# Patient Record
Sex: FEMALE | Race: WHITE | NOT HISPANIC OR LATINO | Employment: OTHER | URBAN - METROPOLITAN AREA
[De-identification: names, ages, dates, MRNs, and addresses within clinical notes are randomized per-mention and may not be internally consistent; named-entity substitution may affect disease eponyms.]

---

## 2017-02-10 ENCOUNTER — OFFICE VISIT (OUTPATIENT)
Dept: ENDOCRINOLOGY | Age: 72
End: 2017-02-10

## 2017-02-10 ENCOUNTER — TELEPHONE (OUTPATIENT)
Dept: SURGERY | Facility: CLINIC | Age: 72
End: 2017-02-10

## 2017-02-10 VITALS
WEIGHT: 221 LBS | HEART RATE: 72 BPM | DIASTOLIC BLOOD PRESSURE: 68 MMHG | BODY MASS INDEX: 39.16 KG/M2 | OXYGEN SATURATION: 98 % | SYSTOLIC BLOOD PRESSURE: 130 MMHG | HEIGHT: 63 IN

## 2017-02-10 DIAGNOSIS — E05.90 HYPERTHYROIDISM: Primary | ICD-10-CM

## 2017-02-10 DIAGNOSIS — R63.5 ABNORMAL WEIGHT GAIN: ICD-10-CM

## 2017-02-10 DIAGNOSIS — E05.00 GRAVES DISEASE: ICD-10-CM

## 2017-02-10 LAB
ALBUMIN SERPL-MCNC: 4.4 G/DL (ref 3.5–5.2)
ALBUMIN/GLOB SERPL: 1.5 G/DL
ALP SERPL-CCNC: 166 U/L (ref 39–117)
ALT SERPL-CCNC: 16 U/L (ref 1–33)
AST SERPL-CCNC: 30 U/L (ref 1–32)
BASOPHILS # BLD AUTO: 0.01 10*3/MM3 (ref 0–0.2)
BASOPHILS NFR BLD AUTO: 0.2 % (ref 0–1.5)
BILIRUB SERPL-MCNC: 0.4 MG/DL (ref 0.1–1.2)
BUN SERPL-MCNC: 14 MG/DL (ref 8–23)
BUN/CREAT SERPL: 11.1 (ref 7–25)
CALCIUM SERPL-MCNC: 9.5 MG/DL (ref 8.6–10.5)
CHLORIDE SERPL-SCNC: 104 MMOL/L (ref 98–107)
CO2 SERPL-SCNC: 19.9 MMOL/L (ref 22–29)
CREAT SERPL-MCNC: 1.26 MG/DL (ref 0.57–1)
EOSINOPHIL # BLD AUTO: 0.19 10*3/MM3 (ref 0–0.7)
EOSINOPHIL NFR BLD AUTO: 3.6 % (ref 0.3–6.2)
ERYTHROCYTE [DISTWIDTH] IN BLOOD BY AUTOMATED COUNT: 16 % (ref 11.7–13)
GLOBULIN SER CALC-MCNC: 3 GM/DL
GLUCOSE SERPL-MCNC: 104 MG/DL (ref 65–99)
HCT VFR BLD AUTO: 42.4 % (ref 35.6–45.5)
HGB BLD-MCNC: 13.9 G/DL (ref 11.9–15.5)
IMM GRANULOCYTES # BLD: 0 10*3/MM3 (ref 0–0.03)
IMM GRANULOCYTES NFR BLD: 0 % (ref 0–0.5)
LYMPHOCYTES # BLD AUTO: 1.49 10*3/MM3 (ref 0.9–4.8)
LYMPHOCYTES NFR BLD AUTO: 28.4 % (ref 19.6–45.3)
MCH RBC QN AUTO: 29.1 PG (ref 26.9–32)
MCHC RBC AUTO-ENTMCNC: 32.8 G/DL (ref 32.4–36.3)
MCV RBC AUTO: 88.9 FL (ref 80.5–98.2)
MONOCYTES # BLD AUTO: 0.25 10*3/MM3 (ref 0.2–1.2)
MONOCYTES NFR BLD AUTO: 4.8 % (ref 5–12)
NEUTROPHILS # BLD AUTO: 3.3 10*3/MM3 (ref 1.9–8.1)
NEUTROPHILS NFR BLD AUTO: 63 % (ref 42.7–76)
PLATELET # BLD AUTO: 133 10*3/MM3 (ref 140–500)
POTASSIUM SERPL-SCNC: 5.1 MMOL/L (ref 3.5–5.2)
PROT SERPL-MCNC: 7.4 G/DL (ref 6–8.5)
RBC # BLD AUTO: 4.77 10*6/MM3 (ref 3.9–5.2)
SODIUM SERPL-SCNC: 139 MMOL/L (ref 136–145)
T4 FREE SERPL-MCNC: 0.8 NG/DL (ref 0.93–1.7)
TSH SERPL DL<=0.005 MIU/L-ACNC: 0.09 MIU/ML (ref 0.27–4.2)
WBC # BLD AUTO: 5.24 10*3/MM3 (ref 4.5–10.7)

## 2017-02-10 PROCEDURE — 99214 OFFICE O/P EST MOD 30 MIN: CPT | Performed by: INTERNAL MEDICINE

## 2017-02-10 RX ORDER — ALLOPURINOL 300 MG/1
300 TABLET ORAL AS NEEDED
COMMUNITY
Start: 2017-01-20 | End: 2017-07-07

## 2017-02-10 NOTE — TELEPHONE ENCOUNTER
Patient called today with c/o golf-ball sized bulge at umb hernia repair site that has appeared over night. The bulge is hard. Has no redness, pain, drainage or change in bowel habits. I have scheduled the patient with Dr. Moore on 3/8/17 for a possible incisional hernia.     I will send a message to Dr. Moore to see if she would like to move up the patient or order imaging prior to her appointment.  Patient was also instructed to call our office if her bowel habits change or she the bulge starts to become painful leading up to her appointment on 3/8/17.       natanael    Move her up to next Monday, and no xrays    MD natanael Caicedo    That's fine    Patt Moore MD

## 2017-02-10 NOTE — PROGRESS NOTES
71 y.o.    Patient Care Team:  Moe Matute MD as PCP - General  Moe Matute MD as PCP - Family Medicine    Chief Complaint:    F/U HYPERTHYROID GRAVE'S DISEASE  Subjective     HPI   Hyperthyroidism, Graves' disease    Patient is a 71-year-old female with a history of large goiter and Graves' disease and hyperthyroidism came for followup.  Patient reported that she has been compliant with methimazole 10 mg twice daily. She denies any side effects. She is tolerating the medication well.  She denied any recent dysphagia other than occasional problem. She denies any change in her voice.  She reports that she does have shortness of breath on exertion.  She also reported that she started feeling much better after starting medication methimazole.  Abnormal weight gain.  Patient gained nearly 14 pounds in the past 2 months.  She denies any cold intolerance. No constipation       Interval History:     71-year-old female no previous history of pulmonary issues in the past admitted for the evaluation of left-sided abdominal pain. She's noted to have severe diverticular disease with abscess and presently ongoing evaluation. She gets short of breath with exertion. She's had workup with a CT chest results of which revealed a large thyroid mass causing tracheal deviation. Currently she denies any problems swallowing. She denies any cough but has some wheezing with exertion. No aspiration has been reported.  Patient's thyroid function was analyzed and TSH was suppressed with elevated. T4. With this picture of hyperthyroidism was concentrated for further managing patient's thyroid condition.      Patient reports very occasional dysphagia. She denies any change in voice. She does report to shortness of breath and is not clear as to the source of shortness of breath.  Patient denied any symptoms of palpitations or sweating episodes for tremors or diarrhea. She has had diverticulitis which has complicated the GI  symptoms.  She has lost weight but also reported that her appetite has been very low which could explain the weight loss      Patient frequently refers to Dr. Camarillo doing a surgery on the right side for thyroid mass in the past. Currently has a CT scan reveals a large thyroid mass on the left side pushing the trachea to the right and causing some tracheal compression.  Patient is currently being evaluated by general surgery, pulmonary, infection disease  Patient and family denied any family history of thyroid cancer. She's not known to have any thyroid dysfunction in the past other than the fact that she had a goiter on the right side of the chest which was removed several years agoher       The following portions of the patient's history were reviewed and updated as appropriate: allergies, current medications, past family history, past medical history, past social history, past surgical history and problem list.    Past Medical History   Diagnosis Date   • Diverticular disease    • Kidney calculi    • PONV (postoperative nausea and vomiting)    • Renal disorder      Family History   Problem Relation Age of Onset   • Heart disease Father      Social History     Social History   • Marital status:      Spouse name: N/A   • Number of children: N/A   • Years of education: N/A     Occupational History   • Not on file.     Social History Main Topics   • Smoking status: Never Smoker   • Smokeless tobacco: Not on file   • Alcohol use No   • Drug use: No   • Sexual activity: Defer     Other Topics Concern   • Not on file     Social History Narrative     Allergies   Allergen Reactions   • Penicillins Hives       Current Outpatient Prescriptions:   •  HYDROcodone-acetaminophen (NORCO) 7.5-325 MG per tablet, Take 1-2 tablets by mouth Every 8 (Eight) Hours As Needed for moderate pain (4-6) for up to 20 doses., Disp: 20 tablet, Rfl: 0  •  methIMAzole (TAPAZOLE) 10 MG tablet, Take 1 tablet by mouth 2 (Two) Times a Day  "With Meals., Disp: 60 tablet, Rfl: 5        Review of Systems   Constitutional: Negative for chills, fatigue and fever.   Eyes: Negative for visual disturbance.   Respiratory: Positive for shortness of breath.    Cardiovascular: Negative for chest pain and palpitations.   Gastrointestinal: Positive for diarrhea. Negative for constipation, nausea and vomiting.   Endocrine: Negative for polydipsia.   Genitourinary: Negative for frequency.   Neurological: Negative for numbness.   All other systems reviewed and are negative.      Objective       Vitals:    02/10/17 0941   BP: 130/68   Pulse: 72   SpO2: 98%   Weight: 221 lb (100 kg)   Height: 63\" (160 cm)     Body mass index is 39.15 kg/(m^2).      Physical Exam   Constitutional: She is oriented to person, place, and time. She appears well-developed and well-nourished.   Obese   HENT:   Head: Normocephalic and atraumatic.   Eyes: EOM are normal. Pupils are equal, round, and reactive to light.   Neck: Normal range of motion. Neck supple. No tracheal deviation present. No thyromegaly present.   Cardiovascular: Normal rate, regular rhythm, normal heart sounds and intact distal pulses.    Tachycardia   Pulmonary/Chest: Effort normal and breath sounds normal.   Abdominal: Soft. Bowel sounds are normal. She exhibits distension. There is no tenderness.   Musculoskeletal: Normal range of motion. She exhibits no edema.   Neurological: She is alert and oriented to person, place, and time. She has normal reflexes.   Skin: Skin is warm and dry. No erythema.   Psychiatric: She has a normal mood and affect. Her behavior is normal.   Nursing note and vitals reviewed.    Results Review:     I reviewed the patient's new clinical results.    Medical records reviewed  Summary:      Admission on 12/06/2016, Discharged on 12/06/2016   Component Date Value Ref Range Status   • Glucose 12/06/2016 119* 65 - 99 mg/dL Final   • BUN 12/06/2016 18  8 - 23 mg/dL Final   • Creatinine 12/06/2016 0.91  " 0.57 - 1.00 mg/dL Final   • Sodium 12/06/2016 138  136 - 145 mmol/L Final   • Potassium 12/06/2016 4.3  3.5 - 5.2 mmol/L Final   • Chloride 12/06/2016 103  98 - 107 mmol/L Final   • CO2 12/06/2016 23.6  22.0 - 29.0 mmol/L Final   • Calcium 12/06/2016 9.3  8.6 - 10.5 mg/dL Final   • Total Protein 12/06/2016 7.3  6.0 - 8.5 g/dL Final   • Albumin 12/06/2016 3.90  3.50 - 5.20 g/dL Final   • ALT (SGPT) 12/06/2016 15  1 - 33 U/L Final   • AST (SGOT) 12/06/2016 17  1 - 32 U/L Final   • Alkaline Phosphatase 12/06/2016 158* 39 - 117 U/L Final   • Total Bilirubin 12/06/2016 0.4  0.1 - 1.2 mg/dL Final   • eGFR Non African Amer 12/06/2016 61  >60 mL/min/1.73 Final   • Globulin 12/06/2016 3.4  gm/dL Final   • A/G Ratio 12/06/2016 1.1  g/dL Final   • BUN/Creatinine Ratio 12/06/2016 19.8  7.0 - 25.0 Final   • Anion Gap 12/06/2016 11.4  mmol/L Final   • Lipase 12/06/2016 36  13 - 60 U/L Final   • Color, UA 12/06/2016 Yellow  Yellow, Straw Final   • Appearance, UA 12/06/2016 Clear  Clear Final   • pH, UA 12/06/2016 5.5  5.0 - 8.0 Final   • Specific Gravity, UA 12/06/2016 1.014  1.005 - 1.030 Final   • Glucose, UA 12/06/2016 Negative  Negative Final   • Ketones, UA 12/06/2016 Negative  Negative Final   • Bilirubin, UA 12/06/2016 Negative  Negative Final   • Blood, UA 12/06/2016 Large (3+)* Negative Final   • Protein, UA 12/06/2016 Negative  Negative Final   • Leuk Esterase, UA 12/06/2016 Negative  Negative Final   • Nitrite, UA 12/06/2016 Negative  Negative Final   • Urobilinogen, UA 12/06/2016 0.2 E.U./dL  0.2 - 1.0 E.U./dL Final   • Extra Tube 12/06/2016 hold for add-on   Final    Auto resulted   • Extra Tube 12/06/2016 hold for add-on   Final    Auto resulted   • Extra Tube 12/06/2016 Hold for add-ons.   Final    Auto resulted.   • WBC 12/06/2016 5.71  4.50 - 10.70 10*3/mm3 Final   • RBC 12/06/2016 4.47  3.90 - 5.20 10*6/mm3 Final   • Hemoglobin 12/06/2016 12.4  11.9 - 15.5 g/dL Final   • Hematocrit 12/06/2016 38.4  35.6 - 45.5 %  Final   • MCV 12/06/2016 85.9  80.5 - 98.2 fL Final   • MCH 12/06/2016 27.7  26.9 - 32.0 pg Final   • MCHC 12/06/2016 32.3* 32.4 - 36.3 g/dL Final   • RDW 12/06/2016 14.9* 11.7 - 13.0 % Final   • RDW-SD 12/06/2016 45.9  37.0 - 54.0 fl Final   • MPV 12/06/2016 9.3  6.0 - 12.0 fL Final   • Platelets 12/06/2016 143  140 - 500 10*3/mm3 Final   • Neutrophil % 12/06/2016 69.6  42.7 - 76.0 % Final   • Lymphocyte % 12/06/2016 20.1  19.6 - 45.3 % Final   • Monocyte % 12/06/2016 6.0  5.0 - 12.0 % Final   • Eosinophil % 12/06/2016 3.5  0.3 - 6.2 % Final   • Basophil % 12/06/2016 0.4  0.0 - 1.5 % Final   • Immature Grans % 12/06/2016 0.4  0.0 - 0.5 % Final   • Neutrophils, Absolute 12/06/2016 3.98  1.90 - 8.10 10*3/mm3 Final   • Lymphocytes, Absolute 12/06/2016 1.15  0.90 - 4.80 10*3/mm3 Final   • Monocytes, Absolute 12/06/2016 0.34  0.20 - 1.20 10*3/mm3 Final   • Eosinophils, Absolute 12/06/2016 0.20  0.00 - 0.70 10*3/mm3 Final   • Basophils, Absolute 12/06/2016 0.02  0.00 - 0.20 10*3/mm3 Final   • Immature Grans, Absolute 12/06/2016 0.02  0.00 - 0.03 10*3/mm3 Final   • RBC, UA 12/06/2016 Too Numerous to Count* None Seen /HPF Final   • WBC, UA 12/06/2016 0-2* None Seen /HPF Final   • Bacteria, UA 12/06/2016 None Seen  None Seen /HPF Final   • Squamous Epithelial Cells, UA 12/06/2016 3-6* None Seen, 0-2 /HPF Final   • Hyaline Casts, UA 12/06/2016 0-2  None Seen /LPF Final   • Methodology 12/06/2016 Automated Microscopy   Final     Lab Results   Component Value Date    HGBA1C 4.70 (L) 09/10/2016     Lab Results   Component Value Date    LDLCALC 24 09/10/2016    CREATININE 0.91 12/06/2016     Imaging Results (most recent)     None                Assessment and Plan:    Diagnoses and all orders for this visit:    Hyperthyroidism  -     CBC & Differential  -     Comprehensive Metabolic Panel  -     T4, Free  -     TSH  -     US Thyroid; Future    Graves disease  -     CBC & Differential  -     Comprehensive Metabolic Panel  -      T4, Free  -     TSH  -     US Thyroid; Future    Abnormal weight gain  -     CBC & Differential  -     Comprehensive Metabolic Panel  -     T4, Free  -     TSH  -     US Thyroid; Future        Very large left thyroid lobe nodule or mass extending into the mediastinum and having mass effect on the trachea measuring 8.4 cm-needs a biopsy to determine if neoplastic  Hyperthyroidism  Abnormal weight loss  Acute diverticulitis  History of previous thyroid surgery again in the mediastinal region  Pancytopenia      Patient reports significant improvement in her symptoms since starting Tapazole 10 mg twice daily  The only problem she has now is increased appetite  She gained 14 pounds since last visit she denies any cold intolerance and heat intolerance    Patient will get lab testing done today  Further adjustments to medication will be made after the results have been reviewed      Patient will return to follow-up in 3 months.  The total time spent for old record and lab review and face- to- face was more than 25 min of which greater than 50% of time was spent on counseling the patient on recommended evaluation and treatment options, instructions for management/treatment and /or follow up  and importance of compliance with chosen management or treatment options    Garrison Alcaraz MD. FACE    02/10/17      EMR Dragon / transcription disclaimer:    Much of this encounter note is an electronic transcription/ translation of spoken language to printed text.  Electronic translation of spoken language may permit erroneous, or at times, nonsensical words or phrases to be inadvertently transcribed; although I have reviewed the note for such errors, some may still exist.

## 2017-02-13 ENCOUNTER — OFFICE VISIT (OUTPATIENT)
Dept: SURGERY | Facility: CLINIC | Age: 72
End: 2017-02-13

## 2017-02-13 VITALS — HEART RATE: 73 BPM | HEIGHT: 63 IN | WEIGHT: 221.2 LBS | OXYGEN SATURATION: 98 % | BODY MASS INDEX: 39.19 KG/M2

## 2017-02-13 DIAGNOSIS — R63.5 WEIGHT GAIN: ICD-10-CM

## 2017-02-13 DIAGNOSIS — Z86.010 HISTORY OF COLON POLYPS: ICD-10-CM

## 2017-02-13 DIAGNOSIS — K80.20 GALLSTONES: ICD-10-CM

## 2017-02-13 DIAGNOSIS — Z88.9 MULTIPLE DRUG ALLERGIES: ICD-10-CM

## 2017-02-13 DIAGNOSIS — E05.90 HYPERTHYROIDISM: ICD-10-CM

## 2017-02-13 DIAGNOSIS — K43.2 INCISIONAL HERNIA, WITHOUT OBSTRUCTION OR GANGRENE: Primary | ICD-10-CM

## 2017-02-13 DIAGNOSIS — Z87.898 HISTORY OF ABDOMINAL ABSCESS: ICD-10-CM

## 2017-02-13 PROCEDURE — 99214 OFFICE O/P EST MOD 30 MIN: CPT | Performed by: SURGERY

## 2017-02-13 RX ORDER — SULFAMETHOXAZOLE AND TRIMETHOPRIM 800; 160 MG/1; MG/1
TABLET ORAL
COMMUNITY
Start: 2017-02-02 | End: 2017-02-13

## 2017-02-13 RX ORDER — IBUPROFEN 200 MG
200 TABLET ORAL EVERY 6 HOURS PRN
Status: ON HOLD | COMMUNITY
End: 2017-09-27

## 2017-02-13 NOTE — PROGRESS NOTES
CC:   Chief Complaint   Patient presents with   • Hernia     Possible incisional hernia, no pain or bowel habit changes      Subjective:   Patt Bond is a 71 y.o. female who is seen today for an incisional hernia.     Her surgical history is significant for undergoing a laparoscopic low anterior resection with splenic flexure mobilization, drainage of pelvic abscess, left salpingoopherectomy, laparoscopic rectopexy and umbilical hernia repair with Dr. Moore on 9/14/2016 for pelvic abscess with suspected diverticulitis, and rectal prolapse. The pelvic abscess grew out 3+ E. Coli on culture. She was also noted to have gallstones prior to surgery, but a lap cosme was decided against due to the length of the case and the patient's vague symptoms.     At today's office visit, 2/13/2017, she denies any bowel symptoms with her incisional hernia or wound infection at the site of her visible incisional hernia at the umbilicus. She reports that she will eat a full meal, then feeling hungry 1/2 hour later. She reports an unexpected weight gain of 10 pounds in the last 2 months to 221 pounds. She denies any postprandial bloating.  She has noticed a mass at the site of her prior surgery and a bulge, some discomfort but very mild.    Due to her history of abscess of 3+ E. Coli, as well as her multiple antibiotic allergies, she would need Ertapenem perioperatively.     She has a history of hyperthyroidism, for which she takes methimazole, and is followed by Dr. Gomez. She reports that her next follow up with him is scheduled for 3 months' time.     Past Medical History   Diagnosis Date   • Colon polyps 12/10/2004     Proximal ascending polyps: tubulovillous adenoma, only mild dysplasia seen; distal ascending polyps: tubulovillous adenoma, only mild dysplasia seen   • Colon polyps 04/16/2001     Cecum biopsy: fragments of tubular adenoma, rectum biopsy: fragments of hyperplastic polyp   • Diverticulitis    •  Diverticulosis    • Endometrial polyp 2011, 2002 2011 Path findings: fragments of endometrial polyp with cystic hyperplasia (and no atypia) / 2002 Path findings:    • Hyperthyroidism      followed by Dr. Blackmon   • Kidney stones 2015     with cystoscopy by Dr. Miguel Monte done on 10/7/15, 9/9/15, 5/8/15, 9/21/07   • Obstructive pyelonephritis 09/28/2015     left obstructing pyelonephritis    • PONV (postoperative nausea and vomiting)    • Renal disorder      Past Surgical History   Procedure Laterality Date   • Sigmoidoscopy N/A 9/13/2016     Procedure: SIGMOIDOSCOPY FLEXIBLE TO 25 CM;  Surgeon: Patt Moore MD;  Location: [facility] ENDOSCOPY;  Service:    • Colon resection Left 9/14/2016     Laparoscopic Low Anterior Resection with splenic flexure mobilization, drainage of Pelvic Abscess (cultured 3+ E. Coli), Left salpingo-ophorectomy, laparoscopic rectopexy, and umbilical hernia repair, Dr. Patt Moore   • Umbilical hernia repair N/A 9/14/2016     Procedure: UMBILICAL HERNIA REPAIR ;  Surgeon: Patt Moore MD;  Location: [facility] OR;  Service:    • Cystoscopy w/ ureteral stent removal Left 10/07/2015     Cystoscopy with stent extraction, left ureteral occulusion balloon placement, percutanous nephrostolithotomy with stone volume less than 2.5 cm involving the left lower pole and the left proximal ureter, balloon tract dilation for establishment of nephrostomy tract, antegrade nephrostomy tube placement-Dr. Miguel Monte   • Cystoscopy, retrograde pyelogram and stent insertion Left 09/28/2015     Left cystoscopy with left retrograde pyelogram, left double-J stent placement-Dr. Rivera [facility]   • Cystoscopy, retrograde pyelogram and stent insertion Left 09/09/2015     Cystoscopy with bilateral retrograde pyelogram, left double-J stent placement, right ureteral pyeloscopy with laser lithotripsy of the ureteropelvic junction calculus, right double-J stent placement-Dr. Miguel Monte   •  Extracorporeal shockwave lithotripsy (eswl), stent insertion/removal Left 05/08/2015     Left extracoporeal shockwave lithotripsy, cystoscopy with stent placement-Dr. Miguel Monte   • D&c hysteroscopy N/A 05/18/2011     Procedure done due to thickened endomentrium and an endometrial polyp-Dr. Quita Cheatham   • Colonoscopy N/A 05/15/2008     sigmoid diverticulosis with blunting of the haustral folds and angulation consistent with previous diverticulitis, no polyps, suggestion of rectal prolapse-Dr. Patt Moore   • Cystoscopy, retrograde pyelogram and stent insertion Right 09/21/2007     Cystoscopy with right retrograde pyelogram, right biliary stent placement-Dr. Mgiuel Monte   • Cystoscopy, ureteroscopy, retrograde pyelogram, stent insertion Right 09/07/2007     Cystoscopy with right retrograde pyelogram, rigth ureteroscopy with extraction of distal mass, right double J stent placement-Dr. Miguel Monte   • Cystoscopy, retrograde pyelogram and stent insertion Right 09/07/2007     Cystoscopy with right retrograde pyelogram, right ureteral peyloscopy with extraction distal ureteral mass, right double J stent placement-Dr. Miguel Monte   • Colonoscopy w/ polypectomy N/A 12/10/2004     Diverticulosis with sigmoid perideverticulitis with erythema and patchiness around some of the diverticula, proximal ascedning colon polyp-approx 5 mm-removed via snare cauter, two distal ascending colon polyps-7 mm and 3 mm-removed via snare cautery polypectomy, hemorrhoids-Dr. Patt Moore   • Dilatation and curettage N/A 03/22/2002     D&C, polyp removal-Dr. Quita Cheatham   • Colonoscopy w/ biopsies and polypectomy N/A 04/16/2001     Possible mild gastritis w/ some appearance of formations that look like petechiae in the antrum, no ulcerations, no erosions; cecal polyp approx 4mm sessile; probable transverse colon polyp, although we could not visualize this completely upon pulling out; sigmoid diverticula; multiple  "rectal polyps, mostly w/ appearance of hyperplasia, largest being 5 to 6mm: removed via snare-Dr. Patt Moore   • Thoracotomy  1996     Thoracotomy for ectopic thyroid, Dr. Camarillo       Current Outpatient Prescriptions:   •  allopurinol (ZYLOPRIM) 300 MG tablet, Take 300 mg by mouth Daily., Disp: , Rfl:   •  ibuprofen (ADVIL,MOTRIN) 200 MG tablet, Take 200 mg by mouth Every 6 (Six) Hours As Needed for mild pain (1-3)., Disp: , Rfl:   •  methIMAzole (TAPAZOLE) 10 MG tablet, Take 1 tablet by mouth 2 (Two) Times a Day With Meals., Disp: 60 tablet, Rfl: 5    Allergies   Allergen Reactions   • Cephalexin Hives     Pt states she possibly is not allergic to Cephalexin, took and did not have problems   • Cephalosporins Hives   • Penicillins Hives     Family History   Problem Relation Age of Onset   • Heart disease Father      Social History   Substance Use Topics   • Smoking status: Never Smoker   • Smokeless tobacco: None   • Alcohol use No     Occupation/Social: She is retired from working for [employer]. She is , and would be cared for by her  if she needed surgery. She is under a moderate amount of stress and states that the most strenuous activity that she performs is ride a bike.     Preventative Medicine  Colonoscopy: 5/2008, she endorses a history of polyps; records reviewed, history of 4 TVA in 2004, normal colonoscopy in 2008  Mammogram: 12/28/2016    Review of Systems   Constitutional: Positive for appetite change and unexpected weight change (weight gain of 10 pounds in 2 months).   Respiratory: Positive for shortness of breath (sometimes shortness of breath).    Musculoskeletal: Positive for arthralgias (due to arthritis).   Hematological: Bruises/bleeds easily (easy bruising).   All other systems reviewed and are negative.    Objective:   Vitals:    02/13/17 1413   Pulse: 73   SpO2: 98%   Weight: 221 lb 3.2 oz (100 kg)   Height: 63\" (160 cm)     Body mass index is 39.18 " kg/(m^2).    Physical Exam   Constitutional: She is oriented to person, place, and time. She appears well-developed and well-nourished.   HENT:   Head: Normocephalic and atraumatic.   Right lip downward curve, chronic, not known to be associated with TIA   Eyes: Conjunctivae are normal. No scleral icterus.   Cardiovascular: Normal rate and regular rhythm.    No murmur heard.  Pulses:       Radial pulses are 2+ on the right side, and 2+ on the left side.        Posterior tibial pulses are 2+ on the right side, and 2+ on the left side.   No carotid bruits auscultated   Pulmonary/Chest: Effort normal and breath sounds normal. She has no wheezes.   Abdominal: Soft. Bowel sounds are normal. She exhibits no distension. There is no tenderness. Hernia confirmed negative in the right inguinal area and confirmed negative in the left inguinal area.   Laparoscopic incision scars and colon resection scar, and infrapannicular hyperpigmentation; a defect at the upper part of the umbilical incision with protrusion of approximately 3 cm, width of at least 4 cm   Musculoskeletal: She exhibits no deformity.   Gait with a limp   Lymphadenopathy:     She has no cervical adenopathy.     She has no axillary adenopathy.   Neurological: She is alert and oriented to person, place, and time.   Skin: Skin is warm and dry.   Psychiatric: She has a normal mood and affect. Her behavior is normal.      Diagnosis Plan   1. Incisional hernia, without obstruction or gangrene  - Discussed at today's office visit was the possibility of an incisional hernia repair. This would likely necessitate an overnight stay. Weight loss was recommended prior to surgery. We will plan to see her back in 6 weeks for a follow up office visit and to discuss weight loss.    2. Cholecystitis  - will discuss gallbladder surgery at follow up office visit, then decide laparoscopic vs open repair for incisional hernia   3. History of colon polyps - 4 Tubulovillous adenomas in  2004, normal colonoscopy in 2008     4. Hyperthyroidism - on methimazole  - Patient to discuss with Dr. Gomez if she is ok for surgery from the standpoint of her thyroid; to discuss at next office visit   5. History of abdominal abscess - 3+ E. coli at time of surgery 9/2016  - Would need to give Ertapenem due to abscess and multiple drug allergies   6. Multiple drug allergies to Cephalexin, Cephalosporins, Penicillins     7. Weight gain - 10 pounds in 2 months, unintentional  - Encouraged weight loss   8. Obesity (BMI 30-39.9)       Domi Mcbride PA-C, acting as a scribe for Dr. Patt Moore addendum    Ms. Bond comes in with an incisional hernia, likely in part due to her pelvic abscess with the Escherichia coli at the time of her surgery, abdominal distention associated with her surgery, her weight with a BMI of 39 now.  This is fairly sizable and will certainly require mesh and will almost certainly need to be a bridging mesh, unless we are willing to do a very extensive surgery with bilateral musculofascial advancement flaps.    With the placement of that mesh, any further entry into the abdominal cavity for attention to her gallstones we very difficult.  In the context of her vague symptoms, and the need for mesh, I would suggest that we take care of her gallbladder at the same time.    We discussed the fact that her weight will make her have greater risk for wound infection and seroma, and she quickly says she would like to lose weight prior to an attempted surgery.  We will thus see her back in 2 months to reassess, but at that time we'll likely schedule a laparoscopic cholecystectomy with an open incisional hernia repair with mesh.  Perioperative ertapenem    I personally performed the history physical exam medical decision making in this case.    Patt Moore MD

## 2017-02-14 PROBLEM — Z87.898 HISTORY OF ABDOMINAL ABSCESS: Status: ACTIVE | Noted: 2017-02-14

## 2017-02-14 PROBLEM — Z86.010 HISTORY OF COLON POLYPS: Status: ACTIVE | Noted: 2017-02-14

## 2017-02-14 PROBLEM — K43.2 INCISIONAL HERNIA, WITHOUT OBSTRUCTION OR GANGRENE: Status: ACTIVE | Noted: 2017-02-14

## 2017-02-14 PROBLEM — Z86.0100 HISTORY OF COLON POLYPS: Status: ACTIVE | Noted: 2017-02-14

## 2017-02-14 PROBLEM — Z88.9 MULTIPLE DRUG ALLERGIES: Status: ACTIVE | Noted: 2017-02-14

## 2017-02-14 PROBLEM — K81.9 CHOLECYSTITIS: Status: ACTIVE | Noted: 2017-02-14

## 2017-02-14 PROBLEM — R63.5 WEIGHT GAIN: Status: ACTIVE | Noted: 2017-02-14

## 2017-02-15 RX ORDER — METHIMAZOLE 10 MG/1
5 TABLET ORAL DAILY
Qty: 30 TABLET | Refills: 3 | Status: SHIPPED | OUTPATIENT
Start: 2017-02-15 | End: 2017-07-08 | Stop reason: SDUPTHER

## 2017-02-16 DIAGNOSIS — Z86.010 HISTORY OF COLON POLYPS: Primary | ICD-10-CM

## 2017-02-16 DIAGNOSIS — Z12.11 SCREEN FOR COLON CANCER: ICD-10-CM

## 2017-03-27 ENCOUNTER — OFFICE VISIT (OUTPATIENT)
Dept: SURGERY | Facility: CLINIC | Age: 72
End: 2017-03-27

## 2017-03-27 VITALS — BODY MASS INDEX: 40.36 KG/M2 | WEIGHT: 227.8 LBS | OXYGEN SATURATION: 96 % | HEIGHT: 63 IN | HEART RATE: 69 BPM

## 2017-03-27 DIAGNOSIS — E05.90 HYPERTHYROIDISM: ICD-10-CM

## 2017-03-27 DIAGNOSIS — K57.20 DIVERTICULITIS OF LARGE INTESTINE WITH ABSCESS WITHOUT BLEEDING: ICD-10-CM

## 2017-03-27 DIAGNOSIS — K43.2 INCISIONAL HERNIA, WITHOUT OBSTRUCTION OR GANGRENE: Primary | ICD-10-CM

## 2017-03-27 DIAGNOSIS — R74.8 ELEVATED ALKALINE PHOSPHATASE LEVEL: ICD-10-CM

## 2017-03-27 DIAGNOSIS — Z86.010 HISTORY OF COLON POLYPS: ICD-10-CM

## 2017-03-27 DIAGNOSIS — K81.9 CHOLECYSTITIS: ICD-10-CM

## 2017-03-27 DIAGNOSIS — Z87.898 HISTORY OF ABDOMINAL ABSCESS: ICD-10-CM

## 2017-03-27 DIAGNOSIS — Z88.9 MULTIPLE DRUG ALLERGIES: ICD-10-CM

## 2017-03-27 DIAGNOSIS — E05.00 GRAVES DISEASE: ICD-10-CM

## 2017-03-27 DIAGNOSIS — N20.0 NEPHROLITHIASIS: ICD-10-CM

## 2017-03-27 PROCEDURE — 99214 OFFICE O/P EST MOD 30 MIN: CPT | Performed by: SURGERY

## 2017-03-27 RX ORDER — SODIUM CHLORIDE 0.9 % (FLUSH) 0.9 %
1-10 SYRINGE (ML) INJECTION AS NEEDED
Status: CANCELLED | OUTPATIENT
Start: 2017-03-27

## 2017-03-28 NOTE — PROGRESS NOTES
CC:   Chief Complaint   Patient presents with   • Follow-up     F/up weight loss, discuss lap cholecystectomy and open incisional hernia repair       Subjective:   Patt Bond is a 71 y.o. female who is seen today for an incisional hernia.  Unfortunately, she is here in follow-up after her last visit we encouraged weight loss, siting the increased risk of the surgery associated with her BMI, she is actually gaining weight.  She states that she has been exercising, doing well on the bike, walking extensively, and cutting back on eating without success.    Her surgical history is significant for undergoing a laparoscopic low anterior resection with splenic flexure mobilization, drainage of pelvic abscess, left salpingoopherectomy, laparoscopic rectopexy and umbilical hernia repair with Dr. Moore on 9/14/2016 for pelvic abscess with suspected diverticulitis, and rectal prolapse. The pelvic abscess grew out 3+ E. Coli on culture, treated with invanz despite her allergies, based on her cultures.   She was also noted to have gallstones prior to surgery, but a lap cosme was decided against due to the length of the case and the patient's vague symptoms.  Those vague symptoms persist with discomfort in the right upper quadrant.    At today's office visit, she continues to note pain at the site of her hernia, mild to moderate, but with visible increase in size.  She has also developed a pulling sensation to the right side of the hernia.    She has a history of hyperthyroidism, and has seen Dr Gomez,who has reduced her dose of methimazole.      Past Medical History:   Diagnosis Date   • Colon polyps 12/10/2004    Proximal ascending polyps: tubulovillous adenoma, only mild dysplasia seen; distal ascending polyps: tubulovillous adenoma, only mild dysplasia seen   • Colon polyps 04/16/2001    Cecum biopsy: fragments of tubular adenoma, rectum biopsy: fragments of hyperplastic polyp   • Diverticulitis    •  Diverticulosis    • Endometrial polyp 2011, 2002 2011 Path findings: fragments of endometrial polyp with cystic hyperplasia (and no atypia) / 2002 Path findings:    • Hyperthyroidism     followed by Dr. Blackmon   • Kidney stones 2015    with cystoscopy by Dr. Miguel Monte done on 10/7/15, 9/9/15, 5/8/15, 9/21/07   • Obstructive pyelonephritis 09/28/2015    left obstructing pyelonephritis    • PONV (postoperative nausea and vomiting)    • Renal disorder      Past Surgical History:   Procedure Laterality Date   • COLON RESECTION Left 9/14/2016    Laparoscopic Low Anterior Resection with splenic flexure mobilization, drainage of Pelvic Abscess (cultured 3+ E. Coli), Left salpingo-ophorectomy, laparoscopic rectopexy, and umbilical hernia repair, Dr. Patt Moore   • COLONOSCOPY N/A 05/15/2008    sigmoid diverticulosis with blunting of the haustral folds and angulation consistent with previous diverticulitis, no polyps, suggestion of rectal prolapse-Dr. Patt Moore   • COLONOSCOPY W/ BIOPSIES AND POLYPECTOMY N/A 04/16/2001    Possible mild gastritis w/ some appearance of formations that look like petechiae in the antrum, no ulcerations, no erosions; cecal polyp approx 4mm sessile; probable transverse colon polyp, although we could not visualize this completely upon pulling out; sigmoid diverticula; multiple rectal polyps, mostly w/ appearance of hyperplasia, largest being 5 to 6mm: removed via snare-Dr. Patt Moore   • COLONOSCOPY W/ POLYPECTOMY N/A 12/10/2004    Diverticulosis with sigmoid perideverticulitis with erythema and patchiness around some of the diverticula, proximal ascedning colon polyp-approx 5 mm-removed via snare cauter, two distal ascending colon polyps-7 mm and 3 mm-removed via snare cautery polypectomy, hemorrhoids-Dr. Patt Moore   • CYSTOSCOPY W/ URETERAL STENT REMOVAL Left 10/07/2015    Cystoscopy with stent extraction, left ureteral occulusion balloon placement, percutanous  nephrostolithotomy with stone volume less than 2.5 cm involving the left lower pole and the left proximal ureter, balloon tract dilation for establishment of nephrostomy tract, antegrade nephrostomy tube placement-Dr. Miguel Monte   • CYSTOSCOPY, RETROGRADE PYELOGRAM AND STENT INSERTION Left 09/28/2015    Left cystoscopy with left retrograde pyelogram, left double-J stent placement-Dr. Rivera [facility]   • CYSTOSCOPY, RETROGRADE PYELOGRAM AND STENT INSERTION Left 09/09/2015    Cystoscopy with bilateral retrograde pyelogram, left double-J stent placement, right ureteral pyeloscopy with laser lithotripsy of the ureteropelvic junction calculus, right double-J stent placement-Dr. Miguel Monte   • CYSTOSCOPY, RETROGRADE PYELOGRAM AND STENT INSERTION Right 09/21/2007    Cystoscopy with right retrograde pyelogram, right biliary stent placement-Dr. Miguel Monte   • CYSTOSCOPY, RETROGRADE PYELOGRAM AND STENT INSERTION Right 09/07/2007    Cystoscopy with right retrograde pyelogram, right ureteral peyloscopy with extraction distal ureteral mass, right double J stent placement-Dr. Miguel Monte   • CYSTOSCOPY, URETEROSCOPY, RETROGRADE PYELOGRAM, STENT INSERTION Right 09/07/2007    Cystoscopy with right retrograde pyelogram, rigth ureteroscopy with extraction of distal mass, right double J stent placement-Dr. Miguel Monte   • D&C HYSTEROSCOPY N/A 05/18/2011    Procedure done due to thickened endomentrium and an endometrial polyp-Dr. Quita Cheatham   • DILATATION AND CURETTAGE N/A 03/22/2002    D&C, polyp removal-Dr. Quita Cheatham   • EXTRACORPOREAL SHOCKWAVE LITHOTRIPSY (ESWL), STENT INSERTION/REMOVAL Left 05/08/2015    Left extracoporeal shockwave lithotripsy, cystoscopy with stent placement-Dr. Miguel Monte   • SIGMOIDOSCOPY N/A 9/13/2016    Procedure: SIGMOIDOSCOPY FLEXIBLE TO 25 CM;  Surgeon: Patt Moore MD;  Location: [facility] ENDOSCOPY;  Service:    • THORACOTOMY  1996    Thoracotomy for ectopic  "thyroid, Dr. Camarillo   • UMBILICAL HERNIA REPAIR N/A 9/14/2016    Procedure: UMBILICAL HERNIA REPAIR ;  Surgeon: Patt Moore MD;  Location: [facility];  Service:        Current Outpatient Prescriptions:   •  allopurinol (ZYLOPRIM) 300 MG tablet, Take 300 mg by mouth Daily., Disp: , Rfl:   •  ibuprofen (ADVIL,MOTRIN) 200 MG tablet, Take 200 mg by mouth Every 6 (Six) Hours As Needed for mild pain (1-3)., Disp: , Rfl:   •  methIMAzole (TAPAZOLE) 10 MG tablet, Take 0.5 tablets by mouth Daily., Disp: 30 tablet, Rfl: 3    Allergies   Allergen Reactions   • Cephalexin Hives     Pt states she possibly is not allergic to Cephalexin, took and did not have problems   • Cephalosporins Hives   • Penicillins Hives     Family History   Problem Relation Age of Onset   • Heart disease Father      Social History   Substance Use Topics   • Smoking status: Never Smoker   • Smokeless tobacco: None   • Alcohol use No     Occupation/Social: She is retired from working for [employer]. She is , and would be cared for by her  if she needed surgery. She is under a moderate amount of stress and states that the most strenuous activity that she performs is ride a bike.     Preventative Medicine  Colonoscopy: 5/2008, she endorses a history of polyps; records reviewed, history of 4 TVA in 2004, normal colonoscopy in 2008  Mammogram: 12/28/2016    Review of Systems   Constitutional: Positive for appetite change and unexpected weight change (weight gain of 10 pounds in 2 months).   Respiratory: Positive for shortness of breath (sometimes shortness of breath).    Musculoskeletal: Positive for arthralgias (due to arthritis).   Hematological: Bruises/bleeds easily (easy bruising).   All other systems reviewed and are negative.    Objective:   Vitals:    03/27/17 1428   Pulse: 69   SpO2: 96%   Weight: 227 lb 12.8 oz (103 kg)   Height: 63\" (160 cm)     Body mass index is 40.35 kg/(m^2).    Physical Exam   Constitutional: She " is oriented to person, place, and time. She appears well-developed and well-nourished.   HENT:   Head: Normocephalic and atraumatic.   Right lip downward curve, chronic, not known to be associated with TIA   Eyes: Conjunctivae are normal. No scleral icterus.   Cardiovascular: Normal rate and regular rhythm.    No murmur heard.  Pulses:       Radial pulses are 2+ on the right side, and 2+ on the left side.        Posterior tibial pulses are 2+ on the right side, and 2+ on the left side.   No carotid bruits auscultated   Pulmonary/Chest: Effort normal and breath sounds normal. She has no wheezes.   Abdominal: Soft. Bowel sounds are normal. She exhibits no distension. There is no tenderness. Hernia confirmed negative in the right inguinal area and confirmed negative in the left inguinal area.   Laparoscopic incision scars and colon resection scar, and infrapannicular hyperpigmentation; a defect at the upper part of the umbilical incision with protrusion of approximately 3 cm elevated from the skin, width of at least 4 cm, external size 9 cm, increased notably   Musculoskeletal: She exhibits no deformity.   Gait with a limp   Lymphadenopathy:     She has no cervical adenopathy.     She has no axillary adenopathy.   Neurological: She is alert and oriented to person, place, and time.   Skin: Skin is warm and dry.   Psychiatric: She has a normal mood and affect. Her behavior is normal.      Diagnosis Plan   1. Incisional hernia, without obstruction or gangrene, increasing in size - incisional hernia repair.  We will need to use a bridging mesh or do bilateral musculofascial advancement flaps, which I think is probably going to be the better option in her, she being very concerned about the potential for mesh complications, and not wanting her bowel to be exposed mesh    2. Cholecystitis, with gallstones, and elevated alkaline phosphatase  - laparoscopic cholecystectomy with x-ray to begin the case through the defect.  I     3. History of colon polyps - 4 Tubulovillous adenomas in 2004, normal colonoscopy in 2008  -colonoscopy after recuperated from surgery    4. Hyperthyroidism - on methimazole  - continue medication as recommended by Dr. Hardin    5. History of abdominal abscess - 3+ E. coli at time of surgery 9/2016  - Ertapenem due to abscess at incision site, and multiple drug allergies   6. Multiple drug allergies to Cephalexin, Cephalosporins, Penicillins     7. Weight gain - 10 pounds in 2 months, unintentional  - seems that encouragement to lose weight is not going to be effective and thus we will need to go ahead and proceed with surgery, and now encumbering her ability and activity    8. Obesity (BMI 30-39.9)     9  kidney stone, with increasing creatinine, now up to 1.26.  I'm concerned about proceeding with our surgery until her kidney function is cleared up  - will discuss with Dr Rl Monte.  Have instructed her to proceed with scheduling once she is cleared from the kidney perspective.           [facility] addendum    Ms. Bond comes in with an incisional hernia, likely in part due to her pelvic abscess with the Escherichia coli at the time of her surgery, abdominal distention associated with her surgery, her weight with a BMI of 39 now.  This is fairly sizable and will certainly require mesh and will almost certainly need to be a bridging mesh, unless we are willing to do a very extensive surgery with bilateral musculofascial advancement flaps.    With the placement of that mesh, any further entry into the abdominal cavity for attention to her gallstones we very difficult.  In the context of her vague symptoms, and the need for mesh, I would suggest that we take care of her gallbladder at the same time.    We discussed the fact that her weight will make her have greater risk for wound infection and seroma, and she has been unable to lose that weight despite attempts to do so.  We will proceed with surgery.    Patt  MD Oscar    Addendum 04/26/17    Patient was seen by Dr Rl Monte and then referred to Dr Matute due to concerns that her creatinine was elevated from something other than kidney stone.  Dr Matute's labs did not reflect DM and her TSH is now normal at 0.49.      Will plan for PRP for hernia flaps and liver biopsy with elevated alkphos and obesity.    Patt Moore MD

## 2017-04-13 ENCOUNTER — DOCUMENTATION (OUTPATIENT)
Dept: SURGERY | Facility: CLINIC | Age: 72
End: 2017-04-13

## 2017-04-14 NOTE — PROGRESS NOTES
Discussed with Dr Rl Monte. She does not require urologic intervention prior to my surgery, but he has recommended that she follow-up with her PCP about her glucose levels with concern about increase in creatinine not being attributable to her kidney stones which are nonobstructing

## 2017-04-21 ENCOUNTER — TELEPHONE (OUTPATIENT)
Dept: SURGERY | Facility: CLINIC | Age: 72
End: 2017-04-21

## 2017-04-21 ENCOUNTER — DOCUMENTATION (OUTPATIENT)
Dept: SURGERY | Facility: CLINIC | Age: 72
End: 2017-04-21

## 2017-04-21 NOTE — PROGRESS NOTES
natanael    I discussed with patient scheduling her surgery which could be quite extensive and would entail a cholecystectomy and large hernia repair with mesh, with advancement flaps.    She has been cleared by Dr Rl Monte, after seeing Dr Bangura as well.  However, i would like to make sure that she is OK from a thyroid standpoint.      I've written this note to forward to Dr Quigley so that he can comment if further adjustment of her medications is advisable prior to surgery.    Patt Moore MD

## 2017-04-24 ENCOUNTER — TELEPHONE (OUTPATIENT)
Dept: SURGERY | Facility: CLINIC | Age: 72
End: 2017-04-24

## 2017-04-26 NOTE — TELEPHONE ENCOUNTER
Kym,    I put in a case request for her for c scope and also surgery.  Left message for her to call our schedulers.    Patt Moore MD

## 2017-05-12 ENCOUNTER — PREP FOR SURGERY (OUTPATIENT)
Dept: SURGERY | Facility: CLINIC | Age: 72
End: 2017-05-12

## 2017-05-12 ENCOUNTER — APPOINTMENT (OUTPATIENT)
Dept: PREADMISSION TESTING | Facility: HOSPITAL | Age: 72
End: 2017-05-12

## 2017-05-12 VITALS
HEART RATE: 80 BPM | HEIGHT: 62 IN | TEMPERATURE: 98.4 F | DIASTOLIC BLOOD PRESSURE: 76 MMHG | RESPIRATION RATE: 20 BRPM | WEIGHT: 235 LBS | OXYGEN SATURATION: 95 % | SYSTOLIC BLOOD PRESSURE: 146 MMHG | BODY MASS INDEX: 43.24 KG/M2

## 2017-05-12 DIAGNOSIS — K43.2 INCISIONAL HERNIA, WITHOUT OBSTRUCTION OR GANGRENE: ICD-10-CM

## 2017-05-12 LAB
ABO GROUP BLD: NORMAL
ALBUMIN SERPL-MCNC: 3.9 G/DL (ref 3.5–5.2)
ALBUMIN/GLOB SERPL: 1.1 G/DL
ALP SERPL-CCNC: 113 U/L (ref 39–117)
ALT SERPL W P-5'-P-CCNC: 18 U/L (ref 1–33)
ANION GAP SERPL CALCULATED.3IONS-SCNC: 11.9 MMOL/L
AST SERPL-CCNC: 22 U/L (ref 1–32)
BASOPHILS # BLD AUTO: 0.01 10*3/MM3 (ref 0–0.2)
BASOPHILS NFR BLD AUTO: 0.2 % (ref 0–1.5)
BILIRUB SERPL-MCNC: 0.7 MG/DL (ref 0.1–1.2)
BLD GP AB SCN SERPL QL: NEGATIVE
BUN BLD-MCNC: 17 MG/DL (ref 8–23)
BUN/CREAT SERPL: 16.3 (ref 7–25)
CALCIUM SPEC-SCNC: 9.2 MG/DL (ref 8.6–10.5)
CHLORIDE SERPL-SCNC: 104 MMOL/L (ref 98–107)
CO2 SERPL-SCNC: 24.1 MMOL/L (ref 22–29)
CREAT BLD-MCNC: 1.04 MG/DL (ref 0.57–1)
DEPRECATED RDW RBC AUTO: 46.6 FL (ref 37–54)
EOSINOPHIL # BLD AUTO: 0.27 10*3/MM3 (ref 0–0.7)
EOSINOPHIL NFR BLD AUTO: 4.8 % (ref 0.3–6.2)
ERYTHROCYTE [DISTWIDTH] IN BLOOD BY AUTOMATED COUNT: 13.7 % (ref 11.7–13)
GFR SERPL CREATININE-BSD FRML MDRD: 52 ML/MIN/1.73
GLOBULIN UR ELPH-MCNC: 3.6 GM/DL
GLUCOSE BLD-MCNC: 123 MG/DL (ref 65–99)
HCT VFR BLD AUTO: 41.9 % (ref 35.6–45.5)
HGB BLD-MCNC: 13.9 G/DL (ref 11.9–15.5)
IMM GRANULOCYTES # BLD: 0 10*3/MM3 (ref 0–0.03)
IMM GRANULOCYTES NFR BLD: 0 % (ref 0–0.5)
LYMPHOCYTES # BLD AUTO: 1.42 10*3/MM3 (ref 0.9–4.8)
LYMPHOCYTES NFR BLD AUTO: 25.1 % (ref 19.6–45.3)
MCH RBC QN AUTO: 30.8 PG (ref 26.9–32)
MCHC RBC AUTO-ENTMCNC: 33.2 G/DL (ref 32.4–36.3)
MCV RBC AUTO: 92.9 FL (ref 80.5–98.2)
MONOCYTES # BLD AUTO: 0.36 10*3/MM3 (ref 0.2–1.2)
MONOCYTES NFR BLD AUTO: 6.4 % (ref 5–12)
NEUTROPHILS # BLD AUTO: 3.59 10*3/MM3 (ref 1.9–8.1)
NEUTROPHILS NFR BLD AUTO: 63.5 % (ref 42.7–76)
PLATELET # BLD AUTO: 109 10*3/MM3 (ref 140–500)
PMV BLD AUTO: 9.5 FL (ref 6–12)
POTASSIUM BLD-SCNC: 4.2 MMOL/L (ref 3.5–5.2)
PROT SERPL-MCNC: 7.5 G/DL (ref 6–8.5)
RBC # BLD AUTO: 4.51 10*6/MM3 (ref 3.9–5.2)
RH BLD: POSITIVE
SODIUM BLD-SCNC: 140 MMOL/L (ref 136–145)
WBC NRBC COR # BLD: 5.65 10*3/MM3 (ref 4.5–10.7)

## 2017-05-12 PROCEDURE — 86900 BLOOD TYPING SEROLOGIC ABO: CPT | Performed by: SURGERY

## 2017-05-12 PROCEDURE — 86850 RBC ANTIBODY SCREEN: CPT | Performed by: SURGERY

## 2017-05-12 PROCEDURE — 93010 ELECTROCARDIOGRAM REPORT: CPT | Performed by: INTERNAL MEDICINE

## 2017-05-12 PROCEDURE — 85025 COMPLETE CBC W/AUTO DIFF WBC: CPT | Performed by: SURGERY

## 2017-05-12 PROCEDURE — 36415 COLL VENOUS BLD VENIPUNCTURE: CPT

## 2017-05-12 PROCEDURE — 93005 ELECTROCARDIOGRAM TRACING: CPT

## 2017-05-12 PROCEDURE — 80053 COMPREHEN METABOLIC PANEL: CPT | Performed by: SURGERY

## 2017-05-12 PROCEDURE — 86901 BLOOD TYPING SEROLOGIC RH(D): CPT | Performed by: SURGERY

## 2017-05-15 ENCOUNTER — TELEPHONE (OUTPATIENT)
Dept: SURGERY | Facility: CLINIC | Age: 72
End: 2017-05-15

## 2017-05-17 ENCOUNTER — ANESTHESIA EVENT (OUTPATIENT)
Dept: PERIOP | Facility: HOSPITAL | Age: 72
End: 2017-05-17

## 2017-05-17 ENCOUNTER — APPOINTMENT (OUTPATIENT)
Dept: GENERAL RADIOLOGY | Facility: HOSPITAL | Age: 72
End: 2017-05-17

## 2017-05-17 ENCOUNTER — ANESTHESIA (OUTPATIENT)
Dept: PERIOP | Facility: HOSPITAL | Age: 72
End: 2017-05-17

## 2017-05-17 ENCOUNTER — HOSPITAL ENCOUNTER (INPATIENT)
Facility: HOSPITAL | Age: 72
LOS: 3 days | Discharge: HOME | End: 2017-05-20
Attending: SURGERY | Admitting: SURGERY

## 2017-05-17 DIAGNOSIS — K43.2 INCISIONAL HERNIA, WITHOUT OBSTRUCTION OR GANGRENE: ICD-10-CM

## 2017-05-17 PROCEDURE — 25010000002 ONDANSETRON PER 1 MG: Performed by: NURSE ANESTHETIST, CERTIFIED REGISTERED

## 2017-05-17 PROCEDURE — 25010000002 HYDROMORPHONE PER 4 MG: Performed by: SURGERY

## 2017-05-17 PROCEDURE — C1781 MESH (IMPLANTABLE): HCPCS | Performed by: SURGERY

## 2017-05-17 PROCEDURE — 25010000002 ONDANSETRON PER 1 MG: Performed by: SURGERY

## 2017-05-17 PROCEDURE — 88304 TISSUE EXAM BY PATHOLOGIST: CPT | Performed by: SURGERY

## 2017-05-17 PROCEDURE — 88302 TISSUE EXAM BY PATHOLOGIST: CPT | Performed by: SURGERY

## 2017-05-17 PROCEDURE — 49568 PR IMPLANT MESH HERNIA REPAIR/DEBRIDEMENT CLOSURE: CPT | Performed by: SURGERY

## 2017-05-17 PROCEDURE — 25010000002 VANCOMYCIN PER 500 MG

## 2017-05-17 PROCEDURE — 25010000002 MIDAZOLAM PER 1 MG: Performed by: ANESTHESIOLOGY

## 2017-05-17 PROCEDURE — 15734 MUSCLE-SKIN GRAFT TRUNK: CPT | Performed by: SURGERY

## 2017-05-17 PROCEDURE — 49560 PR REPAIR INCISIONAL HERNIA,REDUCIBLE: CPT | Performed by: PHYSICIAN ASSISTANT

## 2017-05-17 PROCEDURE — 25010000002 PROPOFOL 10 MG/ML EMULSION: Performed by: ANESTHESIOLOGY

## 2017-05-17 PROCEDURE — 25010000002 PROMETHAZINE PER 50 MG: Performed by: SURGERY

## 2017-05-17 PROCEDURE — 74300 X-RAY BILE DUCTS/PANCREAS: CPT

## 2017-05-17 PROCEDURE — 49568 PR IMPLANT MESH HERNIA REPAIR/DEBRIDEMENT CLOSURE: CPT | Performed by: PHYSICIAN ASSISTANT

## 2017-05-17 PROCEDURE — BF131ZZ FLUOROSCOPY OF GALLBLADDER AND BILE DUCTS USING LOW OSMOLAR CONTRAST: ICD-10-PCS | Performed by: SURGERY

## 2017-05-17 PROCEDURE — 25010000002 HYDROMORPHONE PER 4 MG: Performed by: NURSE ANESTHETIST, CERTIFIED REGISTERED

## 2017-05-17 PROCEDURE — 0WUF0JZ SUPPLEMENT ABDOMINAL WALL WITH SYNTHETIC SUBSTITUTE, OPEN APPROACH: ICD-10-PCS | Performed by: SURGERY

## 2017-05-17 PROCEDURE — 25010000002 KETOROLAC TROMETHAMINE PER 15 MG: Performed by: SURGERY

## 2017-05-17 PROCEDURE — 94799 UNLISTED PULMONARY SVC/PX: CPT

## 2017-05-17 PROCEDURE — 25010000002 FENTANYL CITRATE (PF) 100 MCG/2ML SOLUTION: Performed by: ANESTHESIOLOGY

## 2017-05-17 PROCEDURE — 49560 PR REPAIR INCISIONAL HERNIA,REDUCIBLE: CPT | Performed by: SURGERY

## 2017-05-17 PROCEDURE — 0FT44ZZ RESECTION OF GALLBLADDER, PERCUTANEOUS ENDOSCOPIC APPROACH: ICD-10-PCS | Performed by: SURGERY

## 2017-05-17 PROCEDURE — 15734 MUSCLE-SKIN GRAFT TRUNK: CPT | Performed by: PHYSICIAN ASSISTANT

## 2017-05-17 PROCEDURE — 0 IOPAMIDOL PER 1 ML: Performed by: SURGERY

## 2017-05-17 PROCEDURE — 25010000002 HYDRALAZINE PER 20 MG: Performed by: NURSE ANESTHETIST, CERTIFIED REGISTERED

## 2017-05-17 PROCEDURE — 47563 LAPARO CHOLECYSTECTOMY/GRAPH: CPT | Performed by: PHYSICIAN ASSISTANT

## 2017-05-17 PROCEDURE — 25010000002 SUCCINYLCHOLINE PER 20 MG: Performed by: ANESTHESIOLOGY

## 2017-05-17 PROCEDURE — 47563 LAPARO CHOLECYSTECTOMY/GRAPH: CPT | Performed by: SURGERY

## 2017-05-17 DEVICE — MESH TISS REINFORCEMENT BIO/A 9X15CM: Type: IMPLANTABLE DEVICE | Site: ABDOMEN | Status: FUNCTIONAL

## 2017-05-17 RX ORDER — ALBUTEROL SULFATE 2.5 MG/3ML
2.5 SOLUTION RESPIRATORY (INHALATION) ONCE AS NEEDED
Status: DISCONTINUED | OUTPATIENT
Start: 2017-05-17 | End: 2017-05-17 | Stop reason: HOSPADM

## 2017-05-17 RX ORDER — LABETALOL HYDROCHLORIDE 5 MG/ML
5 INJECTION, SOLUTION INTRAVENOUS
Status: DISCONTINUED | OUTPATIENT
Start: 2017-05-17 | End: 2017-05-17 | Stop reason: HOSPADM

## 2017-05-17 RX ORDER — OXYCODONE AND ACETAMINOPHEN 10; 325 MG/1; MG/1
1 TABLET ORAL EVERY 4 HOURS PRN
Status: DISCONTINUED | OUTPATIENT
Start: 2017-05-17 | End: 2017-05-20 | Stop reason: HOSPADM

## 2017-05-17 RX ORDER — FENTANYL CITRATE 50 UG/ML
50 INJECTION, SOLUTION INTRAMUSCULAR; INTRAVENOUS
Status: DISCONTINUED | OUTPATIENT
Start: 2017-05-17 | End: 2017-05-17 | Stop reason: HOSPADM

## 2017-05-17 RX ORDER — SODIUM CHLORIDE, SODIUM LACTATE, POTASSIUM CHLORIDE, CALCIUM CHLORIDE 600; 310; 30; 20 MG/100ML; MG/100ML; MG/100ML; MG/100ML
100 INJECTION, SOLUTION INTRAVENOUS CONTINUOUS
Status: DISCONTINUED | OUTPATIENT
Start: 2017-05-17 | End: 2017-05-18

## 2017-05-17 RX ORDER — OXYCODONE AND ACETAMINOPHEN 7.5; 325 MG/1; MG/1
1 TABLET ORAL ONCE AS NEEDED
Status: DISCONTINUED | OUTPATIENT
Start: 2017-05-17 | End: 2017-05-20 | Stop reason: HOSPADM

## 2017-05-17 RX ORDER — METHIMAZOLE 5 MG/1
5 TABLET ORAL NIGHTLY
Status: DISCONTINUED | OUTPATIENT
Start: 2017-05-17 | End: 2017-05-20 | Stop reason: HOSPADM

## 2017-05-17 RX ORDER — PROMETHAZINE HYDROCHLORIDE 25 MG/ML
12.5 INJECTION, SOLUTION INTRAMUSCULAR; INTRAVENOUS EVERY 6 HOURS PRN
Status: DISCONTINUED | OUTPATIENT
Start: 2017-05-17 | End: 2017-05-20 | Stop reason: HOSPADM

## 2017-05-17 RX ORDER — PROPOFOL 10 MG/ML
VIAL (ML) INTRAVENOUS AS NEEDED
Status: DISCONTINUED | OUTPATIENT
Start: 2017-05-17 | End: 2017-05-17 | Stop reason: SURG

## 2017-05-17 RX ORDER — DEXTROSE, SODIUM CHLORIDE, AND POTASSIUM CHLORIDE 5; .45; .15 G/100ML; G/100ML; G/100ML
30 INJECTION INTRAVENOUS CONTINUOUS
Status: DISCONTINUED | OUTPATIENT
Start: 2017-05-17 | End: 2017-05-20 | Stop reason: HOSPADM

## 2017-05-17 RX ORDER — PROMETHAZINE HYDROCHLORIDE 25 MG/ML
5 INJECTION, SOLUTION INTRAMUSCULAR; INTRAVENOUS
Status: DISCONTINUED | OUTPATIENT
Start: 2017-05-17 | End: 2017-05-17 | Stop reason: HOSPADM

## 2017-05-17 RX ORDER — PROMETHAZINE HYDROCHLORIDE 25 MG/1
25 TABLET ORAL ONCE AS NEEDED
Status: DISCONTINUED | OUTPATIENT
Start: 2017-05-17 | End: 2017-05-17 | Stop reason: HOSPADM

## 2017-05-17 RX ORDER — LIDOCAINE HYDROCHLORIDE 20 MG/ML
INJECTION, SOLUTION INFILTRATION; PERINEURAL AS NEEDED
Status: DISCONTINUED | OUTPATIENT
Start: 2017-05-17 | End: 2017-05-17 | Stop reason: SURG

## 2017-05-17 RX ORDER — HYDROMORPHONE HYDROCHLORIDE 1 MG/ML
0.5 INJECTION, SOLUTION INTRAMUSCULAR; INTRAVENOUS; SUBCUTANEOUS
Status: DISCONTINUED | OUTPATIENT
Start: 2017-05-17 | End: 2017-05-20 | Stop reason: HOSPADM

## 2017-05-17 RX ORDER — CETIRIZINE HYDROCHLORIDE 10 MG/1
10 TABLET ORAL DAILY
Status: DISCONTINUED | OUTPATIENT
Start: 2017-05-17 | End: 2017-05-20 | Stop reason: HOSPADM

## 2017-05-17 RX ORDER — SCOLOPAMINE TRANSDERMAL SYSTEM 1 MG/1
1 PATCH, EXTENDED RELEASE TRANSDERMAL ONCE
Status: COMPLETED | OUTPATIENT
Start: 2017-05-17 | End: 2017-05-18

## 2017-05-17 RX ORDER — METOCLOPRAMIDE HYDROCHLORIDE 5 MG/ML
10 INJECTION INTRAMUSCULAR; INTRAVENOUS EVERY 6 HOURS PRN
Status: DISCONTINUED | OUTPATIENT
Start: 2017-05-17 | End: 2017-05-20 | Stop reason: HOSPADM

## 2017-05-17 RX ORDER — ROCURONIUM BROMIDE 10 MG/ML
INJECTION, SOLUTION INTRAVENOUS AS NEEDED
Status: DISCONTINUED | OUTPATIENT
Start: 2017-05-17 | End: 2017-05-17 | Stop reason: SURG

## 2017-05-17 RX ORDER — NALOXONE HCL 0.4 MG/ML
0.2 VIAL (ML) INJECTION AS NEEDED
Status: DISCONTINUED | OUTPATIENT
Start: 2017-05-17 | End: 2017-05-17 | Stop reason: HOSPADM

## 2017-05-17 RX ORDER — MIDAZOLAM HYDROCHLORIDE 1 MG/ML
1 INJECTION INTRAMUSCULAR; INTRAVENOUS
Status: DISCONTINUED | OUTPATIENT
Start: 2017-05-17 | End: 2017-05-17 | Stop reason: HOSPADM

## 2017-05-17 RX ORDER — BENZONATATE 100 MG/1
100 CAPSULE ORAL
COMMUNITY
Start: 2017-05-14 | End: 2017-07-07

## 2017-05-17 RX ORDER — CETIRIZINE HYDROCHLORIDE 10 MG/1
10 TABLET ORAL
COMMUNITY
Start: 2017-05-14 | End: 2017-07-07

## 2017-05-17 RX ORDER — HYDROMORPHONE HYDROCHLORIDE 1 MG/ML
0.5 INJECTION, SOLUTION INTRAMUSCULAR; INTRAVENOUS; SUBCUTANEOUS
Status: DISCONTINUED | OUTPATIENT
Start: 2017-05-17 | End: 2017-05-17 | Stop reason: HOSPADM

## 2017-05-17 RX ORDER — HYDRALAZINE HYDROCHLORIDE 20 MG/ML
5 INJECTION INTRAMUSCULAR; INTRAVENOUS
Status: DISCONTINUED | OUTPATIENT
Start: 2017-05-17 | End: 2017-05-17 | Stop reason: HOSPADM

## 2017-05-17 RX ORDER — KETOROLAC TROMETHAMINE 30 MG/ML
15 INJECTION, SOLUTION INTRAMUSCULAR; INTRAVENOUS EVERY 6 HOURS PRN
Status: DISCONTINUED | OUTPATIENT
Start: 2017-05-17 | End: 2017-05-20 | Stop reason: HOSPADM

## 2017-05-17 RX ORDER — PROMETHAZINE HYDROCHLORIDE 25 MG/ML
12.5 INJECTION, SOLUTION INTRAMUSCULAR; INTRAVENOUS EVERY 4 HOURS PRN
Status: DISCONTINUED | OUTPATIENT
Start: 2017-05-17 | End: 2017-05-20 | Stop reason: HOSPADM

## 2017-05-17 RX ORDER — BENZONATATE 100 MG/1
100 CAPSULE ORAL 3 TIMES DAILY PRN
Status: DISCONTINUED | OUTPATIENT
Start: 2017-05-17 | End: 2017-05-20 | Stop reason: HOSPADM

## 2017-05-17 RX ORDER — ONDANSETRON 2 MG/ML
4 INJECTION INTRAMUSCULAR; INTRAVENOUS ONCE AS NEEDED
Status: COMPLETED | OUTPATIENT
Start: 2017-05-17 | End: 2017-05-17

## 2017-05-17 RX ORDER — NALOXONE HCL 0.4 MG/ML
0.1 VIAL (ML) INJECTION
Status: DISCONTINUED | OUTPATIENT
Start: 2017-05-17 | End: 2017-05-20 | Stop reason: HOSPADM

## 2017-05-17 RX ORDER — ONDANSETRON 2 MG/ML
INJECTION INTRAMUSCULAR; INTRAVENOUS AS NEEDED
Status: DISCONTINUED | OUTPATIENT
Start: 2017-05-17 | End: 2017-05-17 | Stop reason: SURG

## 2017-05-17 RX ORDER — PROMETHAZINE HYDROCHLORIDE 25 MG/1
25 SUPPOSITORY RECTAL ONCE AS NEEDED
Status: DISCONTINUED | OUTPATIENT
Start: 2017-05-17 | End: 2017-05-17 | Stop reason: HOSPADM

## 2017-05-17 RX ORDER — DOXYCYCLINE HYCLATE 100 MG/1
100 CAPSULE ORAL EVERY 12 HOURS SCHEDULED
Status: DISCONTINUED | OUTPATIENT
Start: 2017-05-17 | End: 2017-05-20 | Stop reason: HOSPADM

## 2017-05-17 RX ORDER — DIPHENHYDRAMINE HYDROCHLORIDE 50 MG/ML
12.5 INJECTION INTRAMUSCULAR; INTRAVENOUS
Status: DISCONTINUED | OUTPATIENT
Start: 2017-05-17 | End: 2017-05-17 | Stop reason: HOSPADM

## 2017-05-17 RX ORDER — FENTANYL CITRATE 50 UG/ML
INJECTION, SOLUTION INTRAMUSCULAR; INTRAVENOUS AS NEEDED
Status: DISCONTINUED | OUTPATIENT
Start: 2017-05-17 | End: 2017-05-17 | Stop reason: SURG

## 2017-05-17 RX ORDER — ACETAMINOPHEN 325 MG/1
650 TABLET ORAL EVERY 4 HOURS PRN
Status: DISCONTINUED | OUTPATIENT
Start: 2017-05-17 | End: 2017-05-20 | Stop reason: HOSPADM

## 2017-05-17 RX ORDER — PROMETHAZINE HYDROCHLORIDE 25 MG/ML
12.5 INJECTION, SOLUTION INTRAMUSCULAR; INTRAVENOUS ONCE AS NEEDED
Status: DISCONTINUED | OUTPATIENT
Start: 2017-05-17 | End: 2017-05-17 | Stop reason: HOSPADM

## 2017-05-17 RX ORDER — DOXYCYCLINE HYCLATE 100 MG/1
100 CAPSULE ORAL
COMMUNITY
Start: 2017-05-14 | End: 2017-05-24

## 2017-05-17 RX ORDER — ONDANSETRON 4 MG/1
4 TABLET, FILM COATED ORAL EVERY 6 HOURS PRN
Status: DISCONTINUED | OUTPATIENT
Start: 2017-05-17 | End: 2017-05-20 | Stop reason: HOSPADM

## 2017-05-17 RX ORDER — BUPIVACAINE HYDROCHLORIDE AND EPINEPHRINE 5; 5 MG/ML; UG/ML
INJECTION, SOLUTION PERINEURAL AS NEEDED
Status: DISCONTINUED | OUTPATIENT
Start: 2017-05-17 | End: 2017-05-17 | Stop reason: HOSPADM

## 2017-05-17 RX ORDER — SODIUM CHLORIDE, SODIUM LACTATE, POTASSIUM CHLORIDE, CALCIUM CHLORIDE 600; 310; 30; 20 MG/100ML; MG/100ML; MG/100ML; MG/100ML
9 INJECTION, SOLUTION INTRAVENOUS CONTINUOUS
Status: DISCONTINUED | OUTPATIENT
Start: 2017-05-17 | End: 2017-05-17

## 2017-05-17 RX ORDER — SODIUM CHLORIDE 0.9 % (FLUSH) 0.9 %
1-10 SYRINGE (ML) INJECTION AS NEEDED
Status: DISCONTINUED | OUTPATIENT
Start: 2017-05-17 | End: 2017-05-17 | Stop reason: HOSPADM

## 2017-05-17 RX ORDER — MIDAZOLAM HYDROCHLORIDE 1 MG/ML
INJECTION INTRAMUSCULAR; INTRAVENOUS AS NEEDED
Status: DISCONTINUED | OUTPATIENT
Start: 2017-05-17 | End: 2017-05-17 | Stop reason: SURG

## 2017-05-17 RX ORDER — SUCCINYLCHOLINE CHLORIDE 20 MG/ML
INJECTION INTRAMUSCULAR; INTRAVENOUS AS NEEDED
Status: DISCONTINUED | OUTPATIENT
Start: 2017-05-17 | End: 2017-05-17 | Stop reason: SURG

## 2017-05-17 RX ORDER — ONDANSETRON 2 MG/ML
4 INJECTION INTRAMUSCULAR; INTRAVENOUS EVERY 6 HOURS PRN
Status: DISCONTINUED | OUTPATIENT
Start: 2017-05-17 | End: 2017-05-20 | Stop reason: HOSPADM

## 2017-05-17 RX ORDER — FLUMAZENIL 0.1 MG/ML
0.2 INJECTION INTRAVENOUS AS NEEDED
Status: DISCONTINUED | OUTPATIENT
Start: 2017-05-17 | End: 2017-05-17 | Stop reason: HOSPADM

## 2017-05-17 RX ORDER — ONDANSETRON 4 MG/1
4 TABLET, ORALLY DISINTEGRATING ORAL EVERY 6 HOURS PRN
Status: DISCONTINUED | OUTPATIENT
Start: 2017-05-17 | End: 2017-05-20 | Stop reason: HOSPADM

## 2017-05-17 RX ORDER — MIDAZOLAM HYDROCHLORIDE 1 MG/ML
2 INJECTION INTRAMUSCULAR; INTRAVENOUS
Status: DISCONTINUED | OUTPATIENT
Start: 2017-05-17 | End: 2017-05-17 | Stop reason: HOSPADM

## 2017-05-17 RX ORDER — SODIUM CHLORIDE 9 MG/ML
INJECTION, SOLUTION INTRAVENOUS AS NEEDED
Status: DISCONTINUED | OUTPATIENT
Start: 2017-05-17 | End: 2017-05-17 | Stop reason: HOSPADM

## 2017-05-17 RX ORDER — FAMOTIDINE 10 MG/ML
20 INJECTION, SOLUTION INTRAVENOUS ONCE
Status: COMPLETED | OUTPATIENT
Start: 2017-05-17 | End: 2017-05-17

## 2017-05-17 RX ADMIN — ROCURONIUM BROMIDE 45 MG: 10 INJECTION INTRAVENOUS at 11:10

## 2017-05-17 RX ADMIN — FENTANYL CITRATE 100 MCG: 50 INJECTION INTRAMUSCULAR; INTRAVENOUS at 11:55

## 2017-05-17 RX ADMIN — SODIUM CHLORIDE, POTASSIUM CHLORIDE, SODIUM LACTATE AND CALCIUM CHLORIDE 9 ML/HR: 600; 310; 30; 20 INJECTION, SOLUTION INTRAVENOUS at 09:18

## 2017-05-17 RX ADMIN — HYDROMORPHONE HYDROCHLORIDE 0.5 MG: 2 INJECTION INTRAMUSCULAR; INTRAVENOUS; SUBCUTANEOUS at 15:18

## 2017-05-17 RX ADMIN — LABETALOL HYDROCHLORIDE 5 MG: 5 INJECTION, SOLUTION INTRAVENOUS at 14:31

## 2017-05-17 RX ADMIN — SUGAMMADEX 214 MG: 100 INJECTION, SOLUTION INTRAVENOUS at 13:14

## 2017-05-17 RX ADMIN — HYDROMORPHONE HYDROCHLORIDE 0.5 MG: 1 INJECTION, SOLUTION INTRAMUSCULAR; INTRAVENOUS; SUBCUTANEOUS at 21:52

## 2017-05-17 RX ADMIN — HYDROMORPHONE HYDROCHLORIDE 0.5 MG: 1 INJECTION, SOLUTION INTRAMUSCULAR; INTRAVENOUS; SUBCUTANEOUS at 17:55

## 2017-05-17 RX ADMIN — ONDANSETRON 4 MG: 2 INJECTION INTRAMUSCULAR; INTRAVENOUS at 16:32

## 2017-05-17 RX ADMIN — ROCURONIUM BROMIDE 5 MG: 10 INJECTION INTRAVENOUS at 10:58

## 2017-05-17 RX ADMIN — POTASSIUM CHLORIDE, DEXTROSE MONOHYDRATE AND SODIUM CHLORIDE 100 ML/HR: 150; 5; 450 INJECTION, SOLUTION INTRAVENOUS at 16:33

## 2017-05-17 RX ADMIN — FENTANYL CITRATE 50 MCG: 50 INJECTION INTRAMUSCULAR; INTRAVENOUS at 12:10

## 2017-05-17 RX ADMIN — SODIUM CHLORIDE, POTASSIUM CHLORIDE, SODIUM LACTATE AND CALCIUM CHLORIDE: 600; 310; 30; 20 INJECTION, SOLUTION INTRAVENOUS at 10:58

## 2017-05-17 RX ADMIN — FAMOTIDINE 20 MG: 10 INJECTION, SOLUTION INTRAVENOUS at 09:18

## 2017-05-17 RX ADMIN — HYDROMORPHONE HYDROCHLORIDE 0.5 MG: 2 INJECTION INTRAMUSCULAR; INTRAVENOUS; SUBCUTANEOUS at 14:36

## 2017-05-17 RX ADMIN — FENTANYL CITRATE 100 MCG: 50 INJECTION INTRAMUSCULAR; INTRAVENOUS at 10:59

## 2017-05-17 RX ADMIN — PROPOFOL 200 MG: 10 INJECTION, EMULSION INTRAVENOUS at 11:00

## 2017-05-17 RX ADMIN — DOXYCYCLINE HYCLATE 100 MG: 100 CAPSULE, GELATIN COATED ORAL at 21:52

## 2017-05-17 RX ADMIN — LIDOCAINE HYDROCHLORIDE 100 MG: 20 INJECTION, SOLUTION INFILTRATION; PERINEURAL at 11:00

## 2017-05-17 RX ADMIN — LABETALOL HYDROCHLORIDE 5 MG: 5 INJECTION, SOLUTION INTRAVENOUS at 14:13

## 2017-05-17 RX ADMIN — MIDAZOLAM 2 MG: 1 INJECTION INTRAMUSCULAR; INTRAVENOUS at 09:18

## 2017-05-17 RX ADMIN — SUCCINYLCHOLINE CHLORIDE 140 MG: 20 INJECTION, SOLUTION INTRAMUSCULAR; INTRAVENOUS; PARENTERAL at 11:00

## 2017-05-17 RX ADMIN — MIDAZOLAM HYDROCHLORIDE 2 MG: 1 INJECTION, SOLUTION INTRAMUSCULAR; INTRAVENOUS at 10:58

## 2017-05-17 RX ADMIN — SCOPALAMINE 1 PATCH: 1 PATCH, EXTENDED RELEASE TRANSDERMAL at 09:17

## 2017-05-17 RX ADMIN — MIDAZOLAM 2 MG: 1 INJECTION INTRAMUSCULAR; INTRAVENOUS at 10:40

## 2017-05-17 RX ADMIN — ONDANSETRON 4 MG: 2 INJECTION INTRAMUSCULAR; INTRAVENOUS at 13:14

## 2017-05-17 RX ADMIN — METHIMAZOLE 5 MG: 5 TABLET ORAL at 21:52

## 2017-05-17 RX ADMIN — FENTANYL CITRATE 100 MCG: 50 INJECTION INTRAMUSCULAR; INTRAVENOUS at 11:45

## 2017-05-17 RX ADMIN — SODIUM CHLORIDE, POTASSIUM CHLORIDE, SODIUM LACTATE AND CALCIUM CHLORIDE: 600; 310; 30; 20 INJECTION, SOLUTION INTRAVENOUS at 13:10

## 2017-05-17 RX ADMIN — HYDRALAZINE HYDROCHLORIDE 5 MG: 20 INJECTION INTRAMUSCULAR; INTRAVENOUS at 15:24

## 2017-05-17 RX ADMIN — PROPOFOL 200 MG: 10 INJECTION, EMULSION INTRAVENOUS at 12:39

## 2017-05-17 RX ADMIN — ERTAPENEM SODIUM 1 G: 1 INJECTION, POWDER, LYOPHILIZED, FOR SOLUTION INTRAMUSCULAR; INTRAVENOUS at 11:10

## 2017-05-17 RX ADMIN — KETOROLAC TROMETHAMINE 15 MG: 30 INJECTION, SOLUTION INTRAMUSCULAR at 16:32

## 2017-05-17 RX ADMIN — ONDANSETRON 4 MG: 2 INJECTION INTRAMUSCULAR; INTRAVENOUS at 14:13

## 2017-05-17 RX ADMIN — PROMETHAZINE HYDROCHLORIDE 12.5 MG: 25 INJECTION INTRAMUSCULAR; INTRAVENOUS at 18:01

## 2017-05-18 LAB
ANION GAP SERPL CALCULATED.3IONS-SCNC: 12.4 MMOL/L
BASOPHILS # BLD AUTO: 0.01 10*3/MM3 (ref 0–0.2)
BASOPHILS NFR BLD AUTO: 0.1 % (ref 0–1.5)
BUN BLD-MCNC: 15 MG/DL (ref 8–23)
BUN/CREAT SERPL: 13 (ref 7–25)
CALCIUM SPEC-SCNC: 8.2 MG/DL (ref 8.6–10.5)
CHLORIDE SERPL-SCNC: 101 MMOL/L (ref 98–107)
CO2 SERPL-SCNC: 20.6 MMOL/L (ref 22–29)
CREAT BLD-MCNC: 1.15 MG/DL (ref 0.57–1)
CYTO UR: NORMAL
DEPRECATED RDW RBC AUTO: 49.2 FL (ref 37–54)
EOSINOPHIL # BLD AUTO: 0.16 10*3/MM3 (ref 0–0.7)
EOSINOPHIL NFR BLD AUTO: 2.2 % (ref 0.3–6.2)
ERYTHROCYTE [DISTWIDTH] IN BLOOD BY AUTOMATED COUNT: 14.5 % (ref 11.7–13)
GFR SERPL CREATININE-BSD FRML MDRD: 47 ML/MIN/1.73
GLUCOSE BLD-MCNC: 117 MG/DL (ref 65–99)
HCT VFR BLD AUTO: 36.3 % (ref 35.6–45.5)
HGB BLD-MCNC: 11.8 G/DL (ref 11.9–15.5)
IMM GRANULOCYTES # BLD: 0.02 10*3/MM3 (ref 0–0.03)
IMM GRANULOCYTES NFR BLD: 0.3 % (ref 0–0.5)
LAB AP CASE REPORT: NORMAL
LYMPHOCYTES # BLD AUTO: 1.4 10*3/MM3 (ref 0.9–4.8)
LYMPHOCYTES NFR BLD AUTO: 19.6 % (ref 19.6–45.3)
Lab: NORMAL
MCH RBC QN AUTO: 30.2 PG (ref 26.9–32)
MCHC RBC AUTO-ENTMCNC: 32.5 G/DL (ref 32.4–36.3)
MCV RBC AUTO: 92.8 FL (ref 80.5–98.2)
MONOCYTES # BLD AUTO: 0.56 10*3/MM3 (ref 0.2–1.2)
MONOCYTES NFR BLD AUTO: 7.9 % (ref 5–12)
NEUTROPHILS # BLD AUTO: 4.98 10*3/MM3 (ref 1.9–8.1)
NEUTROPHILS NFR BLD AUTO: 69.9 % (ref 42.7–76)
NRBC BLD MANUAL-RTO: 0 /100 WBC (ref 0–0)
PATH REPORT.FINAL DX SPEC: NORMAL
PATH REPORT.GROSS SPEC: NORMAL
PLAT MORPH BLD: NORMAL
PLATELET # BLD AUTO: 137 10*3/MM3 (ref 140–500)
PMV BLD AUTO: 9.7 FL (ref 6–12)
POTASSIUM BLD-SCNC: 4.6 MMOL/L (ref 3.5–5.2)
RBC # BLD AUTO: 3.91 10*6/MM3 (ref 3.9–5.2)
RBC MORPH BLD: NORMAL
SODIUM BLD-SCNC: 134 MMOL/L (ref 136–145)
WBC MORPH BLD: NORMAL
WBC NRBC COR # BLD: 7.13 10*3/MM3 (ref 4.5–10.7)

## 2017-05-18 PROCEDURE — 99024 POSTOP FOLLOW-UP VISIT: CPT | Performed by: SURGERY

## 2017-05-18 PROCEDURE — 85007 BL SMEAR W/DIFF WBC COUNT: CPT | Performed by: SURGERY

## 2017-05-18 PROCEDURE — 25010000002 KETOROLAC TROMETHAMINE PER 15 MG: Performed by: SURGERY

## 2017-05-18 PROCEDURE — 25010000002 HYDROMORPHONE PER 4 MG: Performed by: SURGERY

## 2017-05-18 PROCEDURE — 85025 COMPLETE CBC W/AUTO DIFF WBC: CPT | Performed by: SURGERY

## 2017-05-18 PROCEDURE — 80048 BASIC METABOLIC PNL TOTAL CA: CPT | Performed by: SURGERY

## 2017-05-18 RX ADMIN — DOXYCYCLINE HYCLATE 100 MG: 100 CAPSULE, GELATIN COATED ORAL at 09:12

## 2017-05-18 RX ADMIN — BENZONATATE 100 MG: 100 CAPSULE ORAL at 21:07

## 2017-05-18 RX ADMIN — DOXYCYCLINE HYCLATE 100 MG: 100 CAPSULE, GELATIN COATED ORAL at 21:07

## 2017-05-18 RX ADMIN — HYDROMORPHONE HYDROCHLORIDE 0.5 MG: 1 INJECTION, SOLUTION INTRAMUSCULAR; INTRAVENOUS; SUBCUTANEOUS at 09:01

## 2017-05-18 RX ADMIN — DAKIN'S SOLUTION 0.125% (QUARTER STRENGTH) 946 ML: 0.12 SOLUTION at 09:01

## 2017-05-18 RX ADMIN — HYDROMORPHONE HYDROCHLORIDE 0.5 MG: 1 INJECTION, SOLUTION INTRAMUSCULAR; INTRAVENOUS; SUBCUTANEOUS at 03:41

## 2017-05-18 RX ADMIN — OXYCODONE HYDROCHLORIDE AND ACETAMINOPHEN 1 TABLET: 10; 325 TABLET ORAL at 19:13

## 2017-05-18 RX ADMIN — POTASSIUM CHLORIDE, DEXTROSE MONOHYDRATE AND SODIUM CHLORIDE 100 ML/HR: 150; 5; 450 INJECTION, SOLUTION INTRAVENOUS at 11:25

## 2017-05-18 RX ADMIN — KETOROLAC TROMETHAMINE 15 MG: 30 INJECTION, SOLUTION INTRAMUSCULAR at 11:24

## 2017-05-18 RX ADMIN — OXYCODONE HYDROCHLORIDE AND ACETAMINOPHEN 1 TABLET: 10; 325 TABLET ORAL at 22:51

## 2017-05-18 RX ADMIN — POTASSIUM CHLORIDE, DEXTROSE MONOHYDRATE AND SODIUM CHLORIDE 100 ML/HR: 150; 5; 450 INJECTION, SOLUTION INTRAVENOUS at 02:30

## 2017-05-18 RX ADMIN — HYDROMORPHONE HYDROCHLORIDE 0.5 MG: 1 INJECTION, SOLUTION INTRAMUSCULAR; INTRAVENOUS; SUBCUTANEOUS at 15:42

## 2017-05-18 RX ADMIN — POTASSIUM CHLORIDE, DEXTROSE MONOHYDRATE AND SODIUM CHLORIDE 100 ML/HR: 150; 5; 450 INJECTION, SOLUTION INTRAVENOUS at 21:27

## 2017-05-18 RX ADMIN — METHIMAZOLE 5 MG: 5 TABLET ORAL at 21:07

## 2017-05-18 RX ADMIN — BENZONATATE 100 MG: 100 CAPSULE ORAL at 11:29

## 2017-05-18 RX ADMIN — KETOROLAC TROMETHAMINE 15 MG: 30 INJECTION, SOLUTION INTRAMUSCULAR at 06:57

## 2017-05-19 PROCEDURE — 99024 POSTOP FOLLOW-UP VISIT: CPT | Performed by: SURGERY

## 2017-05-19 RX ORDER — OXYCODONE HYDROCHLORIDE AND ACETAMINOPHEN 5; 325 MG/1; MG/1
TABLET ORAL
Qty: 30 TABLET | Refills: 0 | Status: SHIPPED | OUTPATIENT
Start: 2017-05-19 | End: 2017-05-24

## 2017-05-19 RX ADMIN — OXYCODONE HYDROCHLORIDE AND ACETAMINOPHEN 1 TABLET: 10; 325 TABLET ORAL at 04:30

## 2017-05-19 RX ADMIN — CETIRIZINE HYDROCHLORIDE 10 MG: 10 TABLET, FILM COATED ORAL at 08:00

## 2017-05-19 RX ADMIN — POTASSIUM CHLORIDE, DEXTROSE MONOHYDRATE AND SODIUM CHLORIDE 100 ML/HR: 150; 5; 450 INJECTION, SOLUTION INTRAVENOUS at 07:36

## 2017-05-19 RX ADMIN — DAKIN'S SOLUTION 0.125% (QUARTER STRENGTH) 946 ML: 0.12 SOLUTION at 08:00

## 2017-05-19 RX ADMIN — DOXYCYCLINE HYCLATE 100 MG: 100 CAPSULE, GELATIN COATED ORAL at 08:00

## 2017-05-19 RX ADMIN — OXYCODONE HYDROCHLORIDE AND ACETAMINOPHEN 1 TABLET: 10; 325 TABLET ORAL at 13:59

## 2017-05-19 RX ADMIN — OXYCODONE HYDROCHLORIDE AND ACETAMINOPHEN 1 TABLET: 10; 325 TABLET ORAL at 09:09

## 2017-05-19 RX ADMIN — OXYCODONE HYDROCHLORIDE AND ACETAMINOPHEN 1 TABLET: 10; 325 TABLET ORAL at 21:17

## 2017-05-19 RX ADMIN — DOXYCYCLINE HYCLATE 100 MG: 100 CAPSULE, GELATIN COATED ORAL at 21:17

## 2017-05-19 RX ADMIN — ONDANSETRON 4 MG: 4 TABLET, FILM COATED ORAL at 13:59

## 2017-05-19 RX ADMIN — METHIMAZOLE 5 MG: 5 TABLET ORAL at 21:17

## 2017-05-20 VITALS
HEIGHT: 62 IN | HEART RATE: 69 BPM | TEMPERATURE: 97.8 F | OXYGEN SATURATION: 94 % | DIASTOLIC BLOOD PRESSURE: 74 MMHG | SYSTOLIC BLOOD PRESSURE: 126 MMHG | WEIGHT: 236.4 LBS | RESPIRATION RATE: 18 BRPM | BODY MASS INDEX: 43.5 KG/M2

## 2017-05-20 PROBLEM — K43.2 INCISIONAL HERNIA, WITHOUT OBSTRUCTION OR GANGRENE: Status: RESOLVED | Noted: 2017-02-14 | Resolved: 2017-05-20

## 2017-05-20 LAB
ANION GAP SERPL CALCULATED.3IONS-SCNC: 8.7 MMOL/L
BASOPHILS # BLD AUTO: 0.01 10*3/MM3 (ref 0–0.2)
BASOPHILS NFR BLD AUTO: 0.1 % (ref 0–1.5)
BUN BLD-MCNC: 10 MG/DL (ref 8–23)
BUN/CREAT SERPL: 10.1 (ref 7–25)
CALCIUM SPEC-SCNC: 8.9 MG/DL (ref 8.6–10.5)
CHLORIDE SERPL-SCNC: 102 MMOL/L (ref 98–107)
CO2 SERPL-SCNC: 25.3 MMOL/L (ref 22–29)
CREAT BLD-MCNC: 0.99 MG/DL (ref 0.57–1)
DEPRECATED RDW RBC AUTO: 47.3 FL (ref 37–54)
EOSINOPHIL # BLD AUTO: 0.27 10*3/MM3 (ref 0–0.7)
EOSINOPHIL NFR BLD AUTO: 4 % (ref 0.3–6.2)
ERYTHROCYTE [DISTWIDTH] IN BLOOD BY AUTOMATED COUNT: 14 % (ref 11.7–13)
GFR SERPL CREATININE-BSD FRML MDRD: 55 ML/MIN/1.73
GLUCOSE BLD-MCNC: 104 MG/DL (ref 65–99)
HCT VFR BLD AUTO: 36.7 % (ref 35.6–45.5)
HGB BLD-MCNC: 12.3 G/DL (ref 11.9–15.5)
IMM GRANULOCYTES # BLD: 0 10*3/MM3 (ref 0–0.03)
IMM GRANULOCYTES NFR BLD: 0 % (ref 0–0.5)
LYMPHOCYTES # BLD AUTO: 1.27 10*3/MM3 (ref 0.9–4.8)
LYMPHOCYTES NFR BLD AUTO: 18.8 % (ref 19.6–45.3)
MCH RBC QN AUTO: 31.1 PG (ref 26.9–32)
MCHC RBC AUTO-ENTMCNC: 33.5 G/DL (ref 32.4–36.3)
MCV RBC AUTO: 92.9 FL (ref 80.5–98.2)
MONOCYTES # BLD AUTO: 0.36 10*3/MM3 (ref 0.2–1.2)
MONOCYTES NFR BLD AUTO: 5.3 % (ref 5–12)
NEUTROPHILS # BLD AUTO: 4.85 10*3/MM3 (ref 1.9–8.1)
NEUTROPHILS NFR BLD AUTO: 71.8 % (ref 42.7–76)
PLATELET # BLD AUTO: 133 10*3/MM3 (ref 140–500)
PMV BLD AUTO: 9.5 FL (ref 6–12)
POTASSIUM BLD-SCNC: 4.4 MMOL/L (ref 3.5–5.2)
RBC # BLD AUTO: 3.95 10*6/MM3 (ref 3.9–5.2)
SODIUM BLD-SCNC: 136 MMOL/L (ref 136–145)
WBC NRBC COR # BLD: 6.76 10*3/MM3 (ref 4.5–10.7)

## 2017-05-20 PROCEDURE — 80048 BASIC METABOLIC PNL TOTAL CA: CPT | Performed by: SURGERY

## 2017-05-20 PROCEDURE — 85025 COMPLETE CBC W/AUTO DIFF WBC: CPT | Performed by: SURGERY

## 2017-05-20 PROCEDURE — 99024 POSTOP FOLLOW-UP VISIT: CPT | Performed by: SURGERY

## 2017-05-20 RX ADMIN — DAKIN'S SOLUTION 0.125% (QUARTER STRENGTH) 946 ML: 0.12 SOLUTION at 10:02

## 2017-05-20 RX ADMIN — CETIRIZINE HYDROCHLORIDE 10 MG: 10 TABLET, FILM COATED ORAL at 10:02

## 2017-05-20 RX ADMIN — OXYCODONE HYDROCHLORIDE AND ACETAMINOPHEN 1 TABLET: 10; 325 TABLET ORAL at 06:15

## 2017-05-20 RX ADMIN — DOXYCYCLINE HYCLATE 100 MG: 100 CAPSULE, GELATIN COATED ORAL at 10:02

## 2017-05-24 ENCOUNTER — OFFICE VISIT (OUTPATIENT)
Dept: SURGERY | Facility: CLINIC | Age: 72
End: 2017-05-24

## 2017-05-24 DIAGNOSIS — Z87.898 HISTORY OF ABDOMINAL ABSCESS: Primary | ICD-10-CM

## 2017-05-24 DIAGNOSIS — K43.2 INCISIONAL HERNIA, WITHOUT OBSTRUCTION OR GANGRENE: ICD-10-CM

## 2017-05-24 DIAGNOSIS — K81.9 CHOLECYSTITIS: ICD-10-CM

## 2017-05-24 PROCEDURE — 99024 POSTOP FOLLOW-UP VISIT: CPT | Performed by: SURGERY

## 2017-05-26 ENCOUNTER — DOCUMENTATION (OUTPATIENT)
Dept: SURGERY | Facility: CLINIC | Age: 72
End: 2017-05-26

## 2017-05-30 ENCOUNTER — HOSPITAL ENCOUNTER (EMERGENCY)
Facility: HOSPITAL | Age: 72
Discharge: HOME | End: 2017-05-30
Attending: EMERGENCY MEDICINE | Admitting: EMERGENCY MEDICINE

## 2017-05-30 ENCOUNTER — APPOINTMENT (OUTPATIENT)
Dept: CT IMAGING | Facility: HOSPITAL | Age: 72
End: 2017-05-30

## 2017-05-30 VITALS
OXYGEN SATURATION: 96 % | TEMPERATURE: 97.8 F | RESPIRATION RATE: 16 BRPM | HEIGHT: 62 IN | WEIGHT: 235 LBS | SYSTOLIC BLOOD PRESSURE: 127 MMHG | DIASTOLIC BLOOD PRESSURE: 71 MMHG | HEART RATE: 63 BPM | BODY MASS INDEX: 43.24 KG/M2

## 2017-05-30 DIAGNOSIS — N20.0 KIDNEY STONE ON LEFT SIDE: Primary | ICD-10-CM

## 2017-05-30 LAB
ALBUMIN SERPL-MCNC: 3.8 G/DL (ref 3.5–5.2)
ALBUMIN/GLOB SERPL: 1.1 G/DL
ALP SERPL-CCNC: 95 U/L (ref 39–117)
ALT SERPL W P-5'-P-CCNC: 12 U/L (ref 1–33)
ANION GAP SERPL CALCULATED.3IONS-SCNC: 12.8 MMOL/L
AST SERPL-CCNC: 16 U/L (ref 1–32)
BACTERIA UR QL AUTO: ABNORMAL /HPF
BASOPHILS # BLD AUTO: 0.02 10*3/MM3 (ref 0–0.2)
BASOPHILS NFR BLD AUTO: 0.3 % (ref 0–1.5)
BILIRUB SERPL-MCNC: 0.4 MG/DL (ref 0.1–1.2)
BILIRUB UR QL STRIP: NEGATIVE
BUN BLD-MCNC: 17 MG/DL (ref 8–23)
BUN/CREAT SERPL: 14.5 (ref 7–25)
CALCIUM SPEC-SCNC: 9.3 MG/DL (ref 8.6–10.5)
CHLORIDE SERPL-SCNC: 104 MMOL/L (ref 98–107)
CLARITY UR: ABNORMAL
CO2 SERPL-SCNC: 24.2 MMOL/L (ref 22–29)
COLOR UR: YELLOW
CREAT BLD-MCNC: 1.17 MG/DL (ref 0.57–1)
DEPRECATED RDW RBC AUTO: 44.1 FL (ref 37–54)
EOSINOPHIL # BLD AUTO: 0.15 10*3/MM3 (ref 0–0.7)
EOSINOPHIL NFR BLD AUTO: 2.3 % (ref 0.3–6.2)
ERYTHROCYTE [DISTWIDTH] IN BLOOD BY AUTOMATED COUNT: 13.6 % (ref 11.7–13)
GFR SERPL CREATININE-BSD FRML MDRD: 46 ML/MIN/1.73
GLOBULIN UR ELPH-MCNC: 3.4 GM/DL
GLUCOSE BLD-MCNC: 100 MG/DL (ref 65–99)
GLUCOSE UR STRIP-MCNC: NEGATIVE MG/DL
HCT VFR BLD AUTO: 36.8 % (ref 35.6–45.5)
HGB BLD-MCNC: 12.7 G/DL (ref 11.9–15.5)
HGB UR QL STRIP.AUTO: ABNORMAL
HOLD SPECIMEN: NORMAL
HOLD SPECIMEN: NORMAL
HYALINE CASTS UR QL AUTO: ABNORMAL /LPF
IMM GRANULOCYTES # BLD: 0 10*3/MM3 (ref 0–0.03)
IMM GRANULOCYTES NFR BLD: 0 % (ref 0–0.5)
KETONES UR QL STRIP: NEGATIVE
LEUKOCYTE ESTERASE UR QL STRIP.AUTO: ABNORMAL
LYMPHOCYTES # BLD AUTO: 1.17 10*3/MM3 (ref 0.9–4.8)
LYMPHOCYTES NFR BLD AUTO: 17.9 % (ref 19.6–45.3)
MCH RBC QN AUTO: 30.7 PG (ref 26.9–32)
MCHC RBC AUTO-ENTMCNC: 34.5 G/DL (ref 32.4–36.3)
MCV RBC AUTO: 88.9 FL (ref 80.5–98.2)
MONOCYTES # BLD AUTO: 0.3 10*3/MM3 (ref 0.2–1.2)
MONOCYTES NFR BLD AUTO: 4.6 % (ref 5–12)
NEUTROPHILS # BLD AUTO: 4.88 10*3/MM3 (ref 1.9–8.1)
NEUTROPHILS NFR BLD AUTO: 74.9 % (ref 42.7–76)
NITRITE UR QL STRIP: NEGATIVE
PH UR STRIP.AUTO: <=5 [PH] (ref 5–8)
PLATELET # BLD AUTO: 165 10*3/MM3 (ref 140–500)
PMV BLD AUTO: 9.3 FL (ref 6–12)
POTASSIUM BLD-SCNC: 4.1 MMOL/L (ref 3.5–5.2)
PROT SERPL-MCNC: 7.2 G/DL (ref 6–8.5)
PROT UR QL STRIP: NEGATIVE
RBC # BLD AUTO: 4.14 10*6/MM3 (ref 3.9–5.2)
RBC # UR: ABNORMAL /HPF
REF LAB TEST METHOD: ABNORMAL
SODIUM BLD-SCNC: 141 MMOL/L (ref 136–145)
SP GR UR STRIP: 1.02 (ref 1–1.03)
SQUAMOUS #/AREA URNS HPF: ABNORMAL /HPF
UROBILINOGEN UR QL STRIP: ABNORMAL
WBC NRBC COR # BLD: 6.52 10*3/MM3 (ref 4.5–10.7)
WBC UR QL AUTO: ABNORMAL /HPF
WHOLE BLOOD HOLD SPECIMEN: NORMAL
WHOLE BLOOD HOLD SPECIMEN: NORMAL

## 2017-05-30 PROCEDURE — 25010000002 ONDANSETRON PER 1 MG: Performed by: PHYSICIAN ASSISTANT

## 2017-05-30 PROCEDURE — 99283 EMERGENCY DEPT VISIT LOW MDM: CPT

## 2017-05-30 PROCEDURE — 81001 URINALYSIS AUTO W/SCOPE: CPT | Performed by: EMERGENCY MEDICINE

## 2017-05-30 PROCEDURE — 96361 HYDRATE IV INFUSION ADD-ON: CPT

## 2017-05-30 PROCEDURE — 85025 COMPLETE CBC W/AUTO DIFF WBC: CPT | Performed by: EMERGENCY MEDICINE

## 2017-05-30 PROCEDURE — 96375 TX/PRO/DX INJ NEW DRUG ADDON: CPT

## 2017-05-30 PROCEDURE — 80053 COMPREHEN METABOLIC PANEL: CPT | Performed by: EMERGENCY MEDICINE

## 2017-05-30 PROCEDURE — 74176 CT ABD & PELVIS W/O CONTRAST: CPT

## 2017-05-30 PROCEDURE — 25010000002 MORPHINE PER 10 MG: Performed by: EMERGENCY MEDICINE

## 2017-05-30 PROCEDURE — 87077 CULTURE AEROBIC IDENTIFY: CPT | Performed by: EMERGENCY MEDICINE

## 2017-05-30 PROCEDURE — 96374 THER/PROPH/DIAG INJ IV PUSH: CPT

## 2017-05-30 PROCEDURE — 36415 COLL VENOUS BLD VENIPUNCTURE: CPT

## 2017-05-30 PROCEDURE — 87086 URINE CULTURE/COLONY COUNT: CPT | Performed by: EMERGENCY MEDICINE

## 2017-05-30 RX ORDER — OXYCODONE HYDROCHLORIDE AND ACETAMINOPHEN 5; 325 MG/1; MG/1
1 TABLET ORAL EVERY 6 HOURS PRN
COMMUNITY
End: 2017-07-07

## 2017-05-30 RX ORDER — ONDANSETRON 2 MG/ML
4 INJECTION INTRAMUSCULAR; INTRAVENOUS ONCE
Status: COMPLETED | OUTPATIENT
Start: 2017-05-30 | End: 2017-05-30

## 2017-05-30 RX ORDER — SODIUM CHLORIDE 0.9 % (FLUSH) 0.9 %
10 SYRINGE (ML) INJECTION AS NEEDED
Status: DISCONTINUED | OUTPATIENT
Start: 2017-05-30 | End: 2017-05-30 | Stop reason: HOSPADM

## 2017-05-30 RX ADMIN — MORPHINE SULFATE 4 MG: 4 INJECTION, SOLUTION INTRAMUSCULAR; INTRAVENOUS at 12:08

## 2017-05-30 RX ADMIN — SODIUM CHLORIDE 1000 ML: 9 INJECTION, SOLUTION INTRAVENOUS at 12:07

## 2017-05-30 RX ADMIN — ONDANSETRON 4 MG: 2 INJECTION INTRAMUSCULAR; INTRAVENOUS at 12:08

## 2017-06-01 LAB — BACTERIA SPEC AEROBE CULT: NORMAL

## 2017-07-07 ENCOUNTER — OFFICE VISIT (OUTPATIENT)
Dept: ENDOCRINOLOGY | Age: 72
End: 2017-07-07

## 2017-07-07 VITALS
SYSTOLIC BLOOD PRESSURE: 152 MMHG | WEIGHT: 245.6 LBS | OXYGEN SATURATION: 96 % | DIASTOLIC BLOOD PRESSURE: 82 MMHG | HEART RATE: 84 BPM | HEIGHT: 63 IN | BODY MASS INDEX: 43.52 KG/M2

## 2017-07-07 DIAGNOSIS — E05.90 HYPERTHYROIDISM: Primary | ICD-10-CM

## 2017-07-07 DIAGNOSIS — E05.00 GRAVES DISEASE: ICD-10-CM

## 2017-07-07 DIAGNOSIS — R63.5 ABNORMAL WEIGHT GAIN: ICD-10-CM

## 2017-07-07 DIAGNOSIS — E04.2 MULTINODULAR GOITER: ICD-10-CM

## 2017-07-07 PROCEDURE — 99214 OFFICE O/P EST MOD 30 MIN: CPT | Performed by: INTERNAL MEDICINE

## 2017-07-07 NOTE — PROGRESS NOTES
72 y.o.    Patient Care Team:  Moe Matute MD as PCP - General  Moe Matute MD as PCP - Family Medicine    Chief Complaint:        F/U HYPERTHYROID.  GRAVE'S DISEASE.  NO RECENT LABS.  Subjective     HPI patient is a 71-year-old white female with a past history of large goiter, Graves' disease, hyperthyroidism came for follow-up  Patient is currently taking methimazole 5 mg once daily.  She denies any side effects  She is tolerating the medication and is compliant  Abnormal weight gain  Patient gained nearly 10 pounds since last visit 3 months ago  Patient currently denies any symptoms suggestive of hyper-or hypothyroidism       Interval History:      71-year-old female no previous history of pulmonary issues in the past admitted for the evaluation of left-sided abdominal pain. She's noted to have severe diverticular disease with abscess and presently ongoing evaluation. She gets short of breath with exertion. She's had workup with a CT chest results of which revealed a large thyroid mass causing tracheal deviation. Currently she denies any problems swallowing. She denies any cough but has some wheezing with exertion. No aspiration has been reported.  Patient's thyroid function was analyzed and TSH was suppressed with elevated. T4. With this picture of hyperthyroidism was concentrated for further managing patient's thyroid condition.      Patient reports very occasional dysphagia. She denies any change in voice. She does report to shortness of breath and is not clear as to the source of shortness of breath.  Patient denied any symptoms of palpitations or sweating episodes for tremors or diarrhea. She has had diverticulitis which has complicated the GI symptoms.  She has lost weight but also reported that her appetite has been very low which could explain the weight loss      Patient frequently refers to Dr. Camarillo doing a surgery on the right side for thyroid mass in the past. Currently has a  CT scan reveals a large thyroid mass on the left side pushing the trachea to the right and causing some tracheal compression.  Patient is currently being evaluated by general surgery, pulmonary, infection disease  Patient and family denied any family history of thyroid cancer. She's not known to have any thyroid dysfunction in the past other than the fact that she had a goiter on the right side of the chest which was removed several years agoher     The following portions of the patient's history were reviewed and updated as appropriate: allergies, current medications, past family history, past medical history, past social history, past surgical history and problem list.    Past Medical History:   Diagnosis Date   • Back pain    • Colon polyps 12/10/2004    Proximal ascending polyps: tubulovillous adenoma, only mild dysplasia seen; distal ascending polyps: tubulovillous adenoma, only mild dysplasia seen   • Colon polyps 04/16/2001    Cecum biopsy: fragments of tubular adenoma, rectum biopsy: fragments of hyperplastic polyp   • Diverticulitis    • Diverticulosis    • Endometrial polyp 2011, 2002 2011 Path findings: fragments of endometrial polyp with cystic hyperplasia (and no atypia) / 2002 Path findings:    • Gallstones    • Hyperthyroidism     followed by Dr. Blackmon   • Joint pain, knee    • Kidney stones 2015    with cystoscopy by Dr. Miguel Monte done on 10/7/15, 9/9/15, 5/8/15, 9/21/07   • Obstructive pyelonephritis 09/28/2015    left obstructing pyelonephritis    • PONV (postoperative nausea and vomiting)    • Rash     RT LOWER LEG AND FOOT   • Renal disorder    • SOB (shortness of breath) on exertion      Family History   Problem Relation Age of Onset   • Heart disease Father      Social History     Social History   • Marital status:      Spouse name: BERENICE COFFEY    • Number of children: 2   • Years of education: HIGH SCHOOL      Occupational History   •  Retired     Social History Main Topics   •  "Smoking status: Never Smoker   • Smokeless tobacco: Never Used   • Alcohol use No   • Drug use: No   • Sexual activity: Defer     Other Topics Concern   • Not on file     Social History Narrative     Allergies   Allergen Reactions   • Cephalexin Hives     Pt states she possibly is not allergic to Cephalexin, took and did not have problems   • Cephalosporins Hives   • Penicillins Hives       Current Outpatient Prescriptions:   •  Acetaminophen (TYLENOL 8 HOUR PO), Take 2 tablets by mouth As Needed (PAIN)., Disp: , Rfl:   •  allopurinol (ZYLOPRIM) 300 MG tablet, Take 300 mg by mouth As Needed. STOPPED 4/8/17, Disp: , Rfl:   •  benzonatate (TESSALON) 100 MG capsule, Take 100 mg by mouth., Disp: , Rfl:   •  cetirizine (zyrTEC) 10 MG tablet, Take 10 mg by mouth., Disp: , Rfl:   •  ibuprofen (ADVIL,MOTRIN) 200 MG tablet, Take 200 mg by mouth Every 6 (Six) Hours As Needed for Mild Pain (1-3). STOPPED 5/9/17, Disp: , Rfl:   •  methIMAzole (TAPAZOLE) 10 MG tablet, Take 0.5 tablets by mouth Daily. (Patient taking differently: Take 5 mg by mouth Every Evening.), Disp: 30 tablet, Rfl: 3  •  oxyCODONE-acetaminophen (PERCOCET) 5-325 MG per tablet, Take 1 tablet by mouth Every 6 (Six) Hours As Needed. Took 2 this am, Disp: , Rfl:   •  sodium hypochlorite in sterile water irrigation topical solution 0.0625%, Irrigate with 946 mL as directed Daily., Disp: 1 bottle, Rfl: 0        Review of Systems   Constitutional: Negative for chills, fatigue and fever.   Cardiovascular: Negative for chest pain and palpitations.   Gastrointestinal: Negative for abdominal pain, constipation, diarrhea, nausea and vomiting.   Endocrine: Negative for cold intolerance and heat intolerance.   All other systems reviewed and are negative.      Objective       Vitals:    07/07/17 1121   BP: 152/82   Pulse: 84   SpO2: 96%   Weight: 245 lb 9.6 oz (111 kg)   Height: 63\" (160 cm)     Body mass index is 43.51 kg/(m^2).      Physical Exam   Constitutional: She is " oriented to person, place, and time. She appears well-developed and well-nourished.   Obese   HENT:   Head: Normocephalic and atraumatic.   Eyes: EOM are normal. Pupils are equal, round, and reactive to light.   Neck: Normal range of motion. Neck supple. No tracheal deviation present. No thyromegaly present.   Cardiovascular: Normal rate, regular rhythm, normal heart sounds and intact distal pulses.    Tachycardia   Pulmonary/Chest: Effort normal and breath sounds normal.   Abdominal: Soft. Bowel sounds are normal. She exhibits distension. There is no tenderness.   Musculoskeletal: Normal range of motion. She exhibits no edema.   Neurological: She is alert and oriented to person, place, and time. She has normal reflexes.   Skin: Skin is warm and dry. No erythema.   Psychiatric: She has a normal mood and affect. Her behavior is normal.   Nursing note and vitals reviewed.    Results Review:     I reviewed the patient's new clinical results.    Medical records reviewed  Summary:      Admission on 05/30/2017, Discharged on 05/30/2017   Component Date Value Ref Range Status   • Glucose 05/30/2017 100* 65 - 99 mg/dL Final   • BUN 05/30/2017 17  8 - 23 mg/dL Final   • Creatinine 05/30/2017 1.17* 0.57 - 1.00 mg/dL Final   • Sodium 05/30/2017 141  136 - 145 mmol/L Final   • Potassium 05/30/2017 4.1  3.5 - 5.2 mmol/L Final   • Chloride 05/30/2017 104  98 - 107 mmol/L Final   • CO2 05/30/2017 24.2  22.0 - 29.0 mmol/L Final   • Calcium 05/30/2017 9.3  8.6 - 10.5 mg/dL Final   • Total Protein 05/30/2017 7.2  6.0 - 8.5 g/dL Final   • Albumin 05/30/2017 3.80  3.50 - 5.20 g/dL Final   • ALT (SGPT) 05/30/2017 12  1 - 33 U/L Final   • AST (SGOT) 05/30/2017 16  1 - 32 U/L Final   • Alkaline Phosphatase 05/30/2017 95  39 - 117 U/L Final   • Total Bilirubin 05/30/2017 0.4  0.1 - 1.2 mg/dL Final   • eGFR Non African Amer 05/30/2017 46* >60 mL/min/1.73 Final   • Globulin 05/30/2017 3.4  gm/dL Final   • A/G Ratio 05/30/2017 1.1  g/dL Final    • BUN/Creatinine Ratio 05/30/2017 14.5  7.0 - 25.0 Final   • Anion Gap 05/30/2017 12.8  mmol/L Final   • Color, UA 05/30/2017 Yellow  Yellow, Straw Final   • Appearance, UA 05/30/2017 Cloudy* Clear Final   • pH, UA 05/30/2017 <=5.0  5.0 - 8.0 Final   • Specific Gravity, UA 05/30/2017 1.020  1.005 - 1.030 Final   • Glucose, UA 05/30/2017 Negative  Negative Final   • Ketones, UA 05/30/2017 Negative  Negative Final   • Bilirubin, UA 05/30/2017 Negative  Negative Final   • Blood, UA 05/30/2017 Moderate (2+)* Negative Final   • Protein, UA 05/30/2017 Negative  Negative Final   • Leuk Esterase, UA 05/30/2017 Small (1+)* Negative Final   • Nitrite, UA 05/30/2017 Negative  Negative Final   • Urobilinogen, UA 05/30/2017 0.2 E.U./dL  0.2 - 1.0 E.U./dL Final   • Extra Tube 05/30/2017 hold for add-on   Final    Auto resulted   • Extra Tube 05/30/2017 Hold for add-ons.   Final    Auto resulted.   • Extra Tube 05/30/2017 hold for add-on   Final    Auto resulted   • Extra Tube 05/30/2017 Hold for add-ons.   Final    Auto resulted.   • WBC 05/30/2017 6.52  4.50 - 10.70 10*3/mm3 Final   • RBC 05/30/2017 4.14  3.90 - 5.20 10*6/mm3 Final   • Hemoglobin 05/30/2017 12.7  11.9 - 15.5 g/dL Final   • Hematocrit 05/30/2017 36.8  35.6 - 45.5 % Final   • MCV 05/30/2017 88.9  80.5 - 98.2 fL Final   • MCH 05/30/2017 30.7  26.9 - 32.0 pg Final   • MCHC 05/30/2017 34.5  32.4 - 36.3 g/dL Final   • RDW 05/30/2017 13.6* 11.7 - 13.0 % Final   • RDW-SD 05/30/2017 44.1  37.0 - 54.0 fl Final   • MPV 05/30/2017 9.3  6.0 - 12.0 fL Final   • Platelets 05/30/2017 165  140 - 500 10*3/mm3 Final   • Neutrophil % 05/30/2017 74.9  42.7 - 76.0 % Final   • Lymphocyte % 05/30/2017 17.9* 19.6 - 45.3 % Final   • Monocyte % 05/30/2017 4.6* 5.0 - 12.0 % Final   • Eosinophil % 05/30/2017 2.3  0.3 - 6.2 % Final   • Basophil % 05/30/2017 0.3  0.0 - 1.5 % Final   • Immature Grans % 05/30/2017 0.0  0.0 - 0.5 % Final   • Neutrophils, Absolute 05/30/2017 4.88  1.90 - 8.10  10*3/mm3 Final   • Lymphocytes, Absolute 05/30/2017 1.17  0.90 - 4.80 10*3/mm3 Final   • Monocytes, Absolute 05/30/2017 0.30  0.20 - 1.20 10*3/mm3 Final   • Eosinophils, Absolute 05/30/2017 0.15  0.00 - 0.70 10*3/mm3 Final   • Basophils, Absolute 05/30/2017 0.02  0.00 - 0.20 10*3/mm3 Final   • Immature Grans, Absolute 05/30/2017 0.00  0.00 - 0.03 10*3/mm3 Final   • RBC, UA 05/30/2017 13-20* None Seen, 0-2 /HPF Final   • WBC, UA 05/30/2017 3-5* None Seen, 0-2 /HPF Final   • Bacteria, UA 05/30/2017 None Seen  None Seen /HPF Final   • Squamous Epithelial Cells, UA 05/30/2017 3-6* None Seen, 0-2 /HPF Final   • Hyaline Casts, UA 05/30/2017 3-6  None Seen /LPF Final   • Methodology 05/30/2017 Automated Microscopy   Final   • Urine Culture 05/30/2017 50,000 CFU/mL Mixed Culture   Final     Lab Results   Component Value Date    HGBA1C 4.70 (L) 09/10/2016     Lab Results   Component Value Date    LDLCALC 24 09/10/2016    CREATININE 1.17 (H) 05/30/2017     Imaging Results (most recent)     None                Assessment and Plan:    Patt was seen today for graves' disease and hyperthyroidism.    Diagnoses and all orders for this visit:    Hyperthyroidism  -     Comprehensive Metabolic Panel  -     T4, Free  -     TSH  -     CBC & Differential    Graves disease  -     Comprehensive Metabolic Panel  -     T4, Free  -     TSH  -     CBC & Differential    Abnormal weight gain    Multinodular goiter           Very large left thyroid lobe nodule or mass extending into the mediastinum and having mass effect on the trachea measuring 8.4 cm-needs a biopsy to determine if neoplastic  Hyperthyroidism  Abnormal weight loss  Acute diverticulitis  History of previous thyroid surgery again in the mediastinal region  Pancytopenia     Patient continues to gain weight and is a very big concern  She's currently taking Tapazole 5 mg once daily since February 2017  Patient gained an additional 10 pounds since May 2017    Patient is reporting  worsening fatigue and feeling cold.  No worsening of dysphagia or shortness of breath    Patient will get lab testing done today  She'll be notified of the lab results and any further changes in the medication  Patient will return to follow-up in 6 months.     The total time spent for old record and lab review and face- to- face was more than 25 min of which greater than 15 min of time was spent on counseling the patient on recommended evaluation and treatment options, instructions for management/treatment and /or follow up  and importance of compliance with chosen management or treatment options      Garrison Alcaraz MD. FACE    07/07/17

## 2017-07-08 LAB
ALBUMIN SERPL-MCNC: 4.3 G/DL (ref 3.5–5.2)
ALBUMIN/GLOB SERPL: 1.7 G/DL
ALP SERPL-CCNC: 97 U/L (ref 39–117)
ALT SERPL-CCNC: 15 U/L (ref 1–33)
AST SERPL-CCNC: 19 U/L (ref 1–32)
BASOPHILS # BLD AUTO: 0.02 10*3/MM3 (ref 0–0.2)
BASOPHILS NFR BLD AUTO: 0.3 % (ref 0–1.5)
BILIRUB SERPL-MCNC: 0.7 MG/DL (ref 0.1–1.2)
BUN SERPL-MCNC: 14 MG/DL (ref 8–23)
BUN/CREAT SERPL: 12.8 (ref 7–25)
CALCIUM SERPL-MCNC: 9.2 MG/DL (ref 8.6–10.5)
CHLORIDE SERPL-SCNC: 100 MMOL/L (ref 98–107)
CO2 SERPL-SCNC: 22.1 MMOL/L (ref 22–29)
CREAT SERPL-MCNC: 1.09 MG/DL (ref 0.57–1)
EOSINOPHIL # BLD AUTO: 0.15 10*3/MM3 (ref 0–0.7)
EOSINOPHIL NFR BLD AUTO: 2.5 % (ref 0.3–6.2)
ERYTHROCYTE [DISTWIDTH] IN BLOOD BY AUTOMATED COUNT: 14.7 % (ref 11.7–13)
GLOBULIN SER CALC-MCNC: 2.5 GM/DL
GLUCOSE SERPL-MCNC: 135 MG/DL (ref 65–99)
HCT VFR BLD AUTO: 41.8 % (ref 35.6–45.5)
HGB BLD-MCNC: 13.6 G/DL (ref 11.9–15.5)
IMM GRANULOCYTES # BLD: 0.02 10*3/MM3 (ref 0–0.03)
IMM GRANULOCYTES NFR BLD: 0.3 % (ref 0–0.5)
LYMPHOCYTES # BLD AUTO: 1.69 10*3/MM3 (ref 0.9–4.8)
LYMPHOCYTES NFR BLD AUTO: 27.7 % (ref 19.6–45.3)
MCH RBC QN AUTO: 30.8 PG (ref 26.9–32)
MCHC RBC AUTO-ENTMCNC: 32.5 G/DL (ref 32.4–36.3)
MCV RBC AUTO: 94.8 FL (ref 80.5–98.2)
MONOCYTES # BLD AUTO: 0.31 10*3/MM3 (ref 0.2–1.2)
MONOCYTES NFR BLD AUTO: 5.1 % (ref 5–12)
NEUTROPHILS # BLD AUTO: 3.91 10*3/MM3 (ref 1.9–8.1)
NEUTROPHILS NFR BLD AUTO: 64.1 % (ref 42.7–76)
PLATELET # BLD AUTO: 134 10*3/MM3 (ref 140–500)
POTASSIUM SERPL-SCNC: 4.4 MMOL/L (ref 3.5–5.2)
PROT SERPL-MCNC: 6.8 G/DL (ref 6–8.5)
RBC # BLD AUTO: 4.41 10*6/MM3 (ref 3.9–5.2)
SODIUM SERPL-SCNC: 139 MMOL/L (ref 136–145)
T4 FREE SERPL-MCNC: 0.83 NG/DL (ref 0.93–1.7)
TSH SERPL DL<=0.005 MIU/L-ACNC: 2.63 MIU/ML (ref 0.27–4.2)
WBC # BLD AUTO: 6.1 10*3/MM3 (ref 4.5–10.7)

## 2017-07-08 RX ORDER — METHIMAZOLE 5 MG/1
5 TABLET ORAL EVERY OTHER DAY
Qty: 15 TABLET | Refills: 3 | Status: SHIPPED | OUTPATIENT
Start: 2017-07-08 | End: 2017-09-26

## 2017-08-02 ENCOUNTER — TRANSCRIBE ORDERS (OUTPATIENT)
Dept: ADMINISTRATIVE | Facility: HOSPITAL | Age: 72
End: 2017-08-02

## 2017-08-02 DIAGNOSIS — R06.09 DYSPNEA ON EXERTION: Primary | ICD-10-CM

## 2017-08-08 ENCOUNTER — HOSPITAL ENCOUNTER (OUTPATIENT)
Dept: CT IMAGING | Facility: HOSPITAL | Age: 72
Discharge: HOME | End: 2017-08-08
Attending: INTERNAL MEDICINE | Admitting: INTERNAL MEDICINE

## 2017-08-08 DIAGNOSIS — R06.09 DYSPNEA ON EXERTION: ICD-10-CM

## 2017-08-08 PROCEDURE — 71250 CT THORAX DX C-: CPT

## 2017-08-22 ENCOUNTER — TRANSCRIBE ORDERS (OUTPATIENT)
Dept: CARDIOLOGY | Facility: CLINIC | Age: 72
End: 2017-08-22

## 2017-08-22 DIAGNOSIS — R06.00 DYSPNEA, UNSPECIFIED TYPE: Primary | ICD-10-CM

## 2017-08-29 ENCOUNTER — TRANSCRIBE ORDERS (OUTPATIENT)
Dept: ADMINISTRATIVE | Facility: HOSPITAL | Age: 72
End: 2017-08-29

## 2017-08-29 DIAGNOSIS — R91.1 LUNG NODULE: Primary | ICD-10-CM

## 2017-08-30 ENCOUNTER — HOSPITAL ENCOUNTER (OUTPATIENT)
Dept: CARDIOLOGY | Facility: HOSPITAL | Age: 72
Discharge: HOME | End: 2017-08-30
Attending: INTERNAL MEDICINE | Admitting: INTERNAL MEDICINE

## 2017-08-30 VITALS
DIASTOLIC BLOOD PRESSURE: 90 MMHG | OXYGEN SATURATION: 96 % | HEART RATE: 82 BPM | SYSTOLIC BLOOD PRESSURE: 150 MMHG | BODY MASS INDEX: 43.05 KG/M2 | HEIGHT: 63 IN | WEIGHT: 243 LBS

## 2017-08-30 DIAGNOSIS — R06.00 DYSPNEA, UNSPECIFIED TYPE: ICD-10-CM

## 2017-08-30 LAB
BH CV ECHO MEAS - ACS: 1.5 CM
BH CV ECHO MEAS - AO MAX PG (FULL): 11.3 MMHG
BH CV ECHO MEAS - AO MAX PG: 13.8 MMHG
BH CV ECHO MEAS - AO MEAN PG (FULL): 6 MMHG
BH CV ECHO MEAS - AO MEAN PG: 7 MMHG
BH CV ECHO MEAS - AO ROOT AREA (BSA CORRECTED): 1.8
BH CV ECHO MEAS - AO ROOT AREA: 11.3 CM^2
BH CV ECHO MEAS - AO ROOT DIAM: 3.8 CM
BH CV ECHO MEAS - AO V2 MAX: 186 CM/SEC
BH CV ECHO MEAS - AO V2 MEAN: 123 CM/SEC
BH CV ECHO MEAS - AO V2 VTI: 36 CM
BH CV ECHO MEAS - AVA(I,A): 1.5 CM^2
BH CV ECHO MEAS - AVA(I,D): 1.5 CM^2
BH CV ECHO MEAS - AVA(V,A): 1.3 CM^2
BH CV ECHO MEAS - AVA(V,D): 1.3 CM^2
BH CV ECHO MEAS - BSA(HAYCOCK): 2.3 M^2
BH CV ECHO MEAS - BSA: 2.1 M^2
BH CV ECHO MEAS - BZI_BMI: 43.6 KILOGRAMS/M^2
BH CV ECHO MEAS - BZI_METRIC_HEIGHT: 160 CM
BH CV ECHO MEAS - BZI_METRIC_WEIGHT: 111.6 KG
BH CV ECHO MEAS - CONTRAST EF (2CH): 60.9 ML/M^2
BH CV ECHO MEAS - CONTRAST EF 4CH: 56.3 ML/M^2
BH CV ECHO MEAS - EDV(MOD-SP2): 92 ML
BH CV ECHO MEAS - EDV(MOD-SP4): 96 ML
BH CV ECHO MEAS - EF(MOD-SP2): 60.9 %
BH CV ECHO MEAS - EF(MOD-SP4): 56.3 %
BH CV ECHO MEAS - ESV(MOD-SP2): 36 ML
BH CV ECHO MEAS - ESV(MOD-SP4): 42 ML
BH CV ECHO MEAS - LAT PEAK E' VEL: 8 CM/SEC
BH CV ECHO MEAS - LV DIASTOLIC VOL/BSA (35-75): 45.5 ML/M^2
BH CV ECHO MEAS - LV MAX PG: 2.5 MMHG
BH CV ECHO MEAS - LV MEAN PG: 1 MMHG
BH CV ECHO MEAS - LV SYSTOLIC VOL/BSA (12-30): 19.9 ML/M^2
BH CV ECHO MEAS - LV V1 MAX: 79.7 CM/SEC
BH CV ECHO MEAS - LV V1 MEAN: 57.1 CM/SEC
BH CV ECHO MEAS - LV V1 VTI: 17.4 CM
BH CV ECHO MEAS - LVLD AP2: 7.7 CM
BH CV ECHO MEAS - LVLD AP4: 7.8 CM
BH CV ECHO MEAS - LVLS AP2: 6.8 CM
BH CV ECHO MEAS - LVLS AP4: 7.2 CM
BH CV ECHO MEAS - LVOT AREA (M): 3.1 CM^2
BH CV ECHO MEAS - LVOT AREA: 3.1 CM^2
BH CV ECHO MEAS - LVOT DIAM: 2 CM
BH CV ECHO MEAS - MED PEAK E' VEL: 7 CM/SEC
BH CV ECHO MEAS - MR MAX PG: 110.7 MMHG
BH CV ECHO MEAS - MR MAX VEL: 526 CM/SEC
BH CV ECHO MEAS - MV A DUR: 0.11 SEC
BH CV ECHO MEAS - MV A MAX VEL: 106 CM/SEC
BH CV ECHO MEAS - MV DEC SLOPE: 402 CM/SEC^2
BH CV ECHO MEAS - MV DEC TIME: 0.24 SEC
BH CV ECHO MEAS - MV E MAX VEL: 93.3 CM/SEC
BH CV ECHO MEAS - MV E/A: 0.88
BH CV ECHO MEAS - MV MAX PG: 5.6 MMHG
BH CV ECHO MEAS - MV MEAN PG: 3 MMHG
BH CV ECHO MEAS - MV P1/2T MAX VEL: 95.3 CM/SEC
BH CV ECHO MEAS - MV P1/2T: 69.4 MSEC
BH CV ECHO MEAS - MV V2 MAX: 118 CM/SEC
BH CV ECHO MEAS - MV V2 MEAN: 87.6 CM/SEC
BH CV ECHO MEAS - MV V2 VTI: 41.2 CM
BH CV ECHO MEAS - MVA P1/2T LCG: 2.3 CM^2
BH CV ECHO MEAS - MVA(P1/2T): 3.2 CM^2
BH CV ECHO MEAS - MVA(VTI): 1.3 CM^2
BH CV ECHO MEAS - PA MAX PG (FULL): 1.5 MMHG
BH CV ECHO MEAS - PA MAX PG: 3.8 MMHG
BH CV ECHO MEAS - PA V2 MAX: 97.7 CM/SEC
BH CV ECHO MEAS - PULM A REVS DUR: 0.09 SEC
BH CV ECHO MEAS - PULM A REVS VEL: 22.1 CM/SEC
BH CV ECHO MEAS - PULM DIAS VEL: 34.2 CM/SEC
BH CV ECHO MEAS - PULM S/D: 1.3
BH CV ECHO MEAS - PULM SYS VEL: 45.9 CM/SEC
BH CV ECHO MEAS - PVA(V,A): 2 CM^2
BH CV ECHO MEAS - PVA(V,D): 2 CM^2
BH CV ECHO MEAS - QP/QS: 0.75
BH CV ECHO MEAS - RV MAX PG: 2.3 MMHG
BH CV ECHO MEAS - RV MEAN PG: 1 MMHG
BH CV ECHO MEAS - RV V1 MAX: 75.9 CM/SEC
BH CV ECHO MEAS - RV V1 MEAN: 51.5 CM/SEC
BH CV ECHO MEAS - RV V1 VTI: 16.1 CM
BH CV ECHO MEAS - RVOT AREA: 2.5 CM^2
BH CV ECHO MEAS - RVOT DIAM: 1.8 CM
BH CV ECHO MEAS - SI(AO): 193.3 ML/M^2
BH CV ECHO MEAS - SI(LVOT): 25.9 ML/M^2
BH CV ECHO MEAS - SI(MOD-SP2): 26.5 ML/M^2
BH CV ECHO MEAS - SI(MOD-SP4): 25.6 ML/M^2
BH CV ECHO MEAS - SV(AO): 408.3 ML
BH CV ECHO MEAS - SV(LVOT): 54.7 ML
BH CV ECHO MEAS - SV(MOD-SP2): 56 ML
BH CV ECHO MEAS - SV(MOD-SP4): 54 ML
BH CV ECHO MEAS - SV(RVOT): 41 ML
BH CV ECHO MEAS - TAPSE (>1.6): 2.1 CM2
BH CV ECHO MEAS - TR MAX VEL: 213 CM/SEC
BH CV XLRA - RV BASE: 2.6 CM
BH CV XLRA - TDI S': 14 CM/SEC
E/E' RATIO: 12
LEFT ATRIUM VOLUME INDEX: 20 ML/M2
LEFT ATRIUM VOLUME: 41 CM3
LV EF 2D ECHO EST: 56 %
SINUS: 2.4 CM
STJ: 2.6 CM

## 2017-08-30 PROCEDURE — 93306 TTE W/DOPPLER COMPLETE: CPT | Performed by: INTERNAL MEDICINE

## 2017-08-30 PROCEDURE — 25010000002 PERFLUTREN (DEFINITY) 8.476 MG IN SODIUM CHLORIDE 10 ML INJECTION: Performed by: INTERNAL MEDICINE

## 2017-08-30 PROCEDURE — C8929 TTE W OR WO FOL WCON,DOPPLER: HCPCS

## 2017-08-30 RX ADMIN — PERFLUTREN 1.5 ML: 6.52 INJECTION, SUSPENSION INTRAVENOUS at 09:36

## 2017-09-08 ENCOUNTER — OFFICE VISIT (OUTPATIENT)
Dept: CARDIOLOGY | Facility: CLINIC | Age: 72
End: 2017-09-08

## 2017-09-08 ENCOUNTER — LAB (OUTPATIENT)
Dept: LAB | Facility: HOSPITAL | Age: 72
End: 2017-09-08
Attending: INTERNAL MEDICINE

## 2017-09-08 VITALS
DIASTOLIC BLOOD PRESSURE: 80 MMHG | BODY MASS INDEX: 45.45 KG/M2 | WEIGHT: 247 LBS | SYSTOLIC BLOOD PRESSURE: 130 MMHG | HEIGHT: 62 IN | HEART RATE: 81 BPM

## 2017-09-08 DIAGNOSIS — R00.2 PALPITATIONS: ICD-10-CM

## 2017-09-08 DIAGNOSIS — E66.01 MORBID OBESITY DUE TO EXCESS CALORIES (HCC): ICD-10-CM

## 2017-09-08 DIAGNOSIS — R06.09 DOE (DYSPNEA ON EXERTION): ICD-10-CM

## 2017-09-08 DIAGNOSIS — R07.2 PRECORDIAL PAIN: Primary | ICD-10-CM

## 2017-09-08 DIAGNOSIS — R07.2 PRECORDIAL PAIN: ICD-10-CM

## 2017-09-08 LAB
ANION GAP SERPL CALCULATED.3IONS-SCNC: 13.9 MMOL/L
BUN BLD-MCNC: 17 MG/DL (ref 8–23)
BUN/CREAT SERPL: 15.7 (ref 7–25)
CALCIUM SPEC-SCNC: 9.3 MG/DL (ref 8.6–10.5)
CHLORIDE SERPL-SCNC: 104 MMOL/L (ref 98–107)
CO2 SERPL-SCNC: 23.1 MMOL/L (ref 22–29)
CREAT BLD-MCNC: 1.08 MG/DL (ref 0.57–1)
D DIMER PPP FEU-MCNC: 0.47 MCGFEU/ML (ref 0–0.49)
GFR SERPL CREATININE-BSD FRML MDRD: 50 ML/MIN/1.73
GLUCOSE BLD-MCNC: 98 MG/DL (ref 65–99)
NT-PROBNP SERPL-MCNC: 37.8 PG/ML (ref 5–900)
POTASSIUM BLD-SCNC: 4.3 MMOL/L (ref 3.5–5.2)
SODIUM BLD-SCNC: 141 MMOL/L (ref 136–145)

## 2017-09-08 PROCEDURE — 99214 OFFICE O/P EST MOD 30 MIN: CPT | Performed by: INTERNAL MEDICINE

## 2017-09-08 PROCEDURE — 85379 FIBRIN DEGRADATION QUANT: CPT

## 2017-09-08 PROCEDURE — 80048 BASIC METABOLIC PNL TOTAL CA: CPT | Performed by: INTERNAL MEDICINE

## 2017-09-08 PROCEDURE — 36415 COLL VENOUS BLD VENIPUNCTURE: CPT | Performed by: INTERNAL MEDICINE

## 2017-09-08 PROCEDURE — 83880 ASSAY OF NATRIURETIC PEPTIDE: CPT | Performed by: INTERNAL MEDICINE

## 2017-09-08 PROCEDURE — 93000 ELECTROCARDIOGRAM COMPLETE: CPT | Performed by: INTERNAL MEDICINE

## 2017-09-08 RX ORDER — FUROSEMIDE 20 MG/1
20 TABLET ORAL DAILY
Qty: 30 TABLET | Refills: 11 | Status: SHIPPED | OUTPATIENT
Start: 2017-09-08 | End: 2017-09-16

## 2017-09-08 NOTE — PROGRESS NOTES
Patt Bond  1945  Date of Office Visit: 09/08/2017  Encounter Provider: Miguel Gautam MD  Place of Service: [facility] CARDIOLOGY      CHIEF COMPLAINT:  Dyspnea on exertion    HISTORY OF PRESENT ILLNESS:A very pleasant 72-year-old female with a medical history as documented above who presented to me secondary to bilateral lower extremity edema along with severe dyspnea on exertion. She states that her dyspnea, at least over the past 6 months, has severely increased. She is unable to walk more than 200 feet without having severe dyspnea requiring her to rest. She has no chest pain or tightness with that activity, but her dyspnea is debilitating. She has no other associated symptoms typically with that activity. She has also complained of intermittent bilateral lower extremity edema that she describes as mild in intensity. It occasionally improves with elevation, but this is no predictable. She has no erythema or pain in her legs documented. She has no orthopnea or PND. She has underwent evaluation by Dr. Nando Heller and was felt to have really no obvious pulmonary etiology for her severe dyspnea. She was subsequently referred here for evaluation.      Review of Systems   Constitution: Negative for fever, weakness and malaise/fatigue.   HENT: Negative for nosebleeds and sore throat.    Eyes: Negative for blurred vision and double vision.   Cardiovascular: Positive for chest pain, leg swelling and palpitations. Negative for claudication and syncope.   Respiratory: Positive for shortness of breath. Negative for cough and snoring.    Endocrine: Negative for cold intolerance, heat intolerance and polydipsia.   Skin: Negative for itching, poor wound healing and rash.   Musculoskeletal: Negative for joint pain, joint swelling, muscle weakness and myalgias.   Gastrointestinal: Negative for abdominal pain, melena, nausea and vomiting.   Neurological: Negative for  light-headedness, loss of balance, seizures and vertigo.   Psychiatric/Behavioral: Negative for altered mental status and depression.       Past Medical History:   Diagnosis Date   • Back pain    • Colon polyps 12/10/2004    Proximal ascending polyps: tubulovillous adenoma, only mild dysplasia seen; distal ascending polyps: tubulovillous adenoma, only mild dysplasia seen   • Colon polyps 04/16/2001    Cecum biopsy: fragments of tubular adenoma, rectum biopsy: fragments of hyperplastic polyp   • Diverticulitis    • Diverticulosis    • Endometrial polyp 2011, 2002 2011 Path findings: fragments of endometrial polyp with cystic hyperplasia (and no atypia) / 2002 Path findings:    • Gallstones    • Hyperthyroidism     followed by Dr. Blackmon   • Joint pain, knee    • Kidney stones 2015    with cystoscopy by Dr. Miguel Monte done on 10/7/15, 9/9/15, 5/8/15, 9/21/07   • Obstructive pyelonephritis 09/28/2015    left obstructing pyelonephritis    • PONV (postoperative nausea and vomiting)    • Rash     RT LOWER LEG AND FOOT   • Renal disorder    • SOB (shortness of breath) on exertion        The following portions of the patient's history were reviewed and updated as appropriate: Social history , Family history and Surgical history     Current Outpatient Prescriptions on File Prior to Visit   Medication Sig Dispense Refill   • Acetaminophen (TYLENOL 8 HOUR PO) Take 2 tablets by mouth As Needed (PAIN).     • ibuprofen (ADVIL,MOTRIN) 200 MG tablet Take 200 mg by mouth Every 6 (Six) Hours As Needed for Mild Pain (1-3). STOPPED 5/9/17     • methIMAzole (TAPAZOLE) 5 MG tablet Take 1 tablet by mouth Every Other Day. 15 tablet 3     No current facility-administered medications on file prior to visit.        Allergies   Allergen Reactions   • Cephalexin Hives     Pt states she possibly is not allergic to Cephalexin, took and did not have problems   • Cephalosporins Hives   • Penicillins Hives       Vitals:    09/08/17 1259  "09/08/17 1300   BP: 130/80 130/80   BP Location: Right arm Left arm   Pulse: 81    Weight: 247 lb (112 kg)    Height: 62\" (157.5 cm)      Physical Exam   Constitutional: She is oriented to person, place, and time. She appears well-developed and well-nourished.   Obese     HENT:   Head: Normocephalic and atraumatic.   Eyes: Conjunctivae and EOM are normal. No scleral icterus.   Neck: Normal range of motion. Neck supple. Normal carotid pulses, no hepatojugular reflux and no JVD present. Carotid bruit is not present. No tracheal deviation present. No thyromegaly present.   Cardiovascular: Normal rate and regular rhythm.  Exam reveals no gallop and no friction rub.    No murmur heard.  Pulses:       Carotid pulses are 2+ on the right side, and 2+ on the left side.       Radial pulses are 2+ on the right side, and 2+ on the left side.        Femoral pulses are 2+ on the right side, and 2+ on the left side.       Dorsalis pedis pulses are 2+ on the right side, and 2+ on the left side.        Posterior tibial pulses are 2+ on the right side, and 2+ on the left side.   Pulmonary/Chest: Breath sounds normal. No respiratory distress. She has no decreased breath sounds. She has no wheezes. She has no rhonchi. She has no rales. She exhibits no tenderness.   Abdominal: Soft. Bowel sounds are normal. She exhibits no distension. There is no tenderness. There is no rebound.   Musculoskeletal: She exhibits edema (trace bilateral LE). She exhibits no deformity.   Neurological: She is alert and oriented to person, place, and time. She has normal strength. No sensory deficit.   Skin: No rash noted. No erythema.   Psychiatric: She has a normal mood and affect. Her behavior is normal.       No components found for: CBC  No results found for: CMP  No components found for: LIPID  No results found for: BMP      ECG 12 Lead  Date/Time: 9/8/2017 1:04 PM  Performed by: NARENDRA COOPER  Authorized by: NARENDRA COOPER   Comparison: " compared with previous ECG   Rhythm: sinus rhythm  Rate: normal  Conduction: conduction normal  ST Segments: ST segments normal  T Waves: T waves normal  QRS axis: normal  Clinical impression: normal ECG               DISCUSSION/SUMMARYA very pleasant 72-year-old female with a medical history as documented above including severe dyspnea on exertion that she states has worsened over the past 6 months. She denies any chest pain or pressure with activity. She also complains of bilateral lower extremity edema. Her volume status is difficulty to ascertain secondary to her size. She has had a significant amount of weight gain over the same period of time that her dyspnea has presented and I think this may be contributing significantly. She underwent a transthoracic echocardiogram with just grade 2 diastolic dysfunction and no significant valvular heart disease or right ventricular dysfunction.    1. Dyspnea on exertion.       - In a setting of her lower extremity edema along with grade 2 diastolic dysfunction I do think a low dose of Lasix therapy           at 20 mg daily is reasonable.       - BMP, proBNP, and D-dimer have all been ordered as well.        - I will start out with a PET stress to evaluate for ischemia as the etiology or contributing factor to her dyspnea.       - If she has a positive stress test obviously she will need a left and right heart catheterization. If she has a negative stress       test and has continued symptoms even with diuretic therapy I still think we may end up going down the road of coronary         angiography and right heart catheterization. If she had a negative stress test and had improvement on the Lasix therapy        that would certainly be an ideal situation.  2. Lower extremity edema: Low dose Lasix and compression stockings have been recommended.    I will follow up her lab work and stress test and we will go from there.

## 2017-09-12 ENCOUNTER — HOSPITAL ENCOUNTER (OUTPATIENT)
Dept: CARDIOLOGY | Facility: HOSPITAL | Age: 72
Discharge: HOME | End: 2017-09-12
Attending: INTERNAL MEDICINE | Admitting: INTERNAL MEDICINE

## 2017-09-12 DIAGNOSIS — R07.2 PRECORDIAL PAIN: ICD-10-CM

## 2017-09-12 LAB
BH CV NUCLEAR PRIOR STUDY: 2
BH CV STRESS BP STAGE 1: NORMAL
BH CV STRESS COMMENTS STAGE 1: NORMAL
BH CV STRESS DOSE REGADENOSON STAGE 1: 0.4
BH CV STRESS DURATION MIN STAGE 1: 0
BH CV STRESS DURATION SEC STAGE 1: 15
BH CV STRESS HR STAGE 1: 98
BH CV STRESS PROTOCOL 1: NORMAL
BH CV STRESS RECOVERY BP: NORMAL MMHG
BH CV STRESS RECOVERY HR: 88 BPM
BH CV STRESS STAGE 1: 1
LV EF NUC BP: 67 %
MAXIMAL PREDICTED HEART RATE: 150 BPM
PERCENT MAX PREDICTED HR: 65.33 %
STRESS BASELINE BP: NORMAL MMHG
STRESS BASELINE HR: 75 BPM
STRESS PERCENT HR: 77 %
STRESS POST EXERCISE DUR SEC: 15 SEC
STRESS POST PEAK BP: NORMAL MMHG
STRESS POST PEAK HR: 98 BPM
STRESS TARGET HR: 128 BPM

## 2017-09-12 PROCEDURE — 93016 CV STRESS TEST SUPVJ ONLY: CPT | Performed by: INTERNAL MEDICINE

## 2017-09-12 PROCEDURE — 78492 MYOCRD IMG PET MLT RST&STRS: CPT | Performed by: INTERNAL MEDICINE

## 2017-09-12 PROCEDURE — 93017 CV STRESS TEST TRACING ONLY: CPT

## 2017-09-12 PROCEDURE — 0 RUBIDIUM CHLORIDE: Performed by: INTERNAL MEDICINE

## 2017-09-12 PROCEDURE — A9555 RB82 RUBIDIUM: HCPCS | Performed by: INTERNAL MEDICINE

## 2017-09-12 PROCEDURE — 93018 CV STRESS TEST I&R ONLY: CPT | Performed by: INTERNAL MEDICINE

## 2017-09-12 PROCEDURE — 25010000002 REGADENOSON 0.4 MG/5ML SOLUTION: Performed by: INTERNAL MEDICINE

## 2017-09-12 PROCEDURE — 78492 MYOCRD IMG PET MLT RST&STRS: CPT

## 2017-09-12 RX ADMIN — RUBIDIUM CHLORIDE RB-82 1 DOSE: 150 INJECTION, SOLUTION INTRAVENOUS at 11:55

## 2017-09-12 RX ADMIN — REGADENOSON 0.4 MG: 0.08 INJECTION, SOLUTION INTRAVENOUS at 11:55

## 2017-09-12 RX ADMIN — RUBIDIUM CHLORIDE RB-82 1 DOSE: 150 INJECTION, SOLUTION INTRAVENOUS at 11:45

## 2017-09-13 ENCOUNTER — OFFICE VISIT (OUTPATIENT)
Dept: OTHER | Facility: HOSPITAL | Age: 72
End: 2017-09-13
Attending: THORACIC SURGERY (CARDIOTHORACIC VASCULAR SURGERY)

## 2017-09-13 ENCOUNTER — TELEPHONE (OUTPATIENT)
Dept: CARDIOLOGY | Facility: CLINIC | Age: 72
End: 2017-09-13

## 2017-09-13 VITALS
SYSTOLIC BLOOD PRESSURE: 164 MMHG | HEIGHT: 62 IN | HEART RATE: 84 BPM | OXYGEN SATURATION: 94 % | TEMPERATURE: 97 F | BODY MASS INDEX: 45.64 KG/M2 | RESPIRATION RATE: 16 BRPM | DIASTOLIC BLOOD PRESSURE: 83 MMHG | WEIGHT: 248 LBS

## 2017-09-13 DIAGNOSIS — R06.09 DOE (DYSPNEA ON EXERTION): ICD-10-CM

## 2017-09-13 DIAGNOSIS — E04.2 MULTINODULAR GOITER: ICD-10-CM

## 2017-09-13 DIAGNOSIS — J39.8 TRACHEAL COMPRESSION: Primary | Chronic | ICD-10-CM

## 2017-09-13 DIAGNOSIS — R22.2 PLEURAL NODULE: Chronic | ICD-10-CM

## 2017-09-13 PROCEDURE — 99204 OFFICE O/P NEW MOD 45 MIN: CPT | Performed by: THORACIC SURGERY (CARDIOTHORACIC VASCULAR SURGERY)

## 2017-09-13 PROCEDURE — G0463 HOSPITAL OUTPT CLINIC VISIT: HCPCS

## 2017-09-13 NOTE — TELEPHONE ENCOUNTER
Pt called wanting to know the results of her labs from last week and her stress results from yesterday. Both are in [facility] and she can be reached at #[phone].  She also said that you said that she should wait to start the Lasix until you had a chance to look at her BMP. So she wants to know whether to start it or not.      1. Dyspnea on exertion.       - In a setting of her lower extremity edema along with grade 2 diastolic dysfunction I do think a low dose of Lasix therapy           at 20 mg daily is reasonable.       - BMP, proBNP, and D-dimer have all been ordered as well.        - I will start out with a PET stress to evaluate for ischemia as the etiology or contributing factor to her dyspnea.       - If she has a positive stress test obviously she will need a left and right heart catheterization. If she has a negative stress       test and has continued symptoms even with diuretic therapy I still think we may end up going down the road of coronary         angiography and right heart catheterization. If she had a negative stress test and had improvement on the Lasix therapy        that would certainly be an ideal situation.  2. Lower extremity edema: Low dose Lasix and compression stockings have been recommended.        Thanks,  Kristi

## 2017-09-13 NOTE — PROGRESS NOTES
Subjective   Patient ID: Patt Bond is a 72 y.o. female is being seen for consultation today at the request of Nando Heller MD    History of Present Illness  Dear Colleague,  Patt Bond was seen in the [facility] for evaluation of left paratracheal, mediastinal mass.  I have taken opportunity review her history, films and perform a focused physical examination.  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 Quintin Camarillo at [facility].  She now presents with recurrent episodes of bronchitis, shortness of breath with physical activity and a recently identified mediastinal mass along the left paratracheal and anterior mediastinal borders.  Her dyspnea is made worse with physical activity such as walking on a flat surface, an incline or climbing stairs.  This resolves with rest.  She denies any complaints of fever, chills,hemoptysis, pleuritic chest pain, night sweats, hoarseness, or unintentional weight loss. Underlying medical conditions including hyperthyroidism, nephrolithiasis and palpitations remain stable.  She has no other somatic complaints or alleviating or exacerbating factors aside from those mentioned above.    The following portions of the patient's history were reviewed and updated as appropriate: allergies, current medications, past family history, past medical history, past social history, past surgical history and problem list.  Review of Systems   Constitution: Positive for weight gain.        Feeling bad,trouble sleeping   Cardiovascular:        Chest heaviness,ankle swelling   Respiratory: Positive for shortness of breath.    Hematologic/Lymphatic: Bruises/bleeds easily.   Skin: Positive for rash.   Musculoskeletal: Positive for back pain and muscle cramps.   Genitourinary:        Kidney stones   All other systems reviewed and are  negative.    Patient Active Problem List   Diagnosis   • Left lower quadrant pain   • Ketonuria due to dehydration   • Normocytic anemia   • Pancytopenia   • Obesity (BMI 30-39.9)   • Nausea   • Sepsis   • Nephrolithiasis   • Pleural nodule   • Tracheal compression   • Hyperthyroidism   • Graves disease   • Abnormal weight gain   • Palpitations   • Cholecystitis   • History of colon polyps - 4 Tubulovillous adenomas in 2004, normal colonoscopy in 2008   • History of abdominal abscess - 3+ E. coli at time of surgery 9/2016   • Multiple drug allergies to Cephalexin, Cephalosporins, Penicillins   • Weight gain - 10 pounds in 2 months, unintentional   • Multinodular goiter   • GRAY (dyspnea on exertion)   • Morbid obesity     Past Medical History:   Diagnosis Date   • Back pain    • Colon polyps 12/10/2004    Proximal ascending polyps: tubulovillous adenoma, only mild dysplasia seen; distal ascending polyps: tubulovillous adenoma, only mild dysplasia seen   • Colon polyps 04/16/2001    Cecum biopsy: fragments of tubular adenoma, rectum biopsy: fragments of hyperplastic polyp   • Diverticulitis    • Diverticulosis    • Endometrial polyp 2011, 2002 2011 Path findings: fragments of endometrial polyp with cystic hyperplasia (and no atypia) / 2002 Path findings:    • Gallstones    • Hyperthyroidism     followed by Dr. Blackmon   • Joint pain, knee    • Kidney stones 2015    with cystoscopy by Dr. Miguel Monte done on 10/7/15, 9/9/15, 5/8/15, 9/21/07   • Mediastinal mass    • Obstructive pyelonephritis 09/28/2015    left obstructing pyelonephritis    • PONV (postoperative nausea and vomiting)    • Rash     RT LOWER LEG AND FOOT   • Renal disorder    • SOB (shortness of breath) on exertion      Past Surgical History:   Procedure Laterality Date   • CHOLECYSTECTOMY N/A 5/17/2017    Procedure: LAPAROSCOPIC CHOLECYSTECTOMY WITH IOC;  Surgeon: Patt Moore MD;  Location: [facility] OR;  Service:    • COLON RESECTION Left  9/14/2016    Laparoscopic Low Anterior Resection with splenic flexure mobilization, drainage of Pelvic Abscess (cultured 3+ E. Coli), Left salpingo-ophorectomy, laparoscopic rectopexy, and umbilical hernia repair, Dr. Patt Moore   • COLON RESECTION      VENTRAL HERNIA REPAIR   • COLONOSCOPY N/A 05/15/2008    sigmoid diverticulosis with blunting of the haustral folds and angulation consistent with previous diverticulitis, no polyps, suggestion of rectal prolapse-Dr. aPtt Moore   • COLONOSCOPY W/ BIOPSIES AND POLYPECTOMY N/A 04/16/2001    Possible mild gastritis w/ some appearance of formations that look like petechiae in the antrum, no ulcerations, no erosions; cecal polyp approx 4mm sessile; probable transverse colon polyp, although we could not visualize this completely upon pulling out; sigmoid diverticula; multiple rectal polyps, mostly w/ appearance of hyperplasia, largest being 5 to 6mm: removed via snare-Dr. Patt Moore   • COLONOSCOPY W/ POLYPECTOMY N/A 12/10/2004    Diverticulosis with sigmoid perideverticulitis with erythema and patchiness around some of the diverticula, proximal ascedning colon polyp-approx 5 mm-removed via snare cauter, two distal ascending colon polyps-7 mm and 3 mm-removed via snare cautery polypectomy, hemorrhoids-Dr. Patt Moore   • CYSTOSCOPY W/ URETERAL STENT REMOVAL Left 10/07/2015    Cystoscopy with stent extraction, left ureteral occulusion balloon placement, percutanous nephrostolithotomy with stone volume less than 2.5 cm involving the left lower pole and the left proximal ureter, balloon tract dilation for establishment of nephrostomy tract, antegrade nephrostomy tube placement-Dr. Miguel Monte   • CYSTOSCOPY, RETROGRADE PYELOGRAM AND STENT INSERTION Left 09/28/2015    Left cystoscopy with left retrograde pyelogram, left double-J stent placement-Dr. Miguel Blas   • CYSTOSCOPY, RETROGRADE PYELOGRAM AND STENT INSERTION Left 09/09/2015    Cystoscopy with  bilateral retrograde pyelogram, left double-J stent placement, right ureteral pyeloscopy with laser lithotripsy of the ureteropelvic junction calculus, right double-J stent placement-Dr. Miguel Monte   • CYSTOSCOPY, RETROGRADE PYELOGRAM AND STENT INSERTION Right 09/21/2007    Cystoscopy with right retrograde pyelogram, right biliary stent placement-Dr. Miguel Monte   • CYSTOSCOPY, RETROGRADE PYELOGRAM AND STENT INSERTION Right 09/07/2007    Cystoscopy with right retrograde pyelogram, right ureteral peyloscopy with extraction distal ureteral mass, right double J stent placement-Dr. Miguel Monte   • CYSTOSCOPY, URETEROSCOPY, RETROGRADE PYELOGRAM, STENT INSERTION Right 09/07/2007    Cystoscopy with right retrograde pyelogram, rigth ureteroscopy with extraction of distal mass, right double J stent placement-Dr. Miguel Monte   • D&C HYSTEROSCOPY N/A 05/18/2011    Procedure done due to thickened endomentrium and an endometrial polyp-Dr. Quita Cheatham   • DILATATION AND CURETTAGE N/A 03/22/2002    D&C, polyp removal-Dr. Quita Cheatham   • EXTRACORPOREAL SHOCKWAVE LITHOTRIPSY (ESWL), STENT INSERTION/REMOVAL Left 05/08/2015    Left extracoporeal shockwave lithotripsy, cystoscopy with stent placement-Dr. Miguel Monte   • SIGMOIDOSCOPY N/A 9/13/2016    Procedure: SIGMOIDOSCOPY FLEXIBLE TO 25 CM;  Surgeon: Patt Moore MD;  Location: [facility] ENDOSCOPY;  Service:    • THORACOTOMY  1996    Thoracotomy for ectopic thyroid, Dr. Camarillo   • UMBILICAL HERNIA REPAIR N/A 9/14/2016    Procedure: UMBILICAL HERNIA REPAIR ;  Surgeon: Patt Moore MD;  Location: [facility] OR;  Service:    • VENTRAL/INCISIONAL HERNIA REPAIR N/A 5/17/2017    Procedure: VENTRAL HERNIA REPAIR WITH MESH, BILATERAL MUSCULOFASCIAL RELEASE;  Surgeon: Patt Moore MD;  Location: [facility] MAIN OR;  Service:      Family History   Problem Relation Age of Onset   • Heart disease Father    • Heart attack Paternal Uncle    • Cancer Maternal  Grandmother      ovarian   • Heart attack Paternal Grandfather    • Heart attack Paternal Uncle    • Heart attack Paternal Uncle    • Heart attack Paternal Uncle    • Heart attack Paternal Uncle    • Heart attack Paternal Uncle      Social History     Social History   • Marital status:      Spouse name: BERENICE COFFEY    • Number of children: 2   • Years of education: HIGH SCHOOL      Occupational History   •  Retired     Social History Main Topics   • Smoking status: Never Smoker   • Smokeless tobacco: Never Used   • Alcohol use No      Comment: No caffeine use   • Drug use: No   • Sexual activity: Defer     Other Topics Concern   • Not on file     Social History Narrative       Current Outpatient Prescriptions:   •  Acetaminophen (TYLENOL 8 HOUR PO), Take 2 tablets by mouth As Needed (PAIN)., Disp: , Rfl:   •  furosemide (LASIX) 20 MG tablet, Take 1 tablet by mouth Daily., Disp: 30 tablet, Rfl: 11  •  ibuprofen (ADVIL,MOTRIN) 200 MG tablet, Take 200 mg by mouth Every 6 (Six) Hours As Needed for Mild Pain (1-3). STOPPED 5/9/17, Disp: , Rfl:   •  methIMAzole (TAPAZOLE) 5 MG tablet, Take 1 tablet by mouth Every Other Day., Disp: 15 tablet, Rfl: 3  No current facility-administered medications for this visit.   Allergies   Allergen Reactions   • Cephalexin Hives     Pt states she possibly is not allergic to Cephalexin, took and did not have problems   • Cephalosporins Hives   • Penicillins Hives        Objective   Vitals:    09/13/17 0922   BP: 164/83   Pulse: 84   Resp: 16   Temp: 97 °F (36.1 °C)   SpO2: 94%     Physical Exam   Constitutional: She is oriented to person, place, and time. Vital signs are normal. She appears well-developed and well-nourished.   HENT:   Head: Normocephalic and atraumatic.   Eyes: EOM are normal. Pupils are equal, round, and reactive to light. Right eye exhibits no discharge. Left eye exhibits no discharge.   Neck: Normal range of motion. Neck supple. No JVD present. No tracheal  deviation present.   Cardiovascular: Normal rate, regular rhythm, normal heart sounds and intact distal pulses.    No murmur heard.  Pulmonary/Chest: Effort normal and breath sounds normal. No accessory muscle usage. No apnea and no bradypnea. No respiratory distress. She has no wheezes. She has no rhonchi. She has no rales. She exhibits no tenderness.   Abdominal: Soft. There is no tenderness.   Musculoskeletal: Normal range of motion. She exhibits no edema or tenderness.   Lymphadenopathy:     She has no cervical adenopathy.   Neurological: She is alert and oriented to person, place, and time. She has normal strength.   Skin: Skin is warm and dry. No rash noted. No cyanosis or erythema.   Psychiatric: She has a normal mood and affect. Her behavior is normal. Thought content normal.     Independent Review of Radiographic Studies:    Appointment Information   PACS Images   Radiology Images   Study Result   CT CHEST WITHOUT CONTRAST      HISTORY: Thyroid mass and dyspnea      TECHNIQUE: Axial CT images of the chest were obtained without  administration of intravenous contrast. Coronal and sagittal reformats  were obtained.      COMPARISON: CT chest from 09/03/2016.      FINDINGS: The left lobe of the thyroid gland is enlarged and  heterogeneous with a mass arising from its inferior aspect and extending  into the mediastinum. This causes rightward deviation of the trachea as  well as the esophagus. The approximate dimensions of this mass are  approximately 6.6 cm in the craniocaudal plane and 4.1 x 4.8 cm in  transverse and AP dimension. It demonstrates heterogeneous foci of  calcification as well as low density areas. The appearance is largely  unchanged from the CT chest performed on 09/13/2016.      Calcified coronary artery disease is present. No pleural or pericardial  effusion. No pathological mediastinal adenopathy is identified. The  central airways are otherwise patent without endobronchial lesion. A 6  mm  subpleural density within the left lower lobe is without interim  change from prior imaging. An additional 3 mm noncalcified nodule seen  within the left lower lobe, image 55 is also unchanged from the prior  study.      The visualized upper abdomen is unremarkable. No pathological axillary  lymphadenopathy. Degenerative disc disease is identified within the  spine.      IMPRESSION:  1.  Large thyroid mass extending into the mediastinal with rightward  deviation of the trachea is unchanged from the CT dated 09/2016.  Followup recommended.  2.  A 6 mm subpleural density in the left lower lobe lobe unchanged  since 2011 and thus likely benign. There is an additional 3 mm  noncalcified nodule within the left lower lobe unchanged since imaging  in 09/2016 and a followup CT chest in 1 year is recommended to  reevaluate this abnormality.      This report was finalized on 8/10/2017 11:45 AM by Dr. Luma Mosqueda MD.     CT CHEST WITH IV CONTRAST      HISTORY: 71-year-old female with a pulmonary nodule. Shortness of  breath.      TECHNIQUE: 3 mm images were obtained through the chest after the  administration of IV contrast. There is no prior chest CT for  comparison. Compared with images of the lower chest on CT of the abdomen  performed yesterday.      FINDINGS: There is a 6 mm pleural-based nodule at the lateral aspect of  the left lower lobe, image 62. There is a tiny nodular focus of pleural  thickening at the left major fissure. No other nodular densities are  seen. There are dependent atelectatic changes at both lung bases and  there is mild scarring at the left lung base. There are no pleural or  pericardial effusions. There is a very large left thyroid lobe nodule  which extends well into the mediastinum resulting in mass effect on the  trachea and esophagus to the right. Measured on the sagittal  reconstruction series, the left thyroid lesion measures 8.4 x 3.3 cm.  The right thyroid lobe appears unremarkable.  There are a few shotty  mediastinal nodes, but there are no pathologically enlarged nodes.      IMPRESSION:  1. The 6 mm pleural-based left lower lobe pulmonary nodule appears  stable since prior CT of the abdomen from 11/01/2011 suggesting a benign  etiology. The tiny focus of pleural thickening along the left major  fissure is likely benign as well.  2. Very large left thyroid lobe nodule/mass extending into the  mediastinum with mass effect on the trachea and esophagus to the right,  measuring approximately 8.4 x 3.3 cm. Follow-up for this is recommended.      This report was finalized on 9/13/2016 2:33 PM by Dr. Sridevi Aguirre MD.      Assessment/Plan   Assessment:  Large left thyroid mass extending into the mediastinum with mild compression of the esophagus and trachea.  Prior history of right thoracotomy and resection of anterior mediastinal mass, substernal thyroid.  Chronic shortness of breath with physical activity.  There are no diagnoses linked to this encounter.          Obesity.  Multinodular goiter.    Plan:  The mass within the left anterior mediastinum and causing mild extrinsic compression of the trachea is likely residual thyroid tissue.  This is not amenable to biopsy through a transthoracic approach.  I recommended that she return to pulmonary service, and determine if this would be approachable by a endobronchial ultrasound or navigational bronchoscopy.  If this is not an option our next approach would be a left thoracotomy with resection or possibly a transcervical approach in conjunction with a left video-assisted thoracoscopy.  Fortunately this mass appears to have decreased in size when compared to the prior CAT scan of the September 2016.  She is only symptomatic with physical activity and I believe that this is multifactorial including a combination of increased weight, and cardiac disease.  If we're are unable to obtain tissue diagnosis through a bronchoscopy and endobronchial ultrasound or  navigational bronchoscopy we will then determine the necessity for surgical intervention.  Additionally there are 2 small pulmonary nodules in the left lower lobe that appear stable when compared to prior CAT scan imaging.  I'll keep you informed of her progress. Should you have any questions or concerns regarding your patient's care, please do not hesitate to contact me.  Sincerely, Christopher Bautista M.D.

## 2017-09-14 NOTE — PATIENT INSTRUCTIONS
Pt was seen by Dr. Bautista and referred back to Dr. Heller for an EBUS for tissue diagnosis and will follow up with Dr. Bautista. Pt given contact cards for Dr. Bautista and nurse navigator.

## 2017-09-16 ENCOUNTER — TELEPHONE (OUTPATIENT)
Dept: CARDIOLOGY | Facility: CLINIC | Age: 72
End: 2017-09-16

## 2017-09-16 DIAGNOSIS — R06.09 DOE (DYSPNEA ON EXERTION): Primary | ICD-10-CM

## 2017-09-25 ENCOUNTER — TRANSCRIBE ORDERS (OUTPATIENT)
Dept: LAB | Facility: HOSPITAL | Age: 72
End: 2017-09-25

## 2017-09-25 ENCOUNTER — LAB (OUTPATIENT)
Dept: LAB | Facility: HOSPITAL | Age: 72
End: 2017-09-25
Attending: INTERNAL MEDICINE

## 2017-09-25 DIAGNOSIS — Z01.810 PRE-OPERATIVE CARDIOVASCULAR EXAMINATION: Primary | ICD-10-CM

## 2017-09-25 DIAGNOSIS — Z13.6 SCREENING FOR ISCHEMIC HEART DISEASE: ICD-10-CM

## 2017-09-25 DIAGNOSIS — R06.89 HYPOVENTILATION, IDIOPATHIC: ICD-10-CM

## 2017-09-25 DIAGNOSIS — Z01.810 PRE-OPERATIVE CARDIOVASCULAR EXAMINATION: ICD-10-CM

## 2017-09-25 LAB
ANION GAP SERPL CALCULATED.3IONS-SCNC: 10.6 MMOL/L
BASOPHILS # BLD AUTO: 0.02 10*3/MM3 (ref 0–0.2)
BASOPHILS NFR BLD AUTO: 0.3 % (ref 0–1.5)
BUN BLD-MCNC: 14 MG/DL (ref 8–23)
BUN/CREAT SERPL: 13.3 (ref 7–25)
CALCIUM SPEC-SCNC: 9.5 MG/DL (ref 8.6–10.5)
CHLORIDE SERPL-SCNC: 106 MMOL/L (ref 98–107)
CO2 SERPL-SCNC: 24.4 MMOL/L (ref 22–29)
CREAT BLD-MCNC: 1.05 MG/DL (ref 0.57–1)
DEPRECATED RDW RBC AUTO: 46.8 FL (ref 37–54)
EOSINOPHIL # BLD AUTO: 0.28 10*3/MM3 (ref 0–0.7)
EOSINOPHIL NFR BLD AUTO: 4.7 % (ref 0.3–6.2)
ERYTHROCYTE [DISTWIDTH] IN BLOOD BY AUTOMATED COUNT: 13.9 % (ref 11.7–13)
GFR SERPL CREATININE-BSD FRML MDRD: 52 ML/MIN/1.73
GLUCOSE BLD-MCNC: 98 MG/DL (ref 65–99)
HCT VFR BLD AUTO: 40.1 % (ref 35.6–45.5)
HGB BLD-MCNC: 13.5 G/DL (ref 11.9–15.5)
IMM GRANULOCYTES # BLD: 0 10*3/MM3 (ref 0–0.03)
IMM GRANULOCYTES NFR BLD: 0 % (ref 0–0.5)
LYMPHOCYTES # BLD AUTO: 1.83 10*3/MM3 (ref 0.9–4.8)
LYMPHOCYTES NFR BLD AUTO: 30.8 % (ref 19.6–45.3)
MCH RBC QN AUTO: 31.5 PG (ref 26.9–32)
MCHC RBC AUTO-ENTMCNC: 33.7 G/DL (ref 32.4–36.3)
MCV RBC AUTO: 93.5 FL (ref 80.5–98.2)
MONOCYTES # BLD AUTO: 0.36 10*3/MM3 (ref 0.2–1.2)
MONOCYTES NFR BLD AUTO: 6.1 % (ref 5–12)
NEUTROPHILS # BLD AUTO: 3.45 10*3/MM3 (ref 1.9–8.1)
NEUTROPHILS NFR BLD AUTO: 58.1 % (ref 42.7–76)
PLATELET # BLD AUTO: 149 10*3/MM3 (ref 140–500)
PMV BLD AUTO: 9.4 FL (ref 6–12)
POTASSIUM BLD-SCNC: 4.4 MMOL/L (ref 3.5–5.2)
RBC # BLD AUTO: 4.29 10*6/MM3 (ref 3.9–5.2)
SODIUM BLD-SCNC: 141 MMOL/L (ref 136–145)
WBC NRBC COR # BLD: 5.94 10*3/MM3 (ref 4.5–10.7)

## 2017-09-25 PROCEDURE — 85025 COMPLETE CBC W/AUTO DIFF WBC: CPT

## 2017-09-25 PROCEDURE — 36415 COLL VENOUS BLD VENIPUNCTURE: CPT

## 2017-09-25 PROCEDURE — 80048 BASIC METABOLIC PNL TOTAL CA: CPT

## 2017-09-26 RX ORDER — METHIMAZOLE 5 MG/1
5 TABLET ORAL DAILY
COMMUNITY
End: 2018-01-08 | Stop reason: SDUPTHER

## 2017-09-27 ENCOUNTER — HOSPITAL ENCOUNTER (OUTPATIENT)
Facility: HOSPITAL | Age: 72
Setting detail: HOSPITAL OUTPATIENT SURGERY
Discharge: HOME | End: 2017-09-27
Attending: INTERNAL MEDICINE | Admitting: INTERNAL MEDICINE

## 2017-09-27 VITALS
RESPIRATION RATE: 18 BRPM | OXYGEN SATURATION: 94 % | BODY MASS INDEX: 45.27 KG/M2 | SYSTOLIC BLOOD PRESSURE: 133 MMHG | HEIGHT: 62 IN | HEART RATE: 66 BPM | WEIGHT: 246 LBS | DIASTOLIC BLOOD PRESSURE: 67 MMHG | TEMPERATURE: 97.9 F

## 2017-09-27 DIAGNOSIS — R06.09 DOE (DYSPNEA ON EXERTION): ICD-10-CM

## 2017-09-27 LAB
HCT VFR BLDA CALC: 36 % (ref 38–51)
HCT VFR BLDA CALC: 36 % (ref 38–51)
HCT VFR BLDA CALC: 37 % (ref 38–51)
HGB BLDA-MCNC: 12.2 G/DL (ref 12–17)
HGB BLDA-MCNC: 12.2 G/DL (ref 12–17)
HGB BLDA-MCNC: 12.6 G/DL (ref 12–17)
SAO2 % BLDA: 68 % (ref 95–98)
SAO2 % BLDA: 73 % (ref 95–98)
SAO2 % BLDA: 91 % (ref 95–98)

## 2017-09-27 PROCEDURE — 85018 HEMOGLOBIN: CPT

## 2017-09-27 PROCEDURE — 0 IOPAMIDOL PER 1 ML: Performed by: INTERNAL MEDICINE

## 2017-09-27 PROCEDURE — 25010000002 FENTANYL CITRATE (PF) 100 MCG/2ML SOLUTION: Performed by: INTERNAL MEDICINE

## 2017-09-27 PROCEDURE — 85014 HEMATOCRIT: CPT

## 2017-09-27 PROCEDURE — 25010000002 HEPARIN (PORCINE) PER 1000 UNITS: Performed by: INTERNAL MEDICINE

## 2017-09-27 PROCEDURE — C1769 GUIDE WIRE: HCPCS | Performed by: INTERNAL MEDICINE

## 2017-09-27 PROCEDURE — 93460 R&L HRT ART/VENTRICLE ANGIO: CPT | Performed by: INTERNAL MEDICINE

## 2017-09-27 PROCEDURE — 25010000002 MIDAZOLAM PER 1 MG: Performed by: INTERNAL MEDICINE

## 2017-09-27 PROCEDURE — C1894 INTRO/SHEATH, NON-LASER: HCPCS | Performed by: INTERNAL MEDICINE

## 2017-09-27 RX ORDER — FENTANYL CITRATE 50 UG/ML
INJECTION, SOLUTION INTRAMUSCULAR; INTRAVENOUS AS NEEDED
Status: DISCONTINUED | OUTPATIENT
Start: 2017-09-27 | End: 2017-09-27 | Stop reason: HOSPADM

## 2017-09-27 RX ORDER — HYDROCODONE BITARTRATE AND ACETAMINOPHEN 5; 325 MG/1; MG/1
1 TABLET ORAL EVERY 4 HOURS PRN
Status: DISCONTINUED | OUTPATIENT
Start: 2017-09-27 | End: 2017-09-27 | Stop reason: HOSPADM

## 2017-09-27 RX ORDER — SODIUM CHLORIDE 0.9 % (FLUSH) 0.9 %
1-10 SYRINGE (ML) INJECTION AS NEEDED
Status: DISCONTINUED | OUTPATIENT
Start: 2017-09-27 | End: 2017-09-27 | Stop reason: HOSPADM

## 2017-09-27 RX ORDER — NALOXONE HCL 0.4 MG/ML
0.4 VIAL (ML) INJECTION
Status: DISCONTINUED | OUTPATIENT
Start: 2017-09-27 | End: 2017-09-27 | Stop reason: HOSPADM

## 2017-09-27 RX ORDER — LIDOCAINE HYDROCHLORIDE 20 MG/ML
INJECTION, SOLUTION INFILTRATION; PERINEURAL AS NEEDED
Status: DISCONTINUED | OUTPATIENT
Start: 2017-09-27 | End: 2017-09-27 | Stop reason: HOSPADM

## 2017-09-27 RX ORDER — SODIUM CHLORIDE 9 MG/ML
100 INJECTION, SOLUTION INTRAVENOUS CONTINUOUS
Status: DISCONTINUED | OUTPATIENT
Start: 2017-09-27 | End: 2017-09-27 | Stop reason: HOSPADM

## 2017-09-27 RX ORDER — MIDAZOLAM HYDROCHLORIDE 1 MG/ML
INJECTION INTRAMUSCULAR; INTRAVENOUS AS NEEDED
Status: DISCONTINUED | OUTPATIENT
Start: 2017-09-27 | End: 2017-09-27 | Stop reason: HOSPADM

## 2017-09-27 RX ORDER — SODIUM CHLORIDE 9 MG/ML
75 INJECTION, SOLUTION INTRAVENOUS CONTINUOUS
Status: DISCONTINUED | OUTPATIENT
Start: 2017-09-27 | End: 2017-09-27 | Stop reason: HOSPADM

## 2017-09-27 RX ORDER — BUDESONIDE AND FORMOTEROL FUMARATE DIHYDRATE 160; 4.5 UG/1; UG/1
2 AEROSOL RESPIRATORY (INHALATION)
COMMUNITY
End: 2018-01-08

## 2017-09-27 RX ORDER — LIDOCAINE HYDROCHLORIDE 10 MG/ML
0.1 INJECTION, SOLUTION EPIDURAL; INFILTRATION; INTRACAUDAL; PERINEURAL ONCE AS NEEDED
Status: DISCONTINUED | OUTPATIENT
Start: 2017-09-27 | End: 2017-09-27 | Stop reason: HOSPADM

## 2017-09-27 RX ADMIN — SODIUM CHLORIDE 75 ML/HR: 9 INJECTION, SOLUTION INTRAVENOUS at 08:19

## 2017-11-03 ENCOUNTER — TELEPHONE (OUTPATIENT)
Dept: CARDIOLOGY | Facility: CLINIC | Age: 72
End: 2017-11-03

## 2017-11-03 NOTE — TELEPHONE ENCOUNTER
No.  This is of no clinical consequence for her right now.  Only therapy is exercise and weight loss. Let her know

## 2017-11-03 NOTE — TELEPHONE ENCOUNTER
Pt called. She said that she had a question about her recent testing. She said the 8/30/17 echo showed she had diastolic dysfunction, but at her heart cath she said that you told her that her heart was normal.  So her question was does she actually have diastoloic dysfunction and if so is there anything that needs to be done to treat it. She can be reached at #[phone]. Please advise.    Thanks,  Kristi

## 2017-11-06 ENCOUNTER — ANESTHESIA EVENT (OUTPATIENT)
Dept: GASTROENTEROLOGY | Facility: HOSPITAL | Age: 72
End: 2017-11-06

## 2017-11-06 ENCOUNTER — ANESTHESIA (OUTPATIENT)
Dept: GASTROENTEROLOGY | Facility: HOSPITAL | Age: 72
End: 2017-11-06

## 2017-11-06 ENCOUNTER — HOSPITAL ENCOUNTER (OUTPATIENT)
Facility: HOSPITAL | Age: 72
Setting detail: HOSPITAL OUTPATIENT SURGERY
Discharge: HOME | End: 2017-11-06
Attending: INTERNAL MEDICINE | Admitting: INTERNAL MEDICINE

## 2017-11-06 VITALS
DIASTOLIC BLOOD PRESSURE: 89 MMHG | OXYGEN SATURATION: 96 % | HEIGHT: 62 IN | BODY MASS INDEX: 46.91 KG/M2 | TEMPERATURE: 97.9 F | SYSTOLIC BLOOD PRESSURE: 152 MMHG | HEART RATE: 75 BPM | WEIGHT: 254.9 LBS | RESPIRATION RATE: 14 BRPM

## 2017-11-06 DIAGNOSIS — J98.59 MEDIASTINAL MASS: ICD-10-CM

## 2017-11-06 PROCEDURE — 25010000002 PROPOFOL 10 MG/ML EMULSION: Performed by: ANESTHESIOLOGY

## 2017-11-06 PROCEDURE — 25010000002 MIDAZOLAM PER 1 MG: Performed by: ANESTHESIOLOGY

## 2017-11-06 PROCEDURE — C1726 CATH, BAL DIL, NON-VASCULAR: HCPCS | Performed by: INTERNAL MEDICINE

## 2017-11-06 PROCEDURE — 88305 TISSUE EXAM BY PATHOLOGIST: CPT | Performed by: INTERNAL MEDICINE

## 2017-11-06 PROCEDURE — 88173 CYTOPATH EVAL FNA REPORT: CPT | Performed by: INTERNAL MEDICINE

## 2017-11-06 RX ORDER — MIDAZOLAM HYDROCHLORIDE 5 MG/ML
1 INJECTION INTRAMUSCULAR; INTRAVENOUS ONCE
Status: DISCONTINUED | OUTPATIENT
Start: 2017-11-06 | End: 2017-11-06

## 2017-11-06 RX ORDER — PROPOFOL 10 MG/ML
VIAL (ML) INTRAVENOUS AS NEEDED
Status: DISCONTINUED | OUTPATIENT
Start: 2017-11-06 | End: 2017-11-06 | Stop reason: SURG

## 2017-11-06 RX ORDER — MIDAZOLAM HYDROCHLORIDE 1 MG/ML
2 INJECTION INTRAMUSCULAR; INTRAVENOUS
Status: CANCELLED | OUTPATIENT
Start: 2017-11-06

## 2017-11-06 RX ORDER — LIDOCAINE HYDROCHLORIDE 10 MG/ML
INJECTION, SOLUTION EPIDURAL; INFILTRATION; INTRACAUDAL; PERINEURAL AS NEEDED
Status: DISCONTINUED | OUTPATIENT
Start: 2017-11-06 | End: 2017-11-06 | Stop reason: HOSPADM

## 2017-11-06 RX ORDER — SCOLOPAMINE TRANSDERMAL SYSTEM 1 MG/1
1 PATCH, EXTENDED RELEASE TRANSDERMAL ONCE
Status: DISCONTINUED | OUTPATIENT
Start: 2017-11-06 | End: 2017-11-06 | Stop reason: HOSPADM

## 2017-11-06 RX ORDER — SODIUM CHLORIDE 9 MG/ML
9 INJECTION, SOLUTION INTRAVENOUS CONTINUOUS
Status: CANCELLED | OUTPATIENT
Start: 2017-11-06

## 2017-11-06 RX ORDER — FAMOTIDINE 10 MG/ML
20 INJECTION, SOLUTION INTRAVENOUS ONCE
Status: CANCELLED | OUTPATIENT
Start: 2017-11-06 | End: 2017-11-06

## 2017-11-06 RX ORDER — SODIUM CHLORIDE 9 MG/ML
30 INJECTION, SOLUTION INTRAVENOUS CONTINUOUS
Status: DISCONTINUED | OUTPATIENT
Start: 2017-11-06 | End: 2017-11-06 | Stop reason: HOSPADM

## 2017-11-06 RX ORDER — LIDOCAINE HYDROCHLORIDE 10 MG/ML
0.5 INJECTION, SOLUTION INFILTRATION; PERINEURAL ONCE AS NEEDED
Status: DISCONTINUED | OUTPATIENT
Start: 2017-11-06 | End: 2017-11-06 | Stop reason: HOSPADM

## 2017-11-06 RX ORDER — SCOLOPAMINE TRANSDERMAL SYSTEM 1 MG/1
1 PATCH, EXTENDED RELEASE TRANSDERMAL ONCE
Status: CANCELLED | OUTPATIENT
Start: 2017-11-06 | End: 2017-11-06

## 2017-11-06 RX ORDER — SODIUM CHLORIDE 0.9 % (FLUSH) 0.9 %
3 SYRINGE (ML) INJECTION AS NEEDED
Status: DISCONTINUED | OUTPATIENT
Start: 2017-11-06 | End: 2017-11-06 | Stop reason: HOSPADM

## 2017-11-06 RX ORDER — LIDOCAINE HYDROCHLORIDE 20 MG/ML
INJECTION, SOLUTION EPIDURAL; INFILTRATION; INTRACAUDAL; PERINEURAL AS NEEDED
Status: DISCONTINUED | OUTPATIENT
Start: 2017-11-06 | End: 2017-11-06 | Stop reason: HOSPADM

## 2017-11-06 RX ORDER — MIDAZOLAM HYDROCHLORIDE 1 MG/ML
1 INJECTION INTRAMUSCULAR; INTRAVENOUS ONCE
Status: COMPLETED | OUTPATIENT
Start: 2017-11-06 | End: 2017-11-06

## 2017-11-06 RX ORDER — SODIUM CHLORIDE, SODIUM LACTATE, POTASSIUM CHLORIDE, CALCIUM CHLORIDE 600; 310; 30; 20 MG/100ML; MG/100ML; MG/100ML; MG/100ML
1000 INJECTION, SOLUTION INTRAVENOUS CONTINUOUS PRN
Status: DISCONTINUED | OUTPATIENT
Start: 2017-11-06 | End: 2017-11-06

## 2017-11-06 RX ORDER — SODIUM CHLORIDE 0.9 % (FLUSH) 0.9 %
1-10 SYRINGE (ML) INJECTION AS NEEDED
Status: CANCELLED | OUTPATIENT
Start: 2017-11-06

## 2017-11-06 RX ORDER — FAMOTIDINE 10 MG/ML
20 INJECTION, SOLUTION INTRAVENOUS ONCE
Status: COMPLETED | OUTPATIENT
Start: 2017-11-06 | End: 2017-11-06

## 2017-11-06 RX ORDER — LIDOCAINE HYDROCHLORIDE 20 MG/ML
INJECTION, SOLUTION INFILTRATION; PERINEURAL AS NEEDED
Status: DISCONTINUED | OUTPATIENT
Start: 2017-11-06 | End: 2017-11-06 | Stop reason: SURG

## 2017-11-06 RX ADMIN — SCOPALAMINE 1 PATCH: 1 PATCH, EXTENDED RELEASE TRANSDERMAL at 08:53

## 2017-11-06 RX ADMIN — PROPOFOL 200 MG: 10 INJECTION, EMULSION INTRAVENOUS at 09:13

## 2017-11-06 RX ADMIN — PROPOFOL 200 MG: 10 INJECTION, EMULSION INTRAVENOUS at 09:30

## 2017-11-06 RX ADMIN — MIDAZOLAM 1 MG: 1 INJECTION INTRAMUSCULAR; INTRAVENOUS at 08:59

## 2017-11-06 RX ADMIN — SODIUM CHLORIDE 30 ML/HR: 9 INJECTION, SOLUTION INTRAVENOUS at 08:47

## 2017-11-06 RX ADMIN — LIDOCAINE HYDROCHLORIDE 100 MG: 20 INJECTION, SOLUTION INFILTRATION; PERINEURAL at 09:13

## 2017-11-06 RX ADMIN — FAMOTIDINE 20 MG: 10 INJECTION INTRAVENOUS at 08:53

## 2017-11-06 RX ADMIN — PROPOFOL 200 MG: 10 INJECTION, EMULSION INTRAVENOUS at 09:20

## 2017-11-06 NOTE — ANESTHESIA POSTPROCEDURE EVALUATION
Patient: Patt Bond    Procedure Summary     Date Anesthesia Start Anesthesia Stop Room / Location    11/06/17 0906 [id number]  [facility] /  [facility]       Procedure Diagnosis Surgeon Provider    BRONCHOSCOPY WITH ENDOBRONCHIAL ULTRASOUND AND TRANSBRONCHIAL NEEDLE ASPIRATION (N/A Bronchus) No diagnosis on file. MD Shauna Dorado MD          Anesthesia Type: general  Last vitals  BP   157/91 (11/06/17 1005)   Temp   36.6 °C (97.9 °F) (11/06/17 1005)   Pulse   74 (11/06/17 1005)   Resp   14 (11/06/17 1005)     SpO2   97 % (11/06/17 1005)     Post Anesthesia Care and Evaluation    Patient location during evaluation: PHASE II  Patient participation: complete - patient participated  Level of consciousness: awake  Pain management: adequate  Airway patency: patent  Anesthetic complications: No anesthetic complications    Cardiovascular status: acceptable  Respiratory status: acceptable  Hydration status: acceptable

## 2017-11-06 NOTE — BRIEF OP NOTE
BRONCHOSCOPY WITH ENDOBRONCHIAL ULTRASOUND  Progress Note    Patt Bond  11/6/2017    Pre-op Diagnosis:   Mediastinal Mass       Post-Op Diagnosis Codes:   Mediastinal Mass    Procedure/CPT® Codes:      Procedure(s):  BRONCHOSCOPY WITH ENDOBRONCHIAL ULTRASOUND AND TRANSBRONCHIAL NEEDLE ASPIRATION    Surgeon(s):  Nando Heller MD    Anesthesia: Monitor Anesthesia Care    Staff:   Endo Technician: Sonia Lofton Nurse: Adilene Lozano RN  [employer]: Delfino Crenshaw    Estimated Blood Loss: none    Urine Voided: * No values recorded between 11/6/2017  9:04 AM and 11/6/2017  9:41 AM *    Specimens:                  ID Type Source Tests Collected by Time Destination   A : mediastinal mass TBNA Fine Needle Aspirate Mediastinum FINE NEEDLE ASPIRATION Nando Heller MD 11/6/2017 0925          Drains:           Findings: Significant trachea narrowing, displacement, narrowing of left lower lobe, left main stem    Complications: none      Nando Heller MD     Date: 11/6/2017  Time: 9:44 AM

## 2017-11-06 NOTE — ANESTHESIA PREPROCEDURE EVALUATION
Anesthesia Evaluation     Patient summary reviewed and Nursing notes reviewed   history of anesthetic complications: PONV  NPO Solid Status: > 8 hours  NPO Liquid Status: > 8 hours     Airway   Mallampati: III  TM distance: >3 FB  Neck ROM: limited  difficult intubation highly probable  Dental      Pulmonary - normal exam   (+) shortness of breath,     ROS comment: Abn CT scan shows thyroid mass that is compressing trachea and esophagus  Pt is able to lay flat     Cardiovascular - normal exam    ECG reviewed    (+) GRAY,       Neuro/Psych  GI/Hepatic/Renal/Endo    (+) morbid obesity,     Musculoskeletal     (+) back pain,   Abdominal   (+) obese,    Substance History      OB/GYN          Other                                      Anesthesia Plan    ASA 3     general     Anesthetic plan and risks discussed with patient.

## 2017-11-06 NOTE — ANESTHESIA PROCEDURE NOTES
Airway  Urgency: elective    Date/Time: 11/6/2017 9:14 AM  End Time:11/6/2017 9:14 AM  Airway not difficult    General Information and Staff    Patient location during procedure: OR  Anesthesiologist: SAYDA QUEEN    Indications and Patient Condition  Indications for airway management: airway protection    Preoxygenated: yes  Mask difficulty assessment: 1 - vent by mask    Final Airway Details  Final airway type: supraglottic airway      Successful airway: classic  Size 4    Number of attempts at approach: 1    Additional Comments  Inserted easily, Atraumatic  Teeth ok

## 2017-11-06 NOTE — PLAN OF CARE
Problem: Patient Care Overview (Adult)  Goal: Plan of Care Review  Outcome: Ongoing (interventions implemented as appropriate)    11/06/17 0901   Coping/Psychosocial Response Interventions   Plan Of Care Reviewed With patient   Patient Care Overview   Progress no change   Outcome Evaluation   Outcome Summary/Follow up Plan pending procedure results       Goal: Adult Individualization and Mutuality  Outcome: Ongoing (interventions implemented as appropriate)    11/06/17 0901   Individualization   Patient Specific Preferences goes by Patt   Mutuality/Individual Preferences   What Anxieties, Fears or Concerns Do You Have About Your Health or Care? IV anxiety       Goal: Discharge Needs Assessment  Outcome: Ongoing (interventions implemented as appropriate)    11/06/17 0901   Discharge Needs Assessment   Concerns To Be Addressed no discharge needs identified   Discharge Disposition home or self-care   Living Environment   Transportation Available car         Problem: Bronchoscopy (Adult)  Goal: Signs and Symptoms of Listed Potential Problems Will be Absent or Manageable (Bronchoscopy)  Outcome: Ongoing (interventions implemented as appropriate)    11/06/17 0901   Bronchoscopy   Problems Assessed (Bronchoscopy) pain;bleeding;dysrhythmia/arrhythmia;hypoxia/hypoxemia;nausea and vomiting   Problems Present (Bronchoscopy) none

## 2017-11-07 LAB
CYTO UR: NORMAL
LAB AP CASE REPORT: NORMAL
LAB AP DIAGNOSIS COMMENT: NORMAL
LAB AP NON-GYN SPECIMEN ADEQUACY: NORMAL
Lab: NORMAL
PATH REPORT.FINAL DX SPEC: NORMAL
PATH REPORT.GROSS SPEC: NORMAL

## 2017-12-04 ENCOUNTER — OFFICE VISIT (OUTPATIENT)
Dept: SURGERY | Facility: CLINIC | Age: 72
End: 2017-12-04

## 2017-12-04 VITALS
BODY MASS INDEX: 46.93 KG/M2 | DIASTOLIC BLOOD PRESSURE: 82 MMHG | HEART RATE: 85 BPM | OXYGEN SATURATION: 96 % | WEIGHT: 255 LBS | HEIGHT: 62 IN | SYSTOLIC BLOOD PRESSURE: 154 MMHG

## 2017-12-04 DIAGNOSIS — E66.9 OBESITY (BMI 30-39.9): ICD-10-CM

## 2017-12-04 DIAGNOSIS — E05.00 GRAVES DISEASE: ICD-10-CM

## 2017-12-04 DIAGNOSIS — E05.90 HYPERTHYROIDISM: ICD-10-CM

## 2017-12-04 DIAGNOSIS — E04.2 MULTINODULAR GOITER: Primary | ICD-10-CM

## 2017-12-04 PROCEDURE — 99213 OFFICE O/P EST LOW 20 MIN: CPT | Performed by: THORACIC SURGERY (CARDIOTHORACIC VASCULAR SURGERY)

## 2017-12-04 NOTE — PROGRESS NOTES
Subjective   Patient ID: Patt Bond is a 72 y.o. female is here today for follow-up.    History of Present Illness  Dear Colleague,  Patt Bond was seen in our office today for continued follow up and surveillance for large peritracheal thyroid mass.  She has recently undergone a bronchoscopy and a transbronchial needle biopsy which is consistent with thyroid tissue.  The patient continues to have shortness of breath with physical activity and takes partially 5 minutes to recover.  I explained to her that I believe her shortness of breath is multifactorial; extrinsic compression of the airway secondary to the left mediastinal mass, her body habitus and weight.  She denies any complaints of fever, chills, cough, hemoptysis, pleuritic chest pain, night sweats, hoarseness, or unintentional weight loss. Underlying medical conditions including hypertension, cardiac palpitations and joint pain remain stable.  She has no other somatic complaints or alleviating or exacerbating factors aside from those mentioned above.    The following portions of the patient's history were reviewed and updated as appropriate: allergies, current medications, past family history, past medical history, past social history, past surgical history and problem list.  Review of Systems   Constitution: Negative.   HENT: Negative.    Eyes: Negative.    Cardiovascular: Negative.    Respiratory: Positive for shortness of breath.    Endocrine: Negative.    Hematologic/Lymphatic: Negative.    Skin: Negative.    Musculoskeletal: Negative.    Gastrointestinal: Negative.    Genitourinary: Negative.    Neurological: Negative.    Psychiatric/Behavioral: Negative.      Patient Active Problem List   Diagnosis   • Left lower quadrant pain   • Ketonuria due to dehydration   • Normocytic anemia   • Pancytopenia   • Obesity (BMI 30-39.9)   • Nausea   • Sepsis   • Nephrolithiasis   • Pleural nodule   • Tracheal compression   • Hyperthyroidism   • Graves  disease   • Abnormal weight gain   • Palpitations   • Cholecystitis   • History of colon polyps - 4 Tubulovillous adenomas in 2004, normal colonoscopy in 2008   • History of abdominal abscess - 3+ E. coli at time of surgery 9/2016   • Multiple drug allergies to Cephalexin, Cephalosporins, Penicillins   • Weight gain - 10 pounds in 2 months, unintentional   • Multinodular goiter   • GRAY (dyspnea on exertion)   • Morbid obesity     Past Medical History:   Diagnosis Date   • Back pain    • Colon polyps 12/10/2004    Proximal ascending polyps: tubulovillous adenoma, only mild dysplasia seen; distal ascending polyps: tubulovillous adenoma, only mild dysplasia seen   • Colon polyps 04/16/2001    Cecum biopsy: fragments of tubular adenoma, rectum biopsy: fragments of hyperplastic polyp   • Diverticulitis    • Diverticulosis    • Endometrial polyp 2011, 2002 2011 Path findings: fragments of endometrial polyp with cystic hyperplasia (and no atypia) / 2002 Path findings:    • Gallstones    • Hyperthyroidism     followed by Dr. Blackmon   • Joint pain, knee    • Kidney stones 2015    with cystoscopy by Dr. Miguel Monte done on 10/7/15, 9/9/15, 5/8/15, 9/21/07   • Mediastinal mass    • Obstructive pyelonephritis 09/28/2015    left obstructing pyelonephritis    • PONV (postoperative nausea and vomiting)    • Rash     RT LOWER LEG AND FOOT   • Renal disorder    • SOB (shortness of breath) on exertion      Past Surgical History:   Procedure Laterality Date   • BRONCHOSCOPY N/A 11/6/2017    Procedure: BRONCHOSCOPY WITH ENDOBRONCHIAL ULTRASOUND AND TRANSBRONCHIAL NEEDLE ASPIRATION;  Surgeon: Nando Heller MD;  Location: [facility] ENDOSCOPY;  Service:    • CARDIAC CATHETERIZATION N/A 9/27/2017    Procedure: Right Heart Cath;  Surgeon: Miguel Gautam MD;  Location: [facility] CATH INVASIVE LOCATION;  Service:    • CARDIAC CATHETERIZATION N/A 9/27/2017    Procedure: Left Heart Cath;  Surgeon: Miguel Gautam MD;   Location:  [facility] CATH INVASIVE LOCATION;  Service:    • CARDIAC CATHETERIZATION N/A 9/27/2017    Procedure: Coronary angiography;  Surgeon: Miguel Gautam MD;  Location:  [facility] CATH INVASIVE LOCATION;  Service:    • CARDIAC CATHETERIZATION N/A 9/27/2017    Procedure: Left ventriculography;  Surgeon: Miguel Gautam MD;  Location:  [facility] CATH INVASIVE LOCATION;  Service:    • CHOLECYSTECTOMY N/A 5/17/2017    Procedure: LAPAROSCOPIC CHOLECYSTECTOMY WITH IOC;  Surgeon: Patt Moore MD;  Location: [facility] MAIN OR;  Service:    • COLON RESECTION Left 9/14/2016    Laparoscopic Low Anterior Resection with splenic flexure mobilization, drainage of Pelvic Abscess (cultured 3+ E. Coli), Left salpingo-ophorectomy, laparoscopic rectopexy, and umbilical hernia repair, Dr. Patt Moore   • COLON RESECTION      VENTRAL HERNIA REPAIR   • COLONOSCOPY N/A 05/15/2008    sigmoid diverticulosis with blunting of the haustral folds and angulation consistent with previous diverticulitis, no polyps, suggestion of rectal prolapse-Dr. Patt Moore   • COLONOSCOPY W/ BIOPSIES AND POLYPECTOMY N/A 04/16/2001    Possible mild gastritis w/ some appearance of formations that look like petechiae in the antrum, no ulcerations, no erosions; cecal polyp approx 4mm sessile; probable transverse colon polyp, although we could not visualize this completely upon pulling out; sigmoid diverticula; multiple rectal polyps, mostly w/ appearance of hyperplasia, largest being 5 to 6mm: removed via snare-Dr. Patt Moore   • COLONOSCOPY W/ POLYPECTOMY N/A 12/10/2004    Diverticulosis with sigmoid perideverticulitis with erythema and patchiness around some of the diverticula, proximal ascedning colon polyp-approx 5 mm-removed via snare cauter, two distal ascending colon polyps-7 mm and 3 mm-removed via snare cautery polypectomy, hemorrhoids-Dr. Patt Moore   • CYSTOSCOPY W/ URETERAL STENT REMOVAL Left 10/07/2015    Cystoscopy with stent  extraction, left ureteral occulusion balloon placement, percutanous nephrostolithotomy with stone volume less than 2.5 cm involving the left lower pole and the left proximal ureter, balloon tract dilation for establishment of nephrostomy tract, antegrade nephrostomy tube placement-Dr. Miguel Monte   • CYSTOSCOPY, RETROGRADE PYELOGRAM AND STENT INSERTION Left 09/28/2015    Left cystoscopy with left retrograde pyelogram, left double-J stent placement-Dr. Rivera [facility]   • CYSTOSCOPY, RETROGRADE PYELOGRAM AND STENT INSERTION Left 09/09/2015    Cystoscopy with bilateral retrograde pyelogram, left double-J stent placement, right ureteral pyeloscopy with laser lithotripsy of the ureteropelvic junction calculus, right double-J stent placement-Dr. Miguel Monte   • CYSTOSCOPY, RETROGRADE PYELOGRAM AND STENT INSERTION Right 09/21/2007    Cystoscopy with right retrograde pyelogram, right biliary stent placement-Dr. Miguel Monte   • CYSTOSCOPY, RETROGRADE PYELOGRAM AND STENT INSERTION Right 09/07/2007    Cystoscopy with right retrograde pyelogram, right ureteral peyloscopy with extraction distal ureteral mass, right double J stent placement-Dr. Miguel Monte   • CYSTOSCOPY, URETEROSCOPY, RETROGRADE PYELOGRAM, STENT INSERTION Right 09/07/2007    Cystoscopy with right retrograde pyelogram, rigth ureteroscopy with extraction of distal mass, right double J stent placement-Dr. Miguel Monte   • D&C HYSTEROSCOPY N/A 05/18/2011    Procedure done due to thickened endomentrium and an endometrial polyp-Dr. Quita Cheatham   • DILATATION AND CURETTAGE N/A 03/22/2002    D&C, polyp removal-Dr. Quita Cheatham   • EXTRACORPOREAL SHOCKWAVE LITHOTRIPSY (ESWL), STENT INSERTION/REMOVAL Left 05/08/2015    Left extracoporeal shockwave lithotripsy, cystoscopy with stent placement-Dr. Miguel Monte   • SIGMOIDOSCOPY N/A 9/13/2016    Procedure: SIGMOIDOSCOPY FLEXIBLE TO 25 CM;  Surgeon: Patt Moore MD;  Location: [facility]  ENDOSCOPY;  Service:    • THORACOTOMY  1996    Thoracotomy for ectopic thyroid, Dr. Camarillo   • UMBILICAL HERNIA REPAIR N/A 9/14/2016    Procedure: UMBILICAL HERNIA REPAIR ;  Surgeon: Patt Moore MD;  Location: [facility] OR;  Service:    • VENTRAL/INCISIONAL HERNIA REPAIR N/A 5/17/2017    Procedure: VENTRAL HERNIA REPAIR WITH MESH, BILATERAL MUSCULOFASCIAL RELEASE;  Surgeon: Patt Moore MD;  Location: [facility] OR;  Service:      Family History   Problem Relation Age of Onset   • Heart disease Father    • Heart attack Paternal Uncle    • Cancer Maternal Grandmother      ovarian   • Heart attack Paternal Grandfather    • Heart attack Paternal Uncle    • Heart attack Paternal Uncle    • Heart attack Paternal Uncle    • Heart attack Paternal Uncle    • Heart attack Paternal Uncle      Social History     Social History   • Marital status:      Spouse name: BERENICE COFFEY    • Number of children: 2   • Years of education: HIGH SCHOOL      Occupational History   •  Retired     Social History Main Topics   • Smoking status: Never Smoker   • Smokeless tobacco: Never Used   • Alcohol use No      Comment: No caffeine use   • Drug use: No   • Sexual activity: Defer     Other Topics Concern   • Not on file     Social History Narrative       Current Outpatient Prescriptions:   •  budesonide-formoterol (SYMBICORT) 160-4.5 MCG/ACT inhaler, Inhale 2 puffs 2 (Two) Times a Day., Disp: , Rfl:   •  methIMAzole (TAPAZOLE) 5 MG tablet, Take 5 mg by mouth Daily., Disp: , Rfl:   Allergies   Allergen Reactions   • Cephalexin Hives     Pt states she possibly is not allergic to Cephalexin, took and did not have problems   • Cephalosporins Hives   • Penicillins Hives        Objective   Vitals:    12/04/17 0851   BP: 154/82   Pulse: 85   SpO2: 96%     Physical Exam   Constitutional: She is oriented to person, place, and time. Vital signs are normal. She appears well-developed.   HENT:   Head: Normocephalic and atraumatic.   Eyes:  "EOM are normal. Pupils are equal, round, and reactive to light.   Neck: Normal range of motion. Neck supple.   Cardiovascular: Normal rate, regular rhythm, normal heart sounds and intact distal pulses.    No murmur heard.  Pulmonary/Chest: Effort normal and breath sounds normal. She has no wheezes. She has no rhonchi. She has no rales. She exhibits no tenderness.   Abdominal: Soft. There is no tenderness.   Musculoskeletal: Normal range of motion. She exhibits no edema or tenderness.   Neurological: She is alert and oriented to person, place, and time. She has normal strength.   Skin: Skin is warm and dry. No rash noted. No cyanosis or erythema.   Psychiatric: She has a normal mood and affect. Her behavior is normal.     Independent Review of Radiographic Studies:    MEDIASTINAL THYROID MASS\", FINE NEEDLE ASPIRATION:                         HISTIOCYTES, LYSED BLOOD AND RARE EPITHELIAL CELL POSSIBLY OF THYROID ORIGIN-                             NON-DIAGNOSTIC.     Reviewed with Dr. MARKUS Reed who concurs.  THM/jse   Assessment/Plan   Assessment:  Heterogeneous mass within the left chest causing rightward deviation of the trachea as well as esophagus is consistent with thyroid tissue from the biopsy obtained during her bronchoscopy.  Dyspnea with exertion.  Small left lower lobe pulmonary nodule.  Plan:  We will repeat her CT scan of the chest as well as discussed with the endocrinology service the plans for moving forward with surgical resection.  This will likely require a left thoracotomy.  I'll update the patient on the results of her CT scan of the chest and make further recommendations after an opportunity to review her films.  Should you have any questions or concerns regarding your patient's care, please do not hesitate to contact me.  Sincerely, Christopher Bautista M.D.  There are no diagnoses linked to this encounter.        "

## 2017-12-11 ENCOUNTER — HOSPITAL ENCOUNTER (OUTPATIENT)
Dept: CT IMAGING | Facility: HOSPITAL | Age: 72
Discharge: HOME | End: 2017-12-11
Attending: THORACIC SURGERY (CARDIOTHORACIC VASCULAR SURGERY) | Admitting: THORACIC SURGERY (CARDIOTHORACIC VASCULAR SURGERY)

## 2017-12-11 DIAGNOSIS — E66.9 OBESITY (BMI 30-39.9): ICD-10-CM

## 2017-12-11 DIAGNOSIS — E04.2 MULTINODULAR GOITER: ICD-10-CM

## 2017-12-11 DIAGNOSIS — E05.00 GRAVES DISEASE: ICD-10-CM

## 2017-12-11 DIAGNOSIS — E05.90 HYPERTHYROIDISM: ICD-10-CM

## 2017-12-11 PROCEDURE — 82565 ASSAY OF CREATININE: CPT

## 2017-12-11 PROCEDURE — 71260 CT THORAX DX C+: CPT

## 2017-12-11 PROCEDURE — 0 IOPAMIDOL 61 % SOLUTION: Performed by: THORACIC SURGERY (CARDIOTHORACIC VASCULAR SURGERY)

## 2017-12-11 RX ADMIN — IOPAMIDOL 75 ML: 612 INJECTION, SOLUTION INTRAVENOUS at 09:00

## 2017-12-13 LAB — CREAT BLDA-MCNC: 1 MG/DL (ref 0.6–1.3)

## 2017-12-27 DIAGNOSIS — E05.00 GRAVES DISEASE: ICD-10-CM

## 2017-12-27 DIAGNOSIS — E05.90 HYPERTHYROIDISM: Primary | ICD-10-CM

## 2017-12-29 ENCOUNTER — RESULTS ENCOUNTER (OUTPATIENT)
Dept: ENDOCRINOLOGY | Age: 72
End: 2017-12-29

## 2017-12-29 DIAGNOSIS — E05.00 GRAVES DISEASE: ICD-10-CM

## 2017-12-29 DIAGNOSIS — E05.90 HYPERTHYROIDISM: ICD-10-CM

## 2017-12-29 LAB
ALBUMIN SERPL-MCNC: 4.1 G/DL (ref 3.5–5.2)
ALBUMIN/GLOB SERPL: 1.6 G/DL
ALP SERPL-CCNC: 68 U/L (ref 39–117)
ALT SERPL-CCNC: 22 U/L (ref 1–33)
AST SERPL-CCNC: 17 U/L (ref 1–32)
BASOPHILS # BLD AUTO: 0.01 10*3/MM3 (ref 0–0.2)
BASOPHILS NFR BLD AUTO: 0.2 % (ref 0–1.5)
BILIRUB SERPL-MCNC: 0.5 MG/DL (ref 0.1–1.2)
BUN SERPL-MCNC: 13 MG/DL (ref 8–23)
BUN/CREAT SERPL: 13.5 (ref 7–25)
CALCIUM SERPL-MCNC: 8.8 MG/DL (ref 8.6–10.5)
CHLORIDE SERPL-SCNC: 106 MMOL/L (ref 98–107)
CO2 SERPL-SCNC: 25 MMOL/L (ref 22–29)
CREAT SERPL-MCNC: 0.96 MG/DL (ref 0.57–1)
EOSINOPHIL # BLD AUTO: 0.22 10*3/MM3 (ref 0–0.7)
EOSINOPHIL NFR BLD AUTO: 4.4 % (ref 0.3–6.2)
ERYTHROCYTE [DISTWIDTH] IN BLOOD BY AUTOMATED COUNT: 14.4 % (ref 11.7–13)
GLOBULIN SER CALC-MCNC: 2.6 GM/DL
GLUCOSE SERPL-MCNC: 196 MG/DL (ref 65–99)
HCT VFR BLD AUTO: 39.2 % (ref 35.6–45.5)
HGB BLD-MCNC: 12.9 G/DL (ref 11.9–15.5)
IMM GRANULOCYTES # BLD: 0 10*3/MM3 (ref 0–0.03)
IMM GRANULOCYTES NFR BLD: 0 % (ref 0–0.5)
LYMPHOCYTES # BLD AUTO: 1.25 10*3/MM3 (ref 0.9–4.8)
LYMPHOCYTES NFR BLD AUTO: 25.2 % (ref 19.6–45.3)
MCH RBC QN AUTO: 31.5 PG (ref 26.9–32)
MCHC RBC AUTO-ENTMCNC: 32.9 G/DL (ref 32.4–36.3)
MCV RBC AUTO: 95.8 FL (ref 80.5–98.2)
MONOCYTES # BLD AUTO: 0.29 10*3/MM3 (ref 0.2–1.2)
MONOCYTES NFR BLD AUTO: 5.8 % (ref 5–12)
NEUTROPHILS # BLD AUTO: 3.2 10*3/MM3 (ref 1.9–8.1)
NEUTROPHILS NFR BLD AUTO: 64.4 % (ref 42.7–76)
PLATELET # BLD AUTO: 133 10*3/MM3 (ref 140–500)
POTASSIUM SERPL-SCNC: 3.9 MMOL/L (ref 3.5–5.2)
PROT SERPL-MCNC: 6.7 G/DL (ref 6–8.5)
RBC # BLD AUTO: 4.09 10*6/MM3 (ref 3.9–5.2)
SODIUM SERPL-SCNC: 144 MMOL/L (ref 136–145)
T4 FREE SERPL-MCNC: 1.35 NG/DL (ref 0.93–1.7)
TSH SERPL DL<=0.005 MIU/L-ACNC: 0.27 MIU/ML (ref 0.27–4.2)
WBC # BLD AUTO: 4.97 10*3/MM3 (ref 4.5–10.7)

## 2018-01-08 ENCOUNTER — OFFICE VISIT (OUTPATIENT)
Dept: ENDOCRINOLOGY | Age: 73
End: 2018-01-08

## 2018-01-08 VITALS
BODY MASS INDEX: 44.86 KG/M2 | HEIGHT: 63 IN | HEART RATE: 75 BPM | WEIGHT: 253.2 LBS | SYSTOLIC BLOOD PRESSURE: 140 MMHG | DIASTOLIC BLOOD PRESSURE: 82 MMHG

## 2018-01-08 DIAGNOSIS — E05.90 HYPERTHYROIDISM: ICD-10-CM

## 2018-01-08 DIAGNOSIS — E04.2 MULTINODULAR GOITER: ICD-10-CM

## 2018-01-08 DIAGNOSIS — E05.00 GRAVES DISEASE: Primary | ICD-10-CM

## 2018-01-08 DIAGNOSIS — J39.8 TRACHEAL COMPRESSION: Chronic | ICD-10-CM

## 2018-01-08 PROCEDURE — 99214 OFFICE O/P EST MOD 30 MIN: CPT | Performed by: INTERNAL MEDICINE

## 2018-01-08 RX ORDER — METHIMAZOLE 5 MG/1
5 TABLET ORAL EVERY OTHER DAY
Qty: 15 TABLET | Refills: 5 | Status: SHIPPED | OUTPATIENT
Start: 2018-01-08 | End: 2018-04-09

## 2018-01-08 NOTE — PROGRESS NOTES
72 y.o.    Patient Care Team:  Moe Matute MD as PCP - General  Moe Matute MD as PCP - Family Medicine    Chief Complaint:      F/U HYPERTHYROID.  GRAVE'S DISEASE.  HERE TO DISCUSS AB RESULTS.  Subjective     HPI   Patient is a 72-year-old white female with a history of large goiter, Graves' disease and hyperthyroidism came for follow-up  Hypothyroidism  Patient used to be on methimazole but apparently she stopped the medication one week before the lab.  Reasons are unknown but she reports shortness of breath and difficulty swallowing and some other reasons for stopping the medication  She denied any clear side effects on the medication  She denies any palpitations or cold intolerance  Dysphagia  Patient reports occasional dysphagia but not on a consistent basis  Her problem seems to be more shortness of breath and feeling of choking in the neck  She is currently being evaluated by cardiothoracic surgery.  Abnormal weight gain  Patient currently weighs 253 pounds with a BMI of 44.9  Other than local neck symptoms she clearly denies any symptoms suggestive of hyper or hypothyroidism at this point          Interval History: Summary of hospitalization      71-year-old female no previous history of pulmonary issues in the past admitted for the evaluation of left-sided abdominal pain. She's noted to have severe diverticular disease with abscess and presently ongoing evaluation. She gets short of breath with exertion. She's had workup with a CT chest results of which revealed a large thyroid mass causing tracheal deviation. Currently she denies any problems swallowing. She denies any cough but has some wheezing with exertion. No aspiration has been reported.  Patient's thyroid function was analyzed and TSH was suppressed with elevated. T4. With this picture of hyperthyroidism was concentrated for further managing patient's thyroid condition.      Patient reports very occasional dysphagia. She denies  any change in voice. She does report to shortness of breath and is not clear as to the source of shortness of breath.  Patient denied any symptoms of palpitations or sweating episodes for tremors or diarrhea. She has had diverticulitis which has complicated the GI symptoms.  She has lost weight but also reported that her appetite has been very low which could explain the weight loss      Patient frequently refers to Dr. Camarillo doing a surgery on the right side for thyroid mass in the past. Currently has a CT scan reveals a large thyroid mass on the left side pushing the trachea to the right and causing some tracheal compression.  Patient is currently being evaluated by general surgery, pulmonary, infection disease  Patient and family denied any family history of thyroid cancer. She's not known to have any thyroid dysfunction in the past other than the fact that she had a goiter on the right side of the chest which was removed several years agoher  The following portions of the patient's history were reviewed and updated as appropriate: allergies, current medications, past family history, past medical history, past social history, past surgical history and problem list.    Past Medical History:   Diagnosis Date   • Back pain    • Colon polyps 12/10/2004    Proximal ascending polyps: tubulovillous adenoma, only mild dysplasia seen; distal ascending polyps: tubulovillous adenoma, only mild dysplasia seen   • Colon polyps 04/16/2001    Cecum biopsy: fragments of tubular adenoma, rectum biopsy: fragments of hyperplastic polyp   • Diverticulitis    • Diverticulosis    • Endometrial polyp 2011, 2002 2011 Path findings: fragments of endometrial polyp with cystic hyperplasia (and no atypia) / 2002 Path findings:    • Gallstones    • Hyperthyroidism     followed by Dr. Blackmon   • Joint pain, knee    • Kidney stones 2015    with cystoscopy by Dr. Miguel Monte done on 10/7/15, 9/9/15, 5/8/15, 9/21/07   • Mediastinal  mass    • Obstructive pyelonephritis 09/28/2015    left obstructing pyelonephritis    • PONV (postoperative nausea and vomiting)    • Rash     RT LOWER LEG AND FOOT   • Renal disorder    • SOB (shortness of breath) on exertion      Family History   Problem Relation Age of Onset   • Heart disease Father    • Heart attack Paternal Uncle    • Cancer Maternal Grandmother      ovarian   • Heart attack Paternal Grandfather    • Heart attack Paternal Uncle    • Heart attack Paternal Uncle    • Heart attack Paternal Uncle    • Heart attack Paternal Uncle    • Heart attack Paternal Uncle      Social History     Social History   • Marital status:      Spouse name: BERENICE COFFEY    • Number of children: 2   • Years of education: HIGH SCHOOL      Occupational History   •  Retired     Social History Main Topics   • Smoking status: Never Smoker   • Smokeless tobacco: Never Used   • Alcohol use No      Comment: No caffeine use   • Drug use: No   • Sexual activity: Defer     Other Topics Concern   • Not on file     Social History Narrative     Allergies   Allergen Reactions   • Cephalexin Hives     Pt states she possibly is not allergic to Cephalexin, took and did not have problems   • Cephalosporins Hives   • Penicillins Hives       Current Outpatient Prescriptions:   •  budesonide-formoterol (SYMBICORT) 160-4.5 MCG/ACT inhaler, Inhale 2 puffs 2 (Two) Times a Day., Disp: , Rfl:   •  methIMAzole (TAPAZOLE) 5 MG tablet, Take 5 mg by mouth Daily., Disp: , Rfl:         Review of Systems   Constitutional: Negative for chills, fatigue and fever.   Respiratory: Positive for shortness of breath.    Cardiovascular: Negative for chest pain and palpitations.   Gastrointestinal: Negative for abdominal pain, constipation, diarrhea, nausea and vomiting.   Endocrine: Negative for cold intolerance and heat intolerance.   All other systems reviewed and are negative.      Objective       Vitals:    01/08/18 1508   BP: 140/82   Pulse: 75  "  Weight: 115 kg (253 lb 3.2 oz)   Height: 160 cm (63\")     Body mass index is 44.85 kg/(m^2).      Physical Exam   Constitutional: She is oriented to person, place, and time. She appears well-developed and well-nourished.   Obese   HENT:   Head: Normocephalic and atraumatic.   Eyes: EOM are normal. Pupils are equal, round, and reactive to light.   Neck: Normal range of motion. Neck supple. No tracheal deviation present. No thyromegaly present.   Cardiovascular: Normal rate, regular rhythm, normal heart sounds and intact distal pulses.    Tachycardia   Pulmonary/Chest: Effort normal and breath sounds normal.   Abdominal: Soft. Bowel sounds are normal. She exhibits distension. There is no tenderness.   Musculoskeletal: Normal range of motion. She exhibits no edema.   Neurological: She is alert and oriented to person, place, and time. She has normal reflexes.   Skin: Skin is warm and dry. No erythema.   Psychiatric: She has a normal mood and affect. Her behavior is normal.   Nursing note and vitals reviewed.    Results Review:     I reviewed the patient's new clinical results.    Medical records reviewed  Summary:      Results Encounter on 12/29/2017   Component Date Value Ref Range Status   • Glucose 12/29/2017 196* 65 - 99 mg/dL Final   • BUN 12/29/2017 13  8 - 23 mg/dL Final   • Creatinine 12/29/2017 0.96  0.57 - 1.00 mg/dL Final   • eGFR Non African Am 12/29/2017 57* >60 mL/min/1.73 Final   • eGFR African Am 12/29/2017 70  >60 mL/min/1.73 Final   • BUN/Creatinine Ratio 12/29/2017 13.5  7.0 - 25.0 Final   • Sodium 12/29/2017 144  136 - 145 mmol/L Final   • Potassium 12/29/2017 3.9  3.5 - 5.2 mmol/L Final   • Chloride 12/29/2017 106  98 - 107 mmol/L Final   • Total CO2 12/29/2017 25.0  22.0 - 29.0 mmol/L Final   • Calcium 12/29/2017 8.8  8.6 - 10.5 mg/dL Final   • Total Protein 12/29/2017 6.7  6.0 - 8.5 g/dL Final   • Albumin 12/29/2017 4.10  3.50 - 5.20 g/dL Final   • Globulin 12/29/2017 2.6  gm/dL Final   • A/G " Ratio 12/29/2017 1.6  g/dL Final   • Total Bilirubin 12/29/2017 0.5  0.1 - 1.2 mg/dL Final   • Alkaline Phosphatase 12/29/2017 68  39 - 117 U/L Final   • AST (SGOT) 12/29/2017 17  1 - 32 U/L Final   • ALT (SGPT) 12/29/2017 22  1 - 33 U/L Final   • Free T4 12/29/2017 1.35  0.93 - 1.70 ng/dL Final   • TSH 12/29/2017 0.269* 0.270 - 4.200 mIU/mL Final   • WBC 12/29/2017 4.97  4.50 - 10.70 10*3/mm3 Final   • RBC 12/29/2017 4.09  3.90 - 5.20 10*6/mm3 Final   • Hemoglobin 12/29/2017 12.9  11.9 - 15.5 g/dL Final   • Hematocrit 12/29/2017 39.2  35.6 - 45.5 % Final   • MCV 12/29/2017 95.8  80.5 - 98.2 fL Final   • MCH 12/29/2017 31.5  26.9 - 32.0 pg Final   • MCHC 12/29/2017 32.9  32.4 - 36.3 g/dL Final   • RDW 12/29/2017 14.4* 11.7 - 13.0 % Final   • Platelets 12/29/2017 133* 140 - 500 10*3/mm3 Final   • Neutrophil Rel % 12/29/2017 64.4  42.7 - 76.0 % Final   • Lymphocyte Rel % 12/29/2017 25.2  19.6 - 45.3 % Final   • Monocyte Rel % 12/29/2017 5.8  5.0 - 12.0 % Final   • Eosinophil Rel % 12/29/2017 4.4  0.3 - 6.2 % Final   • Basophil Rel % 12/29/2017 0.2  0.0 - 1.5 % Final   • Neutrophils Absolute 12/29/2017 3.20  1.90 - 8.10 10*3/mm3 Final   • Lymphocytes Absolute 12/29/2017 1.25  0.90 - 4.80 10*3/mm3 Final   • Monocytes Absolute 12/29/2017 0.29  0.20 - 1.20 10*3/mm3 Final   • Eosinophils Absolute 12/29/2017 0.22  0.00 - 0.70 10*3/mm3 Final   • Basophils Absolute 12/29/2017 0.01  0.00 - 0.20 10*3/mm3 Final   • Immature Granulocyte Rel % 12/29/2017 0.0  0.0 - 0.5 % Final   • Immature Grans Absolute 12/29/2017 0.00  0.00 - 0.03 10*3/mm3 Final     Lab Results   Component Value Date    HGBA1C 4.70 (L) 09/10/2016     Lab Results   Component Value Date    LDLCALC 24 09/10/2016    CREATININE 0.96 12/29/2017     Imaging Results (most recent)     None                Assessment and Plan:    Patt was seen today for graves' disease and hyperthyroidism.    Diagnoses and all orders for this visit:    Graves  "disease    Hyperthyroidism    Multinodular goiter    Tracheal compression    Other orders  -     methIMAzole (TAPAZOLE) 5 MG tablet; Take 1 tablet by mouth Every Other Day.            Very large left thyroid lobe nodule or mass extending into the mediastinum and having mass effect on the trachea measuring 8.4 cm-needs a biopsy to determine if neoplastic  Hyperthyroidism  Abnormal weight loss  Acute diverticulitis  History of previous thyroid surgery again in the mediastinal region  Pancytopenia      Patient has seen Dr. Bautista and the surgery is being contemplated but has no definite date  Patient apparently discontinued methimazole 3 weeks ago  Her recent TSH is once again 0.1  Apparently an ENT consultation is also being proposed  I recommend that she restart Tapazole 5 mg every other day  The risk of not taking medication including atrial fibrillation and stroke explained to the patient    Radioiodine therapy has also some risks including postradiation inflammation and sudden increase in gland  Size and worsening of symptoms in my opinion surgery may be the best option while keeping her euthyroid on Tapazole  I discussed with the patient and her     Patient will return to follow-up in 3-4 months.    The total time spent for old record and lab review and face- to- face was more than 25 min of which greater than 15 min of time was spent on counseling the patient on recommended evaluation and treatment options, instructions for management/treatment and /or follow up  and importance of compliance with chosen management or treatment options        Garrison Alcaraz MD. FACE    01/08/18      EM[facility] Dragon / transcription disclaimer:     \"Dictated utilizing Dragon dictation\".         "

## 2018-02-13 ENCOUNTER — PREP FOR SURGERY (OUTPATIENT)
Dept: OTHER | Facility: HOSPITAL | Age: 73
End: 2018-02-13

## 2018-02-13 ENCOUNTER — OFFICE VISIT (OUTPATIENT)
Dept: SURGERY | Facility: CLINIC | Age: 73
End: 2018-02-13

## 2018-02-13 VITALS
WEIGHT: 257 LBS | HEIGHT: 63 IN | SYSTOLIC BLOOD PRESSURE: 162 MMHG | BODY MASS INDEX: 45.54 KG/M2 | DIASTOLIC BLOOD PRESSURE: 88 MMHG | HEART RATE: 76 BPM | OXYGEN SATURATION: 95 %

## 2018-02-13 DIAGNOSIS — E04.9 SUBSTERNAL GOITER: Primary | ICD-10-CM

## 2018-02-13 DIAGNOSIS — R79.1 ABNORMAL COAGULATION PROFILE: ICD-10-CM

## 2018-02-13 DIAGNOSIS — E66.01 MORBID OBESITY (HCC): ICD-10-CM

## 2018-02-13 PROCEDURE — 99213 OFFICE O/P EST LOW 20 MIN: CPT | Performed by: THORACIC SURGERY (CARDIOTHORACIC VASCULAR SURGERY)

## 2018-02-13 RX ORDER — CLINDAMYCIN PHOSPHATE 900 MG/50ML
900 INJECTION INTRAVENOUS ONCE
Status: CANCELLED | OUTPATIENT
Start: 2018-03-05 | End: 2018-03-05

## 2018-02-13 RX ORDER — SODIUM CHLORIDE 0.9 % (FLUSH) 0.9 %
1-10 SYRINGE (ML) INJECTION AS NEEDED
Status: CANCELLED | OUTPATIENT
Start: 2018-03-05

## 2018-02-13 NOTE — PROGRESS NOTES
Subjective   Patient ID: Patt Bond is a 72 y.o. female is here today for follow-up.    History of Present Illness  Dear Colleague,  Patt Bond was seen in our office today for further evaluation and treatment of a substernal thyroid goiter.  Mrs. Bond said it has been fully evaluated by Dr. Bautista and Dr. Nando Heller.  Dr. Bautista his left town and has asked me to take over the care Mrs. Bond.  I have reviewed her history her x-rays and have examined.    Mrs. Bond is a 72-year-old  female she is a nonsmoker.  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  She has done well since then.  For approximately 2 years she has had progressive shortness of breath.  Initially there was no wheezing.  She now wheezes with exertion.  Because of this shortness of breath she underwent an extensive cardiac evaluation which showed no cardiac issues causing her shortness of breath.  She was seen by Dr. 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  Bronchoscopy with transtracheal biopsy showed normal thyroid tissue no malignancy.  She was referred to Dr. Bautista for resection.    She is morbidly obese.  BMI is 45.5.  She has been seen by an endocrinologist who is managing thyroid medications.  She has no diabetes.  She has no hoarseness or change in her voice.  She has had no cough or hemoptysis.    The following portions of the patient's history were reviewed and updated as appropriate: allergies, current medications, past family history, past medical history, past social history, past surgical history and problem list.  Review of Systems   Constitution: Positive for weight gain.   HENT: Negative.    Eyes: Negative.    Cardiovascular: Positive for dyspnea on exertion and leg swelling.   Respiratory: Positive for shortness of breath.    Endocrine: Negative.    Hematologic/Lymphatic: Negative.     Skin: Negative.    Musculoskeletal: Negative.    Gastrointestinal: Negative.    Genitourinary: Negative.    Neurological: Negative.    Psychiatric/Behavioral: Negative.      Patient Active Problem List   Diagnosis   • Left lower quadrant pain   • Ketonuria due to dehydration   • Normocytic anemia   • Pancytopenia   • Obesity (BMI 30-39.9)   • Nausea   • Sepsis   • Nephrolithiasis   • Pleural nodule   • Tracheal compression   • Hyperthyroidism   • Graves disease   • Abnormal weight gain   • Palpitations   • Cholecystitis   • History of colon polyps - 4 Tubulovillous adenomas in 2004, normal colonoscopy in 2008   • History of abdominal abscess - 3+ E. coli at time of surgery 9/2016   • Multiple drug allergies to Cephalexin, Cephalosporins, Penicillins   • Weight gain - 10 pounds in 2 months, unintentional   • Multinodular goiter   • GRAY (dyspnea on exertion)   • Morbid obesity   • Substernal goiter     Past Medical History:   Diagnosis Date   • Back pain    • Colon polyps 12/10/2004    Proximal ascending polyps: tubulovillous adenoma, only mild dysplasia seen; distal ascending polyps: tubulovillous adenoma, only mild dysplasia seen   • Colon polyps 04/16/2001    Cecum biopsy: fragments of tubular adenoma, rectum biopsy: fragments of hyperplastic polyp   • Diverticulitis    • Diverticulosis    • Endometrial polyp 2011, 2002 2011 Path findings: fragments of endometrial polyp with cystic hyperplasia (and no atypia) / 2002 Path findings:    • Gallstones    • Hyperthyroidism     followed by Dr. Blackmon   • Joint pain, knee    • Kidney stones 2015    with cystoscopy by Dr. Miguel Monte done on 10/7/15, 9/9/15, 5/8/15, 9/21/07   • Mediastinal mass    • Obstructive pyelonephritis 09/28/2015    left obstructing pyelonephritis    • PONV (postoperative nausea and vomiting)    • Rash     RT LOWER LEG AND FOOT   • Renal disorder    • SOB (shortness of breath) on exertion      Past Surgical History:   Procedure Laterality  Date   • BRONCHOSCOPY N/A 11/6/2017    Procedure: BRONCHOSCOPY WITH ENDOBRONCHIAL ULTRASOUND AND TRANSBRONCHIAL NEEDLE ASPIRATION;  Surgeon: Nando Heller MD;  Location: [facility] ENDOSCOPY;  Service:    • CARDIAC CATHETERIZATION N/A 9/27/2017    Procedure: Right Heart Cath;  Surgeon: Miguel Gautam MD;  Location: [facility] CATH INVASIVE LOCATION;  Service:    • CARDIAC CATHETERIZATION N/A 9/27/2017    Procedure: Left Heart Cath;  Surgeon: Miguel Gautam MD;  Location: [facility] CATH INVASIVE LOCATION;  Service:    • CARDIAC CATHETERIZATION N/A 9/27/2017    Procedure: Coronary angiography;  Surgeon: Miguel Gautam MD;  Location: [facility] CATH INVASIVE LOCATION;  Service:    • CARDIAC CATHETERIZATION N/A 9/27/2017    Procedure: Left ventriculography;  Surgeon: Miguel Gautam MD;  Location: [facility] CATH INVASIVE LOCATION;  Service:    • CHOLECYSTECTOMY N/A 5/17/2017    Procedure: LAPAROSCOPIC CHOLECYSTECTOMY WITH IOC;  Surgeon: Patt Moore MD;  Location: [facility] OR;  Service:    • COLON RESECTION Left 9/14/2016    Laparoscopic Low Anterior Resection with splenic flexure mobilization, drainage of Pelvic Abscess (cultured 3+ E. Coli), Left salpingo-ophorectomy, laparoscopic rectopexy, and umbilical hernia repair, Dr. Patt Moore   • COLON RESECTION      VENTRAL HERNIA REPAIR   • COLONOSCOPY N/A 05/15/2008    sigmoid diverticulosis with blunting of the haustral folds and angulation consistent with previous diverticulitis, no polyps, suggestion of rectal prolapse-Dr. Patt Moore   • COLONOSCOPY W/ BIOPSIES AND POLYPECTOMY N/A 04/16/2001    Possible mild gastritis w/ some appearance of formations that look like petechiae in the antrum, no ulcerations, no erosions; cecal polyp approx 4mm sessile; probable transverse colon polyp, although we could not visualize this completely upon pulling out; sigmoid diverticula; multiple rectal polyps, mostly w/ appearance of hyperplasia, largest being 5  to 6mm: removed via snare-Dr. Patt Moore   • COLONOSCOPY W/ POLYPECTOMY N/A 12/10/2004    Diverticulosis with sigmoid perideverticulitis with erythema and patchiness around some of the diverticula, proximal ascedning colon polyp-approx 5 mm-removed via snare cauter, two distal ascending colon polyps-7 mm and 3 mm-removed via snare cautery polypectomy, hemorrhoids-Dr. Patt Moore   • CYSTOSCOPY W/ URETERAL STENT REMOVAL Left 10/07/2015    Cystoscopy with stent extraction, left ureteral occulusion balloon placement, percutanous nephrostolithotomy with stone volume less than 2.5 cm involving the left lower pole and the left proximal ureter, balloon tract dilation for establishment of nephrostomy tract, antegrade nephrostomy tube placement-Dr. Miguel Monte   • CYSTOSCOPY, RETROGRADE PYELOGRAM AND STENT INSERTION Left 09/28/2015    Left cystoscopy with left retrograde pyelogram, left double-J stent placement-Dr. Miguel Blas   • CYSTOSCOPY, RETROGRADE PYELOGRAM AND STENT INSERTION Left 09/09/2015    Cystoscopy with bilateral retrograde pyelogram, left double-J stent placement, right ureteral pyeloscopy with laser lithotripsy of the ureteropelvic junction calculus, right double-J stent placement-Dr. Miguel Monte   • CYSTOSCOPY, RETROGRADE PYELOGRAM AND STENT INSERTION Right 09/21/2007    Cystoscopy with right retrograde pyelogram, right biliary stent placement-Dr. Miguel Monte   • CYSTOSCOPY, RETROGRADE PYELOGRAM AND STENT INSERTION Right 09/07/2007    Cystoscopy with right retrograde pyelogram, right ureteral peyloscopy with extraction distal ureteral mass, right double J stent placement-Dr. Miguel Monte   • CYSTOSCOPY, URETEROSCOPY, RETROGRADE PYELOGRAM, STENT INSERTION Right 09/07/2007    Cystoscopy with right retrograde pyelogram, rigth ureteroscopy with extraction of distal mass, right double J stent placement-Dr. Miguel Monte   • D&C HYSTEROSCOPY N/A 05/18/2011    Procedure done  due to thickened endomentrium and an endometrial polyp-Dr. Quita Cheatham   • DILATATION AND CURETTAGE N/A 03/22/2002    D&C, polyp removal-Dr. Quita Cheatham   • EXTRACORPOREAL SHOCKWAVE LITHOTRIPSY (ESWL), STENT INSERTION/REMOVAL Left 05/08/2015    Left extracoporeal shockwave lithotripsy, cystoscopy with stent placement-Dr. Miguel Monte   • SIGMOIDOSCOPY N/A 9/13/2016    Procedure: SIGMOIDOSCOPY FLEXIBLE TO 25 CM;  Surgeon: Patt Moore MD;  Location: [facility] ENDOSCOPY;  Service:    • THORACOTOMY  1996    Thoracotomy for ectopic thyroid, Dr. Camarillo   • UMBILICAL HERNIA REPAIR N/A 9/14/2016    Procedure: UMBILICAL HERNIA REPAIR ;  Surgeon: Patt Moore MD;  Location: [facility] OR;  Service:    • VENTRAL/INCISIONAL HERNIA REPAIR N/A 5/17/2017    Procedure: VENTRAL HERNIA REPAIR WITH MESH, BILATERAL MUSCULOFASCIAL RELEASE;  Surgeon: Patt Moore MD;  Location: [facility] MAIN OR;  Service:      Family History   Problem Relation Age of Onset   • Heart disease Father    • Heart attack Paternal Uncle    • Cancer Maternal Grandmother      ovarian   • Heart attack Paternal Grandfather    • Heart attack Paternal Uncle    • Heart attack Paternal Uncle    • Heart attack Paternal Uncle    • Heart attack Paternal Uncle    • Heart attack Paternal Uncle      Social History     Social History   • Marital status:      Spouse name: BERENICE COFFEY    • Number of children: 2   • Years of education: HIGH SCHOOL      Occupational History   •  Retired     Social History Main Topics   • Smoking status: Never Smoker   • Smokeless tobacco: Never Used   • Alcohol use No      Comment: No caffeine use   • Drug use: No   • Sexual activity: Defer     Other Topics Concern   • Not on file     Social History Narrative       Current Outpatient Prescriptions:   •  methIMAzole (TAPAZOLE) 5 MG tablet, Take 1 tablet by mouth Every Other Day., Disp: 15 tablet, Rfl: 5  Allergies   Allergen Reactions   • Cephalexin Hives     Pt states she possibly  is not allergic to Cephalexin, took and did not have problems   • Cephalosporins Hives   • Penicillins Hives        Objective   Vitals:    02/13/18 1301   BP: 162/88   Pulse: 76   SpO2: 95%     Physical Exam   Constitutional: She is oriented to person, place, and time. She appears well-developed and well-nourished.   HENT:   Head: Normocephalic.   Eyes: Conjunctivae, EOM and lids are normal. Pupils are equal, round, and reactive to light.   Neck: Trachea normal and normal range of motion. Neck supple. No hepatojugular reflux and no JVD present. Carotid bruit is not present. No thyroid mass and no thyromegaly present.   No palpable masses or adenopathy.  Trachea is deviated to the right.  Neck is short and thick   Cardiovascular: Normal rate, regular rhythm, S1 normal, S2 normal, normal heart sounds and normal pulses.   No extrasystoles are present. PMI is not displaced.    Pulmonary/Chest: Effort normal and breath sounds normal.   Well-healed right thoracotomy incision.  Chest wall is stable.  No subcutaneous masses or nodules.   Abdominal: Soft. Normal appearance and bowel sounds are normal. She exhibits no mass. There is no hepatosplenomegaly. There is no tenderness. No hernia.   Musculoskeletal: Normal range of motion.   Neurological: She is alert and oriented to person, place, and time. She has normal strength and normal reflexes. No cranial nerve deficit or sensory deficit. She displays a negative Romberg sign.   Skin: Skin is warm, dry and intact.   Psychiatric: She has a normal mood and affect. Her speech is normal and behavior is normal. Judgment and thought content normal. Cognition and memory are normal.     Independent Review of Radiographic Studies:    CT of the chest with contrast performed December 11, 2017 was independently reviewed and compared to the previous x-rays. There is a large mass protruding from the inferior aspect of the left lobe of the thyroid into the superior mediastinum.  This  measures 5 x 4 x 4 cm.  The mass is unchanged from CT of August 2017. The mass deviates the trachea to the right.  There is thickening of the thyroid isthmus.  Right lobe of the thyroid is unremarkable.  There there is no mediastinal, hilar or axillary adenopathy. There is a benign 6 mm nodule in the lateral aspect of the left lower lobe that has been present since 2011. There is a 3 mm nodule superiorly within the left lower lobe that has been stable since September 2016.    Assessment/Plan       It appears that this lady has a typical substernal thyroid goiter causing deviation of the trachea to the right.  I have recommended resection prior to this mass getting any larger.  I believe that this can be resected through a neck incision with removal of the left thyroid lobe and the substernal mass.  She may need to have an upper median sternotomy to resect the substernal portion of the goiter.  I have explained all of this in great detail to the patient and her .  We discussed the procedure.  I explained the risks and benefits.  In particular we discussed how her obesity will make the procedure technically more difficult and increase her overall risk.  I have answered all of her questions to her satisfaction.  She has requested that we proceed.  Arrangements are being made to do a left thyroidectomy with substernal thyroid goiter resection at [facility] in the near future.  I will keep you informed of her progress.  Thank you for allowing me to participate in the care Mrs. Bond.    Diagnoses and all orders for this visit:    Substernal goiter  -     Case Request    Morbid obesity

## 2018-02-26 ENCOUNTER — APPOINTMENT (OUTPATIENT)
Dept: PREADMISSION TESTING | Facility: HOSPITAL | Age: 73
End: 2018-02-26

## 2018-02-26 VITALS
HEIGHT: 63 IN | HEART RATE: 81 BPM | RESPIRATION RATE: 20 BRPM | DIASTOLIC BLOOD PRESSURE: 84 MMHG | WEIGHT: 256 LBS | SYSTOLIC BLOOD PRESSURE: 128 MMHG | BODY MASS INDEX: 45.36 KG/M2 | TEMPERATURE: 97.8 F | OXYGEN SATURATION: 96 %

## 2018-02-26 DIAGNOSIS — R79.1 ABNORMAL COAGULATION PROFILE: ICD-10-CM

## 2018-02-26 DIAGNOSIS — E04.9 SUBSTERNAL GOITER: ICD-10-CM

## 2018-02-26 LAB
ABO GROUP BLD: NORMAL
ANION GAP SERPL CALCULATED.3IONS-SCNC: 12 MMOL/L
BASOPHILS # BLD AUTO: 0.01 10*3/MM3 (ref 0–0.2)
BASOPHILS NFR BLD AUTO: 0.2 % (ref 0–1.5)
BLD GP AB SCN SERPL QL: NEGATIVE
BUN BLD-MCNC: 14 MG/DL (ref 8–23)
BUN/CREAT SERPL: 14.6 (ref 7–25)
CALCIUM SPEC-SCNC: 9.3 MG/DL (ref 8.6–10.5)
CHLORIDE SERPL-SCNC: 103 MMOL/L (ref 98–107)
CO2 SERPL-SCNC: 24 MMOL/L (ref 22–29)
CREAT BLD-MCNC: 0.96 MG/DL (ref 0.57–1)
DEPRECATED RDW RBC AUTO: 47.1 FL (ref 37–54)
EOSINOPHIL # BLD AUTO: 0.24 10*3/MM3 (ref 0–0.7)
EOSINOPHIL NFR BLD AUTO: 4.2 % (ref 0.3–6.2)
ERYTHROCYTE [DISTWIDTH] IN BLOOD BY AUTOMATED COUNT: 14 % (ref 11.7–13)
GFR SERPL CREATININE-BSD FRML MDRD: 57 ML/MIN/1.73
GLUCOSE BLD-MCNC: 97 MG/DL (ref 65–99)
HCT VFR BLD AUTO: 38.7 % (ref 35.6–45.5)
HGB BLD-MCNC: 12.9 G/DL (ref 11.9–15.5)
IMM GRANULOCYTES # BLD: 0 10*3/MM3 (ref 0–0.03)
IMM GRANULOCYTES NFR BLD: 0 % (ref 0–0.5)
INR PPP: 1.07 (ref 0.9–1.1)
LYMPHOCYTES # BLD AUTO: 1.54 10*3/MM3 (ref 0.9–4.8)
LYMPHOCYTES NFR BLD AUTO: 27 % (ref 19.6–45.3)
MCH RBC QN AUTO: 30.9 PG (ref 26.9–32)
MCHC RBC AUTO-ENTMCNC: 33.3 G/DL (ref 32.4–36.3)
MCV RBC AUTO: 92.8 FL (ref 80.5–98.2)
MONOCYTES # BLD AUTO: 0.4 10*3/MM3 (ref 0.2–1.2)
MONOCYTES NFR BLD AUTO: 7 % (ref 5–12)
NEUTROPHILS # BLD AUTO: 3.52 10*3/MM3 (ref 1.9–8.1)
NEUTROPHILS NFR BLD AUTO: 61.6 % (ref 42.7–76)
PLATELET # BLD AUTO: 133 10*3/MM3 (ref 140–500)
PMV BLD AUTO: 9.3 FL (ref 6–12)
POTASSIUM BLD-SCNC: 4.1 MMOL/L (ref 3.5–5.2)
PROTHROMBIN TIME: 13.7 SECONDS (ref 11.7–14.2)
RBC # BLD AUTO: 4.17 10*6/MM3 (ref 3.9–5.2)
RH BLD: POSITIVE
SODIUM BLD-SCNC: 139 MMOL/L (ref 136–145)
WBC NRBC COR # BLD: 5.71 10*3/MM3 (ref 4.5–10.7)

## 2018-02-26 PROCEDURE — 36415 COLL VENOUS BLD VENIPUNCTURE: CPT

## 2018-02-26 PROCEDURE — 85025 COMPLETE CBC W/AUTO DIFF WBC: CPT | Performed by: THORACIC SURGERY (CARDIOTHORACIC VASCULAR SURGERY)

## 2018-02-26 PROCEDURE — 86923 COMPATIBILITY TEST ELECTRIC: CPT

## 2018-02-26 PROCEDURE — 80048 BASIC METABOLIC PNL TOTAL CA: CPT | Performed by: THORACIC SURGERY (CARDIOTHORACIC VASCULAR SURGERY)

## 2018-02-26 PROCEDURE — 85610 PROTHROMBIN TIME: CPT | Performed by: THORACIC SURGERY (CARDIOTHORACIC VASCULAR SURGERY)

## 2018-02-26 PROCEDURE — 86901 BLOOD TYPING SEROLOGIC RH(D): CPT | Performed by: THORACIC SURGERY (CARDIOTHORACIC VASCULAR SURGERY)

## 2018-02-26 PROCEDURE — 86900 BLOOD TYPING SEROLOGIC ABO: CPT | Performed by: THORACIC SURGERY (CARDIOTHORACIC VASCULAR SURGERY)

## 2018-02-26 PROCEDURE — 86850 RBC ANTIBODY SCREEN: CPT | Performed by: THORACIC SURGERY (CARDIOTHORACIC VASCULAR SURGERY)

## 2018-02-26 RX ORDER — NAPROXEN SODIUM 220 MG
220 TABLET ORAL 2 TIMES DAILY PRN
COMMUNITY
End: 2018-04-09

## 2018-02-26 RX ORDER — ASPIRIN 325 MG
325 TABLET, DELAYED RELEASE (ENTERIC COATED) ORAL EVERY 6 HOURS PRN
COMMUNITY
End: 2018-11-26

## 2018-02-26 NOTE — DISCHARGE INSTRUCTIONS
Take the following medications the morning of surgery with a small sip of water:    NONE    ARRIVE  AT 5:30  General Instructions:  • Do not eat solid food after midnight the night before surgery.  • You may drink clear liquids day of surgery but must stop at least one hour before your hospital arrival time.  • It is beneficial for you to have a clear drink that contains carbohydrates the day of surgery.  We suggest a 12 to 20 ounce bottle of Gatorade or Powerade for non-diabetic patients or a 12 to 20 ounce bottle of G2 or Powerade Zero for diabetic patients. (Pediatric patients, are not advised to drink a 12 to 20 ounce carbohydrate drink)    Clear liquids are liquids you can see through.  Nothing red in color.     Plain water                               Sports drinks  Sodas                                   Gelatin (Jell-O)  Fruit juices without pulp such as white grape juice and apple juice  Popsicles that contain no fruit or yogurt  Tea or coffee (no cream or milk added)  Gatorade / Powerade  G2 / Powerade Zero    • Infants may have breast milk up to four hours before surgery.  • Infants drinking formula may drink formula up to six hours before surgery.   • Patients who avoid smoking, chewing tobacco and alcohol for 4 weeks prior to surgery have a reduced risk of post-operative complications.  Quit smoking as many days before surgery as you can.  • Do not smoke, use chewing tobacco or drink alcohol the day of surgery.   • If applicable bring your C-PAP/ BI-PAP machine.  • Bring any papers given to you in the doctor’s office.  • Wear clean comfortable clothes and socks.  • Do not wear contact lenses or make-up.  Bring a case for your glasses.   • Bring crutches or walker if applicable.  • Remove all piercings.  Leave jewelry and any other valuables at home.  • Hair extensions with metal clips must be removed prior to surgery.  • The Pre-Admission Testing nurse will instruct you to bring medications if unable  to obtain an accurate list in Pre-Admission Testing.        If you were given a blood bank ID arm band remember to bring it with you the day of surgery.    Preventing a Surgical Site Infection:  • For 2 to 3 days before surgery, avoid shaving with a razor because the razor can irritate skin and make it easier to develop an infection.  • The night prior to surgery sleep in a clean bed with clean clothing.  Do not allow pets to sleep with you.  • Shower on the morning of surgery using a fresh bar of anti-bacterial soap (such as Dial) and clean washcloth.  Dry with a clean towel and dress in clean clothing.  • Ask your surgeon if you will be receiving antibiotics prior to surgery.  • Make sure you, your family, and all healthcare providers clean their hands with soap and water or an alcohol based hand  before caring for you or your wound.    Day of surgery:  Upon arrival, a Pre-op nurse and Anesthesiologist will review your health history, obtain vital signs, and answer questions you may have.  The only belongings needed at this time will be your home medications and if applicable your C-PAP/BI-PAP machine.  If you are staying overnight your family can leave the rest of your belongings in the car and bring them to your room later.  A Pre-op nurse will start an IV and you may receive medication in preparation for surgery, including something to help you relax.  Your family will be able to see you in the Pre-op area.  While you are in surgery your family should notify the waiting room  if they leave the waiting room area and provide a contact phone number.    Please be aware that surgery does come with discomfort.  We want to make every effort to control your discomfort so please discuss any uncontrolled symptoms with your nurse.   Your doctor will most likely have prescribed pain medications.      If you are going home after surgery you will receive individualized written care instructions before  being discharged.  A responsible adult must drive you to and from the hospital on the day of your surgery and stay with you for 24 hours.    If you are staying overnight following surgery, you will be transported to your hospital room following the recovery period.  [facility] has all private rooms.    If you have any questions please call Pre-Admission Testing at [phone].  Deductibles and co-payments are collected on the day of service. Please be prepared to pay the required co-pay, deductible or deposit on the day of service as defined by your plan.

## 2018-03-05 ENCOUNTER — ANESTHESIA (OUTPATIENT)
Dept: PERIOP | Facility: HOSPITAL | Age: 73
End: 2018-03-05

## 2018-03-05 ENCOUNTER — HOSPITAL ENCOUNTER (INPATIENT)
Facility: HOSPITAL | Age: 73
LOS: 2 days | Discharge: HOME | End: 2018-03-08
Attending: THORACIC SURGERY (CARDIOTHORACIC VASCULAR SURGERY) | Admitting: THORACIC SURGERY (CARDIOTHORACIC VASCULAR SURGERY)

## 2018-03-05 ENCOUNTER — APPOINTMENT (OUTPATIENT)
Dept: GENERAL RADIOLOGY | Facility: HOSPITAL | Age: 73
End: 2018-03-05

## 2018-03-05 ENCOUNTER — ANESTHESIA EVENT (OUTPATIENT)
Dept: PERIOP | Facility: HOSPITAL | Age: 73
End: 2018-03-05

## 2018-03-05 DIAGNOSIS — E04.9 SUBSTERNAL GOITER: ICD-10-CM

## 2018-03-05 DIAGNOSIS — R53.1 GENERALIZED WEAKNESS: Primary | ICD-10-CM

## 2018-03-05 DIAGNOSIS — L23.1 ALLERGIC CONTACT DERMATITIS DUE TO ADHESIVES: ICD-10-CM

## 2018-03-05 PROCEDURE — 94799 UNLISTED PULMONARY SVC/PX: CPT

## 2018-03-05 PROCEDURE — 60220 PARTIAL REMOVAL OF THYROID: CPT | Performed by: THORACIC SURGERY (CARDIOTHORACIC VASCULAR SURGERY)

## 2018-03-05 PROCEDURE — 25010000002 FENTANYL CITRATE (PF) 100 MCG/2ML SOLUTION: Performed by: NURSE ANESTHETIST, CERTIFIED REGISTERED

## 2018-03-05 PROCEDURE — 25010000002 PROPOFOL 10 MG/ML EMULSION: Performed by: NURSE ANESTHETIST, CERTIFIED REGISTERED

## 2018-03-05 PROCEDURE — 71045 X-RAY EXAM CHEST 1 VIEW: CPT

## 2018-03-05 PROCEDURE — 25010000002 HYDROMORPHONE PER 4 MG: Performed by: NURSE ANESTHETIST, CERTIFIED REGISTERED

## 2018-03-05 PROCEDURE — 25010000002 FENTANYL CITRATE (PF) 100 MCG/2ML SOLUTION: Performed by: ANESTHESIOLOGY

## 2018-03-05 PROCEDURE — 25010000002 PROMETHAZINE PER 50 MG: Performed by: NURSE ANESTHETIST, CERTIFIED REGISTERED

## 2018-03-05 PROCEDURE — 25010000002 ONDANSETRON PER 1 MG: Performed by: NURSE ANESTHETIST, CERTIFIED REGISTERED

## 2018-03-05 PROCEDURE — 25010000002 MIDAZOLAM PER 1 MG: Performed by: ANESTHESIOLOGY

## 2018-03-05 PROCEDURE — S0260 H&P FOR SURGERY: HCPCS | Performed by: THORACIC SURGERY (CARDIOTHORACIC VASCULAR SURGERY)

## 2018-03-05 PROCEDURE — 0GTG0ZZ RESECTION OF LEFT THYROID GLAND LOBE, OPEN APPROACH: ICD-10-PCS | Performed by: THORACIC SURGERY (CARDIOTHORACIC VASCULAR SURGERY)

## 2018-03-05 PROCEDURE — 03HY32Z INSERTION OF MONITORING DEVICE INTO UPPER ARTERY, PERCUTANEOUS APPROACH: ICD-10-PCS | Performed by: ANESTHESIOLOGY

## 2018-03-05 PROCEDURE — 88307 TISSUE EXAM BY PATHOLOGIST: CPT | Performed by: THORACIC SURGERY (CARDIOTHORACIC VASCULAR SURGERY)

## 2018-03-05 PROCEDURE — G0378 HOSPITAL OBSERVATION PER HR: HCPCS

## 2018-03-05 PROCEDURE — 25010000002 DEXAMETHASONE PER 1 MG: Performed by: NURSE ANESTHETIST, CERTIFIED REGISTERED

## 2018-03-05 PROCEDURE — 25010000002 MORPHINE SULFATE (PF) 2 MG/ML SOLUTION: Performed by: THORACIC SURGERY (CARDIOTHORACIC VASCULAR SURGERY)

## 2018-03-05 PROCEDURE — 25010000002 ONDANSETRON PER 1 MG: Performed by: THORACIC SURGERY (CARDIOTHORACIC VASCULAR SURGERY)

## 2018-03-05 RX ORDER — OXYCODONE AND ACETAMINOPHEN 7.5; 325 MG/1; MG/1
1 TABLET ORAL ONCE AS NEEDED
Status: DISCONTINUED | OUTPATIENT
Start: 2018-03-05 | End: 2018-03-05

## 2018-03-05 RX ORDER — FENTANYL CITRATE 50 UG/ML
INJECTION, SOLUTION INTRAMUSCULAR; INTRAVENOUS AS NEEDED
Status: DISCONTINUED | OUTPATIENT
Start: 2018-03-05 | End: 2018-03-05 | Stop reason: SURG

## 2018-03-05 RX ORDER — FLUMAZENIL 0.1 MG/ML
0.2 INJECTION INTRAVENOUS AS NEEDED
Status: DISCONTINUED | OUTPATIENT
Start: 2018-03-05 | End: 2018-03-05

## 2018-03-05 RX ORDER — ONDANSETRON 2 MG/ML
4 INJECTION INTRAMUSCULAR; INTRAVENOUS EVERY 6 HOURS PRN
Status: DISCONTINUED | OUTPATIENT
Start: 2018-03-05 | End: 2018-03-08 | Stop reason: HOSPADM

## 2018-03-05 RX ORDER — HYDROCODONE BITARTRATE AND ACETAMINOPHEN 7.5; 325 MG/1; MG/1
1 TABLET ORAL ONCE AS NEEDED
Status: DISCONTINUED | OUTPATIENT
Start: 2018-03-05 | End: 2018-03-05

## 2018-03-05 RX ORDER — DIPHENHYDRAMINE HYDROCHLORIDE 50 MG/ML
12.5 INJECTION INTRAMUSCULAR; INTRAVENOUS
Status: DISCONTINUED | OUTPATIENT
Start: 2018-03-05 | End: 2018-03-05

## 2018-03-05 RX ORDER — HYDROCODONE BITARTRATE AND ACETAMINOPHEN 7.5; 325 MG/1; MG/1
2 TABLET ORAL EVERY 4 HOURS PRN
Status: DISCONTINUED | OUTPATIENT
Start: 2018-03-05 | End: 2018-03-08 | Stop reason: HOSPADM

## 2018-03-05 RX ORDER — HYDROCODONE BITARTRATE AND ACETAMINOPHEN 7.5; 325 MG/1; MG/1
1 TABLET ORAL EVERY 4 HOURS PRN
Status: DISCONTINUED | OUTPATIENT
Start: 2018-03-05 | End: 2018-03-08 | Stop reason: HOSPADM

## 2018-03-05 RX ORDER — NALOXONE HCL 0.4 MG/ML
0.2 VIAL (ML) INJECTION AS NEEDED
Status: DISCONTINUED | OUTPATIENT
Start: 2018-03-05 | End: 2018-03-05

## 2018-03-05 RX ORDER — CLINDAMYCIN PHOSPHATE 900 MG/50ML
900 INJECTION INTRAVENOUS EVERY 8 HOURS
Status: COMPLETED | OUTPATIENT
Start: 2018-03-05 | End: 2018-03-06

## 2018-03-05 RX ORDER — FENTANYL CITRATE 50 UG/ML
50 INJECTION, SOLUTION INTRAMUSCULAR; INTRAVENOUS
Status: DISCONTINUED | OUTPATIENT
Start: 2018-03-05 | End: 2018-03-05 | Stop reason: HOSPADM

## 2018-03-05 RX ORDER — PROPOFOL 10 MG/ML
VIAL (ML) INTRAVENOUS AS NEEDED
Status: DISCONTINUED | OUTPATIENT
Start: 2018-03-05 | End: 2018-03-05 | Stop reason: SURG

## 2018-03-05 RX ORDER — SODIUM CHLORIDE 0.9 % (FLUSH) 0.9 %
1-10 SYRINGE (ML) INJECTION AS NEEDED
Status: DISCONTINUED | OUTPATIENT
Start: 2018-03-05 | End: 2018-03-05 | Stop reason: HOSPADM

## 2018-03-05 RX ORDER — SODIUM CHLORIDE 9 MG/ML
75 INJECTION, SOLUTION INTRAVENOUS CONTINUOUS
Status: DISCONTINUED | OUTPATIENT
Start: 2018-03-05 | End: 2018-03-06

## 2018-03-05 RX ORDER — SODIUM CHLORIDE 9 MG/ML
INJECTION, SOLUTION INTRAVENOUS CONTINUOUS PRN
Status: DISCONTINUED | OUTPATIENT
Start: 2018-03-05 | End: 2018-03-05 | Stop reason: SURG

## 2018-03-05 RX ORDER — MAGNESIUM HYDROXIDE 1200 MG/15ML
LIQUID ORAL AS NEEDED
Status: DISCONTINUED | OUTPATIENT
Start: 2018-03-05 | End: 2018-03-05 | Stop reason: HOSPADM

## 2018-03-05 RX ORDER — NITROGLYCERIN 0.4 MG/1
0.4 TABLET SUBLINGUAL
Status: DISCONTINUED | OUTPATIENT
Start: 2018-03-05 | End: 2018-03-08 | Stop reason: HOSPADM

## 2018-03-05 RX ORDER — MIDAZOLAM HYDROCHLORIDE 1 MG/ML
1 INJECTION INTRAMUSCULAR; INTRAVENOUS
Status: DISCONTINUED | OUTPATIENT
Start: 2018-03-05 | End: 2018-03-05 | Stop reason: HOSPADM

## 2018-03-05 RX ORDER — ACETAMINOPHEN 325 MG/1
650 TABLET ORAL EVERY 4 HOURS PRN
Status: DISCONTINUED | OUTPATIENT
Start: 2018-03-05 | End: 2018-03-08 | Stop reason: HOSPADM

## 2018-03-05 RX ORDER — ONDANSETRON 2 MG/ML
INJECTION INTRAMUSCULAR; INTRAVENOUS AS NEEDED
Status: DISCONTINUED | OUTPATIENT
Start: 2018-03-05 | End: 2018-03-05 | Stop reason: SURG

## 2018-03-05 RX ORDER — LABETALOL HYDROCHLORIDE 5 MG/ML
5 INJECTION, SOLUTION INTRAVENOUS
Status: DISCONTINUED | OUTPATIENT
Start: 2018-03-05 | End: 2018-03-05

## 2018-03-05 RX ORDER — BISACODYL 10 MG
10 SUPPOSITORY, RECTAL RECTAL DAILY PRN
Status: DISCONTINUED | OUTPATIENT
Start: 2018-03-05 | End: 2018-03-08 | Stop reason: HOSPADM

## 2018-03-05 RX ORDER — ONDANSETRON 2 MG/ML
4 INJECTION INTRAMUSCULAR; INTRAVENOUS ONCE AS NEEDED
Status: DISCONTINUED | OUTPATIENT
Start: 2018-03-05 | End: 2018-03-05

## 2018-03-05 RX ORDER — HYDROMORPHONE HCL 110MG/55ML
PATIENT CONTROLLED ANALGESIA SYRINGE INTRAVENOUS AS NEEDED
Status: DISCONTINUED | OUTPATIENT
Start: 2018-03-05 | End: 2018-03-05 | Stop reason: SURG

## 2018-03-05 RX ORDER — MIDAZOLAM HYDROCHLORIDE 1 MG/ML
2 INJECTION INTRAMUSCULAR; INTRAVENOUS
Status: DISCONTINUED | OUTPATIENT
Start: 2018-03-05 | End: 2018-03-05 | Stop reason: HOSPADM

## 2018-03-05 RX ORDER — HEPARIN SODIUM 5000 [USP'U]/ML
5000 INJECTION, SOLUTION INTRAVENOUS; SUBCUTANEOUS EVERY 12 HOURS SCHEDULED
Status: DISCONTINUED | OUTPATIENT
Start: 2018-03-06 | End: 2018-03-08 | Stop reason: HOSPADM

## 2018-03-05 RX ORDER — PROMETHAZINE HYDROCHLORIDE 25 MG/ML
INJECTION, SOLUTION INTRAMUSCULAR; INTRAVENOUS AS NEEDED
Status: DISCONTINUED | OUTPATIENT
Start: 2018-03-05 | End: 2018-03-05 | Stop reason: SURG

## 2018-03-05 RX ORDER — PROMETHAZINE HYDROCHLORIDE 25 MG/ML
12.5 INJECTION, SOLUTION INTRAMUSCULAR; INTRAVENOUS ONCE AS NEEDED
Status: DISCONTINUED | OUTPATIENT
Start: 2018-03-05 | End: 2018-03-05

## 2018-03-05 RX ORDER — CLINDAMYCIN PHOSPHATE 900 MG/50ML
900 INJECTION INTRAVENOUS ONCE
Status: COMPLETED | OUTPATIENT
Start: 2018-03-05 | End: 2018-03-05

## 2018-03-05 RX ORDER — FENTANYL CITRATE 50 UG/ML
50 INJECTION, SOLUTION INTRAMUSCULAR; INTRAVENOUS
Status: DISCONTINUED | OUTPATIENT
Start: 2018-03-05 | End: 2018-03-05

## 2018-03-05 RX ORDER — SODIUM CHLORIDE, SODIUM LACTATE, POTASSIUM CHLORIDE, CALCIUM CHLORIDE 600; 310; 30; 20 MG/100ML; MG/100ML; MG/100ML; MG/100ML
9 INJECTION, SOLUTION INTRAVENOUS CONTINUOUS
Status: DISCONTINUED | OUTPATIENT
Start: 2018-03-05 | End: 2018-03-06

## 2018-03-05 RX ORDER — PROMETHAZINE HYDROCHLORIDE 25 MG/1
12.5 TABLET ORAL ONCE AS NEEDED
Status: DISCONTINUED | OUTPATIENT
Start: 2018-03-05 | End: 2018-03-05

## 2018-03-05 RX ORDER — EPHEDRINE SULFATE 50 MG/ML
INJECTION, SOLUTION INTRAVENOUS AS NEEDED
Status: DISCONTINUED | OUTPATIENT
Start: 2018-03-05 | End: 2018-03-05 | Stop reason: SURG

## 2018-03-05 RX ORDER — PROMETHAZINE HYDROCHLORIDE 25 MG/1
25 TABLET ORAL ONCE AS NEEDED
Status: DISCONTINUED | OUTPATIENT
Start: 2018-03-05 | End: 2018-03-05

## 2018-03-05 RX ORDER — DOCUSATE SODIUM 100 MG/1
100 CAPSULE, LIQUID FILLED ORAL 2 TIMES DAILY
Status: DISCONTINUED | OUTPATIENT
Start: 2018-03-05 | End: 2018-03-08 | Stop reason: HOSPADM

## 2018-03-05 RX ORDER — HYDRALAZINE HYDROCHLORIDE 20 MG/ML
5 INJECTION INTRAMUSCULAR; INTRAVENOUS
Status: DISCONTINUED | OUTPATIENT
Start: 2018-03-05 | End: 2018-03-05

## 2018-03-05 RX ORDER — HYDROMORPHONE HCL 110MG/55ML
0.5 PATIENT CONTROLLED ANALGESIA SYRINGE INTRAVENOUS
Status: DISCONTINUED | OUTPATIENT
Start: 2018-03-05 | End: 2018-03-05

## 2018-03-05 RX ORDER — PROMETHAZINE HYDROCHLORIDE 25 MG/1
25 SUPPOSITORY RECTAL ONCE AS NEEDED
Status: DISCONTINUED | OUTPATIENT
Start: 2018-03-05 | End: 2018-03-05

## 2018-03-05 RX ORDER — SENNA AND DOCUSATE SODIUM 50; 8.6 MG/1; MG/1
2 TABLET, FILM COATED ORAL NIGHTLY
Status: DISCONTINUED | OUTPATIENT
Start: 2018-03-05 | End: 2018-03-08 | Stop reason: HOSPADM

## 2018-03-05 RX ORDER — SODIUM CHLORIDE 0.9 % (FLUSH) 0.9 %
1-10 SYRINGE (ML) INJECTION AS NEEDED
Status: DISCONTINUED | OUTPATIENT
Start: 2018-03-05 | End: 2018-03-08 | Stop reason: HOSPADM

## 2018-03-05 RX ORDER — EPHEDRINE SULFATE 50 MG/ML
5 INJECTION, SOLUTION INTRAVENOUS ONCE AS NEEDED
Status: DISCONTINUED | OUTPATIENT
Start: 2018-03-05 | End: 2018-03-05

## 2018-03-05 RX ORDER — DEXAMETHASONE SODIUM PHOSPHATE 10 MG/ML
INJECTION INTRAMUSCULAR; INTRAVENOUS AS NEEDED
Status: DISCONTINUED | OUTPATIENT
Start: 2018-03-05 | End: 2018-03-05 | Stop reason: SURG

## 2018-03-05 RX ORDER — ONDANSETRON 4 MG/1
4 TABLET, FILM COATED ORAL EVERY 6 HOURS PRN
Status: DISCONTINUED | OUTPATIENT
Start: 2018-03-05 | End: 2018-03-08 | Stop reason: HOSPADM

## 2018-03-05 RX ORDER — FAMOTIDINE 10 MG/ML
20 INJECTION, SOLUTION INTRAVENOUS ONCE
Status: COMPLETED | OUTPATIENT
Start: 2018-03-05 | End: 2018-03-05

## 2018-03-05 RX ORDER — LIDOCAINE HYDROCHLORIDE 20 MG/ML
INJECTION, SOLUTION INFILTRATION; PERINEURAL AS NEEDED
Status: DISCONTINUED | OUTPATIENT
Start: 2018-03-05 | End: 2018-03-05 | Stop reason: SURG

## 2018-03-05 RX ORDER — ONDANSETRON 4 MG/1
4 TABLET, ORALLY DISINTEGRATING ORAL EVERY 6 HOURS PRN
Status: DISCONTINUED | OUTPATIENT
Start: 2018-03-05 | End: 2018-03-08 | Stop reason: HOSPADM

## 2018-03-05 RX ORDER — METHIMAZOLE 10 MG/1
5 TABLET ORAL EVERY OTHER DAY
Status: DISCONTINUED | OUTPATIENT
Start: 2018-03-05 | End: 2018-03-08 | Stop reason: HOSPADM

## 2018-03-05 RX ORDER — NALOXONE HCL 0.4 MG/ML
0.4 VIAL (ML) INJECTION
Status: DISCONTINUED | OUTPATIENT
Start: 2018-03-05 | End: 2018-03-08 | Stop reason: HOSPADM

## 2018-03-05 RX ORDER — LIDOCAINE HYDROCHLORIDE 10 MG/ML
0.5 INJECTION, SOLUTION EPIDURAL; INFILTRATION; INTRACAUDAL; PERINEURAL ONCE AS NEEDED
Status: DISCONTINUED | OUTPATIENT
Start: 2018-03-05 | End: 2018-03-05 | Stop reason: HOSPADM

## 2018-03-05 RX ORDER — IPRATROPIUM BROMIDE AND ALBUTEROL SULFATE 2.5; .5 MG/3ML; MG/3ML
3 SOLUTION RESPIRATORY (INHALATION)
Status: DISPENSED | OUTPATIENT
Start: 2018-03-05 | End: 2018-03-07

## 2018-03-05 RX ORDER — ROCURONIUM BROMIDE 10 MG/ML
INJECTION, SOLUTION INTRAVENOUS AS NEEDED
Status: DISCONTINUED | OUTPATIENT
Start: 2018-03-05 | End: 2018-03-05 | Stop reason: SURG

## 2018-03-05 RX ADMIN — PROMETHAZINE HYDROCHLORIDE 5 MG: 25 INJECTION INTRAMUSCULAR; INTRAVENOUS at 10:50

## 2018-03-05 RX ADMIN — CLINDAMYCIN PHOSPHATE 900 MG: 18 INJECTION, SOLUTION INTRAMUSCULAR; INTRAVENOUS at 15:00

## 2018-03-05 RX ADMIN — FENTANYL CITRATE 50 MCG: 50 INJECTION INTRAMUSCULAR; INTRAVENOUS at 08:30

## 2018-03-05 RX ADMIN — ROCURONIUM BROMIDE 10 MG: 10 INJECTION INTRAVENOUS at 09:25

## 2018-03-05 RX ADMIN — FENTANYL CITRATE 50 MCG: 50 INJECTION, SOLUTION INTRAMUSCULAR; INTRAVENOUS at 12:12

## 2018-03-05 RX ADMIN — HYDROMORPHONE HYDROCHLORIDE 0.5 MG: 2 INJECTION INTRAMUSCULAR; INTRAVENOUS; SUBCUTANEOUS at 12:08

## 2018-03-05 RX ADMIN — ROCURONIUM BROMIDE 50 MG: 10 INJECTION INTRAVENOUS at 07:59

## 2018-03-05 RX ADMIN — HYDROMORPHONE HYDROCHLORIDE 0.5 MG: 2 INJECTION INTRAMUSCULAR; INTRAVENOUS; SUBCUTANEOUS at 10:30

## 2018-03-05 RX ADMIN — CLINDAMYCIN PHOSPHATE 900 MG: 18 INJECTION, SOLUTION INTRAVENOUS at 08:05

## 2018-03-05 RX ADMIN — MIDAZOLAM 2 MG: 1 INJECTION INTRAMUSCULAR; INTRAVENOUS at 07:26

## 2018-03-05 RX ADMIN — HYDROCODONE BITARTRATE AND ACETAMINOPHEN 1 TABLET: 7.5; 325 TABLET ORAL at 23:02

## 2018-03-05 RX ADMIN — SODIUM CHLORIDE 75 ML/HR: 9 INJECTION, SOLUTION INTRAVENOUS at 14:13

## 2018-03-05 RX ADMIN — ONDANSETRON 4 MG: 2 INJECTION INTRAMUSCULAR; INTRAVENOUS at 10:29

## 2018-03-05 RX ADMIN — DEXAMETHASONE SODIUM PHOSPHATE 6 MG: 10 INJECTION INTRAMUSCULAR; INTRAVENOUS at 08:10

## 2018-03-05 RX ADMIN — LIDOCAINE HYDROCHLORIDE 60 MG: 20 INJECTION, SOLUTION INFILTRATION; PERINEURAL at 07:59

## 2018-03-05 RX ADMIN — FAMOTIDINE 20 MG: 10 INJECTION, SOLUTION INTRAVENOUS at 06:19

## 2018-03-05 RX ADMIN — FENTANYL CITRATE 50 MCG: 50 INJECTION INTRAMUSCULAR; INTRAVENOUS at 07:59

## 2018-03-05 RX ADMIN — PROPOFOL 50 MG: 10 INJECTION, EMULSION INTRAVENOUS at 08:30

## 2018-03-05 RX ADMIN — HYDROMORPHONE HYDROCHLORIDE 0.5 MG: 2 INJECTION INTRAMUSCULAR; INTRAVENOUS; SUBCUTANEOUS at 09:45

## 2018-03-05 RX ADMIN — METHIMAZOLE 5 MG: 10 TABLET ORAL at 16:22

## 2018-03-05 RX ADMIN — CLINDAMYCIN PHOSPHATE 900 MG: 18 INJECTION, SOLUTION INTRAMUSCULAR; INTRAVENOUS at 20:29

## 2018-03-05 RX ADMIN — ONDANSETRON 4 MG: 2 INJECTION INTRAMUSCULAR; INTRAVENOUS at 19:55

## 2018-03-05 RX ADMIN — MORPHINE SULFATE 4 MG: 2 INJECTION, SOLUTION INTRAMUSCULAR; INTRAVENOUS at 13:53

## 2018-03-05 RX ADMIN — SODIUM CHLORIDE, POTASSIUM CHLORIDE, SODIUM LACTATE AND CALCIUM CHLORIDE 9 ML/HR: 600; 310; 30; 20 INJECTION, SOLUTION INTRAVENOUS at 06:20

## 2018-03-05 RX ADMIN — SUGAMMADEX 300 MG: 100 INJECTION, SOLUTION INTRAVENOUS at 10:29

## 2018-03-05 RX ADMIN — FENTANYL CITRATE 50 MCG: 50 INJECTION INTRAMUSCULAR; INTRAVENOUS at 06:51

## 2018-03-05 RX ADMIN — MORPHINE SULFATE 4 MG: 2 INJECTION, SOLUTION INTRAMUSCULAR; INTRAVENOUS at 20:29

## 2018-03-05 RX ADMIN — SODIUM CHLORIDE: 9 INJECTION, SOLUTION INTRAVENOUS at 07:00

## 2018-03-05 RX ADMIN — HYDROCODONE BITARTRATE AND ACETAMINOPHEN 2 TABLET: 7.5; 325 TABLET ORAL at 15:04

## 2018-03-05 RX ADMIN — PROPOFOL 150 MG: 10 INJECTION, EMULSION INTRAVENOUS at 07:59

## 2018-03-05 RX ADMIN — FENTANYL CITRATE 50 MCG: 50 INJECTION INTRAMUSCULAR; INTRAVENOUS at 06:20

## 2018-03-05 RX ADMIN — MIDAZOLAM 2 MG: 1 INJECTION INTRAMUSCULAR; INTRAVENOUS at 06:20

## 2018-03-05 RX ADMIN — EPHEDRINE SULFATE 10 MG: 50 INJECTION INTRAMUSCULAR; INTRAVENOUS; SUBCUTANEOUS at 09:25

## 2018-03-05 NOTE — PLAN OF CARE
Problem: Patient Care Overview (Adult)  Goal: Plan of Care Review  Outcome: Ongoing (interventions implemented as appropriate)   03/05/18 4744   Coping/Psychosocial Response Interventions   Plan Of Care Reviewed With patient;family;daughter   Patient Care Overview   Progress progress toward functional goals as expected   Outcome Evaluation   Outcome Summary/Follow up Plan Pt admitted to 5E postop LE thyriodectomy, VSS, pt compaining of pain, manage with PRN medications, pt up to chair with dinner, pt tolerating clear liquid diet with no complaints of nausea, will continue to monitor

## 2018-03-05 NOTE — ANESTHESIA PREPROCEDURE EVALUATION
Anesthesia Evaluation     Patient summary reviewed and Nursing notes reviewed   history of anesthetic complications: PONV  NPO Solid Status: > 8 hours  NPO Liquid Status: > 2 hours           Airway   Mallampati: II  TM distance: >3 FB  Neck ROM: full  Dental - normal exam     Pulmonary - normal exam   (+) asthma, shortness of breath,   Cardiovascular - normal exam    ECG reviewed    (+) GRAY,       Neuro/Psych- negative ROS  GI/Hepatic/Renal/Endo    (+) morbid obesity,  hyperthyroidism    Musculoskeletal     (+) back pain,   Abdominal    Substance History - negative use     OB/GYN          Other - negative ROS                       Anesthesia Plan    ASA 4     general     Anesthetic plan and risks discussed with patient.

## 2018-03-05 NOTE — ANESTHESIA PROCEDURE NOTES
Arterial Line    Patient location during procedure: holding area   Line placed for hemodynamic monitoring.  Performed By   Anesthesiologist: GENEVIEVE TYLER  Preanesthetic Checklist  Completed: patient identified, site marked, surgical consent, pre-op evaluation, timeout performed, IV checked, risks and benefits discussed and monitors and equipment checked  Arterial Line Prep   Sterile Tech: gloves and sterile barriers  Prep: ChloraPrep  Patient monitoring: blood pressure monitoring, continuous pulse oximetry and EKG  Arterial Line Procedure   Laterality:left  Location:  radial artery  Catheter size: 20 G   Guidance: ultrasound guided  PROCEDURE NOTE/ULTRASOUND INTERPRETATION.  Using ultrasound guidance the potential vascular sites for insertion of the catheter were visualized to determine the patency of the vessel to be used for vascular access.  After selecting the appropriate site for insertion, the needle was visualized under ultrasound being inserted into the radial artery, followed by ultrasound confirmation of wire and catheter placement. There were no abnormalities seen on ultrasound; an image was taken; and the patient tolerated the procedure with no complications.   Number of attempts: 1  Successful placement: yes          Post Assessment   Dressing Type: occlusive dressing applied, secured with tape and wrist guard applied.   Complications no  Circ/Move/Sens Assessment: unchanged.   Patient Tolerance: patient tolerated the procedure well with no apparent complications

## 2018-03-05 NOTE — OP NOTE
MEDIASTINOTOMY  Progress Note    Patt Bond  3/5/2018    Pre-op Diagnosis:   Substernal goiter [E04.9]       Post-Op Diagnosis Codes:     * Substernal goiter [E04.9]    Procedure/CPT® Codes:      Procedure(s):  left thyroidectomy, resection of substernal thyroid goiter with cervical approach    Surgeon(s):  Quintin Mares III, MD    Anesthesia: General    Staff:   Cell Saver : Leo Rivas  Circulator: Sandhya Arias RN; Rimma Hickman RN  Scrub Person: Kenia Hernandez  Assistant: GUMARO Contreras    Estimated Blood Loss: 250 mL    Urine Voided: 250 mL    Specimens:                  ID Type Source Tests Collected by Time Destination   A : left thyroid with goiter Tissue Thyroid TISSUE PATHOLOGY EXAM Quintin Mares III, MD 3/5/2018 0903          Drains:   Drain/Device Site 03/05/18 1021 Left neck collapsible closed device 1 (Active)       Drain/Device Site 03/05/18 1025 Right neck collapsible closed device 2 (Active)       Urethral Catheter 03/05/18 0805 100% silicone 16 10 10 (Active)           Findings: Left lobe of the thyroid appeared relatively normal.  Large goiter extending from the distal end of the left lobe down into the mediastinum.  This caused a significant deviation of the trachea to the right.    Complications: none    Summary of procedure: Mrs. Bond was brought to the operating room and placed on the operating table in the supine position.  Following the induction of adequate general endotracheal anesthesia, a large roll was placed behind the shoulders to extend the neck.  Neck, chest, and upper abdomen were all prepped and draped in usual sterile manner.  An incision was made 2 fingerbreadths above the sternal notch in the skin creases and extended from right sternocleidomastoid muscle to left sternocleidomastoid muscle.  Incision was carried down to the skin subcutaneous tissues and through the platysma muscle.  Skin flaps were raised superiorly and inferiorly.  Strap  muscles were exposed.  Strap muscles were split longitudinally and retracted laterally exposing the thyroid cartilage and the upper thyroid.  The trachea was deviated significantly to the right of midline.  The left lobe of the thyroid appeared relatively normal.  There was a large goiter extending off the inferior pole of the left thyroid down into the mediastinum.  Dissection was carried out around the superior aspect of the thyroid. The superior thyroid vessels were isolated and ligated with 3-0 silk and divided near the thyroid.  Dissection was carried out along the anterior aspect of the trachea.  The isthmus of the thyroid was divided with the endovascular stapler.  Blunt and sharp dissection was carried out around the goiter extending down into the mediastinum.  The goiter was delivered up out of the mediastinum and into the neck.  Attachments to the thyroid inferiorly were isolated near the gland and ligated with 3-0 silk and divided.  Careful dissection was carried out along the lateral aspect of the gland, pushing soft tissues away and dividing the vessels close to the gland.  Eventually left lobe of the thyroid and the goiter was completely freed.  It was sent to pathology.  The entire mediastinum was irrigated with antibiotic solution.  The entire mediastinum was sprayed with Brett and platelet rich plasma.  A large Artie drain was placed down into the mediastinum and brought out through separate stab incision and secured to the chest wall with a suture 0 silk.  The strap muscles were reapproximated with interrupted 2-0 Vicryl.  The entire area was irrigated.  The entire area was sprayed with platelet rich plasma.  A second Artie drain was placed below the platysma over the strap muscles and brought out through separate stab incision and secured to the chest wall with a suture 0 silk.  The platysma muscle was reapproximated with interrupted 2-0 Vicryl.  Subcutaneous tissues were closed with running 3-0  Vicryl.  Skin was closed with a running subcuticular 4-0 Vicryl.  A dry sterile dressing was applied.  The patient was awakened and extubated in the operating room and transported to the recovery room in satisfactory condition having tolerated the procedure well.  Sponge instrument and needle counts were correct times 2 at the end of the procedure.      .        Dictated utilizing Dragon dictation    Quintin Mares III, MD     Date: 3/5/2018  Time: 10:53 AM

## 2018-03-05 NOTE — ANESTHESIA PROCEDURE NOTES
Airway  Urgency: elective    Date/Time: 3/5/2018 8:00 AM  Airway not difficult    General Information and Staff    Patient location during procedure: OR  Anesthesiologist: GENEVIEVE TYLER  CRNA: ANGELA GA    Indications and Patient Condition  Indications for airway management: airway protection    Preoxygenated: yes  MILS maintained throughout  Mask difficulty assessment: 2 - vent by mask + OA or adjuvant +/- NMBA    Final Airway Details  Final airway type: endotracheal airway      Successful airway: ETT  Cuffed: yes   Successful intubation technique: direct laryngoscopy  Endotracheal tube insertion site: oral  Blade: Nehemias  Blade size: #3  ETT size: 7.0 mm  Cormack-Lehane Classification: grade IIa - partial view of glottis  Placement verified by: chest auscultation and capnometry   Measured from: teeth  ETT to teeth (cm): 22  Number of attempts at approach: 1

## 2018-03-05 NOTE — H&P
Progress Notes  Encounter Date: 2/13/2018  Quintin Mares III, MD   Thoracic Surgery   Expand All Collapse All    []Hide copied text  []Bulmaro for attribution information     Subjective      Patient ID: Patt Bond is a 72 y.o. female is here today for follow-up.     History of Present Illness  Dear Colleague,  Patt Bond was seen in our office today for further evaluation and treatment of a substernal thyroid goiter.  Mrs. Bond said it has been fully evaluated by Dr. Bautista and Dr. Nando Heller.  Dr. Bautista his left town and has asked me to take over the care Mrs. Bond.  I have reviewed her history her x-rays and have examined.     Mrs. Bond is a 72-year-old  female she is a nonsmoker.  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  She has done well since then.  For approximately 2 years she has had progressive shortness of breath.  Initially there was no wheezing.  She now wheezes with exertion.  Because of this shortness of breath she underwent an extensive cardiac evaluation which showed no cardiac issues causing her shortness of breath.  She was seen by Dr. 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  Bronchoscopy with transtracheal biopsy showed normal thyroid tissue no malignancy.  She was referred to Dr. Bautista for resection.     She is morbidly obese.  BMI is 45.5.  She has been seen by an endocrinologist who is managing thyroid medications.  She has no diabetes.  She has no hoarseness or change in her voice.  She has had no cough or hemoptysis.     The following portions of the patient's history were reviewed and updated as appropriate: allergies, current medications, past family history, past medical history, past social history, past surgical history and problem list.  Review of Systems   Constitution: Positive for weight gain.   HENT: Negative.    Eyes:  Negative.    Cardiovascular: Positive for dyspnea on exertion and leg swelling.   Respiratory: Positive for shortness of breath.    Endocrine: Negative.    Hematologic/Lymphatic: Negative.    Skin: Negative.    Musculoskeletal: Negative.    Gastrointestinal: Negative.    Genitourinary: Negative.    Neurological: Negative.    Psychiatric/Behavioral: Negative.           Patient Active Problem List   Diagnosis   • Left lower quadrant pain   • Ketonuria due to dehydration   • Normocytic anemia   • Pancytopenia   • Obesity (BMI 30-39.9)   • Nausea   • Sepsis   • Nephrolithiasis   • Pleural nodule   • Tracheal compression   • Hyperthyroidism   • Graves disease   • Abnormal weight gain   • Palpitations   • Cholecystitis   • History of colon polyps - 4 Tubulovillous adenomas in 2004, normal colonoscopy in 2008   • History of abdominal abscess - 3+ E. coli at time of surgery 9/2016   • Multiple drug allergies to Cephalexin, Cephalosporins, Penicillins   • Weight gain - 10 pounds in 2 months, unintentional   • Multinodular goiter   • GRAY (dyspnea on exertion)   • Morbid obesity   • Substernal goiter       Medical History         Past Medical History:   Diagnosis Date   • Back pain     • Colon polyps 12/10/2004     Proximal ascending polyps: tubulovillous adenoma, only mild dysplasia seen; distal ascending polyps: tubulovillous adenoma, only mild dysplasia seen   • Colon polyps 04/16/2001     Cecum biopsy: fragments of tubular adenoma, rectum biopsy: fragments of hyperplastic polyp   • Diverticulitis     • Diverticulosis     • Endometrial polyp 2011, 2002 2011 Path findings: fragments of endometrial polyp with cystic hyperplasia (and no atypia) / 2002 Path findings:    • Gallstones     • Hyperthyroidism       followed by Dr. Blackmon   • Joint pain, knee     • Kidney stones 2015     with cystoscopy by Dr. Miguel Monte done on 10/7/15, 9/9/15, 5/8/15, 9/21/07   • Mediastinal mass     • Obstructive pyelonephritis 09/28/2015      left obstructing pyelonephritis    • PONV (postoperative nausea and vomiting)     • Rash       RT LOWER LEG AND FOOT   • Renal disorder     • SOB (shortness of breath) on exertion            Surgical History          Past Surgical History:   Procedure Laterality Date   • BRONCHOSCOPY N/A 11/6/2017     Procedure: BRONCHOSCOPY WITH ENDOBRONCHIAL ULTRASOUND AND TRANSBRONCHIAL NEEDLE ASPIRATION;  Surgeon: Nando Heller MD;  Location: [facility] ENDOSCOPY;  Service:    • CARDIAC CATHETERIZATION N/A 9/27/2017     Procedure: Right Heart Cath;  Surgeon: Miguel Gautam MD;  Location: [facility] CATH INVASIVE LOCATION;  Service:    • CARDIAC CATHETERIZATION N/A 9/27/2017     Procedure: Left Heart Cath;  Surgeon: Miguel Gautam MD;  Location: [facility] CATH INVASIVE LOCATION;  Service:    • CARDIAC CATHETERIZATION N/A 9/27/2017     Procedure: Coronary angiography;  Surgeon: Miguel Gautam MD;  Location: [facility] CATH INVASIVE LOCATION;  Service:    • CARDIAC CATHETERIZATION N/A 9/27/2017     Procedure: Left ventriculography;  Surgeon: Miguel Gautam MD;  Location: [facility] CATH INVASIVE LOCATION;  Service:    • CHOLECYSTECTOMY N/A 5/17/2017     Procedure: LAPAROSCOPIC CHOLECYSTECTOMY WITH IOC;  Surgeon: Patt Moore MD;  Location: [facility] MAIN OR;  Service:    • COLON RESECTION Left 9/14/2016     Laparoscopic Low Anterior Resection with splenic flexure mobilization, drainage of Pelvic Abscess (cultured 3+ E. Coli), Left salpingo-ophorectomy, laparoscopic rectopexy, and umbilical hernia repair, Dr. Patt Moore   • COLON RESECTION         VENTRAL HERNIA REPAIR   • COLONOSCOPY N/A 05/15/2008     sigmoid diverticulosis with blunting of the haustral folds and angulation consistent with previous diverticulitis, no polyps, suggestion of rectal prolapse-Dr. Patt Moore   • COLONOSCOPY W/ BIOPSIES AND POLYPECTOMY N/A 04/16/2001     Possible mild gastritis w/ some appearance of formations that look like  petechiae in the antrum, no ulcerations, no erosions; cecal polyp approx 4mm sessile; probable transverse colon polyp, although we could not visualize this completely upon pulling out; sigmoid diverticula; multiple rectal polyps, mostly w/ appearance of hyperplasia, largest being 5 to 6mm: removed via snare-Dr. Patt Moore   • COLONOSCOPY W/ POLYPECTOMY N/A 12/10/2004     Diverticulosis with sigmoid perideverticulitis with erythema and patchiness around some of the diverticula, proximal ascedning colon polyp-approx 5 mm-removed via snare cauter, two distal ascending colon polyps-7 mm and 3 mm-removed via snare cautery polypectomy, hemorrhoids-Dr. Patt Moore   • CYSTOSCOPY W/ URETERAL STENT REMOVAL Left 10/07/2015     Cystoscopy with stent extraction, left ureteral occulusion balloon placement, percutanous nephrostolithotomy with stone volume less than 2.5 cm involving the left lower pole and the left proximal ureter, balloon tract dilation for establishment of nephrostomy tract, antegrade nephrostomy tube placement-Dr. Miguel Monte   • CYSTOSCOPY, RETROGRADE PYELOGRAM AND STENT INSERTION Left 09/28/2015     Left cystoscopy with left retrograde pyelogram, left double-J stent placement-Dr. Rivera [facility]   • CYSTOSCOPY, RETROGRADE PYELOGRAM AND STENT INSERTION Left 09/09/2015     Cystoscopy with bilateral retrograde pyelogram, left double-J stent placement, right ureteral pyeloscopy with laser lithotripsy of the ureteropelvic junction calculus, right double-J stent placement-Dr. Miguel Monte   • CYSTOSCOPY, RETROGRADE PYELOGRAM AND STENT INSERTION Right 09/21/2007     Cystoscopy with right retrograde pyelogram, right biliary stent placement-Dr. Miguel Monte   • CYSTOSCOPY, RETROGRADE PYELOGRAM AND STENT INSERTION Right 09/07/2007     Cystoscopy with right retrograde pyelogram, right ureteral peyloscopy with extraction distal ureteral mass, right double J stent placement-Dr. Miguel Monte   •  CYSTOSCOPY, URETEROSCOPY, RETROGRADE PYELOGRAM, STENT INSERTION Right 09/07/2007     Cystoscopy with right retrograde pyelogram, rigth ureteroscopy with extraction of distal mass, right double J stent placement-Dr. Miguel Monte   • D&C HYSTEROSCOPY N/A 05/18/2011     Procedure done due to thickened endomentrium and an endometrial polyp-Dr. Quita Cheatham   • DILATATION AND CURETTAGE N/A 03/22/2002     D&C, polyp removal-Dr. Quita Cheatham   • EXTRACORPOREAL SHOCKWAVE LITHOTRIPSY (ESWL), STENT INSERTION/REMOVAL Left 05/08/2015     Left extracoporeal shockwave lithotripsy, cystoscopy with stent placement-Dr. Miguel Monte   • SIGMOIDOSCOPY N/A 9/13/2016     Procedure: SIGMOIDOSCOPY FLEXIBLE TO 25 CM;  Surgeon: Patt Moore MD;  Location: [facility] ENDOSCOPY;  Service:    • THORACOTOMY   1996     Thoracotomy for ectopic thyroid, Dr. Camarillo   • UMBILICAL HERNIA REPAIR N/A 9/14/2016     Procedure: UMBILICAL HERNIA REPAIR ;  Surgeon: Patt Moore MD;  Location: [facility] OR;  Service:    • VENTRAL/INCISIONAL HERNIA REPAIR N/A 5/17/2017     Procedure: VENTRAL HERNIA REPAIR WITH MESH, BILATERAL MUSCULOFASCIAL RELEASE;  Surgeon: Patt Moore MD;  Location: [facility] MAIN OR;  Service:                 Family History   Problem Relation Age of Onset   • Heart disease Father     • Heart attack Paternal Uncle     • Cancer Maternal Grandmother         ovarian   • Heart attack Paternal Grandfather     • Heart attack Paternal Uncle     • Heart attack Paternal Uncle     • Heart attack Paternal Uncle     • Heart attack Paternal Uncle     • Heart attack Paternal Uncle         Social History    Social History            Social History   • Marital status:        Spouse name: BERENICE COFFEY    • Number of children: 2   • Years of education: HIGH SCHOOL            Occupational History   •   Retired             Social History Main Topics   • Smoking status: Never Smoker   • Smokeless tobacco: Never Used   • Alcohol use No          Comment: No caffeine use   • Drug use: No   • Sexual activity: Defer           Other Topics Concern   • Not on file      Social History Narrative            Current Outpatient Prescriptions:   •  methIMAzole (TAPAZOLE) 5 MG tablet, Take 1 tablet by mouth Every Other Day., Disp: 15 tablet, Rfl: 5  Allergies   Allergen Reactions   • Cephalexin Hives       Pt states she possibly is not allergic to Cephalexin, took and did not have problems   • Cephalosporins Hives   • Penicillins Hives            Objective           Vitals:     02/13/18 1301   BP: 162/88   Pulse: 76   SpO2: 95%      Physical Exam   Constitutional: She is oriented to person, place, and time. She appears well-developed and well-nourished.   HENT:   Head: Normocephalic.   Eyes: Conjunctivae, EOM and lids are normal. Pupils are equal, round, and reactive to light.   Neck: Trachea normal and normal range of motion. Neck supple. No hepatojugular reflux and no JVD present. Carotid bruit is not present. No thyroid mass and no thyromegaly present.   No palpable masses or adenopathy.  Trachea is deviated to the right.  Neck is short and thick   Cardiovascular: Normal rate, regular rhythm, S1 normal, S2 normal, normal heart sounds and normal pulses.   No extrasystoles are present. PMI is not displaced.    Pulmonary/Chest: Effort normal and breath sounds normal.   Well-healed right thoracotomy incision.  Chest wall is stable.  No subcutaneous masses or nodules.   Abdominal: Soft. Normal appearance and bowel sounds are normal. She exhibits no mass. There is no hepatosplenomegaly. There is no tenderness. No hernia.   Musculoskeletal: Normal range of motion.   Neurological: She is alert and oriented to person, place, and time. She has normal strength and normal reflexes. No cranial nerve deficit or sensory deficit. She displays a negative Romberg sign.   Skin: Skin is warm, dry and intact.   Psychiatric: She has a normal mood and affect. Her speech is normal and  behavior is normal. Judgment and thought content normal. Cognition and memory are normal.      Independent Review of Radiographic Studies:    CT of the chest with contrast performed December 11, 2017 was independently reviewed and compared to the previous x-rays. There is a large mass protruding from the inferior aspect of the left lobe of the thyroid into the superior mediastinum.  This measures 5 x 4 x 4 cm.  The mass is unchanged from CT of August 2017. The mass deviates the trachea to the right.  There is thickening of the thyroid isthmus.  Right lobe of the thyroid is unremarkable.  There there is no mediastinal, hilar or axillary adenopathy. There is a benign 6 mm nodule in the lateral aspect of the left lower lobe that has been present since 2011. There is a 3 mm nodule superiorly within the left lower lobe that has been stable since September 2016.        Assessment/Plan             It appears that this lady has a typical substernal thyroid goiter causing deviation of the trachea to the right.  I have recommended resection prior to this mass getting any larger.  I believe that this can be resected through a neck incision with removal of the left thyroid lobe and the substernal mass.  She may need to have an upper median sternotomy to resect the substernal portion of the goiter.  I have explained all of this in great detail to the patient and her .  We discussed the procedure.  I explained the risks and benefits.  In particular we discussed how her obesity will make the procedure technically more difficult and increase her overall risk.  I have answered all of her questions to her satisfaction.  She has requested that we proceed.  Arrangements are being made to do a left thyroidectomy with substernal thyroid goiter resection at [facility] in the near future.  I will keep you informed of her progress.  Thank you for allowing me to participate in the care Mrs. Bond.     Diagnoses and all  orders for this visit:     Substernal goiter  -     Case Request     Morbid obesity                          Office Visit on 2/13/2018              Revision History              Detailed Report           Update: March 5, 2018    I have seen and examined the patient.  There have been no changes since initial evaluation.  Patient has a very large substernal thyroid goiter deviating her trachea.  We plan a thyroidectomy with removal of substernal thyroid goiter through a cervical approach.  I've explained the procedure as well as the risks and benefits.  The patient understands that we may have to do sternotomy to complete the operation.  I've also discussed that procedure as well as its risks and benefits.  I have answered all of her questions.  She has requested that we proceed.

## 2018-03-05 NOTE — ANESTHESIA POSTPROCEDURE EVALUATION
Patient: Patt ANDRADE Graves    Procedure Summary     Date Anesthesia Start Anesthesia Stop Room / Location    03/05/18 0752 [id number]  [facility] / [facility] MA[facility]       Procedure Diagnosis Surgeon Provider    left thyroidectomy, resection of substernal thyroid goiter cervical approach (Left Chest) Substernal goiter  (Substernal goiter [E04.9]) MD Jackson Ha III, MD          Anesthesia Type: general  Last vitals  BP   158/95 (03/05/18 1300)   Temp   36.4 °C (97.5 °F) (03/05/18 1300)   Pulse   98 (03/05/18 1300)   Resp   18 (03/05/18 1300)     SpO2   93 % (03/05/18 1300)     Post Anesthesia Care and Evaluation    Patient location during evaluation: PACU  Patient participation: complete - patient participated  Level of consciousness: awake and alert  Pain management: adequate  Airway patency: patent  Anesthetic complications: No anesthetic complications  PONV Status: none  Cardiovascular status: acceptable  Respiratory status: acceptable  Hydration status: acceptable

## 2018-03-06 ENCOUNTER — APPOINTMENT (OUTPATIENT)
Dept: GENERAL RADIOLOGY | Facility: HOSPITAL | Age: 73
End: 2018-03-06

## 2018-03-06 LAB
ANION GAP SERPL CALCULATED.3IONS-SCNC: 10.3 MMOL/L
BUN BLD-MCNC: 14 MG/DL (ref 8–23)
BUN/CREAT SERPL: 13.9 (ref 7–25)
CALCIUM SPEC-SCNC: 8 MG/DL (ref 8.6–10.5)
CALCIUM SPEC-SCNC: 8.2 MG/DL (ref 8.6–10.5)
CHLORIDE SERPL-SCNC: 106 MMOL/L (ref 98–107)
CO2 SERPL-SCNC: 22.7 MMOL/L (ref 22–29)
CREAT BLD-MCNC: 1.01 MG/DL (ref 0.57–1)
CYTO UR: NORMAL
DEPRECATED RDW RBC AUTO: 47.8 FL (ref 37–54)
ERYTHROCYTE [DISTWIDTH] IN BLOOD BY AUTOMATED COUNT: 14 % (ref 11.7–13)
GFR SERPL CREATININE-BSD FRML MDRD: 54 ML/MIN/1.73
GLUCOSE BLD-MCNC: 131 MG/DL (ref 65–99)
HCT VFR BLD AUTO: 32.8 % (ref 35.6–45.5)
HGB BLD-MCNC: 10.8 G/DL (ref 11.9–15.5)
LAB AP CASE REPORT: NORMAL
Lab: NORMAL
MCH RBC QN AUTO: 31 PG (ref 26.9–32)
MCHC RBC AUTO-ENTMCNC: 32.9 G/DL (ref 32.4–36.3)
MCV RBC AUTO: 94.3 FL (ref 80.5–98.2)
PATH REPORT.FINAL DX SPEC: NORMAL
PATH REPORT.GROSS SPEC: NORMAL
PLATELET # BLD AUTO: 128 10*3/MM3 (ref 140–500)
PMV BLD AUTO: 9.5 FL (ref 6–12)
POTASSIUM BLD-SCNC: 4.6 MMOL/L (ref 3.5–5.2)
RBC # BLD AUTO: 3.48 10*6/MM3 (ref 3.9–5.2)
SODIUM BLD-SCNC: 139 MMOL/L (ref 136–145)
WBC NRBC COR # BLD: 9.33 10*3/MM3 (ref 4.5–10.7)

## 2018-03-06 PROCEDURE — 97110 THERAPEUTIC EXERCISES: CPT

## 2018-03-06 PROCEDURE — 85027 COMPLETE CBC AUTOMATED: CPT | Performed by: THORACIC SURGERY (CARDIOTHORACIC VASCULAR SURGERY)

## 2018-03-06 PROCEDURE — 94799 UNLISTED PULMONARY SVC/PX: CPT

## 2018-03-06 PROCEDURE — 82310 ASSAY OF CALCIUM: CPT | Performed by: NURSE PRACTITIONER

## 2018-03-06 PROCEDURE — 97162 PT EVAL MOD COMPLEX 30 MIN: CPT

## 2018-03-06 PROCEDURE — 71045 X-RAY EXAM CHEST 1 VIEW: CPT

## 2018-03-06 PROCEDURE — 25010000002 ONDANSETRON PER 1 MG: Performed by: THORACIC SURGERY (CARDIOTHORACIC VASCULAR SURGERY)

## 2018-03-06 PROCEDURE — 25010000002 HEPARIN (PORCINE) PER 1000 UNITS: Performed by: THORACIC SURGERY (CARDIOTHORACIC VASCULAR SURGERY)

## 2018-03-06 PROCEDURE — 80048 BASIC METABOLIC PNL TOTAL CA: CPT | Performed by: THORACIC SURGERY (CARDIOTHORACIC VASCULAR SURGERY)

## 2018-03-06 PROCEDURE — 99024 POSTOP FOLLOW-UP VISIT: CPT | Performed by: NURSE PRACTITIONER

## 2018-03-06 RX ADMIN — IPRATROPIUM BROMIDE AND ALBUTEROL SULFATE 3 ML: .5; 3 SOLUTION RESPIRATORY (INHALATION) at 22:02

## 2018-03-06 RX ADMIN — IPRATROPIUM BROMIDE AND ALBUTEROL SULFATE 3 ML: .5; 3 SOLUTION RESPIRATORY (INHALATION) at 17:00

## 2018-03-06 RX ADMIN — DOCUSATE SODIUM 100 MG: 100 CAPSULE, LIQUID FILLED ORAL at 08:45

## 2018-03-06 RX ADMIN — ONDANSETRON 4 MG: 2 INJECTION INTRAMUSCULAR; INTRAVENOUS at 05:00

## 2018-03-06 RX ADMIN — HEPARIN SODIUM 5000 UNITS: 5000 INJECTION, SOLUTION INTRAVENOUS; SUBCUTANEOUS at 21:55

## 2018-03-06 RX ADMIN — CLINDAMYCIN PHOSPHATE 900 MG: 18 INJECTION, SOLUTION INTRAMUSCULAR; INTRAVENOUS at 14:53

## 2018-03-06 RX ADMIN — IPRATROPIUM BROMIDE AND ALBUTEROL SULFATE 3 ML: .5; 3 SOLUTION RESPIRATORY (INHALATION) at 10:36

## 2018-03-06 RX ADMIN — CLINDAMYCIN PHOSPHATE 900 MG: 18 INJECTION, SOLUTION INTRAMUSCULAR; INTRAVENOUS at 05:01

## 2018-03-06 RX ADMIN — HYDROCODONE BITARTRATE AND ACETAMINOPHEN 1 TABLET: 7.5; 325 TABLET ORAL at 04:59

## 2018-03-06 RX ADMIN — HEPARIN SODIUM 5000 UNITS: 5000 INJECTION, SOLUTION INTRAVENOUS; SUBCUTANEOUS at 08:46

## 2018-03-06 RX ADMIN — HYDROCODONE BITARTRATE AND ACETAMINOPHEN 2 TABLET: 7.5; 325 TABLET ORAL at 13:16

## 2018-03-06 RX ADMIN — HYDROCODONE BITARTRATE AND ACETAMINOPHEN 1 TABLET: 7.5; 325 TABLET ORAL at 18:52

## 2018-03-06 RX ADMIN — HYDROCODONE BITARTRATE AND ACETAMINOPHEN 2 TABLET: 7.5; 325 TABLET ORAL at 08:45

## 2018-03-06 NOTE — PROGRESS NOTES
"    Chief Complaint: Postoperative management  S/P: Left thyroidectomy, resection of substernal thyroid goiter with cervical approach  POD # 1    Subjective:  Symptoms:  Stable.  (Patient doing well this morning.  Currently rates her pain at a 4 on a 0-to-10 scale.  Had problems with nausea last night, which was relieved with Zofran.  No problems urinating since Waddell catheter was removed.).    Diet:  Adequate intake.    Activity level: Impaired due to weakness.    Pain:  She complains of pain that is mild.  Pain is well controlled and requiring pain medication.        Vital Signs:  Temp:  [97.5 °F (36.4 °C)-98.8 °F (37.1 °C)] 97.5 °F (36.4 °C)  Heart Rate:  [71-98] 76  Resp:  [16-20] 16  BP: (104-167)/() 137/79  Arterial Line BP: (124-160)/(57-72) 128/57    Intake & Output (last day)       03/05 0701 - 03/06 0700 03/06 0701 - 03/07 0700    P.O. 540 210    I.V. (mL/kg) 2975 (25.6)     IV Piggyback 50     Total Intake(mL/kg) 3565 (30.7) 210 (1.8)    Urine (mL/kg/hr) 950 (0.3)     Other 120 (0) 12.5 (0)    Blood 250 (0.1)     Total Output 1320 12.5    Net +2245 +197.5                Objective:  General Appearance:  In no acute distress (Sitting up in chair).    Vital signs: (most recent): Blood pressure 137/79, pulse 76, temperature 97.5 °F (36.4 °C), temperature source Oral, resp. rate 16, height 160 cm (62.99\"), weight 116 kg (256 lb), SpO2 94 %.  Vital signs are normal.  No fever.    Output: Producing urine.    Lungs:  Normal respiratory rate and normal effort.    Heart: Normal rate.  Regular rhythm.    Extremities: There is no dependent edema.    Skin:  (Cervical incision site is well approximated without redness or drainage.  Bilateral VIDAL drains intact with serosanguineous drainage noted.)            VIDAL drains:  24 Hour Total: 60/60    Results Review:     I reviewed the patient's new clinical results.  I reviewed the patient's new imaging results and agree with the interpretation.    Imaging Results (last " 24 hours)     Procedure Component Value Units Date/Time    XR Chest 1 View [[id number]] Collected:  03/06/18 0756     Updated:  03/06/18 1212    Narrative:       HISTORY: 72-year-old female 1 day postop left thyroidectomy with  resection of substernal thyroid goiter with cervical approach.     PORTABLE AP SEMIERECT CHEST DATED 03/06/2018 AT 0549 HOURS.     FINDINGS: When compared to the most recent available prior chest  radiograph dated 03/05/2018 at 1149 hours, the drainage catheters at the  lower neck and apparently extending into the substernal area remain.  Monitoring lead wires are present. No large pneumothorax is seen. I  cannot confirm or exclude a 2-3 mm superolateral left pleural  separation. Some modest hazy to patchy opacity compatible with  atelectasis or infiltrate is again demonstrated in the lower lung zones  despite slight improvement in expansion of the lungs. The costophrenic  angles are sharp. The cardiac silhouette remains borderline enlarged  caliber, accompanied by aortic uncoiling. No acute change in bony thorax  is seen. Oxygen cannula tubing is present about the neck and left chest.     CONCLUSION: Slightly better aeration of the lungs but with some  persistent patchy bibasilar opacity along with persistent borderline to  mild cardiomegaly and aortic uncoiling. Questionable trace left apical  to superolateral pneumothorax. This could also represent subpleural  dissection of traces of upper mediastinal air. No large pneumothorax is  seen. Surgical drains, upper mediastinal hemostatic clips, oxygen  cannula tubing, and monitoring lead wires remain.     This report was finalized on 3/6/2018 12:09 PM by Dr. New Green MD.             Lab Results:     Lab Results (last 24 hours)     Procedure Component Value Units Date/Time    CBC (No Diff) [[id number]]  (Abnormal) Collected:  03/06/18 0324    Specimen:  Blood Updated:  03/06/18 0349     WBC 9.33 10*3/mm3      RBC 3.48 (L) 10*6/mm3       Hemoglobin 10.8 (L) g/dL      Hematocrit 32.8 (L) %      MCV 94.3 fL      MCH 31.0 pg      MCHC 32.9 g/dL      RDW 14.0 (H) %      RDW-SD 47.8 fl      MPV 9.5 fL      Platelets 128 (L) 10*3/mm3     Basic Metabolic Panel [[id number]]  (Abnormal) Collected:  03/06/18 0324    Specimen:  Blood Updated:  03/06/18 0410     Glucose 131 (H) mg/dL      BUN 14 mg/dL      Creatinine 1.01 (H) mg/dL      Sodium 139 mmol/L      Potassium 4.6 mmol/L      Chloride 106 mmol/L      CO2 22.7 mmol/L      Calcium 8.0 (L) mg/dL      eGFR Non African Amer 54 (L) mL/min/1.73      BUN/Creatinine Ratio 13.9     Anion Gap 10.3 mmol/L     Narrative:       GFR Normal >60  Chronic Kidney Disease <60  Kidney Failure <15           Assessment/Plan     Active Problems:    Substernal goiter       Assessment:    Condition: In stable condition.  Improving.   (Stable post-op course.  HD stable. ).     Plan:   Encourage ambulation.  Start/continue incentive spirometry.  X-rays as ordered.  (Chest x-ray shows questionable mediastinal air present.  We will continue to monitor with serial chest x-rays.  No large pneumothorax noted.  VIDAL drain which greater than 30 ML's for the past 24 hours.  Will leave drains in place for today.  Continue current treatment plan.  Pulmonary hygiene, incentive spirometer, increase activity, and pain management.  Postop a.m. labs reviewed and remain stable.  We will check a calcium level for today to assure no problems with parathyroid.  DC IV fluids.  ).       Tati Rees, APRN  [facility]  03/06/18  12:34 PM

## 2018-03-06 NOTE — PLAN OF CARE
Problem: Patient Care Overview (Adult)  Goal: Plan of Care Review   03/06/18 1654   Coping/Psychosocial Response Interventions   Plan Of Care Reviewed With patient   Outcome Evaluation   Outcome Summary/Follow up Plan Pt presents s/p thyroidectomy w/resection of goiter. Upon exam, pt demonstrates post op pain, weakness, and decreased endurance limiting mobility. Pt was independent w/o AD prior to admission, currently requiring assist of one and use of walker for mobility. Pt fatigued after ambulating 75'; may benefit from continued PT to address impairments and improved strength and endurance. May also benefit from  PT for continued strengthening at NM.        Problem: Inpatient Physical Therapy  Goal: Bed Mobility Goal LTG- PT   03/06/18 1654   Bed Mobility PT LTG   Bed Mobility PT LTG, Date Established 03/06/18   Bed Mobility PT LTG, Time to Achieve 1 wk   Bed Mobility PT LTG, Activity Type all bed mobility   Bed Mobility PT LTG, Dickinson Level supervision required     Goal: Transfer Training Goal 1 LTG- PT   03/06/18 1654   Transfer Training PT LTG   Transfer Training PT LTG, Date Established 03/06/18   Transfer Training PT LTG, Time to Achieve 1 wk   Transfer Training PT LTG, Activity Type all transfers   Transfer Training PT LTG, Dickinson Level supervision required     Goal: Gait Training Goal LTG- PT   03/06/18 1654   Gait Training PT LTG   Gait Training Goal PT LTG, Date Established 03/06/18   Gait Training Goal PT LTG, Time to Achieve 1 wk   Gait Training Goal PT LTG, Dickinson Level supervision required   Gait Training Goal PT LTG, Distance to Achieve 150

## 2018-03-06 NOTE — PLAN OF CARE
Problem: Patient Care Overview (Adult)  Goal: Plan of Care Review  Outcome: Ongoing (interventions implemented as appropriate)   03/06/18 0419   Coping/Psychosocial Response Interventions   Plan Of Care Reviewed With patient   Patient Care Overview   Progress progress toward functional goals as expected   Outcome Evaluation   Outcome Summary/Follow up Plan VSS. Pt's pain was treated twice on night shift. Pt a-line and olson removed as per orders. Will continue to monitor.       Problem: Perioperative Period (Adult)  Intervention: Monitor/Manage Postoperative Bleeding   03/06/18 0419   Safety Interventions   Bleeding Management affected area elevated;dressing monitored         Problem: Pain, Acute (Adult)  Goal: Acceptable Pain Control/Comfort Level  Outcome: Ongoing (interventions implemented as appropriate)   03/06/18 0419   Pain, Acute (Adult)   Acceptable Pain Control/Comfort Level making progress toward outcome      03/06/18 0419   Pain, Acute (Adult)   Acceptable Pain Control/Comfort Level making progress toward outcome

## 2018-03-06 NOTE — THERAPY EVALUATION
Acute Care - Physical Therapy Initial Evaluation  [facility]     Patient Name: Patt Bond  : 1945  MRN: [id number]  Today's Date: 3/6/2018   Onset of Illness/Injury or Date of Surgery Date: 18     Referring Physician: Vishnu      Admit Date: 3/5/2018     Visit Dx:    ICD-10-CM ICD-9-CM   1. Generalized weakness R53.1 780.79   2. Substernal goiter E04.9 240.9     Patient Active Problem List   Diagnosis   • Left lower quadrant pain   • Ketonuria due to dehydration   • Normocytic anemia   • Pancytopenia   • Obesity (BMI 30-39.9)   • Nausea   • Sepsis   • Nephrolithiasis   • Pleural nodule   • Tracheal compression   • Hyperthyroidism   • Graves disease   • Abnormal weight gain   • Palpitations   • Cholecystitis   • History of colon polyps - 4 Tubulovillous adenomas in , normal colonoscopy in    • History of abdominal abscess - 3+ E. coli at time of surgery 2016   • Multiple drug allergies to Cephalexin, Cephalosporins, Penicillins   • Weight gain - 10 pounds in 2 months, unintentional   • Multinodular goiter   • GRAY (dyspnea on exertion)   • Morbid obesity   • Substernal goiter     Past Medical History:   Diagnosis Date   • Asthma    • Back pain    • Diverticulitis    • Diverticulosis    • Goiter    • Hyperthyroidism     followed by Dr. Blackmon   • Joint pain, knee    • Kidney stones     with cystoscopy by Dr. Miguel Monte done on 10/7/15, 9/9/15, 5/8/15, 07   • Mediastinal mass    • Obstructive pyelonephritis 2015    left obstructing pyelonephritis    • PONV (postoperative nausea and vomiting)    • Renal disorder    • SOB (shortness of breath) on exertion      Past Surgical History:   Procedure Laterality Date   • BRONCHOSCOPY N/A 2017    Procedure: BRONCHOSCOPY WITH ENDOBRONCHIAL ULTRASOUND AND TRANSBRONCHIAL NEEDLE ASPIRATION;  Surgeon: Nando Heller MD;  Location: [facility] ENDOSCOPY;  Service:    • CARDIAC CATHETERIZATION N/A 2017    Procedure: Right  Heart Cath;  Surgeon: Miguel Gautam MD;  [facility];  Service:    • CARDIAC CATHETERIZATION N/A 9/27/2017    Procedure: Left Heart Cath;  Surgeon: Miguel Gautam MD;  Location:  [facility] CATH INVASIVE LOCATION;  Service:    • CARDIAC CATHETERIZATION N/A 9/27/2017    Procedure: Coronary angiography;  Surgeon: Miguel Gautam MD;  Location:  [facility] CATH INVASIVE LOCATION;  Service:    • CARDIAC CATHETERIZATION N/A 9/27/2017    Procedure: Left ventriculography;  Surgeon: Miguel Gautam MD;  Location:  [facility] CATH INVASIVE LOCATION;  Service:    • CHOLECYSTECTOMY N/A 5/17/2017    Procedure: LAPAROSCOPIC CHOLECYSTECTOMY WITH IOC;  Surgeon: Patt Moore MD;  Location: [facility] OR;  Service:    • COLON RESECTION Left 9/14/2016    Laparoscopic Low Anterior Resection with splenic flexure mobilization, drainage of Pelvic Abscess (cultured 3+ E. Coli), Left salpingo-ophorectomy, laparoscopic rectopexy, and umbilical hernia repair, Dr. Patt Moore   • COLON RESECTION      VENTRAL HERNIA REPAIR   • COLONOSCOPY N/A 05/15/2008    sigmoid diverticulosis with blunting of the haustral folds and angulation consistent with previous diverticulitis, no polyps, suggestion of rectal prolapse-Dr. Patt Moore   • COLONOSCOPY W/ BIOPSIES AND POLYPECTOMY N/A 04/16/2001    Possible mild gastritis w/ some appearance of formations that look like petechiae in the antrum, no ulcerations, no erosions; cecal polyp approx 4mm sessile; probable transverse colon polyp, although we could not visualize this completely upon pulling out; sigmoid diverticula; multiple rectal polyps, mostly w/ appearance of hyperplasia, largest being 5 to 6mm: removed via snare-Dr. Patt Moore   • COLONOSCOPY W/ POLYPECTOMY N/A 12/10/2004    Diverticulosis with sigmoid perideverticulitis with erythema and patchiness around some of the diverticula, proximal ascedning colon polyp-approx 5 mm-removed via snare  cauter, two distal ascending colon polyps-7 mm and 3 mm-removed via snare cautery polypectomy, hemorrhoids-Dr. Patt Moore   • CYSTOSCOPY W/ URETERAL STENT REMOVAL Left 10/07/2015    Cystoscopy with stent extraction, left ureteral occulusion balloon placement, percutanous nephrostolithotomy with stone volume less than 2.5 cm involving the left lower pole and the left proximal ureter, balloon tract dilation for establishment of nephrostomy tract, antegrade nephrostomy tube placement-Dr. Miguel Monte   • CYSTOSCOPY, RETROGRADE PYELOGRAM AND STENT INSERTION Left 09/28/2015    Left cystoscopy with left retrograde pyelogram, left double-J stent placement-Dr. Miguel Fernandes   • CYSTOSCOPY, RETROGRADE PYELOGRAM AND STENT INSERTION Left 09/09/2015    Cystoscopy with bilateral retrograde pyelogram, left double-J stent placement, right ureteral pyeloscopy with laser lithotripsy of the ureteropelvic junction calculus, right double-J stent placement-Dr. Miguel Monte   • CYSTOSCOPY, RETROGRADE PYELOGRAM AND STENT INSERTION Right 09/21/2007    Cystoscopy with right retrograde pyelogram, right biliary stent placement-Dr. Miguel Monte   • CYSTOSCOPY, RETROGRADE PYELOGRAM AND STENT INSERTION Right 09/07/2007    Cystoscopy with right retrograde pyelogram, right ureteral peyloscopy with extraction distal ureteral mass, right double J stent placement-Dr. Miguel Monte   • CYSTOSCOPY, URETEROSCOPY, RETROGRADE PYELOGRAM, STENT INSERTION Right 09/07/2007    Cystoscopy with right retrograde pyelogram, rigth ureteroscopy with extraction of distal mass, right double J stent placement-Dr. Miguel Monte   • D&C HYSTEROSCOPY N/A 05/18/2011    Procedure done due to thickened endomentrium and an endometrial polyp-Dr. Quita Cheatham   • DILATATION AND CURETTAGE N/A 03/22/2002    D&C, polyp removal-Dr. Quita Cheatham   • EXTRACORPOREAL SHOCKWAVE LITHOTRIPSY (ESWL), STENT INSERTION/REMOVAL Left 05/08/2015    Left extracoporeal  shockwave lithotripsy, cystoscopy with stent placement-Dr. Miguel Monte   • MEDIASTINOTOMY Left 3/5/2018    Procedure: left thyroidectomy, resection of substernal thyroid goiter cervical approach;  Surgeon: Quintin Mares III, MD;  Location: [facility] MAIN OR;  Service:    • SIGMOIDOSCOPY N/A 9/13/2016    Procedure: SIGMOIDOSCOPY FLEXIBLE TO 25 CM;  Surgeon: Patt Moore MD;  Location: [facility] ENDOSCOPY;  Service:    • THORACOTOMY  1996    Thoracotomy for ectopic thyroid, Dr. Camarillo   • UMBILICAL HERNIA REPAIR N/A 9/14/2016    Procedure: UMBILICAL HERNIA REPAIR ;  Surgeon: Patt Moore MD;  Location: [facility] MAIN OR;  Service:    • VENTRAL/INCISIONAL HERNIA REPAIR N/A 5/17/2017    Procedure: VENTRAL HERNIA REPAIR WITH MESH, BILATERAL MUSCULOFASCIAL RELEASE;  Surgeon: Patt Moore MD;  Location: [facility] MAIN OR;  Service:           PT ASSESSMENT (last 72 hours)      PT Evaluation       03/06/18 1620 03/05/18 0601    Rehab Evaluation    Document Type evaluation  -EE     Subjective Information agree to therapy;complains of;weakness;fatigue;pain  -EE     Patient Effort, Rehab Treatment good  -EE     Symptoms Noted During/After Treatment fatigue  -EE     General Information    Onset of Illness/Injury or Date of Surgery Date 03/05/18  -EE     Referring Physician Linker  -EE     General Observations Pt supine in bed in no acute distress. 2 VIDAL drains from mediastinal incisions.  -EE     Pertinent History Of Current Problem s/p thyroidectomy and resection of substernal goiter  -EE     Precautions/Limitations fall precautions;oxygen therapy device and L/min   2 L O2  -EE     Prior Level of Function independent:;all household mobility;community mobility  -EE     Equipment Currently Used at Home none  -EE none  -SP    Plans/Goals Discussed With patient;agreed upon  -EE     Barriers to Rehab none identified  -EE     Living Environment    Lives With  spouse  -SP    Living Arrangements  house  -SP    Home Accessibility   stairs within home  -SP    Stair Railings at Home  inside, present at both sides  -SP    Type of Financial/Environmental Concern  none  -SP    Transportation Available  car;family or friend will provide  -SP    Clinical Impression    Patient/Family Goals Statement Go home  -EE     Criteria for Skilled Therapeutic Interventions Met yes;treatment indicated  -EE     Pathology/Pathophysiology Noted (Describe Specifically for Each System) musculoskeletal  -EE     Impairments Found (describe specific impairments) aerobic capacity/endurance;gait, locomotion, and balance  -EE     Rehab Potential good, to achieve stated therapy goals  -EE     Vital Signs    Pre SpO2 (%) 94  -EE     O2 Delivery Pre Treatment supplemental O2  -EE     O2 Delivery Intra Treatment supplemental O2  -EE     Post SpO2 (%) 92  -EE     O2 Delivery Post Treatment supplemental O2  -EE     Pre Patient Position Supine  -EE     Intra Patient Position Standing  -EE     Post Patient Position Supine  -EE     Pain Assessment    Pain Assessment 0-10  -EE     Pain Score 4  -EE     Pain Type Surgical pain  -EE     Pain Location Chest   incision  -EE     Pain Intervention(s) Repositioned;Rest  -EE     Response to Interventions tolerated  -EE     Cognitive Assessment/Intervention    Current Cognitive/Communication Assessment functional  -EE     Orientation Status oriented x 4  -EE     Follows Commands/Answers Questions 100% of the time  -EE     Personal Safety WNL/WFL  -EE     Personal Safety Interventions fall prevention program maintained;gait belt;muscle strengthening facilitated;nonskid shoes/slippers when out of bed;supervised activity  -EE     ROM (Range of Motion)    General ROM no range of motion deficits identified  -EE     General ROM Detail B LEs grossly WFL   -EE     MMT (Manual Muscle Testing)    General MMT Assessment lower extremity strength deficits identified  -EE     General MMT Assessment Detail generalized weakness observed w/functional mobility   -EE     Bed Mobility, Assessment/Treatment    Bed Mobility, Assistive Device bed rails;head of bed elevated  -EE     Bed Mob, Supine to Sit, [place] supervision required  -EE     Bed Mob, Sit to Supine, [place] minimum assist (75% patient effort)  -EE     Bed Mobility, Impairments strength decreased  -EE     Transfer Assessment/Treatment    Transfers, Sit-Stand [place] contact guard assist;minimum assist (75% patient effort);hand held assist  -EE     Transfers, Stand-Sit [place] contact guard assist  -EE     Transfer, Impairments strength decreased;impaired balance  -EE     Transfer, Comment slightly unsteady; HHA required for balance  -EE     Gait Assessment/Treatment    Gait, [place] Level contact guard assist;verbal cues required  -EE     Gait, Assistive Device rolling walker  -EE     Gait, Distance (Feet) 75  -EE     Gait, Gait Deviations forward flexed posture;lalitha decreased;step length decreased;stride length decreased  -EE     Gait, Safety Issues step length decreased;supplemental O2  -EE     Gait, Impairments strength decreased;impaired balance  -EE     Gait, Comment Verbal cues for upright posture. Initially attempted ambulation w/o AD; pt unsteady, reaching for furniture and requiring HHA. Improved balance w/use of walker.  -EE     Motor Skills/Interventions    Additional Documentation Balance Skills Training (Group)  -EE     Balance Skills Training    Sitting-Level of Assistance Distant supervision  -EE     Sitting-Balance Support Feet supported  -EE     Standing-Level of Assistance Contact guard  -EE     Static Standing Balance Support assistive device  -EE     Gait Balance-Level of Assistance Contact guard  -EE     Gait Balance Support assistive device  -EE     Therapy Exercises    Bilateral Lower Extremities AROM:;10 reps;ankle pumps/circles;LAQ  -EE     Positioning and Restraints    Pre-Treatment Position in bed  -EE     Post Treatment Position bed  -EE     In Bed  fowlers;call light within reach;encouraged to call for assist;with family/caregiver;RUE elevated;LUE elevated;[place] [facility]  -       User Key  (r) = Recorded By, (t) = Taken By, (c) = Cosigned By    Initials Name Provider Type    SP Cinthya Love, RN Registered Nurse     Heaven Lees, PT Physical Therapist          Physical Therapy Education     Title: PT OT SLP Therapies (Done)     Topic: Physical Therapy (Done)     Point: Mobility training (Done)    Learning Progress Summary    Learner Readiness Method Response Comment Documented by Status   Patient Acceptance E VU,NR  EE 03/06/18 1653 Done               Point: Home exercise program (Done)    Learning Progress Summary    Learner Readiness Method Response Comment Documented by Status   Patient Acceptance E VU,NR  EE 03/06/18 1653 Done               Point: Body mechanics (Done)    Learning Progress Summary    Learner Readiness Method Response Comment Documented by Status   Patient Acceptance E VU,NR  EE 03/06/18 1653 Done                      User Key     Initials Effective Dates Name Provider Type Discipline     12/01/15 -  Heaven Lees PT Physical Therapist PT                PT Recommendation and Plan  Anticipated Equipment Needs At Discharge: front wheeled walker (may benefit from wx; pt unsure if she wants AD for home)  Anticipated Discharge Disposition: home with assist, home with home health (may benefit from  PT for endurance training)  Planned Therapy Interventions: balance training, bed mobility training, gait training, home exercise program, patient/family education, strengthening, transfer training  PT Frequency: daily  Plan of Care Review  Plan Of Care Reviewed With: patient  Outcome Summary/Follow up Plan: Pt presents s/p thyroidectomy w/resection of goiter. Upon exam, pt demonstrates post op pain, weakness, and decreased endurance limiting mobility. Pt was independent w/o AD prior to admission, currently requiring assist of one and use of walker  for mobility. Pt fatigued after ambulating 75'; may benefit from continued PT to address impairments and improved strength and endurance. May also benefit from  PT for continued strengthening at SD.           IP PT Goals       03/06/18 1654          Bed Mobility PT LTG    Bed Mobility PT LTG, Date Established 03/06/18  -EE      Bed Mobility PT LTG, Time to Achieve 1 wk  -EE      Bed Mobility PT LTG, Activity Type all bed mobility  -EE      Bed Mobility PT LTG, [place] Level supervision required  -EE      Transfer Training PT LTG    Transfer Training PT LTG, Date Established 03/06/18  -EE      Transfer Training PT LTG, Time to Achieve 1 wk  -EE      Transfer Training PT LTG, Activity Type all transfers  -EE      Transfer Training PT LTG, [place] Level supervision required  -EE      Transfer Training PT LTG, Assist Device --  -EE      Gait Training PT LTG    Gait Training Goal PT LTG, Date Established 03/06/18  -EE      Gait Training Goal PT LTG, Time to Achieve 1 wk  -EE      Gait Training Goal PT LTG, [place] Level supervision required  -EE      Gait Training Goal PT LTG, Distance to Achieve 150  -EE        User Key  (r) = Recorded By, (t) = Taken By, (c) = Cosigned By    Initials Name Provider Type    EE Heaven Lees, PT Physical Therapist                Outcome Measures       03/06/18 1600          How much help from another person do you currently need...    Turning from your back to your side while in flat bed without using bedrails? 3  -EE      Moving from lying on back to sitting on the side of a flat bed without bedrails? 3  -EE      Moving to and from a bed to a chair (including a wheelchair)? 3  -EE      Standing up from a chair using your arms (e.g., wheelchair, bedside chair)? 3  -EE      Climbing 3-5 steps with a railing? 3  -EE      To walk in hospital room? 3  -EE      AM-PAC 6 Clicks Score 18  -EE      Functional Assessment    Outcome Measure Options AM-PAC 6 Clicks Basic Mobility (PT)   -EE        User Key  (r) = Recorded By, (t) = Taken By, (c) = Cosigned By    Initials Name Provider Type    EE Heaven Lees PT Physical Therapist           Time Calculation:         PT Charges       03/06/18 1658          Time Calculation    Start Time 1620  -EE      Stop Time 1640  -EE      Time Calculation (min) 20 min  -EE      PT Received On 03/06/18  -EE      PT - Next Appointment 03/07/18  -EE      PT Goal Re-Cert Due Date 03/13/18  -EE        User Key  (r) = Recorded By, (t) = Taken By, (c) = Cosigned By    Initials Name Provider Type    EE Heaven Lees PT Physical Therapist          Therapy Charges for Today     Code Description Service Date Service Provider Modifiers Qty    46833435404 HC PT EVAL MOD COMPLEXITY 2 3/6/2018 Heaven Lees, PT GP 1    23633330601 HC PT THER PROC EA 15 MIN 3/6/2018 Heaven Lees PT GP 1    88061344912 HC PT THER SUPP EA 15 MIN 3/6/2018 Heaven Lees, PT GP 1          PT G-Codes  Outcome Measure Options: AM-PAC 6 Clicks Basic Mobility (PT)      Heaven Lees PT  3/6/2018

## 2018-03-07 ENCOUNTER — APPOINTMENT (OUTPATIENT)
Dept: GENERAL RADIOLOGY | Facility: HOSPITAL | Age: 73
End: 2018-03-07

## 2018-03-07 LAB — CALCIUM SPEC-SCNC: 8 MG/DL (ref 8.6–10.5)

## 2018-03-07 PROCEDURE — 94799 UNLISTED PULMONARY SVC/PX: CPT

## 2018-03-07 PROCEDURE — 82310 ASSAY OF CALCIUM: CPT | Performed by: NURSE PRACTITIONER

## 2018-03-07 PROCEDURE — 94640 AIRWAY INHALATION TREATMENT: CPT

## 2018-03-07 PROCEDURE — 71045 X-RAY EXAM CHEST 1 VIEW: CPT

## 2018-03-07 PROCEDURE — 99024 POSTOP FOLLOW-UP VISIT: CPT | Performed by: NURSE PRACTITIONER

## 2018-03-07 PROCEDURE — 25010000002 HEPARIN (PORCINE) PER 1000 UNITS: Performed by: THORACIC SURGERY (CARDIOTHORACIC VASCULAR SURGERY)

## 2018-03-07 PROCEDURE — 97110 THERAPEUTIC EXERCISES: CPT | Performed by: PHYSICAL THERAPIST

## 2018-03-07 RX ORDER — TRIAMCINOLONE ACETONIDE 1 MG/G
CREAM TOPICAL EVERY 12 HOURS SCHEDULED
Status: DISCONTINUED | OUTPATIENT
Start: 2018-03-07 | End: 2018-03-08 | Stop reason: HOSPADM

## 2018-03-07 RX ADMIN — TRIAMCINOLONE ACETONIDE: 1 CREAM TOPICAL at 12:55

## 2018-03-07 RX ADMIN — HYDROCODONE BITARTRATE AND ACETAMINOPHEN 1 TABLET: 7.5; 325 TABLET ORAL at 06:38

## 2018-03-07 RX ADMIN — HYDROCODONE BITARTRATE AND ACETAMINOPHEN 1 TABLET: 7.5; 325 TABLET ORAL at 13:02

## 2018-03-07 RX ADMIN — IPRATROPIUM BROMIDE AND ALBUTEROL SULFATE 3 ML: .5; 3 SOLUTION RESPIRATORY (INHALATION) at 07:51

## 2018-03-07 RX ADMIN — DOCUSATE SODIUM 100 MG: 100 CAPSULE, LIQUID FILLED ORAL at 08:13

## 2018-03-07 RX ADMIN — POLYETHYLENE GLYCOL 3350 17 G: 17 POWDER, FOR SOLUTION ORAL at 08:13

## 2018-03-07 RX ADMIN — HEPARIN SODIUM 5000 UNITS: 5000 INJECTION, SOLUTION INTRAVENOUS; SUBCUTANEOUS at 08:13

## 2018-03-07 RX ADMIN — METHIMAZOLE 5 MG: 10 TABLET ORAL at 08:13

## 2018-03-07 RX ADMIN — HEPARIN SODIUM 5000 UNITS: 5000 INJECTION, SOLUTION INTRAVENOUS; SUBCUTANEOUS at 21:11

## 2018-03-07 RX ADMIN — TRIAMCINOLONE ACETONIDE: 1 CREAM TOPICAL at 21:14

## 2018-03-07 NOTE — PROGRESS NOTES
"    Chief Complaint: Postoperative management  S/P: Left thyroidectomy, resection of substernal thyroid goiter with cervical approach  POD # 2    Subjective:  Symptoms:  Stable.  (Patient's condition continues to improve.  Currently her pain is a 2 on a 0-to-10 scale.  She has no shortness breath.  She is ambulating the díaz without any shortness of breath.  Her main concerns is rash from the electrodes with complaints of burning and itching.).        Vital Signs:  Temp:  [97.5 °F (36.4 °C)-98.7 °F (37.1 °C)] 98.7 °F (37.1 °C)  Heart Rate:  [71-76] 72  Resp:  [16-20] 18  BP: (127-137)/(55-79) 127/55    Intake & Output (last day)       03/06 0701 - 03/07 0700 03/07 0701 - 03/08 0700    P.O. 1020     I.V. (mL/kg)      IV Piggyback      Total Intake(mL/kg) 1020 (8.8)     Urine (mL/kg/hr) 1200 (0.4)     Other 59.5 (0)     Blood      Total Output 1259.5      Net -239.5            Unmeasured Urine Occurrence 1 x           Objective:  General Appearance:  Comfortable (Sitting up in chair).    Vital signs: (most recent): Blood pressure 125/80, pulse 72, temperature 97.4 °F (36.3 °C), temperature source Oral, resp. rate 18, height 160 cm (62.99\"), weight 116 kg (256 lb), SpO2 96 %.  Vital signs are normal.  No fever.    Lungs:  Normal respiratory rate and normal effort.  There are decreased breath sounds.    Heart: Normal rate.  Regular rhythm.  No murmur.   Abdomen: Abdomen is soft and non-distended.    Extremities: Normal range of motion.  There is no dependent edema.    Neurological: Patient is alert and oriented to person, place and time.    Skin:  Warm and dry.  (Cervical incision is well approximated.  VIDAL drains intact bilaterally.  Multiple circular raised red rash noted from electrodes to left chest and back. )          VIDAL drain:  24 Hour Total: 22/37    Results Review:     I reviewed the patient's new clinical results.  I reviewed the patient's new imaging results and agree with the interpretation.    Imaging " Results (last 24 hours)     Procedure Component Value Units Date/Time    XR Chest 1 View [[id number]] Collected:  03/06/18 0756     Updated:  03/06/18 1212    Narrative:       HISTORY: 72-year-old female 1 day postop left thyroidectomy with  resection of substernal thyroid goiter with cervical approach.     PORTABLE AP SEMIERECT CHEST DATED 03/06/2018 AT 0549 HOURS.     FINDINGS: When compared to the most recent available prior chest  radiograph dated 03/05/2018 at 1149 hours, the drainage catheters at the  lower neck and apparently extending into the substernal area remain.  Monitoring lead wires are present. No large pneumothorax is seen. I  cannot confirm or exclude a 2-3 mm superolateral left pleural  separation. Some modest hazy to patchy opacity compatible with  atelectasis or infiltrate is again demonstrated in the lower lung zones  despite slight improvement in expansion of the lungs. The costophrenic  angles are sharp. The cardiac silhouette remains borderline enlarged  caliber, accompanied by aortic uncoiling. No acute change in bony thorax  is seen. Oxygen cannula tubing is present about the neck and left chest.     CONCLUSION: Slightly better aeration of the lungs but with some  persistent patchy bibasilar opacity along with persistent borderline to  mild cardiomegaly and aortic uncoiling. Questionable trace left apical  to superolateral pneumothorax. This could also represent subpleural  dissection of traces of upper mediastinal air. No large pneumothorax is  seen. Surgical drains, upper mediastinal hemostatic clips, oxygen  cannula tubing, and monitoring lead wires remain.     This report was finalized on 3/6/2018 12:09 PM by Dr. New Green MD.       XR Chest 1 View [[id number]] Collected:  03/07/18 0742     Updated:  03/07/18 0742    Narrative:       CLINICAL HISTORY: 72-year-old female 2 days postop left thyroidectomy  and resection of substernal thyroid goiter with cervical approach.     PORTABLE  AP ERECT CHEST 03/07/2018 AT 0613 HOURS     FINDINGS: When compared to the exam dated 03/06/2018 at 0549 hours, the  previously suspected tiny trace of pneumothorax at the superolateral  left chest is absent or diminished. No definite pneumothorax is seen on  the current exam. Some modest hazy to patchy atelectasis or infiltrate  in the basilar right and left chest remains. The cardiac silhouette  remains borderline enlarged caliber, accompanied by aortic uncoiling.  Surgical hemostatic clips at the upper mediastinal distribution are  again noted. The surgical drains at the upper mediastinum and lower neck  remain. Oxygen cannula tubing is present about the neck, and monitoring  lead wires are present.     CONCLUSION: No definite pneumothorax is seen. Modest patchy bibasilar  opacity remains with slightly increased lung volumes and persistent  borderline cardiomegaly.             Lab Results:     Lab Results (last 24 hours)     Procedure Component Value Units Date/Time    Calcium [[id number]]  (Abnormal) Collected:  03/06/18 1236    Specimen:  Blood Updated:  03/06/18 1335     Calcium 8.2 (L) mg/dL     Tissue Pathology Exam [[id number]] Collected:  03/05/18 0903    Specimen:  Tissue from Thyroid Updated:  03/06/18 1405     Case Report --     Surgical Pathology Report                         Case: [id number]                                  Authorizing Provider:  Quintin Mares III, MD   Collected:           03/05/2018 09:03 AM          Ordering Location:     [facility]  Received:            03/05/2018 11:17 AM                                 MAIN OR                                                                      Pathologist:           Miguel Alvarez MD                                                                           Specimen:    Thyroid, left thyroid with goiter                                             "               Final Diagnosis --     LEFT THYROID WITH GOITER:        MULTINODULAR ADENOMATOUS GOITER WITH DEGENERATIVE CHANGES INCLUDING EXTENSIVE               CALCIFICATION.          CMK/th  IHC/TDJ    CPT CODES:  1. 07914        Gross Description --     Received in formalin labeled \"left thyroid with goiter\" is an 11.5 x 5.7 x 3.2 cm, 88 gram pink tan to maroon multinodular portion of thyroid.  The margins are inked black and the specimen is serially sectioned revealing multiple tan to hemorrhagic soft to cystic nodules.  The largest nodule measures 3.7 x 3.5 x 2.5 cm.  A few of these nodules are partially encapsulated with a white fibrous capsule up to 0.1 cm.  There are also diffuse areas of calcification noted.  Representative sections are submitted as follows:  1A - normal appearing thyroid   1B-1F - entire margin of the largest lesion  1G-1M - representative sections of the smaller lesion    CC/USO/CMK/romulo        Microscopic Description --     Performed, incorporated in diagnosis.          Embedded Images --    Calcium [[id number]]  (Abnormal) Collected:  03/07/18 0536    Specimen:  Blood Updated:  03/07/18 0633     Calcium 8.0 (L) mg/dL            Assessment/Plan     Active Problems:    Substernal goiter       Assessment:    Condition: In stable condition.  Improving.   (Stable post-op course.  HD stable. ).     Plan:   Encourage ambulation.  Start/continue incentive spirometry.  X-rays as ordered.  (VIADL drainage decreased, less than 30 mL from the left VIDAL drain will remove left side today.  Hopefully remove the right-sided VIDAL drain tomorrow.  Triamcinolone cream due to rash from electrodes.  Continue current treatment plan.  Pulmonary hygiene, incentive spirometer, increase activity, and pain management.  ).       Tati Rees, APRN  [facility]  03/07/18  8:23 AM          "

## 2018-03-07 NOTE — PLAN OF CARE
Problem: Patient Care Overview (Adult)  Goal: Plan of Care Review  Outcome: Ongoing (interventions implemented as appropriate)   03/07/18 1443   Coping/Psychosocial Response Interventions   Plan Of Care Reviewed With patient   Patient Care Overview   Progress improving   Outcome Evaluation   Outcome Summary/Follow up Plan Pt demonstrates improved activity tolerance and strength as pt needed less assistance with gait and transfers and was able to ambulate 150 ft compared to 75 ft yesterday. Pt with slight shortness of breath after ambulation on room air but kept O2 sats above 90. Pt would benefit from therapy to improve strength, mobility, balance, and activity tolerance.

## 2018-03-07 NOTE — THERAPY TREATMENT NOTE
Acute Care - Physical Therapy Treatment Note  [facility]     Patient Name: Patt Bond  : 1945  MRN: [id number]  Today's Date: 3/7/2018  Onset of Illness/Injury or Date of Surgery Date: 18     Referring Physician: Vishnu    Admit Date: 3/5/2018    Visit Dx:    ICD-10-CM ICD-9-CM   1. Generalized weakness R53.1 780.79   2. Substernal goiter E04.9 240.9   3. Allergic contact dermatitis due to adhesives L23.1 692.4     Patient Active Problem List   Diagnosis   • Left lower quadrant pain   • Ketonuria due to dehydration   • Normocytic anemia   • Pancytopenia   • Obesity (BMI 30-39.9)   • Nausea   • Sepsis   • Nephrolithiasis   • Pleural nodule   • Tracheal compression   • Hyperthyroidism   • Graves disease   • Abnormal weight gain   • Palpitations   • Cholecystitis   • History of colon polyps - 4 Tubulovillous adenomas in , normal colonoscopy in    • History of abdominal abscess - 3+ E. coli at time of surgery 2016   • Multiple drug allergies to Cephalexin, Cephalosporins, Penicillins   • Weight gain - 10 pounds in 2 months, unintentional   • Multinodular goiter   • GRAY (dyspnea on exertion)   • Morbid obesity   • Substernal goiter               Adult Rehabilitation Note       18 1428          Rehab Assessment/Intervention    Discipline (P)  physical therapist;other (see comments)   Student  -PV      Document Type (P)  therapy note (daily note)  -PV      Subjective Information (P)  agree to therapy  -PV      Patient Effort, Rehab Treatment (P)  good  -PV      Symptoms Noted During/After Treatment (P)  shortness of breath  -PV      Precautions/Limitations (P)  fall precautions  -PV      Recorded by [PV] Jin Petit, PT Student      Vital Signs    Post SpO2 (%) (P)  91  -PV      O2 Delivery Post Treatment (P)  room air  -PV      Recorded by [PV] Jin Petit, PT Student      Pain Assessment    Pain Assessment (P)  0-10  -PV      Pain Score (P)  0  -PV      Recorded by [PV] Jin Petit, PT  Student      Cognitive Assessment/Intervention    Current Cognitive/Communication Assessment (P)  functional  -PV      Orientation Status (P)  oriented x 4  -PV      Follows Commands/Answers Questions (P)  100% of the time  -PV      Personal Safety (P)  WNL/WFL  -PV      Personal Safety Interventions (P)  fall prevention program maintained;nonskid shoes/slippers when out of bed  -PV      Recorded by [PV] Jin Petit PT Student      Bed Mobility, Assessment/Treatment    Bed Mob, Supine to Sit, [place] (P)  not tested  -PV      Bed Mob, Sit to Supine, [place] (P)  not tested  -PV      Bed Mobility, Comment (P)  pt in chair  -PV      Recorded by [PV] Jin Petit PT Student      Transfer Assessment/Treatment    Transfers, Sit-Stand [place] (P)  stand by assist  -PV      Transfers, Stand-Sit [place] (P)  stand by assist  -PV      Recorded by [PV] Jin Petit PT Student      Gait Assessment/Treatment    Gait, [place] Level (P)  contact guard assist  -PV      Gait, Distance (Feet) (P)  150  -PV      Gait, Gait Deviations (P)  antalgic;lalitha decreased;stride length decreased  -PV      Gait, Comment (P)  pt with unsteadiness and stiff LEs that improved with distance  -PV      Recorded by [PV] Jin Petit PT Student      Therapy Exercises    Bilateral Lower Extremities (P)  AROM:;10 reps;LAQ;ankle pumps/circles  -PV      Recorded by [PV] Jin Petit PT Student      Positioning and Restraints    Pre-Treatment Position (P)  sitting in chair/recliner  -PV      Post Treatment Position (P)  chair  -PV      In Chair (P)  sitting;call light within reach;with family/caregiver;encouraged to call for assist  -PV      Recorded by [PV] Jin Petit PT Student        User Key  (r) = Recorded By, (t) = Taken By, (c) = Cosigned By    Initials Name Effective Dates    PV Jin Petit PT Student 03/07/18 -                 IP PT Goals       03/06/18 1654          Bed Mobility PT LTG    Bed Mobility PT LTG, Date  Established 03/06/18  -EE      Bed Mobility PT LTG, Time to Achieve 1 wk  -EE      Bed Mobility PT LTG, Activity Type all bed mobility  -EE      Bed Mobility PT LTG, [place] Level supervision required  -EE      Transfer Training PT LTG    Transfer Training PT LTG, Date Established 03/06/18  -EE      Transfer Training PT LTG, Time to Achieve 1 wk  -EE      Transfer Training PT LTG, Activity Type all transfers  -EE      Transfer Training PT LTG, [place] supervision required  -EE      Transfer Training PT LTG, Assist Device --  -EE      Gait Training PT LTG    Gait Training Goal PT LTG, Date Established 03/06/18  -EE      Gait Training Goal PT LTG, Time to Achieve 1 wk  -EE      Gait Training Goal PT LTG, [place] supervision required  -EE      Gait Training Goal PT LTG, Distance to Achieve 150  -EE        User Key  (r) = Recorded By, (t) = Taken By, (c) = Cosigned By    Initials Name Provider Type    EE Heaven Lees PT Physical Therapist          Physical Therapy Education     Title: PT OT SLP Therapies (Done)     Topic: Physical Therapy (Done)     Point: Mobility training (Done)    Learning Progress Summary    Learner Readiness Method Response Comment Documented by Status   Patient Acceptance E,RJ GONZALES,VU  PV 03/07/18 1441 Done    Acceptance E VU,NR  EE 03/06/18 1653 Done               Point: Home exercise program (Done)    Learning Progress Summary    Learner Readiness Method Response Comment Documented by Status   Patient Acceptance E,TBRJ,VU  PV 03/07/18 1441 Done    Acceptance E VU,NR  EE 03/06/18 1653 Done               Point: Body mechanics (Done)    Learning Progress Summary    Learner Readiness Method Response Comment Documented by Status   Patient Acceptance ETBRJ,VU  PV 03/07/18 1441 Done    Acceptance E VU,NR  EE 03/06/18 1653 Done                      User Key     Initials Effective Dates Name Provider Type Discipline    EE 12/01/15 -  Heaven Lees PT Physical Therapist PT     PV 03/07/18 -  Jin Petit, PT Student PT Student PT                    PT Recommendation and Plan  Anticipated Equipment Needs At Discharge: front wheeled walker (may benefit from wx; pt unsure if she wants AD for home)  Anticipated Discharge Disposition: home with assist, home with home health (may benefit from HH PT for endurance training)  Planned Therapy Interventions: balance training, bed mobility training, gait training, home exercise program, patient/family education, strengthening, transfer training  PT Frequency: daily  Plan of Care Review  Plan Of Care Reviewed With: (P) patient  Progress: (P) improving  Outcome Summary/Follow up Plan: (P) Pt demonstrates improved activity tolerance and strength as pt need less assistance with gait and transfers and was able to ambulate 150 ft compared to 75 ft yesterday. Pt with slight shortness of breath after ambulation on room air but kept O2 sats above 90. Pt would benefit from therapy to improve strength, mobility, balance, and activity tolerance.           Outcome Measures       03/07/18 1400 03/06/18 1600       How much help from another person do you currently need...    Turning from your back to your side while in flat bed without using bedrails? (P)  3  -PV 3  -EE     Moving from lying on back to sitting on the side of a flat bed without bedrails? (P)  3  -PV 3  -EE     Moving to and from a bed to a chair (including a wheelchair)? (P)  4  -PV 3  -EE     Standing up from a chair using your arms (e.g., wheelchair, bedside chair)? (P)  4  -PV 3  -EE     Climbing 3-5 steps with a railing? (P)  3  -PV 3  -EE     To walk in hospital room? (P)  3  -PV 3  -EE     AM-PAC 6 Clicks Score (P)  20  -PV 18  -EE     Functional Assessment    Outcome Measure Options (P)  AM-PAC 6 Clicks Basic Mobility (PT)  -PV AM-PAC 6 Clicks Basic Mobility (PT)  -EE       User Key  (r) = Recorded By, (t) = Taken By, (c) = Cosigned By    Initials Name Provider Type    EE Heaven Lees, PT  Physical Therapist    PV Jin Petit, PT Student PT Student           Time Calculation:         PT Charges       03/07/18 1452          Time Calculation    Start Time (P)  1418  -PV      Stop Time (P)  1428  -PV      Time Calculation (min) (P)  10 min  -PV      PT Received On (P)  03/07/18  -PV      PT - Next Appointment (P)  03/08/18  -PV        User Key  (r) = Recorded By, (t) = Taken By, (c) = Cosigned By    Initials Name Provider Type    PV Jin Petit, PT Student PT Student          Therapy Charges for Today     Code Description Service Date Service Provider Modifiers Qty    07716231366 HC PT THER PROC EA 15 MIN 3/7/2018 Jin Petit, PT Student GP 1          PT G-Codes  Outcome Measure Options: (P) AM-PAC 6 Clicks Basic Mobility (PT)    Jin Petit PT Student  3/7/2018

## 2018-03-07 NOTE — PROGRESS NOTES
Discharge Planning Assessment  [facility]     Patient Name: Patt Bond  MRN: [id number]  Today's Date: 3/7/2018    Admit Date: 3/5/2018          Discharge Needs Assessment       03/07/18 1108    Living Environment    Lives With spouse    Living Arrangements house    Transportation Available car;family or friend will provide    Discharge Needs Assessment    Concerns To Be Addressed no discharge needs identified    Equipment Currently Used at Home none            Discharge Plan       03/07/18 1109    Case Management/Social Work Plan    Plan Home    Patient/Family In Agreement With Plan yes    Additional Comments Met with pt and spouse at bedside.  Introduced self, explained CCP role, facesheet verified.  Pt states she lives in house with .  Denies use of any DME.  Denies history of HH or SNF in past.  Discussed possible need for walker and/or HH, pt declines.   Plans home.  SINCERE John RN        Discharge Placement     No information found                Demographic Summary       03/07/18 1107    Referral Information    Admission Type inpatient    Arrived From home or self-care    Referral Source admission list    Reason For Consult discharge planning    Contact Information    Permission Granted to Share Information With family/designee   Ellis Bond (spouse) [phone]    Primary Care Physician Information    Name Moe Matute            Functional Status       03/07/18 1108    Functional Status Current    Ambulation 1-->assistive equipment    Transferring 1-->assistive equipment    Toileting 1-->assistive equipment    Bathing 1-->assistive equipment    Dressing 1-->assistive equipment    Eating 0-->independent    Communication 0-->understands/communicates without difficulty    Functional Status Prior    Ambulation 0-->independent    Transferring 0-->independent    Toileting 0-->independent    Bathing 0-->independent    Dressing 0-->independent    Eating 0-->independent    Communication  0-->understands/communicates without difficulty    Cognitive/Perceptual/Developmental    Current Mental Status/Cognitive Functioning no deficits noted            Psychosocial     None            Abuse/Neglect     None            Legal     None            Substance Abuse     None            Patient Forms     None          Sangeeta John

## 2018-03-07 NOTE — PLAN OF CARE
Problem: Patient Care Overview (Adult)  Goal: Plan of Care Review  Outcome: Ongoing (interventions implemented as appropriate)   03/07/18 0412   Coping/Psychosocial Response Interventions   Plan Of Care Reviewed With patient   Patient Care Overview   Progress progress toward functional goals as expected   Outcome Evaluation   Outcome Summary/Follow up Plan vss. VIDAL drains continue. Output from drains is minimal at best. Pt's pain is well controlled .        Problem: Pain, Acute (Adult)  Goal: Acceptable Pain Control/Comfort Level  Outcome: Ongoing (interventions implemented as appropriate)   03/07/18 0412   Pain, Acute (Adult)   Acceptable Pain Control/Comfort Level making progress toward outcome

## 2018-03-07 NOTE — PLAN OF CARE
Problem: Patient Care Overview (Adult)  Goal: Plan of Care Review  Outcome: Ongoing (interventions implemented as appropriate)    VSS, on 2L oxygen via NC. Pain under control on oral narcotics. Patient up in chair most of the day. Ambulates to bathroom. Left VIDAL drain taken out by APRN. Right VIDAL drain in place in neck, serosaguinous drianage. Will start weaning oxygen, as tolerated. Neck incision C/D/I, MYRIAM, skin adhesive.     03/07/18 1355   Coping/Psychosocial Response Interventions   Plan Of Care Reviewed With patient;spouse   Patient Care Overview   Progress progress toward functional goals as expected       Problem: Pain, Acute (Adult)  Goal: Identify Related Risk Factors and Signs and Symptoms  Outcome: Outcome(s) achieved Date Met: 03/07/18 03/07/18 1355   Pain, Acute   Related Risk Factors (Acute Pain) surgery;disease process   Signs and Symptoms (Acute Pain) verbalization of pain descriptors     Goal: Acceptable Pain Control/Comfort Level  Outcome: Ongoing (interventions implemented as appropriate)   03/07/18 1355   Pain, Acute (Adult)   Acceptable Pain Control/Comfort Level making progress toward outcome       Problem: Skin Integrity Impairment, Risk/Actual (Adult)  Goal: Identify Related Risk Factors and Signs and Symptoms  Outcome: Outcome(s) achieved Date Met: 03/07/18 03/07/18 1355   Skin Integrity Impairment, Risk/Actual   Skin Integrity Impairment, Risk/Actual: Related Risk Factors age extremes     Goal: Skin Integrity/Wound Healing  Outcome: Ongoing (interventions implemented as appropriate)   03/07/18 1355   Skin Integrity Impairment, Risk/Actual (Adult)   Skin Integrity/Wound Healing making progress toward outcome

## 2018-03-07 NOTE — PLAN OF CARE
Problem: Patient Care Overview (Adult)  Goal: Plan of Care Review  Outcome: Ongoing (interventions implemented as appropriate)   03/06/18 1800   Coping/Psychosocial Response Interventions   Plan Of Care Reviewed With patient   Patient Care Overview   Progress progress toward functional goals is gradual   Outcome Evaluation   Outcome Summary/Follow up Plan VSS.pain controlled with PRN pain medication, VIDAL continues to have small amount of drainage. drsg dry and intact. No S/S of distress or discomfort. will continue to monitor      Goal: Adult Individualization and Mutuality  Outcome: Ongoing (interventions implemented as appropriate)      Problem: Perioperative Period (Adult)  Goal: Signs and Symptoms of Listed Potential Problems Will be Absent or Manageable (Perioperative Period)  Outcome: Ongoing (interventions implemented as appropriate)      Problem: Pain, Acute (Adult)  Goal: Identify Related Risk Factors and Signs and Symptoms  Outcome: Ongoing (interventions implemented as appropriate)    Goal: Acceptable Pain Control/Comfort Level  Outcome: Ongoing (interventions implemented as appropriate)

## 2018-03-08 VITALS
OXYGEN SATURATION: 95 % | RESPIRATION RATE: 16 BRPM | SYSTOLIC BLOOD PRESSURE: 139 MMHG | HEIGHT: 63 IN | DIASTOLIC BLOOD PRESSURE: 72 MMHG | TEMPERATURE: 98.7 F | HEART RATE: 72 BPM | WEIGHT: 256 LBS | BODY MASS INDEX: 45.36 KG/M2

## 2018-03-08 LAB
CA-I BLD-MCNC: 4.9 MG/DL (ref 4.6–5.4)
CA-I SERPL ISE-MCNC: 1.23 MMOL/L (ref 1.15–1.35)

## 2018-03-08 PROCEDURE — 82330 ASSAY OF CALCIUM: CPT | Performed by: NURSE PRACTITIONER

## 2018-03-08 PROCEDURE — 25010000002 HEPARIN (PORCINE) PER 1000 UNITS: Performed by: THORACIC SURGERY (CARDIOTHORACIC VASCULAR SURGERY)

## 2018-03-08 PROCEDURE — 94799 UNLISTED PULMONARY SVC/PX: CPT

## 2018-03-08 RX ORDER — HYDROCODONE BITARTRATE AND ACETAMINOPHEN 7.5; 325 MG/1; MG/1
1 TABLET ORAL EVERY 6 HOURS PRN
Qty: 30 TABLET | Refills: 0 | Status: SHIPPED | OUTPATIENT
Start: 2018-03-08 | End: 2018-03-15

## 2018-03-08 RX ORDER — TRIAMCINOLONE ACETONIDE 1 MG/G
CREAM TOPICAL EVERY 12 HOURS SCHEDULED
Qty: 45 G | Refills: 0 | Status: SHIPPED | OUTPATIENT
Start: 2018-03-08 | End: 2018-04-09

## 2018-03-08 RX ADMIN — HEPARIN SODIUM 5000 UNITS: 5000 INJECTION, SOLUTION INTRAVENOUS; SUBCUTANEOUS at 09:42

## 2018-03-08 NOTE — PLAN OF CARE
Problem: Patient Care Overview (Adult)  Goal: Plan of Care Review  Outcome: Ongoing (interventions implemented as appropriate)      Problem: Pain, Acute (Adult)  Goal: Acceptable Pain Control/Comfort Level  Outcome: Ongoing (interventions implemented as appropriate)   03/08/18 0449   Pain, Acute (Adult)   Acceptable Pain Control/Comfort Level making progress toward outcome       Problem: Skin Integrity Impairment, Risk/Actual (Adult)  Goal: Skin Integrity/Wound Healing  Outcome: Ongoing (interventions implemented as appropriate)   03/08/18 0449   Skin Integrity Impairment, Risk/Actual (Adult)   Skin Integrity/Wound Healing making progress toward outcome

## 2018-03-08 NOTE — PLAN OF CARE
Problem: Patient Care Overview (Adult)  Goal: Plan of Care Review  Outcome: Ongoing (interventions implemented as appropriate)    VSS, up in chair this am and pain under control. Patient ready for discharge home per VERO Hawley with right VIDAL drain. Patient's spouse to empty the VIDAL drain and take care of it. Patient to follow up with APRN on Monday with the VIDAL drain. Rx written scripts handed to patient. D/Kolby IVs. Discharge instructions provided.      03/08/18 1103   Coping/Psychosocial Response Interventions   Plan Of Care Reviewed With patient   Patient Care Overview   Progress progress toward functional goals as expected       Problem: Pain, Acute (Adult)  Goal: Acceptable Pain Control/Comfort Level  Outcome: Ongoing (interventions implemented as appropriate)   03/08/18 1103   Pain, Acute (Adult)   Acceptable Pain Control/Comfort Level making progress toward outcome       Problem: Skin Integrity Impairment, Risk/Actual (Adult)  Goal: Skin Integrity/Wound Healing  Outcome: Ongoing (interventions implemented as appropriate)   03/08/18 1103   Skin Integrity Impairment, Risk/Actual (Adult)   Skin Integrity/Wound Healing making progress toward outcome

## 2018-03-08 NOTE — DISCHARGE SUMMARY
Patient Care Team:  Moe Matute MD as PCP - General  Miguel Gautam MD as Consulting Physician (Cardiology)    Date of Admission: 3/5/2018   Date of Discharge:  3/8/2018    Discharge Diagnosis: Multinodular adenomatous goiter    Presenting Problem  Substernal goiter [E04.9]    History of Present Illness  Patt Bond is a 72 y.o. 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  She has done well since then.  For approximately 2 years she has had progressive shortness of breath.  Initially there was no wheezing.  She now wheezes with exertion.  Because of this shortness of breath she underwent an extensive cardiac evaluation which showed no cardiac issues causing her shortness of breath.  She was seen by Dr. 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  Bronchoscopy with transtracheal biopsy showed normal thyroid tissue no malignancy.  She was referred to our office for resection.    Hospital Course  Patt Bond underwent surgery on date of admission which was uneventful.  She is admitted 5 E. for close observation, pain management and management of drains.  She had a stable postoperative course.  Postoperative day 1, her pain is controlled.  No worsening shortness breath.  Drainage from bilateral Artie drains was moderate with 60 cc from each drain.  Continue to leave in place at this time.  Her x-ray was stable.  Postop a.m. labs reviewed and remain stable.  Her cervical neck incision is well approximated without redness or drainage.  Postoperative day 2, her drainage from her VIDAL drains had decreased.  Removed the left drain at this time.  Her voice was normal.  She was swallowing without difficulty.  Today, on postoperative day 3, she is doing well.  She has no problems with bowel or bladder.  She is ambulating without difficulty.  She has no  "worsening shortness of breath.  VIDAL drainage 38 mL for 24 hours.  We will leave drain in place at this time.  Ionized calcium level was checked and is normal.  Patient is hemodynamically stable and ready for discharge home today with drain in place.  Her  is knowledgeable on the care of drains due to patient's previous surgery.  Instructed to empty drain at least once a day and record output.  Patient will return to see me in the office on Monday for drain removal.  In the meantime, instructed to contact us sooner for any problems or concerns.       Procedures Performed  Procedure(s):  left thyroidectomy, resection of substernal thyroid goiter cervical approach       Consults:   Consults     No orders found from 2/4/2018 to 3/6/2018.          Pertinent Test Results:     Imaging Results (last 24 hours)     ** No results found for the last 24 hours. **          Lab Results (last 24 hours)     Procedure Component Value Units Date/Time    Calcium, Ionized [[id number]]  (Normal) Collected:  03/08/18 0829    Specimen:  Blood Updated:  03/08/18 0922     Ionized Calcium 1.23 mmol/L      Ionized Calcium 4.9 mg/dL         Tissue Pathology Exam   Order: [id number]   Status:  Final result   Visible to patient:  No (Not Released) Dx:  Substernal goiter      3d ago     Final Diagnosis   LEFT THYROID WITH GOITER:        MULTINODULAR ADENOMATOUS GOITER WITH DEGENERATIVE CHANGES INCLUDING EXTENSIVE               CALCIFICATION.      CMK/th  IHC/TDJ     CPT CODES:  1. 27938    Electronically signed by Miguel Alvarez MD on 3/6/2018 at 1405   Gross Description See result below   Received in formalin labeled \"left thyroid with goiter\" is an 11.5 x 5.7 x 3.2 cm, 88 gram pink tan to maroon multinodular portion of thyroid.  The margins are inked black and the specimen is serially sectioned revealing multiple tan to hemorrhagic soft to cystic nodules.  The largest nodule measures 3.7 x 3.5 x 2.5 cm.  A few of these nodules are " partially encapsulated with a white fibrous capsule up to 0.1 cm.  There are also diffuse areas of calcification noted.  Representative sections are submitted as follows:  1A - normal appearing thyroid   1B-1F - entire margin of the largest lesion  1G-1M - representative sections of the smaller lesion     CC/USO/CMK/jse    Microscopic Description See result below   Performed, incorporated in diagnosis.          Resulting Agency [facility]      Specimen Collected: 03/05/18  9:03 AM Last Resulted: 03/06/18  2:05 PM                   Condition on Discharge:  Stable    Vital Signs  Temp:  [97.6 °F (36.4 °C)-98.7 °F (37.1 °C)] 98.7 °F (37.1 °C)  Heart Rate:  [69-88] 72  Resp:  [16-18] 16  BP: (122-142)/(63-72) 139/72    Physical Exam:  Objective:  General Appearance:  Comfortable (Sitting up in chair).    Lungs:  Normal respiratory rate and normal effort.  There are decreased breath sounds.    Heart: Normal rate.  Regular rhythm.  No murmur.   Abdomen: Abdomen is soft and non-distended.    Extremities: Normal range of motion.  There is no dependent edema.    Neurological: Patient is alert and oriented to person, place and time.    Skin:  Warm and dry.  (Cervical incision is well approximated.  VIDAL drain intact right neck.  Multiple circular raised red rash noted from electrodes to left chest and back. )    Discharge Disposition  Home today    Discharge Medications   RondaPatt LUPE   Home Medication Instructions KATHE:[id number]    Printed on:03/08/18 1007   Medication Information                      aspirin  MG tablet  Take 325 mg by mouth Every 6 (Six) Hours As Needed. PT HOLDING FOR SURGERY             HYDROcodone-acetaminophen (NORCO) 7.5-325 MG per tablet  Take 1 tablet by mouth Every 6 (Six) Hours As Needed for Moderate Pain  for up to 7 days.             methIMAzole (TAPAZOLE) 5 MG tablet  Take 1 tablet by mouth Every Other Day.             naproxen sodium (ALEVE) 220 MG tablet  Take 220 mg by mouth 2 (Two)  Times a Day As Needed. PT HOLDING FOR SURGERY             triamcinolone (KENALOG) 0.1 % cream  Apply  topically Every 12 (Twelve) Hours.                 Discharge Instructions:  · No heavy lifting, pushing, pulling greater than 10 pounds.  · No driving up until 2 weeks after surgery and no longer taking narcotics.  · Resume home diet as tolerated.  · Continue incentive spirometer at least 4 times per day.  · VIDAL drain care as instructed.      Follow-up Appointments  Future Appointments  Date Time Provider Department Center   3/12/2018 12:45 PM VERO Rodriguez TS [facility] None   4/2/2018 9:30 AM [facility] None   4/9/2018 11:00 AM MD ZACK Gautam END [facility] None   8/14/2018 9:30 AM JAREN CT 2  JAREN CT [facility]           For any questions regarding patient's stay, please refer to patient's chart.    VERO Pelayo  [facility]  03/08/18  10:05 AM

## 2018-03-12 ENCOUNTER — OFFICE VISIT (OUTPATIENT)
Dept: SURGERY | Facility: CLINIC | Age: 73
End: 2018-03-12

## 2018-03-12 VITALS
HEIGHT: 63 IN | WEIGHT: 252 LBS | HEART RATE: 73 BPM | OXYGEN SATURATION: 96 % | SYSTOLIC BLOOD PRESSURE: 150 MMHG | DIASTOLIC BLOOD PRESSURE: 80 MMHG | BODY MASS INDEX: 44.65 KG/M2

## 2018-03-12 DIAGNOSIS — E04.2 MULTINODULAR GOITER: Primary | ICD-10-CM

## 2018-03-12 DIAGNOSIS — Z09 SURGERY FOLLOW-UP EXAMINATION: ICD-10-CM

## 2018-03-12 PROCEDURE — 99024 POSTOP FOLLOW-UP VISIT: CPT | Performed by: NURSE PRACTITIONER

## 2018-03-12 NOTE — PROGRESS NOTES
Case Management Discharge Note    Final Note: Pt discharged home, no known needs.     Destination     No service coordination in this encounter.      Durable Medical Equipment     No service coordination in this encounter.      Dialysis/Infusion     No service coordination in this encounter.      Home Medical Care     No service coordination in this encounter.      Social Care     No service coordination in this encounter.        Other: Other (private auto)    Final Discharge Disposition Code: 01 - home or self-care

## 2018-03-12 NOTE — PROGRESS NOTES
Patient ID: Patt Bond is a 72 y.o. female is here today for follow-up.    Subjective     Chief Complaint   Patient presents with   • Follow-up     drain removal       History of Present Illness    Patt Bond presents to the office today for postoperative follow-up concerning prior history of left thyroidectomy with resection of substernal thyroid goiter by cervical approach.  She presents to the office for VIDAL drain removal.  The drainage since discharge has been minimal with the most output of 10 ml.  Since she was discharged, she has been doing well.  Her pain is controlled and she has no shortness of breath.  Feels like she is breathing easier since discharge.  She denies any complaints of fever, chills, cough, hemoptysis, pleuritic chest pain, shortness of air, dyspnea with exertion, night sweats, hoarseness, or unintentional weight loss.      Patient Active Problem List   Diagnosis   • Left lower quadrant pain   • Ketonuria due to dehydration   • Normocytic anemia   • Pancytopenia   • Obesity (BMI 30-39.9)   • Nausea   • Sepsis   • Nephrolithiasis   • Pleural nodule   • Tracheal compression   • Hyperthyroidism   • Graves disease   • Abnormal weight gain   • Palpitations   • Cholecystitis   • History of colon polyps - 4 Tubulovillous adenomas in 2004, normal colonoscopy in 2008   • History of abdominal abscess - 3+ E. coli at time of surgery 9/2016   • Multiple drug allergies to Cephalexin, Cephalosporins, Penicillins   • Weight gain - 10 pounds in 2 months, unintentional   • Multinodular goiter   • GRAY (dyspnea on exertion)   • Morbid obesity   • Substernal goiter     Past Medical History:   Diagnosis Date   • Asthma    • Back pain    • Diverticulitis    • Diverticulosis    • Goiter    • Hyperthyroidism     followed by Dr. Blackmon   • Joint pain, knee    • Kidney stones 2015    with cystoscopy by Dr. Miguel Monte done on 10/7/15, 9/9/15, 5/8/15, 9/21/07   • Mediastinal mass    • Obstructive  pyelonephritis 09/28/2015    left obstructing pyelonephritis    • PONV (postoperative nausea and vomiting)    • Renal disorder    • SOB (shortness of breath) on exertion      Past Surgical History:   Procedure Laterality Date   • BRONCHOSCOPY N/A 11/6/2017    Procedure: BRONCHOSCOPY WITH ENDOBRONCHIAL ULTRASOUND AND TRANSBRONCHIAL NEEDLE ASPIRATION;  Surgeon: Nando Heller MD;  Location: [facility] ENDOSCOPY;  Service:    • CARDIAC CATHETERIZATION N/A 9/27/2017    Procedure: Right Heart Cath;  Surgeon: Miguel Guatam MD;  Location: [facility] CATH INVASIVE LOCATION;  Service:    • CARDIAC CATHETERIZATION N/A 9/27/2017    Procedure: Left Heart Cath;  Surgeon: Miguel Gautam MD;  Location: [facility] CATH INVASIVE LOCATION;  Service:    • CARDIAC CATHETERIZATION N/A 9/27/2017    Procedure: Coronary angiography;  Surgeon: Miguel Gautam MD;  Location: [facility] CATH INVASIVE LOCATION;  Service:    • CARDIAC CATHETERIZATION N/A 9/27/2017    Procedure: Left ventriculography;  Surgeon: Miguel Gautam MD;  Location: [facility] CATH INVASIVE LOCATION;  Service:    • CHOLECYSTECTOMY N/A 5/17/2017    Procedure: LAPAROSCOPIC CHOLECYSTECTOMY WITH IOC;  Surgeon: Patt Moore MD;  Location: [facility] MAIN OR;  Service:    • COLON RESECTION Left 9/14/2016    Laparoscopic Low Anterior Resection with splenic flexure mobilization, drainage of Pelvic Abscess (cultured 3+ E. Coli), Left salpingo-ophorectomy, laparoscopic rectopexy, and umbilical hernia repair, Dr. Patt Moore   • COLON RESECTION      VENTRAL HERNIA REPAIR   • COLONOSCOPY N/A 05/15/2008    sigmoid diverticulosis with blunting of the haustral folds and angulation consistent with previous diverticulitis, no polyps, suggestion of rectal prolapse-Dr. Patt Moore   • COLONOSCOPY W/ BIOPSIES AND POLYPECTOMY N/A 04/16/2001    Possible mild gastritis w/ some appearance of formations that look like petechiae in the antrum, no ulcerations, no erosions; cecal  polyp approx 4mm sessile; probable transverse colon polyp, although we could not visualize this completely upon pulling out; sigmoid diverticula; multiple rectal polyps, mostly w/ appearance of hyperplasia, largest being 5 to 6mm: removed via snare-Dr. Patt Moore   • COLONOSCOPY W/ POLYPECTOMY N/A 12/10/2004    Diverticulosis with sigmoid perideverticulitis with erythema and patchiness around some of the diverticula, proximal ascedning colon polyp-approx 5 mm-removed via snare cauter, two distal ascending colon polyps-7 mm and 3 mm-removed via snare cautery polypectomy, hemorrhoids-Dr. Patt Moore   • CYSTOSCOPY W/ URETERAL STENT REMOVAL Left 10/07/2015    Cystoscopy with stent extraction, left ureteral occulusion balloon placement, percutanous nephrostolithotomy with stone volume less than 2.5 cm involving the left lower pole and the left proximal ureter, balloon tract dilation for establishment of nephrostomy tract, antegrade nephrostomy tube placement-Dr. Miguel Monte   • CYSTOSCOPY, RETROGRADE PYELOGRAM AND STENT INSERTION Left 09/28/2015    Left cystoscopy with left retrograde pyelogram, left double-J stent placement-Dr. Rivera [facility]   • CYSTOSCOPY, RETROGRADE PYELOGRAM AND STENT INSERTION Left 09/09/2015    Cystoscopy with bilateral retrograde pyelogram, left double-J stent placement, right ureteral pyeloscopy with laser lithotripsy of the ureteropelvic junction calculus, right double-J stent placement-Dr. Miguel Monte   • CYSTOSCOPY, RETROGRADE PYELOGRAM AND STENT INSERTION Right 09/21/2007    Cystoscopy with right retrograde pyelogram, right biliary stent placement-Dr. Miguel Monte   • CYSTOSCOPY, RETROGRADE PYELOGRAM AND STENT INSERTION Right 09/07/2007    Cystoscopy with right retrograde pyelogram, right ureteral peyloscopy with extraction distal ureteral mass, right double J stent placement-Dr. Miguel Monte   • CYSTOSCOPY, URETEROSCOPY, RETROGRADE PYELOGRAM, STENT INSERTION  Right 09/07/2007    Cystoscopy with right retrograde pyelogram, rigth ureteroscopy with extraction of distal mass, right double J stent placement-Dr. Miguel Monte   • D&C HYSTEROSCOPY N/A 05/18/2011    Procedure done due to thickened endomentrium and an endometrial polyp-Dr. Quita Cheatham   • DILATATION AND CURETTAGE N/A 03/22/2002    D&C, polyp removal-Dr. Quita Cheatham   • EXTRACORPOREAL SHOCKWAVE LITHOTRIPSY (ESWL), STENT INSERTION/REMOVAL Left 05/08/2015    Left extracoporeal shockwave lithotripsy, cystoscopy with stent placement-Dr. Miguel Monte   • MEDIASTINOTOMY Left 3/5/2018    Procedure: left thyroidectomy, resection of substernal thyroid goiter cervical approach;  Surgeon: Quintin Mares III, MD;  Location: [facility] MAIN OR;  Service:    • SIGMOIDOSCOPY N/A 9/13/2016    Procedure: SIGMOIDOSCOPY FLEXIBLE TO 25 CM;  Surgeon: Patt Moore MD;  Location: [facility] ENDOSCOPY;  Service:    • THORACOTOMY  1996    Thoracotomy for ectopic thyroid, Dr. Camarillo   • UMBILICAL HERNIA REPAIR N/A 9/14/2016    Procedure: UMBILICAL HERNIA REPAIR ;  Surgeon: Patt Moore MD;  Location: [facility] MAIN OR;  Service:    • VENTRAL/INCISIONAL HERNIA REPAIR N/A 5/17/2017    Procedure: VENTRAL HERNIA REPAIR WITH MESH, BILATERAL MUSCULOFASCIAL RELEASE;  Surgeon: Patt Moore MD;  Location: [facility] MAIN OR;  Service:      Family History   Problem Relation Age of Onset   • Heart disease Father    • Heart attack Paternal Uncle    • Cancer Maternal Grandmother      ovarian   • Heart attack Paternal Grandfather    • Heart attack Paternal Uncle    • Heart attack Paternal Uncle    • Heart attack Paternal Uncle    • Heart attack Paternal Uncle    • Heart attack Paternal Uncle    • Malig Hyperthermia Neg Hx      Social History     Social History   • Marital status:      Spouse name: BERENICE COFFEY    • Number of children: 2   • Years of education: HIGH SCHOOL      Occupational History   •  Retired     Social History Main Topics   •  Smoking status: Never Smoker   • Smokeless tobacco: Never Used   • Alcohol use No   • Drug use: No   • Sexual activity: Defer     Other Topics Concern   • Not on file     Social History Narrative   • No narrative on file       Current Outpatient Prescriptions:   •  aspirin  MG tablet, Take 325 mg by mouth Every 6 (Six) Hours As Needed. PT HOLDING FOR SURGERY, Disp: , Rfl:   •  HYDROcodone-acetaminophen (NORCO) 7.5-325 MG per tablet, Take 1 tablet by mouth Every 6 (Six) Hours As Needed for Moderate Pain  for up to 7 days., Disp: 30 tablet, Rfl: 0  •  methIMAzole (TAPAZOLE) 5 MG tablet, Take 1 tablet by mouth Every Other Day., Disp: 15 tablet, Rfl: 5  •  naproxen sodium (ALEVE) 220 MG tablet, Take 220 mg by mouth 2 (Two) Times a Day As Needed. PT HOLDING FOR SURGERY, Disp: , Rfl:   •  triamcinolone (KENALOG) 0.1 % cream, Apply  topically Every 12 (Twelve) Hours., Disp: 45 g, Rfl: 0  Allergies   Allergen Reactions   • Cephalexin Hives     Pt states she possibly is not allergic to Cephalexin, took and did not have problems   • Cephalosporins Hives   • Penicillins Hives         Review of Systems   Constitution: Negative.   HENT: Negative.    Eyes: Negative.    Cardiovascular: Negative.    Respiratory: Negative.    Endocrine: Negative.    Hematologic/Lymphatic: Negative.    Skin: Negative.    Musculoskeletal: Negative.    Gastrointestinal: Negative.    Genitourinary: Negative.    Neurological: Negative.    Psychiatric/Behavioral: Negative.        Vitals:    03/12/18 1239   BP: 150/80   Pulse: 73   SpO2: 96%       Objective     Physical Exam   Constitutional: She appears well-developed.   HENT:   Head: Normocephalic and atraumatic.   Neck:   Cervical neck incision remains well approximated without redness, drainage, or swelling.     Cardiovascular: Normal rate and regular rhythm.    Pulmonary/Chest: Effort normal and breath sounds normal.   Skin: Rash noted. Rash is urticarial (from electrodes is healing well with  decreased redness and itching.).   Site of left VIDAL neck drain is healed without redness or drainage.  Right VIDAL drain intact.       Psychiatric: She has a normal mood and affect. Her behavior is normal.         Assessment/Plan   Diagnoses and all orders for this visit:    Multinodular goiter  -     XR Chest 2 View; Future    Surgery follow-up examination  -     XR Chest 2 View; Future        SUMMARY  Patt Bond has been doing well since she was last seen.  She denies any new complaints or concerns.    Her VIDAL drain out put has been minimal since discharge.  Drain removed in office today without any problems.  She is to follow-up in approximately 2 weeks with repeat CXR.  In the meantime, instructed to contact us sooner for any problems or concerns.       Tati Rees, APRN  03/12/18  1:11 PM

## 2018-03-13 LAB
ABO + RH BLD: NORMAL
ABO + RH BLD: NORMAL
BH BB BLOOD EXPIRATION DATE: NORMAL
BH BB BLOOD EXPIRATION DATE: NORMAL
BH BB BLOOD TYPE BARCODE: 6200
BH BB BLOOD TYPE BARCODE: 6200
BH BB DISPENSE STATUS: NORMAL
BH BB DISPENSE STATUS: NORMAL
BH BB PRODUCT CODE: NORMAL
BH BB PRODUCT CODE: NORMAL
BH BB UNIT NUMBER: NORMAL
BH BB UNIT NUMBER: NORMAL
UNIT  ABO: NORMAL
UNIT  ABO: NORMAL
UNIT  RH: NORMAL
UNIT  RH: NORMAL

## 2018-03-30 DIAGNOSIS — E05.00 GRAVES DISEASE: Primary | ICD-10-CM

## 2018-04-02 ENCOUNTER — HOSPITAL ENCOUNTER (OUTPATIENT)
Dept: GENERAL RADIOLOGY | Facility: HOSPITAL | Age: 73
Discharge: HOME | End: 2018-04-02
Admitting: NURSE PRACTITIONER

## 2018-04-02 ENCOUNTER — RESULTS ENCOUNTER (OUTPATIENT)
Dept: ENDOCRINOLOGY | Age: 73
End: 2018-04-02

## 2018-04-02 ENCOUNTER — OFFICE VISIT (OUTPATIENT)
Dept: SURGERY | Facility: CLINIC | Age: 73
End: 2018-04-02

## 2018-04-02 VITALS
SYSTOLIC BLOOD PRESSURE: 137 MMHG | DIASTOLIC BLOOD PRESSURE: 80 MMHG | BODY MASS INDEX: 44.65 KG/M2 | HEART RATE: 70 BPM | WEIGHT: 252 LBS | HEIGHT: 63 IN | OXYGEN SATURATION: 95 %

## 2018-04-02 DIAGNOSIS — E04.2 MULTINODULAR GOITER: ICD-10-CM

## 2018-04-02 DIAGNOSIS — Z09 SURGERY FOLLOW-UP EXAMINATION: ICD-10-CM

## 2018-04-02 DIAGNOSIS — E05.00 GRAVES DISEASE: ICD-10-CM

## 2018-04-02 DIAGNOSIS — E04.9 SUBSTERNAL GOITER: Primary | ICD-10-CM

## 2018-04-02 DIAGNOSIS — Z09 FOLLOW-UP EXAMINATION FOLLOWING SURGERY: ICD-10-CM

## 2018-04-02 LAB
ALBUMIN SERPL-MCNC: 4.2 G/DL (ref 3.5–5.2)
ALBUMIN/GLOB SERPL: 1.6 G/DL
ALP SERPL-CCNC: 68 U/L (ref 39–117)
ALT SERPL-CCNC: 20 U/L (ref 1–33)
AST SERPL-CCNC: 23 U/L (ref 1–32)
BASOPHILS # BLD AUTO: 0.01 10*3/MM3 (ref 0–0.2)
BASOPHILS NFR BLD AUTO: 0.2 % (ref 0–1.5)
BILIRUB SERPL-MCNC: 0.6 MG/DL (ref 0.1–1.2)
BUN SERPL-MCNC: 14 MG/DL (ref 8–23)
BUN/CREAT SERPL: 14.1 (ref 7–25)
CALCIUM SERPL-MCNC: 10.1 MG/DL (ref 8.6–10.5)
CHLORIDE SERPL-SCNC: 99 MMOL/L (ref 98–107)
CO2 SERPL-SCNC: 26.3 MMOL/L (ref 22–29)
CREAT SERPL-MCNC: 0.99 MG/DL (ref 0.57–1)
EOSINOPHIL # BLD AUTO: 0.45 10*3/MM3 (ref 0–0.7)
EOSINOPHIL NFR BLD AUTO: 8.4 % (ref 0.3–6.2)
ERYTHROCYTE [DISTWIDTH] IN BLOOD BY AUTOMATED COUNT: 14.8 % (ref 11.7–13)
GFR SERPLBLD CREATININE-BSD FMLA CKD-EPI: 55 ML/MIN/1.73
GFR SERPLBLD CREATININE-BSD FMLA CKD-EPI: 67 ML/MIN/1.73
GLOBULIN SER CALC-MCNC: 2.6 GM/DL
GLUCOSE SERPL-MCNC: 95 MG/DL (ref 65–99)
HCT VFR BLD AUTO: 40.1 % (ref 35.6–45.5)
HGB BLD-MCNC: 13.1 G/DL (ref 11.9–15.5)
IMM GRANULOCYTES # BLD: 0.02 10*3/MM3 (ref 0–0.03)
IMM GRANULOCYTES NFR BLD: 0.4 % (ref 0–0.5)
LYMPHOCYTES # BLD AUTO: 1.51 10*3/MM3 (ref 0.9–4.8)
LYMPHOCYTES NFR BLD AUTO: 28.3 % (ref 19.6–45.3)
MCH RBC QN AUTO: 31.3 PG (ref 26.9–32)
MCHC RBC AUTO-ENTMCNC: 32.7 G/DL (ref 32.4–36.3)
MCV RBC AUTO: 95.7 FL (ref 80.5–98.2)
MONOCYTES # BLD AUTO: 0.32 10*3/MM3 (ref 0.2–1.2)
MONOCYTES NFR BLD AUTO: 6 % (ref 5–12)
NEUTROPHILS # BLD AUTO: 3.03 10*3/MM3 (ref 1.9–8.1)
NEUTROPHILS NFR BLD AUTO: 56.7 % (ref 42.7–76)
PLATELET # BLD AUTO: 136 10*3/MM3 (ref 140–500)
POTASSIUM SERPL-SCNC: 4.7 MMOL/L (ref 3.5–5.2)
PROT SERPL-MCNC: 6.8 G/DL (ref 6–8.5)
RBC # BLD AUTO: 4.19 10*6/MM3 (ref 3.9–5.2)
SODIUM SERPL-SCNC: 138 MMOL/L (ref 136–145)
T4 FREE SERPL-MCNC: 0.83 NG/DL (ref 0.93–1.7)
WBC # BLD AUTO: 5.34 10*3/MM3 (ref 4.5–10.7)

## 2018-04-02 PROCEDURE — 71046 X-RAY EXAM CHEST 2 VIEWS: CPT

## 2018-04-02 PROCEDURE — 99024 POSTOP FOLLOW-UP VISIT: CPT | Performed by: THORACIC SURGERY (CARDIOTHORACIC VASCULAR SURGERY)

## 2018-04-02 NOTE — PROGRESS NOTES
Subjective   Patient ID: Patt Bond is a 72 y.o. female is here today for follow-up.    History of Present Illness  Dear Colleagues,   Patt Bond is here today for their first postoperative visit following resection of a substernal thyroid goiter through a cervical approach performed March 5, 2018.  Postoperative course was uneventful.  Patient was discharged home with 1 drain in place.  This was removed approximately 1 week after discharge.  She has been doing quite well.  She reports marked improvement in her shortness of breath.  She has no wheezing.  There is no dysphasia.  She has no pain.  She is resuming her normal activities.    The following portions of the patient's history were reviewed and updated as appropriate: allergies, current medications, past family history, past medical history, past social history, past surgical history and problem list.  Review of Systems   Constitution: Negative.   HENT: Negative.    Eyes: Negative.    Cardiovascular: Negative.    Respiratory: Negative.    Endocrine: Negative.    Hematologic/Lymphatic: Negative.    Skin: Negative.    Musculoskeletal: Negative.    Gastrointestinal: Negative.    Genitourinary: Negative.    Neurological: Negative.    Psychiatric/Behavioral: Negative.         Objective   Physical Exam   Constitutional: She is oriented to person, place, and time. She appears well-developed and well-nourished.   HENT:   Head: Normocephalic.   Eyes: Conjunctivae, EOM and lids are normal. Pupils are equal, round, and reactive to light.   Neck: Trachea normal and normal range of motion. Neck supple. No hepatojugular reflux and no JVD present. Carotid bruit is not present. No thyroid mass and no thyromegaly present.   No stridor heard over the trachea.  Neck incision is clean and dry and well healed.  Patient was instructed in wound care.  There are no masses and no adenopathy   Cardiovascular: Normal rate, regular rhythm, S1 normal, S2 normal, normal heart  sounds and normal pulses.   No extrasystoles are present. PMI is not displaced.    Pulmonary/Chest: Effort normal and breath sounds normal.   Abdominal: Soft. Normal appearance and bowel sounds are normal. She exhibits no mass. There is no hepatosplenomegaly. There is no tenderness. No hernia.   Musculoskeletal: Normal range of motion.   Neurological: She is alert and oriented to person, place, and time. She has normal strength and normal reflexes. No cranial nerve deficit or sensory deficit. She displays a negative Romberg sign.   Skin: Skin is warm, dry and intact.   Psychiatric: She has a normal mood and affect. Her speech is normal and behavior is normal. Judgment and thought content normal. Cognition and memory are normal.       Assessment/Plan   Independent Review of Radiographic Studies:    Chest x-ray performed today was independently reviewed.  Trachea is almost completely back to the midline.  No obvious masses.  Both lungs fully expanded.  No pneumothorax.  No pleural effusion.  No infiltrates.    Assessment: Satisfactory postoperative course and recovery from resection of substernal thyroid goiter    Plan: Patient will follow-up with her endocrinologist.  She will return here on an as-needed basis only.  She can resume all normal activities without restrictions.  Thank you for allowing me to participate in the care of Mrs. Bond    Diagnoses and all orders for this visit:    Substernal goiter    Follow-up examination following surgery

## 2018-04-09 ENCOUNTER — OFFICE VISIT (OUTPATIENT)
Dept: ENDOCRINOLOGY | Age: 73
End: 2018-04-09

## 2018-04-09 VITALS
DIASTOLIC BLOOD PRESSURE: 88 MMHG | BODY MASS INDEX: 45.18 KG/M2 | SYSTOLIC BLOOD PRESSURE: 168 MMHG | WEIGHT: 255 LBS | HEART RATE: 85 BPM | HEIGHT: 63 IN

## 2018-04-09 DIAGNOSIS — E05.00 GRAVES DISEASE: Primary | ICD-10-CM

## 2018-04-09 DIAGNOSIS — R63.5 ABNORMAL WEIGHT GAIN: ICD-10-CM

## 2018-04-09 DIAGNOSIS — E05.90 HYPERTHYROIDISM: ICD-10-CM

## 2018-04-09 LAB
Lab: NORMAL
Lab: NORMAL
TSH SERPL DL<=0.005 MIU/L-ACNC: 3.2 MIU/ML (ref 0.27–4.2)
WRITTEN AUTHORIZATION: NORMAL

## 2018-04-09 PROCEDURE — 99214 OFFICE O/P EST MOD 30 MIN: CPT | Performed by: INTERNAL MEDICINE

## 2018-04-09 NOTE — PROGRESS NOTES
72 y.o.    Patient Care Team:  Moe Matute MD as PCP - General  Miguel Gautam MD as Consulting Physician (Cardiology)    Chief Complaint:      F/U HYPERTHYROIDISM.  HERE TO DISCUSS LAB RESULTS.  Subjective     Hyperthyroidism   Pertinent negatives include no abdominal pain, chest pain, chills, fatigue, fever or vomiting.   Patient is a 72-year-old white female with a history of large goiter and left thyroid mass, Graves' disease and hyperthyroidism came for follow-up  Hyperthyroidism  Patient is currently taking methimazole 5 mg every other day  She recently had surgery approximately one month ago which was left thyroidectomy and it was partly substernal the surgery was done by cardiothoracic surgery Dr. Mares on 3/5/18.  Since then patient was advised to take methimazole 5 mg every other day  Patient denies any side effects from medication  Dysphagia  Mostly resolved since surgery  Abnormal weight gain  Patient gained only 3 pounds since surgery  Patient denies any symptoms suggestive of hyperthyroidism at this point        Interval History: Summary of hospitalization      71-year-old female no previous history of pulmonary issues in the past admitted for the evaluation of left-sided abdominal pain. She's noted to have severe diverticular disease with abscess and presently ongoing evaluation. She gets short of breath with exertion. She's had workup with a CT chest results of which revealed a large thyroid mass causing tracheal deviation. Currently she denies any problems swallowing. She denies any cough but has some wheezing with exertion. No aspiration has been reported.  Patient's thyroid function was analyzed and TSH was suppressed with elevated. T4. With this picture of hyperthyroidism was concentrated for further managing patient's thyroid condition.      Patient reports very occasional dysphagia. She denies any change in voice. She does report to shortness of breath and is not clear as to  the source of shortness of breath.  Patient denied any symptoms of palpitations or sweating episodes for tremors or diarrhea. She has had diverticulitis which has complicated the GI symptoms.  She has lost weight but also reported that her appetite has been very low which could explain the weight loss      Patient frequently refers to Dr. Camarillo doing a surgery on the right side for thyroid mass in the past. Currently has a CT scan reveals a large thyroid mass on the left side pushing the trachea to the right and causing some tracheal compression.  Patient is currently being evaluated by general surgery, pulmonary, infection disease  Patient and family denied any family history of thyroid cancer. She's not known to have any thyroid dysfunction in the past other than the fact that she had a goiter on the right side of the chest which was removed several years agoher  The following portions of the patient's history were reviewed and updated as appropriate: allergies, current medications, past family history, past medical history, past social history, past surgical history and problem list.    Past Medical History:   Diagnosis Date   • Asthma    • Back pain    • Diverticulitis    • Diverticulosis    • Goiter    • Hyperthyroidism     followed by Dr. Blackmon   • Joint pain, knee    • Kidney stones 2015    with cystoscopy by Dr. Miguel Monte done on 10/7/15, 9/9/15, 5/8/15, 9/21/07   • Mediastinal mass    • Obstructive pyelonephritis 09/28/2015    left obstructing pyelonephritis    • PONV (postoperative nausea and vomiting)    • Renal disorder    • SOB (shortness of breath) on exertion      Family History   Problem Relation Age of Onset   • Heart disease Father    • Heart attack Paternal Uncle    • Cancer Maternal Grandmother      ovarian   • Heart attack Paternal Grandfather    • Heart attack Paternal Uncle    • Heart attack Paternal Uncle    • Heart attack Paternal Uncle    • Heart attack Paternal Uncle    • Heart  "attack Paternal Uncle    • Malig Hyperthermia Neg Hx      Social History     Social History   • Marital status:      Spouse name: BERENICE COFFEY    • Number of children: 2   • Years of education: HIGH SCHOOL      Occupational History   •  Retired     Social History Main Topics   • Smoking status: Never Smoker   • Smokeless tobacco: Never Used   • Alcohol use No   • Drug use: No   • Sexual activity: Defer     Other Topics Concern   • Not on file     Social History Narrative   • No narrative on file     Allergies   Allergen Reactions   • Cephalexin Hives     Pt states she possibly is not allergic to Cephalexin, took and did not have problems   • Cephalosporins Hives   • Penicillins Hives       Current Outpatient Prescriptions:   •  aspirin  MG tablet, Take 325 mg by mouth Every 6 (Six) Hours As Needed. PT HOLDING FOR SURGERY, Disp: , Rfl:   •  methIMAzole (TAPAZOLE) 5 MG tablet, Take 1 tablet by mouth Every Other Day., Disp: 15 tablet, Rfl: 5  •  naproxen sodium (ALEVE) 220 MG tablet, Take 220 mg by mouth 2 (Two) Times a Day As Needed. PT HOLDING FOR SURGERY, Disp: , Rfl:   •  triamcinolone (KENALOG) 0.1 % cream, Apply  topically Every 12 (Twelve) Hours., Disp: 45 g, Rfl: 0        Review of Systems   Constitutional: Negative for chills, fatigue and fever.   Cardiovascular: Negative for chest pain and palpitations.   Gastrointestinal: Negative for abdominal pain, constipation, diarrhea and vomiting.   Endocrine: Negative for cold intolerance.   All other systems reviewed and are negative.      Objective       Vitals:    04/09/18 1111   BP: 168/88   Pulse: 85   Weight: 116 kg (255 lb)   Height: 160 cm (63\")     Body mass index is 45.17 kg/m².      Physical Exam   Constitutional: She is oriented to person, place, and time. She appears well-developed and well-nourished.   Obese   HENT:   Head: Normocephalic and atraumatic.   Eyes: EOM are normal. Pupils are equal, round, and reactive to light.   Neck: Normal " range of motion. Neck supple. No tracheal deviation present. No thyromegaly present.   Cardiovascular: Normal rate, regular rhythm, normal heart sounds and intact distal pulses.    Tachycardia   Pulmonary/Chest: Effort normal and breath sounds normal.   Abdominal: Soft. Bowel sounds are normal. She exhibits distension. There is no tenderness.   Musculoskeletal: Normal range of motion. She exhibits no edema.   Neurological: She is alert and oriented to person, place, and time. She has normal reflexes.   Skin: Skin is warm and dry. No erythema.   Psychiatric: She has a normal mood and affect. Her behavior is normal.   Nursing note and vitals reviewed.    Results Review:     I reviewed the patient's new clinical results.    Medical records reviewed  Summary:      Results Encounter on 04/02/2018   Component Date Value Ref Range Status   • Glucose 04/02/2018 95  65 - 99 mg/dL Final   • BUN 04/02/2018 14  8 - 23 mg/dL Final   • Creatinine 04/02/2018 0.99  0.57 - 1.00 mg/dL Final   • eGFR Non African Am 04/02/2018 55* >60 mL/min/1.73 Final   • eGFR African Am 04/02/2018 67  >60 mL/min/1.73 Final   • BUN/Creatinine Ratio 04/02/2018 14.1  7.0 - 25.0 Final   • Sodium 04/02/2018 138  136 - 145 mmol/L Final   • Potassium 04/02/2018 4.7  3.5 - 5.2 mmol/L Final   • Chloride 04/02/2018 99  98 - 107 mmol/L Final   • Total CO2 04/02/2018 26.3  22.0 - 29.0 mmol/L Final   • Calcium 04/02/2018 10.1  8.6 - 10.5 mg/dL Final   • Total Protein 04/02/2018 6.8  6.0 - 8.5 g/dL Final   • Albumin 04/02/2018 4.20  3.50 - 5.20 g/dL Final   • Globulin 04/02/2018 2.6  gm/dL Final   • A/G Ratio 04/02/2018 1.6  g/dL Final   • Total Bilirubin 04/02/2018 0.6  0.1 - 1.2 mg/dL Final   • Alkaline Phosphatase 04/02/2018 68  39 - 117 U/L Final   • AST (SGOT) 04/02/2018 23  1 - 32 U/L Final   • ALT (SGPT) 04/02/2018 20  1 - 33 U/L Final   • Free T4 04/02/2018 0.83* 0.93 - 1.70 ng/dL Final   • WBC 04/02/2018 5.34  4.50 - 10.70 10*3/mm3 Final   • RBC  04/02/2018 4.19  3.90 - 5.20 10*6/mm3 Final   • Hemoglobin 04/02/2018 13.1  11.9 - 15.5 g/dL Final   • Hematocrit 04/02/2018 40.1  35.6 - 45.5 % Final   • MCV 04/02/2018 95.7  80.5 - 98.2 fL Final   • MCH 04/02/2018 31.3  26.9 - 32.0 pg Final   • MCHC 04/02/2018 32.7  32.4 - 36.3 g/dL Final   • RDW 04/02/2018 14.8* 11.7 - 13.0 % Final   • Platelets 04/02/2018 136* 140 - 500 10*3/mm3 Final   • Neutrophil Rel % 04/02/2018 56.7  42.7 - 76.0 % Final   • Lymphocyte Rel % 04/02/2018 28.3  19.6 - 45.3 % Final   • Monocyte Rel % 04/02/2018 6.0  5.0 - 12.0 % Final   • Eosinophil Rel % 04/02/2018 8.4* 0.3 - 6.2 % Final   • Basophil Rel % 04/02/2018 0.2  0.0 - 1.5 % Final   • Neutrophils Absolute 04/02/2018 3.03  1.90 - 8.10 10*3/mm3 Final   • Lymphocytes Absolute 04/02/2018 1.51  0.90 - 4.80 10*3/mm3 Final   • Monocytes Absolute 04/02/2018 0.32  0.20 - 1.20 10*3/mm3 Final   • Eosinophils Absolute 04/02/2018 0.45  0.00 - 0.70 10*3/mm3 Final   • Basophils Absolute 04/02/2018 0.01  0.00 - 0.20 10*3/mm3 Final   • Immature Granulocyte Rel % 04/02/2018 0.4  0.0 - 0.5 % Final   • Immature Grans Absolute 04/02/2018 0.02  0.00 - 0.03 10*3/mm3 Final     Lab Results   Component Value Date    HGBA1C 4.70 (L) 09/10/2016     Lab Results   Component Value Date    CREATININE 0.99 04/02/2018     Imaging Results (most recent)     None            Assessment and Plan:    Patt was seen today for hyperthyroidism.    Diagnoses and all orders for this visit:    Graves disease  -     TSH; Future  -     T4, Free; Future    Hyperthyroidism  -     TSH; Future  -     T4, Free; Future    Abnormal weight gain      Patient is status post left thyroidectomy which was partly substernal on March 5, 2018  The surgery was done by Dr. Mares  Patient has been taking methimazole 5 mg every other day  She denies any symptoms suggestive of hyper or hypothyroidism  She denies any palpitations  Patient gained about 3 pounds in the past few months     Patient's free  "T4 0.83  Patient's recent TSH was 3.5  Her methimazole dose was adjusted to 5 mg every other day    Patient denies any side effects from the medication  I advised her to continue the current dose  Advised the patient to get lab testing done in the next 6 weeks as well as 12 weeks   After the review of the lab results patient will be notified of any further changes in medication  Patient will return to follow-up in 3-4 months.    The total time spent for old record and lab review and face- to- face was more than 25 min of which greater than 15 min of time ( greater than 50% of the total time )  was spent face to face with the patient counseling and coordination of care on recommended evaluation and treatment options, instructions for management/treatment and /or follow up  and importance of compliance with chosen management or treatment options    Garrison Alcaraz MD. FACE    04/09/18      EMR Dragon / transcription disclaimer:     \"Dictated utilizing Dragon dictation\".         "

## 2018-04-21 ENCOUNTER — APPOINTMENT (OUTPATIENT)
Dept: GENERAL RADIOLOGY | Facility: HOSPITAL | Age: 73
End: 2018-04-21

## 2018-04-21 ENCOUNTER — HOSPITAL ENCOUNTER (OUTPATIENT)
Facility: HOSPITAL | Age: 73
Discharge: HOME | End: 2018-04-22
Attending: EMERGENCY MEDICINE | Admitting: HOSPITALIST

## 2018-04-21 ENCOUNTER — ANESTHESIA (OUTPATIENT)
Dept: PERIOP | Facility: HOSPITAL | Age: 73
End: 2018-04-21

## 2018-04-21 ENCOUNTER — ANESTHESIA EVENT (OUTPATIENT)
Dept: PERIOP | Facility: HOSPITAL | Age: 73
End: 2018-04-21

## 2018-04-21 ENCOUNTER — APPOINTMENT (OUTPATIENT)
Dept: CT IMAGING | Facility: HOSPITAL | Age: 73
End: 2018-04-21

## 2018-04-21 DIAGNOSIS — N20.1 LEFT URETERAL STONE: Primary | ICD-10-CM

## 2018-04-21 DIAGNOSIS — Q62.11 HYDRONEPHROSIS WITH URETEROPELVIC JUNCTION (UPJ) OBSTRUCTION: ICD-10-CM

## 2018-04-21 PROBLEM — R11.2 NAUSEA AND VOMITING: Status: ACTIVE | Noted: 2018-04-21

## 2018-04-21 PROBLEM — E66.01 MORBID OBESITY WITH BMI OF 40.0-44.9, ADULT: Status: ACTIVE | Noted: 2018-04-21

## 2018-04-21 LAB
ALBUMIN SERPL-MCNC: 3.9 G/DL (ref 3.5–5.2)
ALBUMIN/GLOB SERPL: 1.2 G/DL
ALP SERPL-CCNC: 68 U/L (ref 39–117)
ALT SERPL W P-5'-P-CCNC: 25 U/L (ref 1–33)
ANION GAP SERPL CALCULATED.3IONS-SCNC: 13.1 MMOL/L
AST SERPL-CCNC: 25 U/L (ref 1–32)
BACTERIA UR QL AUTO: ABNORMAL /HPF
BASOPHILS # BLD AUTO: 0.01 10*3/MM3 (ref 0–0.2)
BASOPHILS NFR BLD AUTO: 0.2 % (ref 0–1.5)
BILIRUB SERPL-MCNC: 0.5 MG/DL (ref 0.1–1.2)
BILIRUB UR QL STRIP: NEGATIVE
BUN BLD-MCNC: 19 MG/DL (ref 8–23)
BUN/CREAT SERPL: 16.5 (ref 7–25)
CALCIUM SPEC-SCNC: 9 MG/DL (ref 8.6–10.5)
CHLORIDE SERPL-SCNC: 105 MMOL/L (ref 98–107)
CLARITY UR: CLEAR
CO2 SERPL-SCNC: 22.9 MMOL/L (ref 22–29)
COLOR UR: YELLOW
CREAT BLD-MCNC: 1.15 MG/DL (ref 0.57–1)
DEPRECATED RDW RBC AUTO: 48.7 FL (ref 37–54)
EOSINOPHIL # BLD AUTO: 0.22 10*3/MM3 (ref 0–0.7)
EOSINOPHIL NFR BLD AUTO: 3.5 % (ref 0.3–6.2)
ERYTHROCYTE [DISTWIDTH] IN BLOOD BY AUTOMATED COUNT: 14.2 % (ref 11.7–13)
GFR SERPL CREATININE-BSD FRML MDRD: 47 ML/MIN/1.73
GLOBULIN UR ELPH-MCNC: 3.2 GM/DL
GLUCOSE BLD-MCNC: 107 MG/DL (ref 65–99)
GLUCOSE UR STRIP-MCNC: NEGATIVE MG/DL
HCT VFR BLD AUTO: 42.1 % (ref 35.6–45.5)
HGB BLD-MCNC: 13.8 G/DL (ref 11.9–15.5)
HGB UR QL STRIP.AUTO: ABNORMAL
HYALINE CASTS UR QL AUTO: ABNORMAL /LPF
IMM GRANULOCYTES # BLD: 0 10*3/MM3 (ref 0–0.03)
IMM GRANULOCYTES NFR BLD: 0 % (ref 0–0.5)
KETONES UR QL STRIP: NEGATIVE
LEUKOCYTE ESTERASE UR QL STRIP.AUTO: NEGATIVE
LIPASE SERPL-CCNC: 26 U/L (ref 13–60)
LYMPHOCYTES # BLD AUTO: 1.21 10*3/MM3 (ref 0.9–4.8)
LYMPHOCYTES NFR BLD AUTO: 19.1 % (ref 19.6–45.3)
MCH RBC QN AUTO: 31.2 PG (ref 26.9–32)
MCHC RBC AUTO-ENTMCNC: 32.8 G/DL (ref 32.4–36.3)
MCV RBC AUTO: 95 FL (ref 80.5–98.2)
MONOCYTES # BLD AUTO: 0.34 10*3/MM3 (ref 0.2–1.2)
MONOCYTES NFR BLD AUTO: 5.4 % (ref 5–12)
NEUTROPHILS # BLD AUTO: 4.55 10*3/MM3 (ref 1.9–8.1)
NEUTROPHILS NFR BLD AUTO: 71.8 % (ref 42.7–76)
NITRITE UR QL STRIP: NEGATIVE
PH UR STRIP.AUTO: <=5 [PH] (ref 5–8)
PLATELET # BLD AUTO: 128 10*3/MM3 (ref 140–500)
PMV BLD AUTO: 9.8 FL (ref 6–12)
POTASSIUM BLD-SCNC: 4.4 MMOL/L (ref 3.5–5.2)
PROT SERPL-MCNC: 7.1 G/DL (ref 6–8.5)
PROT UR QL STRIP: NEGATIVE
RBC # BLD AUTO: 4.43 10*6/MM3 (ref 3.9–5.2)
RBC # UR: ABNORMAL /HPF
REF LAB TEST METHOD: ABNORMAL
SODIUM BLD-SCNC: 141 MMOL/L (ref 136–145)
SP GR UR STRIP: 1.02 (ref 1–1.03)
SQUAMOUS #/AREA URNS HPF: ABNORMAL /HPF
UROBILINOGEN UR QL STRIP: ABNORMAL
WBC NRBC COR # BLD: 6.33 10*3/MM3 (ref 4.5–10.7)
WBC UR QL AUTO: ABNORMAL /HPF

## 2018-04-21 PROCEDURE — 74176 CT ABD & PELVIS W/O CONTRAST: CPT

## 2018-04-21 PROCEDURE — A9270 NON-COVERED ITEM OR SERVICE: HCPCS | Performed by: HOSPITALIST

## 2018-04-21 PROCEDURE — 25010000002 ONDANSETRON PER 1 MG: Performed by: NURSE ANESTHETIST, CERTIFIED REGISTERED

## 2018-04-21 PROCEDURE — 96375 TX/PRO/DX INJ NEW DRUG ADDON: CPT

## 2018-04-21 PROCEDURE — 25010000002 FENTANYL CITRATE (PF) 100 MCG/2ML SOLUTION: Performed by: NURSE ANESTHETIST, CERTIFIED REGISTERED

## 2018-04-21 PROCEDURE — 25010000002 HYDROMORPHONE PER 4 MG: Performed by: PHYSICIAN ASSISTANT

## 2018-04-21 PROCEDURE — 63710000001 OPIUM-BELLADONNA 16.2-60 MG SUPPOSITORY: Performed by: UROLOGY

## 2018-04-21 PROCEDURE — 25010000002 FENTANYL CITRATE (PF) 100 MCG/2ML SOLUTION: Performed by: ANESTHESIOLOGY

## 2018-04-21 PROCEDURE — 25010000002 SUCCINYLCHOLINE PER 20 MG: Performed by: NURSE ANESTHETIST, CERTIFIED REGISTERED

## 2018-04-21 PROCEDURE — 25010000002 ONDANSETRON PER 1 MG: Performed by: PHYSICIAN ASSISTANT

## 2018-04-21 PROCEDURE — 99284 EMERGENCY DEPT VISIT MOD MDM: CPT

## 2018-04-21 PROCEDURE — 25010000002 HYDROMORPHONE PER 4 MG: Performed by: EMERGENCY MEDICINE

## 2018-04-21 PROCEDURE — C2617 STENT, NON-COR, TEM W/O DEL: HCPCS | Performed by: UROLOGY

## 2018-04-21 PROCEDURE — 25010000002 LEVOFLOXACIN PER 250 MG: Performed by: UROLOGY

## 2018-04-21 PROCEDURE — 25010000002 MIDAZOLAM PER 1 MG: Performed by: ANESTHESIOLOGY

## 2018-04-21 PROCEDURE — 96374 THER/PROPH/DIAG INJ IV PUSH: CPT

## 2018-04-21 PROCEDURE — 80053 COMPREHEN METABOLIC PANEL: CPT | Performed by: PHYSICIAN ASSISTANT

## 2018-04-21 PROCEDURE — C1758 CATHETER, URETERAL: HCPCS | Performed by: UROLOGY

## 2018-04-21 PROCEDURE — 63710000001 HYDROCODONE-ACETAMINOPHEN 5-325 MG TABLET: Performed by: HOSPITALIST

## 2018-04-21 PROCEDURE — 25010000002 PROPOFOL 10 MG/ML EMULSION: Performed by: NURSE ANESTHETIST, CERTIFIED REGISTERED

## 2018-04-21 PROCEDURE — A9270 NON-COVERED ITEM OR SERVICE: HCPCS | Performed by: UROLOGY

## 2018-04-21 PROCEDURE — C1769 GUIDE WIRE: HCPCS | Performed by: UROLOGY

## 2018-04-21 PROCEDURE — 74420 UROGRAPHY RTRGR +-KUB: CPT

## 2018-04-21 PROCEDURE — 81001 URINALYSIS AUTO W/SCOPE: CPT | Performed by: PHYSICIAN ASSISTANT

## 2018-04-21 PROCEDURE — 25010000002 DEXAMETHASONE PER 1 MG: Performed by: NURSE ANESTHETIST, CERTIFIED REGISTERED

## 2018-04-21 PROCEDURE — 63710000001 PROMETHAZINE PER 12.5 MG: Performed by: HOSPITALIST

## 2018-04-21 PROCEDURE — 25010000002 MORPHINE SULFATE (PF) 2 MG/ML SOLUTION: Performed by: HOSPITALIST

## 2018-04-21 PROCEDURE — 0 IOTHALAMATE 60 % SOLUTION: Performed by: UROLOGY

## 2018-04-21 PROCEDURE — 25010000002 ONDANSETRON PER 1 MG: Performed by: HOSPITALIST

## 2018-04-21 PROCEDURE — 83690 ASSAY OF LIPASE: CPT | Performed by: PHYSICIAN ASSISTANT

## 2018-04-21 PROCEDURE — 85025 COMPLETE CBC W/AUTO DIFF WBC: CPT | Performed by: PHYSICIAN ASSISTANT

## 2018-04-21 DEVICE — URETERAL STENT
Type: IMPLANTABLE DEVICE | Site: URETER | Status: FUNCTIONAL
Brand: CONTOUR™

## 2018-04-21 RX ORDER — ONDANSETRON 2 MG/ML
4 INJECTION INTRAMUSCULAR; INTRAVENOUS ONCE AS NEEDED
Status: DISCONTINUED | OUTPATIENT
Start: 2018-04-21 | End: 2018-04-21 | Stop reason: HOSPADM

## 2018-04-21 RX ORDER — POLYETHYLENE GLYCOL 3350 17 G/17G
17 POWDER, FOR SOLUTION ORAL DAILY
Status: CANCELLED | OUTPATIENT
Start: 2018-04-21

## 2018-04-21 RX ORDER — HYDROMORPHONE HYDROCHLORIDE 1 MG/ML
0.5 INJECTION, SOLUTION INTRAMUSCULAR; INTRAVENOUS; SUBCUTANEOUS ONCE
Status: DISCONTINUED | OUTPATIENT
Start: 2018-04-21 | End: 2018-04-21

## 2018-04-21 RX ORDER — SODIUM CHLORIDE 0.9 % (FLUSH) 0.9 %
10 SYRINGE (ML) INJECTION AS NEEDED
Status: DISCONTINUED | OUTPATIENT
Start: 2018-04-21 | End: 2018-04-21

## 2018-04-21 RX ORDER — SODIUM CHLORIDE 0.9 % (FLUSH) 0.9 %
1-10 SYRINGE (ML) INJECTION AS NEEDED
Status: DISCONTINUED | OUTPATIENT
Start: 2018-04-21 | End: 2018-04-21 | Stop reason: HOSPADM

## 2018-04-21 RX ORDER — HYDROMORPHONE HYDROCHLORIDE 1 MG/ML
0.5 INJECTION, SOLUTION INTRAMUSCULAR; INTRAVENOUS; SUBCUTANEOUS ONCE
Status: COMPLETED | OUTPATIENT
Start: 2018-04-21 | End: 2018-04-21

## 2018-04-21 RX ORDER — HYDROMORPHONE HCL 110MG/55ML
0.5 PATIENT CONTROLLED ANALGESIA SYRINGE INTRAVENOUS
Status: DISCONTINUED | OUTPATIENT
Start: 2018-04-21 | End: 2018-04-21 | Stop reason: HOSPADM

## 2018-04-21 RX ORDER — ACETAMINOPHEN 325 MG/1
650 TABLET ORAL EVERY 4 HOURS PRN
Status: DISCONTINUED | OUTPATIENT
Start: 2018-04-21 | End: 2018-04-22 | Stop reason: HOSPADM

## 2018-04-21 RX ORDER — DIPHENHYDRAMINE HYDROCHLORIDE 50 MG/ML
12.5 INJECTION INTRAMUSCULAR; INTRAVENOUS
Status: DISCONTINUED | OUTPATIENT
Start: 2018-04-21 | End: 2018-04-21 | Stop reason: HOSPADM

## 2018-04-21 RX ORDER — MORPHINE SULFATE 2 MG/ML
2 INJECTION, SOLUTION INTRAMUSCULAR; INTRAVENOUS EVERY 4 HOURS PRN
Status: DISCONTINUED | OUTPATIENT
Start: 2018-04-21 | End: 2018-04-22 | Stop reason: HOSPADM

## 2018-04-21 RX ORDER — MAGNESIUM HYDROXIDE 1200 MG/15ML
LIQUID ORAL AS NEEDED
Status: DISCONTINUED | OUTPATIENT
Start: 2018-04-21 | End: 2018-04-21 | Stop reason: HOSPADM

## 2018-04-21 RX ORDER — SODIUM CHLORIDE, SODIUM LACTATE, POTASSIUM CHLORIDE, CALCIUM CHLORIDE 600; 310; 30; 20 MG/100ML; MG/100ML; MG/100ML; MG/100ML
9 INJECTION, SOLUTION INTRAVENOUS CONTINUOUS
Status: DISCONTINUED | OUTPATIENT
Start: 2018-04-21 | End: 2018-04-21

## 2018-04-21 RX ORDER — FENTANYL CITRATE 50 UG/ML
INJECTION, SOLUTION INTRAMUSCULAR; INTRAVENOUS
Status: COMPLETED
Start: 2018-04-21 | End: 2018-04-21

## 2018-04-21 RX ORDER — PROMETHAZINE HYDROCHLORIDE 12.5 MG/1
12.5 TABLET ORAL EVERY 6 HOURS PRN
Status: DISCONTINUED | OUTPATIENT
Start: 2018-04-21 | End: 2018-04-22 | Stop reason: HOSPADM

## 2018-04-21 RX ORDER — LEVOFLOXACIN 5 MG/ML
750 INJECTION, SOLUTION INTRAVENOUS EVERY 24 HOURS
Status: DISCONTINUED | OUTPATIENT
Start: 2018-04-22 | End: 2018-04-22 | Stop reason: HOSPADM

## 2018-04-21 RX ORDER — DOCUSATE SODIUM 100 MG/1
100 CAPSULE, LIQUID FILLED ORAL 2 TIMES DAILY
Status: CANCELLED | OUTPATIENT
Start: 2018-04-21

## 2018-04-21 RX ORDER — HYDRALAZINE HYDROCHLORIDE 20 MG/ML
5 INJECTION INTRAMUSCULAR; INTRAVENOUS
Status: DISCONTINUED | OUTPATIENT
Start: 2018-04-21 | End: 2018-04-21 | Stop reason: HOSPADM

## 2018-04-21 RX ORDER — LIDOCAINE HYDROCHLORIDE 20 MG/ML
INJECTION, SOLUTION INFILTRATION; PERINEURAL AS NEEDED
Status: DISCONTINUED | OUTPATIENT
Start: 2018-04-21 | End: 2018-04-21 | Stop reason: SURG

## 2018-04-21 RX ORDER — LEVOFLOXACIN 5 MG/ML
500 INJECTION, SOLUTION INTRAVENOUS EVERY 24 HOURS
Status: CANCELLED | OUTPATIENT
Start: 2018-04-21 | End: 2018-04-28

## 2018-04-21 RX ORDER — PROPOFOL 10 MG/ML
VIAL (ML) INTRAVENOUS AS NEEDED
Status: DISCONTINUED | OUTPATIENT
Start: 2018-04-21 | End: 2018-04-21 | Stop reason: SURG

## 2018-04-21 RX ORDER — HYDROCODONE BITARTRATE AND ACETAMINOPHEN 7.5; 325 MG/1; MG/1
1 TABLET ORAL ONCE AS NEEDED
Status: DISCONTINUED | OUTPATIENT
Start: 2018-04-21 | End: 2018-04-21 | Stop reason: HOSPADM

## 2018-04-21 RX ORDER — PROMETHAZINE HYDROCHLORIDE 25 MG/1
25 TABLET ORAL ONCE AS NEEDED
Status: DISCONTINUED | OUTPATIENT
Start: 2018-04-21 | End: 2018-04-21 | Stop reason: HOSPADM

## 2018-04-21 RX ORDER — HYDROMORPHONE HCL 110MG/55ML
0.5 PATIENT CONTROLLED ANALGESIA SYRINGE INTRAVENOUS
Status: CANCELLED | OUTPATIENT
Start: 2018-04-21 | End: 2018-05-01

## 2018-04-21 RX ORDER — FENTANYL CITRATE 50 UG/ML
50 INJECTION, SOLUTION INTRAMUSCULAR; INTRAVENOUS
Status: DISCONTINUED | OUTPATIENT
Start: 2018-04-21 | End: 2018-04-21 | Stop reason: HOSPADM

## 2018-04-21 RX ORDER — ASPIRIN 325 MG
325 TABLET, DELAYED RELEASE (ENTERIC COATED) ORAL EVERY 6 HOURS PRN
Status: CANCELLED | OUTPATIENT
Start: 2018-04-21

## 2018-04-21 RX ORDER — SODIUM CHLORIDE 9 MG/ML
100 INJECTION, SOLUTION INTRAVENOUS CONTINUOUS
Status: DISCONTINUED | OUTPATIENT
Start: 2018-04-21 | End: 2018-04-22

## 2018-04-21 RX ORDER — PROMETHAZINE HYDROCHLORIDE 25 MG/1
12.5 TABLET ORAL ONCE AS NEEDED
Status: DISCONTINUED | OUTPATIENT
Start: 2018-04-21 | End: 2018-04-21 | Stop reason: HOSPADM

## 2018-04-21 RX ORDER — MIDAZOLAM HYDROCHLORIDE 1 MG/ML
2 INJECTION INTRAMUSCULAR; INTRAVENOUS
Status: DISCONTINUED | OUTPATIENT
Start: 2018-04-21 | End: 2018-04-21 | Stop reason: HOSPADM

## 2018-04-21 RX ORDER — FENTANYL CITRATE 50 UG/ML
INJECTION, SOLUTION INTRAMUSCULAR; INTRAVENOUS AS NEEDED
Status: DISCONTINUED | OUTPATIENT
Start: 2018-04-21 | End: 2018-04-21 | Stop reason: SURG

## 2018-04-21 RX ORDER — NALOXONE HCL 0.4 MG/ML
0.2 VIAL (ML) INJECTION AS NEEDED
Status: DISCONTINUED | OUTPATIENT
Start: 2018-04-21 | End: 2018-04-21 | Stop reason: HOSPADM

## 2018-04-21 RX ORDER — ONDANSETRON 2 MG/ML
4 INJECTION INTRAMUSCULAR; INTRAVENOUS ONCE
Status: COMPLETED | OUTPATIENT
Start: 2018-04-21 | End: 2018-04-21

## 2018-04-21 RX ORDER — NALOXONE HCL 0.4 MG/ML
0.4 VIAL (ML) INJECTION
Status: DISCONTINUED | OUTPATIENT
Start: 2018-04-21 | End: 2018-04-22 | Stop reason: HOSPADM

## 2018-04-21 RX ORDER — MIDAZOLAM HYDROCHLORIDE 1 MG/ML
1 INJECTION INTRAMUSCULAR; INTRAVENOUS
Status: DISCONTINUED | OUTPATIENT
Start: 2018-04-21 | End: 2018-04-21 | Stop reason: HOSPADM

## 2018-04-21 RX ORDER — ATROPA BELLADONNA AND OPIUM 16.2; 6 MG/1; MG/1
SUPPOSITORY RECTAL AS NEEDED
Status: DISCONTINUED | OUTPATIENT
Start: 2018-04-21 | End: 2018-04-21 | Stop reason: HOSPADM

## 2018-04-21 RX ORDER — ONDANSETRON 2 MG/ML
4 INJECTION INTRAMUSCULAR; INTRAVENOUS EVERY 4 HOURS PRN
Status: DISCONTINUED | OUTPATIENT
Start: 2018-04-21 | End: 2018-04-22 | Stop reason: HOSPADM

## 2018-04-21 RX ORDER — ONDANSETRON 2 MG/ML
INJECTION INTRAMUSCULAR; INTRAVENOUS AS NEEDED
Status: DISCONTINUED | OUTPATIENT
Start: 2018-04-21 | End: 2018-04-21 | Stop reason: SURG

## 2018-04-21 RX ORDER — PROMETHAZINE HYDROCHLORIDE 25 MG/1
25 SUPPOSITORY RECTAL ONCE AS NEEDED
Status: DISCONTINUED | OUTPATIENT
Start: 2018-04-21 | End: 2018-04-21 | Stop reason: HOSPADM

## 2018-04-21 RX ORDER — DEXAMETHASONE SODIUM PHOSPHATE 10 MG/ML
INJECTION INTRAMUSCULAR; INTRAVENOUS AS NEEDED
Status: DISCONTINUED | OUTPATIENT
Start: 2018-04-21 | End: 2018-04-21 | Stop reason: SURG

## 2018-04-21 RX ORDER — HYDROCODONE BITARTRATE AND ACETAMINOPHEN 7.5; 325 MG/1; MG/1
1 TABLET ORAL EVERY 6 HOURS PRN
Status: CANCELLED | OUTPATIENT
Start: 2018-04-21 | End: 2018-05-01

## 2018-04-21 RX ORDER — LABETALOL HYDROCHLORIDE 5 MG/ML
5 INJECTION, SOLUTION INTRAVENOUS
Status: DISCONTINUED | OUTPATIENT
Start: 2018-04-21 | End: 2018-04-21 | Stop reason: HOSPADM

## 2018-04-21 RX ORDER — PROMETHAZINE HYDROCHLORIDE 25 MG/ML
12.5 INJECTION, SOLUTION INTRAMUSCULAR; INTRAVENOUS EVERY 6 HOURS PRN
Status: CANCELLED | OUTPATIENT
Start: 2018-04-21

## 2018-04-21 RX ORDER — HYDROCODONE BITARTRATE AND ACETAMINOPHEN 5; 325 MG/1; MG/1
1 TABLET ORAL EVERY 4 HOURS PRN
Status: DISCONTINUED | OUTPATIENT
Start: 2018-04-21 | End: 2018-04-22 | Stop reason: HOSPADM

## 2018-04-21 RX ORDER — LIDOCAINE HYDROCHLORIDE 10 MG/ML
0.5 INJECTION, SOLUTION EPIDURAL; INFILTRATION; INTRACAUDAL; PERINEURAL ONCE AS NEEDED
Status: DISCONTINUED | OUTPATIENT
Start: 2018-04-21 | End: 2018-04-21 | Stop reason: HOSPADM

## 2018-04-21 RX ORDER — FLUMAZENIL 0.1 MG/ML
0.2 INJECTION INTRAVENOUS AS NEEDED
Status: DISCONTINUED | OUTPATIENT
Start: 2018-04-21 | End: 2018-04-21 | Stop reason: HOSPADM

## 2018-04-21 RX ORDER — PROMETHAZINE HYDROCHLORIDE 25 MG/ML
12.5 INJECTION, SOLUTION INTRAMUSCULAR; INTRAVENOUS ONCE AS NEEDED
Status: DISCONTINUED | OUTPATIENT
Start: 2018-04-21 | End: 2018-04-21 | Stop reason: HOSPADM

## 2018-04-21 RX ORDER — FAMOTIDINE 10 MG/ML
20 INJECTION, SOLUTION INTRAVENOUS ONCE
Status: COMPLETED | OUTPATIENT
Start: 2018-04-21 | End: 2018-04-21

## 2018-04-21 RX ORDER — SCOLOPAMINE TRANSDERMAL SYSTEM 1 MG/1
1 PATCH, EXTENDED RELEASE TRANSDERMAL ONCE
Status: COMPLETED | OUTPATIENT
Start: 2018-04-21 | End: 2018-04-22

## 2018-04-21 RX ORDER — ONDANSETRON 2 MG/ML
4 INJECTION INTRAMUSCULAR; INTRAVENOUS EVERY 4 HOURS PRN
Status: CANCELLED | OUTPATIENT
Start: 2018-04-21

## 2018-04-21 RX ORDER — LEVOFLOXACIN 5 MG/ML
500 INJECTION, SOLUTION INTRAVENOUS
Status: COMPLETED | OUTPATIENT
Start: 2018-04-21 | End: 2018-04-22

## 2018-04-21 RX ORDER — PHENAZOPYRIDINE HYDROCHLORIDE 200 MG/1
200 TABLET, FILM COATED ORAL
Status: CANCELLED | OUTPATIENT
Start: 2018-04-21

## 2018-04-21 RX ORDER — OXYCODONE AND ACETAMINOPHEN 7.5; 325 MG/1; MG/1
1 TABLET ORAL ONCE AS NEEDED
Status: DISCONTINUED | OUTPATIENT
Start: 2018-04-21 | End: 2018-04-21 | Stop reason: HOSPADM

## 2018-04-21 RX ORDER — EPHEDRINE SULFATE 50 MG/ML
5 INJECTION, SOLUTION INTRAVENOUS ONCE AS NEEDED
Status: DISCONTINUED | OUTPATIENT
Start: 2018-04-21 | End: 2018-04-21 | Stop reason: HOSPADM

## 2018-04-21 RX ORDER — SUCCINYLCHOLINE CHLORIDE 20 MG/ML
INJECTION INTRAMUSCULAR; INTRAVENOUS AS NEEDED
Status: DISCONTINUED | OUTPATIENT
Start: 2018-04-21 | End: 2018-04-21 | Stop reason: SURG

## 2018-04-21 RX ORDER — ATROPA BELLADONNA AND OPIUM 16.2; 6 MG/1; MG/1
SUPPOSITORY RECTAL
Status: DISPENSED
Start: 2018-04-21 | End: 2018-04-22

## 2018-04-21 RX ORDER — GLYCOPYRROLATE 0.2 MG/ML
INJECTION INTRAMUSCULAR; INTRAVENOUS AS NEEDED
Status: DISCONTINUED | OUTPATIENT
Start: 2018-04-21 | End: 2018-04-21 | Stop reason: SURG

## 2018-04-21 RX ADMIN — SODIUM CHLORIDE, POTASSIUM CHLORIDE, SODIUM LACTATE AND CALCIUM CHLORIDE: 600; 310; 30; 20 INJECTION, SOLUTION INTRAVENOUS at 13:45

## 2018-04-21 RX ADMIN — MIDAZOLAM HYDROCHLORIDE 2 MG: 2 INJECTION, SOLUTION INTRAMUSCULAR; INTRAVENOUS at 13:32

## 2018-04-21 RX ADMIN — HYDROMORPHONE HYDROCHLORIDE 0.5 MG: 1 INJECTION, SOLUTION INTRAMUSCULAR; INTRAVENOUS; SUBCUTANEOUS at 11:47

## 2018-04-21 RX ADMIN — LIDOCAINE HYDROCHLORIDE 100 MG: 20 INJECTION, SOLUTION INFILTRATION; PERINEURAL at 13:50

## 2018-04-21 RX ADMIN — SCOPALAMINE 1 PATCH: 1 PATCH, EXTENDED RELEASE TRANSDERMAL at 13:20

## 2018-04-21 RX ADMIN — SODIUM CHLORIDE 1000 ML: 9 INJECTION, SOLUTION INTRAVENOUS at 10:44

## 2018-04-21 RX ADMIN — GLYCOPYRROLATE 0.1 MG: 0.2 INJECTION INTRAMUSCULAR; INTRAVENOUS at 13:57

## 2018-04-21 RX ADMIN — DEXAMETHASONE SODIUM PHOSPHATE 8 MG: 10 INJECTION INTRAMUSCULAR; INTRAVENOUS at 13:47

## 2018-04-21 RX ADMIN — HYDROCODONE BITARTRATE AND ACETAMINOPHEN 1 TABLET: 5; 325 TABLET ORAL at 21:37

## 2018-04-21 RX ADMIN — ONDANSETRON 4 MG: 2 INJECTION INTRAMUSCULAR; INTRAVENOUS at 13:59

## 2018-04-21 RX ADMIN — FENTANYL CITRATE 50 MCG: 50 INJECTION, SOLUTION INTRAMUSCULAR; INTRAVENOUS at 13:21

## 2018-04-21 RX ADMIN — LIDOCAINE HYDROCHLORIDE 150 MG: 10 INJECTION, SOLUTION INFILTRATION; PERINEURAL at 11:01

## 2018-04-21 RX ADMIN — PROPOFOL 150 MG: 10 INJECTION, EMULSION INTRAVENOUS at 13:50

## 2018-04-21 RX ADMIN — FAMOTIDINE 20 MG: 10 INJECTION INTRAVENOUS at 13:19

## 2018-04-21 RX ADMIN — FENTANYL CITRATE 50 MCG: 50 INJECTION INTRAMUSCULAR; INTRAVENOUS at 13:47

## 2018-04-21 RX ADMIN — ONDANSETRON 4 MG: 2 INJECTION INTRAMUSCULAR; INTRAVENOUS at 10:44

## 2018-04-21 RX ADMIN — LEVOFLOXACIN 500 MG: 5 INJECTION, SOLUTION INTRAVENOUS at 13:34

## 2018-04-21 RX ADMIN — SUCCINYLCHOLINE CHLORIDE 160 MG: 20 INJECTION, SOLUTION INTRAMUSCULAR; INTRAVENOUS; PARENTERAL at 13:50

## 2018-04-21 RX ADMIN — MORPHINE SULFATE 2 MG: 2 INJECTION, SOLUTION INTRAMUSCULAR; INTRAVENOUS at 16:25

## 2018-04-21 RX ADMIN — PROMETHAZINE HYDROCHLORIDE 12.5 MG: 12.5 TABLET ORAL at 18:21

## 2018-04-21 RX ADMIN — SODIUM CHLORIDE 100 ML/HR: 9 INJECTION, SOLUTION INTRAVENOUS at 18:21

## 2018-04-21 RX ADMIN — SODIUM CHLORIDE, POTASSIUM CHLORIDE, SODIUM LACTATE AND CALCIUM CHLORIDE 9 ML/HR: 600; 310; 30; 20 INJECTION, SOLUTION INTRAVENOUS at 13:19

## 2018-04-21 RX ADMIN — ONDANSETRON 4 MG: 2 INJECTION INTRAMUSCULAR; INTRAVENOUS at 16:25

## 2018-04-21 NOTE — ANESTHESIA POSTPROCEDURE EVALUATION
"Patient: Patt Bond    Procedure Summary     Date:  04/21/18 Room / Location:  [facility] OR 01 / [facility] MAIN OR    Anesthesia Start:  1345 Anesthesia Stop:  1421    Procedure:  CYSTOSCOPY, LEFT RETROGRADE WITH LEFT URETERAL STENT INSERTION (Left ) Diagnosis:      Surgeon:  Stanley Osuna MD Provider:  Jalen Noguera MD    Anesthesia Type:  general ASA Status:  3          Anesthesia Type: general  Last vitals  BP   142/69 (04/21/18 1435)   Temp   36.4 °C (97.6 °F) (04/21/18 1418)   Pulse   79 (04/21/18 1435)   Resp   16 (04/21/18 1435)     SpO2   97 % (04/21/18 1435)     Post Anesthesia Care and Evaluation    Patient location during evaluation: bedside  Patient participation: complete - patient participated  Level of consciousness: awake and alert  Pain management: adequate  Airway patency: patent  Anesthetic complications: No anesthetic complications    Cardiovascular status: acceptable  Respiratory status: acceptable  Hydration status: acceptable    Comments: /69   Pulse 79   Temp 36.4 °C (97.6 °F) (Oral)   Resp 16   Ht 160 cm (63\")   Wt 113 kg (250 lb)   SpO2 97%   BMI 44.29 kg/m²       "

## 2018-04-21 NOTE — OP NOTE
Operative Note      Patt Bond  72 y.o.  1945  female  [id number]      4/21/2018  Surgeon(s) and Role:     * Stanley Osuna MD - Primary   Pre-operative Diagnosis: left upj stone  Post-operative Diagnosis: Same  Complications:None      Description of procedure:  Brief history: This is a 72-year-old female patient of my partner Dr. Miguel Monte with a long history of stones.  She presented to the emergency room with left-sided flank pain, nausea, low-grade fever, and a 7 mm obstructing the left UPJ stone.  After the risks benefits alternatives were all explained to her: Which included were not limited to damage to the surrounding structures, damage to the urethra the bladder the ureter the kidney infection bleeding and anesthetic complications, need for secondary procedures.  The patient accepted all the risks benefits alternatives.    After consent was obtained, the patient was taken the operating room, she was placed supine on the operating room table, after general anesthesia was administered, she is placed in the dorsal lithotomy position, and prepped and draped in the standard fashion.  A cystoscope was placed in her urethra, she had significant pelvic organ prolapse, the left ureteral orifice was cannulated with a pollack catheter and a wire, there was return of purulent urine just past the UPJ stone, retrograde was performed, a [id number] double-[id number] stent was positioned with good coil in the renal pelvis and the bladder, she was extubated in the operating room and taken to recovery in stable.    Procedure(s) and Anesthesia Type:     * CYSTOSCOPY, LEFT RETROGRADE WITH LEFT URETERAL STENT INSERTION - General    Specimens:none      Estimated Blood Loss: minimal  Stanley Osuna MD  4/21/2018

## 2018-04-21 NOTE — ANESTHESIA PROCEDURE NOTES
Airway  Urgency: elective    Airway not difficult    General Information and Staff    Patient location during procedure: OR  Anesthesiologist: DENIS ANGEL  CRNA: HENRI PAULINO    Indications and Patient Condition  Indications for airway management: airway protection    Preoxygenated: yes  MILS maintained throughout  Mask difficulty assessment: 2 - vent by mask + OA or adjuvant +/- NMBA    Final Airway Details  Final airway type: endotracheal airway      Successful airway: ETT    Successful intubation technique: direct laryngoscopy  Facilitating devices/methods: Bougie  Endotracheal tube insertion site: oral  Blade: Nehemias  Blade size: #3  Cormack-Lehane Classification: grade IIb - view of arytenoids or posterior of glottis only  Placement verified by: chest auscultation and capnometry   Measured from: teeth  ETT to teeth (cm): 20  Number of attempts at approach: 1    Additional Comments  Atraumatic. Dentition as preop.

## 2018-04-21 NOTE — H&P
Name: Patt Bond ADMIT: 2018   : 1945  PCP: Moe Matute MD    MRN: [id number] LOS: 0 days   AGE/SEX: 72 y.o. female  ROOM: [facility]/     Chief Complaint   Patient presents with   • Flank Pain     L flank pain x 4 hrs       Subjective   Ms. Bond is a 72 y.o. female with a history of recurrent kidney stones that presents to [facility] complaining of left flank pain.    Flank Pain   This is a new problem. The current episode started today. The problem occurs constantly. The problem is unchanged. The quality of the pain is described as shooting and stabbing. Radiates to: Left groin. The pain is at a severity of 10/10. The pain is severe. Associated symptoms include a fever. Pertinent negatives include no dysuria or leg pain. (Nausea) Risk factors: kidney stones.     Seen postoperatively after ureteral stent placement per Dr. Osuna. Still having severe left flank pain and nausea. Now having multiple bouts of nonbloody emesis. No abdominal pain. No chest pain or shortness of breath.    Past Medical History:   Diagnosis Date   • Asthma    • Back pain    • Diverticulitis    • Diverticulosis    • Goiter    • Hyperthyroidism     followed by Dr. Blackmon   • Joint pain, knee    • Kidney stones     with cystoscopy by Dr. Miguel Monte done on 10/7/15, 9/9/15, 5/8/15, 07   • Mediastinal mass    • Obstructive pyelonephritis 2015    left obstructing pyelonephritis    • PONV (postoperative nausea and vomiting)    • Renal disorder    • SOB (shortness of breath) on exertion      Past Surgical History:   Procedure Laterality Date   • BRONCHOSCOPY N/A 2017    Procedure: BRONCHOSCOPY WITH ENDOBRONCHIAL ULTRASOUND AND TRANSBRONCHIAL NEEDLE ASPIRATION;  Surgeon: Nando Heller MD;  Location: [facility] ENDOSCOPY;  Service:    • CARDIAC CATHETERIZATION N/A 2017    Procedure: Right Heart Cath;  Surgeon: Miguel Gautam MD;  Location: [facility] CATH INVASIVE LOCATION;   Service:    • CARDIAC CATHETERIZATION N/A 9/27/2017    Procedure: Left Heart Cath;  Surgeon: Miguel Gautam MD;  Location: [facility] CATH INVASIVE LOCATION;  Service:    • CARDIAC CATHETERIZATION N/A 9/27/2017    Procedure: Coronary angiography;  Surgeon: Miguel Gautam MD;  Location:  [facility] CATH INVASIVE LOCATION;  Service:    • CARDIAC CATHETERIZATION N/A 9/27/2017    Procedure: Left ventriculography;  Surgeon: Miguel Gautam MD;  Location: [facility] CATH INVASIVE LOCATION;  Service:    • CHOLECYSTECTOMY N/A 5/17/2017    Procedure: LAPAROSCOPIC CHOLECYSTECTOMY WITH IOC;  Surgeon: Patt Moore MD;  Location: [facility] OR;  Service:    • COLON RESECTION Left 9/14/2016    Laparoscopic Low Anterior Resection with splenic flexure mobilization, drainage of Pelvic Abscess (cultured 3+ E. Coli), Left salpingo-ophorectomy, laparoscopic rectopexy, and umbilical hernia repair, Dr. Patt Moore   • COLON RESECTION      VENTRAL HERNIA REPAIR   • COLONOSCOPY N/A 05/15/2008    sigmoid diverticulosis with blunting of the haustral folds and angulation consistent with previous diverticulitis, no polyps, suggestion of rectal prolapse-Dr. Patt Moore   • COLONOSCOPY W/ BIOPSIES AND POLYPECTOMY N/A 04/16/2001    Possible mild gastritis w/ some appearance of formations that look like petechiae in the antrum, no ulcerations, no erosions; cecal polyp approx 4mm sessile; probable transverse colon polyp, although we could not visualize this completely upon pulling out; sigmoid diverticula; multiple rectal polyps, mostly w/ appearance of hyperplasia, largest being 5 to 6mm: removed via snare-Dr. Patt Moore   • COLONOSCOPY W/ POLYPECTOMY N/A 12/10/2004    Diverticulosis with sigmoid perideverticulitis with erythema and patchiness around some of the diverticula, proximal ascedning colon polyp-approx 5 mm-removed via snare cauter, two distal ascending colon polyps-7 mm and 3 mm-removed via snare cautery polypectomy,  hemorrhoids-Dr. Patt Moore   • CYSTOSCOPY W/ URETERAL STENT REMOVAL Left 10/07/2015    Cystoscopy with stent extraction, left ureteral occulusion balloon placement, percutanous nephrostolithotomy with stone volume less than 2.5 cm involving the left lower pole and the left proximal ureter, balloon tract dilation for establishment of nephrostomy tract, antegrade nephrostomy tube placement-Dr. Miguel Monte   • CYSTOSCOPY, RETROGRADE PYELOGRAM AND STENT INSERTION Left 09/28/2015    Left cystoscopy with left retrograde pyelogram, left double-J stent placement-Dr. Miguel Blas   • CYSTOSCOPY, RETROGRADE PYELOGRAM AND STENT INSERTION Left 09/09/2015    Cystoscopy with bilateral retrograde pyelogram, left double-J stent placement, right ureteral pyeloscopy with laser lithotripsy of the ureteropelvic junction calculus, right double-J stent placement-Dr. Miguel Monte   • CYSTOSCOPY, RETROGRADE PYELOGRAM AND STENT INSERTION Right 09/21/2007    Cystoscopy with right retrograde pyelogram, right biliary stent placement-Dr. Miguel Monte   • CYSTOSCOPY, RETROGRADE PYELOGRAM AND STENT INSERTION Right 09/07/2007    Cystoscopy with right retrograde pyelogram, right ureteral peyloscopy with extraction distal ureteral mass, right double J stent placement-Dr. Migule Monte   • CYSTOSCOPY, URETEROSCOPY, RETROGRADE PYELOGRAM, STENT INSERTION Right 09/07/2007    Cystoscopy with right retrograde pyelogram, rigth ureteroscopy with extraction of distal mass, right double J stent placement-Dr. Miguel Monte   • D&C HYSTEROSCOPY N/A 05/18/2011    Procedure done due to thickened endomentrium and an endometrial polyp-Dr. Quita Cheatham   • DILATATION AND CURETTAGE N/A 03/22/2002    D&C, polyp removal-Dr. Quita Cheatham   • EXTRACORPOREAL SHOCKWAVE LITHOTRIPSY (ESWL), STENT INSERTION/REMOVAL Left 05/08/2015    Left extracoporeal shockwave lithotripsy, cystoscopy with stent placement-Dr. Miguel Monte   • MEDIASTINOTOMY  Left 3/5/2018    Procedure: left thyroidectomy, resection of substernal thyroid goiter cervical approach;  Surgeon: Quintin Mares III, MD;  Location: [facility] MAIN OR;  Service:    • SIGMOIDOSCOPY N/A 9/13/2016    Procedure: SIGMOIDOSCOPY FLEXIBLE TO 25 CM;  Surgeon: Patt Moore MD;  Location: [facility] ENDOSCOPY;  Service:    • THORACOTOMY  1996    Thoracotomy for ectopic thyroid, Dr. Camarillo   • THYROIDECTOMY, PARTIAL      left side 3/05/18   • UMBILICAL HERNIA REPAIR N/A 9/14/2016    Procedure: UMBILICAL HERNIA REPAIR ;  Surgeon: Patt Moore MD;  Location: [facility] MAIN OR;  Service:    • VENTRAL/INCISIONAL HERNIA REPAIR N/A 5/17/2017    Procedure: VENTRAL HERNIA REPAIR WITH MESH, BILATERAL MUSCULOFASCIAL RELEASE;  Surgeon: Patt Moore MD;  Location: [facility] MAIN OR;  Service:      Family History   Problem Relation Age of Onset   • Heart disease Father    • Heart attack Paternal Uncle    • Cancer Maternal Grandmother      ovarian   • Heart attack Paternal Grandfather    • Heart attack Paternal Uncle    • Heart attack Paternal Uncle    • Heart attack Paternal Uncle    • Heart attack Paternal Uncle    • Heart attack Paternal Uncle    • Malig Hyperthermia Neg Hx      Social History   Substance Use Topics   • Smoking status: Never Smoker   • Smokeless tobacco: Never Used   • Alcohol use No     Prescriptions Prior to Admission   Medication Sig Dispense Refill Last Dose   • aspirin  MG tablet Take 325 mg by mouth Every 6 (Six) Hours As Needed.   Taking     Allergies:    Allergies   Allergen Reactions   • Cephalexin Hives   • Cephalosporins Hives   • Penicillins Hives       Review of Systems   Constitutional: Positive for fever.   HENT: Negative.    Eyes: Negative.    Respiratory: Negative.    Cardiovascular: Negative.    Gastrointestinal: Negative.    Endocrine: Negative.    Genitourinary: Positive for flank pain. Negative for dysuria.   Skin: Negative.    Neurological: Negative.    Hematological: Negative.     Psychiatric/Behavioral: Negative.         Objective    Vital Signs  Temp:  [97.6 °F (36.4 °C)-98.8 °F (37.1 °C)] 97.8 °F (36.6 °C)  Heart Rate:  [68-88] 70  Resp:  [12-24] 16  BP: (113-168)/(60-99) 113/61  SpO2:  [94 %-98 %] 98 %  on  Flow (L/min):  [2-4] 2;   Device (Oxygen Therapy): nasal cannula  Body mass index is 44.29 kg/m².    Physical Exam   Constitutional: She is oriented to person, place, and time. She appears well-developed and well-nourished.  Non-toxic appearance. She appears distressed (In obvious discomfort).   HENT:   Head: Normocephalic and atraumatic.   Eyes: Conjunctivae and EOM are normal. No scleral icterus.   Neck: Normal range of motion. Neck supple. No tracheal deviation present.   Thyroid surgery scar   Cardiovascular: Normal rate and regular rhythm.    No murmur heard.  Pulmonary/Chest: Effort normal and breath sounds normal. No respiratory distress. She has no wheezes. She has no rales.   Abdominal: Soft. Bowel sounds are normal. She exhibits no distension and no mass. There is no tenderness.   Obese    Musculoskeletal: Normal range of motion. She exhibits no edema or deformity.   Neurological: She is alert and oriented to person, place, and time. No cranial nerve deficit.   Skin: Skin is warm and dry. No rash noted. No erythema.   Psychiatric: She has a normal mood and affect. Her behavior is normal. Judgment and thought content normal.   Nursing note and vitals reviewed.      Results Review:   I reviewed the patient's new clinical results including all labs and xray's.    Lab Results (last 24 hours)     Procedure Component Value Units Date/Time    CBC & Differential [[id number]] Collected:  04/21/18 1046    Specimen:  Blood Updated:  04/21/18 1106    Narrative:       The following orders were created for panel order CBC & Differential.  Procedure                               Abnormality         Status                     ---------                               -----------          ------                     CBC Auto Differential[[id number]]        Abnormal            Final result                 Please view results for these tests on the individual orders.    Comprehensive Metabolic Panel [[id number]]  (Abnormal) Collected:  04/21/18 1046    Specimen:  Blood Updated:  04/21/18 1135     Glucose 107 (H) mg/dL      BUN 19 mg/dL      Creatinine 1.15 (H) mg/dL      Sodium 141 mmol/L      Potassium 4.4 mmol/L      Chloride 105 mmol/L      CO2 22.9 mmol/L      Calcium 9.0 mg/dL      Total Protein 7.1 g/dL      Albumin 3.90 g/dL      ALT (SGPT) 25 U/L      AST (SGOT) 25 U/L      Alkaline Phosphatase 68 U/L      Total Bilirubin 0.5 mg/dL      eGFR Non African Amer 47 (L) mL/min/1.73      Globulin 3.2 gm/dL      A/G Ratio 1.2 g/dL      BUN/Creatinine Ratio 16.5     Anion Gap 13.1 mmol/L     Urinalysis With / Microscopic If Indicated - Urine, Clean Catch [[id number]]  (Abnormal) Collected:  04/21/18 1046    Specimen:  Urine from Urine, Clean Catch Updated:  04/21/18 1121     Color, UA Yellow     Appearance, UA Clear     pH, UA <=5.0     Specific Gravity, UA 1.022     Glucose, UA Negative     Ketones, UA Negative     Bilirubin, UA Negative     Blood, UA Small (1+) (A)     Protein, UA Negative     Leuk Esterase, UA Negative     Nitrite, UA Negative     Urobilinogen, UA 0.2 E.U./dL    Lipase [[id number]]  (Normal) Collected:  04/21/18 1046    Specimen:  Blood Updated:  04/21/18 1135     Lipase 26 U/L     CBC Auto Differential [[id number]]  (Abnormal) Collected:  04/21/18 1046    Specimen:  Blood Updated:  04/21/18 1106     WBC 6.33 10*3/mm3      RBC 4.43 10*6/mm3      Hemoglobin 13.8 g/dL      Hematocrit 42.1 %      MCV 95.0 fL      MCH 31.2 pg      MCHC 32.8 g/dL      RDW 14.2 (H) %      RDW-SD 48.7 fl      MPV 9.8 fL      Platelets 128 (L) 10*3/mm3      Neutrophil % 71.8 %      Lymphocyte % 19.1 (L) %      Monocyte % 5.4 %      Eosinophil % 3.5 %      Basophil % 0.2 %      Immature Grans % 0.0 %       Neutrophils, Absolute 4.55 10*3/mm3      Lymphocytes, Absolute 1.21 10*3/mm3      Monocytes, Absolute 0.34 10*3/mm3      Eosinophils, Absolute 0.22 10*3/mm3      Basophils, Absolute 0.01 10*3/mm3      Immature Grans, Absolute 0.00 10*3/mm3     Urinalysis, Microscopic Only - Urine, Clean Catch [[id number]]  (Abnormal) Collected:  04/21/18 1046    Specimen:  Urine from Urine, Clean Catch Updated:  04/21/18 1122     RBC, UA 0-2 /HPF      WBC, UA 0-2 /HPF      Bacteria, UA None Seen /HPF      Squamous Epithelial Cells, UA 7-12 (A) /HPF      Hyaline Casts, UA 0-2 /LPF      Methodology Automated Microscopy          Imaging Results (last 24 hours)     Procedure Component Value Units Date/Time    FL Retrograde Pyelogram In OR [[id number]] Collected:  04/21/18 1428     Updated:  04/21/18 1436    Narrative:       RETROGRADE PYELOGRAM     HISTORY: Left ureteral stone and obstruction.     FINDINGS: Imaging in the operating room shows instrumentation of the  left ureter with injection of a small amount of contrast showing mild  dilatation of the intrarenal collecting system and outlining stones  clustered in a lower pole calyx, as well as in the left renal pelvis.  The final image shows a double-J ureteral stent in place.     Three images were obtained and the fluoroscopy time measures 4 seconds.     This report was finalized on 4/21/2018 2:33 PM by Dr. Evaristo Lema MD.       CT Abdomen Pelvis Without Contrast [[id number]] Collected:  04/21/18 1232     Updated:  04/21/18 1315    Narrative:       CT SCANS OF THE ABDOMEN AND PELVIS WITHOUT CONTRAST     HISTORY: Left flank pain. Previous kidney stones.     The CT scan was performed as an emergency procedure through the abdomen  and pelvis without contrast and compared to the previous CT scans of the  abdomen and pelvis dated 05/30/2017. The following findings are present:  1. There is prominent left renal obstruction secondary to a 10 mm stone  at the left ureteropelvic junction as  seen on image 82. There are  multiple stones and stone fragments clustered in the central portion of  the left kidney and likely representing fragmentation related to  previous lithotripsy when compared to the earlier exam. There is  considerable perinephric edematous change and stranding consistent with  the obstruction. The right kidney is unremarkable.  2. There is some minimal linear fibrotic scarring at the lung bases that  is unchanged. The liver, spleen, pancreas, and both adrenal glands are  unremarkable without intravenous contrast. The gallbladder has been  removed.  3. There is no aortic aneurysm or retroperitoneal lymphadenopathy. The  large and small bowel loops are normal in caliber. No abnormality is  seen in the pelvis.                 Radiation dose reduction techniques were utilized, including automated  exposure control and exposure modulation based on body size.     This report was finalized on 4/21/2018 1:12 PM by Dr. Evaristo Lema MD.             Assessment/Plan      Active Hospital Problems (** Indicates Principal Problem)    Diagnosis Date Noted   • **Obstruction of left ureteropelvic junction (UPJ) due to stone [N20.1] 04/21/2018   • Morbid obesity with BMI of 40.0-44.9, adult [E66.01, Z68.41] 04/21/2018   • Nausea and vomiting [R11.2] 04/21/2018      Resolved Hospital Problems    Diagnosis Date Noted Date Resolved   No resolved problems to display.       Ms. Bond is a 72 y.o. female who is admitted with UPJ obstruction due to kidney stone.     · Discussed with Dr. Osuna. Will continue IV antibiotics as he felt there was infection present during surgery. Due to her multiple allergies will continue Levaquin.  · We will continue IV fluids, analgesics and antibiotics.  · Recheck lab work in the morning.      I discussed the patients findings and my recommendations with patient, family, nursing staff and consulting provider.      Christopher Shore MD  [facility]  04/21/18  5:52 PM

## 2018-04-21 NOTE — ANESTHESIA PREPROCEDURE EVALUATION
Anesthesia Evaluation     Patient summary reviewed   history of anesthetic complications: PONV  NPO Solid Status: > 8 hours  NPO Liquid Status: > 6 hours           Airway   Mallampati: II  TM distance: >3 FB  Dental      Pulmonary    (+) asthma,   Cardiovascular     Rhythm: regular  Rate: normal        Neuro/Psych  GI/Hepatic/Renal/Endo    (+) morbid obesity,  hyperthyroidism (Recent partial thyroidectomy.)    Musculoskeletal     Abdominal    Substance History      OB/GYN          Other                        Anesthesia Plan    ASA 3     general     Anesthetic plan and risks discussed with patient.

## 2018-04-21 NOTE — ED PROVIDER NOTES
The patient presents complaining of left flank pain that began about 5 hours ago with symptoms worsening in severity. The pt describes the pain as a knife in her side. Pt reports nausea. Pt denies vomiting, hematuria, and dark urine. The pt has a hx of kidney stones and states her pain today feels similar to her previous episodes with kidney stones.    Physical Exam:  Patient is nontoxic appearing and in mild distress. The pt is alert and oriented X 3.  Lungs/cardiovascular: The pt's heart is RRR without murmur. The pt's lungs are clear to auscultation.  Abdomen: The pt's abd is soft, nondistended, and nontender with normal active bowel sounds.    Discussed the plan to order a CT for further evaluation. Discussed the plan to treat the pt with Lidocaine to improve her pain. The pt understands and agrees with the plan.    MD ATTESTATION NOTE    The DEVIKA and I have discussed this patient's history, physical exam, and treatment plan.  I have reviewed the documentation and personally had a face to face interaction with the patient. I affirm the documentation and agree with the treatment and plan.  The attached note describes my personal findings.    Documentation assistance provided by oscar King for Dr. Sanchez. Information recorded by the scribe was done at my direction and has been verified and validated by me.                 Romario King  04/21/18 4119       John Sanchez MD  04/21/18 0062

## 2018-04-21 NOTE — CONSULTS
H&P Note  Patient Identification:  Name: Patt Bond  Age: 72 y.o.  Sex: female  : 1945  MRN: [id number]   Chief Complaint: flank pain  History of Present Illness:     Left 7mm upj stone and left renal stones     Pain 10/10 no relief with pain meds  No fever  No sob  No cp          [facility]:  [facility]  Past Medical History:  Past Medical History:   Diagnosis Date   • Asthma    • Back pain    • Diverticulitis    • Diverticulosis    • Goiter    • Hyperthyroidism     followed by Dr. Blackmon   • Joint pain, knee    • Kidney stones     with cystoscopy by Dr. Miguel Monte done on 10/7/15, 9/9/15, 5/8/15, 07   • Mediastinal mass    • Obstructive pyelonephritis 2015    left obstructing pyelonephritis    • PONV (postoperative nausea and vomiting)    • Renal disorder    • SOB (shortness of breath) on exertion      Past Surgical History:  Past Surgical History:   Procedure Laterality Date   • BRONCHOSCOPY N/A 2017    Procedure: BRONCHOSCOPY WITH ENDOBRONCHIAL ULTRASOUND AND TRANSBRONCHIAL NEEDLE ASPIRATION;  Surgeon: Nando Heller MD;  Location: [facility] ENDOSCOPY;  Service:    • CARDIAC CATHETERIZATION N/A 2017    Procedure: Right Heart Cath;  Surgeon: Miguel Gautam MD;  Location: [facility] CATH INVASIVE LOCATION;  Service:    • CARDIAC CATHETERIZATION N/A 2017    Procedure: Left Heart Cath;  Surgeon: Miguel Gautam MD;  Location: [facility] CATH INVASIVE LOCATION;  Service:    • CARDIAC CATHETERIZATION N/A 2017    Procedure: Coronary angiography;  Surgeon: Miguel Gautam MD;  Location: [facility] CATH INVASIVE LOCATION;  Service:    • CARDIAC CATHETERIZATION N/A 2017    Procedure: Left ventriculography;  Surgeon: Miguel Gautam MD;  Location: [facility] CATH INVASIVE LOCATION;  Service:    • CHOLECYSTECTOMY N/A 2017    Procedure: LAPAROSCOPIC CHOLECYSTECTOMY WITH IOC;  Surgeon: Patt Moore MD;  Location: [facility] MAIN OR;  Service:    • COLON  RESECTION Left 9/14/2016    Laparoscopic Low Anterior Resection with splenic flexure mobilization, drainage of Pelvic Abscess (cultured 3+ E. Coli), Left salpingo-ophorectomy, laparoscopic rectopexy, and umbilical hernia repair, Dr. Patt Moore   • COLON RESECTION      VENTRAL HERNIA REPAIR   • COLONOSCOPY N/A 05/15/2008    sigmoid diverticulosis with blunting of the haustral folds and angulation consistent with previous diverticulitis, no polyps, suggestion of rectal prolapse-Dr. Patt Moore   • COLONOSCOPY W/ BIOPSIES AND POLYPECTOMY N/A 04/16/2001    Possible mild gastritis w/ some appearance of formations that look like petechiae in the antrum, no ulcerations, no erosions; cecal polyp approx 4mm sessile; probable transverse colon polyp, although we could not visualize this completely upon pulling out; sigmoid diverticula; multiple rectal polyps, mostly w/ appearance of hyperplasia, largest being 5 to 6mm: removed via snare-Dr. Patt Moore   • COLONOSCOPY W/ POLYPECTOMY N/A 12/10/2004    Diverticulosis with sigmoid perideverticulitis with erythema and patchiness around some of the diverticula, proximal ascedning colon polyp-approx 5 mm-removed via snare cauter, two distal ascending colon polyps-7 mm and 3 mm-removed via snare cautery polypectomy, hemorrhoids-Dr. Patt Moore   • CYSTOSCOPY W/ URETERAL STENT REMOVAL Left 10/07/2015    Cystoscopy with stent extraction, left ureteral occulusion balloon placement, percutanous nephrostolithotomy with stone volume less than 2.5 cm involving the left lower pole and the left proximal ureter, balloon tract dilation for establishment of nephrostomy tract, antegrade nephrostomy tube placement-Dr. Miguel Monte   • CYSTOSCOPY, RETROGRADE PYELOGRAM AND STENT INSERTION Left 09/28/2015    Left cystoscopy with left retrograde pyelogram, left double-J stent placement-Dr. Miguel Blas   • CYSTOSCOPY, RETROGRADE PYELOGRAM AND STENT INSERTION Left 09/09/2015     Cystoscopy with bilateral retrograde pyelogram, left double-J stent placement, right ureteral pyeloscopy with laser lithotripsy of the ureteropelvic junction calculus, right double-J stent placement-Dr. Miguel Monte   • CYSTOSCOPY, RETROGRADE PYELOGRAM AND STENT INSERTION Right 09/21/2007    Cystoscopy with right retrograde pyelogram, right biliary stent placement-Dr. Miguel Monte   • CYSTOSCOPY, RETROGRADE PYELOGRAM AND STENT INSERTION Right 09/07/2007    Cystoscopy with right retrograde pyelogram, right ureteral peyloscopy with extraction distal ureteral mass, right double J stent placement-Dr. Miguel Monte   • CYSTOSCOPY, URETEROSCOPY, RETROGRADE PYELOGRAM, STENT INSERTION Right 09/07/2007    Cystoscopy with right retrograde pyelogram, rigth ureteroscopy with extraction of distal mass, right double J stent placement-Dr. Migeul Monte   • D&C HYSTEROSCOPY N/A 05/18/2011    Procedure done due to thickened endomentrium and an endometrial polyp-Dr. Quita Cheatham   • DILATATION AND CURETTAGE N/A 03/22/2002    D&C, polyp removal-Dr. Quita Cheatham   • EXTRACORPOREAL SHOCKWAVE LITHOTRIPSY (ESWL), STENT INSERTION/REMOVAL Left 05/08/2015    Left extracoporeal shockwave lithotripsy, cystoscopy with stent placement-Dr. Miguel Monte   • MEDIASTINOTOMY Left 3/5/2018    Procedure: left thyroidectomy, resection of substernal thyroid goiter cervical approach;  Surgeon: Quintin Mares III, MD;  Location: [facility] OR;  Service:    • SIGMOIDOSCOPY N/A 9/13/2016    Procedure: SIGMOIDOSCOPY FLEXIBLE TO 25 CM;  Surgeon: Patt Moore MD;  Location: [facility] ENDOSCOPY;  Service:    • THORACOTOMY  1996    Thoracotomy for ectopic thyroid, Dr. Camarillo   • THYROIDECTOMY, PARTIAL      left side 3/05/18   • UMBILICAL HERNIA REPAIR N/A 9/14/2016    Procedure: UMBILICAL HERNIA REPAIR ;  Surgeon: Patt Moore MD;  Location: [facility] OR;  Service:    • VENTRAL/INCISIONAL HERNIA REPAIR N/A 5/17/2017    Procedure:  "VENTRAL HERNIA REPAIR WITH MESH, BILATERAL MUSCULOFASCIAL RELEASE;  Surgeon: Patt Moore MD;  Location: [facility] OR;  Service:       Home Meds:    (Not in a hospital admission)  Current Meds:   [unfilled]  Allergies:  Allergies   Allergen Reactions   • Cephalexin Hives     Pt states she possibly is not allergic to Cephalexin, took and did not have problems   • Cephalosporins Hives   • Penicillins Hives     Immunizations:    There is no immunization history on file for this patient.  Social History:   Social History   Substance Use Topics   • Smoking status: Never Smoker   • Smokeless tobacco: Never Used   • Alcohol use No      Family History:  Family History   Problem Relation Age of Onset   • Heart disease Father    • Heart attack Paternal Uncle    • Cancer Maternal Grandmother      ovarian   • Heart attack Paternal Grandfather    • Heart attack Paternal Uncle    • Heart attack Paternal Uncle    • Heart attack Paternal Uncle    • Heart attack Paternal Uncle    • Heart attack Paternal Uncle    • Malig Hyperthermia Neg Hx       Review of Systems  negative 12 point system review except:  Objective:  tMax 24 hrs: Temp (24hrs), Av.8 °F (37.1 °C), Min:98.8 °F (37.1 °C), Max:98.8 °F (37.1 °C)    Vitals Ranges:   Temp:  [98.8 °F (37.1 °C)] 98.8 °F (37.1 °C)  Heart Rate:  [68-88] 68  Resp:  [16] 16  BP: (132-168)/(61-99) 138/73  Intake and Output Last 3 Shifts:   No intake/output data recorded.  Exam:  /73   Pulse 68   Temp 98.8 °F (37.1 °C)   Resp 16   Ht 160 cm (63\")   Wt 113 kg (250 lb)   SpO2 95%   BMI 44.29 kg/m²   General Appearance:  Alert, cooperative, no distress, appears stated age   Head:  Normocephalic, without obvious abnormality, atraumatic   ENT:  Normal hearing, external inspection, ears and nose   Eyes Normal pupils/iris, external inspection, conjunctiva, eye lids   Back: No CVA tenderness   Respiratory: Normal effort, palpation, auscultation   CV: Normal auscultation, carotid pulses, no " edema   Abdomen: No hernia, soft, non tender, no masses   :    Musculoskelteal: Normal extremities, nails, digitis   Skin: Normal skin color, texture, no rashes or lesion   Neurologic/psych: Normal orientation, mood, affect       Data Review:  CBC:   Results from last 7 days  Lab Units 04/21/18  1046   WBC 10*3/mm3 6.33   RBC 10*6/mm3 4.43      Assessment:  Active Problems:    * No active hospital problems. *  left upj stone   Left renal stones        Plan:  Cystoscopy with left stent placement possible stone removal      Stanley Osuna MD  4/21/2018

## 2018-04-21 NOTE — ED PROVIDER NOTES
EMERGENCY DEPARTMENT ENCOUNTER    CHIEF COMPLAINT  Chief Complaint: Left flank pain   History given by: Pt/spouse   History limited by: Nothing  Room Number: P398/1  PMD: Moe Matute MD      HPI:  Pt is a 72 y.o. female who presents complaining of 5 hour history of left flank pain that awoke pt this morning. She has had similar episodes in the past that were dx'd as kidney stones. She sees Dr Rl Monte in urology.  She has nausea.  Denies fever, vomiting, or dysuria.      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 coli at time of surgery 9/2016 02/14/2017   • Multiple drug allergies to Cephalexin, Cephalosporins, Penicillins 02/14/2017   • Weight gain - 10 pounds in 2 months, unintentional 02/14/2017   • Multinodular goiter 07/07/2017   • GRAY (dyspnea on exertion) 09/08/2017   • Morbid obesity 09/08/2017   • Substernal goiter 02/13/2018     Resolved Ambulatory Problems     Diagnosis Date Noted   • Acute diverticulitis of sigmoid colon 07/03/2016   • Acute diverticulitis 09/09/2016   • Incisional hernia, without obstruction or gangrene 02/14/2017     Past Medical History:   Diagnosis Date   • Asthma    • Back pain    • Diverticulitis    •  Diverticulosis    • Goiter    • Hyperthyroidism    • Joint pain, knee    • Kidney stones 2015   • Mediastinal mass    • Obstructive pyelonephritis 09/28/2015   • PONV (postoperative nausea and vomiting)    • Renal disorder    • SOB (shortness of breath) on exertion        PAST SURGICAL HISTORY  Past Surgical History:   Procedure Laterality Date   • BRONCHOSCOPY N/A 11/6/2017    Procedure: BRONCHOSCOPY WITH ENDOBRONCHIAL ULTRASOUND AND TRANSBRONCHIAL NEEDLE ASPIRATION;  Surgeon: Nando Heller MD;  Location: [facility] ENDOSCOPY;  Service:    • CARDIAC CATHETERIZATION N/A 9/27/2017    Procedure: Right Heart Cath;  Surgeon: Miguel Gautam MD;  [facility];  Service:    • CARDIAC CATHETERIZATION N/A 9/27/2017    Procedure: Left Heart Cath;  Surgeon: Miguel Gautam MD;  [facility];  Service:    • CARDIAC CATHETERIZATION N/A 9/27/2017    Procedure: Coronary angiography;  Surgeon: Miguel Gautam MD;  Location: [facility] CATH INVASIVE LOCATION;  Service:    • CARDIAC CATHETERIZATION N/A 9/27/2017    Procedure: Left ventriculography;  Surgeon: Miguel Gautam MD;  Location: [facility] CATH INVASIVE LOCATION;  Service:    • CHOLECYSTECTOMY N/A 5/17/2017    Procedure: LAPAROSCOPIC CHOLECYSTECTOMY WITH IOC;  Surgeon: Patt Moore MD;  Location: [facility] MAIN OR;  Service:    • COLON RESECTION Left 9/14/2016    Laparoscopic Low Anterior Resection with splenic flexure mobilization, drainage of Pelvic Abscess (cultured 3+ E. Coli), Left salpingo-ophorectomy, laparoscopic rectopexy, and umbilical hernia repair, Dr. Patt Moore   • COLON RESECTION      VENTRAL HERNIA REPAIR   • COLONOSCOPY N/A 05/15/2008    sigmoid diverticulosis with blunting of the haustral folds and angulation consistent with previous diverticulitis, no polyps, suggestion of rectal prolapse-Dr. Patt Moore   • COLONOSCOPY W/ BIOPSIES AND POLYPECTOMY N/A 04/16/2001    Possible mild  gastritis w/ some appearance of formations that look like petechiae in the antrum, no ulcerations, no erosions; cecal polyp approx 4mm sessile; probable transverse colon polyp, although we could not visualize this completely upon pulling out; sigmoid diverticula; multiple rectal polyps, mostly w/ appearance of hyperplasia, largest being 5 to 6mm: removed via snare-Dr. Patt Moore   • COLONOSCOPY W/ POLYPECTOMY N/A 12/10/2004    Diverticulosis with sigmoid perideverticulitis with erythema and patchiness around some of the diverticula, proximal ascedning colon polyp-approx 5 mm-removed via snare cauter, two distal ascending colon polyps-7 mm and 3 mm-removed via snare cautery polypectomy, hemorrhoids-Dr. Patt Moore   • CYSTOSCOPY W/ URETERAL STENT REMOVAL Left 10/07/2015    Cystoscopy with stent extraction, left ureteral occulusion balloon placement, percutanous nephrostolithotomy with stone volume less than 2.5 cm involving the left lower pole and the left proximal ureter, balloon tract dilation for establishment of nephrostomy tract, antegrade nephrostomy tube placement-Dr. Miguel Monte   • CYSTOSCOPY, RETROGRADE PYELOGRAM AND STENT INSERTION Left 09/28/2015    Left cystoscopy with left retrograde pyelogram, left double-J stent placement-Dr. Miguel Blas   • CYSTOSCOPY, RETROGRADE PYELOGRAM AND STENT INSERTION Left 09/09/2015    Cystoscopy with bilateral retrograde pyelogram, left double-J stent placement, right ureteral pyeloscopy with laser lithotripsy of the ureteropelvic junction calculus, right double-J stent placement-Dr. Miguel Monte   • CYSTOSCOPY, RETROGRADE PYELOGRAM AND STENT INSERTION Right 09/21/2007    Cystoscopy with right retrograde pyelogram, right biliary stent placement-Dr. Miguel Monte   • CYSTOSCOPY, RETROGRADE PYELOGRAM AND STENT INSERTION Right 09/07/2007    Cystoscopy with right retrograde pyelogram, right ureteral peyloscopy with extraction distal ureteral mass,  right double J stent placement-Dr. Miguel Monte   • CYSTOSCOPY, URETEROSCOPY, RETROGRADE PYELOGRAM, STENT INSERTION Right 09/07/2007    Cystoscopy with right retrograde pyelogram, rigth ureteroscopy with extraction of distal mass, right double J stent placement-Dr. Miguel Monte   • D&C HYSTEROSCOPY N/A 05/18/2011    Procedure done due to thickened endomentrium and an endometrial polyp-Dr. Quita Cheatham   • DILATATION AND CURETTAGE N/A 03/22/2002    D&C, polyp removal-Dr. Quita Cheatham   • EXTRACORPOREAL SHOCKWAVE LITHOTRIPSY (ESWL), STENT INSERTION/REMOVAL Left 05/08/2015    Left extracoporeal shockwave lithotripsy, cystoscopy with stent placement-Dr. Miguel Monte   • MEDIASTINOTOMY Left 3/5/2018    Procedure: left thyroidectomy, resection of substernal thyroid goiter cervical approach;  Surgeon: Quintin Mares III, MD;  Location: [facility] OR;  Service:    • SIGMOIDOSCOPY N/A 9/13/2016    Procedure: SIGMOIDOSCOPY FLEXIBLE TO 25 CM;  Surgeon: Patt Moore MD;  Location: [facility] ENDOSCOPY;  Service:    • THORACOTOMY  1996    Thoracotomy for ectopic thyroid, Dr. Camarillo   • THYROIDECTOMY, PARTIAL      left side 3/05/18   • UMBILICAL HERNIA REPAIR N/A 9/14/2016    Procedure: UMBILICAL HERNIA REPAIR ;  Surgeon: Patt Moore MD;  Location: [facility] OR;  Service:    • VENTRAL/INCISIONAL HERNIA REPAIR N/A 5/17/2017    Procedure: VENTRAL HERNIA REPAIR WITH MESH, BILATERAL MUSCULOFASCIAL RELEASE;  Surgeon: Patt Moroe MD;  Location: [facility] OR;  Service:        FAMILY HISTORY  Family History   Problem Relation Age of Onset   • Heart disease Father    • Heart attack Paternal Uncle    • Cancer Maternal Grandmother      ovarian   • Heart attack Paternal Grandfather    • Heart attack Paternal Uncle    • Heart attack Paternal Uncle    • Heart attack Paternal Uncle    • Heart attack Paternal Uncle    • Heart attack Paternal Uncle    • Malig Hyperthermia Neg Hx        SOCIAL HISTORY  Social History      Social History   • Marital status:      Spouse name: BERENICE COFFEY    • Number of children: 2   • Years of education: HIGH SCHOOL      Occupational History   •  Retired     Social History Main Topics   • Smoking status: Never Smoker   • Smokeless tobacco: Never Used   • Alcohol use No   • Drug use: No   • Sexual activity: Defer     Other Topics Concern   • Not on file     Social History Narrative   • No narrative on file       ALLERGIES  Cephalexin; Cephalosporins; and Penicillins    REVIEW OF SYSTEMS  Review of Systems   Constitutional: Positive for diaphoresis. Negative for fever.   Respiratory: Negative for chest tightness and shortness of breath.    Cardiovascular: Negative for chest pain.   Gastrointestinal: Positive for abdominal pain (severe left flank), nausea and vomiting.   Genitourinary: Negative for difficulty urinating and hematuria.   Musculoskeletal: Negative.  Negative for myalgias.   Skin: Negative.    Neurological: Negative.    All other systems reviewed and are negative.      PHYSICAL EXAM  ED Triage Vitals   Temp Heart Rate Resp BP SpO2   04/21/18 0956 04/21/18 0956 04/21/18 0956 04/21/18 1000 04/21/18 0956   98.8 °F (37.1 °C) 88 16 168/99 97 %      Temp src Heart Rate Source Patient Position BP Location FiO2 (%)   -- -- 04/21/18 1000 04/21/18 1000 --     Sitting Right arm        Physical Exam   Constitutional: She is oriented to person, place, and time. She appears distressed (mild).   HENT:   Head: Normocephalic.   Eyes: EOM are normal.   Neck: Normal range of motion. Neck supple.   Cardiovascular: Normal rate, regular rhythm and normal heart sounds.    Pulmonary/Chest: Effort normal and breath sounds normal.   Abdominal: Soft. There is no tenderness. There is no rebound.   Musculoskeletal: Normal range of motion.   Left CVA tenderness.    Neurological: She is alert and oriented to person, place, and time.   Skin: Skin is warm. She is diaphoretic.   Psychiatric: Affect and judgment  normal.   Nursing note and vitals reviewed.      LAB RESULTS  Lab Results (last 24 hours)     Procedure Component Value Units Date/Time    CBC & Differential [[id number]] Collected:  04/21/18 1046    Specimen:  Blood Updated:  04/21/18 1106    Narrative:       The following orders were created for panel order CBC & Differential.  Procedure                               Abnormality         Status                     ---------                               -----------         ------                     CBC Auto Differential[[id number]]        Abnormal            Final result                 Please view results for these tests on the individual orders.    Comprehensive Metabolic Panel [[id number]]  (Abnormal) Collected:  04/21/18 1046    Specimen:  Blood Updated:  04/21/18 1135     Glucose 107 (H) mg/dL      BUN 19 mg/dL      Creatinine 1.15 (H) mg/dL      Sodium 141 mmol/L      Potassium 4.4 mmol/L      Chloride 105 mmol/L      CO2 22.9 mmol/L      Calcium 9.0 mg/dL      Total Protein 7.1 g/dL      Albumin 3.90 g/dL      ALT (SGPT) 25 U/L      AST (SGOT) 25 U/L      Alkaline Phosphatase 68 U/L      Total Bilirubin 0.5 mg/dL      eGFR Non African Amer 47 (L) mL/min/1.73      Globulin 3.2 gm/dL      A/G Ratio 1.2 g/dL      BUN/Creatinine Ratio 16.5     Anion Gap 13.1 mmol/L     Urinalysis With / Microscopic If Indicated - Urine, Clean Catch [[id number]]  (Abnormal) Collected:  04/21/18 1046    Specimen:  Urine from Urine, Clean Catch Updated:  04/21/18 1121     Color, UA Yellow     Appearance, UA Clear     pH, UA <=5.0     Specific Gravity, UA 1.022     Glucose, UA Negative     Ketones, UA Negative     Bilirubin, UA Negative     Blood, UA Small (1+) (A)     Protein, UA Negative     Leuk Esterase, UA Negative     Nitrite, UA Negative     Urobilinogen, UA 0.2 E.U./dL    Lipase [[id number]]  (Normal) Collected:  04/21/18 1046    Specimen:  Blood Updated:  04/21/18 1135     Lipase 26 U/L     CBC Auto Differential [[id number]]   (Abnormal) Collected:  04/21/18 1046    Specimen:  Blood Updated:  04/21/18 1106     WBC 6.33 10*3/mm3      RBC 4.43 10*6/mm3      Hemoglobin 13.8 g/dL      Hematocrit 42.1 %      MCV 95.0 fL      MCH 31.2 pg      MCHC 32.8 g/dL      RDW 14.2 (H) %      RDW-SD 48.7 fl      MPV 9.8 fL      Platelets 128 (L) 10*3/mm3      Neutrophil % 71.8 %      Lymphocyte % 19.1 (L) %      Monocyte % 5.4 %      Eosinophil % 3.5 %      Basophil % 0.2 %      Immature Grans % 0.0 %      Neutrophils, Absolute 4.55 10*3/mm3      Lymphocytes, Absolute 1.21 10*3/mm3      Monocytes, Absolute 0.34 10*3/mm3      Eosinophils, Absolute 0.22 10*3/mm3      Basophils, Absolute 0.01 10*3/mm3      Immature Grans, Absolute 0.00 10*3/mm3     Urinalysis, Microscopic Only - Urine, Clean Catch [[id number]]  (Abnormal) Collected:  04/21/18 1046    Specimen:  Urine from Urine, Clean Catch Updated:  04/21/18 1122     RBC, UA 0-2 /HPF      WBC, UA 0-2 /HPF      Bacteria, UA None Seen /HPF      Squamous Epithelial Cells, UA 7-12 (A) /HPF      Hyaline Casts, UA 0-2 /LPF      Methodology Automated Microscopy          I ordered the above labs and reviewed the results    RADIOLOGY  FL Retrograde Pyelogram In OR   Final Result      CT Abdomen Pelvis Without Contrast   Final Result       There is prominent left renal obstruction secondary to a 10 mm stone  at the left ureteropelvic junction as seen on image 82. There are  multiple stones and stone fragments clustered in the central portion of  the left kidney and likely representing fragmentation related to  previous lithotripsy when compared to the earlier exam. There is  considerable perinephric edematous change and stranding consistent with  the obstruction. The right kidney is unremarkable.    I ordered the above noted radiological studies. Interpreted by radiologist. Discussed with radiologist (Pita). Reviewed by me in PACS.       PROCEDURES  Procedures      PROGRESS AND CONSULTS  ED Course       1200  Discussed with Dr Osuna, will take to ER, asls that we admit to medicine.   1230 Discussed with Dr Shore, will admit.      MEDICAL DECISION MAKING  Results were reviewed/discussed with the patient and they were also made aware of online access. Pt also made aware that some labs, such as cultures, will not be resulted during ER visit and follow up with PMD is necessary.     MDM       DIAGNOSIS  Final diagnoses:   Left ureteral stone   Hydronephrosis with ureteropelvic junction (UPJ) obstruction       DISPOSITION  Admitted    Latest Documented Vital Signs:  As of 5:39 PM  BP- 113/61 HR- 70 Temp- 97.8 °F (36.6 °C) (Oral) O2 sat- 98%         RICHELLE House  04/21/18 0109

## 2018-04-21 NOTE — ED NOTES
"Pt reports left flank pain for several days. Hx of kidney stones. Pt states \"This feels just like my previous stones.\"     Nando Mcrae RN  04/21/18 1002    "

## 2018-04-22 VITALS
DIASTOLIC BLOOD PRESSURE: 72 MMHG | SYSTOLIC BLOOD PRESSURE: 111 MMHG | OXYGEN SATURATION: 94 % | TEMPERATURE: 97.4 F | HEART RATE: 66 BPM | HEIGHT: 63 IN | WEIGHT: 250 LBS | RESPIRATION RATE: 16 BRPM | BODY MASS INDEX: 44.3 KG/M2

## 2018-04-22 PROBLEM — R11.2 NAUSEA AND VOMITING: Status: RESOLVED | Noted: 2018-04-21 | Resolved: 2018-04-22

## 2018-04-22 LAB
ANION GAP SERPL CALCULATED.3IONS-SCNC: 13.1 MMOL/L
BUN BLD-MCNC: 20 MG/DL (ref 8–23)
BUN/CREAT SERPL: 16.3 (ref 7–25)
CALCIUM SPEC-SCNC: 8.5 MG/DL (ref 8.6–10.5)
CHLORIDE SERPL-SCNC: 108 MMOL/L (ref 98–107)
CO2 SERPL-SCNC: 19.9 MMOL/L (ref 22–29)
CREAT BLD-MCNC: 1.23 MG/DL (ref 0.57–1)
DEPRECATED RDW RBC AUTO: 50.2 FL (ref 37–54)
ERYTHROCYTE [DISTWIDTH] IN BLOOD BY AUTOMATED COUNT: 14.2 % (ref 11.7–13)
GFR SERPL CREATININE-BSD FRML MDRD: 43 ML/MIN/1.73
GLUCOSE BLD-MCNC: 130 MG/DL (ref 65–99)
HCT VFR BLD AUTO: 39.2 % (ref 35.6–45.5)
HGB BLD-MCNC: 12.3 G/DL (ref 11.9–15.5)
MCH RBC QN AUTO: 30.4 PG (ref 26.9–32)
MCHC RBC AUTO-ENTMCNC: 31.4 G/DL (ref 32.4–36.3)
MCV RBC AUTO: 96.8 FL (ref 80.5–98.2)
PLATELET # BLD AUTO: 132 10*3/MM3 (ref 140–500)
PMV BLD AUTO: 10.1 FL (ref 6–12)
POTASSIUM BLD-SCNC: 4.6 MMOL/L (ref 3.5–5.2)
RBC # BLD AUTO: 4.05 10*6/MM3 (ref 3.9–5.2)
SODIUM BLD-SCNC: 141 MMOL/L (ref 136–145)
WBC NRBC COR # BLD: 9.44 10*3/MM3 (ref 4.5–10.7)

## 2018-04-22 PROCEDURE — 85027 COMPLETE CBC AUTOMATED: CPT | Performed by: HOSPITALIST

## 2018-04-22 PROCEDURE — G0378 HOSPITAL OBSERVATION PER HR: HCPCS

## 2018-04-22 PROCEDURE — 25010000002 LEVOFLOXACIN PER 250 MG: Performed by: HOSPITALIST

## 2018-04-22 PROCEDURE — 80048 BASIC METABOLIC PNL TOTAL CA: CPT | Performed by: HOSPITALIST

## 2018-04-22 RX ORDER — HYDROCODONE BITARTRATE AND ACETAMINOPHEN 5; 325 MG/1; MG/1
1 TABLET ORAL EVERY 4 HOURS PRN
Qty: 20 TABLET | Refills: 0 | Status: SHIPPED | OUTPATIENT
Start: 2018-04-22 | End: 2018-05-01

## 2018-04-22 RX ORDER — LEVOFLOXACIN 750 MG/1
750 TABLET ORAL DAILY
Qty: 5 TABLET | Refills: 0 | Status: SHIPPED | OUTPATIENT
Start: 2018-04-23 | End: 2018-04-28

## 2018-04-22 RX ADMIN — LEVOFLOXACIN 750 MG: 5 INJECTION, SOLUTION INTRAVENOUS at 12:52

## 2018-04-22 NOTE — NURSING NOTE
"Falls Prevention Teach-Back Education     Patt Bond is a 72 y.o. female admitted to [facility] on 4/21/2018  9:58 AM. The primary encounter diagnosis was Left ureteral stone. A diagnosis of Hydronephrosis with ureteropelvic junction (UPJ) obstruction was also pertinent to this visit.    Fall Risk Assessment  [facility] High Risk Falls Assessment (If Fall score is >/=13, add the Fall Risk CPG to the care plan)   Fallen in past 6 months: 0--> No  Mental Status: 0--> no mental status change  Elimination: 0--> No elimination issues  Mobility: 0--> No mobility issues  Medications: 1--> Narcotics  Nurses' Clinical Judgement: 2  Total Fall Risk Score: 5  Total Fall Risk Score: 5    The patient viewed the informational video on strategies to prevent falling while hospitalized entitled \"Patient Safety: Protecting Yourself in the Hospital (Part 3)\".  The patient was able to teach back at least three things that can be done to lessen the risk for falling while in the hospital.  Additionally, the patient was able to verbalize what they need to do before getting out of bed in an effort to prevent a fall.    Nurse: Zandra Mota RN  "

## 2018-04-22 NOTE — PLAN OF CARE
Problem: Patient Care Overview  Goal: Plan of Care Review  Outcome: Ongoing (interventions implemented as appropriate)   04/22/18 0541   OTHER   Outcome Summary VSS, Norco x1, amb to bathroom with assist, no c/o nausea this shift   Plan of Care Review   Progress improving     Goal: Individualization and Mutuality  Outcome: Ongoing (interventions implemented as appropriate)    Goal: Discharge Needs Assessment  Outcome: Ongoing (interventions implemented as appropriate)    Goal: Interprofessional Rounds/Family Conf  Outcome: Ongoing (interventions implemented as appropriate)      Problem: Pain, Acute (Adult)  Goal: Identify Related Risk Factors and Signs and Symptoms  Outcome: Ongoing (interventions implemented as appropriate)    Goal: Acceptable Pain Control/Comfort Level  Outcome: Ongoing (interventions implemented as appropriate)      Problem: Infection, Risk/Actual (Adult)  Goal: Identify Related Risk Factors and Signs and Symptoms  Outcome: Ongoing (interventions implemented as appropriate)    Goal: Infection Prevention/Resolution  Outcome: Ongoing (interventions implemented as appropriate)

## 2018-04-22 NOTE — DISCHARGE SUMMARY
Name: Patt Bond  Age: 72 y.o.  Sex: female  :  1945  MRN: [id number]         Primary Care Physician: Moe Matute MD      Date of Admission:  2018  Date of Discharge:  2018      CHIEF COMPLAINT  Flank Pain (L flank pain x 4 hrs)      DISCHARGE DIAGNOSIS  Active Hospital Problems (** Indicates Principal Problem)    Diagnosis Date Noted   • **Obstruction of left ureteropelvic junction (UPJ) due to stone [N20.1] 2018   • Morbid obesity with BMI of 40.0-44.9, adult [E66.01, Z68.41] 2018      Resolved Hospital Problems    Diagnosis Date Noted Date Resolved   • Nausea and vomiting [R11.2] 2018       SECONDARY DIAGNOSES  Past Medical History:   Diagnosis Date   • Asthma    • Back pain    • Diverticulitis    • Diverticulosis    • Goiter    • Hyperthyroidism     followed by Dr. Blackmon   • Joint pain, knee    • Kidney stones     with cystoscopy by Dr. Miguel Monte done on 10/7/15, 9/9/15, 5/8/15, 07   • Mediastinal mass    • Obstructive pyelonephritis 2015    left obstructing pyelonephritis    • PONV (postoperative nausea and vomiting)    • Renal disorder    • SOB (shortness of breath) on exertion        CONSULTS   Consult Orders (all)     Start     Ordered    18 1210  Urology (on-call MD unless specified)     Specialty:  Urology  Provider:  Stanley Osuna MD    18 1209            PROCEDURES PERFORMED  CYSTOSCOPY, LEFT RETROGRADE WITH LEFT URETERAL STENT INSERTION        HOSPITAL COURSE  Ms. Bond is a 72 y.o. female who presented to [facility] initially complaining of left flank pain. Please see the admitting history and physical for further details. She was found to have obstructing stone at her left UPJ and was admitted to the hospital for further evaluation and treatment. She was seen by urology and underwent the above-mentioned surgical procedure. She tolerated the procedure well and was watched overnight.  Today her pain has improved and she's had no further nausea and vomiting. She was cleared for discharge by Dr. Osuna of urology. She will follow-up with her normal urologist Dr. Miguel Monte within the next week. Per their recommendations will prescribe antibiotics and analgesics at discharge.        PHYSICAL EXAM  Temp:  [97.2 °F (36.2 °C)-98.1 °F (36.7 °C)] 97.4 °F (36.3 °C)  Heart Rate:  [66-84] 66  Resp:  [12-16] 16  BP: (111-149)/(60-81) 111/72  Body mass index is 44.29 kg/m².  Physical Exam   Constitutional: She is oriented to person, place, and time. No distress.   Cardiovascular: Normal rate and regular rhythm.    No murmur heard.  Pulmonary/Chest: Effort normal and breath sounds normal.   Abdominal: Soft. Bowel sounds are normal. She exhibits no distension. There is no tenderness.   Musculoskeletal: Normal range of motion. She exhibits no edema.   Neurological: She is alert and oriented to person, place, and time.   Skin: Skin is warm and dry. She is not diaphoretic.         CONDITION ON DISCHARGE  Stable.      DISCHARGE DISPOSITION   Home with family      ALLERGIES  Allergies   Allergen Reactions   • Cephalexin Hives   • Cephalosporins Hives   • Penicillins Hives         DISCHARGE MEDICATIONS     Your medication list      START taking these medications      Instructions Last Dose Given Next Dose Due   HYDROcodone-acetaminophen 5-325 MG per tablet  Commonly known as:  NORCO      Take 1 tablet by mouth Every 4 (Four) Hours As Needed for Moderate Pain  for up to 9 days.       levoFLOXacin 750 MG tablet  Commonly known as:  LEVAQUIN  Start taking on:  4/23/2018      Take 1 tablet by mouth Daily for 5 days.          CONTINUE taking these medications      Instructions Last Dose Given Next Dose Due   aspirin  MG tablet      Take 325 mg by mouth Every 6 (Six) Hours As Needed.             Where to Get Your Medications      [facility]  [facility] - [phone]  - [phone] FX  [address] [facility] [address]    Phone:  [phone]    levoFLOXacin 750 MG tablet     You can get these medications from any pharmacy    Bring a paper prescription for each of these medications   HYDROcodone-acetaminophen 5-325 MG per tablet       Diet Instructions     Diet: Regular       Discharge Diet:  Regular       Activity Instructions     Activity as Tolerated         Future Appointments  Date Time Provider Department Center   5/19/2018 7:30 PM  [facility]  [facility]   5/22/2018 10:10 AM [facility] None   7/23/2018 10:00 AM Garrison SCHWARTZ MD [facility] None   8/14/2018 9:30 AM [facility] CT 2  [facility] CT [facility]     Follow-up Information     Moe Matute MD Follow up in 2 week(s).    Specialty:  Family Medicine  Contact information:  [address] [id number] [phone]             Miguel Monte MD Follow up.    Specialty:  Urology  Why:  His office will call with appointment time.   Contact information:  [address] [id number] [phone]                   TEST  RESULTS PENDING AT DISCHARGE  None       CODE STATUS  Full Code        Christopher Shore MD  [facility]  04/22/18  2:18 PM

## 2018-04-22 NOTE — PLAN OF CARE
Problem: Patient Care Overview  Goal: Plan of Care Review  Outcome: Ongoing (interventions implemented as appropriate)   04/22/18 1420   OTHER   Outcome Summary VSS; BRP; no c/o pain, N/V, AAOx4 plan to discharge home   Plan of Care Review   Progress improving   Coping/Psychosocial   Plan of Care Reviewed With patient;spouse       Problem: Pain, Acute (Adult)  Goal: Identify Related Risk Factors and Signs and Symptoms  Outcome: Ongoing (interventions implemented as appropriate)   04/22/18 1420   Pain, Acute (Adult)   Related Risk Factors (Acute Pain) surgery;disease process;patient perception     Goal: Acceptable Pain Control/Comfort Level  Outcome: Ongoing (interventions implemented as appropriate)   04/22/18 1420   Pain, Acute (Adult)   Acceptable Pain Control/Comfort Level achieves outcome       Problem: Infection, Risk/Actual (Adult)  Goal: Identify Related Risk Factors and Signs and Symptoms  Outcome: Ongoing (interventions implemented as appropriate)   04/22/18 1420   Infection, Risk/Actual (Adult)   Related Risk Factors (Infection, Risk/Actual) exposure to microbes     Goal: Infection Prevention/Resolution  Outcome: Ongoing (interventions implemented as appropriate)   04/22/18 1420   Infection, Risk/Actual (Adult)   Infection Prevention/Resolution making progress toward outcome

## 2018-04-22 NOTE — PROGRESS NOTES
Pod 1 left stent placement  afvss  abd soft, obese  Minimal stent pain        Plan:  Home on abx and analgesics  My office will call to schedule follow up

## 2018-05-14 ENCOUNTER — RESULTS ENCOUNTER (OUTPATIENT)
Dept: ENDOCRINOLOGY | Age: 73
End: 2018-05-14

## 2018-05-14 DIAGNOSIS — E05.00 GRAVES DISEASE: ICD-10-CM

## 2018-05-14 DIAGNOSIS — E05.90 HYPERTHYROIDISM: ICD-10-CM

## 2018-05-16 ENCOUNTER — TRANSCRIBE ORDERS (OUTPATIENT)
Dept: SLEEP MEDICINE | Facility: HOSPITAL | Age: 73
End: 2018-05-16

## 2018-05-16 DIAGNOSIS — G47.33 OSA (OBSTRUCTIVE SLEEP APNEA): Primary | ICD-10-CM

## 2018-05-19 ENCOUNTER — HOSPITAL ENCOUNTER (OUTPATIENT)
Dept: SLEEP MEDICINE | Facility: HOSPITAL | Age: 73
Discharge: HOME | End: 2018-05-19
Admitting: INTERNAL MEDICINE

## 2018-05-19 DIAGNOSIS — G47.33 OSA (OBSTRUCTIVE SLEEP APNEA): ICD-10-CM

## 2018-05-19 PROCEDURE — 95811 POLYSOM 6/>YRS CPAP 4/> PARM: CPT

## 2018-05-22 LAB
T4 FREE SERPL-MCNC: 1.19 NG/DL (ref 0.93–1.7)
TSH SERPL DL<=0.005 MIU/L-ACNC: 1.15 MIU/ML (ref 0.27–4.2)

## 2018-05-25 ENCOUNTER — TELEPHONE (OUTPATIENT)
Dept: SLEEP MEDICINE | Facility: HOSPITAL | Age: 73
End: 2018-05-25

## 2018-05-25 NOTE — TELEPHONE ENCOUNTER
Tech contacted patient to discuss sleep study results.  Pt has good understanding of diagnosis and wants to be set up with PAP device.  Pt requested setup thru [facility].  Made follow up appt with Dr. Collazo for post set-up follow [place] [facility] [place]

## 2018-05-31 ENCOUNTER — TELEPHONE (OUTPATIENT)
Dept: ENDOCRINOLOGY | Age: 73
End: 2018-05-31

## 2018-05-31 NOTE — TELEPHONE ENCOUNTER
PER DR. MASON PATIENT LABS ARE STABLE. DOES NOT EXPLAIN FATIGUE. PATIENT NOT CURRENTLY ON ANY MEDICATIONS. WILL REPEAT LABS AT NEXT OFFICE VISIT

## 2018-07-23 ENCOUNTER — OFFICE VISIT (OUTPATIENT)
Dept: ENDOCRINOLOGY | Age: 73
End: 2018-07-23

## 2018-07-23 VITALS
WEIGHT: 259.8 LBS | HEIGHT: 63 IN | SYSTOLIC BLOOD PRESSURE: 148 MMHG | BODY MASS INDEX: 46.03 KG/M2 | HEART RATE: 72 BPM | DIASTOLIC BLOOD PRESSURE: 90 MMHG

## 2018-07-23 DIAGNOSIS — R53.82 CHRONIC FATIGUE: ICD-10-CM

## 2018-07-23 DIAGNOSIS — E05.00 GRAVES DISEASE: ICD-10-CM

## 2018-07-23 DIAGNOSIS — R63.5 ABNORMAL WEIGHT GAIN: ICD-10-CM

## 2018-07-23 DIAGNOSIS — E05.90 HYPERTHYROIDISM: Primary | ICD-10-CM

## 2018-07-23 LAB
ALBUMIN SERPL-MCNC: 4.5 G/DL (ref 3.5–5.2)
ALBUMIN/GLOB SERPL: 1.9 G/DL
ALP SERPL-CCNC: 62 U/L (ref 39–117)
ALT SERPL-CCNC: 24 U/L (ref 1–33)
AST SERPL-CCNC: 19 U/L (ref 1–32)
BILIRUB SERPL-MCNC: 0.6 MG/DL (ref 0.1–1.2)
BUN SERPL-MCNC: 16 MG/DL (ref 8–23)
BUN/CREAT SERPL: 13.2 (ref 7–25)
CALCIUM SERPL-MCNC: 9.5 MG/DL (ref 8.6–10.5)
CHLORIDE SERPL-SCNC: 105 MMOL/L (ref 98–107)
CO2 SERPL-SCNC: 23.5 MMOL/L (ref 22–29)
CREAT SERPL-MCNC: 1.21 MG/DL (ref 0.57–1)
GLOBULIN SER CALC-MCNC: 2.4 GM/DL
GLUCOSE SERPL-MCNC: 106 MG/DL (ref 65–99)
POTASSIUM SERPL-SCNC: 4.9 MMOL/L (ref 3.5–5.2)
PROT SERPL-MCNC: 6.9 G/DL (ref 6–8.5)
SODIUM SERPL-SCNC: 143 MMOL/L (ref 136–145)
T4 FREE SERPL-MCNC: 1.13 NG/DL (ref 0.93–1.7)
TSH SERPL DL<=0.005 MIU/L-ACNC: 1.03 MIU/ML (ref 0.27–4.2)

## 2018-07-23 PROCEDURE — 99214 OFFICE O/P EST MOD 30 MIN: CPT | Performed by: INTERNAL MEDICINE

## 2018-07-23 RX ORDER — POTASSIUM CITRATE 10 MEQ/1
TABLET, EXTENDED RELEASE ORAL
COMMUNITY
Start: 2018-06-21 | End: 2018-11-26

## 2018-07-23 NOTE — PROGRESS NOTES
73 y.o.    Patient Care Team:  Moe Matute MD as PCP - General  Miguel Gautam MD as Consulting Physician (Cardiology)    Chief Complaint:      F/U HYPERTHYROID.  GRAVE'S DISEASE    Subjective     HPI   Patient is a 73-year-old white female with a past history of large goiter mostly substernal on the left side, Graves' disease with hyperthyroidism came for follow-up    Hypothyroidism  Patient stopped taking methimazole in April 2018 after her left hemithyroidectomy along with the mass  Patient is currently not on replacement levothyroxine  Patient apparently had right-sided thyroid surgery 20 years ago but the details are unclear  She believes that only the mass was removed and the right thyroid is still existent    Graves' disease  Patient was positive for antibodies  She denies any double vision or diplopia  No blurring of the vision  No skin rash suggestive of pretibial myxedema    Abnormal weight gain  Patient gained nearly 9 pounds in the past 3 months  Patient currently weighs 259 pounds with a BMI of 46  Fatigue  Patient reports chronic fatigue   slightly improved after starting CPAP for obstructive sleep apnea 1 month ago    Patient was recently placed on potassium citrate for  kidney stones by her urologist        Interval History: Summary of hospitalization      71-year-old female no previous history of pulmonary issues in the past admitted for the evaluation of left-sided abdominal pain. She's noted to have severe diverticular disease with abscess and presently ongoing evaluation. She gets short of breath with exertion. She's had workup with a CT chest results of which revealed a large thyroid mass causing tracheal deviation. Currently she denies any problems swallowing. She denies any cough but has some wheezing with exertion. No aspiration has been reported.  Patient's thyroid function was analyzed and TSH was suppressed with elevated. T4. With this picture of hyperthyroidism was  concentrated for further managing patient's thyroid condition.      Patient reports very occasional dysphagia. She denies any change in voice. She does report to shortness of breath and is not clear as to the source of shortness of breath.  Patient denied any symptoms of palpitations or sweating episodes for tremors or diarrhea. She has had diverticulitis which has complicated the GI symptoms.  She has lost weight but also reported that her appetite has been very low which could explain the weight loss      Patient frequently refers to Dr. Camarillo doing a surgery on the right side for thyroid mass in the past. Currently has a CT scan reveals a large thyroid mass on the left side pushing the trachea to the right and causing some tracheal compression.  Patient is currently being evaluated by general surgery, pulmonary, infection disease  Patient and family denied any family history of thyroid cancer. She's not known to have any thyroid dysfunction in the past other than the fact that she had a goiter on the right side of the chest which was removed several years agoher  The following portions of the patient's history were reviewed and updated as appropriate: allergies, current medications, past family history, past medical history, past social history, past surgical history and problem list.    Past Medical History:   Diagnosis Date   • Asthma    • Back pain    • Diverticulitis    • Diverticulosis    • Goiter    • Hyperthyroidism     followed by Dr. Blackmon   • Joint pain, knee    • Kidney stones 2015    with cystoscopy by Dr. Miguel Monte done on 10/7/15, 9/9/15, 5/8/15, 9/21/07   • Mediastinal mass    • Obstructive pyelonephritis 09/28/2015    left obstructing pyelonephritis    • PONV (postoperative nausea and vomiting)    • Renal disorder    • SOB (shortness of breath) on exertion      Family History   Problem Relation Age of Onset   • Heart disease Father    • Heart attack Paternal Uncle    • Cancer Maternal  "Grandmother         ovarian   • Heart attack Paternal Grandfather    • Heart attack Paternal Uncle    • Heart attack Paternal Uncle    • Heart attack Paternal Uncle    • Heart attack Paternal Uncle    • Heart attack Paternal Uncle    • Malig Hyperthermia Neg Hx      Social History     Social History   • Marital status:      Spouse name: BERENICE COFFEY    • Number of children: 2   • Years of education: HIGH SCHOOL      Occupational History   •  Retired     Social History Main Topics   • Smoking status: Never Smoker   • Smokeless tobacco: Never Used   • Alcohol use No   • Drug use: No   • Sexual activity: Defer     Other Topics Concern   • Not on file     Social History Narrative   • No narrative on file     Allergies   Allergen Reactions   • Cephalexin Hives   • Cephalosporins Hives   • Penicillins Hives       Current Outpatient Prescriptions:   •  aspirin  MG tablet, Take 325 mg by mouth Every 6 (Six) Hours As Needed., Disp: , Rfl:         Review of Systems   Constitutional: Negative for chills, fatigue and fever.   Cardiovascular: Negative for chest pain and palpitations.   Gastrointestinal: Negative for abdominal pain, constipation, diarrhea, nausea and vomiting.   Endocrine: Negative for cold intolerance and heat intolerance.   All other systems reviewed and are negative.      Objective       Vitals:    07/23/18 1012   BP: 148/90   Pulse: 72   Weight: 118 kg (259 lb 12.8 oz)   Height: 160 cm (63\")     Body mass index is 46.02 kg/m².      Physical Exam   Constitutional: She is oriented to person, place, and time. She appears well-developed and well-nourished.   Obese   HENT:   Head: Normocephalic and atraumatic.   Eyes: Pupils are equal, round, and reactive to light. EOM are normal.   Neck: Normal range of motion. Neck supple. No tracheal deviation present. No thyromegaly present.   Cardiovascular: Normal rate, regular rhythm, normal heart sounds and intact distal pulses.    Tachycardia   Pulmonary/Chest: " Effort normal and breath sounds normal.   Abdominal: Soft. Bowel sounds are normal. She exhibits distension. There is no tenderness.   Musculoskeletal: Normal range of motion. She exhibits no edema.   Neurological: She is alert and oriented to person, place, and time. She has normal reflexes.   Skin: Skin is warm and dry. No erythema.   Psychiatric: She has a normal mood and affect. Her behavior is normal.   Nursing note and vitals reviewed.    Results Review:     I reviewed the patient's new clinical results.    Medical records reviewed  Summary:      Results Encounter on 05/14/2018   Component Date Value Ref Range Status   • TSH 05/22/2018 1.150  0.270 - 4.200 mIU/mL Final   • Free T4 05/22/2018 1.19  0.93 - 1.70 ng/dL Final     Lab Results   Component Value Date    HGBA1C 4.70 (L) 09/10/2016     Lab Results   Component Value Date    CREATININE 1.23 (H) 04/22/2018     Imaging Results (most recent)     None                Assessment and Plan:    Patt was seen today for hyperthyroidism.    Diagnoses and all orders for this visit:    Hyperthyroidism  -     Comprehensive Metabolic Panel  -     T4, Free  -     TSH    Graves disease    Abnormal weight gain    Chronic fatigue        Patient reported that she is currently not taking any methimazole for the past 3 months  Patient is status post left hemithyroidectomy which was partly substernal on March 5, 2018  She has not been on Synthroid replacement either since then    Patient had lab testing 6 weeks ago  Her TSH was 1.1 and her free T4 was 1.1  Since April 2018 visit to the office patient was advised not to continue methimazole any longer  She is also currently not taking any replacement Synthroid    Patient has been experiencing fatigue and also gaining weight  She gain nearly 9 pounds in the past 3 months  She started using CPAP mask for obstructive sleep apnea and is feeling slightly better with fatigue but still has significant fatigue    Patient will get lab  "testing done today  It is possible that she might need Synthroid replacement  Patient will be notified about that after the lab results have been reviewed  Patient verbalized understanding    Patient will return to follow-up in 4 months.     The total time spent  was more than 25 min of which greater than 15 min of time ( greater than 50% of the total time )  was spent face to face with the patient counseling and coordination of care on recommended evaluation and treatment options, instructions for management/treatment and /or follow up  and importance of compliance with chosen management or treatment options  Garrison Alcaraz MD. FACE    07/23/18      EMR Dragon / transcription disclaimer:     \"Dictated utilizing Dragon dictation\".         "

## 2018-08-06 ENCOUNTER — OFFICE VISIT (OUTPATIENT)
Dept: SLEEP MEDICINE | Facility: HOSPITAL | Age: 73
End: 2018-08-06
Attending: INTERNAL MEDICINE

## 2018-08-06 VITALS
WEIGHT: 255 LBS | DIASTOLIC BLOOD PRESSURE: 72 MMHG | SYSTOLIC BLOOD PRESSURE: 195 MMHG | HEART RATE: 72 BPM | BODY MASS INDEX: 45.18 KG/M2 | HEIGHT: 63 IN

## 2018-08-06 DIAGNOSIS — Z99.89 OSA ON CPAP: Primary | ICD-10-CM

## 2018-08-06 DIAGNOSIS — E66.01 OBESITY, MORBID, BMI 40.0-49.9 (HCC): ICD-10-CM

## 2018-08-06 DIAGNOSIS — G47.33 OSA ON CPAP: Primary | ICD-10-CM

## 2018-08-06 DIAGNOSIS — G47.34 SLEEP RELATED HYPOXIA: ICD-10-CM

## 2018-08-06 DIAGNOSIS — G47.10 HYPERSOMNIA WITH SLEEP APNEA: ICD-10-CM

## 2018-08-06 DIAGNOSIS — G47.30 HYPERSOMNIA WITH SLEEP APNEA: ICD-10-CM

## 2018-08-06 PROCEDURE — G0463 HOSPITAL OUTPT CLINIC VISIT: HCPCS

## 2018-08-14 ENCOUNTER — HOSPITAL ENCOUNTER (OUTPATIENT)
Dept: CT IMAGING | Facility: HOSPITAL | Age: 73
Discharge: HOME | End: 2018-08-14
Attending: INTERNAL MEDICINE | Admitting: INTERNAL MEDICINE

## 2018-08-14 DIAGNOSIS — R91.1 LUNG NODULE: ICD-10-CM

## 2018-08-14 PROCEDURE — 71250 CT THORAX DX C-: CPT

## 2018-11-19 ENCOUNTER — LAB (OUTPATIENT)
Dept: ENDOCRINOLOGY | Age: 73
End: 2018-11-19

## 2018-11-19 DIAGNOSIS — E05.00 GRAVES DISEASE: ICD-10-CM

## 2018-11-19 DIAGNOSIS — E05.90 HYPERTHYROIDISM: ICD-10-CM

## 2018-11-19 DIAGNOSIS — E05.90 HYPERTHYROIDISM: Primary | ICD-10-CM

## 2018-11-19 LAB
ALBUMIN SERPL-MCNC: 3.7 G/DL (ref 3.5–5.2)
ALBUMIN/GLOB SERPL: 1.2 G/DL
ALP SERPL-CCNC: 72 U/L (ref 39–117)
ALT SERPL-CCNC: 39 U/L (ref 1–33)
AST SERPL-CCNC: 24 U/L (ref 1–32)
BILIRUB SERPL-MCNC: 0.7 MG/DL (ref 0.1–1.2)
BUN SERPL-MCNC: 13 MG/DL (ref 8–23)
BUN/CREAT SERPL: 12.1 (ref 7–25)
CALCIUM SERPL-MCNC: 9.4 MG/DL (ref 8.6–10.5)
CHLORIDE SERPL-SCNC: 105 MMOL/L (ref 98–107)
CO2 SERPL-SCNC: 24.2 MMOL/L (ref 22–29)
CREAT SERPL-MCNC: 1.07 MG/DL (ref 0.57–1)
GLOBULIN SER CALC-MCNC: 3.2 GM/DL
GLUCOSE SERPL-MCNC: 127 MG/DL (ref 65–99)
POTASSIUM SERPL-SCNC: 4.4 MMOL/L (ref 3.5–5.2)
PROT SERPL-MCNC: 6.9 G/DL (ref 6–8.5)
SODIUM SERPL-SCNC: 141 MMOL/L (ref 136–145)
T4 FREE SERPL-MCNC: 1.5 NG/DL (ref 0.93–1.7)
TSH SERPL DL<=0.005 MIU/L-ACNC: 0.28 MIU/ML (ref 0.27–4.2)

## 2018-11-26 ENCOUNTER — OFFICE VISIT (OUTPATIENT)
Dept: ENDOCRINOLOGY | Age: 73
End: 2018-11-26

## 2018-11-26 VITALS
HEIGHT: 63 IN | SYSTOLIC BLOOD PRESSURE: 142 MMHG | DIASTOLIC BLOOD PRESSURE: 80 MMHG | WEIGHT: 258.8 LBS | BODY MASS INDEX: 45.86 KG/M2 | HEART RATE: 74 BPM

## 2018-11-26 DIAGNOSIS — E04.2 MULTINODULAR GOITER: Primary | ICD-10-CM

## 2018-11-26 DIAGNOSIS — E05.90 HYPERTHYROIDISM: ICD-10-CM

## 2018-11-26 DIAGNOSIS — E05.00 GRAVES DISEASE: ICD-10-CM

## 2018-11-26 PROCEDURE — 99214 OFFICE O/P EST MOD 30 MIN: CPT | Performed by: INTERNAL MEDICINE

## 2018-11-26 RX ORDER — ALLOPURINOL 300 MG/1
300 TABLET ORAL DAILY
COMMUNITY
Start: 2017-01-20

## 2018-11-26 NOTE — PROGRESS NOTES
73 y.o.    Patient Care Team:  Moe Matute MD as PCP - General  Nando Heller MD as PCP - Claims Attributed  Miguel Gautam MD as Consulting Physician (Cardiology)    Chief Complaint:      F/U HYPERTHYROID.  GRAVE'S DISEASE.  HERE TO DISCUSS AB RESULTS.     Subjective     HPI   Patient is a 73-year-old white female with a past history of Graves' disease, large substernal goiter status post left hemithyroidectomy, history of hyperthyroidism came for follow-up    Hyperthyroidism  Patient stopped taking methimazole in April 2018 after the left hemithyroidectomy along with the mass removal substernal  Patient was not subsequently placed on levothyroxine  Patient apparently had right-sided thyroid surgery 20 some years ago  Patient believes that only the mass was removed in the right thyroid is still present.  Graves' disease  Patient was positive for antibodies  She denies any diplopia or double vision or blurred vision  No skin rash suggestive of pretibial myxedema  Abnormal weight gain  Patient currently weighs 247 pounds with a BMI of 45.9  Chronic fatigue  Patient reports chronic fatigue which is only improved since surgery slightly  Patient also has obstructive sleep apnea and is using CPAP with significant improvement  Patient has history of kidney stones for which she sees a urologist and is currently on potassium citrate      Interval History    Summary of hospitalization      71-year-old female no previous history of pulmonary issues in the past admitted for the evaluation of left-sided abdominal pain. She's noted to have severe diverticular disease with abscess and presently ongoing evaluation. She gets short of breath with exertion. She's had workup with a CT chest results of which revealed a large thyroid mass causing tracheal deviation. Currently she denies any problems swallowing. She denies any cough but has some wheezing with exertion. No aspiration has been  reported.  Patient's thyroid function was analyzed and TSH was suppressed with elevated. T4. With this picture of hyperthyroidism was concentrated for further managing patient's thyroid condition.      Patient reports very occasional dysphagia. She denies any change in voice. She does report to shortness of breath and is not clear as to the source of shortness of breath.  Patient denied any symptoms of palpitations or sweating episodes for tremors or diarrhea. She has had diverticulitis which has complicated the GI symptoms.  She has lost weight but also reported that her appetite has been very low which could explain the weight loss      Patient frequently refers to Dr. Camarillo doing a surgery on the right side for thyroid mass in the past. Currently has a CT scan reveals a large thyroid mass on the left side pushing the trachea to the right and causing some tracheal compression.  Patient is currently being evaluated by general surgery, pulmonary, infection disease  Patient and family denied any family history of thyroid cancer. She's not known to have any thyroid dysfunction in the past other than the fact that she had a goiter on the right side of the chest which was removed several years agoher        The following portions of the patient's history were reviewed and updated as appropriate: allergies, current medications, past family history, past medical history, past social history, past surgical history and problem list.    Past Medical History:   Diagnosis Date   • Asthma    • Back pain    • Diverticulitis    • Diverticulosis    • Goiter    • Hyperthyroidism     followed by Dr. Blackmon   • Joint pain, knee    • Kidney stones 2015    with cystoscopy by Dr. Miguel Monte done on 10/7/15, 9/9/15, 5/8/15, 9/21/07   • Mediastinal mass    • Obstructive pyelonephritis 09/28/2015    left obstructing pyelonephritis    • PONV (postoperative nausea and vomiting)    • Renal disorder    • SOB (shortness of breath) on  "exertion      Family History   Problem Relation Age of Onset   • Heart disease Father    • Heart attack Paternal Uncle    • Cancer Maternal Grandmother         ovarian   • Heart attack Paternal Grandfather    • Heart attack Paternal Uncle    • Heart attack Paternal Uncle    • Heart attack Paternal Uncle    • Heart attack Paternal Uncle    • Heart attack Paternal Uncle    • Malig Hyperthermia Neg Hx      Social History     Socioeconomic History   • Marital status:      Spouse name: BERENICE COFFEY    • Number of children: 2   • Years of education: HIGH SCHOOL    • Highest education level: Not on file   Social Needs   • Financial resource strain: Not on file   • Food insecurity - worry: Not on file   • Food insecurity - inability: Not on file   • Transportation needs - medical: Not on file   • Transportation needs - non-medical: Not on file   Occupational History     Employer: RETIRED   Tobacco Use   • Smoking status: Never Smoker   • Smokeless tobacco: Never Used   Substance and Sexual Activity   • Alcohol use: No   • Drug use: No   • Sexual activity: Defer   Other Topics Concern   • Not on file   Social History Narrative   • Not on file     Allergies   Allergen Reactions   • Cephalexin Hives   • Cephalosporins Hives   • Penicillins Hives       Current Outpatient Medications:   •  aspirin  MG tablet, Take 325 mg by mouth Every 6 (Six) Hours As Needed., Disp: , Rfl:   •  potassium citrate (UROCIT-K) 10 MEQ (1080 MG) CR tablet, , Disp: , Rfl:         Review of Systems   Constitutional: Negative for chills, fatigue and fever.   Cardiovascular: Negative for chest pain and palpitations.   Gastrointestinal: Negative for abdominal pain, constipation, diarrhea, nausea and vomiting.   Endocrine: Negative for cold intolerance and heat intolerance.   All other systems reviewed and are negative.      Objective       Vitals:    11/26/18 1323   BP: 142/80   Pulse: 74   Weight: 117 kg (258 lb 12.8 oz)   Height: 160 cm (63\") "     Body mass index is 45.84 kg/m².      Physical Exam   Constitutional: She is oriented to person, place, and time. She appears well-developed and well-nourished.   Obese   HENT:   Head: Normocephalic and atraumatic.   Eyes: EOM are normal. Pupils are equal, round, and reactive to light.   Neck: Normal range of motion. Neck supple. No tracheal deviation present. No thyromegaly present.   Cardiovascular: Normal rate, regular rhythm, normal heart sounds and intact distal pulses.   Tachycardia   Pulmonary/Chest: Effort normal and breath sounds normal.   Abdominal: Soft. Bowel sounds are normal. She exhibits distension. There is no tenderness.   Musculoskeletal: Normal range of motion. She exhibits no edema.   Neurological: She is alert and oriented to person, place, and time. She has normal reflexes.   Skin: Skin is warm and dry. No erythema.   Psychiatric: She has a normal mood and affect. Her behavior is normal.   Nursing note and vitals reviewed.    Results Review:     I reviewed the patient's new clinical results.    Medical records reviewed  Summary:      Lab on 11/19/2018   Component Date Value Ref Range Status   • TSH 11/19/2018 0.280  0.270 - 4.200 mIU/mL Final   • Free T4 11/19/2018 1.50  0.93 - 1.70 ng/dL Final   • Glucose 11/19/2018 127* 65 - 99 mg/dL Final   • BUN 11/19/2018 13  8 - 23 mg/dL Final   • Creatinine 11/19/2018 1.07* 0.57 - 1.00 mg/dL Final   • eGFR Non  Am 11/19/2018 51* >60 mL/min/1.73 Final    Comment: The MDRD GFR formula is only valid for adults with stable  renal function between ages 18 and 70.     • eGFR  Am 11/19/2018 61  >60 mL/min/1.73 Final   • BUN/Creatinine Ratio 11/19/2018 12.1  7.0 - 25.0 Final   • Sodium 11/19/2018 141  136 - 145 mmol/L Final   • Potassium 11/19/2018 4.4  3.5 - 5.2 mmol/L Final   • Chloride 11/19/2018 105  98 - 107 mmol/L Final   • Total CO2 11/19/2018 24.2  22.0 - 29.0 mmol/L Final   • Calcium 11/19/2018 9.4  8.6 - 10.5 mg/dL Final   • Total  "Protein 11/19/2018 6.9  6.0 - 8.5 g/dL Final   • Albumin 11/19/2018 3.70  3.50 - 5.20 g/dL Final   • Globulin 11/19/2018 3.2  gm/dL Final   • A/G Ratio 11/19/2018 1.2  g/dL Final   • Total Bilirubin 11/19/2018 0.7  0.1 - 1.2 mg/dL Final   • Alkaline Phosphatase 11/19/2018 72  39 - 117 U/L Final   • AST (SGOT) 11/19/2018 24  1 - 32 U/L Final   • ALT (SGPT) 11/19/2018 39* 1 - 33 U/L Final     Lab Results   Component Value Date    HGBA1C 4.70 (L) 09/10/2016     Lab Results   Component Value Date    CREATININE 1.07 (H) 11/19/2018     Imaging Results (most recent)     None                Assessment and Plan:    Patt was seen today for hyperthyroidism.    Diagnoses and all orders for this visit:    Multinodular goiter    Hyperthyroidism  -     T4, Free; Standing  -     TSH; Standing    Graves disease  -     T4, Free; Standing  -     TSH; Standing      Patient is status post left hemithyroidectomy for substernal goiter  Patient is currently not taking any medication since April 2018  Lab reports reviewed  TSH is trending down to 0.28  Free T4 is trending up to 1.50 in the past 3 months    Patient might become hyperthyroid once again  I advised her to get lab testing done every 6 weeks  Patient will return to follow-up in 3 months.    The total time spent  was more than 25 min of which greater than 15 min of time (greater than 50% of the total time)  was spent face to face with the patient counseling and coordination of care on recommended evaluation and treatment options, instructions for management/treatment and /or follow up and importance of compliance with chosen management or treatment options    Garrison Alcaraz MD. FACE    11/26/18      EMR Dragon / transcription disclaimer:     \"Dictated utilizing Dragon dictation\".         "

## 2019-01-04 ENCOUNTER — RESULTS ENCOUNTER (OUTPATIENT)
Dept: ENDOCRINOLOGY | Age: 74
End: 2019-01-04

## 2019-01-04 DIAGNOSIS — E05.90 HYPERTHYROIDISM: ICD-10-CM

## 2019-01-04 DIAGNOSIS — E05.00 GRAVES DISEASE: ICD-10-CM

## 2019-01-07 ENCOUNTER — LAB (OUTPATIENT)
Dept: ENDOCRINOLOGY | Age: 74
End: 2019-01-07

## 2019-01-07 DIAGNOSIS — E05.90 HYPERTHYROIDISM: ICD-10-CM

## 2019-01-07 DIAGNOSIS — E05.00 GRAVES DISEASE: ICD-10-CM

## 2019-01-08 LAB
T4 FREE SERPL-MCNC: 1.27 NG/DL (ref 0.93–1.7)
TSH SERPL DL<=0.005 MIU/L-ACNC: 0.35 MIU/ML (ref 0.27–4.2)

## 2019-02-25 ENCOUNTER — LAB (OUTPATIENT)
Dept: ENDOCRINOLOGY | Age: 74
End: 2019-02-25

## 2019-02-25 DIAGNOSIS — E05.00 GRAVES DISEASE: ICD-10-CM

## 2019-02-25 DIAGNOSIS — E05.90 HYPERTHYROIDISM: Primary | ICD-10-CM

## 2019-02-25 DIAGNOSIS — E05.90 HYPERTHYROIDISM: ICD-10-CM

## 2019-02-25 LAB
ALBUMIN SERPL-MCNC: 4.1 G/DL (ref 3.5–5.2)
ALBUMIN/GLOB SERPL: 1.7 G/DL
ALP SERPL-CCNC: 64 U/L (ref 39–117)
ALT SERPL-CCNC: 26 U/L (ref 1–33)
AST SERPL-CCNC: 19 U/L (ref 1–32)
BILIRUB SERPL-MCNC: 0.5 MG/DL (ref 0.1–1.2)
BUN SERPL-MCNC: 15 MG/DL (ref 8–23)
BUN/CREAT SERPL: 14.9 (ref 7–25)
CALCIUM SERPL-MCNC: 9.6 MG/DL (ref 8.6–10.5)
CHLORIDE SERPL-SCNC: 107 MMOL/L (ref 98–107)
CO2 SERPL-SCNC: 24 MMOL/L (ref 22–29)
CREAT SERPL-MCNC: 1.01 MG/DL (ref 0.57–1)
GLOBULIN SER CALC-MCNC: 2.4 GM/DL
GLUCOSE SERPL-MCNC: 170 MG/DL (ref 65–99)
POTASSIUM SERPL-SCNC: 4.5 MMOL/L (ref 3.5–5.2)
PROT SERPL-MCNC: 6.5 G/DL (ref 6–8.5)
SODIUM SERPL-SCNC: 140 MMOL/L (ref 136–145)
T4 FREE SERPL-MCNC: 1.19 NG/DL (ref 0.93–1.7)
TSH SERPL DL<=0.005 MIU/L-ACNC: 0.18 MIU/ML (ref 0.27–4.2)

## 2019-03-04 ENCOUNTER — OFFICE VISIT (OUTPATIENT)
Dept: ENDOCRINOLOGY | Age: 74
End: 2019-03-04

## 2019-03-04 VITALS
HEART RATE: 87 BPM | HEIGHT: 63 IN | WEIGHT: 255.4 LBS | SYSTOLIC BLOOD PRESSURE: 130 MMHG | DIASTOLIC BLOOD PRESSURE: 70 MMHG | BODY MASS INDEX: 45.25 KG/M2

## 2019-03-04 DIAGNOSIS — E04.2 MULTINODULAR GOITER: ICD-10-CM

## 2019-03-04 DIAGNOSIS — E05.90 HYPERTHYROIDISM: Primary | ICD-10-CM

## 2019-03-04 DIAGNOSIS — E05.00 GRAVES DISEASE: ICD-10-CM

## 2019-03-04 PROCEDURE — 99214 OFFICE O/P EST MOD 30 MIN: CPT | Performed by: INTERNAL MEDICINE

## 2019-03-04 NOTE — PROGRESS NOTES
73 y.o.    Patient Care Team:  Moe Matute MD as PCP - General  Nando Heller MD as PCP - Claims Attributed  Miguel Gautam MD as Consulting Physician (Cardiology)    Chief Complaint:      F/U HYPERTHYROID.  GRAVE'S DISEASE.  HERE TO DISCUSS AB RESULTS.  Subjective     HPI   Patient is a 73-year-old white female with a past history of Graves' disease, large substernal goiter status post left hemithyroidectomy, hypothyroidism and for follow-up    Hyper thyroidism  Patient stopped taking methimazole in April 2018 after she had left hemithyroidectomy in March 2018.  Is substernal mass was also removed at the same time  Patient was not subsequently placed on levothyroxine  She also did not need any methimazole since then  She reports that she had right-sided surgery 20 years ago  She believes that only mass was removed and the thyroid was still intact  Patient denies any difficulty swallowing at this time  Graves' disease  Patient was positive for antibodies  She denies any diplopia or blurred vision  She denies any skin rash suggestive of pretibial myxedema  Abnormal weight gain  Patient currently weighs 255 pounds with a BMI of 45.3  Chronic fatigue  Patient is reporting fatigue for a long time which has improved slightly after surgery  Obstructive sleep apnea  Patient uses CPAP with significant improvement  Patient also has history of kidney stones and sees a urologist on a regular basis  She is currently on potassium citrate daily      Interval History    Summary of hospitalization      71-year-old female no previous history of pulmonary issues in the past admitted for the evaluation of left-sided abdominal pain. She's noted to have severe diverticular disease with abscess and presently ongoing evaluation. She gets short of breath with exertion. She's had workup with a CT chest results of which revealed a large thyroid mass causing tracheal deviation. Currently she denies any problems  swallowing. She denies any cough but has some wheezing with exertion. No aspiration has been reported.  Patient's thyroid function was analyzed and TSH was suppressed with elevated. T4. With this picture of hyperthyroidism was concentrated for further managing patient's thyroid condition.      Patient reports very occasional dysphagia. She denies any change in voice. She does report to shortness of breath and is not clear as to the source of shortness of breath.  Patient denied any symptoms of palpitations or sweating episodes for tremors or diarrhea. She has had diverticulitis which has complicated the GI symptoms.  She has lost weight but also reported that her appetite has been very low which could explain the weight loss      Patient frequently refers to Dr. Camarillo doing a surgery on the right side for thyroid mass in the past. Currently has a CT scan reveals a large thyroid mass on the left side pushing the trachea to the right and causing some tracheal compression.  Patient is currently being evaluated by general surgery, pulmonary, infection disease  Patient and family denied any family history of thyroid cancer. She's not known to have any thyroid dysfunction in the past other than the fact that she had a goiter on the right side of the chest which was removed several years agoher           The following portions of the patient's history were reviewed and updated as appropriate: allergies, current medications, past family history, past medical history, past social history, past surgical history and problem list.    Past Medical History:   Diagnosis Date   • Asthma    • Back pain    • Diverticulitis    • Diverticulosis    • Goiter    • Hyperthyroidism     followed by Dr. Blackmon   • Joint pain, knee    • Kidney stones 2015    with cystoscopy by Dr. Miguel Monte done on 10/7/15, 9/9/15, 5/8/15, 9/21/07   • Mediastinal mass    • Obstructive pyelonephritis 09/28/2015    left obstructing pyelonephritis    •  PONV (postoperative nausea and vomiting)    • Renal disorder    • SOB (shortness of breath) on exertion      Family History   Problem Relation Age of Onset   • Heart disease Father    • Heart attack Paternal Uncle    • Cancer Maternal Grandmother         ovarian   • Heart attack Paternal Grandfather    • Heart attack Paternal Uncle    • Heart attack Paternal Uncle    • Heart attack Paternal Uncle    • Heart attack Paternal Uncle    • Heart attack Paternal Uncle    • Malig Hyperthermia Neg Hx      Social History     Socioeconomic History   • Marital status:      Spouse name: BERENICE COFFEY    • Number of children: 2   • Years of education: HIGH SCHOOL    • Highest education level: Not on file   Social Needs   • Financial resource strain: Not on file   • Food insecurity - worry: Not on file   • Food insecurity - inability: Not on file   • Transportation needs - medical: Not on file   • Transportation needs - non-medical: Not on file   Occupational History     Employer: RETIRED   Tobacco Use   • Smoking status: Never Smoker   • Smokeless tobacco: Never Used   Substance and Sexual Activity   • Alcohol use: No   • Drug use: No   • Sexual activity: Defer   Other Topics Concern   • Not on file   Social History Narrative   • Not on file     Allergies   Allergen Reactions   • Cephalexin Hives   • Cephalosporins Hives     Took in 2015 w/o problems   • Penicillins Hives       Current Outpatient Medications:   •  allopurinol (ZYLOPRIM) 300 MG tablet, Take  by mouth., Disp: , Rfl:         Review of Systems   Constitutional: Negative for chills, fatigue and fever.   Cardiovascular: Negative for chest pain and palpitations.   Gastrointestinal: Negative for abdominal pain, constipation, diarrhea, nausea and vomiting.   Endocrine: Negative for cold intolerance and heat intolerance.   All other systems reviewed and are negative.      Objective       Vitals:    03/04/19 1424   BP: 130/70   Pulse: 87   Weight: 116 kg (255 lb 6.4 oz)  "  Height: 160 cm (63\")     Body mass index is 45.24 kg/m².      Physical Exam   Constitutional: She is oriented to person, place, and time. She appears well-developed and well-nourished.   Obese   HENT:   Head: Normocephalic and atraumatic.   Eyes: EOM are normal. Pupils are equal, round, and reactive to light.   Neck: Normal range of motion. Neck supple. No tracheal deviation present. No thyromegaly present.   Cardiovascular: Normal rate, regular rhythm, normal heart sounds and intact distal pulses.   Tachycardia   Pulmonary/Chest: Effort normal and breath sounds normal.   Abdominal: Soft. Bowel sounds are normal. She exhibits distension. There is no tenderness.   Musculoskeletal: Normal range of motion. She exhibits no edema.   Neurological: She is alert and oriented to person, place, and time. She has normal reflexes.   Skin: Skin is warm and dry. No erythema.   Psychiatric: She has a normal mood and affect. Her behavior is normal.   Nursing note and vitals reviewed.    Results Review:     I reviewed the patient's new clinical results.    Medical records reviewed  Summary:      Lab on 02/25/2019   Component Date Value Ref Range Status   • Free T4 02/25/2019 1.19  0.93 - 1.70 ng/dL Final   • Glucose 02/25/2019 170* 65 - 99 mg/dL Final   • BUN 02/25/2019 15  8 - 23 mg/dL Final   • Creatinine 02/25/2019 1.01* 0.57 - 1.00 mg/dL Final   • eGFR Non African Am 02/25/2019 54* >60 mL/min/1.73 Final    Comment: The MDRD GFR formula is only valid for adults with stable  renal function between ages 18 and 70.     • eGFR  Am 02/25/2019 65  >60 mL/min/1.73 Final   • BUN/Creatinine Ratio 02/25/2019 14.9  7.0 - 25.0 Final   • Sodium 02/25/2019 140  136 - 145 mmol/L Final   • Potassium 02/25/2019 4.5  3.5 - 5.2 mmol/L Final   • Chloride 02/25/2019 107  98 - 107 mmol/L Final   • Total CO2 02/25/2019 24.0  22.0 - 29.0 mmol/L Final   • Calcium 02/25/2019 9.6  8.6 - 10.5 mg/dL Final   • Total Protein 02/25/2019 6.5  6.0 - 8.5 " "g/dL Final   • Albumin 02/25/2019 4.10  3.50 - 5.20 g/dL Final   • Globulin 02/25/2019 2.4  gm/dL Final   • A/G Ratio 02/25/2019 1.7  g/dL Final   • Total Bilirubin 02/25/2019 0.5  0.1 - 1.2 mg/dL Final   • Alkaline Phosphatase 02/25/2019 64  39 - 117 U/L Final   • AST (SGOT) 02/25/2019 19  1 - 32 U/L Final   • ALT (SGPT) 02/25/2019 26  1 - 33 U/L Final   • TSH 02/25/2019 0.183* 0.270 - 4.200 mIU/mL Final     Lab Results   Component Value Date    HGBA1C 4.70 (L) 09/10/2016     Lab Results   Component Value Date    CREATININE 1.01 (H) 02/25/2019     Imaging Results (most recent)     None          Assessment and Plan:    Patt was seen today for hyperthyroidism.    Diagnoses and all orders for this visit:    Hyperthyroidism  -     T4, Free; Future  -     TSH; Future  -     T3; Future    Graves disease    Multinodular goiter        Patient is status post left hemithyroidectomy for substernal goiter  She had surgery on March 2018  Her TSH is trending down  Is not on any medication at this point  Patient had Graves' disease and hyperthyroidism    TSH trend suggest that she might become hyperthyroid again  Patient is currently not symptomatic of hyperthyroidism    We will continue to monitor the labs and then advise further  Patient return to follow-up in 3 months.    The total time spent  was more than 25 min of which greater than 15 min of time (greater than 50% of the total time)  was spent face to face with the patient counseling and coordination of care on recommended evaluation and treatment options, instructions for management/treatment and /or follow up and importance of compliance with chosen management or treatment options  Garrison Alcaraz MD. FACE    03/04/19      EMR Dragon / transcription disclaimer:     \"Dictated utilizing Dragon dictation\".         "

## 2019-03-09 ENCOUNTER — RESULTS ENCOUNTER (OUTPATIENT)
Dept: ENDOCRINOLOGY | Age: 74
End: 2019-03-09

## 2019-03-09 DIAGNOSIS — E05.90 HYPERTHYROIDISM: ICD-10-CM

## 2019-04-22 ENCOUNTER — RESULTS ENCOUNTER (OUTPATIENT)
Dept: ENDOCRINOLOGY | Age: 74
End: 2019-04-22

## 2019-04-22 DIAGNOSIS — E05.90 HYPERTHYROIDISM: ICD-10-CM

## 2019-05-28 DIAGNOSIS — E05.90 HYPERTHYROIDISM: Primary | ICD-10-CM

## 2019-06-04 ENCOUNTER — OFFICE VISIT (OUTPATIENT)
Dept: ENDOCRINOLOGY | Age: 74
End: 2019-06-04

## 2019-06-04 VITALS
HEART RATE: 69 BPM | HEIGHT: 63 IN | WEIGHT: 254 LBS | BODY MASS INDEX: 45 KG/M2 | SYSTOLIC BLOOD PRESSURE: 132 MMHG | DIASTOLIC BLOOD PRESSURE: 80 MMHG

## 2019-06-04 DIAGNOSIS — E05.90 HYPERTHYROIDISM: Primary | ICD-10-CM

## 2019-06-04 DIAGNOSIS — E05.00 GRAVES DISEASE: ICD-10-CM

## 2019-06-04 DIAGNOSIS — E04.2 MULTINODULAR GOITER: ICD-10-CM

## 2019-06-04 DIAGNOSIS — R53.82 CHRONIC FATIGUE: ICD-10-CM

## 2019-06-04 DIAGNOSIS — R63.5 ABNORMAL WEIGHT GAIN: ICD-10-CM

## 2019-06-04 PROCEDURE — 99214 OFFICE O/P EST MOD 30 MIN: CPT | Performed by: INTERNAL MEDICINE

## 2019-06-04 NOTE — PROGRESS NOTES
74 y.o.    Patient Care Team:  Moe Matute MD as PCP - General  Nando Heller MD as PCP - Claims Attributed  Miguel Gautam MD as Consulting Physician (Cardiology)    Chief Complaint:        F/U HYPERTHYROID.  GRAVE'S DISEASE.  HERE TO DISCUSS LAB RESULTS  Subjective     HPI   Patient is a 74-year-old white female with a past history of Graves' disease and substernal goiter status post left hemithyroidectomy and hypothyroidism came for follow-up    Postoperative hypothyroidism  Listed only the mass was removed but the thyroid gland was still intact  Patient is currently not on any medication  Denies any side effects  Graves' disease  Patient was positive for antibodies  She denies any diplopia or blurred vision  She also denies any pretibial myxedema  Abnormal weight gain  Patient currently weighs 254 pounds with a BMI of 45.3  Chronic fatigue  Patient reports chronic fatigue which has improved slightly after thyroidectomy  Obstructive sleep apnea  Patient uses CPAP with      Interval History    Summary of hospitalization      71-year-old female no previous history of pulmonary issues in the past admitted for the evaluation of left-sided abdominal pain. She's noted to have severe diverticular disease with abscess and presently ongoing evaluation. She gets short of breath with exertion. She's had workup with a CT chest results of which revealed a large thyroid mass causing tracheal deviation. Currently she denies any problems swallowing. She denies any cough but has some wheezing with exertion. No aspiration has been reported.  Patient's thyroid function was analyzed and TSH was suppressed with elevated. T4. With this picture of hyperthyroidism was concentrated for further managing patient's thyroid condition.      Patient reports very occasional dysphagia. She denies any change in voice. She does report to shortness of breath and is not clear as to the source of shortness of  breath.  Patient denied any symptoms of palpitations or sweating episodes for tremors or diarrhea. She has had diverticulitis which has complicated the GI symptoms.  She has lost weight but also reported that her appetite has been very low which could explain the weight loss      Patient frequently refers to Dr. Camarillo doing a surgery on the right side for thyroid mass in the past. Currently has a CT scan reveals a large thyroid mass on the left side pushing the trachea to the right and causing some tracheal compression.  Patient is currently being evaluated by general surgery, pulmonary, infection disease  Patient and family denied any family history of thyroid cancer. She's not known to have any thyroid dysfunction in the past other than the fact that she had a goiter on the right side of the chest which was removed several years agoher           The following portions of the patient's history were reviewed and updated as appropriate: allergies, current medications, past family history, past medical history, past social history, past surgical history and problem list.    Past Medical History:   Diagnosis Date   • Asthma    • Back pain    • Diverticulitis    • Diverticulosis    • Goiter    • Hyperthyroidism     followed by Dr. Blackmon   • Joint pain, knee    • Kidney stones 2015    with cystoscopy by Dr. Miguel Monte done on 10/7/15, 9/9/15, 5/8/15, 9/21/07   • Mediastinal mass    • Obstructive pyelonephritis 09/28/2015    left obstructing pyelonephritis    • PONV (postoperative nausea and vomiting)    • Renal disorder    • SOB (shortness of breath) on exertion      Family History   Problem Relation Age of Onset   • Heart disease Father    • Heart attack Paternal Uncle    • Cancer Maternal Grandmother         ovarian   • Heart attack Paternal Grandfather    • Heart attack Paternal Uncle    • Heart attack Paternal Uncle    • Heart attack Paternal Uncle    • Heart attack Paternal Uncle    • Heart attack Paternal  "Uncle    • Malig Hyperthermia Neg Hx      Social History     Socioeconomic History   • Marital status:      Spouse name: BERENICE COFFEY    • Number of children: 2   • Years of education: HIGH SCHOOL    • Highest education level: Not on file   Occupational History     Employer: RETIRED   Tobacco Use   • Smoking status: Never Smoker   • Smokeless tobacco: Never Used   Substance and Sexual Activity   • Alcohol use: No   • Drug use: No   • Sexual activity: Defer     Allergies   Allergen Reactions   • Cephalexin Hives   • Cephalosporins Hives     Took in 2015 w/o problems   • Penicillins Hives       Current Outpatient Medications:   •  allopurinol (ZYLOPRIM) 300 MG tablet, Take  by mouth., Disp: , Rfl:         Review of Systems   Constitutional: Negative for chills, fatigue and fever.   Cardiovascular: Negative for chest pain and palpitations.   Gastrointestinal: Negative for abdominal pain, constipation, diarrhea, nausea and vomiting.   Endocrine: Negative for cold intolerance and heat intolerance.   All other systems reviewed and are negative.      Objective       Vitals:    06/04/19 1414   BP: 132/80   Pulse: 69   Weight: 115 kg (254 lb)   Height: 160 cm (63\")     Body mass index is 44.99 kg/m².      Physical Exam   Constitutional: She is oriented to person, place, and time. She appears well-developed and well-nourished.   Obese   HENT:   Head: Normocephalic and atraumatic.   Eyes: EOM are normal. Pupils are equal, round, and reactive to light.   Neck: Normal range of motion. Neck supple. No tracheal deviation present. No thyromegaly present.   Cardiovascular: Normal rate, regular rhythm, normal heart sounds and intact distal pulses.   Tachycardia   Pulmonary/Chest: Effort normal and breath sounds normal.   Abdominal: Soft. Bowel sounds are normal. She exhibits distension. There is no tenderness.   Musculoskeletal: Normal range of motion. She exhibits no edema.   Neurological: She is alert and oriented to person, " place, and time. She has normal reflexes.   Skin: Skin is warm and dry. No erythema.   Psychiatric: She has a normal mood and affect. Her behavior is normal.   Nursing note and vitals reviewed.    Results Review:     I reviewed the patient's new clinical results.    Medical records reviewed  Summary:      Lab on 05/28/2019   Component Date Value Ref Range Status   • WBC 05/28/2019 5.61  3.40 - 10.80 10*3/mm3 Final   • RBC 05/28/2019 4.20  3.77 - 5.28 10*6/mm3 Final   • Hemoglobin 05/28/2019 12.8  12.0 - 15.9 g/dL Final   • Hematocrit 05/28/2019 41.3  34.0 - 46.6 % Final   • MCV 05/28/2019 98.3* 79.0 - 97.0 fL Final   • MCH 05/28/2019 30.5  26.6 - 33.0 pg Final   • MCHC 05/28/2019 31.0* 31.5 - 35.7 g/dL Final   • RDW 05/28/2019 14.3  12.3 - 15.4 % Final   • Platelets 05/28/2019 146  140 - 450 10*3/mm3 Final   • Neutrophil Rel % 05/28/2019 57.6  42.7 - 76.0 % Final   • Lymphocyte Rel % 05/28/2019 29.9  19.6 - 45.3 % Final   • Monocyte Rel % 05/28/2019 7.7  5.0 - 12.0 % Final   • Eosinophil Rel % 05/28/2019 4.1  0.3 - 6.2 % Final   • Basophil Rel % 05/28/2019 0.5  0.0 - 1.5 % Final   • Neutrophils Absolute 05/28/2019 3.23  1.70 - 7.00 10*3/mm3 Final   • Lymphocytes Absolute 05/28/2019 1.68  0.70 - 3.10 10*3/mm3 Final   • Monocytes Absolute 05/28/2019 0.43  0.10 - 0.90 10*3/mm3 Final   • Eosinophils Absolute 05/28/2019 0.23  0.00 - 0.40 10*3/mm3 Final   • Basophils Absolute 05/28/2019 0.03  0.00 - 0.20 10*3/mm3 Final   • Immature Granulocyte Rel % 05/28/2019 0.2  0.0 - 0.5 % Final   • Immature Grans Absolute 05/28/2019 0.01  0.00 - 0.05 10*3/mm3 Final   • nRBC 05/28/2019 0.0  0.0 - 0.2 /100 WBC Final   • TSH 05/28/2019 0.292  0.270 - 4.200 mIU/mL Final   • Free T4 05/28/2019 1.23  0.93 - 1.70 ng/dL Final     Lab Results   Component Value Date    HGBA1C 4.70 (L) 09/10/2016     Lab Results   Component Value Date    CREATININE 1.01 (H) 02/25/2019     Imaging Results (most recent)     None                Assessment and  "Plan:    Patt was seen today for hyperthyroidism.    Diagnoses and all orders for this visit:    Hyperthyroidism    Graves disease    Multinodular goiter    Abnormal weight gain    Chronic fatigue      Patient had left hemithyroidectomy for substernal goiter in March 2018  Her TSH is being monitored closely  Patient has a history of Graves' and hyperthyroidism  Patient is currently not on medication    Lab reports reviewed  TSH is 0.2  Free T4 is 1.2  Patient denies any symptoms of hyper or hypothyroidism    Since patient is stable I will see her again in 6 months with lab test      The total time spent  was more than 25 min of which greater than 15 min of time (greater than 50% of the total time)  was spent face to face with the patient counseling and coordination of care on recommended evaluation and treatment options, instructions for management/treatment and /or follow up and importance of compliance with chosen management or treatment options    Garrison Alcaraz MD. FACE    06/04/19      EMR Dragon / transcription disclaimer:     \"Dictated utilizing Dragon dictation\".         "

## 2019-06-20 PROBLEM — R53.82 CHRONIC FATIGUE: Status: ACTIVE | Noted: 2019-06-20

## 2019-08-05 ENCOUNTER — OFFICE VISIT (OUTPATIENT)
Dept: SLEEP MEDICINE | Facility: HOSPITAL | Age: 74
End: 2019-08-05
Attending: INTERNAL MEDICINE

## 2019-08-05 VITALS
WEIGHT: 256 LBS | HEIGHT: 63 IN | BODY MASS INDEX: 45.36 KG/M2 | SYSTOLIC BLOOD PRESSURE: 127 MMHG | HEART RATE: 85 BPM | OXYGEN SATURATION: 95 % | DIASTOLIC BLOOD PRESSURE: 88 MMHG

## 2019-08-05 DIAGNOSIS — G47.33 OSA ON CPAP: Primary | ICD-10-CM

## 2019-08-05 DIAGNOSIS — Z99.89 OSA ON CPAP: Primary | ICD-10-CM

## 2019-08-05 DIAGNOSIS — R00.2 PALPITATIONS: ICD-10-CM

## 2019-08-05 DIAGNOSIS — G47.34 SLEEP RELATED HYPOXIA: ICD-10-CM

## 2019-08-05 DIAGNOSIS — E66.01 OBESITY, MORBID, BMI 40.0-49.9 (HCC): ICD-10-CM

## 2019-08-05 PROCEDURE — G0463 HOSPITAL OUTPT CLINIC VISIT: HCPCS

## 2019-08-05 NOTE — PROGRESS NOTES
Sleep Disorder Follow Up    Patient Name: Patt Bond  Age/Sex: 74 y.o. female  : 1945  MRN: [id number]    Date of Encounter Visit: 19  Referring Provider: Jake Collazo MD  Place of Service: [facility]  Patient Care Team:  Moe Matute MD as PCP - General  Garrison Alcaarz MD as PCP - Claims Attributed  Miguel Gautam MD as Consulting Physician (Cardiology)    PROBLEM LIST:  1. Severe obstructive sleep apnea on CPAP  2. Hypersomnia improved on CPAP  3. Sleep-related hypoxemia  4. Obesity    History of Present Illness:  Patt Bond is a 74 y.o. female who was last seen on 2018 for ERIS, morbid obesity (BMI 46.3), narrowed airway by exam and history of a large thyroid impinging on the trachea that required some surgical intervention.   She had an in lab sleep study that showed severe sleep apnea with an AHI of 36 and RDI 52.6.  There was no significant associated hypoxemia, but was noted to have severe sleep fragmentation and mild periodic leg movement index within significant associated arousals.  Optimal control was noted on a CPAP pressure of 14 cm H2O and beyond and patient was started on auto CPAP 10-20 cm H2O and has been on that pressure since.    Since last visit, she denies any changes in her medical history    Patient is on CPAP and uses it every night with no complaints from the mask or the pressure.  Patient uses a dream ware face mask, which does fit well. Patient denies any air leak.  Reports some dry mouth.   Patient's equipment supplier is [employer].  Current ly on auto CPAP 10-20 cm H2O.  [place] Sleepiness Scale (ESS) is 2.  Compliance download was reviewed and documented below.  Weight is up 1 pounds since prior visit on 2017.  Other comorbidities include palpitations, asthma, tracheal compression from mediastinal mass and obesity.     Review of Systems:   A ten-system review was conducted and was negative except  for shortness of breath.   Please refer to the follow up sleep questionnaire page one for details.    Past Medical History:  Past medical, surgical, social, and family history, except as mentioned above, was unchanged from the last visit.     Past Medical History:   Diagnosis Date   • Asthma    • Back pain    • Diverticulitis    • Diverticulosis    • Goiter    • Hyperthyroidism     followed by Dr. Blackmon   • Joint pain, knee    • Kidney stones 2015    with cystoscopy by Dr. Miguel Monte done on 10/7/15, 9/9/15, 5/8/15, 9/21/07   • Mediastinal mass    • Obstructive pyelonephritis 09/28/2015    left obstructing pyelonephritis    • PONV (postoperative nausea and vomiting)    • Renal disorder    • SOB (shortness of breath) on exertion        Past Surgical History:   Procedure Laterality Date   • BRONCHOSCOPY N/A 11/6/2017    Procedure: BRONCHOSCOPY WITH ENDOBRONCHIAL ULTRASOUND AND TRANSBRONCHIAL NEEDLE ASPIRATION;  Surgeon: Nando Heller MD;  Location: [facility] ENDOSCOPY;  Service:    • CARDIAC CATHETERIZATION N/A 9/27/2017    Procedure: Right Heart Cath;  Surgeon: Miguel Gautam MD;  Location: [facility] INVASIVE LOCATION;  Service:    • CARDIAC CATHETERIZATION N/A 9/27/2017    Procedure: Left Heart Cath;  Surgeon: Miguel Gautam MD;  Location: [facility] CATH INVASIVE LOCATION;  Service:    • CARDIAC CATHETERIZATION N/A 9/27/2017    Procedure: Coronary angiography;  Surgeon: Miguel Gautam MD;  Location: [facility] CATH INVASIVE LOCATION;  Service:    • CARDIAC CATHETERIZATION N/A 9/27/2017    Procedure: Left ventriculography;  Surgeon: Miguel Gautam MD;  Location: [facility] INVASIVE LOCATION;  Service:    • CHOLECYSTECTOMY N/A 5/17/2017    Procedure: LAPAROSCOPIC CHOLECYSTECTOMY WITH IOC;  Surgeon: Patt Moore MD;  Location: [facility] OR;  Service:    • COLON RESECTION Left 9/14/2016    Laparoscopic Low Anterior Resection with splenic flexure mobilization, drainage of Pelvic  Abscess (cultured 3+ E. Coli), Left salpingo-ophorectomy, laparoscopic rectopexy, and umbilical hernia repair, Dr. Patt Moore   • COLON RESECTION      VENTRAL HERNIA REPAIR   • COLONOSCOPY N/A 05/15/2008    sigmoid diverticulosis with blunting of the haustral folds and angulation consistent with previous diverticulitis, no polyps, suggestion of rectal prolapse-Dr. Patt Moore   • COLONOSCOPY W/ BIOPSIES AND POLYPECTOMY N/A 04/16/2001    Possible mild gastritis w/ some appearance of formations that look like petechiae in the antrum, no ulcerations, no erosions; cecal polyp approx 4mm sessile; probable transverse colon polyp, although we could not visualize this completely upon pulling out; sigmoid diverticula; multiple rectal polyps, mostly w/ appearance of hyperplasia, largest being 5 to 6mm: removed via snare-Dr. Patt Moore   • COLONOSCOPY W/ POLYPECTOMY N/A 12/10/2004    Diverticulosis with sigmoid perideverticulitis with erythema and patchiness around some of the diverticula, proximal ascedning colon polyp-approx 5 mm-removed via snare cauter, two distal ascending colon polyps-7 mm and 3 mm-removed via snare cautery polypectomy, hemorrhoids-Dr. Patt Moore   • CYSTOSCOPY W/ URETERAL STENT PLACEMENT Left 4/21/2018    Procedure: CYSTOSCOPY, LEFT RETROGRADE WITH LEFT URETERAL STENT INSERTION;  Surgeon: Stanley Osuna MD;  Location: [facility];  Service: Urology   • CYSTOSCOPY W/ URETERAL STENT REMOVAL Left 10/07/2015    Cystoscopy with stent extraction, left ureteral occulusion balloon placement, percutanous nephrostolithotomy with stone volume less than 2.5 cm involving the left lower pole and the left proximal ureter, balloon tract dilation for establishment of nephrostomy tract, antegrade nephrostomy tube placement-Dr. Miugel Monte   • CYSTOSCOPY, RETROGRADE PYELOGRAM AND STENT INSERTION Left 09/28/2015    Left cystoscopy with left retrograde pyelogram, left double-J stent placement-  Miguel Fernandes   • CYSTOSCOPY, RETROGRADE PYELOGRAM AND STENT INSERTION Left 09/09/2015    Cystoscopy with bilateral retrograde pyelogram, left double-J stent placement, right ureteral pyeloscopy with laser lithotripsy of the ureteropelvic junction calculus, right double-J stent placement-Dr. Miguel Monte   • CYSTOSCOPY, RETROGRADE PYELOGRAM AND STENT INSERTION Right 09/21/2007    Cystoscopy with right retrograde pyelogram, right biliary stent placement-Dr. Miguel Monte   • CYSTOSCOPY, RETROGRADE PYELOGRAM AND STENT INSERTION Right 09/07/2007    Cystoscopy with right retrograde pyelogram, right ureteral peyloscopy with extraction distal ureteral mass, right double J stent placement-Dr. Miguel Monte   • CYSTOSCOPY, URETEROSCOPY, RETROGRADE PYELOGRAM, STENT INSERTION Right 09/07/2007    Cystoscopy with right retrograde pyelogram, rigth ureteroscopy with extraction of distal mass, right double J stent placement-Dr. Miguel Monte   • D&C HYSTEROSCOPY N/A 05/18/2011    Procedure done due to thickened endomentrium and an endometrial polyp-Dr. Quita Cheatham   • DILATATION AND CURETTAGE N/A 03/22/2002    D&C, polyp removal-Dr. Quita Cheatham   • EXTRACORPOREAL SHOCKWAVE LITHOTRIPSY (ESWL), STENT INSERTION/REMOVAL Left 05/08/2015    Left extracoporeal shockwave lithotripsy, cystoscopy with stent placement-Dr. Miguel Monte   • MEDIASTINOTOMY Left 3/5/2018    Procedure: left thyroidectomy, resection of substernal thyroid goiter cervical approach;  Surgeon: Quintin Mares III, MD;  Location: [facility] OR;  Service:    • SIGMOIDOSCOPY N/A 9/13/2016    Procedure: SIGMOIDOSCOPY FLEXIBLE TO 25 CM;  Surgeon: Patt Moore MD;  Location: [facility] ENDOSCOPY;  Service:    • THORACOTOMY  1996    Thoracotomy for ectopic thyroid, Dr. Camarillo   • THYROIDECTOMY, PARTIAL      left side 3/05/18   • UMBILICAL HERNIA REPAIR N/A 9/14/2016    Procedure: UMBILICAL HERNIA REPAIR ;  Surgeon: Patt Moore MD;  Location:   "[facility];  Service:    • VENTRAL/INCISIONAL HERNIA REPAIR N/A 5/17/2017    Procedure: VENTRAL HERNIA REPAIR WITH MESH, BILATERAL MUSCULOFASCIAL RELEASE;  Surgeon: Patt Moore MD;  Location: [facility] MAIN OR;  Service:        Home Medications:     Current Outpatient Medications:   •  allopurinol (ZYLOPRIM) 300 MG tablet, Take  by mouth., Disp: , Rfl:     Allergies:  Allergies   Allergen Reactions   • Cephalexin Hives   • Cephalosporins Hives     Took in 2015 w/o problems   • Penicillins Hives       Past Social History:  Social History     Socioeconomic History   • Marital status:      Spouse name: BERENICE COFFEY    • Number of children: 2   • Years of education: HIGH SCHOOL    • Highest education level: Not on file   Occupational History     Employer: RETIRED   Tobacco Use   • Smoking status: Never Smoker   • Smokeless tobacco: Never Used   Substance and Sexual Activity   • Alcohol use: No   • Drug use: No   • Sexual activity: Defer       Past Family History:  Family History   Problem Relation Age of Onset   • Heart disease Father    • Heart attack Paternal Uncle    • Cancer Maternal Grandmother         ovarian   • Heart attack Paternal Grandfather    • Heart attack Paternal Uncle    • Heart attack Paternal Uncle    • Heart attack Paternal Uncle    • Heart attack Paternal Uncle    • Heart attack Paternal Uncle    • Malig Hyperthermia Neg Hx          Objective:   Done and documented on sleep disorders center physical examination sheet, please refer to hand written note on the chart for details about the other pertinent negative findings.    Vital Signs:   Visit Vitals  /88 (BP Location: Left arm, Patient Position: Sitting)   Pulse 85   Ht 160 cm (63\")   Wt 116 kg (256 lb)   SpO2 95%   BMI 45.35 kg/m²     Wt Readings from Last 3 Encounters:   08/05/19 116 kg (256 lb)   06/04/19 115 kg (254 lb)   03/04/19 116 kg (255 lb 6.4 oz)          Physical Exam:   General: AAOx3. Normal mood and affect.   HEENT:  " Moist mucous membranes.  Septum midline. normal tongue. Mallampati 4 airway. Large tongue   NECK: Scar from previous left partial thyroidectomy.  No cervical or supraclavicular lymphadenopathy.  LUNGS: Non-labored breathing. CTAB. No wheezes.  HEART: Regular rate and rhythm. No murmur. Normal s1/s2  EXTREMITIES: 1+ BLE edema (right more than left). No cyanosis or clubbing. Normal gait    Diagnostic Data:  Split night study done on 5/19/18  During the first half patient had 2.87 hours in bed was 1.08 hour of sleep with only stage I and stage II noted.  During that initial part patient had evidence of severe ERIS with AHI of 36 and RDI of 52.6.  There was no significant associated hypoxemia.  Sleep fragmentation was severe mainly because of the respiratory events with overall arousal index of 60.9 arousals per hour  Periodic leg movement index was mildly elevated at 24 events per hour with clinically insignificant associated arousals  During the second half patient was started on CPAP for titration with 4.46 hours in bed and 3.43 hours of sleep.  Patient did achieve both REM sleep and stage III during that titration part was total of 29 minutes of REM sleep most of that while in the supine sleep  Patient was started on a CPAP of 5 that was titrated all the way up to a CPAP of 18 with a good sleep efficiency towards the end of the night while on the high pressure  On CPAP of 14 and beyond the AHI was down to 0 however snoring persisted  Patient had significant improvement even on the lower pressure of CPAP.  Impression:   Severe ERIS without significant hypoxemia  Good control on CPAP of pressure of 14 and beyond however even lower pressure can be attempted by using an auto CPAP  Plan:   Start auto CPAP 10-20  Follow-up on the clinical response and follow-up on the compliance download with further recommendations accordingly    Compliance download from 7/5/2019-8/3/2019 showed overall compliance of 97% with 80% implies a  greater than 4 hours use with average use of 5 hours and 20 minutes on nights use.  Residual AHI 1.2 on auto CPAP 10-20 cm's H2O with medium pressure of 12.3 cm H2O and 95th percentile pressure 14 cm H20.  95th percentile air leak of 3.3 L/m.      Assessment and Plan:       ICD-10-CM ICD-9-CM   1. ERIS on CPAP G47.33 327.23    Z99.89 V46.8   2. Palpitations R00.2 785.1   3. Obesity, morbid, BMI 40.0-49.9 (CMS/[facility]) E66.01 278.01   4. Sleep related hypoxia G47.34 327.24       Recommendations:     Sleep apnea is very well controlled on the CPAP on the current setting.  Patient feels subjectively better during the daytime by having less fatigue, but she still wakes up frequently at night and has no difficulty going back to sleep and I expect that to improve as she get more used to the machine.  The CPAP is controlling her sleep apnea very well at the current pressure and no further pressure adjustment are needed  Patient has both subjective and clinical benefit from the CPAP and it is strongly recommended to be continued  Patient is to continue to work on the weight loss  We will continue with the yearly follow-up    No orders of the defined types were placed in this encounter.    No orders of the defined types were placed in this encounter.    Return in about 1 year (around 8/5/2020).    Jake Collazo MD   [facility]   08/05/19  10:00 AM    Dictated utilizing Dragon dictation

## 2019-12-03 ENCOUNTER — LAB (OUTPATIENT)
Dept: ENDOCRINOLOGY | Age: 74
End: 2019-12-03

## 2019-12-03 DIAGNOSIS — E05.00 GRAVES DISEASE: ICD-10-CM

## 2019-12-03 DIAGNOSIS — E05.90 HYPERTHYROIDISM: Primary | ICD-10-CM

## 2019-12-03 DIAGNOSIS — E05.90 HYPERTHYROIDISM: ICD-10-CM

## 2019-12-04 LAB
BASOPHILS # BLD AUTO: 0.02 10*3/MM3 (ref 0–0.2)
BASOPHILS NFR BLD AUTO: 0.4 % (ref 0–1.5)
EOSINOPHIL # BLD AUTO: 0.17 10*3/MM3 (ref 0–0.4)
EOSINOPHIL NFR BLD AUTO: 3.8 % (ref 0.3–6.2)
ERYTHROCYTE [DISTWIDTH] IN BLOOD BY AUTOMATED COUNT: 13.8 % (ref 12.3–15.4)
HCT VFR BLD AUTO: 38.4 % (ref 34–46.6)
HGB BLD-MCNC: 13.3 G/DL (ref 12–15.9)
IMM GRANULOCYTES # BLD AUTO: 0.01 10*3/MM3 (ref 0–0.05)
IMM GRANULOCYTES NFR BLD AUTO: 0.2 % (ref 0–0.5)
LYMPHOCYTES # BLD AUTO: 1.23 10*3/MM3 (ref 0.7–3.1)
LYMPHOCYTES NFR BLD AUTO: 27.3 % (ref 19.6–45.3)
MCH RBC QN AUTO: 32 PG (ref 26.6–33)
MCHC RBC AUTO-ENTMCNC: 34.6 G/DL (ref 31.5–35.7)
MCV RBC AUTO: 92.3 FL (ref 79–97)
MONOCYTES # BLD AUTO: 0.37 10*3/MM3 (ref 0.1–0.9)
MONOCYTES NFR BLD AUTO: 8.2 % (ref 5–12)
NEUTROPHILS # BLD AUTO: 2.7 10*3/MM3 (ref 1.7–7)
NEUTROPHILS NFR BLD AUTO: 60.1 % (ref 42.7–76)
NRBC BLD AUTO-RTO: 0 /100 WBC (ref 0–0.2)
PLATELET # BLD AUTO: 144 10*3/MM3 (ref 140–450)
RBC # BLD AUTO: 4.16 10*6/MM3 (ref 3.77–5.28)
T4 FREE SERPL-MCNC: 1.4 NG/DL (ref 0.93–1.7)
TSH SERPL DL<=0.005 MIU/L-ACNC: 0.03 UIU/ML (ref 0.27–4.2)
WBC # BLD AUTO: 4.5 10*3/MM3 (ref 3.4–10.8)

## 2019-12-10 ENCOUNTER — OFFICE VISIT (OUTPATIENT)
Dept: ENDOCRINOLOGY | Age: 74
End: 2019-12-10

## 2019-12-10 VITALS
DIASTOLIC BLOOD PRESSURE: 78 MMHG | WEIGHT: 257 LBS | BODY MASS INDEX: 47.29 KG/M2 | HEIGHT: 62 IN | SYSTOLIC BLOOD PRESSURE: 138 MMHG | HEART RATE: 70 BPM

## 2019-12-10 DIAGNOSIS — E05.00 GRAVES DISEASE: ICD-10-CM

## 2019-12-10 DIAGNOSIS — R53.82 CHRONIC FATIGUE: ICD-10-CM

## 2019-12-10 DIAGNOSIS — E04.2 MULTINODULAR GOITER: ICD-10-CM

## 2019-12-10 DIAGNOSIS — R63.5 ABNORMAL WEIGHT GAIN: ICD-10-CM

## 2019-12-10 DIAGNOSIS — E05.90 HYPERTHYROIDISM: Primary | ICD-10-CM

## 2019-12-10 PROCEDURE — 99214 OFFICE O/P EST MOD 30 MIN: CPT | Performed by: INTERNAL MEDICINE

## 2020-01-27 ENCOUNTER — APPOINTMENT (OUTPATIENT)
Dept: GENERAL RADIOLOGY | Facility: HOSPITAL | Age: 75
End: 2020-01-27

## 2020-01-27 ENCOUNTER — APPOINTMENT (OUTPATIENT)
Dept: CT IMAGING | Facility: HOSPITAL | Age: 75
End: 2020-01-27

## 2020-01-27 ENCOUNTER — HOSPITAL ENCOUNTER (EMERGENCY)
Facility: HOSPITAL | Age: 75
Discharge: HOME | End: 2020-01-27
Attending: EMERGENCY MEDICINE | Admitting: EMERGENCY MEDICINE

## 2020-01-27 ENCOUNTER — APPOINTMENT (OUTPATIENT)
Dept: MRI IMAGING | Facility: HOSPITAL | Age: 75
End: 2020-01-27

## 2020-01-27 VITALS
RESPIRATION RATE: 18 BRPM | HEART RATE: 71 BPM | WEIGHT: 253 LBS | TEMPERATURE: 98.3 F | SYSTOLIC BLOOD PRESSURE: 135 MMHG | BODY MASS INDEX: 44.83 KG/M2 | DIASTOLIC BLOOD PRESSURE: 65 MMHG | OXYGEN SATURATION: 92 % | HEIGHT: 63 IN

## 2020-01-27 DIAGNOSIS — M54.12 CERVICAL RADICULOPATHY: ICD-10-CM

## 2020-01-27 DIAGNOSIS — M62.838 SPASM OF CERVICAL PARASPINOUS MUSCLE: Primary | ICD-10-CM

## 2020-01-27 LAB
ANION GAP SERPL CALCULATED.3IONS-SCNC: 12.5 MMOL/L (ref 5–15)
BASOPHILS # BLD AUTO: 0.02 10*3/MM3 (ref 0–0.2)
BASOPHILS NFR BLD AUTO: 0.4 % (ref 0–1.5)
BUN BLD-MCNC: 12 MG/DL (ref 8–23)
BUN/CREAT SERPL: 11.8 (ref 7–25)
CALCIUM SPEC-SCNC: 8.8 MG/DL (ref 8.6–10.5)
CHLORIDE SERPL-SCNC: 106 MMOL/L (ref 98–107)
CO2 SERPL-SCNC: 21.5 MMOL/L (ref 22–29)
CREAT BLD-MCNC: 1.02 MG/DL (ref 0.57–1)
DEPRECATED RDW RBC AUTO: 45.5 FL (ref 37–54)
EOSINOPHIL # BLD AUTO: 0.12 10*3/MM3 (ref 0–0.4)
EOSINOPHIL NFR BLD AUTO: 2.6 % (ref 0.3–6.2)
ERYTHROCYTE [DISTWIDTH] IN BLOOD BY AUTOMATED COUNT: 13.6 % (ref 12.3–15.4)
GFR SERPL CREATININE-BSD FRML MDRD: 53 ML/MIN/1.73
GLUCOSE BLD-MCNC: 121 MG/DL (ref 65–99)
HCT VFR BLD AUTO: 36.8 % (ref 34–46.6)
HGB BLD-MCNC: 12.8 G/DL (ref 12–15.9)
IMM GRANULOCYTES # BLD AUTO: 0.01 10*3/MM3 (ref 0–0.05)
IMM GRANULOCYTES NFR BLD AUTO: 0.2 % (ref 0–0.5)
LYMPHOCYTES # BLD AUTO: 1.05 10*3/MM3 (ref 0.7–3.1)
LYMPHOCYTES NFR BLD AUTO: 22.6 % (ref 19.6–45.3)
MCH RBC QN AUTO: 31.6 PG (ref 26.6–33)
MCHC RBC AUTO-ENTMCNC: 34.8 G/DL (ref 31.5–35.7)
MCV RBC AUTO: 90.9 FL (ref 79–97)
MONOCYTES # BLD AUTO: 0.29 10*3/MM3 (ref 0.1–0.9)
MONOCYTES NFR BLD AUTO: 6.2 % (ref 5–12)
NEUTROPHILS # BLD AUTO: 3.16 10*3/MM3 (ref 1.7–7)
NEUTROPHILS NFR BLD AUTO: 68 % (ref 42.7–76)
NRBC BLD AUTO-RTO: 0 /100 WBC (ref 0–0.2)
PLATELET # BLD AUTO: 121 10*3/MM3 (ref 140–450)
PMV BLD AUTO: 8.8 FL (ref 6–12)
POTASSIUM BLD-SCNC: 4.3 MMOL/L (ref 3.5–5.2)
RBC # BLD AUTO: 4.05 10*6/MM3 (ref 3.77–5.28)
SODIUM BLD-SCNC: 140 MMOL/L (ref 136–145)
WBC NRBC COR # BLD: 4.65 10*3/MM3 (ref 3.4–10.8)

## 2020-01-27 PROCEDURE — 25010000002 KETOROLAC TROMETHAMINE PER 15 MG: Performed by: EMERGENCY MEDICINE

## 2020-01-27 PROCEDURE — 73030 X-RAY EXAM OF SHOULDER: CPT

## 2020-01-27 PROCEDURE — 72125 CT NECK SPINE W/O DYE: CPT

## 2020-01-27 PROCEDURE — 96374 THER/PROPH/DIAG INJ IV PUSH: CPT

## 2020-01-27 PROCEDURE — 25010000002 MORPHINE PER 10 MG: Performed by: EMERGENCY MEDICINE

## 2020-01-27 PROCEDURE — 80048 BASIC METABOLIC PNL TOTAL CA: CPT | Performed by: EMERGENCY MEDICINE

## 2020-01-27 PROCEDURE — 25010000002 DEXAMETHASONE SODIUM PHOSPHATE 20 MG/5ML SOLUTION: Performed by: EMERGENCY MEDICINE

## 2020-01-27 PROCEDURE — 96375 TX/PRO/DX INJ NEW DRUG ADDON: CPT

## 2020-01-27 PROCEDURE — 25010000002 ONDANSETRON PER 1 MG: Performed by: EMERGENCY MEDICINE

## 2020-01-27 PROCEDURE — 72141 MRI NECK SPINE W/O DYE: CPT

## 2020-01-27 PROCEDURE — 99284 EMERGENCY DEPT VISIT MOD MDM: CPT

## 2020-01-27 PROCEDURE — 85025 COMPLETE CBC W/AUTO DIFF WBC: CPT | Performed by: EMERGENCY MEDICINE

## 2020-01-27 RX ORDER — ONDANSETRON 2 MG/ML
4 INJECTION INTRAMUSCULAR; INTRAVENOUS ONCE
Status: COMPLETED | OUTPATIENT
Start: 2020-01-27 | End: 2020-01-27

## 2020-01-27 RX ORDER — HYDROCODONE BITARTRATE AND ACETAMINOPHEN 5; 325 MG/1; MG/1
1 TABLET ORAL EVERY 6 HOURS PRN
Qty: 12 TABLET | Refills: 0 | Status: SHIPPED | OUTPATIENT
Start: 2020-01-27 | End: 2020-10-07

## 2020-01-27 RX ORDER — MORPHINE SULFATE 2 MG/ML
4 INJECTION, SOLUTION INTRAMUSCULAR; INTRAVENOUS ONCE
Status: COMPLETED | OUTPATIENT
Start: 2020-01-27 | End: 2020-01-27

## 2020-01-27 RX ORDER — DEXAMETHASONE SODIUM PHOSPHATE 4 MG/ML
4 INJECTION, SOLUTION INTRA-ARTICULAR; INTRALESIONAL; INTRAMUSCULAR; INTRAVENOUS; SOFT TISSUE ONCE
Status: COMPLETED | OUTPATIENT
Start: 2020-01-27 | End: 2020-01-27

## 2020-01-27 RX ORDER — METHYLPREDNISOLONE 4 MG/1
TABLET ORAL
Qty: 21 EACH | Refills: 0 | Status: SHIPPED | OUTPATIENT
Start: 2020-01-27 | End: 2020-10-07

## 2020-01-27 RX ORDER — DIAZEPAM 2 MG/1
2 TABLET ORAL ONCE
Status: COMPLETED | OUTPATIENT
Start: 2020-01-27 | End: 2020-01-27

## 2020-01-27 RX ORDER — KETOROLAC TROMETHAMINE 15 MG/ML
15 INJECTION, SOLUTION INTRAMUSCULAR; INTRAVENOUS ONCE
Status: COMPLETED | OUTPATIENT
Start: 2020-01-27 | End: 2020-01-27

## 2020-01-27 RX ADMIN — KETOROLAC TROMETHAMINE 15 MG: 15 INJECTION, SOLUTION INTRAMUSCULAR; INTRAVENOUS at 08:20

## 2020-01-27 RX ADMIN — MORPHINE SULFATE 4 MG: 2 INJECTION, SOLUTION INTRAMUSCULAR; INTRAVENOUS at 10:13

## 2020-01-27 RX ADMIN — DEXAMETHASONE SODIUM PHOSPHATE 4 MG: 4 INJECTION, SOLUTION INTRAMUSCULAR; INTRAVENOUS at 13:37

## 2020-01-27 RX ADMIN — ONDANSETRON 4 MG: 2 INJECTION INTRAMUSCULAR; INTRAVENOUS at 12:51

## 2020-01-27 RX ADMIN — DIAZEPAM 2 MG: 2 TABLET ORAL at 08:10

## 2020-01-27 NOTE — DISCHARGE INSTRUCTIONS
Take medications as prescribed, frequent warm heating pads and light massage, ibuprofen or Aleve in addition to the pain medications as prescribed for pain, follow-up with your primary provider and the neurosurgery team for reevaluation as needed.  Return to the emergency department for worsening symptoms as needed.

## 2020-01-27 NOTE — ED TRIAGE NOTES
"Patient reports having left shoulder pain that is causing her \"to not be able to move her arm due to the pain\" that started at 0500 this morning. Patient denies injury and stated that she woke up with the pain.  "

## 2020-01-27 NOTE — ED PROVIDER NOTES
" EMERGENCY DEPARTMENT ENCOUNTER    Room Number:  14/14  Date of encounter:  1/27/2020  PCP: Moe Matute MD  Historian: patient      HPI:  Chief Complaint: left shoulder pain  A complete HPI/ROS/PMH/PSH/SH/FH are unobtainable due to: none    Context: Patt Bond is a 74 y.o. female who presents to the ED c/o \"stabbing\" left shoulder pain beginning yesterday but worsening this morning around 0500. Pt states pain is constant and worse with movement of arm and neck. Pt reports taking tylenol last night. Pt also complains of headache but denies chest pain and SOA. Pt denies recent trauma or strenuous activity. Pt denies previous episodes of shoulder pain. Pt denies hx of COPD, HTN, DM and heart issues.     PAST MEDICAL HISTORY  Active Ambulatory Problems     Diagnosis Date Noted   • Left lower quadrant pain 07/03/2016   • Ketonuria due to dehydration 07/03/2016   • Normocytic anemia 07/03/2016   • Pancytopenia (CMS/HCC) 07/04/2016   • Obesity (BMI 30-39.9) 07/04/2016   • Nausea 07/04/2016   • Sepsis (CMS/HCC) 07/08/2016   • Nephrolithiasis 08/25/2016   • Pleural nodule 09/13/2016   • Tracheal compression 09/13/2016   • Hyperthyroidism 11/04/2016   • Graves disease 11/04/2016   • Abnormal weight gain 11/04/2016   • Palpitations 11/05/2016   • Cholecystitis 02/14/2017   • History of colon polyps - 4 Tubulovillous adenomas in 2004, normal colonoscopy in 2008 02/14/2017   • History of abdominal abscess - 3+ E. coli at time of surgery 9/2016 02/14/2017   • Multiple drug allergies to Cephalexin, Cephalosporins, Penicillins 02/14/2017   • Weight gain - 10 pounds in 2 months, unintentional 02/14/2017   • Multinodular goiter 07/07/2017   • GRAY (dyspnea on exertion) 09/08/2017   • Obesity, morbid, BMI 40.0-49.9 (CMS/HCC) 09/08/2017   • Substernal goiter 02/13/2018   • Obstruction of left ureteropelvic junction (UPJ) due to stone 04/21/2018   • Morbid obesity with BMI of 40.0-44.9, adult (CMS/[facility]) 04/21/2018   • ERIS on " CPAP 08/06/2018   • Hypersomnia with sleep apnea 08/06/2018   • Sleep related hypoxia 08/06/2018   • Chronic fatigue 06/20/2019     Resolved Ambulatory Problems     Diagnosis Date Noted   • Acute diverticulitis of sigmoid colon 07/03/2016   • Acute diverticulitis 09/09/2016   • Incisional hernia, without obstruction or gangrene 02/14/2017   • Nausea and vomiting 04/21/2018     Past Medical History:   Diagnosis Date   • Asthma    • Back pain    • Diverticulitis    • Diverticulosis    • Goiter    • Joint pain, knee    • Kidney stones 2015   • Mediastinal mass    • Obstructive pyelonephritis 09/28/2015   • PONV (postoperative nausea and vomiting)    • Renal disorder    • SOB (shortness of breath) on exertion          PAST SURGICAL HISTORY  Past Surgical History:   Procedure Laterality Date   • BRONCHOSCOPY N/A 11/6/2017    Procedure: BRONCHOSCOPY WITH ENDOBRONCHIAL ULTRASOUND AND TRANSBRONCHIAL NEEDLE ASPIRATION;  Surgeon: Nando Heller MD;  Location: [facility] ENDOSCOPY;  Service:    • CARDIAC CATHETERIZATION N/A 9/27/2017    Procedure: Right Heart Cath;  Surgeon: Miguel Gautam MD;  Location: [facility] CATH INVASIVE LOCATION;  Service:    • CARDIAC CATHETERIZATION N/A 9/27/2017    Procedure: Left Heart Cath;  Surgeon: Miguel Gautam MD;  Location: [facility] CATH INVASIVE LOCATION;  Service:    • CARDIAC CATHETERIZATION N/A 9/27/2017    Procedure: Coronary angiography;  Surgeon: Miguel Gautam MD;  Location: [facility] CATH INVASIVE LOCATION;  Service:    • CARDIAC CATHETERIZATION N/A 9/27/2017    Procedure: Left ventriculography;  Surgeon: Miguel Gautam MD;  Location: [facility] INVASIVE LOCATION;  Service:    • CHOLECYSTECTOMY N/A 5/17/2017    Procedure: LAPAROSCOPIC CHOLECYSTECTOMY WITH IOC;  Surgeon: Patt Moore MD;  Location: [facility] OR;  Service:    • COLON RESECTION Left 9/14/2016    Laparoscopic Low Anterior Resection with splenic flexure mobilization, drainage of Pelvic Abscess  (cultured 3+ E. Coli), Left salpingo-ophorectomy, laparoscopic rectopexy, and umbilical hernia repair, Dr. Patt Moore   • COLON RESECTION      VENTRAL HERNIA REPAIR   • COLONOSCOPY N/A 05/15/2008    sigmoid diverticulosis with blunting of the haustral folds and angulation consistent with previous diverticulitis, no polyps, suggestion of rectal prolapse-Dr. Patt Moore   • COLONOSCOPY W/ BIOPSIES AND POLYPECTOMY N/A 04/16/2001    Possible mild gastritis w/ some appearance of formations that look like petechiae in the antrum, no ulcerations, no erosions; cecal polyp approx 4mm sessile; probable transverse colon polyp, although we could not visualize this completely upon pulling out; sigmoid diverticula; multiple rectal polyps, mostly w/ appearance of hyperplasia, largest being 5 to 6mm: removed via snare-Dr. Patt Moore   • COLONOSCOPY W/ POLYPECTOMY N/A 12/10/2004    Diverticulosis with sigmoid perideverticulitis with erythema and patchiness around some of the diverticula, proximal ascedning colon polyp-approx 5 mm-removed via snare cauter, two distal ascending colon polyps-7 mm and 3 mm-removed via snare cautery polypectomy, hemorrhoids-Dr. Patt Moore   • CYSTOSCOPY W/ URETERAL STENT PLACEMENT Left 4/21/2018    Procedure: CYSTOSCOPY, LEFT RETROGRADE WITH LEFT URETERAL STENT INSERTION;  Surgeon: Stanley Osuna MD;  Location: [facility];  Service: Urology   • CYSTOSCOPY W/ URETERAL STENT REMOVAL Left 10/07/2015    Cystoscopy with stent extraction, left ureteral occulusion balloon placement, percutanous nephrostolithotomy with stone volume less than 2.5 cm involving the left lower pole and the left proximal ureter, balloon tract dilation for establishment of nephrostomy tract, antegrade nephrostomy tube placement-Dr. Miguel Monte   • CYSTOSCOPY, RETROGRADE PYELOGRAM AND STENT INSERTION Left 09/28/2015    Left cystoscopy with left retrograde pyelogram, left double-J stent placement-Dr. Rivera  Smti   • CYSTOSCOPY, RETROGRADE PYELOGRAM AND STENT INSERTION Left 09/09/2015    Cystoscopy with bilateral retrograde pyelogram, left double-J stent placement, right ureteral pyeloscopy with laser lithotripsy of the ureteropelvic junction calculus, right double-J stent placement-Dr. Miguel Monte   • CYSTOSCOPY, RETROGRADE PYELOGRAM AND STENT INSERTION Right 09/21/2007    Cystoscopy with right retrograde pyelogram, right biliary stent placement-Dr. Miguel Monte   • CYSTOSCOPY, RETROGRADE PYELOGRAM AND STENT INSERTION Right 09/07/2007    Cystoscopy with right retrograde pyelogram, right ureteral peyloscopy with extraction distal ureteral mass, right double J stent placement-Dr. Miguel Monte   • CYSTOSCOPY, URETEROSCOPY, RETROGRADE PYELOGRAM, STENT INSERTION Right 09/07/2007    Cystoscopy with right retrograde pyelogram, rigth ureteroscopy with extraction of distal mass, right double J stent placement-Dr. Miguel Monte   • D&C HYSTEROSCOPY N/A 05/18/2011    Procedure done due to thickened endomentrium and an endometrial polyp-Dr. Quita Cheatham   • DILATATION AND CURETTAGE N/A 03/22/2002    D&C, polyp removal-Dr. Quita Cheatham   • EXTRACORPOREAL SHOCKWAVE LITHOTRIPSY (ESWL), STENT INSERTION/REMOVAL Left 05/08/2015    Left extracoporeal shockwave lithotripsy, cystoscopy with stent placement-Dr. Miguel Monte   • MEDIASTINOTOMY Left 3/5/2018    Procedure: left thyroidectomy, resection of substernal thyroid goiter cervical approach;  Surgeon: Quintin Mares III, MD;  Location: [facility] OR;  Service:    • SIGMOIDOSCOPY N/A 9/13/2016    Procedure: SIGMOIDOSCOPY FLEXIBLE TO 25 CM;  Surgeon: Patt Moore MD;  Location: [facility] ENDOSCOPY;  Service:    • THORACOTOMY  1996    Thoracotomy for ectopic thyroid, Dr. Camarillo   • THYROIDECTOMY, PARTIAL      left side 3/05/18   • UMBILICAL HERNIA REPAIR N/A 9/14/2016    Procedure: UMBILICAL HERNIA REPAIR ;  Surgeon: Patt Moore MD;  Location: [facility]  OR;  Service:    • VENTRAL/INCISIONAL HERNIA REPAIR N/A 5/17/2017    Procedure: VENTRAL HERNIA REPAIR WITH MESH, BILATERAL MUSCULOFASCIAL RELEASE;  Surgeon: Patt Moore MD;  Location: [facility] OR;  Service:          FAMILY HISTORY  Family History   Problem Relation Age of Onset   • Heart disease Father    • Heart attack Paternal Uncle    • Cancer Maternal Grandmother         ovarian   • Heart attack Paternal Grandfather    • Heart attack Paternal Uncle    • Heart attack Paternal Uncle    • Heart attack Paternal Uncle    • Heart attack Paternal Uncle    • Heart attack Paternal Uncle    • Malig Hyperthermia Neg Hx          SOCIAL HISTORY  Social History     Socioeconomic History   • Marital status:      Spouse name: BERENICE COFFEY    • Number of children: 2   • Years of education: HIGH SCHOOL    • Highest education level: Not on file   Occupational History     Employer: RETIRED   Tobacco Use   • Smoking status: Never Smoker   • Smokeless tobacco: Never Used   Substance and Sexual Activity   • Alcohol use: No   • Drug use: No   • Sexual activity: Defer         ALLERGIES  Cephalexin; Cephalosporins; and Penicillins        REVIEW OF SYSTEMS  Review of Systems   Respiratory: Negative for shortness of breath.    Cardiovascular: Negative for chest pain.   Musculoskeletal: Positive for arthralgias (left shoulder).   Neurological: Positive for headaches.      All systems reviewed and negative except for those discussed in HPI.       PHYSICAL EXAM    I have reviewed the triage vital signs and nursing notes.    ED Triage Vitals [01/27/20 0738]   Temp Heart Rate Resp BP SpO2   98.3 °F (36.8 °C) 86 18 -- 97 %      Temp src Heart Rate Source Patient Position BP Location FiO2 (%)   Tympanic Monitor -- -- --       General: Awake, alert, no acute distress  HEENT: Mucous membranes moist, atraumatic, normocephalic, EOMI  Neck: Full ROM  Pulm: Symmetric, non-labored, lungs CTAB  Cardiovascular: Regular rate and rhythm, normal  S1/S2, intact distal pulses  GI: Soft, non-tender, non-distended, no rebound, no guarding, bowel sounds present  MSK: no deformity, reproducible left paraspinal cervical tenderness as well as left trapezius tenderness, reproducible pain with shoulder flexion  Skin: Warm, dry  Neuro: Alert and oriented x 3, GCS 15, moving all extremities, no focal deficits, strength and sensation 5/5  Psych: Calm, cooperative      Physical Exam    LAB RESULTS  Recent Results (from the past 24 hour(s))   Basic Metabolic Panel    Collection Time: 01/27/20  9:16 AM   Result Value Ref Range    Glucose 121 (H) 65 - 99 mg/dL    BUN 12 8 - 23 mg/dL    Creatinine 1.02 (H) 0.57 - 1.00 mg/dL    Sodium 140 136 - 145 mmol/L    Potassium 4.3 3.5 - 5.2 mmol/L    Chloride 106 98 - 107 mmol/L    CO2 21.5 (L) 22.0 - 29.0 mmol/L    Calcium 8.8 8.6 - 10.5 mg/dL    eGFR Non African Amer 53 (L) >60 mL/min/1.73    BUN/Creatinine Ratio 11.8 7.0 - 25.0    Anion Gap 12.5 5.0 - 15.0 mmol/L   CBC Auto Differential    Collection Time: 01/27/20  9:16 AM   Result Value Ref Range    WBC 4.65 3.40 - 10.80 10*3/mm3    RBC 4.05 3.77 - 5.28 10*6/mm3    Hemoglobin 12.8 12.0 - 15.9 g/dL    Hematocrit 36.8 34.0 - 46.6 %    MCV 90.9 79.0 - 97.0 fL    MCH 31.6 26.6 - 33.0 pg    MCHC 34.8 31.5 - 35.7 g/dL    RDW 13.6 12.3 - 15.4 %    RDW-SD 45.5 37.0 - 54.0 fl    MPV 8.8 6.0 - 12.0 fL    Platelets 121 (L) 140 - 450 10*3/mm3    Neutrophil % 68.0 42.7 - 76.0 %    Lymphocyte % 22.6 19.6 - 45.3 %    Monocyte % 6.2 5.0 - 12.0 %    Eosinophil % 2.6 0.3 - 6.2 %    Basophil % 0.4 0.0 - 1.5 %    Immature Grans % 0.2 0.0 - 0.5 %    Neutrophils, Absolute 3.16 1.70 - 7.00 10*3/mm3    Lymphocytes, Absolute 1.05 0.70 - 3.10 10*3/mm3    Monocytes, Absolute 0.29 0.10 - 0.90 10*3/mm3    Eosinophils, Absolute 0.12 0.00 - 0.40 10*3/mm3    Basophils, Absolute 0.02 0.00 - 0.20 10*3/mm3    Immature Grans, Absolute 0.01 0.00 - 0.05 10*3/mm3    nRBC 0.0 0.0 - 0.2 /100 WBC       Ordered the above  labs and independently reviewed the results.        RADIOLOGY  Xr Shoulder 2+ View Left    Result Date: 1/27/2020  XR SHOULDER 2+ VW LEFT-  HISTORY: 74-year-old female with left shoulder pain. No history of recent trauma.  FINDINGS: There is mild narrowing of the subacromial space and there are proliferative changes at the glenohumeral articulation. No acute abnormality is seen.  This report was finalized on 1/27/2020 11:34 AM by Dr. Sridevi Aguirre M.D.      Ct Cervical Spine Without Contrast    Result Date: 1/27/2020  CT CERVICAL SPINE WITHOUT CONTRAST  HISTORY: Neck pain, left radiculopathy.  COMPARISON: None.  FINDINGS: The study is hampered somewhat by the patient's body habitus.  C2-3: There is no evidence of disc bulge or herniation.  C3-4: Moderate facet degenerative disease is present on the left. There is mild neural foraminal compromise on the left secondary to uncovertebral degenerative disease and facet degenerative disease.  C4-5: Mild-to-moderate facet degenerative disease is present bilaterally.  C5-6: A broad-based disc osteophyte complex is present centrally resulting in mild-to-moderate canal stenosis and flattening of ventral surface of the thecal sac. There is moderate to severe neural foraminal compromise on the right and moderate neural foraminal compromise on the left secondary to uncovertebral degenerative disease and extension of disc osteophyte complex into the neural foramen.  C6-7: Evaluation is hampered by beam hardening artifact from the patient's shoulders.  C7-T1: Evaluation is hampered by beam hardening artifact from patient's shoulders.  No obvious fracture is identified.      This study is hampered by the patient's body habitus. Multilevel degenerative disease involving the cervical spine is noted as described in detail above which appears to be most prominent at C5-6 where there is moderate loss of disc height and neural foraminal compromise bilaterally more prominent on the right  secondary to loss of disc height, uncovertebral degenerative disease and extension of a disc osteophyte complex into the neural foramen. There is moderate canal stenosis. Further evaluation could be performed with a MRI examination of the cervical spine.  The above information was called to and discussed with Dr. Womack.  Radiation dose reduction techniques were utilized, including automated exposure control and exposure modulation based on body size.       Mri Cervical Spine Without Contrast    Result Date: 1/27/2020  MRI OF THE CERVICAL SPINE WITHOUT CONTRAST  CLINICAL HISTORY: Acute left-sided neck pain left operculum weakness.  TECHNIQUE: MRI of the cervical spine was obtained with sagittal T1, gradient echo, and T2-weighted images. Additionally, there are axial gradient echo and oblique sagittal T2-weighted images through the cervical spine.  FINDINGS: The study is somewhat compromised due to technical factors concerning patient body habitus.  At C2-3, there is no significant canal or foraminal stenosis.  At C3-4, there is minimal left foraminal narrowing secondary to uncovertebral joint hypertrophy and facet arthrosis. There is no significant right foraminal narrowing or canal stenosis.  At C4-5, there is degenerative anterior spondylolisthesis of C4 on C5 by a few millimeters. There is mild left foraminal narrowing secondary to a combination of uncovertebral joint hypertrophy and facet arthrosis. There is no significant right foraminal narrowing or canal stenosis.  At C5-C6, there is a disc osteophyte complex eccentric to the right resulting in a mild-to-moderate degree of central canal stenosis. There is moderate right and mild-to-moderate left foraminal narrowing secondary to primarily uncinate hypertrophy.  At C6-7, there is no significant canal or foraminal stenosis.  At C7-T1, there is no significant degree of canal or foraminal narrowing.  The cervical spinal cord has normal signal intensity. The  visualized contents of the cranial vault are unremarkable.  Incidental note is made of fluid signal intensity within the right mastoid air cells.      This study is somewhat compromised due to technical factors concerning patient body habitus.  There is grade 1 degenerative anterior spondylolisthesis of C4 on C5 by a few millimeters. There is a mild degree of left foraminal narrowing at C4-5 secondary to a combination of uncovertebral joint hypertrophy and facet arthrosis.  At C5-6, there is a disc osteophyte complex eccentric to the right which results in a mild-to-moderate degree of central canal stenosis. Additionally, there is a moderate degree of right and a mild-to-moderate degree of left foraminal narrowing secondary to uncinate hypertrophy.  The remaining multilevel degenerative phenomena are as discussed in detail above.  Incidental note is made of fluid signal intensity within the right mastoid air cells.          I ordered the above noted radiological studies. Reviewed by me and discussed with radiologist.  See dictation for official radiology interpretation.      PROCEDURES    Procedures      MEDICATIONS GIVEN IN ER    Medications   ketorolac (TORADOL) injection 15 mg (15 mg Intravenous Given 1/27/20 0820)   diazePAM (VALIUM) tablet 2 mg (2 mg Oral Given 1/27/20 0810)   morphine injection 4 mg (4 mg Intravenous Given 1/27/20 1013)   ondansetron (ZOFRAN) injection 4 mg (4 mg Intravenous Given 1/27/20 1251)   dexamethasone sodium phosphate injection 4 mg (4 mg Intravenous Given 1/27/20 1337)         PROGRESS, DATA ANALYSIS, CONSULTS, AND MEDICAL DECISION MAKING    All labs have been independently reviewed by me.  All radiology studies have been reviewed by me and discussed with radiologist dictating the report.   EKG's independently viewed and interpreted by me.  Discussion below represents my analysis of pertinent findings related to patient's condition, differential diagnosis, treatment plan and final  disposition.      ED Course as of Jan 27 1450   Mon Jan 27, 2020   0743 Cardiac cath in September 2017 short but normal left main, proximal and mid LAD with mild luminal irregularities, normal left circumflex, normal RCA, normal ramus, ejection fraction of 50%    [DC]   1449 Patient presentation today was concerning for possible muscle spasm versus cervical radiculopathy.  Patient CT was difficult to assess due to artifact, MRI was obtained due to a subtle left upper extremity weakness with  squeeze, and MRI results are as noted.  I discussed these findings with neurosurgery, they feel at this time there is nothing from a neurosurgery standpoint to do emergently with only 1 to 2 days of symptoms.  Will give her IV steroids here and a Medrol Dosepak with pain medications.  I have advised warm heating pads and light massage.  Have given follow-up information with neurosurgery if needed, and advised return to the emergency department for worsening symptoms as needed.    [DC]      ED Course User Index  [DC] Isaac Womack MD     0743 ordered left shoulder XR for further evaluation.     0754 ordered CT C spine for further evaluation. Ordered valium for muscle spasms and toradol for pain.     0904 pt rechecked and states pain has not improved with medication. Informed pt of imaging results. Discussed plan to conduct MRI for further evaluation of area of pain. Pt understands and agrees with the plan. All questions have been answered.    1220 pt rechecked and states pain is 8/10 and complains of dizziness and nausea since MRI. Discussed plan to consult with neurosurgery. Pt understands and agrees with the plan. All questions have been answered.    1232 placed call to neurosurgery    1306 discussed pt case with Pina Skelton, ros, who does not believe there is a lot to do from an emergent neurosurgery standpoint.     1311 ordered dexamethasone for pain    1333 pt rechecked and resting. Informed pt of MRI  reading. Discussed plan to discharge with steroids and pain medication and instructions to use warm compresses and f/u with neurosurgery with worsening sx. Pt understands and agrees with the plan. All questions have been answered.      AS OF 2:50 PM VITALS:    BP - 135/65  HR - 71  TEMP - 98.3 °F (36.8 °C) (Tympanic)  02 SATS - 92%        DIAGNOSIS  Final diagnoses:   Spasm of cervical paraspinous muscle   Cervical radiculopathy         DISPOSITION  DISCHARGE    Patient discharged in stable condition.    Reviewed implications of results, diagnosis, meds, responsibility to follow up, warning signs and symptoms of possible worsening, potential complications and reasons to return to ER, including new or worsening sx.    Patient/Family voiced understanding of above instructions.    Discussed plan for discharge, as there is no emergent indication for admission. Patient referred to primary care provider for BP management due to today's BP. Pt/family is agreeable and understands need for follow up and repeat testing.  Pt is aware that discharge does not mean that nothing is wrong but it indicates no emergency is present that requires admission and they must continue care with follow-up as given below or physician of their choice.     FOLLOW-UP  [facility] Emergency Department  [address] [facility] [address] [phone]    As needed, If symptoms worsen    Moe Matute MD  [address] [address] [phone]    Schedule an appointment as soon as possible for a visit   As needed    [facility] NEUROSURGERY  [address] [facility] [address]  [facility] [address] [phone]  Schedule an appointment as soon as possible for a visit            Medication List      New Prescriptions    HYDROcodone-acetaminophen 5-325 MG per tablet  Commonly known as:  NORCO  Take 1 tablet by mouth Every 6 (Six) Hours As Needed for Severe Pain . Do   not drive or mix  with alcohol     methylPREDNISolone 4 MG tablet  Commonly known as:  MEDROL (RASHI)  Take as directed on package instructions.                  Documentation assistance provided by oscar Stovall for Dr. Womack.  Information recorded by the scribe was done at my direction and has been verified and validated by me.                 Malia Stovall  01/27/20 [id number]       Isaac Womack MD  01/27/20 [id number]

## 2020-06-22 ENCOUNTER — TRANSCRIBE ORDERS (OUTPATIENT)
Dept: ADMINISTRATIVE | Facility: HOSPITAL | Age: 75
End: 2020-06-22

## 2020-06-22 DIAGNOSIS — R20.0 NUMBNESS: Primary | ICD-10-CM

## 2020-07-14 ENCOUNTER — HOSPITAL ENCOUNTER (OUTPATIENT)
Dept: ULTRASOUND IMAGING | Facility: HOSPITAL | Age: 75
Discharge: HOME | End: 2020-07-14
Admitting: INTERNAL MEDICINE

## 2020-07-14 DIAGNOSIS — E05.90 HYPERTHYROIDISM: ICD-10-CM

## 2020-07-14 PROCEDURE — 76536 US EXAM OF HEAD AND NECK: CPT

## 2020-07-24 DIAGNOSIS — E05.00 GRAVES DISEASE: ICD-10-CM

## 2020-07-24 DIAGNOSIS — E05.90 HYPERTHYROIDISM: ICD-10-CM

## 2020-07-24 DIAGNOSIS — D64.9 NORMOCYTIC ANEMIA: ICD-10-CM

## 2020-07-24 DIAGNOSIS — R00.2 PALPITATIONS: Primary | ICD-10-CM

## 2020-07-29 LAB
ALBUMIN SERPL-MCNC: 4 G/DL (ref 3.7–4.7)
ALBUMIN/GLOB SERPL: 1.6 {RATIO} (ref 1.2–2.2)
ALP SERPL-CCNC: 74 IU/L (ref 39–117)
ALT SERPL-CCNC: 28 IU/L (ref 0–32)
AST SERPL-CCNC: 25 IU/L (ref 0–40)
BASOPHILS # BLD AUTO: 0 X10E3/UL (ref 0–0.2)
BASOPHILS NFR BLD AUTO: 0 %
BILIRUB SERPL-MCNC: 0.6 MG/DL (ref 0–1.2)
BUN SERPL-MCNC: 13 MG/DL (ref 8–27)
BUN/CREAT SERPL: 13 (ref 12–28)
CALCIUM SERPL-MCNC: 9.5 MG/DL (ref 8.7–10.3)
CHLORIDE SERPL-SCNC: 108 MMOL/L (ref 96–106)
CO2 SERPL-SCNC: 24 MMOL/L (ref 20–29)
CREAT SERPL-MCNC: 0.99 MG/DL (ref 0.57–1)
EOSINOPHIL # BLD AUTO: 0.2 X10E3/UL (ref 0–0.4)
EOSINOPHIL NFR BLD AUTO: 3 %
ERYTHROCYTE [DISTWIDTH] IN BLOOD BY AUTOMATED COUNT: 13.6 % (ref 11.7–15.4)
GLOBULIN SER CALC-MCNC: 2.5 G/DL (ref 1.5–4.5)
GLUCOSE SERPL-MCNC: 133 MG/DL (ref 65–99)
HCT VFR BLD AUTO: 39.7 % (ref 34–46.6)
HGB BLD-MCNC: 13.3 G/DL (ref 11.1–15.9)
IMM GRANULOCYTES # BLD AUTO: 0 X10E3/UL (ref 0–0.1)
IMM GRANULOCYTES NFR BLD AUTO: 0 %
INTERPRETATION: NORMAL
LYMPHOCYTES # BLD AUTO: 1.6 X10E3/UL (ref 0.7–3.1)
LYMPHOCYTES NFR BLD AUTO: 26 %
MCH RBC QN AUTO: 32 PG (ref 26.6–33)
MCHC RBC AUTO-ENTMCNC: 33.5 G/DL (ref 31.5–35.7)
MCV RBC AUTO: 95 FL (ref 79–97)
MONOCYTES # BLD AUTO: 0.5 X10E3/UL (ref 0.1–0.9)
MONOCYTES NFR BLD AUTO: 7 %
NEUTROPHILS # BLD AUTO: 3.8 X10E3/UL (ref 1.4–7)
NEUTROPHILS NFR BLD AUTO: 64 %
PLATELET # BLD AUTO: 156 X10E3/UL (ref 150–450)
POTASSIUM SERPL-SCNC: 4.4 MMOL/L (ref 3.5–5.2)
PROT SERPL-MCNC: 6.5 G/DL (ref 6–8.5)
RBC # BLD AUTO: 4.16 X10E6/UL (ref 3.77–5.28)
SODIUM SERPL-SCNC: 143 MMOL/L (ref 134–144)
T4 FREE SERPL-MCNC: 1.23 NG/DL (ref 0.82–1.77)
TSH SERPL DL<=0.005 MIU/L-ACNC: 0.35 UIU/ML (ref 0.45–4.5)
WBC # BLD AUTO: 6.1 X10E3/UL (ref 3.4–10.8)

## 2020-10-07 ENCOUNTER — OFFICE VISIT (OUTPATIENT)
Dept: ENDOCRINOLOGY | Age: 75
End: 2020-10-07

## 2020-10-07 VITALS
HEIGHT: 63 IN | SYSTOLIC BLOOD PRESSURE: 138 MMHG | OXYGEN SATURATION: 97 % | HEART RATE: 66 BPM | DIASTOLIC BLOOD PRESSURE: 68 MMHG | BODY MASS INDEX: 43.77 KG/M2 | WEIGHT: 247 LBS

## 2020-10-07 DIAGNOSIS — E66.01 MORBID OBESITY WITH BMI OF 40.0-44.9, ADULT (HCC): ICD-10-CM

## 2020-10-07 DIAGNOSIS — N20.0 NEPHROLITHIASIS: ICD-10-CM

## 2020-10-07 DIAGNOSIS — E04.2 MULTINODULAR GOITER: Primary | ICD-10-CM

## 2020-10-07 DIAGNOSIS — Z99.89 OSA ON CPAP: ICD-10-CM

## 2020-10-07 DIAGNOSIS — Z90.09 HISTORY OF LOBECTOMY OF THYROID: ICD-10-CM

## 2020-10-07 DIAGNOSIS — G47.33 OSA ON CPAP: ICD-10-CM

## 2020-10-07 PROCEDURE — 99214 OFFICE O/P EST MOD 30 MIN: CPT | Performed by: INTERNAL MEDICINE

## 2020-10-07 RX ORDER — CHOLESTYRAMINE LIGHT 4 G/5.7G
4 POWDER, FOR SUSPENSION ORAL DAILY
COMMUNITY
End: 2022-04-14 | Stop reason: SDUPTHER

## 2020-10-08 LAB
ALBUMIN SERPL-MCNC: 4.5 G/DL (ref 3.5–5.2)
ALBUMIN/GLOB SERPL: 2 G/DL
ALP SERPL-CCNC: 87 U/L (ref 39–117)
ALT SERPL-CCNC: 35 U/L (ref 1–33)
AST SERPL-CCNC: 24 U/L (ref 1–32)
BILIRUB SERPL-MCNC: 0.8 MG/DL (ref 0–1.2)
BUN SERPL-MCNC: 11 MG/DL (ref 8–23)
BUN/CREAT SERPL: 10.6 (ref 7–25)
CALCIUM SERPL-MCNC: 9.6 MG/DL (ref 8.6–10.5)
CHLORIDE SERPL-SCNC: 107 MMOL/L (ref 98–107)
CO2 SERPL-SCNC: 25.7 MMOL/L (ref 22–29)
CREAT SERPL-MCNC: 1.04 MG/DL (ref 0.57–1)
GLOBULIN SER CALC-MCNC: 2.2 GM/DL
GLUCOSE SERPL-MCNC: 95 MG/DL (ref 65–99)
POTASSIUM SERPL-SCNC: 4.8 MMOL/L (ref 3.5–5.2)
PROT SERPL-MCNC: 6.7 G/DL (ref 6–8.5)
SODIUM SERPL-SCNC: 143 MMOL/L (ref 136–145)
T3FREE SERPL-MCNC: 4 PG/ML (ref 2–4.4)
T4 FREE SERPL-MCNC: 1.49 NG/DL (ref 0.93–1.7)
TSH SERPL DL<=0.005 MIU/L-ACNC: 0.05 UIU/ML (ref 0.27–4.2)
URATE SERPL-MCNC: 5.3 MG/DL (ref 2.4–5.7)

## 2020-10-12 DIAGNOSIS — E04.2 MULTINODULAR GOITER: Primary | ICD-10-CM

## 2020-10-12 DIAGNOSIS — E05.00 GRAVES DISEASE: ICD-10-CM

## 2020-10-12 DIAGNOSIS — R53.82 CHRONIC FATIGUE: ICD-10-CM

## 2020-10-12 DIAGNOSIS — E05.90 HYPERTHYROIDISM: ICD-10-CM

## 2020-10-12 RX ORDER — METHIMAZOLE 5 MG/1
TABLET ORAL
Qty: 45 TABLET | Refills: 1 | Status: SHIPPED | OUTPATIENT
Start: 2020-10-12 | End: 2021-03-03 | Stop reason: SDUPTHER

## 2020-10-17 ENCOUNTER — RESULTS ENCOUNTER (OUTPATIENT)
Dept: ENDOCRINOLOGY | Age: 75
End: 2020-10-17

## 2020-10-17 DIAGNOSIS — E05.90 HYPERTHYROIDISM: ICD-10-CM

## 2020-10-17 DIAGNOSIS — E04.2 MULTINODULAR GOITER: ICD-10-CM

## 2020-10-17 DIAGNOSIS — R53.82 CHRONIC FATIGUE: ICD-10-CM

## 2020-10-17 DIAGNOSIS — E05.00 GRAVES DISEASE: ICD-10-CM

## 2020-11-10 ENCOUNTER — RESULTS ENCOUNTER (OUTPATIENT)
Dept: ENDOCRINOLOGY | Age: 75
End: 2020-11-10

## 2020-11-10 DIAGNOSIS — R53.82 CHRONIC FATIGUE: ICD-10-CM

## 2020-11-10 DIAGNOSIS — E05.90 HYPERTHYROIDISM: ICD-10-CM

## 2020-11-10 DIAGNOSIS — E04.2 MULTINODULAR GOITER: ICD-10-CM

## 2020-11-10 DIAGNOSIS — E05.00 GRAVES DISEASE: ICD-10-CM

## 2020-11-12 ENCOUNTER — RESULTS ENCOUNTER (OUTPATIENT)
Dept: ENDOCRINOLOGY | Age: 75
End: 2020-11-12

## 2020-11-12 DIAGNOSIS — R53.82 CHRONIC FATIGUE: ICD-10-CM

## 2020-11-12 DIAGNOSIS — E04.2 MULTINODULAR GOITER: ICD-10-CM

## 2020-11-12 DIAGNOSIS — E05.00 GRAVES DISEASE: ICD-10-CM

## 2020-11-12 DIAGNOSIS — E05.90 HYPERTHYROIDISM: ICD-10-CM

## 2020-11-24 LAB
ALBUMIN SERPL-MCNC: 4.1 G/DL (ref 3.5–5.2)
ALBUMIN/GLOB SERPL: 1.8 G/DL
ALP SERPL-CCNC: 85 U/L (ref 39–117)
ALT SERPL-CCNC: 25 U/L (ref 1–33)
AST SERPL-CCNC: 20 U/L (ref 1–32)
BILIRUB SERPL-MCNC: 0.5 MG/DL (ref 0–1.2)
BUN SERPL-MCNC: 12 MG/DL (ref 8–23)
BUN/CREAT SERPL: 11.9 (ref 7–25)
CALCIUM SERPL-MCNC: 9.6 MG/DL (ref 8.6–10.5)
CHLORIDE SERPL-SCNC: 104 MMOL/L (ref 98–107)
CO2 SERPL-SCNC: 26.8 MMOL/L (ref 22–29)
CREAT SERPL-MCNC: 1.01 MG/DL (ref 0.57–1)
FSH SERPL-ACNC: 23.7 MIU/ML
GLOBULIN SER CALC-MCNC: 2.3 GM/DL
GLUCOSE SERPL-MCNC: 117 MG/DL (ref 65–99)
LH SERPL-ACNC: 50.3 MIU/ML
POTASSIUM SERPL-SCNC: 4.1 MMOL/L (ref 3.5–5.2)
PROT SERPL-MCNC: 6.4 G/DL (ref 6–8.5)
SODIUM SERPL-SCNC: 137 MMOL/L (ref 136–145)
T3FREE SERPL-MCNC: 2.7 PG/ML (ref 2–4.4)
T4 FREE SERPL-MCNC: 1.06 NG/DL (ref 0.93–1.7)
TSH SERPL DL<=0.005 MIU/L-ACNC: 0.82 UIU/ML (ref 0.27–4.2)

## 2020-11-28 DIAGNOSIS — E05.90 HYPERTHYROIDISM: Primary | ICD-10-CM

## 2021-02-17 ENCOUNTER — IMMUNIZATION (OUTPATIENT)
Dept: VACCINE CLINIC | Facility: HOSPITAL | Age: 76
End: 2021-02-17

## 2021-02-17 PROCEDURE — 0001A: CPT | Performed by: INTERNAL MEDICINE

## 2021-02-17 PROCEDURE — 91300 HC SARSCOV02 VAC 30MCG/0.3ML IM: CPT | Performed by: INTERNAL MEDICINE

## 2021-02-23 LAB
T4 FREE SERPL-MCNC: 0.94 NG/DL (ref 0.93–1.7)
TSH SERPL DL<=0.005 MIU/L-ACNC: 1.53 UIU/ML (ref 0.27–4.2)

## 2021-03-03 ENCOUNTER — OFFICE VISIT (OUTPATIENT)
Dept: ENDOCRINOLOGY | Age: 76
End: 2021-03-03

## 2021-03-03 VITALS
BODY MASS INDEX: 44.05 KG/M2 | SYSTOLIC BLOOD PRESSURE: 130 MMHG | HEIGHT: 63 IN | WEIGHT: 248.6 LBS | DIASTOLIC BLOOD PRESSURE: 80 MMHG

## 2021-03-03 DIAGNOSIS — Z99.89 OSA ON CPAP: ICD-10-CM

## 2021-03-03 DIAGNOSIS — G47.33 OSA ON CPAP: ICD-10-CM

## 2021-03-03 DIAGNOSIS — E05.00 GRAVES DISEASE: ICD-10-CM

## 2021-03-03 DIAGNOSIS — N20.0 NEPHROLITHIASIS: ICD-10-CM

## 2021-03-03 DIAGNOSIS — E05.90 HYPERTHYROIDISM: Primary | ICD-10-CM

## 2021-03-03 PROCEDURE — 99214 OFFICE O/P EST MOD 30 MIN: CPT | Performed by: INTERNAL MEDICINE

## 2021-03-03 RX ORDER — METHIMAZOLE 5 MG/1
TABLET ORAL
Qty: 45 TABLET | Refills: 1 | Status: SHIPPED | OUTPATIENT
Start: 2021-03-03 | End: 2022-03-10

## 2021-03-10 ENCOUNTER — IMMUNIZATION (OUTPATIENT)
Dept: VACCINE CLINIC | Facility: HOSPITAL | Age: 76
End: 2021-03-10

## 2021-03-10 PROCEDURE — 0002A: CPT | Performed by: INTERNAL MEDICINE

## 2021-03-10 PROCEDURE — 91300 HC SARSCOV02 VAC 30MCG/0.3ML IM: CPT | Performed by: INTERNAL MEDICINE

## 2021-04-17 LAB
ALBUMIN SERPL-MCNC: 4.2 G/DL (ref 3.5–5.2)
ALBUMIN/GLOB SERPL: 2 G/DL
ALP SERPL-CCNC: 80 U/L (ref 39–117)
ALT SERPL-CCNC: 19 U/L (ref 1–33)
AST SERPL-CCNC: 17 U/L (ref 1–32)
BASOPHILS # BLD AUTO: 0.03 10*3/MM3 (ref 0–0.2)
BASOPHILS NFR BLD AUTO: 0.5 % (ref 0–1.5)
BILIRUB SERPL-MCNC: 0.5 MG/DL (ref 0–1.2)
BUN SERPL-MCNC: 11 MG/DL (ref 8–23)
BUN/CREAT SERPL: 11.5 (ref 7–25)
CALCIUM SERPL-MCNC: 9.7 MG/DL (ref 8.6–10.5)
CHLORIDE SERPL-SCNC: 105 MMOL/L (ref 98–107)
CO2 SERPL-SCNC: 24.7 MMOL/L (ref 22–29)
CREAT SERPL-MCNC: 0.96 MG/DL (ref 0.57–1)
EOSINOPHIL # BLD AUTO: 0.26 10*3/MM3 (ref 0–0.4)
EOSINOPHIL NFR BLD AUTO: 4.4 % (ref 0.3–6.2)
ERYTHROCYTE [DISTWIDTH] IN BLOOD BY AUTOMATED COUNT: 13.5 % (ref 12.3–15.4)
GLOBULIN SER CALC-MCNC: 2.1 GM/DL
GLUCOSE SERPL-MCNC: 121 MG/DL (ref 65–99)
HCT VFR BLD AUTO: 40.1 % (ref 34–46.6)
HGB BLD-MCNC: 13.5 G/DL (ref 12–15.9)
IMM GRANULOCYTES # BLD AUTO: 0.01 10*3/MM3 (ref 0–0.05)
IMM GRANULOCYTES NFR BLD AUTO: 0.2 % (ref 0–0.5)
LYMPHOCYTES # BLD AUTO: 1.49 10*3/MM3 (ref 0.7–3.1)
LYMPHOCYTES NFR BLD AUTO: 25.1 % (ref 19.6–45.3)
MCH RBC QN AUTO: 31.6 PG (ref 26.6–33)
MCHC RBC AUTO-ENTMCNC: 33.7 G/DL (ref 31.5–35.7)
MCV RBC AUTO: 93.9 FL (ref 79–97)
MONOCYTES # BLD AUTO: 0.4 10*3/MM3 (ref 0.1–0.9)
MONOCYTES NFR BLD AUTO: 6.7 % (ref 5–12)
NEUTROPHILS # BLD AUTO: 3.75 10*3/MM3 (ref 1.7–7)
NEUTROPHILS NFR BLD AUTO: 63.1 % (ref 42.7–76)
NRBC BLD AUTO-RTO: 0 /100 WBC (ref 0–0.2)
PLATELET # BLD AUTO: 157 10*3/MM3 (ref 140–450)
POTASSIUM SERPL-SCNC: 4 MMOL/L (ref 3.5–5.2)
PROT SERPL-MCNC: 6.3 G/DL (ref 6–8.5)
RBC # BLD AUTO: 4.27 10*6/MM3 (ref 3.77–5.28)
SODIUM SERPL-SCNC: 138 MMOL/L (ref 136–145)
T3FREE SERPL-MCNC: 2.6 PG/ML (ref 2–4.4)
T4 FREE SERPL-MCNC: 0.99 NG/DL (ref 0.93–1.7)
TSH SERPL DL<=0.005 MIU/L-ACNC: 1.59 UIU/ML (ref 0.27–4.2)
WBC # BLD AUTO: 5.94 10*3/MM3 (ref 3.4–10.8)

## 2021-04-23 ENCOUNTER — TELEPHONE (OUTPATIENT)
Dept: ENDOCRINOLOGY | Age: 76
End: 2021-04-23

## 2021-04-23 NOTE — TELEPHONE ENCOUNTER
4/23 sent copy of labs to dr matute / carole her labs are in chart any questions to please call back       ----- Message from Chalino Brooks MD sent at 4/18/2021  8:12 AM EDT -----  Normal thyroid function tests.  Continue methimazole 5 mg 1/2 tablet daily.Copy of labs sent to patient through my chart.Send copy of labs to Dr. Matute

## 2021-08-25 DIAGNOSIS — R68.89 ABNORMAL ENDOCRINE LABORATORY TEST FINDING: ICD-10-CM

## 2021-08-25 DIAGNOSIS — E05.00 GRAVES DISEASE: ICD-10-CM

## 2021-08-25 DIAGNOSIS — R53.82 CHRONIC FATIGUE: ICD-10-CM

## 2021-08-25 DIAGNOSIS — E04.2 MULTINODULAR GOITER: ICD-10-CM

## 2021-08-25 DIAGNOSIS — E05.90 HYPERTHYROIDISM: Primary | ICD-10-CM

## 2021-09-01 LAB
ALBUMIN SERPL-MCNC: 4.2 G/DL (ref 3.5–5.2)
ALBUMIN/GLOB SERPL: 1.8 G/DL
ALP SERPL-CCNC: 77 U/L (ref 39–117)
ALT SERPL-CCNC: 22 U/L (ref 1–33)
AST SERPL-CCNC: 18 U/L (ref 1–32)
BASOPHILS # BLD AUTO: 0.03 10*3/MM3 (ref 0–0.2)
BASOPHILS NFR BLD AUTO: 0.5 % (ref 0–1.5)
BILIRUB SERPL-MCNC: 0.5 MG/DL (ref 0–1.2)
BUN SERPL-MCNC: 21 MG/DL (ref 8–23)
BUN/CREAT SERPL: 17.9 (ref 7–25)
CALCIUM SERPL-MCNC: 9.8 MG/DL (ref 8.6–10.5)
CHLORIDE SERPL-SCNC: 107 MMOL/L (ref 98–107)
CO2 SERPL-SCNC: 23.3 MMOL/L (ref 22–29)
CREAT SERPL-MCNC: 1.17 MG/DL (ref 0.57–1)
EOSINOPHIL # BLD AUTO: 0.22 10*3/MM3 (ref 0–0.4)
EOSINOPHIL NFR BLD AUTO: 3.7 % (ref 0.3–6.2)
ERYTHROCYTE [DISTWIDTH] IN BLOOD BY AUTOMATED COUNT: 13.1 % (ref 12.3–15.4)
GLOBULIN SER CALC-MCNC: 2.3 GM/DL
GLUCOSE SERPL-MCNC: 213 MG/DL (ref 65–99)
HCT VFR BLD AUTO: 42.2 % (ref 34–46.6)
HGB BLD-MCNC: 14.1 G/DL (ref 12–15.9)
IMM GRANULOCYTES # BLD AUTO: 0.02 10*3/MM3 (ref 0–0.05)
IMM GRANULOCYTES NFR BLD AUTO: 0.3 % (ref 0–0.5)
LYMPHOCYTES # BLD AUTO: 1.62 10*3/MM3 (ref 0.7–3.1)
LYMPHOCYTES NFR BLD AUTO: 27.4 % (ref 19.6–45.3)
MCH RBC QN AUTO: 32.4 PG (ref 26.6–33)
MCHC RBC AUTO-ENTMCNC: 33.4 G/DL (ref 31.5–35.7)
MCV RBC AUTO: 97 FL (ref 79–97)
MONOCYTES # BLD AUTO: 0.34 10*3/MM3 (ref 0.1–0.9)
MONOCYTES NFR BLD AUTO: 5.7 % (ref 5–12)
NEUTROPHILS # BLD AUTO: 3.69 10*3/MM3 (ref 1.7–7)
NEUTROPHILS NFR BLD AUTO: 62.4 % (ref 42.7–76)
NRBC BLD AUTO-RTO: 0 /100 WBC (ref 0–0.2)
PLATELET # BLD AUTO: 172 10*3/MM3 (ref 140–450)
POTASSIUM SERPL-SCNC: 4.7 MMOL/L (ref 3.5–5.2)
PROT SERPL-MCNC: 6.5 G/DL (ref 6–8.5)
RBC # BLD AUTO: 4.35 10*6/MM3 (ref 3.77–5.28)
SODIUM SERPL-SCNC: 144 MMOL/L (ref 136–145)
T3FREE SERPL-MCNC: 2.3 PG/ML (ref 2–4.4)
T4 FREE SERPL-MCNC: 1.01 NG/DL (ref 0.93–1.7)
WBC # BLD AUTO: 5.92 10*3/MM3 (ref 3.4–10.8)

## 2021-09-03 NOTE — PROGRESS NOTES
Subjective   Patt Bond is a 76 y.o. female.     F/u for hyperthyroidism, graves disease, MNG, sleep apnea         Patient is a 76-year-old female came in for follow-up.       In March 2018, she had a left thyroidectomy for done by Dr. Mares.  Pathology was read as multinodular goiter with degenerative changes including extensive calcification.  Thyroid-stimulating immunoglobulin and thyroid peroxidase antibody were elevated in 2016.  Thyroid ultrasound done in July 2020 showed a 1.2 cm right hypoechoic nodule.  Radiologist recommended 1 year follow-up.     She denies dysphagia or pressure symptoms.  She is on methimazole 5 mg 1/2 tablet daily.  Free T4 and free T3 done in August 2021 were normal.     She denies palpitations or increased tremors.  She denies heat or cold intolerance.  She gained 7 lbs since 3/21.  She had intermittent loose stools since 2019 which is improved with cholestyramine 1 packet/day.     She has sleep apnea and is using her CPAP regularly.  She wakes up rested.     She has history of nephrolithiasis and had multiple lithotripsies.  Stone analysis showed 87% uric acid and 10% calcium oxalate stones.  She is on allopurinol 300 mg once a day.  She follows with Dr. Miguel Monte.    She had serum glucose done in 8/21 was 213 mg/dl.  She had gestational diabetes mellitus with the first of her 2 pregnancies.  She did not require insulin..  Her last meal was 9 AM      The following portions of the patient's history were reviewed and updated as appropriate: allergies, current medications, past family history, past medical history, past social history, past surgical history and problem list.    Review of Systems   Eyes: Negative for visual disturbance.   Respiratory: Negative for shortness of breath.    Cardiovascular: Negative for chest pain and palpitations.   Gastrointestinal: Positive for diarrhea.   Endocrine: Negative.    Genitourinary: Negative.    Musculoskeletal: Negative for  "myalgias.   Neurological: Negative for numbness.     Objective      Vitals:    09/14/21 1223   BP: 156/72   BP Location: Right arm   Patient Position: Sitting   Cuff Size: Thigh Adult   Pulse: 68   SpO2: 98%   Weight: 116 kg (255 lb 3.2 oz)   Height: 160 cm (62.99\")     Physical Exam  Constitutional:       General: She is not in acute distress.     Appearance: Normal appearance. She is obese. She is not ill-appearing or diaphoretic.   HENT:      Mouth/Throat:      Mouth: Mucous membranes are moist.   Eyes:      General: No scleral icterus.        Right eye: No discharge.         Left eye: No discharge.      Extraocular Movements: Extraocular movements intact.      Conjunctiva/sclera: Conjunctivae normal.   Cardiovascular:      Rate and Rhythm: Normal rate and regular rhythm.      Heart sounds: Normal heart sounds. No murmur heard.   No friction rub. No gallop.    Pulmonary:      Effort: Pulmonary effort is normal. No respiratory distress.      Breath sounds: Normal breath sounds. No stridor. No wheezing.   Chest:      Chest wall: No tenderness.   Abdominal:      General: Bowel sounds are normal.      Palpations: Abdomen is soft.      Tenderness: There is no right CVA tenderness or left CVA tenderness.   Musculoskeletal:         General: No swelling or tenderness. Normal range of motion.   Skin:     General: Skin is warm and dry.   Neurological:      General: No focal deficit present.      Mental Status: She is alert and oriented to person, place, and time.      Comments: Intact light touch on lower ext       Orders Only on 08/31/2021   Component Date Value Ref Range Status   • WBC 08/31/2021 5.92  3.40 - 10.80 10*3/mm3 Final   • RBC 08/31/2021 4.35  3.77 - 5.28 10*6/mm3 Final   • Hemoglobin 08/31/2021 14.1  12.0 - 15.9 g/dL Final   • Hematocrit 08/31/2021 42.2  34.0 - 46.6 % Final   • MCV 08/31/2021 97.0  79.0 - 97.0 fL Final   • MCH 08/31/2021 32.4  26.6 - 33.0 pg Final   • MCHC 08/31/2021 33.4  31.5 - 35.7 g/dL " Final   • RDW 08/31/2021 13.1  12.3 - 15.4 % Final   • Platelets 08/31/2021 172  140 - 450 10*3/mm3 Final   • Neutrophil Rel % 08/31/2021 62.4  42.7 - 76.0 % Final   • Lymphocyte Rel % 08/31/2021 27.4  19.6 - 45.3 % Final   • Monocyte Rel % 08/31/2021 5.7  5.0 - 12.0 % Final   • Eosinophil Rel % 08/31/2021 3.7  0.3 - 6.2 % Final   • Basophil Rel % 08/31/2021 0.5  0.0 - 1.5 % Final   • Neutrophils Absolute 08/31/2021 3.69  1.70 - 7.00 10*3/mm3 Final   • Lymphocytes Absolute 08/31/2021 1.62  0.70 - 3.10 10*3/mm3 Final   • Monocytes Absolute 08/31/2021 0.34  0.10 - 0.90 10*3/mm3 Final   • Eosinophils Absolute 08/31/2021 0.22  0.00 - 0.40 10*3/mm3 Final   • Basophils Absolute 08/31/2021 0.03  0.00 - 0.20 10*3/mm3 Final   • Immature Granulocyte Rel % 08/31/2021 0.3  0.0 - 0.5 % Final   • Immature Grans Absolute 08/31/2021 0.02  0.00 - 0.05 10*3/mm3 Final   • nRBC 08/31/2021 0.0  0.0 - 0.2 /100 WBC Final   • Glucose 08/31/2021 213* 65 - 99 mg/dL Final   • BUN 08/31/2021 21  8 - 23 mg/dL Final   • Creatinine 08/31/2021 1.17* 0.57 - 1.00 mg/dL Final   • eGFR Non African Am 08/31/2021 45* >60 mL/min/1.73 Final    Comment: The MDRD GFR formula is only valid for adults with stable  renal function between ages 18 and 70.     • eGFR  Am 08/31/2021 55* >60 mL/min/1.73 Final   • BUN/Creatinine Ratio 08/31/2021 17.9  7.0 - 25.0 Final   • Sodium 08/31/2021 144  136 - 145 mmol/L Final   • Potassium 08/31/2021 4.7  3.5 - 5.2 mmol/L Final   • Chloride 08/31/2021 107  98 - 107 mmol/L Final   • Total CO2 08/31/2021 23.3  22.0 - 29.0 mmol/L Final   • Calcium 08/31/2021 9.8  8.6 - 10.5 mg/dL Final   • Total Protein 08/31/2021 6.5  6.0 - 8.5 g/dL Final   • Albumin 08/31/2021 4.20  3.50 - 5.20 g/dL Final   • Globulin 08/31/2021 2.3  gm/dL Final   • A/G Ratio 08/31/2021 1.8  g/dL Final   • Total Bilirubin 08/31/2021 0.5  0.0 - 1.2 mg/dL Final   • Alkaline Phosphatase 08/31/2021 77  39 - 117 U/L Final   • AST (SGOT) 08/31/2021 18  1 -  32 U/L Final   • ALT (SGPT) 08/31/2021 22  1 - 33 U/L Final   • Free T4 08/31/2021 1.01  0.93 - 1.70 ng/dL Final    Results may be falsely increased if patient taking Biotin.   • T3, Free 08/31/2021 2.3  2.0 - 4.4 pg/mL Final     Assessment/Plan   Diagnoses and all orders for this visit:    1. Graves disease (Primary)  -     TSH    2. Hyperglycemia  -     Comprehensive Metabolic Panel  -     Hemoglobin A1c  -     Microalbumin / Creatinine Urine Ratio - Urine, Clean Catch    3. Nephrolithiasis    4. Obesity (BMI 30-39.9)    5. Hyperthyroidism  -     TSH    6. ERIS on CPAP    7. History of lobectomy of thyroid    8. History of gestational diabetes  -     Comprehensive Metabolic Panel  -     Hemoglobin A1c      Continue methimazole 5 mg 1/2 tablet once a day.  Check TSH.  Patient has previous history of gestational diabetes mellitus and had random glucose of 213 mg per DL in August 2021.  Check hemoglobin A1c.  Continue allopurinol per Dr. Miguel Monte.  Check uric acid.  Continue CPAP.    Copy of my note sent to Dr. Matute and Dr. Miguel Monte    RTC 4 mos.

## 2021-09-14 ENCOUNTER — OFFICE VISIT (OUTPATIENT)
Dept: ENDOCRINOLOGY | Age: 76
End: 2021-09-14

## 2021-09-14 VITALS
WEIGHT: 255.2 LBS | HEIGHT: 63 IN | HEART RATE: 68 BPM | SYSTOLIC BLOOD PRESSURE: 156 MMHG | OXYGEN SATURATION: 98 % | BODY MASS INDEX: 45.22 KG/M2 | DIASTOLIC BLOOD PRESSURE: 72 MMHG

## 2021-09-14 DIAGNOSIS — Z99.89 OSA ON CPAP: ICD-10-CM

## 2021-09-14 DIAGNOSIS — R73.9 HYPERGLYCEMIA: ICD-10-CM

## 2021-09-14 DIAGNOSIS — E05.90 HYPERTHYROIDISM: ICD-10-CM

## 2021-09-14 DIAGNOSIS — Z86.32 HISTORY OF GESTATIONAL DIABETES: ICD-10-CM

## 2021-09-14 DIAGNOSIS — Z90.09 HISTORY OF LOBECTOMY OF THYROID: ICD-10-CM

## 2021-09-14 DIAGNOSIS — E05.00 GRAVES DISEASE: Primary | ICD-10-CM

## 2021-09-14 DIAGNOSIS — E66.9 OBESITY (BMI 30-39.9): ICD-10-CM

## 2021-09-14 DIAGNOSIS — N20.0 NEPHROLITHIASIS: ICD-10-CM

## 2021-09-14 DIAGNOSIS — G47.33 OSA ON CPAP: ICD-10-CM

## 2021-09-14 PROCEDURE — 99214 OFFICE O/P EST MOD 30 MIN: CPT | Performed by: INTERNAL MEDICINE

## 2021-09-14 RX ORDER — ALBUTEROL SULFATE 90 UG/1
AEROSOL, METERED RESPIRATORY (INHALATION)
COMMUNITY
Start: 2021-08-18 | End: 2021-09-14

## 2021-09-15 LAB
ALBUMIN SERPL-MCNC: 4.5 G/DL (ref 3.5–5.2)
ALBUMIN/CREAT UR: 11 MG/G CREAT (ref 0–29)
ALBUMIN/GLOB SERPL: 2.1 G/DL
ALP SERPL-CCNC: 66 U/L (ref 39–117)
ALT SERPL-CCNC: 24 U/L (ref 1–33)
AST SERPL-CCNC: 21 U/L (ref 1–32)
BILIRUB SERPL-MCNC: 0.6 MG/DL (ref 0–1.2)
BUN SERPL-MCNC: 12 MG/DL (ref 8–23)
BUN/CREAT SERPL: 10.6 (ref 7–25)
CALCIUM SERPL-MCNC: 9.9 MG/DL (ref 8.6–10.5)
CHLORIDE SERPL-SCNC: 109 MMOL/L (ref 98–107)
CO2 SERPL-SCNC: 26.7 MMOL/L (ref 22–29)
CREAT SERPL-MCNC: 1.13 MG/DL (ref 0.57–1)
CREAT UR-MCNC: 101.4 MG/DL
GLOBULIN SER CALC-MCNC: 2.1 GM/DL
GLUCOSE SERPL-MCNC: 94 MG/DL (ref 65–99)
HBA1C MFR BLD: 5.2 % (ref 4.8–5.6)
MICROALBUMIN UR-MCNC: 11.6 UG/ML
POTASSIUM SERPL-SCNC: 4.5 MMOL/L (ref 3.5–5.2)
PROT SERPL-MCNC: 6.6 G/DL (ref 6–8.5)
SODIUM SERPL-SCNC: 146 MMOL/L (ref 136–145)
TSH SERPL DL<=0.005 MIU/L-ACNC: 2.3 UIU/ML (ref 0.27–4.2)
URATE SERPL-MCNC: 7.7 MG/DL (ref 2.4–5.7)

## 2021-09-18 NOTE — PROGRESS NOTES
Hemoglobin A1c 5.2%.  Hemoglobin A1c is in nondiabetic range.Normal nonfasting glucose at 94 mg per DL.Normal TSH.  Continue methimazole 5 mg 1/2 tablet daily.Normal urine microalbumin.Uric acid elevated on allopurinol 300 mg/day.  Reduce dietary protein and fructose.  Follow-up with Dr. Matute for reevaluation.    Send copy of labs to Dr. Moe Matute.  Please notify patient results and instructions

## 2022-02-07 ENCOUNTER — OFFICE VISIT (OUTPATIENT)
Dept: ORTHOPEDIC SURGERY | Facility: CLINIC | Age: 77
End: 2022-02-07

## 2022-02-07 VITALS — HEIGHT: 63 IN | WEIGHT: 250 LBS | TEMPERATURE: 96.8 F | BODY MASS INDEX: 44.3 KG/M2

## 2022-02-07 DIAGNOSIS — M17.10 ARTHRITIS OF KNEE: ICD-10-CM

## 2022-02-07 DIAGNOSIS — M25.562 PAIN IN BOTH KNEES, UNSPECIFIED CHRONICITY: Primary | ICD-10-CM

## 2022-02-07 DIAGNOSIS — M25.561 PAIN IN BOTH KNEES, UNSPECIFIED CHRONICITY: Primary | ICD-10-CM

## 2022-02-07 PROCEDURE — 73562 X-RAY EXAM OF KNEE 3: CPT | Performed by: ORTHOPAEDIC SURGERY

## 2022-02-07 PROCEDURE — 99204 OFFICE O/P NEW MOD 45 MIN: CPT | Performed by: ORTHOPAEDIC SURGERY

## 2022-02-07 PROCEDURE — 20610 DRAIN/INJ JOINT/BURSA W/O US: CPT | Performed by: ORTHOPAEDIC SURGERY

## 2022-02-07 RX ORDER — TRIAMCINOLONE ACETONIDE 40 MG/ML
80 INJECTION, SUSPENSION INTRA-ARTICULAR; INTRAMUSCULAR
Status: COMPLETED | OUTPATIENT
Start: 2022-02-07 | End: 2022-02-07

## 2022-02-07 RX ADMIN — TRIAMCINOLONE ACETONIDE 80 MG: 40 INJECTION, SUSPENSION INTRA-ARTICULAR; INTRAMUSCULAR at 15:01

## 2022-02-10 ENCOUNTER — PATIENT ROUNDING (BHMG ONLY) (OUTPATIENT)
Dept: ORTHOPEDIC SURGERY | Facility: CLINIC | Age: 77
End: 2022-02-10

## 2022-02-10 NOTE — PROGRESS NOTES
A [employer] message has been sent to the patient for [place] [facility]

## 2022-03-10 RX ORDER — METHIMAZOLE 5 MG/1
TABLET ORAL
Qty: 45 TABLET | Refills: 1 | Status: SHIPPED | OUTPATIENT
Start: 2022-03-10 | End: 2022-10-21

## 2022-03-15 DIAGNOSIS — R73.9 HYPERGLYCEMIA: ICD-10-CM

## 2022-03-15 DIAGNOSIS — E05.90 HYPERTHYROIDISM: Primary | ICD-10-CM

## 2022-03-15 DIAGNOSIS — E05.00 GRAVES DISEASE: ICD-10-CM

## 2022-03-15 DIAGNOSIS — E04.2 MULTINODULAR GOITER: ICD-10-CM

## 2022-03-15 DIAGNOSIS — R68.89 ABNORMAL ENDOCRINE LABORATORY TEST FINDING: ICD-10-CM

## 2022-03-31 ENCOUNTER — LAB (OUTPATIENT)
Dept: ENDOCRINOLOGY | Age: 77
End: 2022-03-31

## 2022-03-31 DIAGNOSIS — R73.9 HYPERGLYCEMIA: ICD-10-CM

## 2022-03-31 DIAGNOSIS — E05.00 GRAVES DISEASE: ICD-10-CM

## 2022-03-31 DIAGNOSIS — E05.90 HYPERTHYROIDISM: ICD-10-CM

## 2022-03-31 DIAGNOSIS — E04.2 MULTINODULAR GOITER: ICD-10-CM

## 2022-03-31 DIAGNOSIS — R68.89 ABNORMAL ENDOCRINE LABORATORY TEST FINDING: ICD-10-CM

## 2022-04-01 LAB
ALBUMIN SERPL-MCNC: 4.2 G/DL (ref 3.7–4.7)
ALBUMIN/GLOB SERPL: 1.9 {RATIO} (ref 1.2–2.2)
ALP SERPL-CCNC: 69 IU/L (ref 44–121)
ALT SERPL-CCNC: 25 IU/L (ref 0–32)
AST SERPL-CCNC: 23 IU/L (ref 0–40)
BILIRUB SERPL-MCNC: 0.9 MG/DL (ref 0–1.2)
BUN SERPL-MCNC: 17 MG/DL (ref 8–27)
BUN/CREAT SERPL: 15 (ref 12–28)
CALCIUM SERPL-MCNC: 9.4 MG/DL (ref 8.7–10.3)
CHLORIDE SERPL-SCNC: 105 MMOL/L (ref 96–106)
CO2 SERPL-SCNC: 22 MMOL/L (ref 20–29)
CREAT SERPL-MCNC: 1.13 MG/DL (ref 0.57–1)
EGFRCR SERPLBLD CKD-EPI 2021: 51 ML/MIN/1.73
GLOBULIN SER CALC-MCNC: 2.2 G/DL (ref 1.5–4.5)
GLUCOSE SERPL-MCNC: 115 MG/DL (ref 65–99)
HBA1C MFR BLD: 5.5 % (ref 4.8–5.6)
POTASSIUM SERPL-SCNC: 4.4 MMOL/L (ref 3.5–5.2)
PROT SERPL-MCNC: 6.4 G/DL (ref 6–8.5)
REPORT: NORMAL
SODIUM SERPL-SCNC: 141 MMOL/L (ref 134–144)
T4 FREE SERPL-MCNC: 1.13 NG/DL (ref 0.82–1.77)
TSH SERPL DL<=0.005 MIU/L-ACNC: 2.36 UIU/ML (ref 0.45–4.5)
URATE SERPL-MCNC: 7.2 MG/DL (ref 3.1–7.9)

## 2022-04-13 NOTE — PROGRESS NOTES
Subjective   Patt Bond is a 76 y.o. female.     F/u for hyperthyroidism, graves disease, MNG, sleep apnea      Patient is a 76-year-old female came in for follow-up.       In March 2018, she had a left thyroidectomy for done by Dr. Mares.  Pathology was read as multinodular goiter with degenerative changes including extensive calcification.  Thyroid-stimulating immunoglobulin and thyroid peroxidase antibody were elevated in 2016.  Thyroid ultrasound done in July 2020 showed a 1.2 cm right hypoechoic nodule.      She denies dysphagia or pressure symptoms.  She is on methimazole 5 mg 1/2 tablet daily.  TSH done in March 2022 is normal at 2.36 with a normal free T4 at 1.13 ng per DL.     She denies palpitations or increased tremors.  She denies heat or cold intolerance.  She  lost 6 pounds since September 2021.  She had intermittent loose stools since 2019 which is improved with cholestyramine 1 packet/day.     She has sleep apnea and is using her CPAP regularly.  She wakes up rested.  She follows with Dr. Nando Heller.     She has history of nephrolithiasis and had multiple lithotripsies.  Stone analysis showed 87% uric acid and 10% calcium oxalate stones.  She is on allopurinol 300 mg once a day.  She follows with Dr. Miguel Monte.     She had serum glucose done in 8/21 was 213 mg/dl.  She had gestational diabetes mellitus with the first of her 2 pregnancies.  She did not require insulin..     Fasting glucose done in March 2022 is elevated at 115 mg per DL with normal hemoglobin A1c of 5.5%.     The following portions of the patient's history were reviewed and updated as appropriate: allergies, current medications, past family history, past medical history, past social history, past surgical history and problem list.    Review of Systems   Respiratory: Positive for shortness of breath (after walking 200 feet).    Cardiovascular: Negative for chest pain and palpitations.   Gastrointestinal: Negative.   "  Endocrine: Negative for cold intolerance and heat intolerance.   Genitourinary: Negative.    Neurological: Negative for numbness.     Objective      Vitals:    04/14/22 1020   BP: 144/68   Weight: 113 kg (249 lb 9.6 oz)   Height: 160 cm (62.99\")     Physical Exam  Constitutional:       General: She is not in acute distress.     Appearance: Normal appearance. She is not ill-appearing, toxic-appearing or diaphoretic.   Eyes:      General: No scleral icterus.        Right eye: No discharge.         Left eye: No discharge.   Neck:      Vascular: No carotid bruit.   Cardiovascular:      Rate and Rhythm: Normal rate and regular rhythm.      Pulses: Normal pulses.      Heart sounds: Normal heart sounds. No murmur heard.    No friction rub.   Pulmonary:      Effort: Pulmonary effort is normal. No respiratory distress.      Breath sounds: Normal breath sounds. No stridor. No rales.   Chest:      Chest wall: No tenderness.   Abdominal:      General: Bowel sounds are normal. There is no distension.      Palpations: Abdomen is soft.      Tenderness: There is no right CVA tenderness or left CVA tenderness.   Musculoskeletal:         General: Normal range of motion.      Cervical back: Normal range of motion and neck supple. No rigidity or tenderness.   Lymphadenopathy:      Cervical: No cervical adenopathy.   Skin:     General: Skin is warm and dry.   Neurological:      General: No focal deficit present.      Mental Status: She is alert and oriented to person, place, and time.   Psychiatric:         Mood and Affect: Mood normal.         Behavior: Behavior normal.       Lab on 03/31/2022   Component Date Value Ref Range Status   • Uric Acid 03/31/2022 7.2  3.1 - 7.9 mg/dL Final               Therapeutic target for gout patients: <6.0   • TSH 03/31/2022 2.360  0.450 - 4.500 uIU/mL Final   • Free T4 03/31/2022 1.13  0.82 - 1.77 ng/dL Final   • Hemoglobin A1C 03/31/2022 5.5  4.8 - 5.6 % Final    Comment:          Prediabetes: 5.7 " - 6.4           Diabetes: >6.4           Glycemic control for adults with diabetes: <7.0     • Glucose 03/31/2022 115 (A) 65 - 99 mg/dL Final   • BUN 03/31/2022 17  8 - 27 mg/dL Final   • Creatinine 03/31/2022 1.13 (A) 0.57 - 1.00 mg/dL Final   • EGFR Result 03/31/2022 51 (A) >59 mL/min/1.73 Final   • BUN/Creatinine Ratio 03/31/2022 15  12 - 28 Final   • Sodium 03/31/2022 141  134 - 144 mmol/L Final   • Potassium 03/31/2022 4.4  3.5 - 5.2 mmol/L Final   • Chloride 03/31/2022 105  96 - 106 mmol/L Final   • Total CO2 03/31/2022 22  20 - 29 mmol/L Final   • Calcium 03/31/2022 9.4  8.7 - 10.3 mg/dL Final   • Total Protein 03/31/2022 6.4  6.0 - 8.5 g/dL Final   • Albumin 03/31/2022 4.2  3.7 - 4.7 g/dL Final   • Globulin 03/31/2022 2.2  1.5 - 4.5 g/dL Final   • A/G Ratio 03/31/2022 1.9  1.2 - 2.2 Final   • Total Bilirubin 03/31/2022 0.9  0.0 - 1.2 mg/dL Final   • Alkaline Phosphatase 03/31/2022 69  44 - 121 IU/L Final   • AST (SGOT) 03/31/2022 23  0 - 40 IU/L Final   • ALT (SGPT) 03/31/2022 25  0 - 32 IU/L Final   • Interpretation 03/31/2022 Note   Final    Comment: Medical Director's Note: Effective 2/28/2022, [facility] uses  the 2021 CKD-EPI creatinine equation without a race factor  to calculate and report eGFR results. eGFR results reported  prior to this date using the 2009 CKD-EPI equation are no  longer included in this report interpretation.  -------------------------------  CHRONIC KIDNEY DISEASE:  EGFR, BLOOD PRESSURE, AND PROTEINURIA ASSESSMENT  We presume eGFR has been less than 60 mL/min/1.73mE2 on at  least two occasions spaced at least 3 months apart. Current  eGFR is 51 mL/min/1.73mE2 corresponding to CKD stage 3a.  Potassium is within goal and has not changed significantly,  was 4.5 and now is 4.4 mmol/L. Hemoglobin A1C is 5.5 %;  diabetic status is unknown. Refer to ADA guidelines for  clinical practice recommendations.  EGFR, BLOOD PRESSURE, AND PROTEINURIA TREATMENT SUGGESTIONS  -  Guidelines recommend a  target blood pressure of 120/80 mmHg  or less in CKD patients to reduce cardiovascular risk and  CKD progression. Assessment of                            albuminuria (urine  albumin:creatinine ratio or urine protein:creatinine ratio  preferred) is recommended at least annually in CKD patients  for staging and disease prognosis.  EGFR, BLOOD PRESSURE, AND PROTEINURIA FOLLOW-UP  -  fasting Renal Panel within 12 months; Spot Urine Panel is  recommended by guidelines, at least yearly;  BONE and MINERAL ASSESSMENT  Calcium is within goal and has decreased, was 9.9 and now is  9.4 mg/dL. Carbon Dioxide is within goal and has decreased,  was 27 and now is 22 mmol/L. Guidelines recommend the  measurement of 25-hydroxy vitamin D in patients with CKD.  BONE and MINERAL TREATMENT SUGGESTIONS  -  Interpretations require simultaneous measurements of serum  calcium and phosphorus.  BONE and MINERAL FOLLOW-UP  -  fasting PTH with Renal Panel and 25-Hydroxy Vitamin D are  recommended by guidelines, at least yearly;  LIPIDS ASSESSMENT  Most recent order does not include a fasting Lipid Panel.  LIPIDS FOLLOW-UP  -  fasting Lipid Panel is recommended by                            guidelines, at least  yearly;  ANEMIA ASSESSMENT  Most recent order does not include a CBC Panel or iron  studies.  -------------------------------  DISCLAIMER  These assessments and treatment suggestions are provided as  a convenience in support of the physician-patient  relationship and are not intended to replace the physician's  clinical judgment. They are derived from national guidelines  in addition to other evidence and expert opinion. The  clinician should consider this information within the  context of clinical opinion and the individual patient.  SEE GUIDANCE FOR CHRONIC KIDNEY DISEASE PROGRAM: Kidney  Disease Improving Global Outcomes (KDIGO) clinical practice  guidelines are at http://kdigo.org/home/guidelines/.  [employer]  Kidney Disease Outcomes Quality  Initiative (KDOQI (TM)), with its limitations and  disclaimers, are at www.kidney.org/professionals/KDOQI. This  program is intended for patients who have been diagnosed  with stages 3, 4, or pre-dialysis                            5 CKD. It is not intended  for children, pregnant patients, or transplant patients.       Assessment/Plan   Diagnoses and all orders for this visit:    1. Nontoxic multinodular goiter (Primary)    2. History of lobectomy of thyroid    3. ERIS on CPAP    4. Nephrolithiasis    5. Impaired fasting glucose      Continue methimazole 5 mg 1/2 tablet daily.  Schedule thyroid ultrasound.  Continue CPAP.  Continue allopurinol.    Continue weight losing diet.    Copy of my note sent to Dr. Matute and Dr. Nando Heller.    RTC 6 mos.

## 2022-04-14 ENCOUNTER — OFFICE VISIT (OUTPATIENT)
Dept: ENDOCRINOLOGY | Age: 77
End: 2022-04-14

## 2022-04-14 VITALS
WEIGHT: 249.6 LBS | SYSTOLIC BLOOD PRESSURE: 144 MMHG | BODY MASS INDEX: 44.23 KG/M2 | HEIGHT: 63 IN | DIASTOLIC BLOOD PRESSURE: 68 MMHG

## 2022-04-14 DIAGNOSIS — E04.2 NONTOXIC MULTINODULAR GOITER: Primary | ICD-10-CM

## 2022-04-14 DIAGNOSIS — Z90.09 HISTORY OF LOBECTOMY OF THYROID: ICD-10-CM

## 2022-04-14 DIAGNOSIS — Z99.89 OSA ON CPAP: ICD-10-CM

## 2022-04-14 DIAGNOSIS — R73.01 IMPAIRED FASTING GLUCOSE: ICD-10-CM

## 2022-04-14 DIAGNOSIS — G47.33 OSA ON CPAP: ICD-10-CM

## 2022-04-14 DIAGNOSIS — N20.0 NEPHROLITHIASIS: ICD-10-CM

## 2022-04-14 PROCEDURE — 99214 OFFICE O/P EST MOD 30 MIN: CPT | Performed by: INTERNAL MEDICINE

## 2022-04-14 RX ORDER — CHOLESTYRAMINE 4 G/9G
1 POWDER, FOR SUSPENSION ORAL DAILY
COMMUNITY
Start: 2021-10-28

## 2022-04-14 RX ORDER — FLUTICASONE PROPIONATE 50 MCG
1-2 SPRAY, SUSPENSION (ML) NASAL DAILY
COMMUNITY
Start: 2021-11-15 | End: 2022-04-14

## 2022-04-14 NOTE — PROGRESS NOTES
Results discussed with patient during office visit on April 14, 2022.  Copy of labs sent to Dr. Matute and Dr. Nando Heller

## 2022-05-13 ENCOUNTER — HOSPITAL ENCOUNTER (OUTPATIENT)
Dept: ULTRASOUND IMAGING | Facility: HOSPITAL | Age: 77
Discharge: HOME | End: 2022-05-13
Admitting: INTERNAL MEDICINE

## 2022-05-13 DIAGNOSIS — E04.2 NONTOXIC MULTINODULAR GOITER: ICD-10-CM

## 2022-05-13 PROCEDURE — 76536 US EXAM OF HEAD AND NECK: CPT

## 2022-05-22 NOTE — PROGRESS NOTES
Stable right thyroid nodule.  Send copy of lab to Dr. Moe Matute.  Copy of labs sent to patient through [facility].

## 2022-07-23 ENCOUNTER — HOSPITAL ENCOUNTER (EMERGENCY)
Facility: HOSPITAL | Age: 77
Discharge: HOME | End: 2022-07-23
Attending: EMERGENCY MEDICINE | Admitting: EMERGENCY MEDICINE

## 2022-07-23 ENCOUNTER — APPOINTMENT (OUTPATIENT)
Dept: CT IMAGING | Facility: HOSPITAL | Age: 77
End: 2022-07-23

## 2022-07-23 VITALS
RESPIRATION RATE: 18 BRPM | DIASTOLIC BLOOD PRESSURE: 84 MMHG | BODY MASS INDEX: 44.3 KG/M2 | OXYGEN SATURATION: 95 % | HEIGHT: 63 IN | HEART RATE: 71 BPM | SYSTOLIC BLOOD PRESSURE: 169 MMHG | WEIGHT: 250 LBS | TEMPERATURE: 98.3 F

## 2022-07-23 DIAGNOSIS — R10.9 ACUTE LEFT FLANK PAIN: Primary | ICD-10-CM

## 2022-07-23 DIAGNOSIS — N13.30 HYDRONEPHROSIS, LEFT: ICD-10-CM

## 2022-07-23 DIAGNOSIS — N20.1 URETEROLITHIASIS: ICD-10-CM

## 2022-07-23 DIAGNOSIS — N18.9 CHRONIC KIDNEY DISEASE, UNSPECIFIED CKD STAGE: ICD-10-CM

## 2022-07-23 DIAGNOSIS — E87.20 LACTIC ACIDOSIS: ICD-10-CM

## 2022-07-23 LAB
ALBUMIN SERPL-MCNC: 4.1 G/DL (ref 3.5–5.2)
ALBUMIN/GLOB SERPL: 1.3 G/DL
ALP SERPL-CCNC: 80 U/L (ref 39–117)
ALT SERPL W P-5'-P-CCNC: 37 U/L (ref 1–33)
ANION GAP SERPL CALCULATED.3IONS-SCNC: 11.9 MMOL/L (ref 5–15)
AST SERPL-CCNC: 26 U/L (ref 1–32)
BACTERIA UR QL AUTO: ABNORMAL /HPF
BASOPHILS # BLD AUTO: 0.03 10*3/MM3 (ref 0–0.2)
BASOPHILS NFR BLD AUTO: 0.4 % (ref 0–1.5)
BILIRUB SERPL-MCNC: 0.7 MG/DL (ref 0–1.2)
BILIRUB UR QL STRIP: NEGATIVE
BUN SERPL-MCNC: 15 MG/DL (ref 8–23)
BUN/CREAT SERPL: 11.7 (ref 7–25)
CALCIUM SPEC-SCNC: 9.9 MG/DL (ref 8.6–10.5)
CHLORIDE SERPL-SCNC: 106 MMOL/L (ref 98–107)
CLARITY UR: ABNORMAL
CO2 SERPL-SCNC: 24.1 MMOL/L (ref 22–29)
COLOR UR: YELLOW
CREAT SERPL-MCNC: 1.28 MG/DL (ref 0.57–1)
D-LACTATE SERPL-SCNC: 2.5 MMOL/L (ref 0.5–2)
DEPRECATED RDW RBC AUTO: 43.9 FL (ref 37–54)
EGFRCR SERPLBLD CKD-EPI 2021: 43.8 ML/MIN/1.73
EOSINOPHIL # BLD AUTO: 0.14 10*3/MM3 (ref 0–0.4)
EOSINOPHIL NFR BLD AUTO: 1.9 % (ref 0.3–6.2)
ERYTHROCYTE [DISTWIDTH] IN BLOOD BY AUTOMATED COUNT: 13.4 % (ref 12.3–15.4)
GLOBULIN UR ELPH-MCNC: 3.2 GM/DL
GLUCOSE SERPL-MCNC: 137 MG/DL (ref 65–99)
GLUCOSE UR STRIP-MCNC: NEGATIVE MG/DL
HCT VFR BLD AUTO: 38.4 % (ref 34–46.6)
HGB BLD-MCNC: 13.7 G/DL (ref 12–15.9)
HGB UR QL STRIP.AUTO: ABNORMAL
HOLD SPECIMEN: NORMAL
HOLD SPECIMEN: NORMAL
HYALINE CASTS UR QL AUTO: ABNORMAL /LPF
IMM GRANULOCYTES # BLD AUTO: 0.02 10*3/MM3 (ref 0–0.05)
IMM GRANULOCYTES NFR BLD AUTO: 0.3 % (ref 0–0.5)
KETONES UR QL STRIP: NEGATIVE
LEUKOCYTE ESTERASE UR QL STRIP.AUTO: NEGATIVE
LIPASE SERPL-CCNC: 49 U/L (ref 13–60)
LYMPHOCYTES # BLD AUTO: 1.32 10*3/MM3 (ref 0.7–3.1)
LYMPHOCYTES NFR BLD AUTO: 17.6 % (ref 19.6–45.3)
MCH RBC QN AUTO: 32.2 PG (ref 26.6–33)
MCHC RBC AUTO-ENTMCNC: 35.7 G/DL (ref 31.5–35.7)
MCV RBC AUTO: 90.1 FL (ref 79–97)
MONOCYTES # BLD AUTO: 0.4 10*3/MM3 (ref 0.1–0.9)
MONOCYTES NFR BLD AUTO: 5.3 % (ref 5–12)
NEUTROPHILS NFR BLD AUTO: 5.61 10*3/MM3 (ref 1.7–7)
NEUTROPHILS NFR BLD AUTO: 74.5 % (ref 42.7–76)
NITRITE UR QL STRIP: NEGATIVE
NRBC BLD AUTO-RTO: 0.1 /100 WBC (ref 0–0.2)
PH UR STRIP.AUTO: <=5 [PH] (ref 5–8)
PLATELET # BLD AUTO: 155 10*3/MM3 (ref 140–450)
PMV BLD AUTO: 9.3 FL (ref 6–12)
POTASSIUM SERPL-SCNC: 4.2 MMOL/L (ref 3.5–5.2)
PROT SERPL-MCNC: 7.3 G/DL (ref 6–8.5)
PROT UR QL STRIP: ABNORMAL
RBC # BLD AUTO: 4.26 10*6/MM3 (ref 3.77–5.28)
RBC # UR STRIP: ABNORMAL /HPF
REF LAB TEST METHOD: ABNORMAL
SODIUM SERPL-SCNC: 142 MMOL/L (ref 136–145)
SP GR UR STRIP: 1.02 (ref 1–1.03)
SQUAMOUS #/AREA URNS HPF: ABNORMAL /HPF
URATE CRY URNS QL MICRO: ABNORMAL /HPF
UROBILINOGEN UR QL STRIP: ABNORMAL
WBC # UR STRIP: ABNORMAL /HPF
WBC NRBC COR # BLD: 7.52 10*3/MM3 (ref 3.4–10.8)
WHOLE BLOOD HOLD COAG: NORMAL
WHOLE BLOOD HOLD SPECIMEN: NORMAL

## 2022-07-23 PROCEDURE — 25010000002 KETOROLAC TROMETHAMINE PER 15 MG: Performed by: EMERGENCY MEDICINE

## 2022-07-23 PROCEDURE — 83605 ASSAY OF LACTIC ACID: CPT

## 2022-07-23 PROCEDURE — 25010000002 MORPHINE PER 10 MG: Performed by: EMERGENCY MEDICINE

## 2022-07-23 PROCEDURE — 87086 URINE CULTURE/COLONY COUNT: CPT | Performed by: EMERGENCY MEDICINE

## 2022-07-23 PROCEDURE — 96375 TX/PRO/DX INJ NEW DRUG ADDON: CPT

## 2022-07-23 PROCEDURE — 85025 COMPLETE CBC W/AUTO DIFF WBC: CPT

## 2022-07-23 PROCEDURE — 83690 ASSAY OF LIPASE: CPT

## 2022-07-23 PROCEDURE — 96374 THER/PROPH/DIAG INJ IV PUSH: CPT

## 2022-07-23 PROCEDURE — 80053 COMPREHEN METABOLIC PANEL: CPT

## 2022-07-23 PROCEDURE — 25010000002 ONDANSETRON PER 1 MG: Performed by: EMERGENCY MEDICINE

## 2022-07-23 PROCEDURE — 99283 EMERGENCY DEPT VISIT LOW MDM: CPT

## 2022-07-23 PROCEDURE — 81001 URINALYSIS AUTO W/SCOPE: CPT

## 2022-07-23 PROCEDURE — 74176 CT ABD & PELVIS W/O CONTRAST: CPT

## 2022-07-23 RX ORDER — HYDROCODONE BITARTRATE AND ACETAMINOPHEN 5; 325 MG/1; MG/1
TABLET ORAL
Qty: 15 TABLET | Refills: 0 | Status: ON HOLD | OUTPATIENT
Start: 2022-07-23 | End: 2022-08-12 | Stop reason: SDUPTHER

## 2022-07-23 RX ORDER — SODIUM CHLORIDE 0.9 % (FLUSH) 0.9 %
10 SYRINGE (ML) INJECTION AS NEEDED
Status: DISCONTINUED | OUTPATIENT
Start: 2022-07-23 | End: 2022-07-23 | Stop reason: HOSPADM

## 2022-07-23 RX ORDER — HYDROCODONE BITARTRATE AND ACETAMINOPHEN 5; 325 MG/1; MG/1
1 TABLET ORAL ONCE
Status: COMPLETED | OUTPATIENT
Start: 2022-07-23 | End: 2022-07-23

## 2022-07-23 RX ORDER — ONDANSETRON 2 MG/ML
4 INJECTION INTRAMUSCULAR; INTRAVENOUS ONCE
Status: COMPLETED | OUTPATIENT
Start: 2022-07-23 | End: 2022-07-23

## 2022-07-23 RX ORDER — MORPHINE SULFATE 2 MG/ML
4 INJECTION, SOLUTION INTRAMUSCULAR; INTRAVENOUS ONCE
Status: COMPLETED | OUTPATIENT
Start: 2022-07-23 | End: 2022-07-23

## 2022-07-23 RX ORDER — KETOROLAC TROMETHAMINE 15 MG/ML
15 INJECTION, SOLUTION INTRAMUSCULAR; INTRAVENOUS ONCE
Status: COMPLETED | OUTPATIENT
Start: 2022-07-23 | End: 2022-07-23

## 2022-07-23 RX ORDER — SODIUM CHLORIDE 9 MG/ML
125 INJECTION, SOLUTION INTRAVENOUS CONTINUOUS
Status: DISCONTINUED | OUTPATIENT
Start: 2022-07-23 | End: 2022-07-23 | Stop reason: HOSPADM

## 2022-07-23 RX ORDER — ONDANSETRON 4 MG/1
TABLET, ORALLY DISINTEGRATING ORAL
Qty: 20 TABLET | Refills: 0 | Status: SHIPPED | OUTPATIENT
Start: 2022-07-23 | End: 2022-10-21

## 2022-07-23 RX ADMIN — SODIUM CHLORIDE 500 ML: 9 INJECTION, SOLUTION INTRAVENOUS at 08:21

## 2022-07-23 RX ADMIN — ONDANSETRON 4 MG: 2 INJECTION INTRAMUSCULAR; INTRAVENOUS at 08:16

## 2022-07-23 RX ADMIN — MORPHINE SULFATE 4 MG: 2 INJECTION, SOLUTION INTRAMUSCULAR; INTRAVENOUS at 08:18

## 2022-07-23 RX ADMIN — KETOROLAC TROMETHAMINE 15 MG: 15 INJECTION, SOLUTION INTRAMUSCULAR; INTRAVENOUS at 08:17

## 2022-07-23 RX ADMIN — HYDROCODONE BITARTRATE AND ACETAMINOPHEN 1 TABLET: 5; 325 TABLET ORAL at 10:24

## 2022-07-23 NOTE — ED PROVIDER NOTES
EMERGENCY DEPARTMENT ENCOUNTER    Room Number:  33/33  Date of encounter:  7/23/2022  PCP: Moe Matute MD  Historian: Pt    Patient was placed in face mask during triage process. Patient was wearing facemask when I entered the room and throughout our encounter. I wore full protective equipment throughout this patient encounter including a face mask, eye protection, and gloves. Hand hygiene was performed before donning protective equipment and again following doffing of PPE after leaving the room.    HPI:  Chief Complaint: Back pain  A complete HPI/ROS/PMH/PSH/SH/FH are unobtainable due to: N/A   Context: Patt Bond is a 77 y.o. female with a history of ureterolithiasis who presents to the ED c/o acute onset left flank pain radiating mildly into her left abdomen with associated nausea.  Pain is severe.  No exacerbating relieving factors.  Patient went to bed feeling well last night.  No dysuria or hematuria.  Some loose stools since taking antibiotic for teeth but no black or bloody stools reported.  No saddle anesthesia or lower extremity numbness/weakness reported.      MEDICAL HISTORY REVIEW  EMR reviewed:  Large left-sided ureteral lithiasis noted on CT 2018    PAST MEDICAL HISTORY  Active Ambulatory Problems     Diagnosis Date Noted   • Left lower quadrant pain 07/03/2016   • Ketonuria due to dehydration 07/03/2016   • Normocytic anemia 07/03/2016   • Pancytopenia (HCC) 07/04/2016   • Obesity (BMI 30-39.9) 07/04/2016   • Nausea 07/04/2016   • Sepsis (HCC) 07/08/2016   • Nephrolithiasis 08/25/2016   • Pleural nodule 09/13/2016   • Tracheal compression 09/13/2016   • Hyperthyroidism 11/04/2016   • Graves disease 11/04/2016   • Abnormal weight gain 11/04/2016   • Palpitations 11/05/2016   • Cholecystitis 02/14/2017   • History of colon polyps - 4 Tubulovillous adenomas in 2004, normal colonoscopy in 2008 02/14/2017   • History of abdominal abscess - 3+ E. coli at time of surgery 9/2016 02/14/2017   •  Multiple drug allergies to Cephalexin, Cephalosporins, Penicillins 02/14/2017   • Weight gain - 10 pounds in 2 months, unintentional 02/14/2017   • Multinodular goiter 07/07/2017   • GRAY (dyspnea on exertion) 09/08/2017   • Obesity, morbid, BMI 40.0-49.9 ([facility]) 09/08/2017   • Substernal goiter 02/13/2018   • Obstruction of left ureteropelvic junction (UPJ) due to stone 04/21/2018   • Morbid obesity with BMI of 40.0-44.9, adult ([facility]) 04/21/2018   • ERIS on CPAP 08/06/2018   • Hypersomnia with sleep apnea 08/06/2018   • Sleep related hypoxia 08/06/2018   • Chronic fatigue 06/20/2019   • History of lobectomy of thyroid 10/07/2020   • Impaired fasting glucose 04/14/2022     Resolved Ambulatory Problems     Diagnosis Date Noted   • Acute diverticulitis of sigmoid colon 07/03/2016   • Acute diverticulitis 09/09/2016   • Incisional hernia, without obstruction or gangrene 02/14/2017   • Nausea and vomiting 04/21/2018     Past Medical History:   Diagnosis Date   • Asthma    • Back pain    • Diverticulitis    • Diverticulosis    • Goiter    • Joint pain, knee    • Kidney stones 2015   • Mediastinal mass    • Obstructive pyelonephritis 09/28/2015   • PONV (postoperative nausea and vomiting)    • SOB (shortness of breath) on exertion          PAST SURGICAL HISTORY  Past Surgical History:   Procedure Laterality Date   • BRONCHOSCOPY N/A 11/6/2017    Procedure: BRONCHOSCOPY WITH ENDOBRONCHIAL ULTRASOUND AND TRANSBRONCHIAL NEEDLE ASPIRATION;  Surgeon: Nando Heller MD;  Location: [facility] ENDOSCOPY;  Service:    • CARDIAC CATHETERIZATION N/A 9/27/2017    Procedure: Right Heart Cath;  Surgeon: Miguel Gautam MD;  Location: [facility] CATH INVASIVE LOCATION;  Service:    • CARDIAC CATHETERIZATION N/A 9/27/2017    Procedure: Left Heart Cath;  Surgeon: Miguel Gautam MD;  Location: [facility] CATH INVASIVE LOCATION;  Service:    • CARDIAC CATHETERIZATION N/A 9/27/2017    Procedure: Coronary angiography;  Surgeon: Miguel  KAYLEY Gautam MD;  Location: [facility] CATH INVASIVE LOCATION;  Service:    • CARDIAC CATHETERIZATION N/A 9/27/2017    Procedure: Left ventriculography;  Surgeon: Miguel Gautam MD;  Location: [facility] CATH INVASIVE LOCATION;  Service:    • CHOLECYSTECTOMY N/A 5/17/2017    Procedure: LAPAROSCOPIC CHOLECYSTECTOMY WITH IOC;  Surgeon: Patt Moore MD;  Location: [facility] OR;  Service:    • COLON RESECTION Left 9/14/2016    Laparoscopic Low Anterior Resection with splenic flexure mobilization, drainage of Pelvic Abscess (cultured 3+ E. Coli), Left salpingo-ophorectomy, laparoscopic rectopexy, and umbilical hernia repair, Dr. Patt Moore   • COLON RESECTION      VENTRAL HERNIA REPAIR   • COLONOSCOPY N/A 05/15/2008    sigmoid diverticulosis with blunting of the haustral folds and angulation consistent with previous diverticulitis, no polyps, suggestion of rectal prolapse-Dr. Patt Moore   • COLONOSCOPY W/ BIOPSIES AND POLYPECTOMY N/A 04/16/2001    Possible mild gastritis w/ some appearance of formations that look like petechiae in the antrum, no ulcerations, no erosions; cecal polyp approx 4mm sessile; probable transverse colon polyp, although we could not visualize this completely upon pulling out; sigmoid diverticula; multiple rectal polyps, mostly w/ appearance of hyperplasia, largest being 5 to 6mm: removed via snare-Dr. Patt Moore   • COLONOSCOPY W/ POLYPECTOMY N/A 12/10/2004    Diverticulosis with sigmoid perideverticulitis with erythema and patchiness around some of the diverticula, proximal ascedning colon polyp-approx 5 mm-removed via snare cauter, two distal ascending colon polyps-7 mm and 3 mm-removed via snare cautery polypectomy, hemorrhoids-Dr. Patt Moore   • CYSTOSCOPY W/ URETERAL STENT PLACEMENT Left 4/21/2018    Procedure: CYSTOSCOPY, LEFT RETROGRADE WITH LEFT URETERAL STENT INSERTION;  Surgeon: Stanley Osuna MD;  Location: [facility] OR;  Service: Urology   • CYSTOSCOPY W/ URETERAL  STENT REMOVAL Left 10/07/2015    Cystoscopy with stent extraction, left ureteral occulusion balloon placement, percutanous nephrostolithotomy with stone volume less than 2.5 cm involving the left lower pole and the left proximal ureter, balloon tract dilation for establishment of nephrostomy tract, antegrade nephrostomy tube placement-Dr. Miguel Monte   • CYSTOSCOPY, RETROGRADE PYELOGRAM AND STENT INSERTION Left 09/28/2015    Left cystoscopy with left retrograde pyelogram, left double-J stent placement-Dr. Rivera [facility]   • CYSTOSCOPY, RETROGRADE PYELOGRAM AND STENT INSERTION Left 09/09/2015    Cystoscopy with bilateral retrograde pyelogram, left double-J stent placement, right ureteral pyeloscopy with laser lithotripsy of the ureteropelvic junction calculus, right double-J stent placement-Dr. Miguel Monte   • CYSTOSCOPY, RETROGRADE PYELOGRAM AND STENT INSERTION Right 09/21/2007    Cystoscopy with right retrograde pyelogram, right biliary stent placement-Dr. Miguel Monte   • CYSTOSCOPY, RETROGRADE PYELOGRAM AND STENT INSERTION Right 09/07/2007    Cystoscopy with right retrograde pyelogram, right ureteral peyloscopy with extraction distal ureteral mass, right double J stent placement-Dr. Miguel Monte   • CYSTOSCOPY, URETEROSCOPY, RETROGRADE PYELOGRAM, STENT INSERTION Right 09/07/2007    Cystoscopy with right retrograde pyelogram, rigth ureteroscopy with extraction of distal mass, right double J stent placement-Dr. Miguel Monte   • D & C HYSTEROSCOPY N/A 05/18/2011    Procedure done due to thickened endomentrium and an endometrial polyp-Dr. Quita Cheatham   • DILATATION AND CURETTAGE N/A 03/22/2002    D&C, polyp removal-Dr. Quita Cheatham   • EXTRACORPOREAL SHOCKWAVE LITHOTRIPSY (ESWL), STENT INSERTION/REMOVAL Left 05/08/2015    Left extracoporeal shockwave lithotripsy, cystoscopy with stent placement-Dr. Miguel Monte   • MEDIASTINOTOMY Left 3/5/2018    Procedure: left thyroidectomy,  resection of substernal thyroid goiter cervical approach;  Surgeon: Quintin Mares III, MD;  Location: [facility] OR;  Service:    • SIGMOIDOSCOPY N/A 9/13/2016    Procedure: SIGMOIDOSCOPY FLEXIBLE TO 25 CM;  Surgeon: Patt Moore MD;  Location: [facility] ENDOSCOPY;  Service:    • THORACOTOMY  1996    Thoracotomy for ectopic thyroid, Dr. Camarillo   • THYROIDECTOMY, PARTIAL      left side 3/05/18   • UMBILICAL HERNIA REPAIR N/A 9/14/2016    Procedure: UMBILICAL HERNIA REPAIR ;  Surgeon: Patt Moore MD;  Location: [facility] MAIN OR;  Service:    • VENTRAL/INCISIONAL HERNIA REPAIR N/A 5/17/2017    Procedure: VENTRAL HERNIA REPAIR WITH MESH, BILATERAL MUSCULOFASCIAL RELEASE;  Surgeon: Patt Moore MD;  Location: [facility] OR;  Service:          FAMILY HISTORY  Family History   Problem Relation Age of Onset   • Heart disease Father    • Heart attack Paternal Uncle    • Cancer Maternal Grandmother         ovarian   • Heart attack Paternal Grandfather    • Heart attack Paternal Uncle    • Heart attack Paternal Uncle    • Heart attack Paternal Uncle    • Heart attack Paternal Uncle    • Heart attack Paternal Uncle    • Scleroderma Mother    • Hypertension Sister    • Malig Hyperthermia Neg Hx          SOCIAL HISTORY  Social History     Socioeconomic History   • Marital status:      Spouse name: BERENICE COFFEY    • Number of children: 2   • Years of education: HIGH SCHOOL    Tobacco Use   • Smoking status: Never Smoker   • Smokeless tobacco: Never Used   Substance and Sexual Activity   • Alcohol use: No   • Drug use: No   • Sexual activity: Defer         ALLERGIES  Cephalexin, Cephalosporins, and Penicillins        REVIEW OF SYSTEMS  Review of Systems     All systems reviewed and negative except for those discussed in HPI.       PHYSICAL EXAM    I have reviewed the triage vital signs and nursing notes.    ED Triage Vitals [07/23/22 0612]   Temp Heart Rate Resp BP SpO2   98.3 °F (36.8 °C) 87 18 158/90 96 %      Temp  src Heart Rate Source Patient Position BP Location FiO2 (%)   -- -- -- -- --       Physical Exam    Physical Exam   Constitutional: No distress.  Uncomfortable appearing but not overtly toxic.  HENT:  Head: Normocephalic and atraumatic.   Oropharynx: Mucous membranes are moist.   Eyes: No scleral icterus. No conjunctival pallor.  Neck: Painless range of motion noted. Neck supple.   Cardiovascular: Normal rate, regular rhythm and intact distal pulses.  Pulmonary/Chest: No respiratory distress. There are no wheezes, no rhonchi, and no rales.   Abdominal: Soft. There is no tenderness. There is no rebound and no guarding.   Musculoskeletal: Moves all extremities equally. There is no pedal edema or calf tenderness.  No midline tenderness to palpation of the thoracic or lumbar spine.  Neurological: Alert.  Baseline strength and sensation noted.   Skin: Skin is pink, warm, and dry. No pallor.   Psychiatric: Mood and affect normal.   Nursing note and vitals reviewed.    LAB RESULTS  Recent Results (from the past 24 hour(s))   Comprehensive Metabolic Panel    Collection Time: 07/23/22  8:03 AM    Specimen: Blood   Result Value Ref Range    Glucose 137 (H) 65 - 99 mg/dL    BUN 15 8 - 23 mg/dL    Creatinine 1.28 (H) 0.57 - 1.00 mg/dL    Sodium 142 136 - 145 mmol/L    Potassium 4.2 3.5 - 5.2 mmol/L    Chloride 106 98 - 107 mmol/L    CO2 24.1 22.0 - 29.0 mmol/L    Calcium 9.9 8.6 - 10.5 mg/dL    Total Protein 7.3 6.0 - 8.5 g/dL    Albumin 4.10 3.50 - 5.20 g/dL    ALT (SGPT) 37 (H) 1 - 33 U/L    AST (SGOT) 26 1 - 32 U/L    Alkaline Phosphatase 80 39 - 117 U/L    Total Bilirubin 0.7 0.0 - 1.2 mg/dL    Globulin 3.2 gm/dL    A/G Ratio 1.3 g/dL    BUN/Creatinine Ratio 11.7 7.0 - 25.0    Anion Gap 11.9 5.0 - 15.0 mmol/L    eGFR 43.8 (L) >60.0 mL/min/1.73   Lipase    Collection Time: 07/23/22  8:03 AM    Specimen: Blood   Result Value Ref Range    Lipase 49 13 - 60 U/L   Lactic Acid, Plasma    Collection Time: 07/23/22  8:03 AM     Specimen: Blood   Result Value Ref Range    Lactate 2.5 (C) 0.5 - 2.0 mmol/L   Green Top (Gel)    Collection Time: 07/23/22  8:03 AM   Result Value Ref Range    Extra Tube Hold for add-ons.    Lavender Top    Collection Time: 07/23/22  8:03 AM   Result Value Ref Range    Extra Tube hold for add-on    Gold Top - SST    Collection Time: 07/23/22  8:03 AM   Result Value Ref Range    Extra Tube Hold for add-ons.    Light Blue Top    Collection Time: 07/23/22  8:03 AM   Result Value Ref Range    Extra Tube Hold for add-ons.    CBC Auto Differential    Collection Time: 07/23/22  8:03 AM    Specimen: Blood   Result Value Ref Range    WBC 7.52 3.40 - 10.80 10*3/mm3    RBC 4.26 3.77 - 5.28 10*6/mm3    Hemoglobin 13.7 12.0 - 15.9 g/dL    Hematocrit 38.4 34.0 - 46.6 %    MCV 90.1 79.0 - 97.0 fL    MCH 32.2 26.6 - 33.0 pg    MCHC 35.7 31.5 - 35.7 g/dL    RDW 13.4 12.3 - 15.4 %    RDW-SD 43.9 37.0 - 54.0 fl    MPV 9.3 6.0 - 12.0 fL    Platelets 155 140 - 450 10*3/mm3    Neutrophil % 74.5 42.7 - 76.0 %    Lymphocyte % 17.6 (L) 19.6 - 45.3 %    Monocyte % 5.3 5.0 - 12.0 %    Eosinophil % 1.9 0.3 - 6.2 %    Basophil % 0.4 0.0 - 1.5 %    Immature Grans % 0.3 0.0 - 0.5 %    Neutrophils, Absolute 5.61 1.70 - 7.00 10*3/mm3    Lymphocytes, Absolute 1.32 0.70 - 3.10 10*3/mm3    Monocytes, Absolute 0.40 0.10 - 0.90 10*3/mm3    Eosinophils, Absolute 0.14 0.00 - 0.40 10*3/mm3    Basophils, Absolute 0.03 0.00 - 0.20 10*3/mm3    Immature Grans, Absolute 0.02 0.00 - 0.05 10*3/mm3    nRBC 0.1 0.0 - 0.2 /100 WBC   Urinalysis With Microscopic If Indicated (No Culture) - Urine, Clean Catch    Collection Time: 07/23/22  8:11 AM    Specimen: Urine, Clean Catch   Result Value Ref Range    Color, UA Yellow Yellow, Straw    Appearance, UA Cloudy (A) Clear    pH, UA <=5.0 5.0 - 8.0    Specific Gravity, UA 1.019 1.005 - 1.030    Glucose, UA Negative Negative    Ketones, UA Negative Negative    Bilirubin, UA Negative Negative    Blood, UA Moderate (2+)  (A) Negative    Protein, UA Trace (A) Negative    Leuk Esterase, UA Negative Negative    Nitrite, UA Negative Negative    Urobilinogen, UA 0.2 E.U./dL 0.2 - 1.0 E.U./dL   Urinalysis, Microscopic Only - Urine, Clean Catch    Collection Time: 07/23/22  8:11 AM    Specimen: Urine, Clean Catch   Result Value Ref Range    RBC, UA 6-12 (A) None Seen, 0-2 /HPF    WBC, UA 0-2 None Seen, 0-2 /HPF    Bacteria, UA Trace (A) None Seen /HPF    Squamous Epithelial Cells, UA 3-6 (A) None Seen, 0-2 /HPF    Hyaline Casts, UA None Seen None Seen /LPF    Uric Acid Crystals, UA Small/1+ None Seen /HPF    Methodology Manual Light Microscopy        Ordered the above labs and independently reviewed the results.        RADIOLOGY  CT Abdomen Pelvis Stone Protocol    Result Date: 7/23/2022  CT SCAN OF THE ABDOMEN AND PELVIS WITHOUT CONTRAST  HISTORY: Renal stones. Left flank pain.  FINDINGS: The CT scan was performed as an emergency procedure through the abdomen and pelvis without contrast. This is compared to previous CT scans of the abdomen and pelvis dated 04/21/2018. The following findings are present: 1. There are multiple stones clustered in a left lower pole calyceal system similar to the study of 04/21/2018. There is a staghorn calculus in an upper pole calyx that is a new finding. There is a large ovoid stone in the left renal pelvis that shows enlargement and measures up to 1.0 x 2.4 cm. There are also multiple stone fragments lined up in the proximal left ureter at or below the ureteral pelvic junction with the largest stone more inferiorly measuring up to 8 mm. There is a 4 to 5 mm stone that appears to be located in a decompressed urinary bladder just beyond the left ureterovesical junction. There is associated mild to moderate left renal obstruction and the patient also had some degree of obstruction in 2018. 2. There are several very small nonobstructing stones in the right kidney unchanged. 3. There is some minimal pleural  reaction and a pleural-based nodular density at the left lung base posteriorly that is unchanged from 2018. The liver, spleen, pancreas, and both adrenal glands are unremarkable without intravenous contrast. There is a very small right adrenal nodule measuring 10 mm which is unchanged. The gallbladder has been removed. 4. There is no aortic aneurysm or retroperitoneal lymphadenopathy. The large and small bowel loops are normal in caliber. The remainder of the pelvis is unremarkable.  Radiation dose reduction techniques were utilized, including automated exposure control and exposure modulation based on body size.         I ordered the above noted radiological studies. Reviewed by me and discussed with radiologist.  See dictation for official radiology interpretation.      PROCEDURES    Procedures        MEDICATIONS GIVEN IN ER    Medications   sodium chloride 0.9 % flush 10 mL (has no administration in time range)   sodium chloride 0.9 % bolus 500 mL (500 mL Intravenous New Bag 7/23/22 0821)     Followed by   sodium chloride 0.9 % infusion (has no administration in time range)   HYDROcodone-acetaminophen (NORCO) 5-325 MG per tablet 1 tablet (has no administration in time range)   morphine injection 4 mg (4 mg Intravenous Given 7/23/22 0818)   ketorolac (TORADOL) injection 15 mg (15 mg Intravenous Given 7/23/22 0817)   ondansetron (ZOFRAN) injection 4 mg (4 mg Intravenous Given 7/23/22 0816)         PROGRESS, DATA ANALYSIS, CONSULTS, AND MEDICAL DECISION MAKING    My differential diagnosis for abdominal pain includes but is not limited to:  Gastritis, gastroenteritis, peptic ulcer disease, GERD, esophageal perforation, acute appendicitis, mesenteric adenitis, Meckel’s diverticulum, epiploic appendagitis, diverticulitis, colon cancer, ulcerative colitis, Crohn’s disease, intussusception, small bowel obstruction, adhesions, ischemic bowel, perforated viscus, ileus, obstipation, biliary colic, cholecystitis,  cholelithiasis, Tadeo-Dada Reynaldo, hepatitis, pancreatitis, common bile duct obstruction, cholangitis, bile leak, splenic trauma, splenic rupture, splenic infarction, splenic abscess, abdominal abscess, ascites, spontaneous bacterial peritonitis, hernia, UTI, cystitis,ureterolithiasis, urinary obstruction, ovarian cyst, torsion, pregnancy, ectopic pregnancy, PID, pelvic abscess, mittelschmerz, endometriosis, AAA, myocardial infarction, pneumonia, cancer, porphyria, DKA, medications, sickle cell, viral syndrome, zoster      All labs have been independently reviewed by me.  All radiology studies have been reviewed by me and discussed with radiologist dictating the report.   EKG's independently viewed and interpreted by me.  Discussion below represents my analysis of pertinent findings related to patient's condition, differential diagnosis, treatment plan and final disposition.      ED Course as of 07/23/22 1009   Sat Jul 23, 2022   0907 CT abdomen pelvis reviewed with radiologist.  Both intrarenal and some proximal intraureteral calculi on the left with mild obstructive effect. [RS]   0907 Leukocytes, UA: Negative [RS]   0907 Nitrite, UA: Negative [RS]   0907 Lactate(!!): 2.5  No clear evidence of sepsis at this time to explain lactic acidosis. [RS]   0907 Creatinine(!): 1.28  Minimally worsened CKD [RS]   0907 Sodium: 142 [RS]   0908 Potassium: 4.2 [RS]   0908 WBC: 7.52 [RS]   0908 Hemoglobin: 13.7 [RS]   0934 CT result as well as labs reviewed with patient.  Her pain is now 4 out of 10.  Unlikely that she will pass any of the stones.  Plan consultation with urology and hospital admission for pain control.  Patient agreeable. [RS]   0959 CONSULT        Provider: Dr. Mishra - Urology    Discussion: Reviewed patient history, ED presentation and evaluation.  Would feel very comfortable with this patient going home with pain control and close outpatient follow-up in the office.    Agreeable c treatment and planned  disposition.         [RS]   1006 Reviewed recommendations with patient who is agreeable with plan for discharge. [RS]   1007 Alexandr query complete. Treatment plan to include limited course of prescribed controlled substance.  Risks including addiction, tolerance, sedation, benefits and alternatives presented to patient. [RS]      ED Course User Index  [RS] Johnny Monte MD       AS OF 10:09 EDT VITALS:    BP - 166/87  HR - 87  TEMP - 98.3 °F (36.8 °C)  O2 SATS - 96%        DIAGNOSIS  Final diagnoses:   Acute left flank pain   Ureterolithiasis   Hydronephrosis, left   Chronic kidney disease, unspecified CKD stage   Lactic acidosis         DISPOSITION  DISCHARGE    Patient discharged in stable condition.    Reviewed implications of results, diagnosis, meds, responsibility to follow up, warning signs and symptoms of possible worsening, potential complications and reasons to return to ER.    Patient/Family voiced understanding of above instructions.    Discussed plan for discharge, as there is no emergent indication for admission. Patient referred to primary care provider for regular health maintenance. Pt/family is agreeable and understands need for follow up and possible repeat testing.  Pt is aware that discharge does not mean that nothing is wrong but it indicates no emergency is present that requires admission and they must continue care with follow-up as given below or physician of their choice.     FOLLOW-UP  Moe Matute MD  [address] [facility] [address] [phone]    Schedule an appointment as soon as possible for a visit   As needed    Miguel Monte MD  [address] [facility] [id number] [phone]    Schedule an appointment as soon as possible for a visit in 2 days           Medication List      New Prescriptions    HYDROcodone-acetaminophen 5-325 MG per tablet  Commonly known as: NORCO  Take 1 tab by mouth every 6 hours as needed for pain     ondansetron ODT 4 MG  disintegrating tablet  Commonly known as: Zofran ODT  Take one tablet by mouth every 6 hours as needed for nausea and vomiting           Where to Get Your Medications      These medications were sent to [facility] [address] [place] [address] - [address] [facility] - [phone]  - [phone]   [address] [place], [place] [id number]    Phone: [phone]   · HYDROcodone-acetaminophen 5-325 MG per tablet  · ondansetron ODT 4 MG disintegrating tablet            Johnny Monte MD  07/23/22 8874

## 2022-07-23 NOTE — ED TRIAGE NOTES
Pt to triage from home via PV with c/o lower left back pain that started around 0300. Pt has a history of kidney stones. Pt complains of nausea and pain is 10/10. Pt to triage with mask on along with nursing staff.

## 2022-07-24 LAB — BACTERIA SPEC AEROBE CULT: NORMAL

## 2022-07-25 ENCOUNTER — TRANSCRIBE ORDERS (OUTPATIENT)
Dept: ADMINISTRATIVE | Facility: HOSPITAL | Age: 77
End: 2022-07-25

## 2022-07-25 ENCOUNTER — HOSPITAL ENCOUNTER (OUTPATIENT)
Dept: GENERAL RADIOLOGY | Facility: HOSPITAL | Age: 77
Discharge: HOME | End: 2022-07-25
Admitting: UROLOGY

## 2022-07-25 DIAGNOSIS — N20.0 CALCULUS, RENAL: Primary | ICD-10-CM

## 2022-07-25 DIAGNOSIS — N20.0 CALCULUS, RENAL: ICD-10-CM

## 2022-07-25 PROCEDURE — 74018 RADEX ABDOMEN 1 VIEW: CPT

## 2022-07-28 ENCOUNTER — PRE-ADMISSION TESTING (OUTPATIENT)
Dept: PREADMISSION TESTING | Facility: HOSPITAL | Age: 77
End: 2022-07-28

## 2022-07-28 VITALS
SYSTOLIC BLOOD PRESSURE: 179 MMHG | DIASTOLIC BLOOD PRESSURE: 86 MMHG | RESPIRATION RATE: 18 BRPM | OXYGEN SATURATION: 95 % | HEART RATE: 79 BPM | WEIGHT: 241 LBS | HEIGHT: 63 IN | BODY MASS INDEX: 42.7 KG/M2 | TEMPERATURE: 98.2 F

## 2022-07-28 LAB
QT INTERVAL: 392 MS
SARS-COV-2 ORF1AB RESP QL NAA+PROBE: NOT DETECTED

## 2022-07-28 PROCEDURE — 93005 ELECTROCARDIOGRAM TRACING: CPT

## 2022-07-28 PROCEDURE — U0004 COV-19 TEST NON-CDC HGH THRU: HCPCS

## 2022-07-28 PROCEDURE — C9803 HOPD COVID-19 SPEC COLLECT: HCPCS

## 2022-07-28 PROCEDURE — 93010 ELECTROCARDIOGRAM REPORT: CPT | Performed by: INTERNAL MEDICINE

## 2022-07-28 RX ORDER — CEFAZOLIN SODIUM 2 G/100ML
2 INJECTION, SOLUTION INTRAVENOUS ONCE
Status: CANCELLED | OUTPATIENT
Start: 2022-07-28 | End: 2022-07-28

## 2022-07-29 ENCOUNTER — HOSPITAL ENCOUNTER (OUTPATIENT)
Facility: HOSPITAL | Age: 77
Setting detail: HOSPITAL OUTPATIENT SURGERY
Discharge: HOME | End: 2022-07-29
Attending: UROLOGY | Admitting: UROLOGY

## 2022-07-29 ENCOUNTER — ANESTHESIA (OUTPATIENT)
Dept: PERIOP | Facility: HOSPITAL | Age: 77
End: 2022-07-29

## 2022-07-29 ENCOUNTER — ANESTHESIA EVENT (OUTPATIENT)
Dept: PERIOP | Facility: HOSPITAL | Age: 77
End: 2022-07-29

## 2022-07-29 VITALS
OXYGEN SATURATION: 97 % | HEIGHT: 63 IN | RESPIRATION RATE: 18 BRPM | HEART RATE: 71 BPM | BODY MASS INDEX: 42.88 KG/M2 | WEIGHT: 242 LBS | TEMPERATURE: 97.6 F | SYSTOLIC BLOOD PRESSURE: 147 MMHG | DIASTOLIC BLOOD PRESSURE: 78 MMHG

## 2022-07-29 DIAGNOSIS — N20.0 RENAL CALCULUS, LEFT: Primary | ICD-10-CM

## 2022-07-29 PROCEDURE — 25010000002 PROPOFOL 10 MG/ML EMULSION: Performed by: NURSE ANESTHETIST, CERTIFIED REGISTERED

## 2022-07-29 PROCEDURE — 25010000002 SUCCINYLCHOLINE PER 20 MG: Performed by: NURSE ANESTHETIST, CERTIFIED REGISTERED

## 2022-07-29 PROCEDURE — C2617 STENT, NON-COR, TEM W/O DEL: HCPCS | Performed by: UROLOGY

## 2022-07-29 PROCEDURE — 25010000002 IOPAMIDOL 61 % SOLUTION: Performed by: UROLOGY

## 2022-07-29 PROCEDURE — 25010000002 ONDANSETRON PER 1 MG: Performed by: NURSE ANESTHETIST, CERTIFIED REGISTERED

## 2022-07-29 PROCEDURE — 25010000002 DEXAMETHASONE PER 1 MG: Performed by: NURSE ANESTHETIST, CERTIFIED REGISTERED

## 2022-07-29 PROCEDURE — C1758 CATHETER, URETERAL: HCPCS | Performed by: UROLOGY

## 2022-07-29 PROCEDURE — 63710000001 PROMETHAZINE PER 25 MG: Performed by: NURSE ANESTHETIST, CERTIFIED REGISTERED

## 2022-07-29 PROCEDURE — 25010000002 FENTANYL CITRATE (PF) 50 MCG/ML SOLUTION: Performed by: ANESTHESIOLOGY

## 2022-07-29 PROCEDURE — 25010000002 DROPERIDOL PER 5 MG: Performed by: ANESTHESIOLOGY

## 2022-07-29 PROCEDURE — C1769 GUIDE WIRE: HCPCS | Performed by: UROLOGY

## 2022-07-29 PROCEDURE — 25010000002 LEVOFLOXACIN PER 250 MG: Performed by: UROLOGY

## 2022-07-29 PROCEDURE — 25010000002 PHENYLEPHRINE 10 MG/ML SOLUTION: Performed by: NURSE ANESTHETIST, CERTIFIED REGISTERED

## 2022-07-29 DEVICE — URETERAL STENT
Type: IMPLANTABLE DEVICE | Site: URETER | Status: FUNCTIONAL
Brand: PERCUFLEX™ PLUS

## 2022-07-29 RX ORDER — DROPERIDOL 2.5 MG/ML
0.62 INJECTION, SOLUTION INTRAMUSCULAR; INTRAVENOUS ONCE
Status: COMPLETED | OUTPATIENT
Start: 2022-07-29 | End: 2022-07-29

## 2022-07-29 RX ORDER — FLUMAZENIL 0.1 MG/ML
0.2 INJECTION INTRAVENOUS AS NEEDED
Status: DISCONTINUED | OUTPATIENT
Start: 2022-07-29 | End: 2022-07-29 | Stop reason: HOSPADM

## 2022-07-29 RX ORDER — NALOXONE HCL 0.4 MG/ML
0.2 VIAL (ML) INJECTION AS NEEDED
Status: DISCONTINUED | OUTPATIENT
Start: 2022-07-29 | End: 2022-07-29 | Stop reason: HOSPADM

## 2022-07-29 RX ORDER — PROMETHAZINE HYDROCHLORIDE 25 MG/1
25 TABLET ORAL ONCE AS NEEDED
Status: COMPLETED | OUTPATIENT
Start: 2022-07-29 | End: 2022-07-29

## 2022-07-29 RX ORDER — LABETALOL HYDROCHLORIDE 5 MG/ML
5 INJECTION, SOLUTION INTRAVENOUS
Status: DISCONTINUED | OUTPATIENT
Start: 2022-07-29 | End: 2022-07-29 | Stop reason: HOSPADM

## 2022-07-29 RX ORDER — PHENYLEPHRINE HYDROCHLORIDE 10 MG/ML
INJECTION INTRAVENOUS AS NEEDED
Status: DISCONTINUED | OUTPATIENT
Start: 2022-07-29 | End: 2022-07-29 | Stop reason: SURG

## 2022-07-29 RX ORDER — LIDOCAINE HYDROCHLORIDE 10 MG/ML
0.5 INJECTION, SOLUTION EPIDURAL; INFILTRATION; INTRACAUDAL; PERINEURAL ONCE AS NEEDED
Status: DISCONTINUED | OUTPATIENT
Start: 2022-07-29 | End: 2022-07-29 | Stop reason: HOSPADM

## 2022-07-29 RX ORDER — LEVOFLOXACIN 5 MG/ML
500 INJECTION, SOLUTION INTRAVENOUS EVERY 24 HOURS
Status: COMPLETED | OUTPATIENT
Start: 2022-07-29 | End: 2022-07-29

## 2022-07-29 RX ORDER — FAMOTIDINE 10 MG/ML
20 INJECTION, SOLUTION INTRAVENOUS ONCE
Status: COMPLETED | OUTPATIENT
Start: 2022-07-29 | End: 2022-07-29

## 2022-07-29 RX ORDER — ONDANSETRON 2 MG/ML
INJECTION INTRAMUSCULAR; INTRAVENOUS AS NEEDED
Status: DISCONTINUED | OUTPATIENT
Start: 2022-07-29 | End: 2022-07-29 | Stop reason: SURG

## 2022-07-29 RX ORDER — HYDROMORPHONE HYDROCHLORIDE 1 MG/ML
0.5 INJECTION, SOLUTION INTRAMUSCULAR; INTRAVENOUS; SUBCUTANEOUS
Status: DISCONTINUED | OUTPATIENT
Start: 2022-07-29 | End: 2022-07-29 | Stop reason: HOSPADM

## 2022-07-29 RX ORDER — SODIUM CHLORIDE, SODIUM LACTATE, POTASSIUM CHLORIDE, CALCIUM CHLORIDE 600; 310; 30; 20 MG/100ML; MG/100ML; MG/100ML; MG/100ML
9 INJECTION, SOLUTION INTRAVENOUS CONTINUOUS
Status: DISCONTINUED | OUTPATIENT
Start: 2022-07-29 | End: 2022-07-29 | Stop reason: HOSPADM

## 2022-07-29 RX ORDER — DIPHENHYDRAMINE HYDROCHLORIDE 50 MG/ML
12.5 INJECTION INTRAMUSCULAR; INTRAVENOUS
Status: DISCONTINUED | OUTPATIENT
Start: 2022-07-29 | End: 2022-07-29 | Stop reason: HOSPADM

## 2022-07-29 RX ORDER — IBUPROFEN 600 MG/1
600 TABLET ORAL ONCE AS NEEDED
Status: DISCONTINUED | OUTPATIENT
Start: 2022-07-29 | End: 2022-07-29 | Stop reason: HOSPADM

## 2022-07-29 RX ORDER — SODIUM CHLORIDE 0.9 % (FLUSH) 0.9 %
3-10 SYRINGE (ML) INJECTION AS NEEDED
Status: DISCONTINUED | OUTPATIENT
Start: 2022-07-29 | End: 2022-07-29 | Stop reason: HOSPADM

## 2022-07-29 RX ORDER — SODIUM CHLORIDE 0.9 % (FLUSH) 0.9 %
3 SYRINGE (ML) INJECTION EVERY 12 HOURS SCHEDULED
Status: DISCONTINUED | OUTPATIENT
Start: 2022-07-29 | End: 2022-07-29 | Stop reason: HOSPADM

## 2022-07-29 RX ORDER — HYDROCODONE BITARTRATE AND ACETAMINOPHEN 7.5; 325 MG/1; MG/1
1 TABLET ORAL EVERY 6 HOURS PRN
Qty: 20 TABLET | Refills: 0 | Status: SHIPPED | OUTPATIENT
Start: 2022-07-29 | End: 2022-10-21

## 2022-07-29 RX ORDER — DIPHENHYDRAMINE HCL 25 MG
25 CAPSULE ORAL
Status: DISCONTINUED | OUTPATIENT
Start: 2022-07-29 | End: 2022-07-29 | Stop reason: HOSPADM

## 2022-07-29 RX ORDER — OXYCODONE AND ACETAMINOPHEN 7.5; 325 MG/1; MG/1
1 TABLET ORAL EVERY 4 HOURS PRN
Status: DISCONTINUED | OUTPATIENT
Start: 2022-07-29 | End: 2022-07-29 | Stop reason: HOSPADM

## 2022-07-29 RX ORDER — SUCCINYLCHOLINE CHLORIDE 20 MG/ML
INJECTION INTRAMUSCULAR; INTRAVENOUS AS NEEDED
Status: DISCONTINUED | OUTPATIENT
Start: 2022-07-29 | End: 2022-07-29 | Stop reason: SURG

## 2022-07-29 RX ORDER — SULFAMETHOXAZOLE AND TRIMETHOPRIM 800; 160 MG/1; MG/1
1 TABLET ORAL 2 TIMES DAILY
Qty: 6 TABLET | Refills: 0 | Status: ON HOLD | OUTPATIENT
Start: 2022-07-29 | End: 2022-08-12 | Stop reason: SDUPTHER

## 2022-07-29 RX ORDER — LIDOCAINE HYDROCHLORIDE 20 MG/ML
INJECTION, SOLUTION INFILTRATION; PERINEURAL AS NEEDED
Status: DISCONTINUED | OUTPATIENT
Start: 2022-07-29 | End: 2022-07-29 | Stop reason: SURG

## 2022-07-29 RX ORDER — EPHEDRINE SULFATE 50 MG/ML
5 INJECTION, SOLUTION INTRAVENOUS ONCE AS NEEDED
Status: DISCONTINUED | OUTPATIENT
Start: 2022-07-29 | End: 2022-07-29 | Stop reason: HOSPADM

## 2022-07-29 RX ORDER — PROMETHAZINE HYDROCHLORIDE 25 MG/1
25 SUPPOSITORY RECTAL ONCE AS NEEDED
Status: COMPLETED | OUTPATIENT
Start: 2022-07-29 | End: 2022-07-29

## 2022-07-29 RX ORDER — HYDRALAZINE HYDROCHLORIDE 20 MG/ML
5 INJECTION INTRAMUSCULAR; INTRAVENOUS
Status: DISCONTINUED | OUTPATIENT
Start: 2022-07-29 | End: 2022-07-29 | Stop reason: HOSPADM

## 2022-07-29 RX ORDER — MAGNESIUM HYDROXIDE 1200 MG/15ML
LIQUID ORAL AS NEEDED
Status: DISCONTINUED | OUTPATIENT
Start: 2022-07-29 | End: 2022-07-29 | Stop reason: HOSPADM

## 2022-07-29 RX ORDER — DEXAMETHASONE SODIUM PHOSPHATE 10 MG/ML
INJECTION INTRAMUSCULAR; INTRAVENOUS AS NEEDED
Status: DISCONTINUED | OUTPATIENT
Start: 2022-07-29 | End: 2022-07-29 | Stop reason: SURG

## 2022-07-29 RX ORDER — PROPOFOL 10 MG/ML
VIAL (ML) INTRAVENOUS AS NEEDED
Status: DISCONTINUED | OUTPATIENT
Start: 2022-07-29 | End: 2022-07-29 | Stop reason: SURG

## 2022-07-29 RX ORDER — HYDROCODONE BITARTRATE AND ACETAMINOPHEN 7.5; 325 MG/1; MG/1
1 TABLET ORAL ONCE AS NEEDED
Status: DISCONTINUED | OUTPATIENT
Start: 2022-07-29 | End: 2022-07-29 | Stop reason: HOSPADM

## 2022-07-29 RX ORDER — ONDANSETRON 2 MG/ML
4 INJECTION INTRAMUSCULAR; INTRAVENOUS ONCE AS NEEDED
Status: COMPLETED | OUTPATIENT
Start: 2022-07-29 | End: 2022-07-29

## 2022-07-29 RX ORDER — FENTANYL CITRATE 50 UG/ML
50 INJECTION, SOLUTION INTRAMUSCULAR; INTRAVENOUS
Status: DISCONTINUED | OUTPATIENT
Start: 2022-07-29 | End: 2022-07-29 | Stop reason: HOSPADM

## 2022-07-29 RX ADMIN — PROPOFOL 70 MG: 10 INJECTION, EMULSION INTRAVENOUS at 09:24

## 2022-07-29 RX ADMIN — FAMOTIDINE 20 MG: 10 INJECTION INTRAVENOUS at 08:51

## 2022-07-29 RX ADMIN — ONDANSETRON 4 MG: 2 INJECTION INTRAMUSCULAR; INTRAVENOUS at 10:11

## 2022-07-29 RX ADMIN — LEVOFLOXACIN 500 MG: 500 INJECTION, SOLUTION INTRAVENOUS at 08:52

## 2022-07-29 RX ADMIN — SODIUM CHLORIDE, POTASSIUM CHLORIDE, SODIUM LACTATE AND CALCIUM CHLORIDE 9 ML/HR: 600; 310; 30; 20 INJECTION, SOLUTION INTRAVENOUS at 08:51

## 2022-07-29 RX ADMIN — ONDANSETRON 4 MG: 2 INJECTION INTRAMUSCULAR; INTRAVENOUS at 09:49

## 2022-07-29 RX ADMIN — SUCCINYLCHOLINE CHLORIDE 100 MG: 20 INJECTION, SOLUTION INTRAMUSCULAR; INTRAVENOUS; PARENTERAL at 09:11

## 2022-07-29 RX ADMIN — PROMETHAZINE HYDROCHLORIDE 25 MG: 25 TABLET ORAL at 12:08

## 2022-07-29 RX ADMIN — PHENYLEPHRINE HYDROCHLORIDE 50 MCG: 10 INJECTION, SOLUTION INTRAVENOUS at 09:38

## 2022-07-29 RX ADMIN — DEXAMETHASONE SODIUM PHOSPHATE 10 MG: 10 INJECTION INTRAMUSCULAR; INTRAVENOUS at 09:24

## 2022-07-29 RX ADMIN — FENTANYL CITRATE 50 MCG: 50 INJECTION INTRAMUSCULAR; INTRAVENOUS at 08:51

## 2022-07-29 RX ADMIN — PHENYLEPHRINE HYDROCHLORIDE 100 MCG: 10 INJECTION, SOLUTION INTRAVENOUS at 09:39

## 2022-07-29 RX ADMIN — LABETALOL HYDROCHLORIDE 5 MG: 5 INJECTION, SOLUTION INTRAVENOUS at 10:06

## 2022-07-29 RX ADMIN — PROPOFOL 30 MG: 10 INJECTION, EMULSION INTRAVENOUS at 09:10

## 2022-07-29 RX ADMIN — PHENYLEPHRINE HYDROCHLORIDE 50 MCG: 10 INJECTION, SOLUTION INTRAVENOUS at 09:44

## 2022-07-29 RX ADMIN — DROPERIDOL 0.62 MG: 2.5 INJECTION, SOLUTION INTRAMUSCULAR; INTRAVENOUS at 10:24

## 2022-07-29 RX ADMIN — LIDOCAINE HYDROCHLORIDE 60 MG: 20 INJECTION, SOLUTION INFILTRATION; PERINEURAL at 09:10

## 2022-07-29 NOTE — ANESTHESIA PREPROCEDURE EVALUATION
Anesthesia Evaluation     Patient summary reviewed and Nursing notes reviewed   history of anesthetic complications: PONV  NPO Solid Status: > 8 hours  NPO Liquid Status: > 2 hours           Airway   Mallampati: III  Possible difficult intubation  Dental      Comment: Risk of dental injury discussed with patient      Pulmonary - normal exam   (+) asthma,sleep apnea on CPAP,   Cardiovascular - normal exam    ECG reviewed    (+) GRAY,   (-) hypertension, CAD    ROS comment: Reviewed stress echo    Neuro/Psych  GI/Hepatic/Renal/Endo    (+) morbid obesity,  renal disease stones, thyroid problem hyperthyroidism  (-) diabetes    Musculoskeletal     Abdominal   (+) obese,    Substance History      OB/GYN          Other                      Anesthesia Plan    ASA 3     general     (I have reviewed all pertinent information including medical history,allergies, imaging, studies and laboratory results. I have explained risks and benefits to anesthesia, including but not limited to; dental damage, corneal abrasion, sore throat, nausea, vomiting, aspiration, nerve damage, failed block, MI,stroke and death. Patient has agreed to proceed. )    Anesthetic plan, risks, benefits, and alternatives have been provided, discussed and informed consent has been obtained with: patient.        CODE STATUS:

## 2022-07-29 NOTE — ANESTHESIA POSTPROCEDURE EVALUATION
Patient: Patt Bond    Procedure Summary     Date: 07/29/22 Room / Location: [facility] OR  / [facility] MAIN OR    Anesthesia Start: 0904 Anesthesia Stop: 1003    Procedure: CYSTOSCOPY, LEFT RETROGRADE PYELOGRAM, LEFT URETERAL STENT (Left ) Diagnosis:     Surgeons: Cedrick Jones MD Provider: Brandy Escobar MD    Anesthesia Type: general ASA Status: 3          Anesthesia Type: general    Vitals  Vitals Value Taken Time   /70 07/29/22 1116   Temp 36.4 °C (97.6 °F) 07/29/22 0959   Pulse 68 07/29/22 1116   Resp 16 07/29/22 1045   SpO2 96 % 07/29/22 1117   Vitals shown include unvalidated device data.        Post Anesthesia Care and Evaluation    Patient location during evaluation: bedside  Patient participation: complete - patient participated  Level of consciousness: awake and alert  Pain management: adequate    Airway patency: patent  Anesthetic complications: No anesthetic complications  PONV Status: controlled  Cardiovascular status: acceptable  Respiratory status: acceptable  Hydration status: acceptable

## 2022-07-29 NOTE — ANESTHESIA PROCEDURE NOTES
Airway  Urgency: elective    Date/Time: 7/29/2022 9:15 AM  Airway not difficult    General Information and Staff    Patient location during procedure: OR  CRNA/CAA: Cinthya Diego CRNA    Indications and Patient Condition    Preoxygenated: yes  Mask difficulty assessment: 1 - vent by mask    Final Airway Details  Final airway type: endotracheal airway      Successful airway: ETT  Cuffed: yes   Successful intubation technique: video laryngoscopy  Endotracheal tube insertion site: oral  Blade: CMAC  Blade size: D  ETT size (mm): 7.0  Cormack-Lehane Classification: grade I - full view of glottis  Placement verified by: chest auscultation and capnometry   Measured from: teeth  ETT/EBT  to teeth (cm): 21  Number of attempts at approach: 1  Assessment: lips, teeth, and gum same as pre-op and atraumatic intubation    Additional Comments  Teeth, tongue, lips, and gums in preop condition. VSS throughout. Easy mask/easy airway w/CMAC             Consent (Lip)/Introductory Paragraph: The rationale for Mohs was explained to the patient and consent was obtained. The risks, benefits and alternatives to therapy were discussed in detail. Specifically, the risks of lip deformity, changes in the oral aperture, infection, scarring, bleeding, prolonged wound healing, incomplete removal, allergy to anesthesia, nerve injury and recurrence were addressed. Prior to the procedure, the treatment site was clearly identified and confirmed by the patient. All components of Universal Protocol/PAUSE Rule completed.

## 2022-08-04 ENCOUNTER — HOSPITAL ENCOUNTER (OUTPATIENT)
Dept: GENERAL RADIOLOGY | Facility: HOSPITAL | Age: 77
Discharge: HOME | End: 2022-08-04
Admitting: UROLOGY

## 2022-08-04 DIAGNOSIS — N20.1 CALCULUS OF URETER: ICD-10-CM

## 2022-08-04 DIAGNOSIS — N20.0 CALCULUS, RENAL: ICD-10-CM

## 2022-08-04 PROCEDURE — 74018 RADEX ABDOMEN 1 VIEW: CPT

## 2022-08-05 ENCOUNTER — TRANSCRIBE ORDERS (OUTPATIENT)
Dept: ADMINISTRATIVE | Facility: HOSPITAL | Age: 77
End: 2022-08-05

## 2022-08-05 DIAGNOSIS — Z01.818 OTHER SPECIFIED PRE-OPERATIVE EXAMINATION: Primary | ICD-10-CM

## 2022-08-10 ENCOUNTER — LAB (OUTPATIENT)
Dept: LAB | Facility: HOSPITAL | Age: 77
End: 2022-08-10

## 2022-08-10 DIAGNOSIS — Z01.818 OTHER SPECIFIED PRE-OPERATIVE EXAMINATION: ICD-10-CM

## 2022-08-10 LAB — SARS-COV-2 ORF1AB RESP QL NAA+PROBE: NOT DETECTED

## 2022-08-10 PROCEDURE — U0004 COV-19 TEST NON-CDC HGH THRU: HCPCS

## 2022-08-10 PROCEDURE — C9803 HOPD COVID-19 SPEC COLLECT: HCPCS

## 2022-08-12 ENCOUNTER — APPOINTMENT (OUTPATIENT)
Dept: GENERAL RADIOLOGY | Facility: HOSPITAL | Age: 77
End: 2022-08-12

## 2022-08-12 ENCOUNTER — HOSPITAL ENCOUNTER (OUTPATIENT)
Facility: HOSPITAL | Age: 77
Setting detail: HOSPITAL OUTPATIENT SURGERY
Discharge: HOME | End: 2022-08-12
Attending: UROLOGY | Admitting: UROLOGY

## 2022-08-12 ENCOUNTER — ANESTHESIA EVENT (OUTPATIENT)
Dept: PERIOP | Facility: HOSPITAL | Age: 77
End: 2022-08-12

## 2022-08-12 ENCOUNTER — ANESTHESIA (OUTPATIENT)
Dept: PERIOP | Facility: HOSPITAL | Age: 77
End: 2022-08-12

## 2022-08-12 VITALS
SYSTOLIC BLOOD PRESSURE: 167 MMHG | OXYGEN SATURATION: 97 % | TEMPERATURE: 97.9 F | RESPIRATION RATE: 16 BRPM | HEART RATE: 66 BPM | DIASTOLIC BLOOD PRESSURE: 94 MMHG

## 2022-08-12 DIAGNOSIS — R10.9 ACUTE LEFT FLANK PAIN: ICD-10-CM

## 2022-08-12 DIAGNOSIS — N20.1 URETEROLITHIASIS: ICD-10-CM

## 2022-08-12 PROCEDURE — C2617 STENT, NON-COR, TEM W/O DEL: HCPCS | Performed by: UROLOGY

## 2022-08-12 PROCEDURE — 25010000002 GENTAMICIN PER 80 MG: Performed by: UROLOGY

## 2022-08-12 PROCEDURE — 25010000002 DEXAMETHASONE PER 1 MG: Performed by: ANESTHESIOLOGY

## 2022-08-12 PROCEDURE — 25010000002 PROPOFOL 10 MG/ML EMULSION: Performed by: ANESTHESIOLOGY

## 2022-08-12 PROCEDURE — 25010000002 ONDANSETRON PER 1 MG: Performed by: ANESTHESIOLOGY

## 2022-08-12 PROCEDURE — 25010000002 FENTANYL CITRATE (PF) 50 MCG/ML SOLUTION: Performed by: ANESTHESIOLOGY

## 2022-08-12 PROCEDURE — 76000 FLUOROSCOPY <1 HR PHYS/QHP: CPT | Performed by: UROLOGY

## 2022-08-12 PROCEDURE — 74018 RADEX ABDOMEN 1 VIEW: CPT | Performed by: UROLOGY

## 2022-08-12 PROCEDURE — 25010000002 MIDAZOLAM PER 1 MG: Performed by: ANESTHESIOLOGY

## 2022-08-12 DEVICE — URETERAL STENT
Type: IMPLANTABLE DEVICE | Site: VAGINA | Status: FUNCTIONAL
Brand: CONTOUR™

## 2022-08-12 RX ORDER — FENTANYL CITRATE 50 UG/ML
INJECTION, SOLUTION INTRAMUSCULAR; INTRAVENOUS AS NEEDED
Status: DISCONTINUED | OUTPATIENT
Start: 2022-08-12 | End: 2022-08-12 | Stop reason: SURG

## 2022-08-12 RX ORDER — LIDOCAINE HYDROCHLORIDE 10 MG/ML
0.5 INJECTION, SOLUTION EPIDURAL; INFILTRATION; INTRACAUDAL; PERINEURAL ONCE AS NEEDED
Status: DISCONTINUED | OUTPATIENT
Start: 2022-08-12 | End: 2022-08-12 | Stop reason: HOSPADM

## 2022-08-12 RX ORDER — HYDROCODONE BITARTRATE AND ACETAMINOPHEN 5; 325 MG/1; MG/1
TABLET ORAL
Qty: 15 TABLET | Refills: 0 | Status: SHIPPED | OUTPATIENT
Start: 2022-08-12 | End: 2022-10-21

## 2022-08-12 RX ORDER — FENTANYL CITRATE 50 UG/ML
INJECTION, SOLUTION INTRAMUSCULAR; INTRAVENOUS AS NEEDED
Status: DISCONTINUED | OUTPATIENT
Start: 2022-08-12 | End: 2022-08-12

## 2022-08-12 RX ORDER — DIPHENHYDRAMINE HCL 25 MG
25 CAPSULE ORAL
Status: DISCONTINUED | OUTPATIENT
Start: 2022-08-12 | End: 2022-08-12 | Stop reason: HOSPADM

## 2022-08-12 RX ORDER — EPHEDRINE SULFATE 50 MG/ML
5 INJECTION, SOLUTION INTRAVENOUS ONCE AS NEEDED
Status: DISCONTINUED | OUTPATIENT
Start: 2022-08-12 | End: 2022-08-12 | Stop reason: HOSPADM

## 2022-08-12 RX ORDER — HYDRALAZINE HYDROCHLORIDE 20 MG/ML
5 INJECTION INTRAMUSCULAR; INTRAVENOUS
Status: DISCONTINUED | OUTPATIENT
Start: 2022-08-12 | End: 2022-08-12 | Stop reason: HOSPADM

## 2022-08-12 RX ORDER — SODIUM CHLORIDE 0.9 % (FLUSH) 0.9 %
3-10 SYRINGE (ML) INJECTION AS NEEDED
Status: DISCONTINUED | OUTPATIENT
Start: 2022-08-12 | End: 2022-08-12 | Stop reason: HOSPADM

## 2022-08-12 RX ORDER — LIDOCAINE HYDROCHLORIDE 20 MG/ML
INJECTION, SOLUTION INFILTRATION; PERINEURAL AS NEEDED
Status: DISCONTINUED | OUTPATIENT
Start: 2022-08-12 | End: 2022-08-12 | Stop reason: SURG

## 2022-08-12 RX ORDER — ONDANSETRON 2 MG/ML
4 INJECTION INTRAMUSCULAR; INTRAVENOUS ONCE AS NEEDED
Status: DISCONTINUED | OUTPATIENT
Start: 2022-08-12 | End: 2022-08-12 | Stop reason: HOSPADM

## 2022-08-12 RX ORDER — FENTANYL CITRATE 50 UG/ML
50 INJECTION, SOLUTION INTRAMUSCULAR; INTRAVENOUS
Status: DISCONTINUED | OUTPATIENT
Start: 2022-08-12 | End: 2022-08-12 | Stop reason: HOSPADM

## 2022-08-12 RX ORDER — ONDANSETRON 2 MG/ML
INJECTION INTRAMUSCULAR; INTRAVENOUS AS NEEDED
Status: DISCONTINUED | OUTPATIENT
Start: 2022-08-12 | End: 2022-08-12 | Stop reason: SURG

## 2022-08-12 RX ORDER — DEXAMETHASONE SODIUM PHOSPHATE 10 MG/ML
INJECTION INTRAMUSCULAR; INTRAVENOUS AS NEEDED
Status: DISCONTINUED | OUTPATIENT
Start: 2022-08-12 | End: 2022-08-12 | Stop reason: SURG

## 2022-08-12 RX ORDER — PROPOFOL 10 MG/ML
VIAL (ML) INTRAVENOUS AS NEEDED
Status: DISCONTINUED | OUTPATIENT
Start: 2022-08-12 | End: 2022-08-12 | Stop reason: SURG

## 2022-08-12 RX ORDER — HYDROMORPHONE HYDROCHLORIDE 1 MG/ML
0.25 INJECTION, SOLUTION INTRAMUSCULAR; INTRAVENOUS; SUBCUTANEOUS
Status: DISCONTINUED | OUTPATIENT
Start: 2022-08-12 | End: 2022-08-12 | Stop reason: HOSPADM

## 2022-08-12 RX ORDER — MIDAZOLAM HYDROCHLORIDE 1 MG/ML
0.5 INJECTION INTRAMUSCULAR; INTRAVENOUS
Status: DISCONTINUED | OUTPATIENT
Start: 2022-08-12 | End: 2022-08-12 | Stop reason: HOSPADM

## 2022-08-12 RX ORDER — FAMOTIDINE 10 MG/ML
20 INJECTION, SOLUTION INTRAVENOUS ONCE
Status: COMPLETED | OUTPATIENT
Start: 2022-08-12 | End: 2022-08-12

## 2022-08-12 RX ORDER — SODIUM CHLORIDE 0.9 % (FLUSH) 0.9 %
3 SYRINGE (ML) INJECTION EVERY 12 HOURS SCHEDULED
Status: DISCONTINUED | OUTPATIENT
Start: 2022-08-12 | End: 2022-08-12 | Stop reason: HOSPADM

## 2022-08-12 RX ORDER — PROMETHAZINE HYDROCHLORIDE 25 MG/1
25 SUPPOSITORY RECTAL ONCE AS NEEDED
Status: DISCONTINUED | OUTPATIENT
Start: 2022-08-12 | End: 2022-08-12 | Stop reason: HOSPADM

## 2022-08-12 RX ORDER — DIPHENHYDRAMINE HYDROCHLORIDE 50 MG/ML
12.5 INJECTION INTRAMUSCULAR; INTRAVENOUS
Status: DISCONTINUED | OUTPATIENT
Start: 2022-08-12 | End: 2022-08-12 | Stop reason: HOSPADM

## 2022-08-12 RX ORDER — LABETALOL HYDROCHLORIDE 5 MG/ML
5 INJECTION, SOLUTION INTRAVENOUS
Status: DISCONTINUED | OUTPATIENT
Start: 2022-08-12 | End: 2022-08-12 | Stop reason: HOSPADM

## 2022-08-12 RX ORDER — FLUMAZENIL 0.1 MG/ML
0.2 INJECTION INTRAVENOUS AS NEEDED
Status: DISCONTINUED | OUTPATIENT
Start: 2022-08-12 | End: 2022-08-12 | Stop reason: HOSPADM

## 2022-08-12 RX ORDER — MAGNESIUM HYDROXIDE 1200 MG/15ML
LIQUID ORAL AS NEEDED
Status: DISCONTINUED | OUTPATIENT
Start: 2022-08-12 | End: 2022-08-12 | Stop reason: HOSPADM

## 2022-08-12 RX ORDER — HYDROCODONE BITARTRATE AND ACETAMINOPHEN 7.5; 325 MG/1; MG/1
1 TABLET ORAL ONCE AS NEEDED
Status: COMPLETED | OUTPATIENT
Start: 2022-08-12 | End: 2022-08-12

## 2022-08-12 RX ORDER — SODIUM CHLORIDE, SODIUM LACTATE, POTASSIUM CHLORIDE, CALCIUM CHLORIDE 600; 310; 30; 20 MG/100ML; MG/100ML; MG/100ML; MG/100ML
9 INJECTION, SOLUTION INTRAVENOUS CONTINUOUS
Status: DISCONTINUED | OUTPATIENT
Start: 2022-08-12 | End: 2022-08-12 | Stop reason: HOSPADM

## 2022-08-12 RX ORDER — NALOXONE HCL 0.4 MG/ML
0.2 VIAL (ML) INJECTION AS NEEDED
Status: DISCONTINUED | OUTPATIENT
Start: 2022-08-12 | End: 2022-08-12 | Stop reason: HOSPADM

## 2022-08-12 RX ORDER — PROMETHAZINE HYDROCHLORIDE 25 MG/1
25 TABLET ORAL ONCE AS NEEDED
Status: DISCONTINUED | OUTPATIENT
Start: 2022-08-12 | End: 2022-08-12 | Stop reason: HOSPADM

## 2022-08-12 RX ORDER — OXYCODONE AND ACETAMINOPHEN 7.5; 325 MG/1; MG/1
1 TABLET ORAL EVERY 4 HOURS PRN
Status: DISCONTINUED | OUTPATIENT
Start: 2022-08-12 | End: 2022-08-12 | Stop reason: HOSPADM

## 2022-08-12 RX ORDER — SULFAMETHOXAZOLE AND TRIMETHOPRIM 800; 160 MG/1; MG/1
1 TABLET ORAL 2 TIMES DAILY
Qty: 28 TABLET | Refills: 0 | Status: SHIPPED | OUTPATIENT
Start: 2022-08-12 | End: 2022-08-26

## 2022-08-12 RX ADMIN — SODIUM CHLORIDE, POTASSIUM CHLORIDE, SODIUM LACTATE AND CALCIUM CHLORIDE 9 ML/HR: 600; 310; 30; 20 INJECTION, SOLUTION INTRAVENOUS at 16:18

## 2022-08-12 RX ADMIN — MIDAZOLAM 0.5 MG: 1 INJECTION INTRAMUSCULAR; INTRAVENOUS at 16:25

## 2022-08-12 RX ADMIN — FENTANYL CITRATE 25 MCG: 50 INJECTION INTRAMUSCULAR; INTRAVENOUS at 18:04

## 2022-08-12 RX ADMIN — LIDOCAINE HYDROCHLORIDE 100 MG: 20 INJECTION, SOLUTION INFILTRATION; PERINEURAL at 17:32

## 2022-08-12 RX ADMIN — GENTAMICIN SULFATE 380 MG: 40 INJECTION, SOLUTION INTRAMUSCULAR; INTRAVENOUS at 17:13

## 2022-08-12 RX ADMIN — FAMOTIDINE 20 MG: 10 INJECTION INTRAVENOUS at 16:24

## 2022-08-12 RX ADMIN — MIDAZOLAM 0.5 MG: 1 INJECTION INTRAMUSCULAR; INTRAVENOUS at 16:40

## 2022-08-12 RX ADMIN — ONDANSETRON 4 MG: 2 INJECTION INTRAMUSCULAR; INTRAVENOUS at 17:32

## 2022-08-12 RX ADMIN — FENTANYL CITRATE 25 MCG: 50 INJECTION INTRAMUSCULAR; INTRAVENOUS at 17:37

## 2022-08-12 RX ADMIN — DEXAMETHASONE SODIUM PHOSPHATE 6 MG: 10 INJECTION INTRAMUSCULAR; INTRAVENOUS at 17:32

## 2022-08-12 RX ADMIN — PROPOFOL 150 MG: 10 INJECTION, EMULSION INTRAVENOUS at 17:32

## 2022-08-12 RX ADMIN — HYDROCODONE BITARTRATE AND ACETAMINOPHEN 1 TABLET: 7.5; 325 TABLET ORAL at 19:06

## 2022-08-12 NOTE — ANESTHESIA PREPROCEDURE EVALUATION
Anesthesia Evaluation     Patient summary reviewed and Nursing notes reviewed   history of anesthetic complications: PONV  NPO Solid Status: > 8 hours  NPO Liquid Status: > 2 hours           Airway   Mallampati: III  Possible difficult intubation  Dental      Comment: Risk of dental injury discussed with patient      Pulmonary - normal exam   (+) asthma,sleep apnea on CPAP,   Cardiovascular - normal exam    ECG reviewed    (+) GRAY,   (-) hypertension, CAD    ROS comment: Reviewed stress echo    Neuro/Psych  GI/Hepatic/Renal/Endo    (+) morbid obesity,  renal disease stones, thyroid problem hyperthyroidism  (-) diabetes    Musculoskeletal     Abdominal   (+) obese,    Substance History      OB/GYN          Other                          Anesthesia Plan    ASA 3     general       Anesthetic plan, risks, benefits, and alternatives have been provided, discussed and informed consent has been obtained with: patient.    Plan discussed with CRNA.        CODE STATUS:

## 2022-08-12 NOTE — ANESTHESIA POSTPROCEDURE EVALUATION
Patient: Patt Bond    Procedure Summary     Date: 08/12/22 Room / Location: [facility] OR  / [facility] MAIN OR    Anesthesia Start: 1720 Anesthesia Stop: 1830    Procedure: LEFT URETEROSCOPY LASER LITHOTRIPSY STONE BACKET EXTRACTION, STENT PLACEMENT (Left ) Diagnosis:       Renal calculi      (Left renal calculi)    Surgeons: Miguel Monte MD Provider: Augie Galindo MD    Anesthesia Type: general ASA Status: 3          Anesthesia Type: general    Vitals  Vitals Value Taken Time   /86 08/12/22 1914   Temp 36.6 °C (97.9 °F) 08/12/22 1914   Pulse 71 08/12/22 1914   Resp 16 08/12/22 1914   SpO2 95 % 08/12/22 1914           Post Anesthesia Care and Evaluation    Patient location during evaluation: PACU  Patient participation: complete - patient participated  Level of consciousness: awake and alert  Pain management: adequate    Airway patency: patent  Anesthetic complications: No anesthetic complications  PONV Status: controlled  Cardiovascular status: acceptable and hemodynamically stable  Respiratory status: acceptable  Hydration status: acceptable    Comments: /94   Pulse 66   Temp 36.6 °C (97.9 °F) (Oral)   Resp 16   SpO2 97%

## 2022-08-12 NOTE — OP NOTE
URETEROSCOPY LASER LITHOTRIPSY WITH STENT INSERTION  Procedure Note    Patt Bond  8/12/2022    Pre-op Diagnosis:   Left renal calculi    Post-op Diagnosis:     Post-Op Diagnosis Codes:     * Renal calculi [N20.0]    Procedure(s):  LEFT URETEROSCOPY LASER LITHOTRIPSY STONE BACKET EXTRACTION, STENT PLACEMENT    Surgeon(s):  Miguel Monte MD    Anesthesia: General    Staff:   Circulator: Stanley Nichols RN  Laser Staff: Nell Atkinson RN  Scrub Person: Cinthya Sultana; Geraldine Duncan    Estimated Blood Loss: minimal    Specimens:                * No orders in the log *      Drains:   Ureteral Drain/Stent Left ureter 4.8 Fr. (Active)   Site Assessment AMANDA 08/12/22 1826       [REMOVED] Ureteral Drain/Stent Left ureter 6 Fr. (Removed)       Findings: Left renal calculi with large left renal pelvic stone.    Complications: None apparent    Indications: 77-year-old female with large renal pelvic stone and several other calyceal stones that was unable to do shockwave lithotripsy due to her body habitus.  She has a stent in.  She now presents for left ureteroscopy.    Procedure: Was taken to the operative suite after Informed sent had been obtained.  Given LMA.  Lithotomy.  Prepped and draped in a sterile fashion.  Surgical timeout was performed.  Cystoscopy is performed.  Mild diffuse cystitis was noted.  The left stent was pulled out and a guidewire was passed up.  Flexible ureteroscopy was performed.  The ureter was largely within normal limits.  No obvious stones are seen within the ureter.  A large stone was encountered in the renal pelvis as well as a upper and lower pole stone.  242 µm holmium fiber was passed.  We began fragmenting the stone.  It was fairly tedious and long process due to the volume of stone but it broke up fairly well broken to small manageable pieces that began then irrigating down and around the pelvis.  I went about as far as I could tell visualization was next to impossible to  see anything and I did not see any large pieces remaining.  The guidewire was replaced.  A 7 Azeri by 24 cm stent was then placed left collecting system without a tether and she was awoken and taken to recovery in stable condition.      Miguel Monte MD     Date: 8/12/2022  Time: 18:43 EDT

## 2022-08-12 NOTE — H&P
[facility] Surgical History and Physical    Patient Care Team:  Moe Matute MD as PCP - General  Miguel Gautam MD as Consulting Physician (Cardiology)  Chalino Brooks MD as Consulting Physician (Endocrinology)  Nando Heller MD as Consulting Physician (Pulmonary Disease)    Chief complaint left flank pain    Subjective     Patient is a 77 y.o. female presents with recurrent uric acid stones.  She has an indwelling stent.  She now presents for left ureteroscopy.  She had attempted left ESWL but they could not get her stones in the focal point of the lithotripter..     Review of Systems   Pertinent items are noted in HPI    Past Medical History:   Diagnosis Date   • Asthma    • Back pain    • Diverticulitis    • Diverticulosis    • Goiter    • Hyperthyroidism     followed by Dr. Blackmon   • Joint pain, knee    • Kidney stones 2015    with cystoscopy by Dr. Miguel Monte done on 10/7/15, 9/9/15, 5/8/15, 9/21/07   • Mediastinal mass    • Obstructive pyelonephritis 09/28/2015    left obstructing pyelonephritis    • PONV (postoperative nausea and vomiting)    • Sleep apnea    • SOB (shortness of breath) on exertion      Past Surgical History:   Procedure Laterality Date   • BRONCHOSCOPY N/A 11/6/2017    Procedure: BRONCHOSCOPY WITH ENDOBRONCHIAL ULTRASOUND AND TRANSBRONCHIAL NEEDLE ASPIRATION;  Surgeon: Nando Heller MD;  Location: [facility] ENDOSCOPY;  Service:    • CARDIAC CATHETERIZATION N/A 9/27/2017    Procedure: Right Heart Cath;  Surgeon: Miguel Gautam MD;  Location: [facility] CATH INVASIVE LOCATION;  Service:    • CARDIAC CATHETERIZATION N/A 9/27/2017    Procedure: Left Heart Cath;  Surgeon: Miguel Gautam MD;  Location: [facility] CATH INVASIVE LOCATION;  Service:    • CARDIAC CATHETERIZATION N/A 9/27/2017    Procedure: Coronary angiography;  Surgeon: Miguel Gautam MD;  Location: [facility] CATH INVASIVE LOCATION;  Service:    • CARDIAC CATHETERIZATION N/A  9/27/2017    Procedure: Left ventriculography;  Surgeon: Miguel Gautam MD;  Location: [facility] INVASIVE LOCATION;  Service:    • CHOLECYSTECTOMY N/A 5/17/2017    Procedure: LAPAROSCOPIC CHOLECYSTECTOMY WITH IOC;  Surgeon: Patt Moore MD;  Location: [facility] OR;  Service:    • COLON RESECTION Left 9/14/2016    Laparoscopic Low Anterior Resection with splenic flexure mobilization, drainage of Pelvic Abscess (cultured 3+ E. Coli), Left salpingo-ophorectomy, laparoscopic rectopexy, and umbilical hernia repair, Dr. Patt Moore   • COLON RESECTION      VENTRAL HERNIA REPAIR   • COLONOSCOPY N/A 05/15/2008    sigmoid diverticulosis with blunting of the haustral folds and angulation consistent with previous diverticulitis, no polyps, suggestion of rectal prolapse-Dr. Patt Moore   • COLONOSCOPY W/ BIOPSIES AND POLYPECTOMY N/A 04/16/2001    Possible mild gastritis w/ some appearance of formations that look like petechiae in the antrum, no ulcerations, no erosions; cecal polyp approx 4mm sessile; probable transverse colon polyp, although we could not visualize this completely upon pulling out; sigmoid diverticula; multiple rectal polyps, mostly w/ appearance of hyperplasia, largest being 5 to 6mm: removed via snare-Dr. Patt Moore   • COLONOSCOPY W/ POLYPECTOMY N/A 12/10/2004    Diverticulosis with sigmoid perideverticulitis with erythema and patchiness around some of the diverticula, proximal ascedning colon polyp-approx 5 mm-removed via snare cauter, two distal ascending colon polyps-7 mm and 3 mm-removed via snare cautery polypectomy, hemorrhoids-Dr. Patt Moore   • CYSTOSCOPY W/ URETERAL STENT PLACEMENT Left 4/21/2018    Procedure: CYSTOSCOPY, LEFT RETROGRADE WITH LEFT URETERAL STENT INSERTION;  Surgeon: Stanley Osuna MD;  Location: [facility] OR;  Service: Urology   • CYSTOSCOPY W/ URETERAL STENT REMOVAL Left 10/07/2015    Cystoscopy with stent extraction, left ureteral occulusion balloon  placement, percutanous nephrostolithotomy with stone volume less than 2.5 cm involving the left lower pole and the left proximal ureter, balloon tract dilation for establishment of nephrostomy tract, antegrade nephrostomy tube placement-Dr. Miguel Monte   • CYSTOSCOPY, RETROGRADE PYELOGRAM AND STENT INSERTION Left 09/28/2015    Left cystoscopy with left retrograde pyelogram, left double-J stent placement-Dr. Rivera [facility]   • CYSTOSCOPY, RETROGRADE PYELOGRAM AND STENT INSERTION Left 09/09/2015    Cystoscopy with bilateral retrograde pyelogram, left double-J stent placement, right ureteral pyeloscopy with laser lithotripsy of the ureteropelvic junction calculus, right double-J stent placement-Dr. Miguel Monte   • CYSTOSCOPY, RETROGRADE PYELOGRAM AND STENT INSERTION Right 09/21/2007    Cystoscopy with right retrograde pyelogram, right biliary stent placement-Dr. Miguel Monte   • CYSTOSCOPY, RETROGRADE PYELOGRAM AND STENT INSERTION Right 09/07/2007    Cystoscopy with right retrograde pyelogram, right ureteral peyloscopy with extraction distal ureteral mass, right double J stent placement-Dr. Miguel Monte   • CYSTOSCOPY, RETROGRADE PYELOGRAM AND STENT INSERTION Left 7/29/2022    Procedure: CYSTOSCOPY, LEFT RETROGRADE PYELOGRAM, LEFT URETERAL STENT;  Surgeon: Cedrick Jones MD;  Location: [facility];  Service: Urology;  Laterality: Left;   • CYSTOSCOPY, URETEROSCOPY, RETROGRADE PYELOGRAM, STENT INSERTION Right 09/07/2007    Cystoscopy with right retrograde pyelogram, rigth ureteroscopy with extraction of distal mass, right double J stent placement-Dr. Miguel Monte   • D & C HYSTEROSCOPY N/A 05/18/2011    Procedure done due to thickened endomentrium and an endometrial polyp-Dr. Quita Cheatham   • DILATATION AND CURETTAGE N/A 03/22/2002    D&C, polyp removal-Dr. Quita Cheatham   • EXTRACORPOREAL SHOCKWAVE LITHOTRIPSY (ESWL), STENT INSERTION/REMOVAL Left 05/08/2015    Left extracoporeal  shockwave lithotripsy, cystoscopy with stent placement-Dr. Miguel Monte   • MEDIASTINOTOMY Left 3/5/2018    Procedure: left thyroidectomy, resection of substernal thyroid goiter cervical approach;  Surgeon: Quintin Mares III, MD;  Location: [facility] MAIN OR;  Service:    • SIGMOIDOSCOPY N/A 9/13/2016    Procedure: SIGMOIDOSCOPY FLEXIBLE TO 25 CM;  Surgeon: Patt Moore MD;  Location: [facility] ENDOSCOPY;  Service:    • THORACOTOMY  1996    Thoracotomy for ectopic thyroid, Dr. Camarillo   • THYROIDECTOMY, PARTIAL      left side 3/05/18   • UMBILICAL HERNIA REPAIR N/A 9/14/2016    Procedure: UMBILICAL HERNIA REPAIR ;  Surgeon: Patt Moore MD;  Location: [facility] MAIN OR;  Service:    • VENTRAL/INCISIONAL HERNIA REPAIR N/A 5/17/2017    Procedure: VENTRAL HERNIA REPAIR WITH MESH, BILATERAL MUSCULOFASCIAL RELEASE;  Surgeon: Patt Moore MD;  Location: [facility] MAIN OR;  Service:      Family History   Problem Relation Age of Onset   • Heart disease Father    • Heart attack Paternal Uncle    • Cancer Maternal Grandmother         ovarian   • Heart attack Paternal Grandfather    • Heart attack Paternal Uncle    • Heart attack Paternal Uncle    • Heart attack Paternal Uncle    • Heart attack Paternal Uncle    • Heart attack Paternal Uncle    • Scleroderma Mother    • Hypertension Sister    • Malig Hyperthermia Neg Hx      Social History     Tobacco Use   • Smoking status: Never Smoker   • Smokeless tobacco: Never Used   Vaping Use   • Vaping Use: Never used   Substance Use Topics   • Alcohol use: No   • Drug use: No       Meds:  Medications Prior to Admission   Medication Sig Dispense Refill Last Dose   • allopurinol (ZYLOPRIM) 300 MG tablet Take 300 mg by mouth Daily.   8/11/2022 at 0800   • cholestyramine (QUESTRAN) 4 g packet Take 1 packet by mouth Daily.   8/11/2022 at 1800   • HYDROcodone-acetaminophen (NORCO) 7.5-325 MG per tablet Take 1 tablet by mouth Every 6 (Six) Hours As Needed for Moderate Pain  (Pain). 20  tablet 0 Past Month at Unknown time   • methIMAzole (TAPAZOLE) 5 MG tablet TAKE 1/2 TABLET BY MOUTH DAILY (Patient taking differently: Take 2.5 mg by mouth Daily. TAKE 1/2 TABLET BY MOUTH DAILY) 45 tablet 1 8/11/2022 at 0800   • ondansetron ODT (Zofran ODT) 4 MG disintegrating tablet Take one tablet by mouth every 6 hours as needed for nausea and vomiting (Patient taking differently: As Needed. Take one tablet by mouth every 6 hours as needed for nausea and vomiting) 20 tablet 0 Past Month at Unknown time   • HYDROcodone-acetaminophen (NORCO) 5-325 MG per tablet Take 1 tab by mouth every 6 hours as needed for pain (Patient taking differently: Take 1 tablet by mouth. Take 1 tab by mouth every 6 hours as needed for pain) 15 tablet 0 Unknown at Unknown time   • sulfamethoxazole-trimethoprim (Bactrim DS) 800-160 MG per tablet Take 1 tablet by mouth 2 (Two) Times a Day. 6 tablet 0        Allergies:  Cephalexin, Cephalosporins, and Penicillins    Debilities:  None    Objective     Vital Signs  Temp:  [97.8 °F (36.6 °C)] 97.8 °F (36.6 °C)  Heart Rate:  [74] 74  Resp:  [17] 17  BP: (154-159)/(71-93) 154/71  No intake or output data in the 24 hours ending 08/12/22 1716       Physical Exam:     General Appearance:    Alert, cooperative, NAD   HEENT:    No trauma, pupils reactive, hearing intact   Back:     No CVA tenderness   Lungs:     Respirations unlabored, no wheezing    Heart:    RRR, intact peripheral pulses   Abdomen:     Soft, NDNT, no masses, no guarding   :       Extremities:   No edema, no deformity   Lymphatic:   No neck or groin LAD   Skin:   No bleeding, bruising or rashes   Neuro/Psych:   Orientation intact, mood/affect pleasant, no focal findings     Results Review:    I reviewed the patient's new clinical results.  Results for orders placed or performed in visit on 08/10/22   COVID-19,APTIMA PANTHER(DEGAR), JAREN/ KATHE, NP/OP SWAB IN UTM/VTM/SALINE TRANSPORT MEDIA,24 HR TAT - Swab, Nasopharynx    Specimen:  Nasopharynx; Swab   Result Value Ref Range    COVID19 Not Detected Not Detected - Ref. Range        Assessment:  Left renal calculi    Plan:    Left ureteroscopy laser lithotripsy possible stent replacement.    I discussed the patient's findings and my recommendations with patient.   Risks, complications, outcomes and alternatives discussed with the patient at the bedside and office.    Miguel Monte MD  08/12/22  17:16 EDT

## 2022-08-12 NOTE — ANESTHESIA PROCEDURE NOTES
Airway  Urgency: elective    Date/Time: 8/12/2022 5:32 PM    General Information and Staff    Patient location during procedure: OR  Anesthesiologist: Augie Galindo MD    Indications and Patient Condition  Indications for airway management: airway protection    Preoxygenated: yes  Mask difficulty assessment: 1 - vent by mask    Final Airway Details  Final airway type: supraglottic airway      Successful airway: classic  Size 4    Number of attempts at approach: 1  Assessment: lips, teeth, and gum same as pre-op and atraumatic intubation

## 2022-08-17 ENCOUNTER — HOSPITAL ENCOUNTER (OUTPATIENT)
Dept: GENERAL RADIOLOGY | Facility: HOSPITAL | Age: 77
Discharge: HOME | End: 2022-08-17
Admitting: UROLOGY

## 2022-08-17 DIAGNOSIS — N20.0 CALCULUS, RENAL: ICD-10-CM

## 2022-08-17 PROCEDURE — 74018 RADEX ABDOMEN 1 VIEW: CPT

## 2022-10-07 DIAGNOSIS — R53.82 CHRONIC FATIGUE: ICD-10-CM

## 2022-10-07 DIAGNOSIS — E04.2 NONTOXIC MULTINODULAR GOITER: ICD-10-CM

## 2022-10-07 DIAGNOSIS — E05.00 GRAVES DISEASE: ICD-10-CM

## 2022-10-07 DIAGNOSIS — E79.0 ELEVATED URIC ACID IN BLOOD: ICD-10-CM

## 2022-10-07 DIAGNOSIS — R73.9 HYPERGLYCEMIA: ICD-10-CM

## 2022-10-07 DIAGNOSIS — E05.90 HYPERTHYROIDISM: ICD-10-CM

## 2022-10-07 DIAGNOSIS — R73.01 IMPAIRED FASTING GLUCOSE: Primary | ICD-10-CM

## 2022-10-08 LAB
ALBUMIN SERPL-MCNC: 4.3 G/DL (ref 3.5–5.2)
ALBUMIN/GLOB SERPL: 2.3 G/DL
ALP SERPL-CCNC: 83 U/L (ref 39–117)
ALT SERPL-CCNC: 19 U/L (ref 1–33)
AST SERPL-CCNC: 20 U/L (ref 1–32)
BILIRUB SERPL-MCNC: 0.6 MG/DL (ref 0–1.2)
BUN SERPL-MCNC: 15 MG/DL (ref 8–23)
BUN/CREAT SERPL: 12.4 (ref 7–25)
CALCIUM SERPL-MCNC: 10 MG/DL (ref 8.6–10.5)
CHLORIDE SERPL-SCNC: 108 MMOL/L (ref 98–107)
CO2 SERPL-SCNC: 25 MMOL/L (ref 22–29)
CREAT SERPL-MCNC: 1.21 MG/DL (ref 0.57–1)
EGFRCR SERPLBLD CKD-EPI 2021: 46.8 ML/MIN/1.73
GLOBULIN SER CALC-MCNC: 1.9 GM/DL
GLUCOSE SERPL-MCNC: 119 MG/DL (ref 65–99)
HBA1C MFR BLD: 5.4 % (ref 4.8–5.6)
POTASSIUM SERPL-SCNC: 4.5 MMOL/L (ref 3.5–5.2)
PROT SERPL-MCNC: 6.2 G/DL (ref 6–8.5)
SODIUM SERPL-SCNC: 141 MMOL/L (ref 136–145)
T4 FREE SERPL-MCNC: 0.98 NG/DL (ref 0.93–1.7)
TSH SERPL DL<=0.005 MIU/L-ACNC: 1.11 UIU/ML (ref 0.27–4.2)
URATE SERPL-MCNC: 6.2 MG/DL (ref 2.4–5.7)

## 2022-10-20 NOTE — PROGRESS NOTES
Subjective   Patt Bond is a 77 y.o. female.     History of Present Illness  F/u for hyperthyroidism, graves disease, MNG, sleep apnea      Patient is a 76-year-old female came in for follow-up.       In March 2018, she had a left thyroidectomy for done by Dr. Mares.  Pathology was read as multinodular goiter with degenerative changes including extensive calcification.  Thyroid-stimulating immunoglobulin and thyroid peroxidase antibody were elevated in 2016.  She had a left thyroid lobectomy in March 2018.  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  Thyroid ultrasound done in July 2020 showed a 1.2 cm right hypoechoic nodule.      She denies dysphagia or pressure symptoms.  She is on methimazole 5 mg 1/2 tablet daily.    TSH done in October 2022 is normal at 1.11 with a normal free T4 at 0.98 ng per DL.     She denies palpitations or increased tremors.  She denies heat or cold intolerance.  She lost 5 pounds since April 2022.  She had intermittent loose stools since 2019 which is improved with cholestyramine 1 packet/day.     She has sleep apnea and is using her CPAP regularly.  She wakes up rested.  She follows with Dr. Nando Heller.     She has history of nephrolithiasis and had multiple lithotripsies.  Stone analysis showed 87% uric acid and 10% calcium oxalate stones.  She is on allopurinol 300 mg once a day.  She passed stones and had lithotripsy in July 2022.  She follows with Dr. Miguel Monte.     She had serum glucose done in 8/21 was 213 mg/dl.  She had gestational diabetes mellitus with the first of her 2 pregnancies.  She did not require insulin..      Fasting glucose done in March 2022 is elevated at 115 mg per DL with normal hemoglobin A1c of 5.5%.  Hemoglobin A1c done in October 2022 is normal at 5.4% with a nonfasting glucose of 119 mg per DL.    She had 2 Pfizer COVID-vaccine without major side effects.  She has not had flu vaccine this fall.     The  "following portions of the patient's history were reviewed and updated as appropriate: allergies, current medications, past family history, past medical history, past social history, past surgical history and problem list.    Review of Systems   Respiratory: Positive for shortness of breath.         No orthopnea or paroxysmal nocturnal dyspnea   Cardiovascular: Negative for chest pain and palpitations.   Gastrointestinal: Negative.    Endocrine: Negative for cold intolerance and heat intolerance.   Genitourinary: Negative.    Musculoskeletal: Negative for myalgias.   Neurological: Negative for tremors, numbness and headaches.     Vitals:    10/21/22 0957   BP: 158/80   Pulse: 74   Temp: 94.6 °F (34.8 °C)   TempSrc: Temporal   SpO2: 98%   Weight: 111 kg (244 lb 9.6 oz)   Height: 160 cm (62.99\")      Objective   Physical Exam  Orders Only on 10/07/2022   Component Date Value Ref Range Status   • Hemoglobin A1C 10/07/2022 5.40  4.80 - 5.60 % Final    Comment: Hemoglobin A1C Ranges:  Increased Risk for Diabetes  5.7% to 6.4%  Diabetes                     >= 6.5%  Diabetic Goal                < 7.0%     • Glucose 10/07/2022 119 (H)  65 - 99 mg/dL Final   • BUN 10/07/2022 15  8 - 23 mg/dL Final   • Creatinine 10/07/2022 1.21 (H)  0.57 - 1.00 mg/dL Final   • EGFR Result 10/07/2022 46.8 (L)  >60.0 mL/min/1.73 Final    Comment: National Kidney Foundation and American Society of  Nephrology (ASN) Task Force recommended calculation based  on the Chronic Kidney Disease Epidemiology Collaboration  (CKD-EPI) equation refit without adjustment for race.  The GFR formula is only valid for adults with stable renal  function between ages 18 and 70.     • BUN/Creatinine Ratio 10/07/2022 12.4  7.0 - 25.0 Final   • Sodium 10/07/2022 141  136 - 145 mmol/L Final   • Potassium 10/07/2022 4.5  3.5 - 5.2 mmol/L Final   • Chloride 10/07/2022 108 (H)  98 - 107 mmol/L Final   • Total CO2 10/07/2022 25.0  22.0 - 29.0 mmol/L Final   • Calcium " 10/07/2022 10.0  8.6 - 10.5 mg/dL Final   • Total Protein 10/07/2022 6.2  6.0 - 8.5 g/dL Final   • Albumin 10/07/2022 4.30  3.50 - 5.20 g/dL Final   • Globulin 10/07/2022 1.9  gm/dL Final   • A/G Ratio 10/07/2022 2.3  g/dL Final   • Total Bilirubin 10/07/2022 0.6  0.0 - 1.2 mg/dL Final   • Alkaline Phosphatase 10/07/2022 83  39 - 117 U/L Final   • AST (SGOT) 10/07/2022 20  1 - 32 U/L Final   • ALT (SGPT) 10/07/2022 19  1 - 33 U/L Final   • TSH 10/07/2022 1.110  0.270 - 4.200 uIU/mL Final   • Free T4 10/07/2022 0.98  0.93 - 1.70 ng/dL Final    Results may be falsely increased if patient taking Biotin.   • Uric Acid 10/07/2022 6.2 (H)  2.4 - 5.7 mg/dL Final     Assessment & Plan   Diagnoses and all orders for this visit:    1. Hyperthyroidism (Primary)  -     TSH; Future  -     T4, Free; Future  -     T3, Free; Future    2. Morbid obesity with BMI of 40.0-44.9, adult (HCC)    3. History of lobectomy of thyroid    4. ERIS on CPAP    5. Multinodular goiter  -     TSH; Future  -     T4, Free; Future  -     T3, Free; Future    6. Elevated fasting glucose  -     Comprehensive Metabolic Panel; Future  -     Lipid Panel; Future  -     Hemoglobin A1c; Future    7. Abnormal finding of blood chemistry, unspecified  -     Lipid Panel; Future    Other orders  -     methIMAzole (TAPAZOLE) 5 MG tablet; TAKE 1/2 TABLET BY MOUTH DAILY  Dispense: 45 tablet; Refill: 1        Continue methimazole 5 mg 1/2 tablet/day.  Continue CPAP.  Continue allopurinol per Dr. Miguel Monte.    Copy of my note sent to Dr. Matute and Dr. SINCERE Monte.    RTC 4 mos.

## 2022-10-21 ENCOUNTER — OFFICE VISIT (OUTPATIENT)
Dept: ENDOCRINOLOGY | Age: 77
End: 2022-10-21

## 2022-10-21 VITALS
DIASTOLIC BLOOD PRESSURE: 80 MMHG | SYSTOLIC BLOOD PRESSURE: 158 MMHG | BODY MASS INDEX: 43.34 KG/M2 | WEIGHT: 244.6 LBS | HEART RATE: 74 BPM | TEMPERATURE: 94.6 F | HEIGHT: 63 IN | OXYGEN SATURATION: 98 %

## 2022-10-21 DIAGNOSIS — E05.90 HYPERTHYROIDISM: Primary | ICD-10-CM

## 2022-10-21 DIAGNOSIS — E04.2 MULTINODULAR GOITER: ICD-10-CM

## 2022-10-21 DIAGNOSIS — Z99.89 OSA ON CPAP: ICD-10-CM

## 2022-10-21 DIAGNOSIS — G47.33 OSA ON CPAP: ICD-10-CM

## 2022-10-21 DIAGNOSIS — E66.01 MORBID OBESITY WITH BMI OF 40.0-44.9, ADULT: ICD-10-CM

## 2022-10-21 DIAGNOSIS — Z90.09 HISTORY OF LOBECTOMY OF THYROID: ICD-10-CM

## 2022-10-21 DIAGNOSIS — R79.9 ABNORMAL FINDING OF BLOOD CHEMISTRY, UNSPECIFIED: ICD-10-CM

## 2022-10-21 DIAGNOSIS — R73.01 ELEVATED FASTING GLUCOSE: ICD-10-CM

## 2022-10-21 PROCEDURE — 99214 OFFICE O/P EST MOD 30 MIN: CPT | Performed by: INTERNAL MEDICINE

## 2022-10-21 RX ORDER — OXYCODONE AND ACETAMINOPHEN 7.5; 325 MG/1; MG/1
TABLET ORAL
COMMUNITY
Start: 2022-07-28 | End: 2022-10-21

## 2022-10-21 RX ORDER — METHIMAZOLE 5 MG/1
TABLET ORAL
Qty: 45 TABLET | Refills: 1
Start: 2022-10-21

## 2022-10-26 ENCOUNTER — TRANSCRIBE ORDERS (OUTPATIENT)
Dept: ADMINISTRATIVE | Facility: HOSPITAL | Age: 77
End: 2022-10-26

## 2022-10-26 DIAGNOSIS — R94.2 ABNORMAL PFT: Primary | ICD-10-CM

## 2022-10-26 DIAGNOSIS — R06.09 DOE (DYSPNEA ON EXERTION): ICD-10-CM

## 2022-10-28 ENCOUNTER — TELEPHONE (OUTPATIENT)
Dept: ORTHOPEDIC SURGERY | Facility: CLINIC | Age: 77
End: 2022-10-28

## 2022-10-28 NOTE — TELEPHONE ENCOUNTER
Caller: CAROL COFFEY    Relationship to patient: SELF    Best call back number: [phone]    Chief complaint: BILATERAL KNEE PAIN    Type of visit: INJECTION    Additional notes: PT WOULD LIKE A CALL BACK TO SCHEDULE BILATERAL KNEE INJECTION.  WOULD LIKE APPT AT [facility]

## 2022-12-07 ENCOUNTER — OFFICE VISIT (OUTPATIENT)
Dept: ORTHOPEDIC SURGERY | Facility: CLINIC | Age: 77
End: 2022-12-07

## 2022-12-07 VITALS — WEIGHT: 244 LBS | BODY MASS INDEX: 43.23 KG/M2 | TEMPERATURE: 97.3 F | HEIGHT: 63 IN

## 2022-12-07 DIAGNOSIS — M17.10 ARTHRITIS OF KNEE: Primary | ICD-10-CM

## 2022-12-07 PROCEDURE — 20610 DRAIN/INJ JOINT/BURSA W/O US: CPT | Performed by: ORTHOPAEDIC SURGERY

## 2022-12-07 PROCEDURE — 99212 OFFICE O/P EST SF 10 MIN: CPT | Performed by: ORTHOPAEDIC SURGERY

## 2022-12-07 RX ORDER — ACETAMINOPHEN 325 MG/1
1000 TABLET ORAL ONCE
Status: CANCELLED | OUTPATIENT
Start: 2023-02-02 | End: 2022-12-07

## 2022-12-07 RX ORDER — METHYLPREDNISOLONE ACETATE 80 MG/ML
80 INJECTION, SUSPENSION INTRA-ARTICULAR; INTRALESIONAL; INTRAMUSCULAR; SOFT TISSUE
Status: COMPLETED | OUTPATIENT
Start: 2022-12-07 | End: 2022-12-07

## 2022-12-07 RX ORDER — CHLORHEXIDINE GLUCONATE 500 MG/1
1 CLOTH TOPICAL TAKE AS DIRECTED
Status: CANCELLED | OUTPATIENT
Start: 2022-12-07

## 2022-12-07 RX ORDER — PREGABALIN 75 MG/1
150 CAPSULE ORAL ONCE
Status: CANCELLED | OUTPATIENT
Start: 2023-02-02 | End: 2022-12-07

## 2022-12-07 RX ORDER — MELOXICAM 15 MG/1
15 TABLET ORAL ONCE
Status: CANCELLED | OUTPATIENT
Start: 2023-02-02 | End: 2022-12-07

## 2022-12-07 RX ADMIN — METHYLPREDNISOLONE ACETATE 80 MG: 80 INJECTION, SUSPENSION INTRA-ARTICULAR; INTRALESIONAL; INTRAMUSCULAR; SOFT TISSUE at 09:28

## 2022-12-07 NOTE — PROGRESS NOTES
Patient: Patt Bond    YOB: 1945    Medical Record Number: [id number]    Chief Complaints: Follow-up regarding bilateral knee osteoarthritis    History of Present Illness:     77 y.o. female patient who presents for follow-up of both knees, primarily the left.  She says the last injection helped but the effects wore off after just a few weeks.  She recently traveled to [place] and she could hardly stand or walk for any prolonged period of time.  She is having to use a walker for ambulation at this point.  The left knee pain is severe, constant and throbbing.  The pain is mostly medial but she also gets some anterior and even occasional lateral discomfort.  The pain does occasionally radiate down towards the medial upper tibia.  She has constant pain but it is worse when she tries to stand or walk.  This is affecting her quality of life and she has asked today about potentially pursuing surgical options for the left knee.  The right knee continues to bother her but the symptoms are milder.  She would like to get that right knee injected again today.    Allergies:   Allergies   Allergen Reactions   • Cephalexin Hives   • Cephalosporins Hives     Took in 2015 w/o problems   • Penicillins Hives       Home Medications:    Current Outpatient Medications:   •  allopurinol (ZYLOPRIM) 300 MG tablet, Take 300 mg by mouth Daily., Disp: , Rfl:   •  cholestyramine (QUESTRAN) 4 g packet, Take 1 packet by mouth Daily., Disp: , Rfl:   •  methIMAzole (TAPAZOLE) 5 MG tablet, TAKE 1/2 TABLET BY MOUTH DAILY, Disp: 45 tablet, Rfl: 1    Past Medical History:   Diagnosis Date   • Asthma    • Back pain    • Diverticulitis    • Diverticulosis    • Goiter    • Hyperthyroidism     followed by Dr. Blackmon   • Joint pain, knee    • Kidney stones 2015    with cystoscopy by Dr. Miguel Monte done on 10/7/15, 9/9/15, 5/8/15, 9/21/07   • Mediastinal mass    • Obstructive pyelonephritis 09/28/2015    left obstructing  pyelonephritis    • PONV (postoperative nausea and vomiting)    • Sleep apnea    • SOB (shortness of breath) on exertion        Past Surgical History:   Procedure Laterality Date   • BRONCHOSCOPY N/A 11/6/2017    Procedure: BRONCHOSCOPY WITH ENDOBRONCHIAL ULTRASOUND AND TRANSBRONCHIAL NEEDLE ASPIRATION;  Surgeon: Nando Heller MD;  Location: [facility] ENDOSCOPY;  Service:    • CARDIAC CATHETERIZATION N/A 9/27/2017    Procedure: Right Heart Cath;  Surgeon: Miguel Gautam MD;  Location: [facility] CATH INVASIVE LOCATION;  Service:    • CARDIAC CATHETERIZATION N/A 9/27/2017    Procedure: Left Heart Cath;  Surgeon: Miguel Gautam MD;  Location: [facility] CATH INVASIVE LOCATION;  Service:    • CARDIAC CATHETERIZATION N/A 9/27/2017    Procedure: Coronary angiography;  Surgeon: Miguel Gautam MD;  Location: [facility] CATH INVASIVE LOCATION;  Service:    • CARDIAC CATHETERIZATION N/A 9/27/2017    Procedure: Left ventriculography;  Surgeon: Miguel Gautam MD;  Location: [facility] CATH INVASIVE LOCATION;  Service:    • CHOLECYSTECTOMY N/A 5/17/2017    Procedure: LAPAROSCOPIC CHOLECYSTECTOMY WITH IOC;  Surgeon: Patt Moore MD;  Location: [facility] MAIN OR;  Service:    • COLON RESECTION Left 9/14/2016    Laparoscopic Low Anterior Resection with splenic flexure mobilization, drainage of Pelvic Abscess (cultured 3+ E. Coli), Left salpingo-ophorectomy, laparoscopic rectopexy, and umbilical hernia repair, Dr. Patt Moore   • COLON RESECTION      VENTRAL HERNIA REPAIR   • COLONOSCOPY N/A 05/15/2008    sigmoid diverticulosis with blunting of the haustral folds and angulation consistent with previous diverticulitis, no polyps, suggestion of rectal prolapse-Dr. Patt Moore   • COLONOSCOPY W/ BIOPSIES AND POLYPECTOMY N/A 04/16/2001    Possible mild gastritis w/ some appearance of formations that look like petechiae in the antrum, no ulcerations, no erosions; cecal polyp approx 4mm sessile; probable transverse  colon polyp, although we could not visualize this completely upon pulling out; sigmoid diverticula; multiple rectal polyps, mostly w/ appearance of hyperplasia, largest being 5 to 6mm: removed via snare-Dr. Patt Moore   • COLONOSCOPY W/ POLYPECTOMY N/A 12/10/2004    Diverticulosis with sigmoid perideverticulitis with erythema and patchiness around some of the diverticula, proximal ascedning colon polyp-approx 5 mm-removed via snare cauter, two distal ascending colon polyps-7 mm and 3 mm-removed via snare cautery polypectomy, hemorrhoids-Dr. Patt Moore   • CYSTOSCOPY W/ URETERAL STENT PLACEMENT Left 4/21/2018    Procedure: CYSTOSCOPY, LEFT RETROGRADE WITH LEFT URETERAL STENT INSERTION;  Surgeon: Stanley Osuna MD;  Location: [facility];  Service: Urology   • CYSTOSCOPY W/ URETERAL STENT REMOVAL Left 10/07/2015    Cystoscopy with stent extraction, left ureteral occulusion balloon placement, percutanous nephrostolithotomy with stone volume less than 2.5 cm involving the left lower pole and the left proximal ureter, balloon tract dilation for establishment of nephrostomy tract, antegrade nephrostomy tube placement-Dr. Miguel Monte   • CYSTOSCOPY, RETROGRADE PYELOGRAM AND STENT INSERTION Left 09/28/2015    Left cystoscopy with left retrograde pyelogram, left double-J stent placement-Dr. Miguel Blas   • CYSTOSCOPY, RETROGRADE PYELOGRAM AND STENT INSERTION Left 09/09/2015    Cystoscopy with bilateral retrograde pyelogram, left double-J stent placement, right ureteral pyeloscopy with laser lithotripsy of the ureteropelvic junction calculus, right double-J stent placement-Dr. Miguel Monte   • CYSTOSCOPY, RETROGRADE PYELOGRAM AND STENT INSERTION Right 09/21/2007    Cystoscopy with right retrograde pyelogram, right biliary stent placement-Dr. Miguel Monte   • CYSTOSCOPY, RETROGRADE PYELOGRAM AND STENT INSERTION Right 09/07/2007    Cystoscopy with right retrograde pyelogram, right ureteral  peyloscopy with extraction distal ureteral mass, right double J stent placement-Dr. Migule Monte   • CYSTOSCOPY, RETROGRADE PYELOGRAM AND STENT INSERTION Left 7/29/2022    Procedure: CYSTOSCOPY, LEFT RETROGRADE PYELOGRAM, LEFT URETERAL STENT;  Surgeon: Cedrick Jones MD;  Location: [facility] OR;  Service: Urology;  Laterality: Left;   • CYSTOSCOPY, URETEROSCOPY, RETROGRADE PYELOGRAM, STENT INSERTION Right 09/07/2007    Cystoscopy with right retrograde pyelogram, rigth ureteroscopy with extraction of distal mass, right double J stent placement-Dr. Miguel Monte   • D & C HYSTEROSCOPY N/A 05/18/2011    Procedure done due to thickened endomentrium and an endometrial polyp-Dr. uQita Cheatham   • DILATATION AND CURETTAGE N/A 03/22/2002    D&C, polyp removal-Dr. Quita Cheatham   • EXTRACORPOREAL SHOCKWAVE LITHOTRIPSY (ESWL), STENT INSERTION/REMOVAL Left 05/08/2015    Left extracoporeal shockwave lithotripsy, cystoscopy with stent placement-Dr. Miguel Monte   • MEDIASTINOTOMY Left 3/5/2018    Procedure: left thyroidectomy, resection of substernal thyroid goiter cervical approach;  Surgeon: Quintin Mares III, MD;  Location: [facility] OR;  Service:    • SIGMOIDOSCOPY N/A 9/13/2016    Procedure: SIGMOIDOSCOPY FLEXIBLE TO 25 CM;  Surgeon: Patt Moore MD;  Location: [facility] ENDOSCOPY;  Service:    • THORACOTOMY  1996    Thoracotomy for ectopic thyroid, Dr. Camarillo   • THYROIDECTOMY, PARTIAL      left side 3/05/18   • UMBILICAL HERNIA REPAIR N/A 9/14/2016    Procedure: UMBILICAL HERNIA REPAIR ;  Surgeon: Patt Moore MD;  Location: [facility] OR;  Service:    • URETEROSCOPY LASER LITHOTRIPSY WITH STENT INSERTION Left 8/12/2022    Procedure: LEFT URETEROSCOPY LASER LITHOTRIPSY STONE BACKET EXTRACTION, STENT PLACEMENT;  Surgeon: Miguel Monte MD;  Location: [facility] MAIN OR;  Service: Urology;  Laterality: Left;   • VENTRAL/INCISIONAL HERNIA REPAIR N/A 5/17/2017    Procedure: VENTRAL HERNIA REPAIR  "WITH MESH, BILATERAL MUSCULOFASCIAL RELEASE;  Surgeon: Patt Moore MD;  Location[facility] OR;  Service:        Social History     Occupational History     Employer: RETIRED   Tobacco Use   • Smoking status: Never   • Smokeless tobacco: Never   Vaping Use   • Vaping Use: Never used   Substance and Sexual Activity   • Alcohol use: No   • Drug use: No   • Sexual activity: Defer      Social History     Social History Narrative   • Not on file       Family History   Problem Relation Age of Onset   • Heart disease Father    • Heart attack Paternal Uncle    • Cancer Maternal Grandmother         ovarian   • Heart attack Paternal Grandfather    • Heart attack Paternal Uncle    • Heart attack Paternal Uncle    • Heart attack Paternal Uncle    • Heart attack Paternal Uncle    • Heart attack Paternal Uncle    • Scleroderma Mother    • Hypertension Sister    • Malig Hyperthermia Neg Hx        Review of Systems:      Constitutional: Denies fever, shaking or chills   Eyes: Denies change in visual acuity   HEENT: Denies nasal congestion or sore throat   Respiratory: Denies cough or shortness of breath   Cardiovascular: Denies chest pain or edema  Endocrine: Denies tremors, palpitations, intolerance of heat or cold, polyuria, polydipsia.  GI: Denies abdominal pain, nausea, vomiting, bloody stools or diarrhea  : Denies frequency, urgency, incontinence, retention, or nocturia.  Musculoskeletal: Denies numbness, tingling or loss of motor function except as above  Integument: Denies rash, lesion or ulceration   Neurologic: Denies headache or focal weakness, deficits  Heme: Denies spontaneous or excessive bleeding, epistaxis, hematuria, melena, fatigue, enlarged or tender lymph nodes.      All other pertinent positives and negatives as noted above in HPI.    Physical Exam:   77 y.o. female  Vitals:    12/07/22 0918   Temp: 97.3 °F (36.3 °C)   TempSrc: Temporal   Weight: 111 kg (244 lb)   Height: 160 cm (62.99\")     General:  " Patient is awake and alert.  Appears in no acute distress or discomfort.    Psych:  Affect and demeanor are appropriate.    Cardiovascular:  Regular rate and rhythm.    Lungs:  Good chest expansion.  Breathing unlabored.    Extremities:  Left knee is examined.  Skin is benign.  Moderate medial and patellofemoral compartment tenderness.  Motion:  5-110.  She has anterior crepitus with range of motion.  Positive patellar grind test.  Pseudolaxity of the MCL but her knee is otherwise stable normal motor and sensory function in the lower leg and foot.  Palpable pedal pulses.  Brisk capillary refill.    Imaging: Previous x-rays of both knees are reviewed.  She has advanced medial and patellofemoral compartment osteoarthritis bilaterally.  On the left, she has bone-on-bone degeneration, osteophyte formation, subchondral sclerosis and subchondral cyst formation in the medial tibial plateau    Assessment/Plan: End-stage bilateral knee osteoarthritis, left currently more symptomatic than right    We discussed the natural history of this condition and all available treatment options, both surgical and nonsurgical.  Given her failure of conservative treatment, I do consider that she is a candidate for total knee arthroplasty on the left.  The risk, benefits and alternatives to a total knee arthroplasty were carefully discussed all questions posed by her were answered in detail.  She wants to move forward with scheduling a left total knee arthroplasty.  I will have her follow-up with either myself or Yancy for a preoperative visit.    As per her request, I did agree to repeat the right knee injection today.  The risk, benefits and alternatives were discussed.  She consented and injection was performed as described below.    Anthony Sinclair MD    12/07/2022    Large Joint Arthrocentesis: R knee  Date/Time: 12/7/2022 9:28 AM  Consent given by: patient  Site marked: site marked  Timeout: Immediately prior to procedure a time out  was called to verify the correct patient, procedure, equipment, support staff and site/side marked as required   Supporting Documentation  Indications: pain   Procedure Details  Location: knee - R knee  Preparation: Patient was prepped and draped in the usual sterile fashion  Needle gauge: 21G.  Medications administered: 80 mg methylPREDNISolone acetate 80 MG/ML; 2 mL lidocaine (cardiac)  Patient tolerance: patient tolerated the procedure well with no immediate complications

## 2022-12-08 PROBLEM — M17.10 ARTHRITIS OF KNEE: Status: ACTIVE | Noted: 2022-12-08

## 2022-12-09 ENCOUNTER — HOSPITAL ENCOUNTER (OUTPATIENT)
Dept: CT IMAGING | Facility: HOSPITAL | Age: 77
Discharge: HOME | End: 2022-12-09
Admitting: INTERNAL MEDICINE

## 2022-12-09 DIAGNOSIS — R94.2 ABNORMAL PFT: ICD-10-CM

## 2022-12-09 DIAGNOSIS — R06.09 DOE (DYSPNEA ON EXERTION): ICD-10-CM

## 2022-12-09 PROCEDURE — 71250 CT THORAX DX C-: CPT

## 2023-01-16 ENCOUNTER — PRE-ADMISSION TESTING (OUTPATIENT)
Dept: PREADMISSION TESTING | Facility: HOSPITAL | Age: 78
End: 2023-01-16
Payer: MEDICARE

## 2023-01-16 VITALS
HEART RATE: 76 BPM | BODY MASS INDEX: 43.43 KG/M2 | OXYGEN SATURATION: 97 % | RESPIRATION RATE: 16 BRPM | HEIGHT: 62 IN | SYSTOLIC BLOOD PRESSURE: 160 MMHG | WEIGHT: 236 LBS | TEMPERATURE: 96.9 F | DIASTOLIC BLOOD PRESSURE: 80 MMHG

## 2023-01-16 LAB
ANION GAP SERPL CALCULATED.3IONS-SCNC: 9.5 MMOL/L (ref 5–15)
BUN SERPL-MCNC: 14 MG/DL (ref 8–23)
BUN/CREAT SERPL: 13.9 (ref 7–25)
CALCIUM SPEC-SCNC: 9.7 MG/DL (ref 8.6–10.5)
CHLORIDE SERPL-SCNC: 106 MMOL/L (ref 98–107)
CO2 SERPL-SCNC: 25.5 MMOL/L (ref 22–29)
CREAT SERPL-MCNC: 1.01 MG/DL (ref 0.57–1)
DEPRECATED RDW RBC AUTO: 44.2 FL (ref 37–54)
EGFRCR SERPLBLD CKD-EPI 2021: 58.2 ML/MIN/1.73
ERYTHROCYTE [DISTWIDTH] IN BLOOD BY AUTOMATED COUNT: 13.4 % (ref 12.3–15.4)
GLUCOSE SERPL-MCNC: 118 MG/DL (ref 65–99)
HCT VFR BLD AUTO: 38.9 % (ref 34–46.6)
HGB BLD-MCNC: 13.2 G/DL (ref 12–15.9)
MCH RBC QN AUTO: 31 PG (ref 26.6–33)
MCHC RBC AUTO-ENTMCNC: 33.9 G/DL (ref 31.5–35.7)
MCV RBC AUTO: 91.3 FL (ref 79–97)
PLATELET # BLD AUTO: 168 10*3/MM3 (ref 140–450)
PMV BLD AUTO: 8.9 FL (ref 6–12)
POTASSIUM SERPL-SCNC: 4.3 MMOL/L (ref 3.5–5.2)
QT INTERVAL: 403 MS
RBC # BLD AUTO: 4.26 10*6/MM3 (ref 3.77–5.28)
SODIUM SERPL-SCNC: 141 MMOL/L (ref 136–145)
WBC NRBC COR # BLD: 5.87 10*3/MM3 (ref 3.4–10.8)

## 2023-01-16 PROCEDURE — 80048 BASIC METABOLIC PNL TOTAL CA: CPT

## 2023-01-16 PROCEDURE — 36415 COLL VENOUS BLD VENIPUNCTURE: CPT

## 2023-01-16 PROCEDURE — 93010 ELECTROCARDIOGRAM REPORT: CPT | Performed by: INTERNAL MEDICINE

## 2023-01-16 PROCEDURE — 93005 ELECTROCARDIOGRAM TRACING: CPT

## 2023-01-16 PROCEDURE — 85027 COMPLETE CBC AUTOMATED: CPT

## 2023-01-16 RX ORDER — ACETAMINOPHEN 500 MG
500-1000 TABLET ORAL EVERY 6 HOURS PRN
COMMUNITY
End: 2023-02-03 | Stop reason: HOSPADM

## 2023-01-16 NOTE — DISCHARGE INSTRUCTIONS
Take the following medications the morning of surgery: NONE      If you are on prescription narcotic pain medication to control your pain you may also take that medication the morning of surgery.    General Instructions:  Do not eat solid food after midnight the night before surgery.  You may drink clear liquids day of surgery but must stop at least one hour before your hospital arrival time.  It is beneficial for you to have a clear drink that contains carbohydrates the day of surgery.  We suggest a 12 to 20 ounce bottle of Gatorade or Powerade for non-diabetic patients or a 12 to 20 ounce bottle of G2 or Powerade Zero for diabetic patients.     Clear liquids are liquids you can see through.  Nothing red in color.     Plain water                               Sports drinks  Sodas                                   Gelatin (Jell-O)  Fruit juices without pulp such as white grape juice and apple juice  Popsicles that contain no fruit or yogurt  Tea or coffee (no cream or milk added)  Gatorade / Powerade  G2 / Powerade Zero    If applicable bring your C-PAP/ BI-PAP machine.  Bring any papers given to you in the doctor’s office.  Wear clean comfortable clothes.  Do not wear contact lenses, false eyelashes or make-up.  Bring a case for your glasses.   Bring crutches or walker if applicable.  Remove all piercings.  Leave jewelry and any other valuables at home.  The Pre-Admission Testing nurse will instruct you to bring medications if unable to obtain an accurate list in Pre-Admission Testing.            Preventing a Surgical Site Infection:  For 2 to 3 days before surgery, avoid shaving with a razor because the razor can irritate skin and make it easier to develop an infection.    Any areas of open skin can increase the risk of a post-operative wound infection by allowing bacteria to enter and travel throughout the body.  Notify your surgeon if you have any skin wounds / rashes even if it is not near the expected surgical  site.  The area will need assessed to determine if surgery should be delayed until it is healed.  The night prior to surgery shower using a fresh bar of anti-bacterial soap (such as Dial) and clean washcloth.  Sleep in a clean bed with clean clothing.  Do not allow pets to sleep with you.  Shower on the morning of surgery using a fresh bar of anti-bacterial soap (such as Dial) and clean washcloth.  Dry with a clean towel and dress in clean clothing.  Ask your surgeon if you will be receiving antibiotics prior to surgery.  Make sure you, your family, and all healthcare providers clean their hands with soap and water or an alcohol based hand  before caring for you or your wound.    Day of surgery:  Your arrival time is approximately two hours before your scheduled surgery time.  Upon arrival, a Pre-op nurse and Anesthesiologist will review your health history, obtain vital signs, and answer questions you may have.  The only belongings needed at this time will be a list of your home medications and if applicable your C-PAP/BI-PAP machine.  A Pre-op nurse will start an IV and you may receive medication in preparation for surgery, including something to help you relax.     Please be aware that surgery does come with discomfort.  We want to make every effort to control your discomfort so please discuss any uncontrolled symptoms with your nurse.   Your doctor will most likely have prescribed pain medications.          If you are staying overnight following surgery, you will be transported to your hospital room following the recovery period.  [facility] has all private rooms.    If you have any questions please call Pre-Admission Testing at [phone].  Deductibles and co-payments are collected on the day of service. Please be prepared to pay the required co-pay, deductible or deposit on the day of service as defined by your plan.    CHLORHEXIDINE CLOTH INSTRUCTIONS  The morning of surgery follow  these instructions using the Chlorhexidine cloths you've been given.  These steps reduce bacteria on the body.  Do not use the cloths near your eyes, ears mouth, genitalia or on open wounds.  Throw the cloths away after use but do not try to flush them down a toilet.      Open and remove one cloth at a time from the package.    Leave the cloth unfolded and begin the bathing.  Massage the skin with the cloths using gentle pressure to remove bacteria.  Do not scrub harshly.   Follow the steps below with one 2% CHG cloth per area (6 total cloths).  One cloth for neck, shoulders and chest.  One cloth for both arms, hands, fingers and underarms (do underarms last).  One cloth for the abdomen followed by groin.  One cloth for right leg and foot including between the toes.  One cloth for left leg and foot including between the toes.  The last cloth is to be used for the back of the neck, back and buttocks.    Allow the CHG to air dry 3 minutes on the skin which will give it time to work and decrease the chance of irritation.  The skin may feel sticky until it is dry.  Do not rinse with water or any other liquid or you will lose the beneficial effects of the CHG.  If mild skin irritation occurs, do rinse the skin to remove the CHG.  Report this to the nurse at time of admission.  Do not apply lotions, creams, ointments, deodorants or perfumes after using the clothes. Dress in clean clothes before coming to the hospital.     Call your surgeon immediately if you experience any of the following symptoms:  Sore Throat  Shortness of Breath or difficulty breathing  Cough  Chills  Body soreness or muscle pain  Headache  Fever  New loss of taste or smell  Do not arrive for your surgery ill.  Your procedure will need to be rescheduled to another time.  You will need to call your physician before the day of surgery to avoid any unnecessary exposure to hospital staff as well as other patients.

## 2023-01-20 ENCOUNTER — OFFICE VISIT (OUTPATIENT)
Dept: ORTHOPEDIC SURGERY | Facility: CLINIC | Age: 78
End: 2023-01-20
Payer: MEDICARE

## 2023-01-20 VITALS — TEMPERATURE: 97.1 F | BODY MASS INDEX: 43.43 KG/M2 | WEIGHT: 236 LBS | HEIGHT: 62 IN

## 2023-01-20 DIAGNOSIS — Z01.818 PREOP EXAMINATION: Primary | ICD-10-CM

## 2023-01-20 PROCEDURE — 73562 X-RAY EXAM OF KNEE 3: CPT | Performed by: NURSE PRACTITIONER

## 2023-01-20 PROCEDURE — S0260 H&P FOR SURGERY: HCPCS | Performed by: NURSE PRACTITIONER

## 2023-01-20 PROCEDURE — 77077 JOINT SURVEY SINGLE VIEW: CPT | Performed by: ORTHOPAEDIC SURGERY

## 2023-01-20 NOTE — PROGRESS NOTES
History & Physical       Patient: Patt Bond    YOB: 1945    Medical Record Number: [id number]    Chief Complaints: Left knee endstage osteoarthritis    History of Present Illness: 77 y.o. female presents today in anticipation of upcoming knee replacement surgery.  Patient has a long history of worsening symptoms.  Describes the pain as severe, constant, and typically dull and achy. She has tried and failed prolonged conservative treatment. She is struggling with routine daily activities.  This has become a significant issue for overall quality of life. She cannot walk any prolonged distances without having to rest.     Allergies:   Allergies   Allergen Reactions   • Cephalexin Hives   • Cephalosporins Hives     Took in 2015 w/o problems   • Penicillins Hives       Medications:   Home Medications:    Current Outpatient Medications:   •  acetaminophen (TYLENOL) 500 MG tablet, Take 500-1,000 mg by mouth Every 6 (Six) Hours As Needed for Mild Pain., Disp: , Rfl:   •  allopurinol (ZYLOPRIM) 300 MG tablet, Take 300 mg by mouth Daily., Disp: , Rfl:   •  CHLORHEXIDINE GLUCONATE CLOTH EX, Apply  topically. AS DIRECTED, Disp: , Rfl:   •  cholestyramine (QUESTRAN) 4 g packet, Take 1 packet by mouth Daily. AROUND DINNERTIME, Disp: , Rfl:   •  methIMAzole (TAPAZOLE) 5 MG tablet, TAKE 1/2 TABLET BY MOUTH DAILY (Patient taking differently: Take 2.5 mg by mouth Daily.), Disp: 45 tablet, Rfl: 1    Past Medical History:   Diagnosis Date   • Arthritis     OSTEO. LEFT KNEE   • Asthma    • Back pain     AT TIMES   • Cataract     CLIFFORD EYES   • Chronic diarrhea    • Diverticulitis     WITH RESECTION   • Diverticulosis    • Goiter    • History of colon polyps     BENIGN   • Hyperthyroidism     followed by Dr. Blackmon. PARTIAL THRYOIDECTOMY   • Joint pain, knee    • Kidney stones     HISTORY OF MULTIPLE TIMES. URIC AND CALCIUM STONES   • Left knee pain    • Lung nodule     HISTORY OF-NOTE LATEST CT CHEST 12/2022   • Mass  of mediastinum     HISTORY OF. BENIGN.   • Obstructive pyelonephritis 09/28/2015    left obstructing pyelonephritis    • PONV (postoperative nausea and vomiting)    • Sleep apnea     CPAP MACHINE   • SOB (shortness of breath) on exertion     SEES DR VASQUEZ          Past Surgical History:   Procedure Laterality Date   • BRONCHOSCOPY N/A 11/6/2017    Procedure: BRONCHOSCOPY WITH ENDOBRONCHIAL ULTRASOUND AND TRANSBRONCHIAL NEEDLE ASPIRATION;  Surgeon: Nando Vasquez MD;  Location: [facility] ENDOSCOPY;  Service:    • CARDIAC CATHETERIZATION N/A 9/27/2017    Procedure: Right Heart Cath;  Surgeon: Miguel Gautam MD;  [facility];  Service:    • CARDIAC CATHETERIZATION N/A 9/27/2017    Procedure: Left Heart Cath;  Surgeon: Miguel Gautam MD;  [facility];  Service:    • CARDIAC CATHETERIZATION N/A 9/27/2017    Procedure: Coronary angiography;  Surgeon: Miguel Gautam MD;  Location: [facility] CATH INVASIVE LOCATION;  Service:    • CARDIAC CATHETERIZATION N/A 9/27/2017    Procedure: Left ventriculography;  Surgeon: Miguel Gautam MD;  Location: [facility] CATH INVASIVE LOCATION;  Service:    • CHOLECYSTECTOMY N/A 5/17/2017    Procedure: LAPAROSCOPIC CHOLECYSTECTOMY WITH IOC;  Surgeon: Patt Moore MD;  Location: [facility] MAIN OR;  Service:    • COLON RESECTION Left 9/14/2016    Laparoscopic Low Anterior Resection with splenic flexure mobilization, drainage of Pelvic Abscess (cultured 3+ E. Coli), Left salpingo-ophorectomy, laparoscopic rectopexy, and umbilical hernia repair, Dr. Patt Moore   • COLON RESECTION      VENTRAL HERNIA REPAIR   • COLONOSCOPY N/A 05/15/2008    sigmoid diverticulosis with blunting of the haustral folds and angulation consistent with previous diverticulitis, no polyps, suggestion of rectal prolapse-Dr. Patt Moore   • COLONOSCOPY W/ BIOPSIES AND POLYPECTOMY N/A 04/16/2001    Possible mild gastritis w/ some appearance  of formations that look like petechiae in the antrum, no ulcerations, no erosions; cecal polyp approx 4mm sessile; probable transverse colon polyp, although we could not visualize this completely upon pulling out; sigmoid diverticula; multiple rectal polyps, mostly w/ appearance of hyperplasia, largest being 5 to 6mm: removed via snare-Dr. Patt Moore   • COLONOSCOPY W/ POLYPECTOMY N/A 12/10/2004    Diverticulosis with sigmoid perideverticulitis with erythema and patchiness around some of the diverticula, proximal ascedning colon polyp-approx 5 mm-removed via snare cauter, two distal ascending colon polyps-7 mm and 3 mm-removed via snare cautery polypectomy, hemorrhoids-Dr. Patt Moore   • CYSTOSCOPY W/ URETERAL STENT PLACEMENT Left 4/21/2018    Procedure: CYSTOSCOPY, LEFT RETROGRADE WITH LEFT URETERAL STENT INSERTION;  Surgeon: Stanley Osuna MD;  Location: [facility];  Service: Urology   • CYSTOSCOPY W/ URETERAL STENT REMOVAL Left 10/07/2015    Cystoscopy with stent extraction, left ureteral occulusion balloon placement, percutanous nephrostolithotomy with stone volume less than 2.5 cm involving the left lower pole and the left proximal ureter, balloon tract dilation for establishment of nephrostomy tract, antegrade nephrostomy tube placement-Dr. Miguel Monte   • CYSTOSCOPY, RETROGRADE PYELOGRAM AND STENT INSERTION Left 09/28/2015    Left cystoscopy with left retrograde pyelogram, left double-J stent placement-Dr. Rivera [facility]   • CYSTOSCOPY, RETROGRADE PYELOGRAM AND STENT INSERTION Left 09/09/2015    Cystoscopy with bilateral retrograde pyelogram, left double-J stent placement, right ureteral pyeloscopy with laser lithotripsy of the ureteropelvic junction calculus, right double-J stent placement-Dr. Miguel Monte   • CYSTOSCOPY, RETROGRADE PYELOGRAM AND STENT INSERTION Right 09/21/2007    Cystoscopy with right retrograde pyelogram, right biliary stent placement-Dr. Miguel Monte   •  CYSTOSCOPY, RETROGRADE PYELOGRAM AND STENT INSERTION Right 09/07/2007    Cystoscopy with right retrograde pyelogram, right ureteral peyloscopy with extraction distal ureteral mass, right double J stent placement-Dr. Miguel Monte   • CYSTOSCOPY, RETROGRADE PYELOGRAM AND STENT INSERTION Left 7/29/2022    Procedure: CYSTOSCOPY, LEFT RETROGRADE PYELOGRAM, LEFT URETERAL STENT;  Surgeon: Cedrick Jones MD;  Location: [facility] OR;  Service: Urology;  Laterality: Left;   • CYSTOSCOPY, URETEROSCOPY, RETROGRADE PYELOGRAM, STENT INSERTION Right 09/07/2007    Cystoscopy with right retrograde pyelogram, rigth ureteroscopy with extraction of distal mass, right double J stent placement-Dr. Miguel Monte   • D & C HYSTEROSCOPY N/A 05/18/2011    Procedure done due to thickened endomentrium and an endometrial polyp-Dr. Quita Cheatham   • DILATATION AND CURETTAGE N/A 03/22/2002    D&C, polyp removal-Dr. Quita Cheatham   • EXTRACORPOREAL SHOCKWAVE LITHOTRIPSY (ESWL), STENT INSERTION/REMOVAL Left 05/08/2015    Left extracoporeal shockwave lithotripsy, cystoscopy with stent placement-Dr. Miguel Monte   • MEDIASTINOTOMY Left 3/5/2018    Procedure: left thyroidectomy, resection of substernal thyroid goiter cervical approach;  Surgeon: Quintin Mares III, MD;  Location: [facility] OR;  Service:    • SIGMOIDOSCOPY N/A 9/13/2016    Procedure: SIGMOIDOSCOPY FLEXIBLE TO 25 CM;  Surgeon: Patt Moore MD;  Location: [facility] ENDOSCOPY;  Service:    • THORACOTOMY  1996    Thoracotomy for ectopic thyroid, Dr. Camarillo   • THYROIDECTOMY, PARTIAL      left side 3/05/18   • UMBILICAL HERNIA REPAIR N/A 9/14/2016    Procedure: UMBILICAL HERNIA REPAIR ;  Surgeon: Patt Moore MD;  Location: [facility] OR;  Service:    • URETEROSCOPY LASER LITHOTRIPSY WITH STENT INSERTION Left 8/12/2022    Procedure: LEFT URETEROSCOPY LASER LITHOTRIPSY STONE BACKET EXTRACTION, STENT PLACEMENT;  Surgeon: Miguel Monte MD;  Location: [facility]  "MAIN OR;  Service: Urology;  Laterality: Left;   • VENTRAL/INCISIONAL HERNIA REPAIR N/A 5/17/2017    Procedure: VENTRAL HERNIA REPAIR WITH MESH, BILATERAL MUSCULOFASCIAL RELEASE;  Surgeon: Patt Moore MD;  Location: [facility] MAIN OR;  Service:           Social History     Occupational History     Employer: RETIRED   Tobacco Use   • Smoking status: Never   • Smokeless tobacco: Never   Vaping Use   • Vaping Use: Never used   Substance and Sexual Activity   • Alcohol use: No   • Drug use: No   • Sexual activity: Defer      Social History     Social History Narrative   • Not on file          Family History   Problem Relation Age of Onset   • Heart disease Father    • Heart attack Paternal Uncle    • Cancer Maternal Grandmother         ovarian   • Heart attack Paternal Grandfather    • Heart attack Paternal Uncle    • Heart attack Paternal Uncle    • Heart attack Paternal Uncle    • Heart attack Paternal Uncle    • Heart attack Paternal Uncle    • Scleroderma Mother    • Hypertension Sister    • Malig Hyperthermia Neg Hx        Review of Systems:  A 14 point review of systems is reviewed with the patient.  Pertinent positives are listed above.  All others are negative.    Physical Exam: 77 y.o. female    Vitals:    01/20/23 1257   Temp: 97.1 °F (36.2 °C)   Weight: 107 kg (236 lb)   Height: 157.5 cm (62\")       General:  Patient is awake and alert.  Appears in no acute distress or discomfort.    Psych:  Affect and demeanor are appropriate.    Eyes:  Conjunctiva and sclera appear grossly normal.  Eyes track well and EOM seem to be intact.    Ears:  No gross abnormalities.  Hearing adequate for the exam.    Cardiovascular:  Regular rate and rhythm.    Lungs:  Good chest expansion.  Breathing unlabored.    Lymph:  No palpable adenopathy about neck or axilla.    Left lower extremity:  Skin benign and intact without evidence for swelling, masses or atrophy.  No palpable masses.  Focal tenderness noted over lateral joint line. "  ROM is from 5°-110°.   Crepitus noted with passive range of motion.  Knee is stable on exam.  Good strength throughout the lower leg and foot.  Intact sensation throughout.  Palpable pedal pulses with brisk cap refill.    Diagnostic Tests:  Lab Results   Component Value Date    GLUCOSE 118 (H) 01/16/2023    CALCIUM 9.7 01/16/2023     01/16/2023    K 4.3 01/16/2023    CO2 25.5 01/16/2023     01/16/2023    BUN 14 01/16/2023    CREATININE 1.01 (H) 01/16/2023    EGFRIFAFRI 57 (L) 09/14/2021    EGFRIFNONA 47 (L) 09/14/2021    BCR 13.9 01/16/2023    ANIONGAP 9.5 01/16/2023     Lab Results   Component Value Date    WBC 5.87 01/16/2023    HGB 13.2 01/16/2023    HCT 38.9 01/16/2023    MCV 91.3 01/16/2023     01/16/2023     Lab Results   Component Value Date    INR 1.07 02/26/2018    PROTIME 13.7 02/26/2018       Imaging:  AP, merchant and lateral views of the left knee are ordered and reviewed along with a full length alignment x-ray.  These x-rays were taken to evaluate her complaint and presurgical planning.  These compared to previous x-rays.  The x-rays show medial and patellofemoral compartment osteoarthritis with bone-on-bone, osteophyte formation, subchondral sclerosis, and varus alignment.  The alignment film shows varus alignment with the weightbearing axis passing through the medial edge of the medial compartment.    Assessment:  Left knee endstage osteoarthritis    Plan: We will plan on proceeding with a left total knee arthroplasty at the patient's request.  I reviewed details of procedure with patient today and discussed all the risks, benefits, alternatives, and limitations of the procedure in laymen's terms with the risks including but not limited to:  neurovascular damage resulting in permanent dysfunction or footdrop and potential need for further surgery, bleeding, infection, hematoma, chronic pain, worsening of pain, persistent symptoms potentially necessitating revision, prosthesis  related problems including loosening or allergy, swelling, loss of motion and arthrofibrosis, weakness, stiffness, instability, DVT, pulmonary embolus, death, stroke, complex regional pain syndrome, and need for additional procedures.  Patient verbalized understanding, and was given the opportunity to ask and have all questions answered today.  No guarantees were given regarding results of surgery.      Patient is undecided regarding overnight hospital stay versus going home same day.  She is under the impression Dr. Sinclair wants her to stay overnight.  I will consult with him and give her a call regarding his recommendations.  Denies history of DVT or metal allergy.      Yancy Cui, APRN  01/20/23

## 2023-01-23 ENCOUNTER — TELEPHONE (OUTPATIENT)
Dept: ORTHOPEDIC SURGERY | Facility: CLINIC | Age: 78
End: 2023-01-23
Payer: MEDICARE

## 2023-01-23 NOTE — TELEPHONE ENCOUNTER
I spoke to Ms. Bond regarding post-op recovery.  I explained Dr. Sinclair said we will see how she does in recovery and make a decision to send her home or keep her overnight.  She will bring her CPAP and personal care items in case she stays.

## 2023-01-24 ENCOUNTER — TELEPHONE (OUTPATIENT)
Dept: ORTHOPEDIC SURGERY | Facility: HOSPITAL | Age: 78
End: 2023-01-24
Payer: MEDICARE

## 2023-01-24 NOTE — TELEPHONE ENCOUNTER
Risk Factor yes no   Age >75  X   BMI <20 >40 X    Patient History     Chronic Pain (2 or more meds/Pain Management)  X   ETOH (more than 3 drinks Daily)  X   Uncontrolled Depression/Bipolar/Schizoaffective Disorder  X   Arrhythmias  X   Stent placement/MI  X   DVT/PE  X   Pacemaker  X   HTN (uncontrolled or requiring more than 2 medications)  X   CHF/Retained fluids/Edema  X   Stroke with Residual   X   COPD/Asthma X    ERIS--Non-compliant with CPAP  X   Diabetes (on insulin or more than 2 meds)         A1C:5.2  X   BPH/Urinary retention (on medication)  X   CKD  X   Home environment and support     Current ambulation status (use of cane, walker, W/C, Multiple falls/weakness) X    Stairs to enter and throughout home X    Lives Alone  X   Doesn’t have support at home  X     Outpatient Screening Assessment    Home needs: (Walker/BSC):  Has Equipment   ? Steps 3 steps in   Caregiver 24-48hrs post-discharge:      Discharge Plan:   PT    Prescriptions: Other pharmacy    Home medications: NO MEDICATIONS  ? Blood thinner/anti-coag therapy--   ? BPH or diuretic  ? BP meds--  ? Pain/Anti-inflammatories--  Pre-op Education:  Educate patient on spinal anesthesia/pain control:  ? patient verbalize understanding    Educate patient on hospital course/timeline:  ?  patient verbalize understanding    Joint Care Class:  ?  yes ? no    Notes:   Uses CPAP Told to bring it with her  Creatinine 1.01  Requesting Dr. Augie Galindo for anesthesiologist she said she hasn't had the PONV issues with him that she has had in the past.

## 2023-02-01 NOTE — DISCHARGE PLACEMENT REQUEST
"Patt Coffey (77 y.o. Female)    YOB: 1945  Social Security Number:   Address: [address] [place] [address]  Home Phone: [phone]  MRN: [id number]  Buddhism: Mormonism  Marital Status:         Admission Date:   Admission Type: Elective  Admitting Provider: Anthony Sinclair MD  Attending Provider: Anthony Sinclair MD  Department, Room/Bed: [facility], --/--  Discharge Date:   Discharge Disposition:   Discharge Destination:               Attending Provider: Anthony Sinclair MD    Allergies: Cephalexin, Cephalosporins, Penicillins    Isolation: None   Infection: None   Code Status: Prior    Ht: 157.5 cm (62\")   Wt: 107 kg (236 lb)    Admission Cmt: None   Principal Problem: Arthritis of knee [M17.10]                 Active Insurance as of 2/2/2023     Primary Coverage     Payor Plan Insurance Group Employer/Plan Group    HUMANA MEDICARE REPLACEMENT HUMANA MEDICARE REPLACEMENT [id number]     Payor Plan Address Payor Plan Phone Number Payor Plan Fax Number Effective Dates    [address] [address] [phone]  1/1/2022 - None Entered    [facility] [id number]       Subscriber Name Subscriber Birth Date Member ID       PATT COFFEY 1945 [id number]                 Emergency Contacts      (Rel.) Home Phone Work Phone Mobile Phone    Ellis Coffey (Spouse) None None [phone]    Sonia Arroyo (Daughter) None None [phone]              "

## 2023-02-02 ENCOUNTER — ANESTHESIA (OUTPATIENT)
Dept: PERIOP | Facility: HOSPITAL | Age: 78
End: 2023-02-02
Payer: MEDICARE

## 2023-02-02 ENCOUNTER — HOSPITAL ENCOUNTER (OUTPATIENT)
Facility: HOSPITAL | Age: 78
Discharge: HOME HEALTH | End: 2023-02-03
Attending: ORTHOPAEDIC SURGERY | Admitting: ORTHOPAEDIC SURGERY
Payer: MEDICARE

## 2023-02-02 ENCOUNTER — ANESTHESIA EVENT (OUTPATIENT)
Dept: PERIOP | Facility: HOSPITAL | Age: 78
End: 2023-02-02
Payer: MEDICARE

## 2023-02-02 ENCOUNTER — APPOINTMENT (OUTPATIENT)
Dept: GENERAL RADIOLOGY | Facility: HOSPITAL | Age: 78
End: 2023-02-02
Payer: MEDICARE

## 2023-02-02 ENCOUNTER — HOME HEALTH ADMISSION (OUTPATIENT)
Dept: HOME HEALTH SERVICES | Facility: HOME HEALTHCARE | Age: 78
End: 2023-02-02
Payer: MEDICARE

## 2023-02-02 DIAGNOSIS — Z96.652 TOTAL KNEE REPLACEMENT STATUS, LEFT: Primary | ICD-10-CM

## 2023-02-02 DIAGNOSIS — M17.10 ARTHRITIS OF KNEE: ICD-10-CM

## 2023-02-02 PROCEDURE — 25010000002 ONDANSETRON PER 1 MG: Performed by: ORTHOPAEDIC SURGERY

## 2023-02-02 PROCEDURE — G0378 HOSPITAL OBSERVATION PER HR: HCPCS

## 2023-02-02 PROCEDURE — 25010000002 ONDANSETRON PER 1 MG: Performed by: NURSE ANESTHETIST, CERTIFIED REGISTERED

## 2023-02-02 PROCEDURE — 25010000002 HYDROMORPHONE 1 MG/ML SOLUTION: Performed by: NURSE ANESTHETIST, CERTIFIED REGISTERED

## 2023-02-02 PROCEDURE — 25010000002 ROPIVACAINE PER 1 MG: Performed by: ORTHOPAEDIC SURGERY

## 2023-02-02 PROCEDURE — C1713 ANCHOR/SCREW BN/BN,TIS/BN: HCPCS | Performed by: ORTHOPAEDIC SURGERY

## 2023-02-02 PROCEDURE — 25010000002 PROCHLORPERAZINE 10 MG/2ML SOLUTION: Performed by: STUDENT IN AN ORGANIZED HEALTH CARE EDUCATION/TRAINING PROGRAM

## 2023-02-02 PROCEDURE — 25010000002 EPINEPHRINE 1 MG/ML SOLUTION 30 ML VIAL: Performed by: ORTHOPAEDIC SURGERY

## 2023-02-02 PROCEDURE — 25010000002 KETOROLAC TROMETHAMINE PER 15 MG: Performed by: ORTHOPAEDIC SURGERY

## 2023-02-02 PROCEDURE — 25010000002 MIDAZOLAM PER 1 MG: Performed by: STUDENT IN AN ORGANIZED HEALTH CARE EDUCATION/TRAINING PROGRAM

## 2023-02-02 PROCEDURE — 27447 TOTAL KNEE ARTHROPLASTY: CPT | Performed by: ORTHOPAEDIC SURGERY

## 2023-02-02 PROCEDURE — 25010000002 VANCOMYCIN 10 G RECONSTITUTED SOLUTION: Performed by: ORTHOPAEDIC SURGERY

## 2023-02-02 PROCEDURE — 25010000002 DEXAMETHASONE SODIUM PHOSPHATE 20 MG/5ML SOLUTION: Performed by: STUDENT IN AN ORGANIZED HEALTH CARE EDUCATION/TRAINING PROGRAM

## 2023-02-02 PROCEDURE — 25010000002 ROPIVACAINE PER 1 MG: Performed by: STUDENT IN AN ORGANIZED HEALTH CARE EDUCATION/TRAINING PROGRAM

## 2023-02-02 PROCEDURE — 25010000002 HYDRALAZINE PER 20 MG: Performed by: NURSE ANESTHETIST, CERTIFIED REGISTERED

## 2023-02-02 PROCEDURE — 97161 PT EVAL LOW COMPLEX 20 MIN: CPT

## 2023-02-02 PROCEDURE — 25010000002 PROPOFOL 10 MG/ML EMULSION: Performed by: NURSE ANESTHETIST, CERTIFIED REGISTERED

## 2023-02-02 PROCEDURE — A9270 NON-COVERED ITEM OR SERVICE: HCPCS | Performed by: ORTHOPAEDIC SURGERY

## 2023-02-02 PROCEDURE — 25010000002 FENTANYL CITRATE (PF) 100 MCG/2ML SOLUTION: Performed by: NURSE ANESTHETIST, CERTIFIED REGISTERED

## 2023-02-02 PROCEDURE — 25010000002 FENTANYL CITRATE (PF) 50 MCG/ML SOLUTION: Performed by: STUDENT IN AN ORGANIZED HEALTH CARE EDUCATION/TRAINING PROGRAM

## 2023-02-02 PROCEDURE — 63710000001 ACETAMINOPHEN 500 MG TABLET: Performed by: ORTHOPAEDIC SURGERY

## 2023-02-02 PROCEDURE — C1776 JOINT DEVICE (IMPLANTABLE): HCPCS | Performed by: ORTHOPAEDIC SURGERY

## 2023-02-02 PROCEDURE — 73560 X-RAY EXAM OF KNEE 1 OR 2: CPT

## 2023-02-02 PROCEDURE — 25010000002 MORPHINE PER 10 MG: Performed by: ORTHOPAEDIC SURGERY

## 2023-02-02 DEVICE — IMPLANTABLE DEVICE: Type: IMPLANTABLE DEVICE | Site: KNEE | Status: FUNCTIONAL

## 2023-02-02 DEVICE — GENESIS II NON-POROUS TIBIAL                                    BASEPLATE SIZE 4 LEFT
Type: IMPLANTABLE DEVICE | Site: KNEE | Status: FUNCTIONAL
Brand: GENESIS II

## 2023-02-02 DEVICE — LEGION CRUCIATE RETAINING OXINIUM                                    FEMORAL SIZE 4 LEFT
Type: IMPLANTABLE DEVICE | Site: KNEE | Status: FUNCTIONAL
Brand: LEGION

## 2023-02-02 DEVICE — GEN II 7.5MM RESUR PAT 32MM
Type: IMPLANTABLE DEVICE | Site: KNEE | Status: FUNCTIONAL
Brand: GENESIS II

## 2023-02-02 DEVICE — LEGION CRUCIATE RETAINING HIGH                                    FLEX HIGHLY CROSS LINKED                                    POLYETHYLENE SIZE 3-4 11MM
Type: IMPLANTABLE DEVICE | Site: KNEE | Status: FUNCTIONAL
Brand: LEGION

## 2023-02-02 DEVICE — CMT BONE PALACOS R HI/VISC 1X40: Type: IMPLANTABLE DEVICE | Site: KNEE | Status: FUNCTIONAL

## 2023-02-02 DEVICE — DEV CONTRL TISS STRATAFIX SPIRAL MNCRYL UD 3/0 PLS 30CM: Type: IMPLANTABLE DEVICE | Site: KNEE | Status: FUNCTIONAL

## 2023-02-02 DEVICE — DEV CONTRL TISS STRATAFIX SYMM PDS PLUS VIL CT-1 60CM: Type: IMPLANTABLE DEVICE | Site: KNEE | Status: FUNCTIONAL

## 2023-02-02 RX ORDER — OXYCODONE AND ACETAMINOPHEN 7.5; 325 MG/1; MG/1
1 TABLET ORAL EVERY 4 HOURS PRN
Status: DISCONTINUED | OUTPATIENT
Start: 2023-02-02 | End: 2023-02-02 | Stop reason: HOSPADM

## 2023-02-02 RX ORDER — DOCUSATE SODIUM 100 MG/1
100 CAPSULE, LIQUID FILLED ORAL 2 TIMES DAILY
Qty: 60 CAPSULE | Refills: 0 | Status: SHIPPED | OUTPATIENT
Start: 2023-02-02

## 2023-02-02 RX ORDER — FLUMAZENIL 0.1 MG/ML
0.2 INJECTION INTRAVENOUS AS NEEDED
Status: DISCONTINUED | OUTPATIENT
Start: 2023-02-02 | End: 2023-02-02 | Stop reason: HOSPADM

## 2023-02-02 RX ORDER — MELOXICAM 15 MG/1
15 TABLET ORAL DAILY
Status: DISCONTINUED | OUTPATIENT
Start: 2023-02-03 | End: 2023-02-03 | Stop reason: HOSPADM

## 2023-02-02 RX ORDER — EPHEDRINE SULFATE 50 MG/ML
5 INJECTION, SOLUTION INTRAVENOUS ONCE AS NEEDED
Status: DISCONTINUED | OUTPATIENT
Start: 2023-02-02 | End: 2023-02-02 | Stop reason: HOSPADM

## 2023-02-02 RX ORDER — ACETAMINOPHEN 500 MG
500 TABLET ORAL 2 TIMES DAILY PRN
Status: DISCONTINUED | OUTPATIENT
Start: 2023-02-02 | End: 2023-02-03 | Stop reason: HOSPADM

## 2023-02-02 RX ORDER — HYDROCODONE BITARTRATE AND ACETAMINOPHEN 7.5; 325 MG/1; MG/1
1 TABLET ORAL ONCE AS NEEDED
Status: DISCONTINUED | OUTPATIENT
Start: 2023-02-02 | End: 2023-02-02 | Stop reason: HOSPADM

## 2023-02-02 RX ORDER — HYDROMORPHONE HYDROCHLORIDE 1 MG/ML
0.5 INJECTION, SOLUTION INTRAMUSCULAR; INTRAVENOUS; SUBCUTANEOUS
Status: DISCONTINUED | OUTPATIENT
Start: 2023-02-02 | End: 2023-02-02 | Stop reason: HOSPADM

## 2023-02-02 RX ORDER — HYDROCODONE BITARTRATE AND ACETAMINOPHEN 7.5; 325 MG/1; MG/1
1 TABLET ORAL EVERY 4 HOURS PRN
Status: DISCONTINUED | OUTPATIENT
Start: 2023-02-02 | End: 2023-02-03 | Stop reason: HOSPADM

## 2023-02-02 RX ORDER — ASPIRIN 325 MG
325 TABLET, DELAYED RELEASE (ENTERIC COATED) ORAL DAILY
Status: DISCONTINUED | OUTPATIENT
Start: 2023-02-03 | End: 2023-02-03 | Stop reason: HOSPADM

## 2023-02-02 RX ORDER — ALLOPURINOL 300 MG/1
300 TABLET ORAL DAILY
Status: DISCONTINUED | OUTPATIENT
Start: 2023-02-02 | End: 2023-02-03 | Stop reason: HOSPADM

## 2023-02-02 RX ORDER — DIPHENHYDRAMINE HYDROCHLORIDE 50 MG/ML
12.5 INJECTION INTRAMUSCULAR; INTRAVENOUS
Status: DISCONTINUED | OUTPATIENT
Start: 2023-02-02 | End: 2023-02-02 | Stop reason: HOSPADM

## 2023-02-02 RX ORDER — HYDRALAZINE HYDROCHLORIDE 20 MG/ML
5 INJECTION INTRAMUSCULAR; INTRAVENOUS
Status: DISCONTINUED | OUTPATIENT
Start: 2023-02-02 | End: 2023-02-02 | Stop reason: HOSPADM

## 2023-02-02 RX ORDER — PROPOFOL 10 MG/ML
VIAL (ML) INTRAVENOUS AS NEEDED
Status: DISCONTINUED | OUTPATIENT
Start: 2023-02-02 | End: 2023-02-02 | Stop reason: SURG

## 2023-02-02 RX ORDER — FENTANYL CITRATE 50 UG/ML
50 INJECTION, SOLUTION INTRAMUSCULAR; INTRAVENOUS
Status: DISCONTINUED | OUTPATIENT
Start: 2023-02-02 | End: 2023-02-02 | Stop reason: HOSPADM

## 2023-02-02 RX ORDER — MAGNESIUM HYDROXIDE 1200 MG/15ML
LIQUID ORAL AS NEEDED
Status: DISCONTINUED | OUTPATIENT
Start: 2023-02-02 | End: 2023-02-02 | Stop reason: HOSPADM

## 2023-02-02 RX ORDER — HYDROCODONE BITARTRATE AND ACETAMINOPHEN 7.5; 325 MG/1; MG/1
2 TABLET ORAL EVERY 4 HOURS PRN
Status: DISCONTINUED | OUTPATIENT
Start: 2023-02-02 | End: 2023-02-03 | Stop reason: HOSPADM

## 2023-02-02 RX ORDER — VANCOMYCIN HYDROCHLORIDE 1 G/200ML
1000 INJECTION, SOLUTION INTRAVENOUS ONCE
Status: DISCONTINUED | OUTPATIENT
Start: 2023-02-02 | End: 2023-02-02 | Stop reason: SDUPTHER

## 2023-02-02 RX ORDER — ONDANSETRON 2 MG/ML
4 INJECTION INTRAMUSCULAR; INTRAVENOUS ONCE AS NEEDED
Status: DISCONTINUED | OUTPATIENT
Start: 2023-02-02 | End: 2023-02-02 | Stop reason: HOSPADM

## 2023-02-02 RX ORDER — KETOROLAC TROMETHAMINE 30 MG/ML
15 INJECTION, SOLUTION INTRAMUSCULAR; INTRAVENOUS ONCE AS NEEDED
Status: ACTIVE | OUTPATIENT
Start: 2023-02-02 | End: 2023-02-03

## 2023-02-02 RX ORDER — POLYETHYLENE GLYCOL 3350 17 G/17G
17 POWDER, FOR SOLUTION ORAL DAILY
Status: DISCONTINUED | OUTPATIENT
Start: 2023-02-03 | End: 2023-02-03 | Stop reason: HOSPADM

## 2023-02-02 RX ORDER — NALOXONE HCL 0.4 MG/ML
0.2 VIAL (ML) INJECTION AS NEEDED
Status: DISCONTINUED | OUTPATIENT
Start: 2023-02-02 | End: 2023-02-02 | Stop reason: HOSPADM

## 2023-02-02 RX ORDER — ONDANSETRON 2 MG/ML
4 INJECTION INTRAMUSCULAR; INTRAVENOUS EVERY 6 HOURS PRN
Status: DISCONTINUED | OUTPATIENT
Start: 2023-02-02 | End: 2023-02-03 | Stop reason: HOSPADM

## 2023-02-02 RX ORDER — ONDANSETRON 2 MG/ML
INJECTION INTRAMUSCULAR; INTRAVENOUS AS NEEDED
Status: DISCONTINUED | OUTPATIENT
Start: 2023-02-02 | End: 2023-02-02 | Stop reason: SURG

## 2023-02-02 RX ORDER — PROMETHAZINE HYDROCHLORIDE 25 MG/1
25 TABLET ORAL ONCE AS NEEDED
Status: DISCONTINUED | OUTPATIENT
Start: 2023-02-02 | End: 2023-02-02 | Stop reason: HOSPADM

## 2023-02-02 RX ORDER — SODIUM CHLORIDE 0.9 % (FLUSH) 0.9 %
3 SYRINGE (ML) INJECTION EVERY 12 HOURS SCHEDULED
Status: DISCONTINUED | OUTPATIENT
Start: 2023-02-02 | End: 2023-02-02 | Stop reason: HOSPADM

## 2023-02-02 RX ORDER — METHIMAZOLE 5 MG/1
2.5 TABLET ORAL DAILY
Status: DISCONTINUED | OUTPATIENT
Start: 2023-02-02 | End: 2023-02-03 | Stop reason: HOSPADM

## 2023-02-02 RX ORDER — ONDANSETRON 4 MG/1
4 TABLET, FILM COATED ORAL EVERY 8 HOURS PRN
Qty: 12 TABLET | Refills: 0 | Status: SHIPPED | OUTPATIENT
Start: 2023-02-02

## 2023-02-02 RX ORDER — ACETAMINOPHEN 325 MG/1
1000 TABLET ORAL ONCE
Status: COMPLETED | OUTPATIENT
Start: 2023-02-02 | End: 2023-02-02

## 2023-02-02 RX ORDER — LIDOCAINE HYDROCHLORIDE 20 MG/ML
INJECTION, SOLUTION INFILTRATION; PERINEURAL AS NEEDED
Status: DISCONTINUED | OUTPATIENT
Start: 2023-02-02 | End: 2023-02-02 | Stop reason: SURG

## 2023-02-02 RX ORDER — DIPHENHYDRAMINE HCL 25 MG
25 CAPSULE ORAL
Status: DISCONTINUED | OUTPATIENT
Start: 2023-02-02 | End: 2023-02-02 | Stop reason: HOSPADM

## 2023-02-02 RX ORDER — ROPIVACAINE HYDROCHLORIDE 5 MG/ML
INJECTION, SOLUTION EPIDURAL; INFILTRATION; PERINEURAL
Status: COMPLETED | OUTPATIENT
Start: 2023-02-02 | End: 2023-02-02

## 2023-02-02 RX ORDER — MELOXICAM 15 MG/1
15 TABLET ORAL ONCE
Status: DISCONTINUED | OUTPATIENT
Start: 2023-02-02 | End: 2023-02-02 | Stop reason: HOSPADM

## 2023-02-02 RX ORDER — SCOLOPAMINE TRANSDERMAL SYSTEM 1 MG/1
1 PATCH, EXTENDED RELEASE TRANSDERMAL ONCE
Status: DISCONTINUED | OUTPATIENT
Start: 2023-02-02 | End: 2023-02-02

## 2023-02-02 RX ORDER — SODIUM CHLORIDE 0.9 % (FLUSH) 0.9 %
3-10 SYRINGE (ML) INJECTION AS NEEDED
Status: DISCONTINUED | OUTPATIENT
Start: 2023-02-02 | End: 2023-02-02 | Stop reason: HOSPADM

## 2023-02-02 RX ORDER — SODIUM CHLORIDE, SODIUM LACTATE, POTASSIUM CHLORIDE, CALCIUM CHLORIDE 600; 310; 30; 20 MG/100ML; MG/100ML; MG/100ML; MG/100ML
9 INJECTION, SOLUTION INTRAVENOUS CONTINUOUS
Status: DISCONTINUED | OUTPATIENT
Start: 2023-02-02 | End: 2023-02-03 | Stop reason: HOSPADM

## 2023-02-02 RX ORDER — ONDANSETRON 4 MG/1
4 TABLET, FILM COATED ORAL EVERY 6 HOURS PRN
Status: DISCONTINUED | OUTPATIENT
Start: 2023-02-02 | End: 2023-02-03 | Stop reason: HOSPADM

## 2023-02-02 RX ORDER — ACETAMINOPHEN 325 MG/1
650 TABLET ORAL 2 TIMES DAILY PRN
Qty: 60 TABLET | Refills: 0 | Status: SHIPPED | OUTPATIENT
Start: 2023-02-02

## 2023-02-02 RX ORDER — TRANEXAMIC ACID 100 MG/ML
INJECTION, SOLUTION INTRAVENOUS AS NEEDED
Status: DISCONTINUED | OUTPATIENT
Start: 2023-02-02 | End: 2023-02-02 | Stop reason: SURG

## 2023-02-02 RX ORDER — HYDROMORPHONE HYDROCHLORIDE 1 MG/ML
0.5 INJECTION, SOLUTION INTRAMUSCULAR; INTRAVENOUS; SUBCUTANEOUS
Status: DISCONTINUED | OUTPATIENT
Start: 2023-02-02 | End: 2023-02-03 | Stop reason: HOSPADM

## 2023-02-02 RX ORDER — HYDROCODONE BITARTRATE AND ACETAMINOPHEN 7.5; 325 MG/1; MG/1
TABLET ORAL
Qty: 42 TABLET | Refills: 0 | Status: SHIPPED | OUTPATIENT
Start: 2023-02-02 | End: 2023-02-09 | Stop reason: SDUPTHER

## 2023-02-02 RX ORDER — HYDRALAZINE HYDROCHLORIDE 20 MG/ML
INJECTION INTRAMUSCULAR; INTRAVENOUS AS NEEDED
Status: DISCONTINUED | OUTPATIENT
Start: 2023-02-02 | End: 2023-02-02 | Stop reason: SURG

## 2023-02-02 RX ORDER — FENTANYL CITRATE 50 UG/ML
INJECTION, SOLUTION INTRAMUSCULAR; INTRAVENOUS AS NEEDED
Status: DISCONTINUED | OUTPATIENT
Start: 2023-02-02 | End: 2023-02-02 | Stop reason: SURG

## 2023-02-02 RX ORDER — ROCURONIUM BROMIDE 10 MG/ML
INJECTION, SOLUTION INTRAVENOUS AS NEEDED
Status: DISCONTINUED | OUTPATIENT
Start: 2023-02-02 | End: 2023-02-02 | Stop reason: SURG

## 2023-02-02 RX ORDER — ONDANSETRON 2 MG/ML
4 INJECTION INTRAMUSCULAR; INTRAVENOUS ONCE AS NEEDED
Status: COMPLETED | OUTPATIENT
Start: 2023-02-02 | End: 2023-02-02

## 2023-02-02 RX ORDER — BISACODYL 10 MG
10 SUPPOSITORY, RECTAL RECTAL DAILY PRN
Status: DISCONTINUED | OUTPATIENT
Start: 2023-02-02 | End: 2023-02-03 | Stop reason: HOSPADM

## 2023-02-02 RX ORDER — ONDANSETRON 4 MG/1
4 TABLET, FILM COATED ORAL ONCE AS NEEDED
Status: DISCONTINUED | OUTPATIENT
Start: 2023-02-02 | End: 2023-02-03 | Stop reason: HOSPADM

## 2023-02-02 RX ORDER — ASPIRIN 81 MG/1
81 TABLET ORAL 2 TIMES DAILY
Qty: 60 TABLET | Refills: 0 | Status: SHIPPED | OUTPATIENT
Start: 2023-02-02

## 2023-02-02 RX ORDER — LIDOCAINE HYDROCHLORIDE 10 MG/ML
0.5 INJECTION, SOLUTION EPIDURAL; INFILTRATION; INTRACAUDAL; PERINEURAL ONCE AS NEEDED
Status: DISCONTINUED | OUTPATIENT
Start: 2023-02-02 | End: 2023-02-02 | Stop reason: HOSPADM

## 2023-02-02 RX ORDER — PREGABALIN 75 MG/1
150 CAPSULE ORAL ONCE
Status: COMPLETED | OUTPATIENT
Start: 2023-02-02 | End: 2023-02-02

## 2023-02-02 RX ORDER — PROMETHAZINE HYDROCHLORIDE 25 MG/1
25 SUPPOSITORY RECTAL ONCE AS NEEDED
Status: DISCONTINUED | OUTPATIENT
Start: 2023-02-02 | End: 2023-02-02 | Stop reason: HOSPADM

## 2023-02-02 RX ORDER — LABETALOL HYDROCHLORIDE 5 MG/ML
5 INJECTION, SOLUTION INTRAVENOUS
Status: DISCONTINUED | OUTPATIENT
Start: 2023-02-02 | End: 2023-02-02 | Stop reason: HOSPADM

## 2023-02-02 RX ORDER — NALOXONE HCL 0.4 MG/ML
0.1 VIAL (ML) INJECTION
Status: DISCONTINUED | OUTPATIENT
Start: 2023-02-02 | End: 2023-02-03 | Stop reason: HOSPADM

## 2023-02-02 RX ORDER — MIDAZOLAM HYDROCHLORIDE 1 MG/ML
0.5 INJECTION INTRAMUSCULAR; INTRAVENOUS
Status: COMPLETED | OUTPATIENT
Start: 2023-02-02 | End: 2023-02-02

## 2023-02-02 RX ORDER — DOCUSATE SODIUM 100 MG/1
100 CAPSULE, LIQUID FILLED ORAL 2 TIMES DAILY
Status: DISCONTINUED | OUTPATIENT
Start: 2023-02-02 | End: 2023-02-03 | Stop reason: HOSPADM

## 2023-02-02 RX ORDER — SODIUM CHLORIDE, SODIUM LACTATE, POTASSIUM CHLORIDE, CALCIUM CHLORIDE 600; 310; 30; 20 MG/100ML; MG/100ML; MG/100ML; MG/100ML
100 INJECTION, SOLUTION INTRAVENOUS CONTINUOUS
Status: DISCONTINUED | OUTPATIENT
Start: 2023-02-02 | End: 2023-02-03 | Stop reason: HOSPADM

## 2023-02-02 RX ORDER — EPHEDRINE SULFATE 50 MG/ML
INJECTION INTRAVENOUS AS NEEDED
Status: DISCONTINUED | OUTPATIENT
Start: 2023-02-02 | End: 2023-02-02 | Stop reason: SURG

## 2023-02-02 RX ORDER — ASPIRIN 81 MG/1
81 TABLET ORAL ONCE
Status: DISCONTINUED | OUTPATIENT
Start: 2023-02-02 | End: 2023-02-03 | Stop reason: HOSPADM

## 2023-02-02 RX ORDER — PROCHLORPERAZINE EDISYLATE 5 MG/ML
2.5 INJECTION INTRAMUSCULAR; INTRAVENOUS ONCE
Status: COMPLETED | OUTPATIENT
Start: 2023-02-02 | End: 2023-02-02

## 2023-02-02 RX ORDER — CHLORHEXIDINE GLUCONATE 500 MG/1
1 CLOTH TOPICAL TAKE AS DIRECTED
Status: DISCONTINUED | OUTPATIENT
Start: 2023-02-02 | End: 2023-02-02

## 2023-02-02 RX ORDER — DEXAMETHASONE SODIUM PHOSPHATE 4 MG/ML
INJECTION, SOLUTION INTRA-ARTICULAR; INTRALESIONAL; INTRAMUSCULAR; INTRAVENOUS; SOFT TISSUE
Status: COMPLETED | OUTPATIENT
Start: 2023-02-02 | End: 2023-02-02

## 2023-02-02 RX ADMIN — FENTANYL CITRATE 50 MCG: 50 INJECTION, SOLUTION INTRAMUSCULAR; INTRAVENOUS at 07:19

## 2023-02-02 RX ADMIN — PROCHLORPERAZINE EDISYLATE 2.5 MG: 5 INJECTION INTRAMUSCULAR; INTRAVENOUS at 19:49

## 2023-02-02 RX ADMIN — PROPOFOL 150 MG: 10 INJECTION, EMULSION INTRAVENOUS at 08:11

## 2023-02-02 RX ADMIN — ROPIVACAINE HYDROCHLORIDE 14 ML: 5 INJECTION EPIDURAL; INFILTRATION; PERINEURAL at 07:20

## 2023-02-02 RX ADMIN — ACETAMINOPHEN 500 MG: 500 TABLET, FILM COATED ORAL at 18:05

## 2023-02-02 RX ADMIN — MIDAZOLAM 1 MG: 1 INJECTION INTRAMUSCULAR; INTRAVENOUS at 07:31

## 2023-02-02 RX ADMIN — ACETAMINOPHEN 975 MG: 325 TABLET, FILM COATED ORAL at 06:38

## 2023-02-02 RX ADMIN — SUGAMMADEX 200 MG: 100 INJECTION, SOLUTION INTRAVENOUS at 09:31

## 2023-02-02 RX ADMIN — DEXAMETHASONE SODIUM PHOSPHATE 4 MG: 4 INJECTION, SOLUTION INTRAMUSCULAR; INTRAVENOUS at 07:20

## 2023-02-02 RX ADMIN — ROCURONIUM BROMIDE 50 MG: 10 INJECTION, SOLUTION INTRAVENOUS at 08:11

## 2023-02-02 RX ADMIN — MIDAZOLAM 1 MG: 1 INJECTION INTRAMUSCULAR; INTRAVENOUS at 07:19

## 2023-02-02 RX ADMIN — HYDROMORPHONE HYDROCHLORIDE 0.5 MG: 1 INJECTION, SOLUTION INTRAMUSCULAR; INTRAVENOUS; SUBCUTANEOUS at 09:46

## 2023-02-02 RX ADMIN — EPHEDRINE SULFATE 10 MG: 50 INJECTION INTRAVENOUS at 08:31

## 2023-02-02 RX ADMIN — HYDRALAZINE HYDROCHLORIDE 10 MG: 20 INJECTION INTRAMUSCULAR; INTRAVENOUS at 08:44

## 2023-02-02 RX ADMIN — ONDANSETRON 4 MG: 2 INJECTION INTRAMUSCULAR; INTRAVENOUS at 08:50

## 2023-02-02 RX ADMIN — SODIUM CHLORIDE, POTASSIUM CHLORIDE, SODIUM LACTATE AND CALCIUM CHLORIDE 500 ML: 600; 310; 30; 20 INJECTION, SOLUTION INTRAVENOUS at 06:17

## 2023-02-02 RX ADMIN — LIDOCAINE HYDROCHLORIDE 60 MG: 20 INJECTION, SOLUTION INFILTRATION; PERINEURAL at 08:11

## 2023-02-02 RX ADMIN — ONDANSETRON 4 MG: 2 INJECTION INTRAMUSCULAR; INTRAVENOUS at 14:39

## 2023-02-02 RX ADMIN — TRANEXAMIC ACID 1000 MG: 1 INJECTION, SOLUTION INTRAVENOUS at 08:29

## 2023-02-02 RX ADMIN — VANCOMYCIN HYDROCHLORIDE 1750 MG: 10 INJECTION, POWDER, LYOPHILIZED, FOR SOLUTION INTRAVENOUS at 07:39

## 2023-02-02 RX ADMIN — SODIUM CHLORIDE, POTASSIUM CHLORIDE, SODIUM LACTATE AND CALCIUM CHLORIDE 9 ML/HR: 600; 310; 30; 20 INJECTION, SOLUTION INTRAVENOUS at 07:07

## 2023-02-02 RX ADMIN — PREGABALIN 150 MG: 75 CAPSULE ORAL at 06:38

## 2023-02-02 RX ADMIN — FENTANYL CITRATE 100 MCG: 50 INJECTION, SOLUTION INTRAMUSCULAR; INTRAVENOUS at 08:11

## 2023-02-02 RX ADMIN — DEXAMETHASONE SODIUM PHOSPHATE 8 MG: 4 INJECTION, SOLUTION INTRAMUSCULAR; INTRAVENOUS at 08:50

## 2023-02-02 RX ADMIN — VANCOMYCIN HYDROCHLORIDE 1500 MG: 10 INJECTION, POWDER, LYOPHILIZED, FOR SOLUTION INTRAVENOUS at 19:50

## 2023-02-02 NOTE — PERIOPERATIVE NURSING NOTE
"PT. AWAKENED FOR EVALUATION. STATES SHE IS VERY SLEEPY. SAYS SHE IS \" TOO TIRED TO GET UP OFF STRETCHER.\"  SHE JUST \" WANTS TO SLEEP.\" REMAINS ON O2 AT THIS TIME AT 2L. SAO2 93-94. UPDATE CALLED TO DR. SCHNEIDER.  "

## 2023-02-02 NOTE — ANESTHESIA PROCEDURE NOTES
Airway  Urgency: elective    Date/Time: 2/2/2023 8:14 AM  Airway not difficult    General Information and Staff    Patient location during procedure: OR  CRNA/CAA: Chastity Prasad CRNA    Indications and Patient Condition  Indications for airway management: airway protection    Preoxygenated: yes  Mask difficulty assessment: 1 - vent by mask    Final Airway Details  Final airway type: endotracheal airway      Successful airway: ETT  Cuffed: yes   Successful intubation technique: direct laryngoscopy  Endotracheal tube insertion site: oral  Blade: Nehemias  Blade size: 3  ETT size (mm): 7.0  Cormack-Lehane Classification: grade I - full view of glottis  Placement verified by: chest auscultation and capnometry   Cuff volume (mL): 6  Measured from: lips  ETT/EBT  to lips (cm): 20  Number of attempts at approach: 1  Assessment: lips, teeth, and gum same as pre-op and atraumatic intubation    Additional Comments  Smooth IV induction. Trachea intubated. Cuff up. Ett secured. BEBS. Dentition intact without injury.

## 2023-02-02 NOTE — INTERVAL H&P NOTE
H&P reviewed. The patient was examined and there are no changes to the H&P.   complains of pain/discomfort

## 2023-02-02 NOTE — BRIEF OP NOTE
TOTAL KNEE ARTHROPLASTY  Progress Note    Patt Bond  2/2/2023    Pre-op Diagnosis:   Arthritis of knee [M17.10]       Post-Op Diagnosis Codes:     * Arthritis of knee [M17.10]    Procedure/CPT® Codes:        Procedure(s):  TOTAL KNEE ARTHROPLASTY    Surgical Approach: Knee Medial Parapatellar      Surgeon(s):  Anthony Sinclair MD    Anesthesia: General with Block    Staff:   Circulator: Johann Levy RN; Isac Ott RN  Scrub Person: Nimco Mcclellan Virginia L         Estimated Blood Loss: minimal    Urine Voided: * No values recorded between 2/2/2023  8:02 AM and 2/2/2023  9:33 AM *    Specimens:                None          Drains:   [REMOVED] Ureteral Drain/Stent Left ureter 4.8 Fr. (Removed)       Findings: see dictation        Complications: none          Anthony Sinclair MD     Date: 2/2/2023  Time: 09:39 EST

## 2023-02-02 NOTE — DISCHARGE PLACEMENT REQUEST
"Patt Coffey (77 y.o. Female)     Date of Birth   1945    Social Security Number       Address   [address] [id number]    Home Phone   [phone]    MRN   [id number]       Presybeterian   Baptism    Marital Status                               Admission Date   2/2/23    Admission Type   Elective    Admitting Provider   Anthony Sinclair MD    Attending Provider   Anthony Sinclair MD    Department, Room/Bed   [address], P[address]/1       Discharge Date       Discharge Disposition       Discharge Destination                               Attending Provider: Anthony Sinclair MD    Allergies: Other, Cephalexin, Cephalosporins, Penicillins    Isolation: None   Infection: None   Code Status: Prior    Ht: 157.5 cm (62\")   Wt: 107 kg (236 lb)    Admission Cmt: None   Principal Problem: Arthritis of knee [M17.10]                 Active Insurance as of 2/2/2023     Primary Coverage     Payor Plan Insurance Group Employer/Plan Group    HUMANA MEDICARE REPLACEMENT HUMANA MEDICARE REPLACEMENT [id number]     Payor Plan Address Payor Plan Phone Number Payor Plan Fax Number Effective Dates    [address] [address] [phone]  1/1/2022 - None Entered    [facility] [address]       Subscriber Name Subscriber Birth Date Member ID       PATT COFFEY 1945 [id number]                 Emergency Contacts      (Rel.) Home Phone Work Phone Mobile Phone    Ellis Coffey (Spouse) [phone] -- [phone]    Sonia Arroyo (Daughter) -- -- [phone]          "

## 2023-02-02 NOTE — ANESTHESIA POSTPROCEDURE EVALUATION
Patient: Patt Bond    Procedure Summary     Date: 02/02/23 Room / Location:  [facility] [address] [place] [address] [facility]    Anesthesia Start: 0802 Anesthesia Stop: 1006    Procedure: TOTAL KNEE ARTHROPLASTY (Left: Knee) Diagnosis:       Arthritis of knee      (Arthritis of knee [M17.10])    Surgeons: Anthony Sinclair MD Provider: Joaquin Whitfield MD    Anesthesia Type: general ASA Status: 3          Anesthesia Type: general    Vitals  Vitals Value Taken Time   /70 02/02/23 1200   Temp 36.8 °C (98.2 °F) 02/02/23 1002   Pulse 89 02/02/23 1213   Resp 14 02/02/23 1210   SpO2 94 % 02/02/23 1213   Vitals shown include unvalidated device data.        Post Anesthesia Care and Evaluation    Patient location during evaluation: bedside  Patient participation: complete - patient participated  Level of consciousness: awake and alert  Pain management: adequate    Airway patency: patent  Anesthetic complications: No anesthetic complications  PONV Status: controlled  Cardiovascular status: blood pressure returned to baseline and acceptable  Respiratory status: acceptable  Hydration status: acceptable

## 2023-02-02 NOTE — PLAN OF CARE
Goal Outcome Evaluation:  Plan of Care Reviewed With: patient        Progress: no change  Outcome Evaluation: VSS, NVI, voiding per purewick, unable to ambulate d/t extreme nausea and dizzyness, plan was to dc home today but unable d/t drowsiness, tolerating small sips of sprite, Tylenol for HA, plan to dc home tomorrow, educated on O2 monitoring and CPAP use

## 2023-02-02 NOTE — OP NOTE
Orthopaedic Operative Note    Facility: [facility]    Patient: Patt Bond    Medical Record Number: [id number]    YOB: 1945    Dictating Surgeon: Anthony Sinclair M.D.*    Primary Care Physician: Moe Matute MD    Date of Operation: 2/2/2023    Pre-Operative Diagnosis:  Left knee end-stage osteoarthritis    Post-Operative Diagnosis:  Left knee end-stage osteoarthritis    Procedure Performed:   Left total knee arthroplasty    Surgeon: Anthony Sinclair MD     Assistant: Nimco Mcclellan whose assistance was critical for help with patient positioning, suctioning and irrigation, retraction, manipulation of the extremity for insertion of the implants, wound closure and application of the bandages.  Her assistance was critical to the success of this case.     Anesthesia: Regional followed by general.  Local administration of Ortho cocktail solution.    Complications: None.     Estimated Blood Loss: Less than 50 mL.     Implants:     1.  Smith [employer] Nephew size 4 Legion Oxinium femoral component  2.  Smith and Nephew size 4 tibial component with size 11 mm polyethylene liner  3.  Smith [employer] Nephew size 32 x 7.5 mm patellar component    Specimens: * No orders in the log *    Brief Operative Indication: Ms. Bond has a history of worsening left knee osteoarthritis which had been refractory to prolonged conservative treatment.  The risk, benefits and alternatives to a total knee arthroplasty were discussed with the patient in detail.  She acknowledged understanding of the information and consented to proceed.    Description of the procedure in detail: The patient and operative site were identified in the preoperative holding area.  The surgical site was marked.  Adequate regional anesthesia of the left lower extremity was administered. She was then taken to the operating room.  Adequate general anesthesia was administered. She was then repositioned on the operating table in the supine  position.  A timeout was taken and preoperative antibiotics administered.    The left lower extremity was prepped and draped in the standard, sterile fashion.  I began by cleaning the extremity with an alcohol solution.  A Hibiclens scrub was performed.  The extremity was then prepped with 2 ChloraPreps.  I allowed those to dry for 3 minutes before the draping procedure was carried out.  The leg was exsanguinated with an Esmarch bandage.  The tourniquet was inflated to 250 mmHg.  The leg was positioned at approximately 60 degrees of flexion across the knee in a DeMayo leg positioner.    I fashioned an approximately 8 cm incision anteriorly for a standard medial parapatellar approach.  Full-thickness medial and lateral skin flaps were developed.  The extensor mechanism was carefully exposed.  I performed a medial parapatellar arthrotomy, careful to maintain a cuff of tendinous tissue for later anatomic repair.  The joint was entered.  The infrapatellar fat pad was carefully removed.    Next, the anteromedial soft tissues were carefully elevated off of the anterior face of the tibia.  The MCL was kept protected at all times.  At this point, the joint was inspected.  There was marked arthrosis throughout the joint.  She had a large osteophyte off of the superior pole of the patella and there was scalloping of the distal femur and upper trochlea from long-term impingement there.  The periarticular osteophytes were carefully removed with a rongeur.    An opening was created in the distal femur.  The wound was irrigated out and then the distal femur alignment judi was inserted down the canal.  A 5 degree valgus distal femoral cutting guide was pinned into position and then the distal femoral cut carried out in the typical fashion.  I inspected and measured to make sure the cut was appropriate.  The cut portion of bone was removed followed by the guide.    Next, the knee was flexed up further to allow for insertion of the  posterior referencing guide.  I measured the distal femur.  I determined the appropriate size as referenced off of the posterior condyles.  The femur measured a size 4.  The guide was positioned, taking care to align this properly and then the anterior, posterior and chamfer cuts were carried out.  The cut portions of bone were then removed.      I then directed my attention to the tibia.  Retractors were positioned to keep the collateral ligaments, PCL and posterior neurovascular structures protected.  The extramedullary guide was positioned.  I took care to align the judi with the anterior face of the tibia.  I made sure that this was parallel and that the guide was centered at the knee and ankle.  I measured and carefully positioned the guide to allow for correction of the preoperative deformity.  I pinned the guide and then checked the alignment one more time with an regis wing.  Once we had the guide in good position and secure, an oscillating saw was used to carry out the proximal tibia cut.  The cut portion of bone was removed and the PCL inspected.  The PCL was intact and appeared in good condition.  This seems stable and at this point I considered that we could likely proceed with placement of a cruciate retaining implant but I waited until the trialing process to make a definitive decision.  The menisci were removed and the posterior capsule was infiltrated with some of the Ortho cocktail solution.    Next, I measured the proximal tibia cut.  This measured a size 4.  The trial implant was pinned into position, taking care to maintain appropriate rotation.  The proximal tibial preparations were then completed.  I then trialed with a size 4 femur and size 4 tibia.  The knee seemed to be well balanced and demonstrated excellent motion with a size 11 mm trial polyethylene liner.    I then examined the patella.  The patella demonstrated extensive arthrosis and was very thin.  There was just enough bone for  resurfacing though.  The patellar preparations were carried out at this time and then I trialed with a size 32 patella.  With this implant in place, the patella tracked well.  A lateral release was deemed unnecessary in this case.    The final preparations were then completed.  The distal femur was prepared and then the trial implants were removed.  The appropriate size implants were opened at this point.  My assistant mixed the bone cement on the back table using current generation cement mixing technique and a centrifuge.  Once the cement was prepared, cement was applied to the bony surfaces and implants.  The implants were carefully impacted into position.  I made sure that these were fully seated.  The excess, extruded cement was carefully removed with a [place] elevator.  The knee was taken out into full extension and the trial polyethylene liner inserted.  The patella was then clamped into position.  Again, the excess, extruded cement was removed.  The knee was left in extension with the patella clamped until the cement had fully cured.    While the cement was curing, the periarticular soft tissue structures were carefully infiltrated with the Ortho cocktail solution.  Once the cement had fully cured, I again checked the balancing of the knee.  Again, the knee demonstrated excellent motion and stability with the 11 mm trial liner.  The trial was removed.  The final implant was impacted into position.  I took care to make sure that the dovetails were fully interdigitated.  Again the knee was carried through range of motion.  The patella tracked well and the knee demonstrated excellent motion and stability.    The wound was irrigated with 500 cc of a Betadine containing saline solution.  This was left in place for 3 minutes.  I then irrigated with 3 L of sterile saline via pulsatile lavage.  The tourniquet was deflated.  A gram of transexamic acid was administered.  I made sure that we had good hemostasis.  The  parapatellar arthrotomy was anatomically repaired using a PDS Stratafix suture and multiple #1 Vicryl sutures.  The subcutaneous tissues were repaired using 2-0 Vicryl.  A running Stratafix Monocryl suture was used to close the skin followed by a Zipline adhesive.  Sterile dressings were applied.  The drapes were withdrawn. She was awakened and taken to the recovery room in good condition.    Anthony Sinclair MD  02/02/23

## 2023-02-02 NOTE — PROGRESS NOTES
[facility] will follow post hospital as requested. Patient/spouse agreeable to service. Contact information confirmed.

## 2023-02-02 NOTE — THERAPY EVALUATION
Patient Name: Patt Bond  : 1945    MRN: [id number]                              Today's Date: 2023       Admit Date: 2023    Visit Dx:     ICD-10-CM ICD-9-CM   1. Total knee replacement status, left  Z96.652 V43.65   2. Arthritis of knee  M17.10 716.96     Patient Active Problem List   Diagnosis   • Left lower quadrant pain   • Ketonuria due to dehydration   • Normocytic anemia   • Pancytopenia ([facility])   • Obesity (BMI 30-39.9)   • Nausea   • Sepsis ([facility])   • Nephrolithiasis   • Pleural nodule   • Tracheal compression   • Hyperthyroidism   • Abnormal weight gain   • Palpitations   • Cholecystitis   • History of colon polyps - 4 Tubulovillous adenomas in , normal colonoscopy in    • History of abdominal abscess - 3+ E. coli at time of surgery 2016   • Multiple drug allergies to Cephalexin, Cephalosporins, Penicillins   • Weight gain - 10 pounds in 2 months, unintentional   • Multinodular goiter   • GRAY (dyspnea on exertion)   • Obesity, morbid, BMI 40.0-49.9 ([facility])   • Substernal goiter   • Obstruction of left ureteropelvic junction (UPJ) due to stone   • Morbid obesity with BMI of 40.0-44.9, adult ([facility])   • ERIS on CPAP   • Hypersomnia with sleep apnea   • Sleep related hypoxia   • Chronic fatigue   • History of lobectomy of thyroid   • Impaired fasting glucose   • Renal calculus, left   • Arthritis of knee   • Total knee replacement status, left     Past Medical History:   Diagnosis Date   • Arthritis     OSTEO. LEFT KNEE   • Asthma    • Back pain     AT TIMES   • Cataract     CLIFFORD EYES   • Chronic diarrhea    • Diverticulitis     WITH RESECTION   • Diverticulosis    • Goiter    • History of colon polyps     BENIGN   • Hyperthyroidism     followed by Dr. Blackmon. PARTIAL THRYOIDECTOMY   • Joint pain, knee    • Kidney stones     HISTORY OF MULTIPLE TIMES. URIC AND CALCIUM STONES   • Left knee pain    • Lung nodule     HISTORY OF-NOTE LATEST CT CHEST 2022   • Mass of mediastinum     HISTORY  OF. BENIGN.   • Obstructive pyelonephritis 09/28/2015    left obstructing pyelonephritis    • PONV (postoperative nausea and vomiting)    • Sleep apnea     CPAP MACHINE   • SOB (shortness of breath) on exertion     SEES DR VASQUEZ     Past Surgical History:   Procedure Laterality Date   • BRONCHOSCOPY N/A 11/6/2017    Procedure: BRONCHOSCOPY WITH ENDOBRONCHIAL ULTRASOUND AND TRANSBRONCHIAL NEEDLE ASPIRATION;  Surgeon: Nando Vasquez MD;  Location: [facility] ENDOSCOPY;  Service:    • CARDIAC CATHETERIZATION N/A 9/27/2017    Procedure: Right Heart Cath;  Surgeon: Miguel Gautam MD;  Location: [facility] CATH INVASIVE LOCATION;  Service:    • CARDIAC CATHETERIZATION N/A 9/27/2017    Procedure: Left Heart Cath;  Surgeon: Miguel Gautam MD;  Location: [facility] CATH INVASIVE LOCATION;  Service:    • CARDIAC CATHETERIZATION N/A 9/27/2017    Procedure: Coronary angiography;  Surgeon: Miguel Gautam MD;  Location: [facility] CATH INVASIVE LOCATION;  Service:    • CARDIAC CATHETERIZATION N/A 9/27/2017    Procedure: Left ventriculography;  Surgeon: Miguel Gautam MD;  Location: [facility] CATH INVASIVE LOCATION;  Service:    • CHOLECYSTECTOMY N/A 5/17/2017    Procedure: LAPAROSCOPIC CHOLECYSTECTOMY WITH IOC;  Surgeon: Patt Moore MD;  Location: [facility] OR;  Service:    • COLON RESECTION Left 9/14/2016    Laparoscopic Low Anterior Resection with splenic flexure mobilization, drainage of Pelvic Abscess (cultured 3+ E. Coli), Left salpingo-ophorectomy, laparoscopic rectopexy, and umbilical hernia repair, Dr. Patt Moore   • COLON RESECTION      VENTRAL HERNIA REPAIR   • COLONOSCOPY N/A 05/15/2008    sigmoid diverticulosis with blunting of the haustral folds and angulation consistent with previous diverticulitis, no polyps, suggestion of rectal prolapse-Dr. Patt Moore   • COLONOSCOPY W/ BIOPSIES AND POLYPECTOMY N/A 04/16/2001    Possible mild gastritis w/ some appearance of formations that look like  petechiae in the antrum, no ulcerations, no erosions; cecal polyp approx 4mm sessile; probable transverse colon polyp, although we could not visualize this completely upon pulling out; sigmoid diverticula; multiple rectal polyps, mostly w/ appearance of hyperplasia, largest being 5 to 6mm: removed via snare-Dr. Patt Moore   • COLONOSCOPY W/ POLYPECTOMY N/A 12/10/2004    Diverticulosis with sigmoid perideverticulitis with erythema and patchiness around some of the diverticula, proximal ascedning colon polyp-approx 5 mm-removed via snare cauter, two distal ascending colon polyps-7 mm and 3 mm-removed via snare cautery polypectomy, hemorrhoids-Dr. Patt Moore   • CYSTOSCOPY W/ URETERAL STENT PLACEMENT Left 4/21/2018    Procedure: CYSTOSCOPY, LEFT RETROGRADE WITH LEFT URETERAL STENT INSERTION;  Surgeon: Stanley Osuna MD;  Location: [facility];  Service: Urology   • CYSTOSCOPY W/ URETERAL STENT REMOVAL Left 10/07/2015    Cystoscopy with stent extraction, left ureteral occulusion balloon placement, percutanous nephrostolithotomy with stone volume less than 2.5 cm involving the left lower pole and the left proximal ureter, balloon tract dilation for establishment of nephrostomy tract, antegrade nephrostomy tube placement-Dr. Miguel Monte   • CYSTOSCOPY, RETROGRADE PYELOGRAM AND STENT INSERTION Left 09/28/2015    Left cystoscopy with left retrograde pyelogram, left double-J stent placement-Dr. Rivera gregory   • CYSTOSCOPY, RETROGRADE PYELOGRAM AND STENT INSERTION Left 09/09/2015    Cystoscopy with bilateral retrograde pyelogram, left double-J stent placement, right ureteral pyeloscopy with laser lithotripsy of the ureteropelvic junction calculus, right double-J stent placement-Dr. Miguel Monte   • CYSTOSCOPY, RETROGRADE PYELOGRAM AND STENT INSERTION Right 09/21/2007    Cystoscopy with right retrograde pyelogram, right biliary stent placement-Dr. Miguel Monte   • CYSTOSCOPY, RETROGRADE  PYELOGRAM AND STENT INSERTION Right 09/07/2007    Cystoscopy with right retrograde pyelogram, right ureteral peyloscopy with extraction distal ureteral mass, right double J stent placement-Dr. Miguel Monte   • CYSTOSCOPY, RETROGRADE PYELOGRAM AND STENT INSERTION Left 7/29/2022    Procedure: CYSTOSCOPY, LEFT RETROGRADE PYELOGRAM, LEFT URETERAL STENT;  Surgeon: Cedrick Jones MD;  Location: [facility] MAIN OR;  Service: Urology;  Laterality: Left;   • CYSTOSCOPY, URETEROSCOPY, RETROGRADE PYELOGRAM, STENT INSERTION Right 09/07/2007    Cystoscopy with right retrograde pyelogram, rigth ureteroscopy with extraction of distal mass, right double J stent placement-Dr. Miguel Monte   • D & C HYSTEROSCOPY N/A 05/18/2011    Procedure done due to thickened endomentrium and an endometrial polyp-Dr. Quita Cheatham   • DILATATION AND CURETTAGE N/A 03/22/2002    D&C, polyp removal-Dr. Quita Cheatham   • EXTRACORPOREAL SHOCKWAVE LITHOTRIPSY (ESWL), STENT INSERTION/REMOVAL Left 05/08/2015    Left extracoporeal shockwave lithotripsy, cystoscopy with stent placement-Dr. Miguel Monte   • MEDIASTINOTOMY Left 3/5/2018    Procedure: left thyroidectomy, resection of substernal thyroid goiter cervical approach;  Surgeon: Quintin Mares III, MD;  Location: [facility] OR;  Service:    • SIGMOIDOSCOPY N/A 9/13/2016    Procedure: SIGMOIDOSCOPY FLEXIBLE TO 25 CM;  Surgeon: Patt oMore MD;  Location: [facility] ENDOSCOPY;  Service:    • THORACOTOMY  1996    Thoracotomy for ectopic thyroid, Dr. Camarillo   • THYROIDECTOMY, PARTIAL      left side 3/05/18   • UMBILICAL HERNIA REPAIR N/A 9/14/2016    Procedure: UMBILICAL HERNIA REPAIR ;  Surgeon: Patt Moore MD;  Location: [facility] MAIN OR;  Service:    • URETEROSCOPY LASER LITHOTRIPSY WITH STENT INSERTION Left 8/12/2022    Procedure: LEFT URETEROSCOPY LASER LITHOTRIPSY STONE BACKET EXTRACTION, STENT PLACEMENT;  Surgeon: Miguel Monte MD;  Location: [facility] MAIN OR;  Service:  Urology;  Laterality: Left;   • VENTRAL/INCISIONAL HERNIA REPAIR N/A 5/17/2017    Procedure: VENTRAL HERNIA REPAIR WITH MESH, BILATERAL MUSCULOFASCIAL RELEASE;  Surgeon: Patt Moore MD;  Location: [facility] MA[facility] OR;  Service:       General Information     Row Name 02/02/23 1557          Physical Therapy Time and Intention    Document Type evaluation  -     Mode of Treatment individual therapy;physical therapy  -     Row Name 02/02/23 1557          General Information    Prior Level of Function independent:;gait;transfer;bed mobility  -     Existing Precautions/Restrictions fall  -     Barriers to Rehab medically complex  -     Row Name 02/02/23 1557          Living Environment    People in Home spouse  -     Row Name 02/02/23 1557          Home Main Entrance    Number of Stairs, Main Entrance three  -     Stair Railings, Main Entrance railings safe and in good condition  -     Row Name 02/02/23 1557          Cognition    Orientation Status (Cognition) oriented x 3  -     Row Name 02/02/23 1557          Safety Issues, Functional Mobility    Impairments Affecting Function (Mobility) pain;range of motion (ROM);strength;endurance/activity tolerance  -     Comment, Safety Issues/Impairments (Mobility) Pt sleepy and nauseous during PT eval, RN reports vomitting with just turning in bed  -           User Key  (r) = Recorded By, (t) = Taken By, (c) = Cosigned By    Initials Name Provider Type     Yamila Canseco, PT Physical Therapist               Mobility     Row Name 02/02/23 1558          Bed Mobility    Comment, (Bed Mobility) bed mobility deferred due to sleepiness, nausea, and vomitting, agreeable to bed exercises  -     Row Name 02/02/23 1558          Gait/Stairs (Locomotion)    Ambulated day of surgery or within 4 hours of PACU discharge no, other medical factors prevent ambulation  -     Reason Patient was unable to Ambulate Nausea/Vomiting  -           User Key  (r) = Recorded By,  (t) = Taken By, (c) = Cosigned By    Initials Name Provider Type     Yamila Canseco, PT Physical Therapist               Obj/Interventions     Row Name 02/02/23 1559          Range of Motion Comprehensive    Comment, General Range of Motion L knee ROM limited post-op  -     Row Name 02/02/23 1559          Strength Comprehensive (MMT)    Comment, General Manual Muscle Testing (MMT) Assessment L LE weakness noted post-op  -     Row Name 02/02/23 1559          Motor Skills    Therapeutic Exercise --  10 reps L TKA protocol with assist  -CH           User Key  (r) = Recorded By, (t) = Taken By, (c) = Cosigned By    Initials Name Provider Type     Yamila Canseco, PT Physical Therapist               Goals/Plan     [facility] Name 02/02/23 1617          Bed Mobility Goal 1 (PT)    Activity/Assistive Device (Bed Mobility Goal 1, PT) bed mobility activities, all  -CH     [place] Level/Cues Needed (Bed Mobility Goal 1, PT) supervision required  -CH     Time Frame (Bed Mobility Goal 1, PT) 1 week  -[facility] Name 02/02/23 1617          Transfer Goal 1 (PT)    Activity/Assistive Device (Transfer Goal 1, PT) transfers, all;walker, rolling  -CH     [place] Level/Cues Needed (Transfer Goal 1, PT) supervision required  -CH     Time Frame (Transfer Goal 1, PT) 1 week  -CH     Row Name 02/02/23 1617          Gait Training Goal 1 (PT)    Activity/Assistive Device (Gait Training Goal 1, PT) gait (walking locomotion);walker, rolling  -CH     [place] Level (Gait Training Goal 1, PT) supervision required  -     Distance (Gait Training Goal 1, PT) 150  -CH     Time Frame (Gait Training Goal 1, PT) 1 week  -[facility] Name 02/02/23 1617          ROM Goal 1 (PT)    ROM Goal 1 (PT) L knee ROM 0-90  -CH     Time Frame (ROM Goal 1, PT) 1 week  -[facility] Name 02/02/23 1617          Stairs Goal 1 (PT)    Activity/Assistive Device (Stairs Goal 1, PT) stairs, all skills  -CH     [place] Level/Cues Needed (Stairs  Goal 1, PT) contact guard required  -     Number of Stairs (Stairs Goal 1, PT) 3  -CH     Time Frame (Stairs Goal 1, PT) 1 week  -     Row Name 02/02/23 1617          Therapy Assessment/Plan (PT)    Planned Therapy Interventions (PT) bed mobility training;balance training;gait training;home exercise program;patient/family education;strengthening;transfer training  -           User Key  (r) = Recorded By, (t) = Taken By, (c) = Cosigned By    Initials Name Provider Type     Yamila Canseco, PT Physical Therapist               Clinical Impression     Row Name 02/02/23 1600          Pain    Pretreatment Pain Rating 0/10 - no pain  -     Posttreatment Pain Rating 4/10  -     Pain Location - Side/Orientation Left  -     Pain Location - knee  -     Pain Intervention(s) Repositioned  -     Row Name 02/02/23 1600          Plan of Care Review    Plan of Care Reviewed With patient;family  -     Outcome Evaluation Pt is a 78 yo F who was admitted to the floor s/p L TKA surgery. Pt with lethargy, nausea and vomitting post-op and RN suggesting just bed exercises at this time. Pt sleeping when enterred room but awakened and was agreeable to performing exercises. Pt with nausea and headache PT session. Pt found to have LLE pain, weakness, and decreased ROM post-op. Pt may benefit from skilled PT to address strength, mobility, and gait.  -     Row Name 02/02/23 1600          Therapy Assessment/Plan (PT)    Patient/Family Therapy Goals Statement (PT) to return to PLOF  -     Rehab Potential (PT) good, to achieve stated therapy goals  -     Criteria for Skilled Interventions Met (PT) skilled treatment is necessary  -     Therapy Frequency (PT) daily  -     Row Name 02/02/23 1600          Positioning and Restraints    Pre-Treatment Position in bed  -     Post Treatment Position bed  -CH     In Bed supine;call light within reach;encouraged to call for assist;exit alarm on;with family/caregiver;notified  [facility]  -           User Key  (r) = Recorded By, (t) = Taken By, (c) = Cosigned By    Initials Name Provider Type     Yamila Canseco, PT Physical Therapist               Outcome Measures     Row Name 02/02/23 1618 02/02/23 1453       How much help from another person do you currently need...    Turning from your back to your side while in flat bed without using bedrails? 2  - 2  -RI    Moving from lying on back to sitting on the side of a flat bed without bedrails? 2  - 2  -RI    Moving to and from a bed to a chair (including a wheelchair)? 2  - 2  -RI    Standing up from a chair using your arms (e.g., wheelchair, bedside chair)? 2  - 2  -RI    Climbing 3-5 steps with a railing? 2  - 2  -RI    To walk in hospital room? 2  - 2  -RI    AM-PAC 6 Clicks Score (PT) 12  - 12  -RI    Highest level of mobility 4 --> Transferred to chair/commode  - 4 --> Transferred to chair/commode  -RI    Row Name 02/02/23 1618          Functional Assessment    Outcome Measure Options AM-PAC 6 Clicks Basic Mobility (PT)  -           User Key  (r) = Recorded By, (t) = Taken By, (c) = Cosigned By    Initials Name Provider Type     Yamila Canseco, PT Physical Therapist    RI Nestor Tilley, RN Registered Nurse                             Physical Therapy Education     Title: PT OT SLP Therapies (In Progress)     Topic: Physical Therapy (In Progress)     Point: Mobility training (Not Started)     Learner Progress:  Not documented in this visit.          Point: Home exercise program (Done)     Learning Progress Summary           Patient Acceptance, E,TB,D, VU,NR by  at 2/2/2023 1618                   Point: Body mechanics (Not Started)     Learner Progress:  Not documented in this visit.          Point: Precautions (Not Started)     Learner Progress:  Not documented in this visit.                      User Key     Initials Effective Dates Name Provider Type [facility] 06/16/21 -  Yamila Canseco  S, PT Physical Therapist PT              PT Recommendation and Plan  Planned Therapy Interventions (PT): bed mobility training, balance training, gait training, home exercise program, patient/family education, strengthening, transfer training  Plan of Care Reviewed With: patient, family  Outcome Evaluation: Pt is a 76 yo F who was admitted to the floor s/p L TKA surgery. Pt with lethargy, nausea and vomitting post-op and RN suggesting just bed exercises at this time. Pt sleeping when enterred room but awakened and was agreeable to performing exercises. Pt with nausea and headache PT session. Pt found to have LLE pain, weakness, and decreased ROM post-op. Pt may benefit from skilled PT to address strength, mobility, and gait.     Time Calculation:    PT Charges     Row Name 02/02/23 1619             Time Calculation    Start Time 1547  -      Stop Time 1557  -      Time Calculation (min) 10 min  -      PT Received On 02/02/23  -      PT - Next Appointment 02/03/23  -      PT Goal Re-Cert Due Date 02/09/23  -            User Key  (r) = Recorded By, (t) = Taken By, (c) = Cosigned By    Initials Name Provider Type     Yamila Canseco, PT Physical Therapist              Therapy Charges for Today     Code Description Service Date Service Provider Modifiers Qty    66756077803 HC PT EVAL LOW COMPLEXITY 2 2/2/2023 Yamila Canseco, PT GP 1          PT G-Codes  Outcome Measure Options: AM-PAC 6 Clicks Basic Mobility (PT)  AM-PAC 6 Clicks Score (PT): 12  PT Discharge Summary  Anticipated Discharge Disposition (PT): home with home health, home with assist (pending progress with PT)    Yamila Canseco PT  2/2/2023

## 2023-02-02 NOTE — ANESTHESIA PROCEDURE NOTES
Peripheral Block    Pre-sedation assessment completed: 2/2/2023 7:19 AM    Patient reassessed immediately prior to procedure    Patient location during procedure: pre-op  Start time: 2/2/2023 7:20 AM  Stop time: 2/2/2023 7:22 AM  Reason for block: at surgeon's request and post-op pain management  Performed by  Anesthesiologist: Joaquin Whitfield MD  Preanesthetic Checklist  Completed: patient identified, IV checked, site marked, risks and benefits discussed, surgical consent, monitors and equipment checked, pre-op evaluation and timeout performed  Prep:  Pt Position: supine  Sterile barriers:mask, gloves and cap  Prep: ChloraPrep  Patient monitoring: blood pressure monitoring, continuous pulse oximetry and EKG  Procedure    Sedation: yes  Performed under: local infiltration  Guidance:ultrasound guided    ULTRASOUND INTERPRETATION.  Using ultrasound guidance a 21 G gauge needle was placed in close proximity to the nerve, at which point, under ultrasound guidance anesthetic was injected in the area of the nerve and spread of the anesthesia was seen on ultrasound in close proximity thereto.  There were no abnormalities seen on ultrasound; a digital image was taken; and the patient tolerated the procedure with no complications. Images:still images obtained, printed/placed on chart    Laterality:left  Block Type:adductor canal block  Injection Technique:single-shot  Needle Type:echogenic and Tuohy  Needle Gauge:21 G  Resistance on Injection: none    Medications Used: dexamethasone (DECADRON) injection - Injection   4 mg - 2/2/2023 7:20:00 AM  ropivacaine (NAROPIN) 0.5 % injection - Injection   14 mL - 2/2/2023 7:20:00 AM      Post Assessment  Injection Assessment: negative aspiration for heme, no paresthesia on injection and incremental injection  Patient Tolerance:comfortable throughout block  Complications:no  Additional Notes  Ultrasound guidance used to visualize nerve anatomy, guide needle placement and verify  local anesthetic disbursement.

## 2023-02-02 NOTE — CASE MANAGEMENT/SOCIAL WORK
Continued Stay Note  [facility]     Patient Name: Patt Bond  MRN: [id number]  Today's Date: 2/2/2023    Admit Date: 2/2/2023    Plan: Home with Religion    Discharge Plan     Plan     Row Name 02/02/23 1306    Plan Home with Religion                      Discharge Codes    No documentation.               Expected Discharge Date and Time     Expected Discharge Date Expected Discharge Time    Feb 2, 2023             Shannon Epley, RN

## 2023-02-02 NOTE — ANESTHESIA PREPROCEDURE EVALUATION
Anesthesia Evaluation     Patient summary reviewed and Nursing notes reviewed   history of anesthetic complications: PONV  NPO Solid Status: > 8 hours  NPO Liquid Status: > 2 hours           Airway   Mallampati: III  TM distance: >3 FB  Neck ROM: full  Possible difficult intubation and Large neck circumference  Comment: Hx of Mac 3, grade IIA view  Dental      Pulmonary    (+) asthma,sleep apnea,   Cardiovascular     ECG reviewed        Neuro/Psych  GI/Hepatic/Renal/Endo    (+) morbid obesity,  thyroid problem hypothyroidism    Musculoskeletal     Abdominal    Substance History      OB/GYN          Other   arthritis,                    Anesthesia Plan    ASA 3     general     (Will avoid scope patch, hx of post op delirium. Plan for propofol gtt )  intravenous induction     Anesthetic plan, risks, benefits, and alternatives have been provided, discussed and informed consent has been obtained with: patient.        CODE STATUS:

## 2023-02-02 NOTE — PLAN OF CARE
Goal Outcome Evaluation:  Plan of Care Reviewed With: patient, family           Outcome Evaluation: Pt is a 78 yo F who was admitted to the floor s/p L TKA surgery. Pt with lethargy, nausea and vomitting post-op and RN suggesting just bed exercises at this time. Pt sleeping when enterred room but awakened and was agreeable to performing exercises. Pt with nausea and headache PT session. Pt found to have LLE pain, weakness, and decreased ROM post-op. Pt may benefit from skilled PT to address strength, mobility, and gait.

## 2023-02-03 VITALS
BODY MASS INDEX: 43.23 KG/M2 | OXYGEN SATURATION: 94 % | WEIGHT: 244 LBS | DIASTOLIC BLOOD PRESSURE: 73 MMHG | HEIGHT: 63 IN | TEMPERATURE: 98.3 F | SYSTOLIC BLOOD PRESSURE: 123 MMHG | RESPIRATION RATE: 16 BRPM | HEART RATE: 64 BPM

## 2023-02-03 LAB
HCT VFR BLD AUTO: 33.3 % (ref 34–46.6)
HGB BLD-MCNC: 11 G/DL (ref 12–15.9)
HOLD SPECIMEN: NORMAL

## 2023-02-03 PROCEDURE — G0378 HOSPITAL OBSERVATION PER HR: HCPCS

## 2023-02-03 PROCEDURE — 63710000001 MELOXICAM 15 MG TABLET: Performed by: ORTHOPAEDIC SURGERY

## 2023-02-03 PROCEDURE — A9270 NON-COVERED ITEM OR SERVICE: HCPCS | Performed by: ORTHOPAEDIC SURGERY

## 2023-02-03 PROCEDURE — 63710000001 ALLOPURINOL 300 MG TABLET: Performed by: ORTHOPAEDIC SURGERY

## 2023-02-03 PROCEDURE — 99024 POSTOP FOLLOW-UP VISIT: CPT | Performed by: NURSE PRACTITIONER

## 2023-02-03 PROCEDURE — 85014 HEMATOCRIT: CPT | Performed by: ORTHOPAEDIC SURGERY

## 2023-02-03 PROCEDURE — 97116 GAIT TRAINING THERAPY: CPT

## 2023-02-03 PROCEDURE — 97110 THERAPEUTIC EXERCISES: CPT

## 2023-02-03 PROCEDURE — 63710000001 ASPIRIN EC 325 MG TABLET DELAYED-RELEASE: Performed by: ORTHOPAEDIC SURGERY

## 2023-02-03 PROCEDURE — 85018 HEMOGLOBIN: CPT | Performed by: ORTHOPAEDIC SURGERY

## 2023-02-03 PROCEDURE — 63710000001 METHIMAZOLE 5 MG TABLET: Performed by: ORTHOPAEDIC SURGERY

## 2023-02-03 RX ORDER — ACETAMINOPHEN 500 MG
500 TABLET ORAL 2 TIMES DAILY PRN
Qty: 60 TABLET | Refills: 0 | Status: SHIPPED | OUTPATIENT
Start: 2023-02-03

## 2023-02-03 RX ADMIN — METHIMAZOLE 2.5 MG: 5 TABLET ORAL at 09:01

## 2023-02-03 RX ADMIN — SODIUM CHLORIDE, POTASSIUM CHLORIDE, SODIUM LACTATE AND CALCIUM CHLORIDE 100 ML/HR: 600; 310; 30; 20 INJECTION, SOLUTION INTRAVENOUS at 02:11

## 2023-02-03 RX ADMIN — ALLOPURINOL 300 MG: 300 TABLET ORAL at 09:01

## 2023-02-03 RX ADMIN — ASPIRIN 325 MG: 325 TABLET, COATED ORAL at 09:01

## 2023-02-03 RX ADMIN — MELOXICAM 15 MG: 15 TABLET ORAL at 09:01

## 2023-02-03 NOTE — THERAPY TREATMENT NOTE
Patient Name: Patt Bond  : 1945    MRN: [id number]                              Today's Date: 2/3/2023       Admit Date: 2023    Visit Dx:     ICD-10-CM ICD-9-CM   1. Total knee replacement status, left  Z96.652 V43.65   2. Arthritis of knee  M17.10 716.96     Patient Active Problem List   Diagnosis   • Left lower quadrant pain   • Ketonuria due to dehydration   • Normocytic anemia   • Pancytopenia ([facility])   • Obesity (BMI 30-39.9)   • Nausea   • Sepsis ([facility])   • Nephrolithiasis   • Pleural nodule   • Tracheal compression   • Hyperthyroidism   • Abnormal weight gain   • Palpitations   • Cholecystitis   • History of colon polyps - 4 Tubulovillous adenomas in , normal colonoscopy in    • History of abdominal abscess - 3+ E. coli at time of surgery 2016   • Multiple drug allergies to Cephalexin, Cephalosporins, Penicillins   • Weight gain - 10 pounds in 2 months, unintentional   • Multinodular goiter   • GRAY (dyspnea on exertion)   • Obesity, morbid, BMI 40.0-49.9 ([facility])   • Substernal goiter   • Obstruction of left ureteropelvic junction (UPJ) due to stone   • Morbid obesity with BMI of 40.0-44.9, adult ([facility])   • ERIS on CPAP   • Hypersomnia with sleep apnea   • Sleep related hypoxia   • Chronic fatigue   • History of lobectomy of thyroid   • Impaired fasting glucose   • Renal calculus, left   • Arthritis of knee   • Total knee replacement status, left     Past Medical History:   Diagnosis Date   • Arthritis     OSTEO. LEFT KNEE   • Asthma    • Back pain     AT TIMES   • Cataract     CLIFFORD EYES   • Chronic diarrhea    • Diverticulitis     WITH RESECTION   • Diverticulosis    • Goiter    • History of colon polyps     BENIGN   • Hyperthyroidism     followed by Dr. Blackmon. PARTIAL THRYOIDECTOMY   • Joint pain, knee    • Kidney stones     HISTORY OF MULTIPLE TIMES. URIC AND CALCIUM STONES   • Left knee pain    • Lung nodule     HISTORY OF-NOTE LATEST CT CHEST 2022   • Mass of mediastinum     HISTORY  OF. BENIGN.   • Obstructive pyelonephritis 09/28/2015    left obstructing pyelonephritis    • PONV (postoperative nausea and vomiting)    • Sleep apnea     CPAP MACHINE   • SOB (shortness of breath) on exertion     SEES DR VASQUEZ     Past Surgical History:   Procedure Laterality Date   • BRONCHOSCOPY N/A 11/6/2017    Procedure: BRONCHOSCOPY WITH ENDOBRONCHIAL ULTRASOUND AND TRANSBRONCHIAL NEEDLE ASPIRATION;  Surgeon: Nando Vasquez MD;  Location: [facility] ENDOSCOPY;  Service:    • CARDIAC CATHETERIZATION N/A 9/27/2017    Procedure: Right Heart Cath;  Surgeon: Miguel Gautam MD;  Location: [facility] CATH INVASIVE LOCATION;  Service:    • CARDIAC CATHETERIZATION N/A 9/27/2017    Procedure: Left Heart Cath;  Surgeon: Miguel Gautam MD;  Location: [facility] CATH INVASIVE LOCATION;  Service:    • CARDIAC CATHETERIZATION N/A 9/27/2017    Procedure: Coronary angiography;  Surgeon: Miguel Gautam MD;  Location: [facility] CATH INVASIVE LOCATION;  Service:    • CARDIAC CATHETERIZATION N/A 9/27/2017    Procedure: Left ventriculography;  Surgeon: Miguel Gautam MD;  Location: [facility] CATH INVASIVE LOCATION;  Service:    • CHOLECYSTECTOMY N/A 5/17/2017    Procedure: LAPAROSCOPIC CHOLECYSTECTOMY WITH IOC;  Surgeon: Patt Moore MD;  Location: [facility] OR;  Service:    • COLON RESECTION Left 9/14/2016    Laparoscopic Low Anterior Resection with splenic flexure mobilization, drainage of Pelvic Abscess (cultured 3+ E. Coli), Left salpingo-ophorectomy, laparoscopic rectopexy, and umbilical hernia repair, Dr. Patt Moore   • COLON RESECTION      VENTRAL HERNIA REPAIR   • COLONOSCOPY N/A 05/15/2008    sigmoid diverticulosis with blunting of the haustral folds and angulation consistent with previous diverticulitis, no polyps, suggestion of rectal prolapse-Dr. Patt Moore   • COLONOSCOPY W/ BIOPSIES AND POLYPECTOMY N/A 04/16/2001    Possible mild gastritis w/ some appearance of formations that look like  petechiae in the antrum, no ulcerations, no erosions; cecal polyp approx 4mm sessile; probable transverse colon polyp, although we could not visualize this completely upon pulling out; sigmoid diverticula; multiple rectal polyps, mostly w/ appearance of hyperplasia, largest being 5 to 6mm: removed via snare-Dr. Patt Moore   • COLONOSCOPY W/ POLYPECTOMY N/A 12/10/2004    Diverticulosis with sigmoid perideverticulitis with erythema and patchiness around some of the diverticula, proximal ascedning colon polyp-approx 5 mm-removed via snare cauter, two distal ascending colon polyps-7 mm and 3 mm-removed via snare cautery polypectomy, hemorrhoids-Dr. Patt Moore   • CYSTOSCOPY W/ URETERAL STENT PLACEMENT Left 4/21/2018    Procedure: CYSTOSCOPY, LEFT RETROGRADE WITH LEFT URETERAL STENT INSERTION;  Surgeon: Stanley Osuna MD;  Location: [facility];  Service: Urology   • CYSTOSCOPY W/ URETERAL STENT REMOVAL Left 10/07/2015    Cystoscopy with stent extraction, left ureteral occulusion balloon placement, percutanous nephrostolithotomy with stone volume less than 2.5 cm involving the left lower pole and the left proximal ureter, balloon tract dilation for establishment of nephrostomy tract, antegrade nephrostomy tube placement-Dr. Miguel Monte   • CYSTOSCOPY, RETROGRADE PYELOGRAM AND STENT INSERTION Left 09/28/2015    Left cystoscopy with left retrograde pyelogram, left double-J stent placement-Dr. Rivera gregory   • CYSTOSCOPY, RETROGRADE PYELOGRAM AND STENT INSERTION Left 09/09/2015    Cystoscopy with bilateral retrograde pyelogram, left double-J stent placement, right ureteral pyeloscopy with laser lithotripsy of the ureteropelvic junction calculus, right double-J stent placement-Dr. Miguel Monte   • CYSTOSCOPY, RETROGRADE PYELOGRAM AND STENT INSERTION Right 09/21/2007    Cystoscopy with right retrograde pyelogram, right biliary stent placement-Dr. Miguel Monte   • CYSTOSCOPY, RETROGRADE  PYELOGRAM AND STENT INSERTION Right 09/07/2007    Cystoscopy with right retrograde pyelogram, right ureteral peyloscopy with extraction distal ureteral mass, right double J stent placement-Dr. Miguel Monte   • CYSTOSCOPY, RETROGRADE PYELOGRAM AND STENT INSERTION Left 7/29/2022    Procedure: CYSTOSCOPY, LEFT RETROGRADE PYELOGRAM, LEFT URETERAL STENT;  Surgeon: Cedrick Jones MD;  Location: [facility] MAIN OR;  Service: Urology;  Laterality: Left;   • CYSTOSCOPY, URETEROSCOPY, RETROGRADE PYELOGRAM, STENT INSERTION Right 09/07/2007    Cystoscopy with right retrograde pyelogram, rigth ureteroscopy with extraction of distal mass, right double J stent placement-Dr. Miguel Monte   • D & C HYSTEROSCOPY N/A 05/18/2011    Procedure done due to thickened endomentrium and an endometrial polyp-Dr. Quita Cheatham   • DILATATION AND CURETTAGE N/A 03/22/2002    D&C, polyp removal-Dr. Quita Cheatham   • EXTRACORPOREAL SHOCKWAVE LITHOTRIPSY (ESWL), STENT INSERTION/REMOVAL Left 05/08/2015    Left extracoporeal shockwave lithotripsy, cystoscopy with stent placement-Dr. Miguel Monte   • MEDIASTINOTOMY Left 3/5/2018    Procedure: left thyroidectomy, resection of substernal thyroid goiter cervical approach;  Surgeon: Quintin Mares III, MD;  Location: [facility] OR;  Service:    • SIGMOIDOSCOPY N/A 9/13/2016    Procedure: SIGMOIDOSCOPY FLEXIBLE TO 25 CM;  Surgeon: Patt Moore MD;  Location: [facility] ENDOSCOPY;  Service:    • THORACOTOMY  1996    Thoracotomy for ectopic thyroid, Dr. Camarillo   • THYROIDECTOMY, PARTIAL      left side 3/05/18   • UMBILICAL HERNIA REPAIR N/A 9/14/2016    Procedure: UMBILICAL HERNIA REPAIR ;  Surgeon: Patt Moore MD;  Location: [facility] MAIN OR;  Service:    • URETEROSCOPY LASER LITHOTRIPSY WITH STENT INSERTION Left 8/12/2022    Procedure: LEFT URETEROSCOPY LASER LITHOTRIPSY STONE BACKET EXTRACTION, STENT PLACEMENT;  Surgeon: Miguel Monte MD;  Location: [facility] MAIN OR;  Service:  Urology;  Laterality: Left;   • VENTRAL/INCISIONAL HERNIA REPAIR N/A 5/17/2017    Procedure: VENTRAL HERNIA REPAIR WITH MESH, BILATERAL MUSCULOFASCIAL RELEASE;  Surgeon: Patt Moore MD;  Location: [facility] OR;  Service:       General Information     Row Name 02/03/23 0854          Physical Therapy Time and Intention    Document Type therapy note (daily note)  -     Mode of Treatment individual therapy;physical therapy  -     Row Name 02/03/23 0854          General Information    Existing Precautions/Restrictions fall  -     Row Name 02/03/23 0854          Cognition    Orientation Status (Cognition) oriented x 3  -     Row Name 02/03/23 0854          Safety Issues, Functional Mobility    Impairments Affecting Function (Mobility) pain;range of motion (ROM);strength  -           User Key  (r) = Recorded By, (t) = Taken By, (c) = Cosigned By    Initials Name Provider Type     Yamila Canseco, PT Physical Therapist               Mobility     Row Name 02/03/23 0855          Bed Mobility    Bed Mobility supine-sit;sit-supine  -     Supine-Sit [place] (Bed Mobility) not tested  -     Sit-Supine [place] (Bed Mobility) not tested  -     Comment, (Bed Mobility) sitting in chair  -     Row Name 02/03/23 0855          Sit-Stand Transfer    Sit-Stand [place] (Transfers) verbal cues;nonverbal cues (demo/gesture);contact guard  -     Assistive Device (Sit-Stand Transfers) walker, front-wheeled  -     Row Name 02/03/23 0855          Gait/Stairs (Locomotion)    [place] Level (Gait) verbal cues;nonverbal cues (demo/gesture);contact guard  -     Assistive Device (Gait) walker, front-wheeled  -     Distance in Feet (Gait) 120  -     Deviations/Abnormal Patterns (Gait) lalitha decreased;gait speed decreased;stride length decreased  -     Bilateral Gait Deviations forward flexed posture  -     [place] Level (Stairs) verbal cues;nonverbal cues (demo/gesture);contact guard   -     Handrail Location (Stairs) both sides  -     Number of Steps (Stairs) 3  -     Ascending Technique (Stairs) step-to-step  -     Descending Technique (Stairs) step-to-step  -     Row Name 02/03/23 0855          Mobility    Extremity Weight-bearing Status left lower extremity  -     Left Lower Extremity (Weight-bearing Status) weight-bearing as tolerated (WBAT)  -           User Key  (r) = Recorded By, (t) = Taken By, (c) = Cosigned By    Initials Name Provider Type     Yamila Canseco, PT Physical Therapist               Obj/Interventions     Row Name 02/03/23 0855          Range of Motion Comprehensive    Comment, General Range of Motion L knee ROM grossly 5-80 degrees of flexion with heel slide exercises  -     Row Name 02/03/23 0855          Motor Skills    Therapeutic Exercise --  10 reps L TKA protocol  -           User Key  (r) = Recorded By, (t) = Taken By, (c) = Cosigned By    Initials Name Provider Type     Yamila Canseco, PT Physical Therapist               Goals/Plan    No documentation.                Clinical Impression     Row Name 02/03/23 0856          Pain    Pretreatment Pain Rating 0/10 - no pain  -     Posttreatment Pain Rating 5/10  -     Pain Location - Side/Orientation Left  -     Pain Location - knee  -     Pain Intervention(s) Cold applied;Repositioned  -     Row Name 02/03/23 0856          Plan of Care Review    Plan of Care Reviewed With patient;family  -     Outcome Evaluation Pt demonstrates increased activity tolerance and functional strength as she was able to participate in transfer, gait, and stair training this date. Pt also demonstrates understanding of TKA protocol exercises. Pt plans to return home this date with HHPT to follow up.  -     Row Name 02/03/23 0856          Positioning and Restraints    Pre-Treatment Position sitting in chair/recliner  -     Post Treatment Position chair  -     In Chair reclined;call light within  reach;encouraged to call for assist;with family/caregiver;notified nsg  -           User Key  (r) = Recorded By, (t) = Taken By, (c) = Cosigned By    Initials Name Provider Type     Yamila Canseco PT Physical Therapist               Outcome Measures     Row Name 02/03/23 0900          How much help from another person do you currently need...    Turning from your back to your side while in flat bed without using bedrails? 3  -CH     Moving from lying on back to sitting on the side of a flat bed without bedrails? 3  -CH     Moving to and from a bed to a chair (including a wheelchair)? 3  -CH     Standing up from a chair using your arms (e.g., wheelchair, bedside chair)? 3  -CH     Climbing 3-5 steps with a railing? 3  -CH     To walk in hospital room? 3  -CH     AM-PAC 6 Clicks Score (PT) 18  -CH     Highest level of mobility 6 --> Walked 10 steps or more  -           User Key  (r) = Recorded By, (t) = Taken By, (c) = Cosigned By    Initials Name Provider Type     Yamila Canseco PT Physical Therapist                             Physical Therapy Education     Title: PT OT SLP Therapies (Done)     Topic: Physical Therapy (Done)     Point: Mobility training (Done)     Learning Progress Summary           Patient Acceptance, E,TB,D, VU,NR by  at 2/3/2023 0904                   Point: Home exercise program (Done)     Learning Progress Summary           Patient Acceptance, E,TB,D, VU,NR by  at 2/3/2023 0904    Acceptance, E,TB,D, VU,NR by  at 2/2/2023 1618                   Point: Body mechanics (Done)     Learning Progress Summary           Patient Acceptance, E,TB,D, VU,NR by  at 2/3/2023 0904                   Point: Precautions (Done)     Learning Progress Summary           Patient Acceptance, E,TB,D, VU,NR by  at 2/3/2023 0904                               User Key     Initials Effective Dates Name Provider Type [facility] 06/16/21 -  Yamila Canseco PT Physical Therapist PT               PT Recommendation and Plan  Planned Therapy Interventions (PT): bed mobility training, balance training, gait training, home exercise program, patient/family education, strengthening, transfer training  Plan of Care Reviewed With: patient, family  Outcome Evaluation: Pt demonstrates increased activity tolerance and functional strength as she was able to participate in transfer, gait, and stair training this date. Pt also demonstrates understanding of TKA protocol exercises. Pt plans to return home this date with HHPT to follow up.     Time Calculation:    PT Charges     Row Name 02/03/23 0904             Time Calculation    Start Time 0818  -CH      Stop Time 0842  -      Time Calculation (min) 24 min  -CH      PT Received On 02/03/23  -      PT - Next Appointment 02/04/23  -         Time Calculation- PT    Total Timed Code Minutes- PT 24 minute(s)  -CH         Timed Charges    35338 - PT Therapeutic Exercise Minutes 10  -CH      04562 - Gait Training Minutes  14  -CH         Total Minutes    Timed Charges Total Minutes 24  -CH       Total Minutes 24  -CH            User Key  (r) = Recorded By, (t) = Taken By, (c) = Cosigned By    Initials Name Provider Type     Yamila Canseco PT Physical Therapist              Therapy Charges for Today     Code Description Service Date Service Provider Modifiers Qty    27833424544 HC PT EVAL LOW COMPLEXITY 2 2/2/2023 Yamila Canseco, PT GP 1    21126477018 HC PT THER PROC EA 15 MIN 2/3/2023 Yamila Canseco, PT GP 1    [id number]  GAIT TRAINING EA 15 MIN 2/3/2023 Yamila Canseco, PT GP 1          PT G-Codes  Outcome Measure Options: AM-PAC 6 Clicks Basic Mobility (PT)  AM-PAC 6 Clicks Score (PT): 18  PT Discharge Summary  Anticipated Discharge Disposition (PT): home with home health, home with assist    Yamila Canseco PT  2/3/2023

## 2023-02-03 NOTE — PLAN OF CARE
Goal Outcome Evaluation:  Plan of Care Reviewed With: patient, family        Progress: no change  Outcome Evaluation: VSS, Pt been voiding per yuliya. C/o Nausea and prn meds given with noted effect. Cont on LR @100cc/hr infusing well. Denies c/o pain this shift. Plan to d/c home today. Educated on bp meds and monitoring.

## 2023-02-03 NOTE — PLAN OF CARE
Goal Outcome Evaluation:  Plan of Care Reviewed With: patient, family           Outcome Evaluation: Pt demonstrates increased activity tolerance and functional strength as she was able to participate in transfer, gait, and stair training this date. Pt also demonstrates understanding of TKA protocol exercises. Pt plans to return home this date with HHPT to follow up.

## 2023-02-03 NOTE — DISCHARGE SUMMARY
Date of Admission:  2/2/2023    Date of Discharge:  2/3/2023    Discharge Diagnosis: s/p Left total knee arthroplasty    Admitting Physician: Anthony Sinclair    Consults: none    DETAILS OF HOSPITAL STAY:  Patient is a 77 y.o. female was admitted to the floor following a total knee arthroplasty.  Post-operatively the patient was transferred to the post-operative floor where the patient underwent physical therapy that included active as well as passive ROM exercises. Opioids were titrated to achieve appropriate pain management to allow for participation in mobilization exercises. Vital signs are now stable. The incision is benign without signs or symptoms of infection. Operative extremity neurovascular status remains intact. Appropriate education re: incision care, activity levels, medications, and follow up visits was completed and all questions were answered. The patient is now deemed stable for discharge to home.    Condition on Discharge:  Stable    Discharge Medications     Discharge Medications      New Medications      Instructions Start Date   aspirin 81 MG EC tablet   81 mg, Oral, 2 Times Daily      docusate sodium 100 MG capsule  Commonly known as: COLACE   100 mg, Oral, 2 Times Daily      HYDROcodone-acetaminophen 7.5-325 MG per tablet  Commonly known as: NORCO   Take 1tab po q 4 hours prn pain      ondansetron 4 MG tablet  Commonly known as: Zofran   4 mg, Oral, Every 8 Hours PRN         Changes to Medications      Instructions Start Date   acetaminophen 325 MG tablet  Commonly known as: TYLENOL  What changed:   · medication strength  · how much to take  · when to take this   650 mg, Oral, 2 Times Daily PRN      acetaminophen 500 MG tablet  Commonly known as: TYLENOL  What changed: You were already taking a medication with the same name, and this prescription was added. Make sure you understand how and when to take each.   500 mg, Oral, 2 Times Daily PRN      methIMAzole 5 MG tablet  Commonly known as:  TAPAZOLE  What changed:   · how much to take  · how to take this  · when to take this  · additional instructions   TAKE 1/2 TABLET BY MOUTH DAILY         Continue These Medications      Instructions Start Date   allopurinol 300 MG tablet  Commonly known as: ZYLOPRIM   300 mg, Oral, Daily      cholestyramine 4 g packet  Commonly known as: QUESTRAN   1 packet, Oral, Daily, AROUND DINNERTIME         Stop These Medications    CHLORHEXIDINE GLUCONATE CLOTH EX            Discharge Diet: regular    Activity at Discharge: as tolerated    Discharge Instructions:   1)  Patient is to continue with physical therapy exercises daily and continue working with the physical therapist as ordered.  2)  Continue to follow precautions as instructed.   3)  Patient may weight bear as tolerated.   4)  Continue to ice regularly. Patient instructed on frequent calf pumping exercises.  Patient also instructed on incentive spirometer during hospitalization and encouraged to continue to use at home regularly.   5)  Patient is instructed to continue DVT prophylaxis.  6)  The dressing should be left in place. If waterproof dressing is intact the patient may shower immediately following discharge. If the dressing becomes dislodged or saturated it should be changed and patient must wait until POD #5 to shower. If dressing is changed, apply dry sterile dressing after showering.  7)  Follow up appointment in 2 weeks - patient to call the office at [phone] to schedule.     Follow-up Appointments  2 weeks with Dr Dottie Cui, [facility]  02/03/23  12:16 EST

## 2023-02-03 NOTE — PROGRESS NOTES
Continued Stay Note  [facility]     Patient Name: Patt Bond  MRN: [id number]  Today's Date: 2/3/2023    Admit Date: 2/2/2023    Plan: [facility]   Discharge Plan     Row Name 02/03/23 1523       Plan    Plan [facility]    Patient/Family in Agreement with Plan yes    Plan Comments Spoke with pt, verified correct information on facesheet and explained the role of CCP. Pt would like to d/c home with [facility], referral sent in Epic to [facility]. Plan will be to d/c home with [facility] and family support. No other needs identified.    Final Discharge Disposition Code 06 - home with home health care    Final Note [facility]               Discharge Codes    No documentation.               Expected Discharge Date and Time     Expected Discharge Date Expected Discharge Time    Feb 3, 2023             Shannan Mcdaniel RN

## 2023-02-04 ENCOUNTER — HOME CARE VISIT (OUTPATIENT)
Dept: HOME HEALTH SERVICES | Facility: HOME HEALTHCARE | Age: 78
End: 2023-02-04
Payer: MEDICARE

## 2023-02-04 VITALS
DIASTOLIC BLOOD PRESSURE: 70 MMHG | HEART RATE: 70 BPM | TEMPERATURE: 96.6 F | SYSTOLIC BLOOD PRESSURE: 132 MMHG | RESPIRATION RATE: 20 BRPM | OXYGEN SATURATION: 97 %

## 2023-02-04 PROCEDURE — G0151 HHCP-SERV OF PT,EA 15 MIN: HCPCS

## 2023-02-04 NOTE — HOME HEALTH
76 yo s/p L TKR on 2-2-23.  PT focus of care will include post op care for L TKR.  Pt lives with spouse. PLOF pt was independent with intermittent usage of cane/walker.    Renetta dressing intact, no bleeding.  Pic loaded.  Very minimal bruising/swelling present.      Plan for next visit:  pain mgt  gait training  HEP review and progression

## 2023-02-06 ENCOUNTER — TELEPHONE (OUTPATIENT)
Dept: ORTHOPEDIC SURGERY | Facility: HOSPITAL | Age: 78
End: 2023-02-06
Payer: MEDICARE

## 2023-02-06 ENCOUNTER — HOME CARE VISIT (OUTPATIENT)
Dept: HOME HEALTH SERVICES | Facility: HOME HEALTHCARE | Age: 78
End: 2023-02-06
Payer: MEDICARE

## 2023-02-06 VITALS
SYSTOLIC BLOOD PRESSURE: 128 MMHG | HEART RATE: 78 BPM | TEMPERATURE: 96.7 F | RESPIRATION RATE: 18 BRPM | DIASTOLIC BLOOD PRESSURE: 70 MMHG | OXYGEN SATURATION: 99 %

## 2023-02-06 PROCEDURE — G0151 HHCP-SERV OF PT,EA 15 MIN: HCPCS

## 2023-02-06 NOTE — HOME HEALTH
Pt reports she feels some better than Saturday but still rates her pain a 9/10.    NEIL in place, no bleeding, very little swelling.  Calf tender but soft.    Plan for next visit:  pain mgt?  gait training improving step through pattern  HEP/ROM progression

## 2023-02-06 NOTE — TELEPHONE ENCOUNTER
Post op day 4  Discharge Instructions:  Ask patient about his or her discharge instructions  ?  Patient confirmed understanding   ?  Further instruction needed   What, if any, recommendations, teaching, or interventions did you provide? Click or tap here to enter text.  Health status:  Pain controlled Yes   Does have some increased pain but it is controlled with the pain medication.   Recommended interventions:  Yes  incision/dressing status   ?  Clean without redness, drainage, odor  ?  Redness    ?  Drainage - color Click or tap here to enter text.  ?  Odor  NEIL - Green light blinking Yes  Difficulties urination No  No issues   Last BM 2/3/2023 (if no BM by day 3-recommend OTC suppository or fleets enema)  Started stool softeners yesterday.   Medications:  ?Medications reviewed with patient/family/caregiver  Patient taking medications as prescribed?   Yes  If not taking medications as prescribed, note specific medicine(s) and reason for each:  Click or tap here to enter text.  Hospital Follow Up Plan:  Follow up Appointment with Orthopedic surgeon:  ?Has f/u appointment                ?Scheduled f/u appointment  Home Care ordered at discharge?    Yes        Home Care started, or contact made?    Yes   If no, action taken:   DME obtained/used in home?         Yes   Using IS  Yes   Other information: Ms. Bond said she is doing OK. She was to be a SDD but was lethargic and had N/V Issues, that has since resolved and she was able to D/C home the following day. PT has been out to see her. She does have some increased pain, but the pain medication does help. Dressing remains in place CDI. Green light blinking. NO BM as of yet, started taking the stool softeners yesterday. Ms. Bond doesn’t have any questions/concerns for me at this time. She has my contact information should she need anything. she voiced understanding and appreciated the call.

## 2023-02-07 ENCOUNTER — TELEPHONE (OUTPATIENT)
Dept: ORTHOPEDIC SURGERY | Facility: CLINIC | Age: 78
End: 2023-02-07
Payer: MEDICARE

## 2023-02-07 DIAGNOSIS — Z96.652 TOTAL KNEE REPLACEMENT STATUS, LEFT: Primary | ICD-10-CM

## 2023-02-07 NOTE — TELEPHONE ENCOUNTER
Dr. Dottie Deal calling to get a referral for physical therapy.  Patient has an appointment on 1/20/23 for left knee.  Please fax referral to [phone]

## 2023-02-08 ENCOUNTER — HOME CARE VISIT (OUTPATIENT)
Dept: HOME HEALTH SERVICES | Facility: HOME HEALTHCARE | Age: 78
End: 2023-02-08
Payer: MEDICARE

## 2023-02-08 VITALS
SYSTOLIC BLOOD PRESSURE: 146 MMHG | OXYGEN SATURATION: 96 % | DIASTOLIC BLOOD PRESSURE: 80 MMHG | TEMPERATURE: 96.6 F | HEART RATE: 69 BPM

## 2023-02-08 PROCEDURE — G0157 HHC PT ASSISTANT EA 15: HCPCS

## 2023-02-08 PROCEDURE — G0180 MD CERTIFICATION HHA PATIENT: HCPCS | Performed by: ORTHOPAEDIC SURGERY

## 2023-02-08 NOTE — HOME HEALTH
Subjective:I am a little less pain. I haven't had a bowel movement and I am passing gas but not cramping    Wound-Left knee covered with NEIL dressing- leave on till follow up- negative Homans sign  Edema-Left knee min/moderate amount- bruising posteriorly  Dr Betancur- 2-17-23  Outpatient- [facility] at [facility] 2-20-23  Falls- none  Medication Changes- none    Assessment:Patient seen for left knee replacement. She is able to manuever with walker in the home. She takes increased effort to come to stand. She declines shower transfers at this time and is sponge bathing. She was able to review HEP and improved on ROM. Patient education on bowel medication assistance, using spirometer 3-5 times daily and nutrition/hydration and verbalized understanding.    Plan for next visit:  Gait training  Review and progress HEP  Increase ROM  Balance/transfers/safety  steps

## 2023-02-09 DIAGNOSIS — Z96.652 TOTAL KNEE REPLACEMENT STATUS, LEFT: ICD-10-CM

## 2023-02-09 RX ORDER — HYDROCODONE BITARTRATE AND ACETAMINOPHEN 7.5; 325 MG/1; MG/1
TABLET ORAL
Qty: 42 TABLET | Refills: 0 | Status: SHIPPED | OUTPATIENT
Start: 2023-02-09

## 2023-02-10 ENCOUNTER — HOME CARE VISIT (OUTPATIENT)
Dept: HOME HEALTH SERVICES | Facility: HOME HEALTHCARE | Age: 78
End: 2023-02-10
Payer: MEDICARE

## 2023-02-10 VITALS
OXYGEN SATURATION: 98 % | DIASTOLIC BLOOD PRESSURE: 88 MMHG | HEART RATE: 78 BPM | SYSTOLIC BLOOD PRESSURE: 142 MMHG | TEMPERATURE: 96.9 F

## 2023-02-10 PROCEDURE — G0157 HHC PT ASSISTANT EA 15: HCPCS

## 2023-02-10 NOTE — HOME HEALTH
Subjective: I still haven't had a bowel movement, I am taking all my medication, I am not cramping just passing gas    Wound-Left knee covered with NEIL dressing- leave on till follow up- negative Homans sign  and some bruising noted  Edema-Left knee min/moderate amount- bruising posteriorly   Dr Betancur- 2-17-23   Outpatient- [facility] at [facility] 2-20-23   Falls- none   Medication Changes- none - Verbalized understanding    Assessment:Patient seen for left knee replacement. She is able to manuever with walker in the home with no LOB and increased distance. she is not ready for a cane today.  She was able to start her standing HEP and improved on ROM. Patient re-education on bowel medication assistance including putting it in warm liquid and to call doctor if she doesn't go in the next couple of days or if she starts cramping, using spirometer 3-5 times daily and nutrition/hydration and verbalized understanding.     Plan for next visit:   Gait training   Review and progress HEP   Increase ROM   Balance/safety  steps- she declined today

## 2023-02-13 ENCOUNTER — TELEPHONE (OUTPATIENT)
Dept: ORTHOPEDIC SURGERY | Facility: HOSPITAL | Age: 78
End: 2023-02-13
Payer: MEDICARE

## 2023-02-13 ENCOUNTER — HOME CARE VISIT (OUTPATIENT)
Dept: HOME HEALTH SERVICES | Facility: HOME HEALTHCARE | Age: 78
End: 2023-02-13
Payer: MEDICARE

## 2023-02-13 ENCOUNTER — HOSPITAL ENCOUNTER (EMERGENCY)
Facility: HOSPITAL | Age: 78
Discharge: HOME | End: 2023-02-13
Attending: EMERGENCY MEDICINE | Admitting: EMERGENCY MEDICINE
Payer: MEDICARE

## 2023-02-13 ENCOUNTER — TELEPHONE (OUTPATIENT)
Dept: ORTHOPEDIC SURGERY | Facility: CLINIC | Age: 78
End: 2023-02-13
Payer: MEDICARE

## 2023-02-13 ENCOUNTER — APPOINTMENT (OUTPATIENT)
Dept: CT IMAGING | Facility: HOSPITAL | Age: 78
End: 2023-02-13
Payer: MEDICARE

## 2023-02-13 VITALS
HEIGHT: 62 IN | SYSTOLIC BLOOD PRESSURE: 168 MMHG | RESPIRATION RATE: 18 BRPM | DIASTOLIC BLOOD PRESSURE: 77 MMHG | WEIGHT: 237 LBS | OXYGEN SATURATION: 93 % | TEMPERATURE: 98.6 F | BODY MASS INDEX: 43.61 KG/M2 | HEART RATE: 79 BPM

## 2023-02-13 VITALS
TEMPERATURE: 97 F | OXYGEN SATURATION: 94 % | SYSTOLIC BLOOD PRESSURE: 142 MMHG | RESPIRATION RATE: 18 BRPM | DIASTOLIC BLOOD PRESSURE: 70 MMHG | HEART RATE: 74 BPM

## 2023-02-13 DIAGNOSIS — K56.41 FECAL IMPACTION: Primary | ICD-10-CM

## 2023-02-13 LAB
ALBUMIN SERPL-MCNC: 3.9 G/DL (ref 3.5–5.2)
ALBUMIN/GLOB SERPL: 1.3 G/DL
ALP SERPL-CCNC: 108 U/L (ref 39–117)
ALT SERPL W P-5'-P-CCNC: 32 U/L (ref 1–33)
ANION GAP SERPL CALCULATED.3IONS-SCNC: 11 MMOL/L (ref 5–15)
AST SERPL-CCNC: 31 U/L (ref 1–32)
BASOPHILS # BLD AUTO: 0.03 10*3/MM3 (ref 0–0.2)
BASOPHILS NFR BLD AUTO: 0.3 % (ref 0–1.5)
BILIRUB SERPL-MCNC: 1.2 MG/DL (ref 0–1.2)
BUN SERPL-MCNC: 11 MG/DL (ref 8–23)
BUN/CREAT SERPL: 10.7 (ref 7–25)
CALCIUM SPEC-SCNC: 10 MG/DL (ref 8.6–10.5)
CHLORIDE SERPL-SCNC: 105 MMOL/L (ref 98–107)
CO2 SERPL-SCNC: 21 MMOL/L (ref 22–29)
CREAT SERPL-MCNC: 1.03 MG/DL (ref 0.57–1)
DEPRECATED RDW RBC AUTO: 46.2 FL (ref 37–54)
EGFRCR SERPLBLD CKD-EPI 2021: 56.8 ML/MIN/1.73
EOSINOPHIL # BLD AUTO: 0.14 10*3/MM3 (ref 0–0.4)
EOSINOPHIL NFR BLD AUTO: 1.6 % (ref 0.3–6.2)
ERYTHROCYTE [DISTWIDTH] IN BLOOD BY AUTOMATED COUNT: 13.7 % (ref 12.3–15.4)
GLOBULIN UR ELPH-MCNC: 3.1 GM/DL
GLUCOSE SERPL-MCNC: 140 MG/DL (ref 65–99)
HCT VFR BLD AUTO: 38.7 % (ref 34–46.6)
HGB BLD-MCNC: 12.6 G/DL (ref 12–15.9)
IMM GRANULOCYTES # BLD AUTO: 0.03 10*3/MM3 (ref 0–0.05)
IMM GRANULOCYTES NFR BLD AUTO: 0.3 % (ref 0–0.5)
LYMPHOCYTES # BLD AUTO: 0.91 10*3/MM3 (ref 0.7–3.1)
LYMPHOCYTES NFR BLD AUTO: 10.3 % (ref 19.6–45.3)
MCH RBC QN AUTO: 30.4 PG (ref 26.6–33)
MCHC RBC AUTO-ENTMCNC: 32.6 G/DL (ref 31.5–35.7)
MCV RBC AUTO: 93.5 FL (ref 79–97)
MONOCYTES # BLD AUTO: 0.49 10*3/MM3 (ref 0.1–0.9)
MONOCYTES NFR BLD AUTO: 5.6 % (ref 5–12)
NEUTROPHILS NFR BLD AUTO: 7.2 10*3/MM3 (ref 1.7–7)
NEUTROPHILS NFR BLD AUTO: 81.9 % (ref 42.7–76)
NRBC BLD AUTO-RTO: 0 /100 WBC (ref 0–0.2)
PLATELET # BLD AUTO: 222 10*3/MM3 (ref 140–450)
PMV BLD AUTO: 9 FL (ref 6–12)
POTASSIUM SERPL-SCNC: 4 MMOL/L (ref 3.5–5.2)
PROT SERPL-MCNC: 7 G/DL (ref 6–8.5)
RBC # BLD AUTO: 4.14 10*6/MM3 (ref 3.77–5.28)
SODIUM SERPL-SCNC: 137 MMOL/L (ref 136–145)
WBC NRBC COR # BLD: 8.8 10*3/MM3 (ref 3.4–10.8)

## 2023-02-13 PROCEDURE — 74176 CT ABD & PELVIS W/O CONTRAST: CPT

## 2023-02-13 PROCEDURE — 36415 COLL VENOUS BLD VENIPUNCTURE: CPT

## 2023-02-13 PROCEDURE — 80053 COMPREHEN METABOLIC PANEL: CPT

## 2023-02-13 PROCEDURE — 99283 EMERGENCY DEPT VISIT LOW MDM: CPT

## 2023-02-13 PROCEDURE — 85025 COMPLETE CBC W/AUTO DIFF WBC: CPT

## 2023-02-13 PROCEDURE — G0151 HHCP-SERV OF PT,EA 15 MIN: HCPCS

## 2023-02-13 RX ORDER — POLYETHYLENE GLYCOL 3350 17 G/17G
17 POWDER, FOR SOLUTION ORAL DAILY
Qty: 30 EACH | Refills: 0 | Status: SHIPPED | OUTPATIENT
Start: 2023-02-13 | End: 2023-02-14 | Stop reason: SDUPTHER

## 2023-02-13 NOTE — TELEPHONE ENCOUNTER
charisma    Caller: Ted Bondet LUPE     Relationship: SELF    Best call back number: [phone]    What is your medical concern?     FROM [facility]/ HOME CARE - Pt has not had a bowel movement 1-30-23.  She now reports bleeding when trying to have a bowel movement.  She has not had any trouble, pain, sickness until last night.  She has taken 100 mg docusate sodium 2x/day and dulcolax 5 mg twice without any luck.  She feels nauscous today and therefore has not had a pain pill in 12 hours, limiting her PT activity/exercises/ROM.  Please call patient with further instuction.  Thank you.     PT CALLED TO FOLLOW UP WITH PREVIOUS MESSAGE. UNABLE TO WARM TRANSFER. PLEASE CALL BACK ASAP.

## 2023-02-13 NOTE — TELEPHONE ENCOUNTER
Called and spoke with Ms. Bond to see how she is doing as she is 2 weeks SP LTK. She said things were going ok, but today is a bad day. She hasn't had a BM since 1/30. She was passing gas a couple days ago, but now she feels nauseous, is unable to take pain medication making it hard to do PT. She has been taking dulcolax and docusate twice a day with no results. We discussed an enema or suppository. She asked if that didn't work then what. I told her about disimpacting, but would caution against it with the risk of infection. She said she tried that and couldn't get it out. Told her she may need to come in to make sure she didn't have an obstruction. I told her I would let the MD office know of our conversation as well. She said she wanted to give this a try first and see what it did. Her knee is doing ok. She was doing exercises and ambulating. Ms. Bond doesn't have any other questions for me at this time. She was given my contact information should she need anything.

## 2023-02-13 NOTE — ED TRIAGE NOTES
Pt had left knee replacement on feb 2.  Last BM was 1/31.  She has taken stool softener and otc dulcolax.  Today she gave herself a supp.  No results    Patient was placed in face mask during first look triage.  Patient was wearing a face mask throughout encounter.  I wore personal protective equipment throughout the encounter.  Hand hygiene was performed before and after patient encounter.

## 2023-02-13 NOTE — TELEPHONE ENCOUNTER
Pt states she has not had a bowel movement at all. Isabela from the hospital suggested a suppository and she tried that a little bit ago. She is supposed to contact Isabela back if no bowel movement soon. Advised her to go to ED if no bowel movement produced as indicated on suppository instructions.

## 2023-02-13 NOTE — HOME HEALTH
PT reports she is not feeling well at all and cannot do her PT exercises.  She reports she has not had a bowel movement and is so uncomfortable and is now having some bleeding when she tries. She reports she is nauseated and therefore has not been able to take a pain pill since last night.  PT sent message for further instruction to her surgeon and waiting for instructions.      NEIL intact.  Minimal, normal bruising and swelling to L LE.      Plan for next visit:  agency dc to OP PT

## 2023-02-13 NOTE — TELEPHONE ENCOUNTER
I spoke to MsNathalie Ronda.  She reports the suppository did not help.  I instructed her to report to the emergency department.  She said she was getting ready to go right now.

## 2023-02-14 RX ORDER — POLYETHYLENE GLYCOL 3350 17 G/17G
17 POWDER, FOR SOLUTION ORAL DAILY
Qty: 30 EACH | Refills: 0 | Status: SHIPPED | OUTPATIENT
Start: 2023-02-14

## 2023-02-14 NOTE — ED PROVIDER NOTES
EMERGENCY DEPARTMENT ENCOUNTER    Room Number:  40/40  Date seen:  2/16/2023  PCP: Moe Matute MD  Historian: Patient  Relevant information provided by sources other than the patient, review of external records, and social determinants of health may be included in the HPI section.      HPI:  Chief Complaint: Constipation  Additional historical features obtained directly from: No one  Context: Patt Bond is a 77 y.o. female who presents to the ED c/o severe constipation with no BM for 10 days.  Patient states she has lower abdominal discomfort and tenesmus, but has had no stool at all.  She said she feels like she needs to and when she tries and strains when she wipes and sees a little bit of stool and blood on paper.  Patient does have a history of previous colon resection for diverticulosis and reports a chronic rectal prolapse.    Patient had knee replacement surgery 10 days ago and has been on pain medications and stool softeners since then but is still not had a bowel movement.  She has tried over-the-counter stool softeners as well as Dulcolax with no relief.  She was starting to become nauseated and bloated and she was told by her PCP to come to the emergency department.        Initial impression including social determinants which will impact the assessment      Initial impression is that this certainly could be fecal impaction, but cannot exclude bowel obstruction, infectious or inflammatory cause, mass or any number of other things that could lead to admission or surgical consult.          PAST MEDICAL HISTORY  Active Ambulatory Problems     Diagnosis Date Noted   • Left lower quadrant pain 07/03/2016   • Ketonuria due to dehydration 07/03/2016   • Normocytic anemia 07/03/2016   • Pancytopenia (HCC) 07/04/2016   • Obesity (BMI 30-39.9) 07/04/2016   • Nausea 07/04/2016   • Sepsis (HCC) 07/08/2016   • Nephrolithiasis 08/25/2016   • Pleural nodule 09/13/2016   • Tracheal compression 09/13/2016    • Hyperthyroidism 11/04/2016   • Abnormal weight gain 11/04/2016   • Palpitations 11/05/2016   • Cholecystitis 02/14/2017   • History of colon polyps - 4 Tubulovillous adenomas in 2004, normal colonoscopy in 2008 02/14/2017   • History of abdominal abscess - 3+ E. coli at time of surgery 9/2016 02/14/2017   • Multiple drug allergies to Cephalexin, Cephalosporins, Penicillins 02/14/2017   • Weight gain - 10 pounds in 2 months, unintentional 02/14/2017   • Multinodular goiter 07/07/2017   • GRAY (dyspnea on exertion) 09/08/2017   • Obesity, morbid, BMI 40.0-49.9 ([facility]) 09/08/2017   • Substernal goiter 02/13/2018   • Obstruction of left ureteropelvic junction (UPJ) due to stone 04/21/2018   • Morbid obesity with BMI of 40.0-44.9, adult ([facility]) 04/21/2018   • ERIS on CPAP 08/06/2018   • Hypersomnia with sleep apnea 08/06/2018   • Sleep related hypoxia 08/06/2018   • Chronic fatigue 06/20/2019   • History of lobectomy of thyroid 10/07/2020   • Impaired fasting glucose 04/14/2022   • Renal calculus, left 07/29/2022   • Arthritis of knee 12/08/2022   • Total knee replacement status, left 02/02/2023     Resolved Ambulatory Problems     Diagnosis Date Noted   • Acute diverticulitis of sigmoid colon 07/03/2016   • Acute diverticulitis 09/09/2016   • Graves disease 11/04/2016   • Incisional hernia, without obstruction or gangrene 02/14/2017   • Nausea and vomiting 04/21/2018     Past Medical History:   Diagnosis Date   • Arthritis    • Asthma    • Back pain    • Cataract    • Chronic diarrhea    • Diverticulitis    • Diverticulosis    • Goiter    • Joint pain, knee    • Kidney stones    • Left knee pain    • Lung nodule    • Mass of mediastinum    • Obstructive pyelonephritis 09/28/2015   • PONV (postoperative nausea and vomiting)    • Sleep apnea    • SOB (shortness of breath) on exertion          PAST SURGICAL HISTORY  Past Surgical History:   Procedure Laterality Date   • BRONCHOSCOPY N/A 11/6/2017    Procedure: BRONCHOSCOPY  WITH ENDOBRONCHIAL ULTRASOUND AND TRANSBRONCHIAL NEEDLE ASPIRATION;  Surgeon: Nando Heller MD;  Location: [facility] ENDOSCOPY;  Service:    • CARDIAC CATHETERIZATION N/A 9/27/2017    Procedure: Right Heart Cath;  Surgeon: Miguel Gautam MD;  Location: [facility] CATH INVASIVE LOCATION;  Service:    • CARDIAC CATHETERIZATION N/A 9/27/2017    Procedure: Left Heart Cath;  Surgeon: Miguel Gautam MD;  Location: [facility] CATH INVASIVE LOCATION;  Service:    • CARDIAC CATHETERIZATION N/A 9/27/2017    Procedure: Coronary angiography;  Surgeon: Miguel Gautam MD;  Location: [facility] CATH INVASIVE LOCATION;  Service:    • CARDIAC CATHETERIZATION N/A 9/27/2017    Procedure: Left ventriculography;  Surgeon: Miguel Gautam MD;  Location: [facility] CATH INVASIVE LOCATION;  Service:    • CHOLECYSTECTOMY N/A 5/17/2017    Procedure: LAPAROSCOPIC CHOLECYSTECTOMY WITH IOC;  Surgeon: Patt Moore MD;  Location: [facility] MAIN OR;  Service:    • COLON RESECTION Left 9/14/2016    Laparoscopic Low Anterior Resection with splenic flexure mobilization, drainage of Pelvic Abscess (cultured 3+ E. Coli), Left salpingo-ophorectomy, laparoscopic rectopexy, and umbilical hernia repair, Dr. Patt Moore   • COLON RESECTION      VENTRAL HERNIA REPAIR   • COLONOSCOPY N/A 05/15/2008    sigmoid diverticulosis with blunting of the haustral folds and angulation consistent with previous diverticulitis, no polyps, suggestion of rectal prolapse-Dr. Patt Moore   • COLONOSCOPY W/ BIOPSIES AND POLYPECTOMY N/A 04/16/2001    Possible mild gastritis w/ some appearance of formations that look like petechiae in the antrum, no ulcerations, no erosions; cecal polyp approx 4mm sessile; probable transverse colon polyp, although we could not visualize this completely upon pulling out; sigmoid diverticula; multiple rectal polyps, mostly w/ appearance of hyperplasia, largest being 5 to 6mm: removed via snare-Dr. Patt Moore   • COLONOSCOPY W/  POLYPECTOMY N/A 12/10/2004    Diverticulosis with sigmoid perideverticulitis with erythema and patchiness around some of the diverticula, proximal ascedning colon polyp-approx 5 mm-removed via snare cauter, two distal ascending colon polyps-7 mm and 3 mm-removed via snare cautery polypectomy, hemorrhoids-Dr. Patt Moore   • CYSTOSCOPY W/ URETERAL STENT PLACEMENT Left 4/21/2018    Procedure: CYSTOSCOPY, LEFT RETROGRADE WITH LEFT URETERAL STENT INSERTION;  Surgeon: Stanley Osuna MD;  Location: [facility];  Service: Urology   • CYSTOSCOPY W/ URETERAL STENT REMOVAL Left 10/07/2015    Cystoscopy with stent extraction, left ureteral occulusion balloon placement, percutanous nephrostolithotomy with stone volume less than 2.5 cm involving the left lower pole and the left proximal ureter, balloon tract dilation for establishment of nephrostomy tract, antegrade nephrostomy tube placement-Dr. Miguel Monte   • CYSTOSCOPY, RETROGRADE PYELOGRAM AND STENT INSERTION Left 09/28/2015    Left cystoscopy with left retrograde pyelogram, left double-J stent placement-Dr. Rivera [facility]   • CYSTOSCOPY, RETROGRADE PYELOGRAM AND STENT INSERTION Left 09/09/2015    Cystoscopy with bilateral retrograde pyelogram, left double-J stent placement, right ureteral pyeloscopy with laser lithotripsy of the ureteropelvic junction calculus, right double-J stent placement-Dr. Miguel Monte   • CYSTOSCOPY, RETROGRADE PYELOGRAM AND STENT INSERTION Right 09/21/2007    Cystoscopy with right retrograde pyelogram, right biliary stent placement-Dr. Miguel Monte   • CYSTOSCOPY, RETROGRADE PYELOGRAM AND STENT INSERTION Right 09/07/2007    Cystoscopy with right retrograde pyelogram, right ureteral peyloscopy with extraction distal ureteral mass, right double J stent placement-Dr. Miguel Monte   • CYSTOSCOPY, RETROGRADE PYELOGRAM AND STENT INSERTION Left 7/29/2022    Procedure: CYSTOSCOPY, LEFT RETROGRADE PYELOGRAM, LEFT URETERAL  STENT;  Surgeon: Cedrick Jones MD;  Location: [facility] MAIN OR;  Service: Urology;  Laterality: Left;   • CYSTOSCOPY, URETEROSCOPY, RETROGRADE PYELOGRAM, STENT INSERTION Right 09/07/2007    Cystoscopy with right retrograde pyelogram, rigth ureteroscopy with extraction of distal mass, right double J stent placement-Dr. Miguel Monte   • D & C HYSTEROSCOPY N/A 05/18/2011    Procedure done due to thickened endomentrium and an endometrial polyp-Dr. Quita Cheatham   • DILATATION AND CURETTAGE N/A 03/22/2002    D&C, polyp removal-Dr. Quita Cheatham   • EXTRACORPOREAL SHOCKWAVE LITHOTRIPSY (ESWL), STENT INSERTION/REMOVAL Left 05/08/2015    Left extracoporeal shockwave lithotripsy, cystoscopy with stent placement-Dr. Miguel Monte   • MEDIASTINOTOMY Left 3/5/2018    Procedure: left thyroidectomy, resection of substernal thyroid goiter cervical approach;  Surgeon: Quintin Mares III, MD;  Location: [facility] MAIN OR;  Service:    • SIGMOIDOSCOPY N/A 9/13/2016    Procedure: SIGMOIDOSCOPY FLEXIBLE TO 25 CM;  Surgeon: Patt Moore MD;  Location: [facility] ENDOSCOPY;  Service:    • THORACOTOMY  1996    Thoracotomy for ectopic thyroid, Dr. Camarillo   • THYROIDECTOMY, PARTIAL      left side 3/05/18   • TOTAL KNEE ARTHROPLASTY Left 2/2/2023    Procedure: TOTAL KNEE ARTHROPLASTY;  Surgeon: Anthony Sinclair MD;  Location: [facility] OR OSC;  Service: Orthopedics;  Laterality: Left;   • UMBILICAL HERNIA REPAIR N/A 9/14/2016    Procedure: UMBILICAL HERNIA REPAIR ;  Surgeon: Patt Moore MD;  Location: [facility] MAIN OR;  Service:    • URETEROSCOPY LASER LITHOTRIPSY WITH STENT INSERTION Left 8/12/2022    Procedure: LEFT URETEROSCOPY LASER LITHOTRIPSY STONE BACKET EXTRACTION, STENT PLACEMENT;  Surgeon: Miguel Monte MD;  Location: [facility] MAIN OR;  Service: Urology;  Laterality: Left;   • VENTRAL/INCISIONAL HERNIA REPAIR N/A 5/17/2017    Procedure: VENTRAL HERNIA REPAIR WITH MESH, BILATERAL MUSCULOFASCIAL RELEASE;  Surgeon: Patt  MD Oscar;  Location: [facility] OR;  Service:          FAMILY HISTORY  Family History   Problem Relation Age of Onset   • Heart disease Father    • Heart attack Paternal Uncle    • Cancer Maternal Grandmother         ovarian   • Heart attack Paternal Grandfather    • Heart attack Paternal Uncle    • Heart attack Paternal Uncle    • Heart attack Paternal Uncle    • Heart attack Paternal Uncle    • Heart attack Paternal Uncle    • Scleroderma Mother    • Hypertension Sister    • Malig Hyperthermia Neg Hx          SOCIAL HISTORY  Social History     Socioeconomic History   • Marital status:      Spouse name: BERENICE COFFEY    • Number of children: 2   • Years of education: HIGH SCHOOL    Tobacco Use   • Smoking status: Never   • Smokeless tobacco: Never   Vaping Use   • Vaping Use: Never used   Substance and Sexual Activity   • Alcohol use: No   • Drug use: No   • Sexual activity: Defer         ALLERGIES  Other, Cephalexin, Cephalosporins, and Penicillins          PHYSICAL EXAM  ED Triage Vitals   Temp Heart Rate Resp BP SpO2   02/13/23 1814 02/13/23 1814 02/13/23 1814 02/13/23 1818 02/13/23 1814   98.6 °F (37 °C) 93 16 (!) 142/109 94 %      Temp src Heart Rate Source Patient Position BP Location FiO2 (%)   02/13/23 1814 02/13/23 1814 -- -- --   Tympanic Monitor            Physical Exam      GENERAL: Awake and alert, does not appear acutely toxic  HENT: nares patent  EYES: no scleral icterus, PERRL, EOMI  CV: regular rhythm, normal rate, distal pulses symmetric and palpable  RESPIRATORY: normal effort  ABDOMEN: soft, nondistended nontender with normal bowel sound.  Patient has a chronic rectal prolapse that does not appear incarcerated and is easily reducible but does not stay.  MUSCULOSKELETAL: no deformity  NEURO: alert, moves all extremities, follows commands  PSYCH:  calm, cooperative  SKIN: warm, dry with no rash        LAB RESULTS  No results found for this or any previous visit (from the past 24  hour(s)).    Ordered the above labs and independently interpreted results. My findings will be discussed in the medical decision making section below        RADIOLOGY  No Radiology Exams Resulted Within Past 24 Hours    Ordered the above noted radiological studies.  Independently interpreted by me in PACS.  My findings will be discussed in the medical decision making section below        PROCEDURES  Procedures          MEDICATIONS GIVEN IN ER  Medications - No data to display                MEDICAL DECISION MAKING, PROGRESS, and CONSULTS    Please note that this section constitutes my independent interpretation of clinical data including lab results, radiology, EKG's.  This constitutes my independent professional opinion regarding differential diagnosis and management of this patient.  It may include any factors such as history from outside sources, review of external records, social determinants of health, management of medications, response to those treatments, and discussions with other providers.      ED Course as of 02/16/23 1535   Thu Feb 16, 2023   1533 CBC and chemistry are essentially unremarkable making infectious etiology much less likely.  She does not appear to be dehydrated and electrolytes are within normal limits [DP]   1533 CT scan of the abdomen pelvis certainly shows a large ball of stool in the rectal vault, and there is also a segment of thickened large bowel wall adjacent to this, but the colon is mostly collapsed and I believe unlikely to be infectious [DP]   1534 Patient was given an enema and she had complete resolution of her symptoms after passage of very large bowel stool and in shared decision with the patient and family we are comfortable that she can go home using more aggressive stool softener regimen.  I have prescribed MiraLAX for her to take at least once a day as long as she is taking the pain medications [DP]   1535 Also of note, the left knee which is about 10 days postoperative  looks great.  There is very minimal swelling, the incisions clean dry and intact, she has almost complete extension and flexion to 90 degrees and really looks good [DP]      ED Course User Index  [DP] Melvin Uriostegui MD                All appropriate hygiene and PPE requirements were satisfied with this patient encounter      FINAL DIAGNOSES  Final diagnoses:   Fecal impaction (HCC)           DISPOSITION  Discharge          Latest Documented Vital Signs:  As of 15:35 EST  BP- 168/77 HR- 79 Temp- 98.6 °F (37 °C) (Tympanic) O2 sat- 93%        --    Please note that portions of this were completed with a voice recognition program.       Note Disclaimer: At [facility], we believe that sharing information builds trust and better relationships. You are receiving this note because you are receiving care at [facility] or recently visited. It is possible you will see health information before a provider has talked with you about it. This kind of information can be easy to misunderstand. To help you fully understand what it      Melvin Uriostegui MD  02/16/23 [id number]

## 2023-02-15 ENCOUNTER — HOME CARE VISIT (OUTPATIENT)
Dept: HOME HEALTH SERVICES | Facility: HOME HEALTHCARE | Age: 78
End: 2023-02-15
Payer: MEDICARE

## 2023-02-15 VITALS
DIASTOLIC BLOOD PRESSURE: 70 MMHG | HEART RATE: 80 BPM | TEMPERATURE: 97.6 F | OXYGEN SATURATION: 93 % | SYSTOLIC BLOOD PRESSURE: 138 MMHG | RESPIRATION RATE: 18 BRPM

## 2023-02-15 PROCEDURE — G0151 HHCP-SERV OF PT,EA 15 MIN: HCPCS

## 2023-02-15 NOTE — HOME HEALTH
Pt reports she had to go to the ER per MD orders for an enema on Monday night which helped her have a bowel movement and she feels much better today.    Pt is set up for f/u with surgeon and then begins OP PT.    Normal brusing and swelling present to L knee.

## 2023-02-17 ENCOUNTER — OFFICE VISIT (OUTPATIENT)
Dept: ORTHOPEDIC SURGERY | Facility: CLINIC | Age: 78
End: 2023-02-17
Payer: MEDICARE

## 2023-02-17 VITALS — BODY MASS INDEX: 43.5 KG/M2 | WEIGHT: 236.4 LBS | HEIGHT: 62 IN | TEMPERATURE: 97.8 F

## 2023-02-17 DIAGNOSIS — R52 PAIN: Primary | ICD-10-CM

## 2023-02-17 DIAGNOSIS — Z09 SURGERY FOLLOW-UP: ICD-10-CM

## 2023-02-17 PROCEDURE — 99024 POSTOP FOLLOW-UP VISIT: CPT | Performed by: NURSE PRACTITIONER

## 2023-02-17 PROCEDURE — 73560 X-RAY EXAM OF KNEE 1 OR 2: CPT | Performed by: NURSE PRACTITIONER

## 2023-02-17 NOTE — PROGRESS NOTES
Patt Bond     : 1945     MRN: [id number]     DATE: 2023    DIAGNOSIS: Follow-up 2 weeks status post total knee arthroplasty    SUBJECTIVE:  Patient returns today for 2 week follow up of recent knee replacement. Patient reports doing well with no unusual complaints.  Patient reports compliance with the anticoagulation.  She said she was able to have a complete bowel movement after being seen in the emergency department on Monday.  She has not had a bowel movement since Tuesday, but did take MiraLAX yesterday and will repeat today.    OBJECTIVE:     Exam: The incision is healing appropriately.  No sign of infection.  Range of motion is progressing as expected.  The calf is soft and nontender with a negative Homans sign.    DIAGNOSTIC STUDIES     Xrays: AP and lateral views of the left knee were ordered and reviewed for evaluation of recent knee replacement. They demonstrate a well positioned, well aligned knee replacement without complicating factors noted. In comparison with previous films there has been interval implant placement.    ASSESSMENT: 2 week status post left knee replacement.    PLAN:   1) Continue PT   2) Continue to ice as needed.   3) Continue anticoagulation as discussed   4) We discussed the importance of maintaining regular bowel movements.  She will continue using MiraLAX daily as long as she is taking Norco.   5) Follow-up in 4 weeks with Dr. Sinclair.    Yancy Cui, APRN    2023     Answers for HPI/ROS submitted by the patient on 2023  Please describe your symptoms.: Follow up after surgery  Have you had these symptoms before?: No  How long have you been having these symptoms?: Greater than 2 weeks  What is the primary reason for your visit?: Other

## 2023-03-17 ENCOUNTER — OFFICE VISIT (OUTPATIENT)
Dept: ORTHOPEDIC SURGERY | Facility: CLINIC | Age: 78
End: 2023-03-17
Payer: MEDICARE

## 2023-03-17 VITALS — WEIGHT: 231 LBS | HEIGHT: 62 IN | BODY MASS INDEX: 42.51 KG/M2 | TEMPERATURE: 96.8 F

## 2023-03-17 DIAGNOSIS — R52 PAIN: Primary | ICD-10-CM

## 2023-03-17 DIAGNOSIS — Z09 SURGERY FOLLOW-UP: ICD-10-CM

## 2023-03-17 PROCEDURE — 73562 X-RAY EXAM OF KNEE 3: CPT | Performed by: ORTHOPAEDIC SURGERY

## 2023-03-17 PROCEDURE — 1159F MED LIST DOCD IN RCRD: CPT | Performed by: ORTHOPAEDIC SURGERY

## 2023-03-17 PROCEDURE — 1160F RVW MEDS BY RX/DR IN RCRD: CPT | Performed by: ORTHOPAEDIC SURGERY

## 2023-03-17 PROCEDURE — 99024 POSTOP FOLLOW-UP VISIT: CPT | Performed by: ORTHOPAEDIC SURGERY

## 2023-03-17 RX ORDER — HYDROCODONE BITARTRATE AND ACETAMINOPHEN 7.5; 325 MG/1; MG/1
1 TABLET ORAL EVERY 6 HOURS PRN
Qty: 30 TABLET | Refills: 0 | Status: SHIPPED | OUTPATIENT
Start: 2023-03-17

## 2023-03-17 NOTE — PROGRESS NOTES
Patt Bond : 1945 MRN: [id number] DATE: 3/17/2023    DIAGNOSIS: 6 week follow up left TKA      SUBJECTIVE:  Patient returns today for 6 week follow up of left total knee replacement. Patient reports doing well with no unusual complaints. She is using a cane at home but the walker when out of the house.  Pain is getting better but still significant.  She is still taking Norco before PT only.    Vitals:    23 1111   Temp: 96.8 °F (36 °C)       OBJECTIVE:     Exam:  The incision is well healed. No sign of infection. Range of motion: 2 to 110. The calf is soft and nontender with a negative Homans sign.  Gait is reciprocal heel-to-toe and only mildly antalgic.    DIAGNOSTIC STUDIES    Xrays: 3 views of the left knee (AP, lateral, and sunrise) were ordered and reviewed for evaluation of recent knee replacement. They demonstrate a well positioned, well aligned knee replacement without complicating factors noted. In comparison with previous films there has been no change.    ASSESSMENT:  6 week status post left knee replacement.    PLAN:   1) Continue with PT exercises as prescribed     2) Follow up in 3 months     3)  I agreed to refill her Mason.  Risks were discussed.    Anthony Sinclair MD  3/17/2023

## 2023-03-19 ENCOUNTER — HOSPITAL ENCOUNTER (EMERGENCY)
Facility: HOSPITAL | Age: 78
Discharge: HOME | End: 2023-03-20
Attending: EMERGENCY MEDICINE | Admitting: EMERGENCY MEDICINE
Payer: MEDICARE

## 2023-03-19 ENCOUNTER — APPOINTMENT (OUTPATIENT)
Dept: CT IMAGING | Facility: HOSPITAL | Age: 78
End: 2023-03-19
Payer: MEDICARE

## 2023-03-19 VITALS
BODY MASS INDEX: 42.51 KG/M2 | DIASTOLIC BLOOD PRESSURE: 87 MMHG | RESPIRATION RATE: 18 BRPM | SYSTOLIC BLOOD PRESSURE: 171 MMHG | TEMPERATURE: 99.2 F | WEIGHT: 231 LBS | HEIGHT: 62 IN | OXYGEN SATURATION: 98 % | HEART RATE: 87 BPM

## 2023-03-19 DIAGNOSIS — N13.2 HYDRONEPHROSIS WITH URINARY OBSTRUCTION DUE TO URETERAL CALCULUS: ICD-10-CM

## 2023-03-19 DIAGNOSIS — N20.0 KIDNEY STONE ON LEFT SIDE: Primary | ICD-10-CM

## 2023-03-19 LAB
ALBUMIN SERPL-MCNC: 4.5 G/DL (ref 3.5–5.2)
ALBUMIN/GLOB SERPL: 1.6 G/DL
ALP SERPL-CCNC: 97 U/L (ref 39–117)
ALT SERPL W P-5'-P-CCNC: 26 U/L (ref 1–33)
ANION GAP SERPL CALCULATED.3IONS-SCNC: 10.9 MMOL/L (ref 5–15)
AST SERPL-CCNC: 20 U/L (ref 1–32)
BACTERIA UR QL AUTO: ABNORMAL /HPF
BASOPHILS # BLD AUTO: 0.02 10*3/MM3 (ref 0–0.2)
BASOPHILS NFR BLD AUTO: 0.3 % (ref 0–1.5)
BILIRUB SERPL-MCNC: 1 MG/DL (ref 0–1.2)
BILIRUB UR QL STRIP: NEGATIVE
BUN SERPL-MCNC: 14 MG/DL (ref 8–23)
BUN/CREAT SERPL: 10.8 (ref 7–25)
CALCIUM SPEC-SCNC: 10.5 MG/DL (ref 8.6–10.5)
CHLORIDE SERPL-SCNC: 105 MMOL/L (ref 98–107)
CLARITY UR: CLEAR
CO2 SERPL-SCNC: 23.1 MMOL/L (ref 22–29)
COLOR UR: YELLOW
CREAT SERPL-MCNC: 1.3 MG/DL (ref 0.57–1)
DEPRECATED RDW RBC AUTO: 49.4 FL (ref 37–54)
EGFRCR SERPLBLD CKD-EPI 2021: 43 ML/MIN/1.73
EOSINOPHIL # BLD AUTO: 0.06 10*3/MM3 (ref 0–0.4)
EOSINOPHIL NFR BLD AUTO: 0.8 % (ref 0.3–6.2)
ERYTHROCYTE [DISTWIDTH] IN BLOOD BY AUTOMATED COUNT: 14.6 % (ref 12.3–15.4)
GLOBULIN UR ELPH-MCNC: 2.8 GM/DL
GLUCOSE SERPL-MCNC: 131 MG/DL (ref 65–99)
GLUCOSE UR STRIP-MCNC: NEGATIVE MG/DL
HCT VFR BLD AUTO: 37.5 % (ref 34–46.6)
HGB BLD-MCNC: 12.3 G/DL (ref 12–15.9)
HGB UR QL STRIP.AUTO: ABNORMAL
HYALINE CASTS UR QL AUTO: ABNORMAL /LPF
IMM GRANULOCYTES # BLD AUTO: 0.03 10*3/MM3 (ref 0–0.05)
IMM GRANULOCYTES NFR BLD AUTO: 0.4 % (ref 0–0.5)
INR PPP: 1.11 (ref 0.9–1.1)
KETONES UR QL STRIP: NEGATIVE
LEUKOCYTE ESTERASE UR QL STRIP.AUTO: NEGATIVE
LIPASE SERPL-CCNC: 31 U/L (ref 13–60)
LYMPHOCYTES # BLD AUTO: 0.6 10*3/MM3 (ref 0.7–3.1)
LYMPHOCYTES NFR BLD AUTO: 7.5 % (ref 19.6–45.3)
MAGNESIUM SERPL-MCNC: 2.2 MG/DL (ref 1.6–2.4)
MCH RBC QN AUTO: 30.5 PG (ref 26.6–33)
MCHC RBC AUTO-ENTMCNC: 32.8 G/DL (ref 31.5–35.7)
MCV RBC AUTO: 93.1 FL (ref 79–97)
MONOCYTES # BLD AUTO: 0.41 10*3/MM3 (ref 0.1–0.9)
MONOCYTES NFR BLD AUTO: 5.1 % (ref 5–12)
NEUTROPHILS NFR BLD AUTO: 6.87 10*3/MM3 (ref 1.7–7)
NEUTROPHILS NFR BLD AUTO: 85.9 % (ref 42.7–76)
NITRITE UR QL STRIP: NEGATIVE
NRBC BLD AUTO-RTO: 0 /100 WBC (ref 0–0.2)
PH UR STRIP.AUTO: 6.5 [PH] (ref 5–8)
PLATELET # BLD AUTO: 147 10*3/MM3 (ref 140–450)
PMV BLD AUTO: 8.6 FL (ref 6–12)
POTASSIUM SERPL-SCNC: 4.2 MMOL/L (ref 3.5–5.2)
PROT SERPL-MCNC: 7.3 G/DL (ref 6–8.5)
PROT UR QL STRIP: NEGATIVE
PROTHROMBIN TIME: 14.4 SECONDS (ref 11.7–14.2)
RBC # BLD AUTO: 4.03 10*6/MM3 (ref 3.77–5.28)
RBC # UR STRIP: ABNORMAL /HPF
REF LAB TEST METHOD: ABNORMAL
SODIUM SERPL-SCNC: 139 MMOL/L (ref 136–145)
SP GR UR STRIP: 1.02 (ref 1–1.03)
SQUAMOUS #/AREA URNS HPF: ABNORMAL /HPF
UROBILINOGEN UR QL STRIP: ABNORMAL
WBC # UR STRIP: ABNORMAL /HPF
WBC NRBC COR # BLD: 7.99 10*3/MM3 (ref 3.4–10.8)

## 2023-03-19 PROCEDURE — 74177 CT ABD & PELVIS W/CONTRAST: CPT

## 2023-03-19 PROCEDURE — 25010000002 ONDANSETRON PER 1 MG: Performed by: EMERGENCY MEDICINE

## 2023-03-19 PROCEDURE — 25510000001 IOPAMIDOL 61 % SOLUTION: Performed by: EMERGENCY MEDICINE

## 2023-03-19 PROCEDURE — 96376 TX/PRO/DX INJ SAME DRUG ADON: CPT

## 2023-03-19 PROCEDURE — 87077 CULTURE AEROBIC IDENTIFY: CPT | Performed by: EMERGENCY MEDICINE

## 2023-03-19 PROCEDURE — 83690 ASSAY OF LIPASE: CPT | Performed by: EMERGENCY MEDICINE

## 2023-03-19 PROCEDURE — 25010000002 KETOROLAC TROMETHAMINE PER 15 MG: Performed by: EMERGENCY MEDICINE

## 2023-03-19 PROCEDURE — 87086 URINE CULTURE/COLONY COUNT: CPT | Performed by: EMERGENCY MEDICINE

## 2023-03-19 PROCEDURE — 85025 COMPLETE CBC W/AUTO DIFF WBC: CPT | Performed by: EMERGENCY MEDICINE

## 2023-03-19 PROCEDURE — 81001 URINALYSIS AUTO W/SCOPE: CPT | Performed by: EMERGENCY MEDICINE

## 2023-03-19 PROCEDURE — 96374 THER/PROPH/DIAG INJ IV PUSH: CPT

## 2023-03-19 PROCEDURE — 96375 TX/PRO/DX INJ NEW DRUG ADDON: CPT

## 2023-03-19 PROCEDURE — 85610 PROTHROMBIN TIME: CPT | Performed by: EMERGENCY MEDICINE

## 2023-03-19 PROCEDURE — 80053 COMPREHEN METABOLIC PANEL: CPT | Performed by: EMERGENCY MEDICINE

## 2023-03-19 PROCEDURE — 99284 EMERGENCY DEPT VISIT MOD MDM: CPT

## 2023-03-19 PROCEDURE — 25010000002 HYDROMORPHONE PER 4 MG: Performed by: EMERGENCY MEDICINE

## 2023-03-19 PROCEDURE — 83735 ASSAY OF MAGNESIUM: CPT | Performed by: EMERGENCY MEDICINE

## 2023-03-19 PROCEDURE — 96361 HYDRATE IV INFUSION ADD-ON: CPT

## 2023-03-19 PROCEDURE — 87147 CULTURE TYPE IMMUNOLOGIC: CPT | Performed by: EMERGENCY MEDICINE

## 2023-03-19 RX ORDER — SODIUM CHLORIDE 9 MG/ML
125 INJECTION, SOLUTION INTRAVENOUS CONTINUOUS
Status: DISCONTINUED | OUTPATIENT
Start: 2023-03-19 | End: 2023-03-20 | Stop reason: HOSPADM

## 2023-03-19 RX ORDER — KETOROLAC TROMETHAMINE 15 MG/ML
15 INJECTION, SOLUTION INTRAMUSCULAR; INTRAVENOUS ONCE
Status: COMPLETED | OUTPATIENT
Start: 2023-03-19 | End: 2023-03-19

## 2023-03-19 RX ORDER — HYDROMORPHONE HYDROCHLORIDE 1 MG/ML
0.5 INJECTION, SOLUTION INTRAMUSCULAR; INTRAVENOUS; SUBCUTANEOUS ONCE
Status: COMPLETED | OUTPATIENT
Start: 2023-03-19 | End: 2023-03-19

## 2023-03-19 RX ORDER — ONDANSETRON 2 MG/ML
4 INJECTION INTRAMUSCULAR; INTRAVENOUS ONCE
Status: COMPLETED | OUTPATIENT
Start: 2023-03-19 | End: 2023-03-19

## 2023-03-19 RX ORDER — SODIUM CHLORIDE 0.9 % (FLUSH) 0.9 %
10 SYRINGE (ML) INJECTION AS NEEDED
Status: DISCONTINUED | OUTPATIENT
Start: 2023-03-19 | End: 2023-03-20 | Stop reason: HOSPADM

## 2023-03-19 RX ADMIN — HYDROMORPHONE HYDROCHLORIDE 0.5 MG: 1 INJECTION, SOLUTION INTRAMUSCULAR; INTRAVENOUS; SUBCUTANEOUS at 20:01

## 2023-03-19 RX ADMIN — SODIUM CHLORIDE 1000 ML: 9 INJECTION, SOLUTION INTRAVENOUS at 20:02

## 2023-03-19 RX ADMIN — SODIUM CHLORIDE 125 ML/HR: 9 INJECTION, SOLUTION INTRAVENOUS at 20:02

## 2023-03-19 RX ADMIN — ONDANSETRON 4 MG: 2 INJECTION INTRAMUSCULAR; INTRAVENOUS at 20:01

## 2023-03-19 RX ADMIN — IOPAMIDOL 85 ML: 612 INJECTION, SOLUTION INTRAVENOUS at 20:45

## 2023-03-19 RX ADMIN — HYDROMORPHONE HYDROCHLORIDE 0.5 MG: 1 INJECTION, SOLUTION INTRAMUSCULAR; INTRAVENOUS; SUBCUTANEOUS at 22:41

## 2023-03-19 RX ADMIN — KETOROLAC TROMETHAMINE 15 MG: 15 INJECTION, SOLUTION INTRAMUSCULAR; INTRAVENOUS at 20:01

## 2023-03-19 NOTE — ED PROVIDER NOTES
EMERGENCY DEPARTMENT ENCOUNTER    Room Number:  21/21  Date of encounter:  3/20/2023  PCP: Moe Matute MD  Historian: Patient and spouse  Relevant information and history provided by sources other than the patient will be included below and in the ED Course.  Review of pertinent past medical records may also be included in record below and ED Course.    HPI:  Chief Complaint: Left flank pain  A complete HPI/ROS/PMH/PSH/SH/FH are unobtainable due to: Not applicable  Context: Patt Bond is a 77 y.o. female who presents to the ED c/o this pain started suddenly at about 2 PM in the left flank.  Started when she was at rest.  She describes the pain as sharp and stabbing.  Nothing makes the pain better or worse.  It has become more severe but it will wax and wane in severity.  Currently she is having severe pain.  She has nausea and some dry heaves.  She denies any chest pain or shortness of breath.  Denies any burning with urination or frequent urination.  She states that she has a history of kidney stones and she thinks that is what is causing this problem now.  She denies any fevers or chills.  There is no radiation of the pain and there is no anterior abdominal pain or pelvic pain.  She has no weakness to arms or legs.  No sensory change.        Previous Episodes: Yes with kidney stones in the past  Current Symptoms: Left flank plain with nausea.    MEDICAL HISTORY REVIEWED  Patient was seen in the emergency department on February 13, 2023.  I reviewed the ED physician's note and work-up.  She had a knee replacement in the first couple days of February.  On exam she appeared to have a chronic rectal prolapse that was easily reduced.  Patient had lab work which was unremarkable including a CBC and electrolytes.    Patient had a CT scan of the abdomen and pelvis and that report was reviewed.  She had a long segment of proctitis extending into the visualized aspect of the anus patient had signs of a fecal  impaction she has bilateral nephrolithiasis with a large calculus that was present in the left UPJ that was seen on previous CT scan on 7/23/2022 the previous hydronephrosis has resolved.  She has hepatic steatosis.    I reviewed the patient had a cardiac cath in September 2017 normal left ventricular size and systolic function.  No pulmonary hypertension with mildly elevated right atrial pressure.  Normal wedge.  No significant coronary artery disease noted.  There was some mild luminal irregularities in the proximal and mid segment of the LAD.    PAST MEDICAL HISTORY  Active Ambulatory Problems     Diagnosis Date Noted   • Left lower quadrant pain 07/03/2016   • Ketonuria due to dehydration 07/03/2016   • Normocytic anemia 07/03/2016   • Pancytopenia (Formerly [facility]) 07/04/2016   • Obesity (BMI 30-39.9) 07/04/2016   • Nausea 07/04/2016   • Sepsis (Formerly [facility]) 07/08/2016   • Nephrolithiasis 08/25/2016   • Pleural nodule 09/13/2016   • Tracheal compression 09/13/2016   • Hyperthyroidism 11/04/2016   • Abnormal weight gain 11/04/2016   • Palpitations 11/05/2016   • Cholecystitis 02/14/2017   • History of colon polyps - 4 Tubulovillous adenomas in 2004, normal colonoscopy in 2008 02/14/2017   • History of abdominal abscess - 3+ E. coli at time of surgery 9/2016 02/14/2017   • Multiple drug allergies to Cephalexin, Cephalosporins, Penicillins 02/14/2017   • Weight gain - 10 pounds in 2 months, unintentional 02/14/2017   • Multinodular goiter 07/07/2017   • GRAY (dyspnea on exertion) 09/08/2017   • Obesity, morbid, BMI 40.0-49.9 (Formerly [facility]) 09/08/2017   • Substernal goiter 02/13/2018   • Obstruction of left ureteropelvic junction (UPJ) due to stone 04/21/2018   • Morbid obesity with BMI of 40.0-44.9, adult (Formerly [facility]) 04/21/2018   • ERIS on CPAP 08/06/2018   • Hypersomnia with sleep apnea 08/06/2018   • Sleep related hypoxia 08/06/2018   • Chronic fatigue 06/20/2019   • History of lobectomy of thyroid 10/07/2020   • Impaired fasting glucose 04/14/2022    • Renal calculus, left 07/29/2022   • Arthritis of knee 12/08/2022   • Total knee replacement status, left 02/02/2023     Resolved Ambulatory Problems     Diagnosis Date Noted   • Acute diverticulitis of sigmoid colon 07/03/2016   • Acute diverticulitis 09/09/2016   • Graves disease 11/04/2016   • Incisional hernia, without obstruction or gangrene 02/14/2017   • Nausea and vomiting 04/21/2018     Past Medical History:   Diagnosis Date   • Arthritis    • Asthma    • Back pain    • Cataract    • Chronic diarrhea    • Diverticulitis    • Diverticulosis    • Goiter    • Joint pain, knee    • Kidney stones    • Left knee pain    • Lung nodule    • Mass of mediastinum    • Obstructive pyelonephritis 09/28/2015   • PONV (postoperative nausea and vomiting)    • Sleep apnea    • SOB (shortness of breath) on exertion          PAST SURGICAL HISTORY  Past Surgical History:   Procedure Laterality Date   • BRONCHOSCOPY N/A 11/6/2017    Procedure: BRONCHOSCOPY WITH ENDOBRONCHIAL ULTRASOUND AND TRANSBRONCHIAL NEEDLE ASPIRATION;  Surgeon: Nando Heller MD;  Location: [facility] ENDOSCOPY;  Service:    • CARDIAC CATHETERIZATION N/A 9/27/2017    Procedure: Right Heart Cath;  Surgeon: Miguel Gautam MD;  Location: [facility] CATH INVASIVE LOCATION;  Service:    • CARDIAC CATHETERIZATION N/A 9/27/2017    Procedure: Left Heart Cath;  Surgeon: Miguel Gautam MD;  Location: [facility] CATH INVASIVE LOCATION;  Service:    • CARDIAC CATHETERIZATION N/A 9/27/2017    Procedure: Coronary angiography;  Surgeon: Miguel Gautam MD;  Location: [facility] CATH INVASIVE LOCATION;  Service:    • CARDIAC CATHETERIZATION N/A 9/27/2017    Procedure: Left ventriculography;  Surgeon: Miguel Gautam MD;  Location: [facility] CATH INVASIVE LOCATION;  Service:    • CHOLECYSTECTOMY N/A 5/17/2017    Procedure: LAPAROSCOPIC CHOLECYSTECTOMY WITH IOC;  Surgeon: Patt Moore MD;  Location: [facility] OR;  Service:    • COLON RESECTION Left  9/14/2016    Laparoscopic Low Anterior Resection with splenic flexure mobilization, drainage of Pelvic Abscess (cultured 3+ E. Coli), Left salpingo-ophorectomy, laparoscopic rectopexy, and umbilical hernia repair, Dr. Patt Moore   • COLON RESECTION      VENTRAL HERNIA REPAIR   • COLONOSCOPY N/A 05/15/2008    sigmoid diverticulosis with blunting of the haustral folds and angulation consistent with previous diverticulitis, no polyps, suggestion of rectal prolapse-Dr. Patt Moore   • COLONOSCOPY W/ BIOPSIES AND POLYPECTOMY N/A 04/16/2001    Possible mild gastritis w/ some appearance of formations that look like petechiae in the antrum, no ulcerations, no erosions; cecal polyp approx 4mm sessile; probable transverse colon polyp, although we could not visualize this completely upon pulling out; sigmoid diverticula; multiple rectal polyps, mostly w/ appearance of hyperplasia, largest being 5 to 6mm: removed via snare-Dr. Patt Moore   • COLONOSCOPY W/ POLYPECTOMY N/A 12/10/2004    Diverticulosis with sigmoid perideverticulitis with erythema and patchiness around some of the diverticula, proximal ascedning colon polyp-approx 5 mm-removed via snare cauter, two distal ascending colon polyps-7 mm and 3 mm-removed via snare cautery polypectomy, hemorrhoids-Dr. Patt Moore   • CYSTOSCOPY W/ URETERAL STENT PLACEMENT Left 4/21/2018    Procedure: CYSTOSCOPY, LEFT RETROGRADE WITH LEFT URETERAL STENT INSERTION;  Surgeon: Stanley Osuna MD;  Location: [facility];  Service: Urology   • CYSTOSCOPY W/ URETERAL STENT REMOVAL Left 10/07/2015    Cystoscopy with stent extraction, left ureteral occulusion balloon placement, percutanous nephrostolithotomy with stone volume less than 2.5 cm involving the left lower pole and the left proximal ureter, balloon tract dilation for establishment of nephrostomy tract, antegrade nephrostomy tube placement-Dr. Miguel Monte   • CYSTOSCOPY, RETROGRADE PYELOGRAM AND STENT INSERTION  Left 09/28/2015    Left cystoscopy with left retrograde pyelogram, left double-J stent placement-Dr. Miguel Blas   • CYSTOSCOPY, RETROGRADE PYELOGRAM AND STENT INSERTION Left 09/09/2015    Cystoscopy with bilateral retrograde pyelogram, left double-J stent placement, right ureteral pyeloscopy with laser lithotripsy of the ureteropelvic junction calculus, right double-J stent placement-Dr. Miguel Monte   • CYSTOSCOPY, RETROGRADE PYELOGRAM AND STENT INSERTION Right 09/21/2007    Cystoscopy with right retrograde pyelogram, right biliary stent placement-Dr. Miguel Monte   • CYSTOSCOPY, RETROGRADE PYELOGRAM AND STENT INSERTION Right 09/07/2007    Cystoscopy with right retrograde pyelogram, right ureteral peyloscopy with extraction distal ureteral mass, right double J stent placement-Dr. Miguel Monte   • CYSTOSCOPY, RETROGRADE PYELOGRAM AND STENT INSERTION Left 7/29/2022    Procedure: CYSTOSCOPY, LEFT RETROGRADE PYELOGRAM, LEFT URETERAL STENT;  Surgeon: Cedrick Jones MD;  Location: [facility];  Service: Urology;  Laterality: Left;   • CYSTOSCOPY, URETEROSCOPY, RETROGRADE PYELOGRAM, STENT INSERTION Right 09/07/2007    Cystoscopy with right retrograde pyelogram, rigth ureteroscopy with extraction of distal mass, right double J stent placement-Dr. Miguel Monte   • D & C HYSTEROSCOPY N/A 05/18/2011    Procedure done due to thickened endomentrium and an endometrial polyp-Dr. Quita Cheatham   • DILATATION AND CURETTAGE N/A 03/22/2002    D&C, polyp removal-Dr. Quita Cheatham   • EXTRACORPOREAL SHOCKWAVE LITHOTRIPSY (ESWL), STENT INSERTION/REMOVAL Left 05/08/2015    Left extracoporeal shockwave lithotripsy, cystoscopy with stent placement-Dr. Miguel Monte   • MEDIASTINOTOMY Left 3/5/2018    Procedure: left thyroidectomy, resection of substernal thyroid goiter cervical approach;  Surgeon: Quintin Mares III, MD;  Location: [facility];  Service:    • SIGMOIDOSCOPY N/A 9/13/2016     Procedure: SIGMOIDOSCOPY FLEXIBLE TO 25 CM;  Surgeon: Patt Moore MD;  Location: [facility] ENDOSCOPY;  Service:    • THORACOTOMY  1996    Thoracotomy for ectopic thyroid, Dr. Camarillo   • THYROIDECTOMY, PARTIAL      left side 3/05/18   • TOTAL KNEE ARTHROPLASTY Left 2/2/2023    Procedure: TOTAL KNEE ARTHROPLASTY;  Surgeon: Anthony Sinclair MD;  Location[facility];  Service: Orthopedics;  Laterality: Left;   • UMBILICAL HERNIA REPAIR N/A 9/14/2016    Procedure: UMBILICAL HERNIA REPAIR ;  Surgeon: Patt Moore MD;  Location: [facility] MAIN OR;  Service:    • URETEROSCOPY LASER LITHOTRIPSY WITH STENT INSERTION Left 8/12/2022    Procedure: LEFT URETEROSCOPY LASER LITHOTRIPSY STONE BACKET EXTRACTION, STENT PLACEMENT;  Surgeon: Miguel Monte MD;  Location: [facility] MAIN OR;  Service: Urology;  Laterality: Left;   • VENTRAL/INCISIONAL HERNIA REPAIR N/A 5/17/2017    Procedure: VENTRAL HERNIA REPAIR WITH MESH, BILATERAL MUSCULOFASCIAL RELEASE;  Surgeon: Patt Moore MD;  Location: [facility] MAIN OR;  Service:          FAMILY HISTORY  Family History   Problem Relation Age of Onset   • Heart disease Father    • Heart attack Paternal Uncle    • Cancer Maternal Grandmother         ovarian   • Heart attack Paternal Grandfather    • Heart attack Paternal Uncle    • Heart attack Paternal Uncle    • Heart attack Paternal Uncle    • Heart attack Paternal Uncle    • Heart attack Paternal Uncle    • Scleroderma Mother    • Hypertension Sister    • Malig Hyperthermia Neg Hx          SOCIAL HISTORY  Social History     Socioeconomic History   • Marital status:      Spouse name: BERENICE COFFEY    • Number of children: 2   • Years of education: HIGH SCHOOL    Tobacco Use   • Smoking status: Never   • Smokeless tobacco: Never   Vaping Use   • Vaping Use: Never used   Substance and Sexual Activity   • Alcohol use: No   • Drug use: No   • Sexual activity: Defer         ALLERGIES  Other, Cephalexin, Cephalosporins, and  Penicillins        REVIEW OF SYSTEMS  Review of Systems     All systems reviewed and negative except for those discussed in HPI.       PHYSICAL EXAM    I have reviewed the triage vital signs and nursing notes.    ED Triage Vitals [03/19/23 1759]   Temp Heart Rate Resp BP SpO2   99.2 °F (37.3 °C) 88 18 174/87 97 %      Temp src Heart Rate Source Patient Position BP Location FiO2 (%)   Tympanic Monitor Sitting Left arm --       GENERAL: Patient is an elderly female that looks uncomfortable.  She looks like she is in pain.  Vital signs on my initial evaluation are unremarkable.  On my evaluation her O2 sats 100% on room air.  Repeat blood pressure is 150/79 heart rate is in the 80s.  She is afebrile  HENT: nares patent  Head/neck/ face are symmetric without gross deformity, signs of trauma, or swelling  EYES: no scleral icterus, no conjunctival pallor.  NECK: Supple, no meningismus  CV: regular rhythm, regular rate with intact distal pulses.  RESPIRATORY: normal effort and no respiratory distress.  Clear to auscultation bilaterally  ABDOMEN: soft and nontender.  Morbidly obese.  No tenderness to her anterior abdomen on palpation.  Normal inspection.  Back exam:.  Patient has left CVA tenderness.  Does not seem to be reproducible with palpation as the pain is there regardless.  She has normal inspection.  No midline cervical thoracic or lumbar pain.  No rash  MUSCULOSKELETAL: no deformity.  Intact distal pulses to upper and lower extremities that are equal strong and symmetric.  NEURO: alert and appropriate, moves all extremities, follows commands.  No focal motor or sensory changes  SKIN: warm, dry    Vital signs and nursing notes reviewed.        LAB RESULTS  Recent Results (from the past 24 hour(s))   Comprehensive Metabolic Panel    Collection Time: 03/19/23  7:55 PM    Specimen: Blood   Result Value Ref Range    Glucose 131 (H) 65 - 99 mg/dL    BUN 14 8 - 23 mg/dL    Creatinine 1.30 (H) 0.57 - 1.00 mg/dL    Sodium  139 136 - 145 mmol/L    Potassium 4.2 3.5 - 5.2 mmol/L    Chloride 105 98 - 107 mmol/L    CO2 23.1 22.0 - 29.0 mmol/L    Calcium 10.5 8.6 - 10.5 mg/dL    Total Protein 7.3 6.0 - 8.5 g/dL    Albumin 4.5 3.5 - 5.2 g/dL    ALT (SGPT) 26 1 - 33 U/L    AST (SGOT) 20 1 - 32 U/L    Alkaline Phosphatase 97 39 - 117 U/L    Total Bilirubin 1.0 0.0 - 1.2 mg/dL    Globulin 2.8 gm/dL    A/G Ratio 1.6 g/dL    BUN/Creatinine Ratio 10.8 7.0 - 25.0    Anion Gap 10.9 5.0 - 15.0 mmol/L    eGFR 43.0 (L) >60.0 mL/min/1.73   Magnesium    Collection Time: 03/19/23  7:55 PM    Specimen: Blood   Result Value Ref Range    Magnesium 2.2 1.6 - 2.4 mg/dL   Lipase    Collection Time: 03/19/23  7:55 PM    Specimen: Blood   Result Value Ref Range    Lipase 31 13 - 60 U/L   CBC Auto Differential    Collection Time: 03/19/23  7:55 PM    Specimen: Blood   Result Value Ref Range    WBC 7.99 3.40 - 10.80 10*3/mm3    RBC 4.03 3.77 - 5.28 10*6/mm3    Hemoglobin 12.3 12.0 - 15.9 g/dL    Hematocrit 37.5 34.0 - 46.6 %    MCV 93.1 79.0 - 97.0 fL    MCH 30.5 26.6 - 33.0 pg    MCHC 32.8 31.5 - 35.7 g/dL    RDW 14.6 12.3 - 15.4 %    RDW-SD 49.4 37.0 - 54.0 fl    MPV 8.6 6.0 - 12.0 fL    Platelets 147 140 - 450 10*3/mm3    Neutrophil % 85.9 (H) 42.7 - 76.0 %    Lymphocyte % 7.5 (L) 19.6 - 45.3 %    Monocyte % 5.1 5.0 - 12.0 %    Eosinophil % 0.8 0.3 - 6.2 %    Basophil % 0.3 0.0 - 1.5 %    Immature Grans % 0.4 0.0 - 0.5 %    Neutrophils, Absolute 6.87 1.70 - 7.00 10*3/mm3    Lymphocytes, Absolute 0.60 (L) 0.70 - 3.10 10*3/mm3    Monocytes, Absolute 0.41 0.10 - 0.90 10*3/mm3    Eosinophils, Absolute 0.06 0.00 - 0.40 10*3/mm3    Basophils, Absolute 0.02 0.00 - 0.20 10*3/mm3    Immature Grans, Absolute 0.03 0.00 - 0.05 10*3/mm3    nRBC 0.0 0.0 - 0.2 /100 WBC   Protime-INR    Collection Time: 03/19/23  8:26 PM    Specimen: Blood   Result Value Ref Range    Protime 14.4 (H) 11.7 - 14.2 Seconds    INR 1.11 (H) 0.90 - 1.10   Urinalysis With Microscopic If Indicated (No  Culture) - Urine, Clean Catch    Collection Time: 03/19/23 11:48 PM    Specimen: Urine, Clean Catch   Result Value Ref Range    Color, UA Yellow Yellow, Straw    Appearance, UA Clear Clear    pH, UA 6.5 5.0 - 8.0    Specific Gravity, UA 1.025 1.005 - 1.030    Glucose, UA Negative Negative    Ketones, UA Negative Negative    Bilirubin, UA Negative Negative    Blood, UA Trace (A) Negative    Protein, UA Negative Negative    Leuk Esterase, UA Negative Negative    Nitrite, UA Negative Negative    Urobilinogen, UA 0.2 E.U./dL 0.2 - 1.0 E.U./dL   Urinalysis, Microscopic Only - Urine, Clean Catch    Collection Time: 03/19/23 11:48 PM    Specimen: Urine, Clean Catch   Result Value Ref Range    RBC, UA 0-2 None Seen, 0-2 /HPF    WBC, UA 0-2 None Seen, 0-2 /HPF    Bacteria, UA 2+ (A) None Seen /HPF    Squamous Epithelial Cells, UA 0-2 None Seen, 0-2 /HPF    Hyaline Casts, UA None Seen None Seen /LPF    Methodology Automated Microscopy        Ordered the above labs and independently reviewed the results.        RADIOLOGY  CT Abdomen Pelvis With Contrast    Result Date: 3/19/2023  CT OF THE ABDOMEN AND PELVIS WITHOUT CONTRAST  HISTORY: Left flank pain  COMPARISON: 02/13/2023  TECHNIQUE: Axial CT imaging was obtained through the abdomen and pelvis. IV contrast was administered.  FINDINGS: Images through the lung bases demonstrate bibasilar scarring and atelectasis. There is a small hiatal hernia. Duodenum and adrenal glands are normal. The liver is enlarged, measuring up to 19.1 cm in craniocaudal dimensions. Spleen is also enlarged, measuring 14.3 cm. The liver is steatotic. Gallbladder is surgically absent. Pancreas is atrophic. The patient has moderate left-sided hydronephrosis. This is secondary to a 1.7 cm stone which is located at the left ureteropelvic junction. This was present on the prior exam, but was not associated with any hydronephrosis at that time. Additional nonobstructing stones are noted within the inferior  pole of the left kidney. The largest of these measures up to 8 mm. There is surrounding perinephric stranding. Distal to this, the left ureter is decompressed. There is edema of the left kidney. The right kidney enhances normally. Punctate nonobstructing stones are noted within the right kidney. Single largest measures approximately 4 mm. Simple appearing right renal cysts are noted. No additional follow-up is necessary. There are areas of cortical thinning on the right kidney, suggesting chronic scarring. No stones are noted within the right ureter. Urinary bladder is normal. Uterus appears unremarkable. There is colonic diverticulosis. The appendix is normal. There is a fat-containing ventral hernia. No acute osseous abnormalities are seen.       1. The patient does have moderate left-sided hydronephrosis, secondary to a 1.7 cm stone located at the left ureteropelvic junction. This was present on the prior exam, but was not associated with hydronephrosis at that time.  Radiation dose reduction techniques were utilized, including automated exposure control and exposure modulation based on body size.  This report was finalized on 3/19/2023 10:03 PM by Dr. Angie Richards M.D.        I ordered the above noted radiological studies. Reviewed by me and discussed with radiologist.  See dictation for official radiology interpretation.      PROCEDURES    Procedures      MEDICATIONS GIVEN IN ER    Medications   sodium chloride 0.9 % flush 10 mL (has no administration in time range)   sodium chloride 0.9 % infusion (0 mL/hr Intravenous Stopped 3/20/23 0146)   sodium chloride 0.9 % bolus 1,000 mL (0 mL Intravenous Stopped 3/20/23 0146)   HYDROmorphone (DILAUDID) injection 0.5 mg (0.5 mg Intravenous Given 3/19/23 2001)   ondansetron (ZOFRAN) injection 4 mg (4 mg Intravenous Given 3/19/23 2001)   ketorolac (TORADOL) injection 15 mg (15 mg Intravenous Given 3/19/23 2001)   iopamidol (ISOVUE-300) 61 % injection 100 mL (85 mL  Intravenous Given 3/19/23 2045)   HYDROmorphone (DILAUDID) injection 0.5 mg (0.5 mg Intravenous Given 3/19/23 2241)         All labs have been independently reviewed by me.  All radiology studies have been reviewed by me and I discussed with radiologist dictating the report when indicated below.  All EKG's independently viewed and interpreted by me.  Discussion below represents my analysis of pertinent findings related to patient's condition, differential diagnosis, treatment plan and final disposition.        PROGRESS, DATA ANALYSIS, CONSULTS, AND MEDICAL DECISION MAKING    Differential diagnosis includes   - hepatobiliary pathology such as cholecystitis, cholangitis, and symptomatic cholelithiasis  -PUD  -Mesenteric ischemia  - Pancreatitis  - Dyspepsia  - Small bowel or large bowel obstruction  - Appendicitis  - Diverticulitis  - UTI including pyelonephritis  - Ureteral stone  - Zoster  - Colitis, including infectious and ischemic  - Atypical ACS  This patient is uncomfortable.  We will give her some IV pain medicine consisting of Dilaudid and Toradol and Zofran.  We will also give her some IV fluids.  I informed her and her spouse of the test that we will order including CT scan.  Right now I think kidney stone is a very likely etiology of her symptoms.  Patient agrees with that assessment.  Depending upon how she responds to the pain medicine and the results of the CT scan as well as her lab work and urinalysis depends upon whether she will need to be admitted for further monitoring.  Again she looks very uncomfortable at this time my goal to try to make her more comfortable and decrease her level of pain.  All questions answered.      ED Course as of 03/20/23 0245   Sun Mar 19, 2023   2140 Creatinine(!): 1.30  Previous creatinine is fluctuated from 1-1.21.  No significant change. [MM]   2140 Lipase: 31 [MM]   2140 Magnesium: 2.2 [MM]   2224 Patient did have some improvement with her pain with pain medicine.   Is starting to come back again.  She would like another dose of pain medicine.  I have seen and reevaluated her. [MM]   2244 I reviewed the CT scan report on this patient.  Patient has a 1.7 cm stone located at the left ureteropelvic junction which now has hydronephrosis.  On the previous CT scan she did not have hydronephrosis.  The previous CT scan was done on February 13, 2023.  The stone is located at the left UPJ.  Please see complete dictated report from radiologist [MM]   2344 I have reevaluated the patient.  She is received the second dose of pain medicine and currently her pain is controlled.  I informed the results of the CT scan.  Still no urinalysis has been obtained.  I did inform the nurse.  This is a stone that will not pass.  Is 1.7 cm.  I will consult urology.  I anticipate the patient will need to be admitted for further treatment. [MM]   Mon Mar 20, 2023   0119 I have reevaluated the patient.  Talk with the patient as well as spouse at length.  She is pain-free.  She does have pain medicine at home.  She has Lortab 5\325.  She tolerates that well.  She also has Zofran at home because she needs that when she takes pain medicine.  I informed her and her spouse that she has a large stone and that she will very unlikely be able to pass it.  She will call her urologist Dr. Monte in the morning to arrange follow-up in the next several days for further treatment.  I did give her and the spouse strict instructions to return if she has worsening of pain, fever, or any other concerns.  Patient agrees and feels comfortable discharge home.  All questions answered [MM]   0120 I did discuss the case with Dr. Bautista who is on for urology for Dr. Monte.  He states that if she is comfortable and is pain is well controlled she can go home.  Have her call Dr. Monte in the morning to arrange further care. [MM]   0125 Patient has a prescription of Lortab 5/ 325 at home she tolerates this medicine without problem.  I  will not give her a prescription here.  Patient and family agree with that plan [MM]      ED Course User Index  [MM] Rayo Sol MD       AS OF 02:45 EDT VITALS:    BP - 171/87  HR - 87  TEMP - 99.2 °F (37.3 °C) (Tympanic)  02 SATS - 98%    SOCIAL DETERMINANTS OF HEALTH THAT IMPACT OR LIMIT CARE (For example..Homelessness,safe discharge, inability to obtain care, follow up, or prescriptions):      DIAGNOSIS  Final diagnoses:   Kidney stone on left side   Hydronephrosis with urinary obstruction due to ureteral calculus         DISPOSITION  DISCHARGE    Patient discharged in stable condition.    Reviewed implications of results, diagnosis, meds, responsibility to follow up, warning signs and symptoms of possible worsening, potential complications and reasons to return to ER, including worsening of symptoms, worsening of pain, any fever, shortness of breath, any concerns..    Patient/Family voiced understanding of above instructions.    Discussed plan for discharge, as there is no emergent indication for admission. Pt/family is agreeable and understands need for follow up and repeat testing.  Pt is aware that discharge does not mean that nothing is wrong but it indicates no emergency is present that requires admission and they must continue care with follow-up as given below or physician of their choice.     FOLLOW-UP  Miguel Monte MD  [address] [address]  [phone]      Call this morning to arrange follow-up in the next several days.  Return if worsening of pain, fever, shortness of breath, any concerns.         Medication List      New Prescriptions    nitrofurantoin (macrocrystal-monohydrate) 100 MG capsule  Commonly known as: MACROBID  Take 1 capsule by mouth 2 (Two) Times a Day for 5 days.        Changed    methIMAzole 5 MG tablet  Commonly known as: TAPAZOLE  TAKE 1/2 TABLET BY MOUTH DAILY  What changed:   · how much to take  · how to take this  · when to take  this  · additional instructions           Where to Get Your Medications      You can get these medications from any pharmacy    Bring a paper prescription for each of these medications  · nitrofurantoin (macrocrystal-monohydrate) 100 MG capsule                 DICTATED UTILIZING [employer] DICTATION    Note Disclaimer: At [facility], we believe that sharing information builds trust and better relationships. You are receiving this note because you recently visited [facility]. It is possible you will see health information before a provider has talked with you about it. This kind of information can be easy to misunderstand. To help you fully understand what it means for your health, we urge you to discuss this note with your provider.     Rayo Sol MD  03/20/23 [id number]

## 2023-03-20 PROCEDURE — 96361 HYDRATE IV INFUSION ADD-ON: CPT

## 2023-03-20 RX ORDER — NITROFURANTOIN 25; 75 MG/1; MG/1
100 CAPSULE ORAL 2 TIMES DAILY
Qty: 10 CAPSULE | Refills: 0 | Status: SHIPPED | OUTPATIENT
Start: 2023-03-20 | End: 2023-03-25

## 2023-03-20 NOTE — DISCHARGE INSTRUCTIONS
As we discussed, take Tylenol and Motrin for pain.  Use the Lortab for breakthrough pain.  Return immediately if you have any fever, not able to tolerate liquids or solids, pain is severe and not improved with pain medicine.

## 2023-03-21 LAB — BACTERIA SPEC AEROBE CULT: ABNORMAL

## 2023-04-03 ENCOUNTER — HOSPITAL ENCOUNTER (OUTPATIENT)
Dept: GENERAL RADIOLOGY | Facility: HOSPITAL | Age: 78
Discharge: HOME | End: 2023-04-03
Admitting: UROLOGY
Payer: MEDICARE

## 2023-04-03 DIAGNOSIS — N20.0 CALCULUS, RENAL: ICD-10-CM

## 2023-04-03 PROCEDURE — 74018 RADEX ABDOMEN 1 VIEW: CPT

## 2023-04-19 ENCOUNTER — HOSPITAL ENCOUNTER (OUTPATIENT)
Facility: HOSPITAL | Age: 78
Setting detail: HOSPITAL OUTPATIENT SURGERY
Discharge: HOME | End: 2023-04-19
Attending: UROLOGY | Admitting: UROLOGY
Payer: MEDICARE

## 2023-04-19 ENCOUNTER — ANESTHESIA EVENT (OUTPATIENT)
Dept: PERIOP | Facility: HOSPITAL | Age: 78
End: 2023-04-19
Payer: MEDICARE

## 2023-04-19 ENCOUNTER — ANESTHESIA (OUTPATIENT)
Dept: PERIOP | Facility: HOSPITAL | Age: 78
End: 2023-04-19
Payer: MEDICARE

## 2023-04-19 VITALS
OXYGEN SATURATION: 99 % | HEART RATE: 66 BPM | HEIGHT: 62 IN | RESPIRATION RATE: 16 BRPM | BODY MASS INDEX: 42.03 KG/M2 | DIASTOLIC BLOOD PRESSURE: 71 MMHG | SYSTOLIC BLOOD PRESSURE: 138 MMHG | WEIGHT: 228.4 LBS | TEMPERATURE: 98.1 F

## 2023-04-19 DIAGNOSIS — Z88.9 MULTIPLE DRUG ALLERGIES: ICD-10-CM

## 2023-04-19 DIAGNOSIS — N20.0 NEPHROLITHIASIS: Primary | ICD-10-CM

## 2023-04-19 PROBLEM — K57.90 DIVERTICULOSIS: Status: ACTIVE | Noted: 2021-10-28

## 2023-04-19 PROBLEM — Z87.442 HISTORY OF KIDNEY STONES: Status: ACTIVE | Noted: 2021-10-28

## 2023-04-19 PROBLEM — K52.9 POSTPRANDIAL DIARRHEA: Status: ACTIVE | Noted: 2021-10-28

## 2023-04-19 PROCEDURE — 25010000002 PROPOFOL 10 MG/ML EMULSION: Performed by: NURSE ANESTHETIST, CERTIFIED REGISTERED

## 2023-04-19 PROCEDURE — 25010000002 CEFAZOLIN IN DEXTROSE 2-4 GM/100ML-% SOLUTION: Performed by: UROLOGY

## 2023-04-19 PROCEDURE — 25010000002 MIDAZOLAM PER 1 MG: Performed by: ANESTHESIOLOGY

## 2023-04-19 PROCEDURE — 25010000002 ONDANSETRON PER 1 MG: Performed by: NURSE ANESTHETIST, CERTIFIED REGISTERED

## 2023-04-19 PROCEDURE — 25010000002 CEFAZOLIN IN DEXTROSE 2-4 GM/100ML-% SOLUTION: Performed by: NURSE ANESTHETIST, CERTIFIED REGISTERED

## 2023-04-19 PROCEDURE — C1769 GUIDE WIRE: HCPCS | Performed by: UROLOGY

## 2023-04-19 PROCEDURE — 25010000002 FENTANYL CITRATE (PF) 50 MCG/ML SOLUTION: Performed by: NURSE ANESTHETIST, CERTIFIED REGISTERED

## 2023-04-19 PROCEDURE — C2617 STENT, NON-COR, TEM W/O DEL: HCPCS | Performed by: UROLOGY

## 2023-04-19 PROCEDURE — 25010000002 DEXAMETHASONE SODIUM PHOSPHATE 20 MG/5ML SOLUTION: Performed by: NURSE ANESTHETIST, CERTIFIED REGISTERED

## 2023-04-19 DEVICE — URETERAL STENT
Type: IMPLANTABLE DEVICE | Site: URETER | Status: FUNCTIONAL
Brand: CONTOUR™

## 2023-04-19 RX ORDER — FENTANYL CITRATE 50 UG/ML
50 INJECTION, SOLUTION INTRAMUSCULAR; INTRAVENOUS
Status: DISCONTINUED | OUTPATIENT
Start: 2023-04-19 | End: 2023-04-24 | Stop reason: HOSPADM

## 2023-04-19 RX ORDER — SCOLOPAMINE TRANSDERMAL SYSTEM 1 MG/1
1 PATCH, EXTENDED RELEASE TRANSDERMAL
Status: DISCONTINUED | OUTPATIENT
Start: 2023-04-19 | End: 2023-04-19

## 2023-04-19 RX ORDER — SODIUM CHLORIDE, SODIUM LACTATE, POTASSIUM CHLORIDE, CALCIUM CHLORIDE 600; 310; 30; 20 MG/100ML; MG/100ML; MG/100ML; MG/100ML
9 INJECTION, SOLUTION INTRAVENOUS CONTINUOUS
Status: DISCONTINUED | OUTPATIENT
Start: 2023-04-19 | End: 2023-04-24 | Stop reason: HOSPADM

## 2023-04-19 RX ORDER — FAMOTIDINE 10 MG/ML
20 INJECTION, SOLUTION INTRAVENOUS ONCE
Status: COMPLETED | OUTPATIENT
Start: 2023-04-19 | End: 2023-04-19

## 2023-04-19 RX ORDER — DEXAMETHASONE SODIUM PHOSPHATE 4 MG/ML
INJECTION, SOLUTION INTRA-ARTICULAR; INTRALESIONAL; INTRAMUSCULAR; INTRAVENOUS; SOFT TISSUE AS NEEDED
Status: DISCONTINUED | OUTPATIENT
Start: 2023-04-19 | End: 2023-04-19 | Stop reason: SURG

## 2023-04-19 RX ORDER — HYDROCODONE BITARTRATE AND ACETAMINOPHEN 7.5; 325 MG/1; MG/1
1 TABLET ORAL EVERY 6 HOURS PRN
Qty: 30 TABLET | Refills: 0 | Status: SHIPPED | OUTPATIENT
Start: 2023-04-19

## 2023-04-19 RX ORDER — FLUMAZENIL 0.1 MG/ML
0.2 INJECTION INTRAVENOUS AS NEEDED
Status: DISCONTINUED | OUTPATIENT
Start: 2023-04-19 | End: 2023-04-24 | Stop reason: HOSPADM

## 2023-04-19 RX ORDER — FENTANYL CITRATE 50 UG/ML
50 INJECTION, SOLUTION INTRAMUSCULAR; INTRAVENOUS
Status: DISCONTINUED | OUTPATIENT
Start: 2023-04-19 | End: 2023-04-19 | Stop reason: HOSPADM

## 2023-04-19 RX ORDER — DROPERIDOL 2.5 MG/ML
0.62 INJECTION, SOLUTION INTRAMUSCULAR; INTRAVENOUS
Status: DISCONTINUED | OUTPATIENT
Start: 2023-04-19 | End: 2023-04-24 | Stop reason: HOSPADM

## 2023-04-19 RX ORDER — PROMETHAZINE HYDROCHLORIDE 25 MG/1
25 SUPPOSITORY RECTAL ONCE AS NEEDED
Status: DISCONTINUED | OUTPATIENT
Start: 2023-04-19 | End: 2023-04-24 | Stop reason: HOSPADM

## 2023-04-19 RX ORDER — LIDOCAINE HYDROCHLORIDE 10 MG/ML
0.5 INJECTION, SOLUTION EPIDURAL; INFILTRATION; INTRACAUDAL; PERINEURAL ONCE AS NEEDED
Status: DISCONTINUED | OUTPATIENT
Start: 2023-04-19 | End: 2023-04-19 | Stop reason: HOSPADM

## 2023-04-19 RX ORDER — MIDAZOLAM HYDROCHLORIDE 1 MG/ML
0.5 INJECTION INTRAMUSCULAR; INTRAVENOUS
Status: DISCONTINUED | OUTPATIENT
Start: 2023-04-19 | End: 2023-04-19 | Stop reason: HOSPADM

## 2023-04-19 RX ORDER — PROPOFOL 10 MG/ML
VIAL (ML) INTRAVENOUS AS NEEDED
Status: DISCONTINUED | OUTPATIENT
Start: 2023-04-19 | End: 2023-04-19 | Stop reason: SURG

## 2023-04-19 RX ORDER — ONDANSETRON 2 MG/ML
INJECTION INTRAMUSCULAR; INTRAVENOUS AS NEEDED
Status: DISCONTINUED | OUTPATIENT
Start: 2023-04-19 | End: 2023-04-19 | Stop reason: SURG

## 2023-04-19 RX ORDER — LIDOCAINE HYDROCHLORIDE 20 MG/ML
INJECTION, SOLUTION INFILTRATION; PERINEURAL AS NEEDED
Status: DISCONTINUED | OUTPATIENT
Start: 2023-04-19 | End: 2023-04-19 | Stop reason: SURG

## 2023-04-19 RX ORDER — FENTANYL CITRATE 50 UG/ML
INJECTION, SOLUTION INTRAMUSCULAR; INTRAVENOUS AS NEEDED
Status: DISCONTINUED | OUTPATIENT
Start: 2023-04-19 | End: 2023-04-19 | Stop reason: SURG

## 2023-04-19 RX ORDER — CEFAZOLIN SODIUM 2 G/100ML
INJECTION, SOLUTION INTRAVENOUS AS NEEDED
Status: DISCONTINUED | OUTPATIENT
Start: 2023-04-19 | End: 2023-04-19 | Stop reason: SURG

## 2023-04-19 RX ORDER — HYDROCODONE BITARTRATE AND ACETAMINOPHEN 7.5; 325 MG/1; MG/1
1 TABLET ORAL ONCE AS NEEDED
Status: DISCONTINUED | OUTPATIENT
Start: 2023-04-19 | End: 2023-04-24 | Stop reason: HOSPADM

## 2023-04-19 RX ORDER — PROMETHAZINE HYDROCHLORIDE 25 MG/1
25 TABLET ORAL ONCE AS NEEDED
Status: DISCONTINUED | OUTPATIENT
Start: 2023-04-19 | End: 2023-04-24 | Stop reason: HOSPADM

## 2023-04-19 RX ORDER — HYDROMORPHONE HYDROCHLORIDE 1 MG/ML
0.5 INJECTION, SOLUTION INTRAMUSCULAR; INTRAVENOUS; SUBCUTANEOUS
Status: DISCONTINUED | OUTPATIENT
Start: 2023-04-19 | End: 2023-04-24 | Stop reason: HOSPADM

## 2023-04-19 RX ORDER — EPHEDRINE SULFATE 50 MG/ML
INJECTION INTRAVENOUS AS NEEDED
Status: DISCONTINUED | OUTPATIENT
Start: 2023-04-19 | End: 2023-04-19 | Stop reason: SURG

## 2023-04-19 RX ORDER — SODIUM CHLORIDE, SODIUM LACTATE, POTASSIUM CHLORIDE, CALCIUM CHLORIDE 600; 310; 30; 20 MG/100ML; MG/100ML; MG/100ML; MG/100ML
INJECTION, SOLUTION INTRAVENOUS CONTINUOUS PRN
Status: DISCONTINUED | OUTPATIENT
Start: 2023-04-19 | End: 2023-04-19 | Stop reason: SURG

## 2023-04-19 RX ORDER — EPHEDRINE SULFATE 50 MG/ML
5 INJECTION, SOLUTION INTRAVENOUS ONCE AS NEEDED
Status: DISCONTINUED | OUTPATIENT
Start: 2023-04-19 | End: 2023-04-24 | Stop reason: HOSPADM

## 2023-04-19 RX ORDER — NALOXONE HCL 0.4 MG/ML
0.2 VIAL (ML) INJECTION AS NEEDED
Status: DISCONTINUED | OUTPATIENT
Start: 2023-04-19 | End: 2023-04-24 | Stop reason: HOSPADM

## 2023-04-19 RX ORDER — NITROFURANTOIN 25; 75 MG/1; MG/1
100 CAPSULE ORAL 2 TIMES DAILY
Qty: 6 CAPSULE | Refills: 0 | Status: SHIPPED | OUTPATIENT
Start: 2023-04-19

## 2023-04-19 RX ORDER — HYDRALAZINE HYDROCHLORIDE 20 MG/ML
5 INJECTION INTRAMUSCULAR; INTRAVENOUS
Status: DISCONTINUED | OUTPATIENT
Start: 2023-04-19 | End: 2023-04-24 | Stop reason: HOSPADM

## 2023-04-19 RX ORDER — IPRATROPIUM BROMIDE AND ALBUTEROL SULFATE 2.5; .5 MG/3ML; MG/3ML
3 SOLUTION RESPIRATORY (INHALATION) ONCE AS NEEDED
Status: DISCONTINUED | OUTPATIENT
Start: 2023-04-19 | End: 2023-04-24 | Stop reason: HOSPADM

## 2023-04-19 RX ORDER — LABETALOL HYDROCHLORIDE 5 MG/ML
5 INJECTION, SOLUTION INTRAVENOUS
Status: DISCONTINUED | OUTPATIENT
Start: 2023-04-19 | End: 2023-04-24 | Stop reason: HOSPADM

## 2023-04-19 RX ORDER — ONDANSETRON 2 MG/ML
4 INJECTION INTRAMUSCULAR; INTRAVENOUS ONCE AS NEEDED
Status: DISCONTINUED | OUTPATIENT
Start: 2023-04-19 | End: 2023-04-24 | Stop reason: HOSPADM

## 2023-04-19 RX ORDER — CEFAZOLIN SODIUM 2 G/100ML
2 INJECTION, SOLUTION INTRAVENOUS ONCE
Status: COMPLETED | OUTPATIENT
Start: 2023-04-19 | End: 2023-04-19

## 2023-04-19 RX ORDER — SODIUM CHLORIDE 0.9 % (FLUSH) 0.9 %
3 SYRINGE (ML) INJECTION EVERY 12 HOURS SCHEDULED
Status: DISCONTINUED | OUTPATIENT
Start: 2023-04-19 | End: 2023-04-19 | Stop reason: HOSPADM

## 2023-04-19 RX ORDER — DIPHENHYDRAMINE HYDROCHLORIDE 50 MG/ML
12.5 INJECTION INTRAMUSCULAR; INTRAVENOUS
Status: DISCONTINUED | OUTPATIENT
Start: 2023-04-19 | End: 2023-04-24 | Stop reason: HOSPADM

## 2023-04-19 RX ORDER — SODIUM CHLORIDE 0.9 % (FLUSH) 0.9 %
3-10 SYRINGE (ML) INJECTION AS NEEDED
Status: DISCONTINUED | OUTPATIENT
Start: 2023-04-19 | End: 2023-04-19 | Stop reason: HOSPADM

## 2023-04-19 RX ORDER — PHENYLEPHRINE HCL IN 0.9% NACL 1 MG/10 ML
SYRINGE (ML) INTRAVENOUS AS NEEDED
Status: DISCONTINUED | OUTPATIENT
Start: 2023-04-19 | End: 2023-04-19 | Stop reason: SURG

## 2023-04-19 RX ORDER — OXYCODONE AND ACETAMINOPHEN 7.5; 325 MG/1; MG/1
1 TABLET ORAL EVERY 4 HOURS PRN
Status: DISCONTINUED | OUTPATIENT
Start: 2023-04-19 | End: 2023-04-24 | Stop reason: HOSPADM

## 2023-04-19 RX ADMIN — Medication 200 MCG: at 13:56

## 2023-04-19 RX ADMIN — PROPOFOL 50 MG: 10 INJECTION, EMULSION INTRAVENOUS at 14:11

## 2023-04-19 RX ADMIN — ONDANSETRON 4 MG: 2 INJECTION INTRAMUSCULAR; INTRAVENOUS at 13:57

## 2023-04-19 RX ADMIN — CEFAZOLIN SODIUM 2 G: 2 INJECTION, SOLUTION INTRAVENOUS at 13:35

## 2023-04-19 RX ADMIN — FENTANYL CITRATE 50 MCG: 50 INJECTION, SOLUTION INTRAMUSCULAR; INTRAVENOUS at 14:11

## 2023-04-19 RX ADMIN — LIDOCAINE HYDROCHLORIDE 100 MG: 20 INJECTION, SOLUTION INFILTRATION; PERINEURAL at 13:49

## 2023-04-19 RX ADMIN — MIDAZOLAM 0.5 MG: 1 INJECTION INTRAMUSCULAR; INTRAVENOUS at 13:09

## 2023-04-19 RX ADMIN — SODIUM CHLORIDE, POTASSIUM CHLORIDE, SODIUM LACTATE AND CALCIUM CHLORIDE: 600; 310; 30; 20 INJECTION, SOLUTION INTRAVENOUS at 13:41

## 2023-04-19 RX ADMIN — FAMOTIDINE 20 MG: 10 INJECTION INTRAVENOUS at 13:09

## 2023-04-19 RX ADMIN — DEXAMETHASONE SODIUM PHOSPHATE 8 MG: 4 INJECTION, SOLUTION INTRAMUSCULAR; INTRAVENOUS at 13:49

## 2023-04-19 RX ADMIN — CEFAZOLIN SODIUM 2 G: 2 INJECTION, SOLUTION INTRAVENOUS at 13:55

## 2023-04-19 RX ADMIN — Medication 100 MCG: at 14:17

## 2023-04-19 RX ADMIN — PROPOFOL 150 MG: 10 INJECTION, EMULSION INTRAVENOUS at 13:49

## 2023-04-19 RX ADMIN — SODIUM CHLORIDE, POTASSIUM CHLORIDE, SODIUM LACTATE AND CALCIUM CHLORIDE 9 ML/HR: 600; 310; 30; 20 INJECTION, SOLUTION INTRAVENOUS at 13:10

## 2023-04-19 RX ADMIN — EPHEDRINE SULFATE 10 MG: 50 INJECTION INTRAVENOUS at 14:22

## 2023-04-19 NOTE — OP NOTE
Operative Report    [facility] MA[facility] OR    Patient: Patt Bond  Age:      77 y.o.  :     1945  Sex:      female    Medical Record:  [id number]    Date of Operation/Procedure:  2023    Pre-operative Diagnosis Code: Left Renal Calculi    Post-Op Diagnosis Codes:     * Renal calculi [N20.0]    Pre-operative Diagnosis Free Text:  Left Renal Calculi         Surgeon(s) and Role:     * Miguel Monte MD - Primary     Name of Operation/Procedure:  Procedure(s) and Anesthesia Type:     * LEFT EXTRACORPOREAL SHOCKWAVE LITHOTRIPSY STENT REPLACEMENT - General    Findings/Complications: Large stone collection left renal pelvis extending the left lower pole.  Fragmented further.  Stent change.    Description of procedure:  The patient was taken to the lithotripsy suite and placed under general anesthesia in supine position on the [employer] SLX-F2 lithotripter table.  Biplanar fluoroscopic imaging was used to identify and target the 25-30 mm stone in the left renal pelvis and lower pole kidney.  ESWL was commenced using slow escalation of power and rate to a peak power level of 7 and a total number of 3000 shocks given.  Intermittent fluoroscopy to confirm proper stone targeting was used throughout the case.  ECG monitoring was performed throughout the case to assure no ventricular ectopy or waveform changes from the lithotripter.  Apparent pulverization was observed.  At the end of the case cystoscopy was then performed under sterile conditions.  The flexible cystoscope inserted.  The stent left side was pulled out a guidewire passed up and a new 6 Portuguese by 24 cm stent was then placed the left collecting system.    Specimens: * No orders in the log *     Estimated Blood Loss: none    Stent: 6 Portuguese by 24 cm stent    Miguel Monte MD  2023  14:30 EDT

## 2023-04-19 NOTE — ANESTHESIA PROCEDURE NOTES
Airway  Urgency: elective    Date/Time: 4/19/2023 1:50 PM  Airway not difficult    General Information and Staff    Patient location during procedure: OR  CRNA/CAA: Mercedes Andrade CRNA    Indications and Patient Condition    Preoxygenated: yes  Mask difficulty assessment: 1 - vent by mask    Final Airway Details  Final airway type: supraglottic airway      Successful airway: LMA  Size 3     Number of attempts at approach: 1  Assessment: lips, teeth, and gum same as pre-op

## 2023-04-19 NOTE — H&P
[facility] Surgical History and Physical    Patient Care Team:  Moe Matute MD as PCP - General  Miguel Gautam MD as Consulting Physician (Cardiology)  Chalino Brooks MD as Consulting Physician (Endocrinology)  Nando Vasquez MD as Consulting Physician (Pulmonary Disease)    Chief complaint left flank pain    Subjective     Patient is a 77 y.o. female presents with left renal calculi with indwelling stent.  She now presents for staged ESWL possible stent change possible removal..     Review of Systems   Pertinent items are noted in HPI    Past Medical History:   Diagnosis Date   • Arthritis     OSTEO. LEFT KNEE   • Asthma    • Back pain     AT TIMES   • Cataract     CLIFFORD EYES   • Chronic diarrhea    • Diverticulitis     WITH RESECTION   • Diverticulosis    • Goiter    • History of colon polyps     BENIGN   • Hyperthyroidism     followed by Dr. Blackmon. PARTIAL THRYOIDECTOMY   • Joint pain, knee    • Kidney stones     HISTORY OF MULTIPLE TIMES. URIC AND CALCIUM STONES   • Left knee pain    • Lung nodule     HISTORY OF-NOTE LATEST CT CHEST 12/2022   • Mass of mediastinum     HISTORY OF. BENIGN.   • Obstructive pyelonephritis 09/28/2015    left obstructing pyelonephritis    • PONV (postoperative nausea and vomiting)    • Sleep apnea     CPAP MACHINE   • SOB (shortness of breath) on exertion     SEES DR VASQUEZ     Past Surgical History:   Procedure Laterality Date   • BRONCHOSCOPY N/A 11/06/2017    Procedure: BRONCHOSCOPY WITH ENDOBRONCHIAL ULTRASOUND AND TRANSBRONCHIAL NEEDLE ASPIRATION;  Surgeon: Nando Vasquez MD;  Location: [facility] ENDOSCOPY;  Service:    • CARDIAC CATHETERIZATION N/A 09/27/2017    Procedure: Right Heart Cath;  Surgeon: Miguel Gautam MD;  Location: [facility] CATH INVASIVE LOCATION;  Service:    • CARDIAC CATHETERIZATION N/A 09/27/2017    Procedure: Left Heart Cath;  Surgeon: Miguel Gautam MD;  Location: [facility] CATH INVASIVE LOCATION;  Service:    •  CARDIAC CATHETERIZATION N/A 09/27/2017    Procedure: Coronary angiography;  Surgeon: Miguel Gautam MD;  Location:  [facility] CATH INVASIVE LOCATION;  Service:    • CARDIAC CATHETERIZATION N/A 09/27/2017    Procedure: Left ventriculography;  Surgeon: Miguel Gautam MD;  Location:  [facility] CATH INVASIVE LOCATION;  Service:    • CHOLECYSTECTOMY N/A 05/17/2017    Procedure: LAPAROSCOPIC CHOLECYSTECTOMY WITH IOC;  Surgeon: Patt Moore MD;  Location: [facility] MAIN OR;  Service:    • COLON RESECTION Left 09/14/2016    Laparoscopic Low Anterior Resection with splenic flexure mobilization, drainage of Pelvic Abscess (cultured 3+ E. Coli), Left salpingo-ophorectomy, laparoscopic rectopexy, and umbilical hernia repair, Dr. Patt Moore   • COLON RESECTION      VENTRAL HERNIA REPAIR   • COLONOSCOPY N/A 05/15/2008    sigmoid diverticulosis with blunting of the haustral folds and angulation consistent with previous diverticulitis, no polyps, suggestion of rectal prolapse-Dr. Patt Moore   • COLONOSCOPY W/ BIOPSIES AND POLYPECTOMY N/A 04/16/2001    Possible mild gastritis w/ some appearance of formations that look like petechiae in the antrum, no ulcerations, no erosions; cecal polyp approx 4mm sessile; probable transverse colon polyp, although we could not visualize this completely upon pulling out; sigmoid diverticula; multiple rectal polyps, mostly w/ appearance of hyperplasia, largest being 5 to 6mm: removed via snare-Dr. Patt Moore   • COLONOSCOPY W/ POLYPECTOMY N/A 12/10/2004    Diverticulosis with sigmoid perideverticulitis with erythema and patchiness around some of the diverticula, proximal ascedning colon polyp-approx 5 mm-removed via snare cauter, two distal ascending colon polyps-7 mm and 3 mm-removed via snare cautery polypectomy, hemorrhoids-Dr. Patt Moore   • CYSTOSCOPY W/ URETERAL STENT PLACEMENT Left 04/21/2018    Procedure: CYSTOSCOPY, LEFT RETROGRADE WITH LEFT URETERAL STENT INSERTION;   Surgeon: Stanley Osuna MD;  Location: [facility];  Service: Urology   • CYSTOSCOPY W/ URETERAL STENT REMOVAL Left 10/07/2015    Cystoscopy with stent extraction, left ureteral occulusion balloon placement, percutanous nephrostolithotomy with stone volume less than 2.5 cm involving the left lower pole and the left proximal ureter, balloon tract dilation for establishment of nephrostomy tract, antegrade nephrostomy tube placement-Dr. Miguel Monte   • CYSTOSCOPY, RETROGRADE PYELOGRAM AND STENT INSERTION Left 09/28/2015    Left cystoscopy with left retrograde pyelogram, left double-J stent placement-Dr. Miguel Fernandes   • CYSTOSCOPY, RETROGRADE PYELOGRAM AND STENT INSERTION Left 09/09/2015    Cystoscopy with bilateral retrograde pyelogram, left double-J stent placement, right ureteral pyeloscopy with laser lithotripsy of the ureteropelvic junction calculus, right double-J stent placement-Dr. Miguel Monte   • CYSTOSCOPY, RETROGRADE PYELOGRAM AND STENT INSERTION Right 09/21/2007    Cystoscopy with right retrograde pyelogram, right biliary stent placement-Dr. Miguel Monte   • CYSTOSCOPY, RETROGRADE PYELOGRAM AND STENT INSERTION Right 09/07/2007    Cystoscopy with right retrograde pyelogram, right ureteral peyloscopy with extraction distal ureteral mass, right double J stent placement-Dr. Miguel Monte   • CYSTOSCOPY, RETROGRADE PYELOGRAM AND STENT INSERTION Left 07/29/2022    Procedure: CYSTOSCOPY, LEFT RETROGRADE PYELOGRAM, LEFT URETERAL STENT;  Surgeon: Cedrick Jones MD;  Location: [facility];  Service: Urology;  Laterality: Left;   • CYSTOSCOPY, URETEROSCOPY, RETROGRADE PYELOGRAM, STENT INSERTION Right 09/07/2007    Cystoscopy with right retrograde pyelogram, rigth ureteroscopy with extraction of distal mass, right double J stent placement-Dr. Miguel Monte   • D & C HYSTEROSCOPY N/A 05/18/2011    Procedure done due to thickened endomentrium and an endometrial polyp-Dr. Devlin  Linden   • DILATATION AND CURETTAGE N/A 03/22/2002    D&C, polyp removal-Dr. Quita Cheatham   • EXTRACORPOREAL SHOCKWAVE LITHOTRIPSY (ESWL), STENT INSERTION/REMOVAL Left 05/08/2015    Left extracoporeal shockwave lithotripsy, cystoscopy with stent placement-Dr. Miguel Monte   • EXTRACORPOREAL SHOCKWAVE LITHOTRIPSY (ESWL), STENT INSERTION/REMOVAL  04/2023    [facility]   • MEDIASTINOTOMY Left 03/05/2018    Procedure: left thyroidectomy, resection of substernal thyroid goiter cervical approach;  Surgeon: Quintin Mares III, MD;  Location: [facility] MAIN OR;  Service:    • SIGMOIDOSCOPY N/A 09/13/2016    Procedure: SIGMOIDOSCOPY FLEXIBLE TO 25 CM;  Surgeon: Patt Moore MD;  Location: [facility] ENDOSCOPY;  Service:    • THORACOTOMY  1996    Thoracotomy for ectopic thyroid, Dr. Camarillo   • THYROIDECTOMY, PARTIAL      left side 3/05/18   • TOTAL KNEE ARTHROPLASTY Left 02/02/2023    Procedure: TOTAL KNEE ARTHROPLASTY;  Surgeon: Anthony Sinclair MD;  Location: [facility] OR [facility];  Service: Orthopedics;  Laterality: Left;   • UMBILICAL HERNIA REPAIR N/A 09/14/2016    Procedure: UMBILICAL HERNIA REPAIR ;  Surgeon: Patt Moore MD;  Location: [facility] MAIN OR;  Service:    • URETEROSCOPY LASER LITHOTRIPSY WITH STENT INSERTION Left 08/12/2022    Procedure: LEFT URETEROSCOPY LASER LITHOTRIPSY STONE BACKET EXTRACTION, STENT PLACEMENT;  Surgeon: Miguel Monte MD;  Location: [facility] MAIN OR;  Service: Urology;  Laterality: Left;   • VENTRAL/INCISIONAL HERNIA REPAIR N/A 05/17/2017    Procedure: VENTRAL HERNIA REPAIR WITH MESH, BILATERAL MUSCULOFASCIAL RELEASE;  Surgeon: Patt Moore MD;  Location: [facility] MAIN OR;  Service:      Family History   Problem Relation Age of Onset   • Heart disease Father    • Heart attack Paternal Uncle    • Cancer Maternal Grandmother         ovarian   • Heart attack Paternal Grandfather    • Heart attack Paternal Uncle    • Heart attack Paternal Uncle    • Heart attack Paternal  Uncle    • Heart attack Paternal Uncle    • Heart attack Paternal Uncle    • Scleroderma Mother    • Hypertension Sister    • Malig Hyperthermia Neg Hx      Social History     Tobacco Use   • Smoking status: Never   • Smokeless tobacco: Never   Vaping Use   • Vaping Use: Never used   Substance Use Topics   • Alcohol use: No   • Drug use: No       Meds:  Medications Prior to Admission   Medication Sig Dispense Refill Last Dose   • acetaminophen (TYLENOL) 325 MG tablet Take 2 tablets by mouth 2 (Two) Times a Day As Needed for Mild Pain. 60 tablet 0 Past Week   • acetaminophen (TYLENOL) 500 MG tablet Take 1 tablet by mouth 2 (Two) Times a Day As Needed for Fever or Mild Pain (Temperature Greater Than 101F). 60 tablet 0 Past Week   • allopurinol (ZYLOPRIM) 300 MG tablet Take 1 tablet by mouth Daily.   4/18/2023 at 1200   • cholestyramine (QUESTRAN) 4 g packet Take 1 packet by mouth Daily. AROUND DINNERTIME   4/18/2023   • methIMAzole (TAPAZOLE) 5 MG tablet TAKE 1/2 TABLET BY MOUTH DAILY (Patient taking differently: Take 2.5 mg by mouth Daily.) 45 tablet 1 4/18/2023 at 1200   • aspirin 81 MG EC tablet Take 1 tablet by mouth 2 (Two) Times a Day. 60 tablet 0    • docusate sodium (COLACE) 100 MG capsule Take 1 capsule by mouth 2 (Two) Times a Day. (Patient not taking: Reported on 3/17/2023) 60 capsule 0    • HYDROcodone-acetaminophen (NORCO) 7.5-325 MG per tablet Take 1tab po q 4 hours prn pain 42 tablet 0    • HYDROcodone-acetaminophen (NORCO) 7.5-325 MG per tablet Take 1 tablet by mouth Every 6 (Six) Hours As Needed for Moderate Pain. 30 tablet 0    • ondansetron (Zofran) 4 MG tablet Take 1 tablet by mouth Every 8 (Eight) Hours As Needed for Nausea or Vomiting. (Patient not taking: Reported on 3/17/2023) 12 tablet 0    • polyethylene glycol (MIRALAX) 17 g packet Take 17 g by mouth Daily. (Patient not taking: Reported on 3/17/2023) 30 each 0        Allergies:  Other, Cephalexin, Cephalosporins, and  "Penicillins    Debilities:  None    Objective     Vital Signs  Temp:  [97.7 °F (36.5 °C)] 97.7 °F (36.5 °C)  Heart Rate:  [76] 76  Resp:  [18] 18  BP: (151)/(94) 151/94  No intake or output data in the 24 hours ending 04/19/23 1147  Flowsheet Rows    Flowsheet Row First Filed Value   Admission Height 157.5 cm (62\") Documented at 04/19/2023 1146   Admission Weight 104 kg (228 lb 6.4 oz) Documented at 04/19/2023 1146           Physical Exam:     General Appearance:    Alert, cooperative, NAD   HEENT:    No trauma, pupils reactive, hearing intact   Back:     No CVA tenderness   Lungs:     Respirations unlabored, no wheezing    Heart:    RRR, intact peripheral pulses   Abdomen:     Soft, NDNT, no masses, no guarding   :      Extremities:   No edema, no deformity   Lymphatic:   No neck or groin LAD   Skin:   No bleeding, bruising or rashes   Neuro/Psych:   Orientation intact, mood/affect pleasant, no focal findings     Results Review:    I reviewed the patient's new clinical results.  Results for orders placed or performed during the hospital encounter of 03/19/23   Urine Culture - Urine, Urine, Clean Catch    Specimen: Urine, Clean Catch   Result Value Ref Range    Urine Culture >100,000 CFU/mL Staphylococcus, coagulase negative (A)    Comprehensive Metabolic Panel    Specimen: Blood   Result Value Ref Range    Glucose 131 (H) 65 - 99 mg/dL    BUN 14 8 - 23 mg/dL    Creatinine 1.30 (H) 0.57 - 1.00 mg/dL    Sodium 139 136 - 145 mmol/L    Potassium 4.2 3.5 - 5.2 mmol/L    Chloride 105 98 - 107 mmol/L    CO2 23.1 22.0 - 29.0 mmol/L    Calcium 10.5 8.6 - 10.5 mg/dL    Total Protein 7.3 6.0 - 8.5 g/dL    Albumin 4.5 3.5 - 5.2 g/dL    ALT (SGPT) 26 1 - 33 U/L    AST (SGOT) 20 1 - 32 U/L    Alkaline Phosphatase 97 39 - 117 U/L    Total Bilirubin 1.0 0.0 - 1.2 mg/dL    Globulin 2.8 gm/dL    A/G Ratio 1.6 g/dL    BUN/Creatinine Ratio 10.8 7.0 - 25.0    Anion Gap 10.9 5.0 - 15.0 mmol/L    eGFR 43.0 (L) >60.0 mL/min/1.73 "   Protime-INR    Specimen: Blood   Result Value Ref Range    Protime 14.4 (H) 11.7 - 14.2 Seconds    INR 1.11 (H) 0.90 - 1.10   Urinalysis With Microscopic If Indicated (No Culture) - Urine, Clean Catch    Specimen: Urine, Clean Catch   Result Value Ref Range    Color, UA Yellow Yellow, Straw    Appearance, UA Clear Clear    pH, UA 6.5 5.0 - 8.0    Specific Gravity, UA 1.025 1.005 - 1.030    Glucose, UA Negative Negative    Ketones, UA Negative Negative    Bilirubin, UA Negative Negative    Blood, UA Trace (A) Negative    Protein, UA Negative Negative    Leuk Esterase, UA Negative Negative    Nitrite, UA Negative Negative    Urobilinogen, UA 0.2 E.U./dL 0.2 - 1.0 E.U./dL   Magnesium    Specimen: Blood   Result Value Ref Range    Magnesium 2.2 1.6 - 2.4 mg/dL   Lipase    Specimen: Blood   Result Value Ref Range    Lipase 31 13 - 60 U/L   CBC Auto Differential    Specimen: Blood   Result Value Ref Range    WBC 7.99 3.40 - 10.80 10*3/mm3    RBC 4.03 3.77 - 5.28 10*6/mm3    Hemoglobin 12.3 12.0 - 15.9 g/dL    Hematocrit 37.5 34.0 - 46.6 %    MCV 93.1 79.0 - 97.0 fL    MCH 30.5 26.6 - 33.0 pg    MCHC 32.8 31.5 - 35.7 g/dL    RDW 14.6 12.3 - 15.4 %    RDW-SD 49.4 37.0 - 54.0 fl    MPV 8.6 6.0 - 12.0 fL    Platelets 147 140 - 450 10*3/mm3    Neutrophil % 85.9 (H) 42.7 - 76.0 %    Lymphocyte % 7.5 (L) 19.6 - 45.3 %    Monocyte % 5.1 5.0 - 12.0 %    Eosinophil % 0.8 0.3 - 6.2 %    Basophil % 0.3 0.0 - 1.5 %    Immature Grans % 0.4 0.0 - 0.5 %    Neutrophils, Absolute 6.87 1.70 - 7.00 10*3/mm3    Lymphocytes, Absolute 0.60 (L) 0.70 - 3.10 10*3/mm3    Monocytes, Absolute 0.41 0.10 - 0.90 10*3/mm3    Eosinophils, Absolute 0.06 0.00 - 0.40 10*3/mm3    Basophils, Absolute 0.02 0.00 - 0.20 10*3/mm3    Immature Grans, Absolute 0.03 0.00 - 0.05 10*3/mm3    nRBC 0.0 0.0 - 0.2 /100 WBC   Urinalysis, Microscopic Only - Urine, Clean Catch    Specimen: Urine, Clean Catch   Result Value Ref Range    RBC, UA 0-2 None Seen, 0-2 /HPF    WBC,  UA 0-2 None Seen, 0-2 /HPF    Bacteria, UA 2+ (A) None Seen /HPF    Squamous Epithelial Cells, UA 0-2 None Seen, 0-2 /HPF    Hyaline Casts, UA None Seen None Seen /LPF    Methodology Automated Microscopy         Assessment:  Left renal calculi    Plan:    Left kidney shockwave lithotripsy with possible stent removal possible stent change    I discussed the patient's findings and my recommendations with patient.   Risks, complications, outcomes and alternatives discussed with the patient at the bedside and office.    Miguel Monte MD  04/19/23  11:47 EDT

## 2023-04-19 NOTE — ANESTHESIA POSTPROCEDURE EVALUATION
Patient: Patt Bond    Procedure Summary     Date: 04/19/23 Room / Location: [facility] OR 03 / [facility] MAIN OR    Anesthesia Start: 1341 Anesthesia Stop: 1434    Procedure: LEFT EXTRACORPOREAL SHOCKWAVE LITHOTRIPSY STENT REPLACEMENT (Left) Diagnosis:       Renal calculi      (Left Renal Calculi)    Surgeons: Miguel Monte MD Provider: Jin Cash MD    Anesthesia Type: general ASA Status: 3          Anesthesia Type: general    Vitals  Vitals Value Taken Time   /85 04/19/23 1501   Temp 36.7 °C (98.1 °F) 04/19/23 1432   Pulse 76 04/19/23 1503   Resp 16 04/19/23 1432   SpO2 98 % 04/19/23 1503   Vitals shown include unvalidated device data.        Post Anesthesia Care and Evaluation    Patient location during evaluation: PACU  Patient participation: complete - patient participated  Level of consciousness: awake and alert  Pain management: adequate    Airway patency: patent  Anesthetic complications: No anesthetic complications    Cardiovascular status: acceptable  Respiratory status: acceptable  Hydration status: acceptable    Comments: --------------------            04/19/23               1446     --------------------   BP:       161/84     Pulse:      84       Resp:                Temp:                SpO2:      99%      --------------------

## 2023-04-19 NOTE — ANESTHESIA PREPROCEDURE EVALUATION
Anesthesia Evaluation     Patient summary reviewed and Nursing notes reviewed   history of anesthetic complications: PONV               Airway   Mallampati: II  TM distance: >3 FB  Neck ROM: limited  Small opening  Dental      Pulmonary    (+) asthma,shortness of breath, sleep apnea,   Cardiovascular     ECG reviewed  Rate: normal    (+) dysrhythmias PAC, GRAY,       Neuro/Psych- negative ROS  GI/Hepatic/Renal/Endo    (+) morbid obesity,  renal disease stones, thyroid problem hypothyroidism    Musculoskeletal     (+) back pain,   Abdominal    Substance History - negative use     OB/GYN negative ob/gyn ROS         Other   arthritis,                      Anesthesia Plan    ASA 3     general     (I have reviewed the patient's history with the patient and the chart, including all pertinent laboratory results and imaging. I have explained the risks of anesthesia including but not limited to dental damage, corneal abrasion, nerve injury, MI, stroke, and death. Questions asked and answered. Anesthetic plan discussed with patient and team as indicated. Patient expressed understanding of the above.  )  intravenous induction     Anesthetic plan, risks, benefits, and alternatives have been provided, discussed and informed consent has been obtained with: patient and spouse/significant other.        CODE STATUS:

## 2023-05-31 ENCOUNTER — TELEPHONE (OUTPATIENT)
Dept: ORTHOPEDIC SURGERY | Facility: CLINIC | Age: 78
End: 2023-05-31

## 2023-05-31 NOTE — TELEPHONE ENCOUNTER
Caller: CAROL COFFEY    Relationship to patient: SELF    Best call back number: [phone]    Chief complaint: INJECTION LEFT KNEE    Type of visit: INJECTION LEFT KNEE    Requested date: 06/26/2023    If rescheduling, when is the original appointment: NA     Additional notes: PATIENT WANTED TO KNOW IF SHE CAN HAVE AN INJECTION ON HER LEFT KNEE THE SAME DAY AS HER RESCHEDULED APPOINTMENT.

## 2023-06-06 DIAGNOSIS — E05.90 HYPERTHYROIDISM: Primary | ICD-10-CM

## 2023-06-06 NOTE — TELEPHONE ENCOUNTER
Patient called needing a refill on her methimazole. Can we send this to the [facility] on [address]?    She has 4 days left    Told patient it may be 24-48 hours

## 2023-06-06 NOTE — TELEPHONE ENCOUNTER
Caller: Patt Bond    Relationship to patient: Self    Best call back number: [phone]    Patient is needing: PATIENT CALLING BACK FOR AN ANSWER ON IF HER RIGHT KNEE CAN BE INJECTED AT THE EVAL OF HER LEFT KNEE ON 6.26.23

## 2023-06-08 RX ORDER — METHIMAZOLE 5 MG/1
2.5 TABLET ORAL DAILY
Qty: 15 TABLET | Refills: 0 | Status: SHIPPED | OUTPATIENT
Start: 2023-06-08 | End: 2023-07-08

## 2023-06-09 ENCOUNTER — PRE-ADMISSION TESTING (OUTPATIENT)
Dept: PREADMISSION TESTING | Facility: HOSPITAL | Age: 78
End: 2023-06-09
Payer: MEDICARE

## 2023-06-09 VITALS
SYSTOLIC BLOOD PRESSURE: 162 MMHG | BODY MASS INDEX: 42.33 KG/M2 | OXYGEN SATURATION: 96 % | WEIGHT: 230 LBS | RESPIRATION RATE: 20 BRPM | HEIGHT: 62 IN | TEMPERATURE: 97.9 F | HEART RATE: 75 BPM | DIASTOLIC BLOOD PRESSURE: 77 MMHG

## 2023-06-09 LAB
ANION GAP SERPL CALCULATED.3IONS-SCNC: 11 MMOL/L (ref 5–15)
BUN SERPL-MCNC: 15 MG/DL (ref 8–23)
BUN/CREAT SERPL: 14 (ref 7–25)
CALCIUM SPEC-SCNC: 9.6 MG/DL (ref 8.6–10.5)
CHLORIDE SERPL-SCNC: 106 MMOL/L (ref 98–107)
CO2 SERPL-SCNC: 25 MMOL/L (ref 22–29)
CREAT SERPL-MCNC: 1.07 MG/DL (ref 0.57–1)
DEPRECATED RDW RBC AUTO: 46.8 FL (ref 37–54)
EGFRCR SERPLBLD CKD-EPI 2021: 53.9 ML/MIN/1.73
ERYTHROCYTE [DISTWIDTH] IN BLOOD BY AUTOMATED COUNT: 14.7 % (ref 12.3–15.4)
GLUCOSE SERPL-MCNC: 96 MG/DL (ref 65–99)
HCT VFR BLD AUTO: 34.9 % (ref 34–46.6)
HGB BLD-MCNC: 11.7 G/DL (ref 12–15.9)
MCH RBC QN AUTO: 29.5 PG (ref 26.6–33)
MCHC RBC AUTO-ENTMCNC: 33.5 G/DL (ref 31.5–35.7)
MCV RBC AUTO: 88.1 FL (ref 79–97)
PLATELET # BLD AUTO: 125 10*3/MM3 (ref 140–450)
PMV BLD AUTO: 8.9 FL (ref 6–12)
POTASSIUM SERPL-SCNC: 4.3 MMOL/L (ref 3.5–5.2)
RBC # BLD AUTO: 3.96 10*6/MM3 (ref 3.77–5.28)
SODIUM SERPL-SCNC: 142 MMOL/L (ref 136–145)
WBC NRBC COR # BLD: 3.87 10*3/MM3 (ref 3.4–10.8)

## 2023-06-09 PROCEDURE — 85027 COMPLETE CBC AUTOMATED: CPT

## 2023-06-09 PROCEDURE — 80048 BASIC METABOLIC PNL TOTAL CA: CPT

## 2023-06-09 PROCEDURE — 36415 COLL VENOUS BLD VENIPUNCTURE: CPT

## 2023-06-09 RX ORDER — POTASSIUM CITRATE 10 MEQ/1
10 TABLET, EXTENDED RELEASE ORAL DAILY
Status: ON HOLD | COMMUNITY

## 2023-06-09 NOTE — DISCHARGE INSTRUCTIONS
Take the following medications the morning of surgery with a small sip of water:    METHLMAZOLE      ARRIVE AT 12:30      If you are on prescription narcotic pain medication to control your pain you may also take that medication the morning of surgery.    General Instructions:  Do not eat or drink anything 8 HOURS tbefore surgery.  Infants may have breast milk up to four hours before surgery.  Infants drinking formula may drink formula up to six hours before surgery.   Patients who avoid smoking, chewing tobacco and alcohol for 4 weeks prior to surgery have a reduced risk of post-operative complications.  Quit smoking as many days before surgery as you can.  Do not smoke, use chewing tobacco or drink alcohol the day of surgery.   If applicable bring your C-PAP/ BI-PAP machine in with you to preop day of surgery.  Bring any papers given to you in the doctor’s office.  Wear clean comfortable clothes.  Do not wear contact lenses, false eyelashes or make-up.  Bring a case for your glasses.   Bring crutches or walker if applicable.  Remove all piercings.  Leave jewelry and any other valuables at home.  Hair extensions with metal clips must be removed prior to surgery.  The Pre-Admission Testing nurse will instruct you to bring medications if unable to obtain an accurate list in Pre-Admission Testing.            Preventing a Surgical Site Infection:  For 2 to 3 days before surgery, avoid shaving with a razor because the razor can irritate skin and make it easier to develop an infection.    Any areas of open skin can increase the risk of a post-operative wound infection by allowing bacteria to enter and travel throughout the body.  Notify your surgeon if you have any skin wounds / rashes even if it is not near the expected surgical site.  The area will need assessed to determine if surgery should be delayed until it is healed.  The night prior to surgery shower using a fresh bar of anti-bacterial soap (such as Dial) and  clean washcloth.  Sleep in a clean bed with clean clothing.  Do not allow pets to sleep with you.  Shower on the morning of surgery using a fresh bar of anti-bacterial soap (such as Dial) and clean washcloth.  Dry with a clean towel and dress in clean clothing.  Ask your surgeon if you will be receiving antibiotics prior to surgery.  Make sure you, your family, and all healthcare providers clean their hands with soap and water or an alcohol based hand  before caring for you or your wound.    Day of surgery:  Your arrival time is approximately two hours before your scheduled surgery time.  Upon arrival, a Pre-op nurse and Anesthesiologist will review your health history, obtain vital signs, and answer questions you may have.  The only belongings needed at this time will be your home medications and if applicable your C-PAP/BI-PAP machine.  A Pre-op nurse will start an IV and you may receive medication in preparation for surgery, including something to help you relax.      Please be aware that surgery does come with discomfort.  We want to make every effort to control your discomfort so please discuss any uncontrolled symptoms with your nurse.   Your doctor will most likely have prescribed pain medications.      If you are going home after surgery you will receive individualized written care instructions before being discharged.  A responsible adult must drive you to and from the hospital on the day of your surgery and stay with you for 24 hours.  Discharge prescriptions can be filled by the hospital pharmacy during regular pharmacy hours.  If you are having surgery late in the day/evening your prescription may be e-prescribed to your pharmacy.  Please verify your pharmacy hours or chose a 24 hour pharmacy to avoid not having access to your prescription because your pharmacy has closed for the day.    If you are staying overnight following surgery, you will be transported to your hospital room following the  recovery period.  [facility] has all private rooms.    If you have any questions please call Pre-Admission Testing at [phone].  Deductibles and co-payments are collected on the day of service. Please be prepared to pay the required co-pay, deductible or deposit on the day of service as defined by your plan.    Call your surgeon immediately if you experience any of the following symptoms:  Sore Throat  Shortness of Breath or difficulty breathing  Cough  Chills  Body soreness or muscle pain  Headache  Fever  New loss of taste or smell  Do not arrive for your surgery ill.  Your procedure will need to be rescheduled to another time.  You will need to call your physician before the day of surgery to avoid any unnecessary exposure to hospital staff as well as other patients.

## 2023-06-16 ENCOUNTER — ANESTHESIA (OUTPATIENT)
Dept: PERIOP | Facility: HOSPITAL | Age: 78
End: 2023-06-16
Payer: MEDICARE

## 2023-06-16 ENCOUNTER — ANESTHESIA EVENT (OUTPATIENT)
Dept: PERIOP | Facility: HOSPITAL | Age: 78
End: 2023-06-16
Payer: MEDICARE

## 2023-06-16 PROBLEM — N20.0 RENAL CALCULI: Status: ACTIVE | Noted: 2023-06-16

## 2023-06-16 PROCEDURE — 25010000002 DEXAMETHASONE SODIUM PHOSPHATE 20 MG/5ML SOLUTION: Performed by: STUDENT IN AN ORGANIZED HEALTH CARE EDUCATION/TRAINING PROGRAM

## 2023-06-16 PROCEDURE — 25010000002 ONDANSETRON PER 1 MG: Performed by: STUDENT IN AN ORGANIZED HEALTH CARE EDUCATION/TRAINING PROGRAM

## 2023-06-16 PROCEDURE — 25010000002 PROPOFOL 10 MG/ML EMULSION: Performed by: STUDENT IN AN ORGANIZED HEALTH CARE EDUCATION/TRAINING PROGRAM

## 2023-06-16 PROCEDURE — 25010000002 FENTANYL CITRATE (PF) 50 MCG/ML SOLUTION: Performed by: STUDENT IN AN ORGANIZED HEALTH CARE EDUCATION/TRAINING PROGRAM

## 2023-06-16 PROCEDURE — 25010000002 SUGAMMADEX 200 MG/2ML SOLUTION: Performed by: STUDENT IN AN ORGANIZED HEALTH CARE EDUCATION/TRAINING PROGRAM

## 2023-06-16 PROCEDURE — 25010000002 MAGNESIUM SULFATE PER 500 MG OF MAGNESIUM: Performed by: STUDENT IN AN ORGANIZED HEALTH CARE EDUCATION/TRAINING PROGRAM

## 2023-06-16 RX ORDER — GLYCOPYRROLATE 0.2 MG/ML
INJECTION INTRAMUSCULAR; INTRAVENOUS AS NEEDED
Status: DISCONTINUED | OUTPATIENT
Start: 2023-06-16 | End: 2023-06-16 | Stop reason: SURG

## 2023-06-16 RX ORDER — DEXAMETHASONE SODIUM PHOSPHATE 4 MG/ML
INJECTION, SOLUTION INTRA-ARTICULAR; INTRALESIONAL; INTRAMUSCULAR; INTRAVENOUS; SOFT TISSUE AS NEEDED
Status: DISCONTINUED | OUTPATIENT
Start: 2023-06-16 | End: 2023-06-16 | Stop reason: SURG

## 2023-06-16 RX ORDER — LIDOCAINE HYDROCHLORIDE 20 MG/ML
INJECTION, SOLUTION INFILTRATION; PERINEURAL AS NEEDED
Status: DISCONTINUED | OUTPATIENT
Start: 2023-06-16 | End: 2023-06-16 | Stop reason: SURG

## 2023-06-16 RX ORDER — ROCURONIUM BROMIDE 10 MG/ML
INJECTION, SOLUTION INTRAVENOUS AS NEEDED
Status: DISCONTINUED | OUTPATIENT
Start: 2023-06-16 | End: 2023-06-16 | Stop reason: SURG

## 2023-06-16 RX ORDER — PROPOFOL 10 MG/ML
VIAL (ML) INTRAVENOUS AS NEEDED
Status: DISCONTINUED | OUTPATIENT
Start: 2023-06-16 | End: 2023-06-16 | Stop reason: SURG

## 2023-06-16 RX ORDER — ONDANSETRON 2 MG/ML
INJECTION INTRAMUSCULAR; INTRAVENOUS AS NEEDED
Status: DISCONTINUED | OUTPATIENT
Start: 2023-06-16 | End: 2023-06-16 | Stop reason: SURG

## 2023-06-16 RX ORDER — MAGNESIUM SULFATE HEPTAHYDRATE 500 MG/ML
INJECTION, SOLUTION INTRAMUSCULAR; INTRAVENOUS AS NEEDED
Status: DISCONTINUED | OUTPATIENT
Start: 2023-06-16 | End: 2023-06-16 | Stop reason: SURG

## 2023-06-16 RX ORDER — FENTANYL CITRATE 50 UG/ML
INJECTION, SOLUTION INTRAMUSCULAR; INTRAVENOUS AS NEEDED
Status: DISCONTINUED | OUTPATIENT
Start: 2023-06-16 | End: 2023-06-16 | Stop reason: SURG

## 2023-06-16 RX ADMIN — MAGNESIUM SULFATE HEPTAHYDRATE 1 G: 500 INJECTION, SOLUTION INTRAMUSCULAR; INTRAVENOUS at 18:07

## 2023-06-16 RX ADMIN — PROPOFOL 140 MG: 10 INJECTION, EMULSION INTRAVENOUS at 17:30

## 2023-06-16 RX ADMIN — ONDANSETRON 4 MG: 2 INJECTION INTRAMUSCULAR; INTRAVENOUS at 18:35

## 2023-06-16 RX ADMIN — GLYCOPYRROLATE 0.1 MCG: 1 INJECTION INTRAMUSCULAR; INTRAVENOUS at 17:55

## 2023-06-16 RX ADMIN — FENTANYL CITRATE 25 MCG: 50 INJECTION, SOLUTION INTRAMUSCULAR; INTRAVENOUS at 18:00

## 2023-06-16 RX ADMIN — SUGAMMADEX 200 MG: 100 INJECTION, SOLUTION INTRAVENOUS at 18:39

## 2023-06-16 RX ADMIN — FENTANYL CITRATE 25 MCG: 50 INJECTION, SOLUTION INTRAMUSCULAR; INTRAVENOUS at 18:42

## 2023-06-16 RX ADMIN — DEXAMETHASONE SODIUM PHOSPHATE 8 MG: 4 INJECTION, SOLUTION INTRAMUSCULAR; INTRAVENOUS at 17:40

## 2023-06-16 RX ADMIN — FENTANYL CITRATE 25 MCG: 50 INJECTION, SOLUTION INTRAMUSCULAR; INTRAVENOUS at 17:42

## 2023-06-16 RX ADMIN — ROCURONIUM BROMIDE 50 MG: 10 INJECTION, SOLUTION INTRAVENOUS at 17:31

## 2023-06-16 RX ADMIN — LIDOCAINE HYDROCHLORIDE 100 MG: 20 INJECTION, SOLUTION INFILTRATION; PERINEURAL at 17:30

## 2023-06-16 NOTE — ANESTHESIA PROCEDURE NOTES
Airway  Urgency: elective    Date/Time: 6/16/2023 5:33 PM  Airway not difficult    General Information and Staff    Patient location during procedure: OR  Anesthesiologist: Elan Sneed MD  CRNA/CAA: Tc Reed CRNA    Indications and Patient Condition  Indications for airway management: airway protection    Preoxygenated: yes  MILS not maintained throughout  Mask difficulty assessment: 1 - vent by mask    Final Airway Details  Final airway type: endotracheal airway      Successful airway: ETT  Cuffed: yes   Successful intubation technique: direct laryngoscopy  Facilitating devices/methods: anterior pressure/BURP  Endotracheal tube insertion site: oral  Blade: Nehemias  Blade size: 3  ETT size (mm): 7.5  Cormack-Lehane Classification: grade IIa - partial view of glottis  Placement verified by: chest auscultation and capnometry   Cuff volume (mL): 8  Measured from: lips  ETT/EBT  to lips (cm): 21  Number of attempts at approach: 1  Assessment: lips, teeth, and gum same as pre-op and atraumatic intubation

## 2023-06-16 NOTE — ANESTHESIA PREPROCEDURE EVALUATION
Anesthesia Evaluation     Patient summary reviewed and Nursing notes reviewed   history of anesthetic complications: PONV  NPO Solid Status: > 8 hours  NPO Liquid Status: > 2 hours           Airway   Mallampati: III  TM distance: >3 FB  Neck ROM: limited  Small opening and Possible difficult intubation  Dental      Pulmonary    (+) asthma,shortness of breath, sleep apnea,   Cardiovascular     ECG reviewed  Rate: normal    (+) dysrhythmias PAC, GRAY,       Neuro/Psych- negative ROS  GI/Hepatic/Renal/Endo    (+) morbid obesity,  renal disease stones, thyroid problem hypothyroidism    Musculoskeletal     (+) back pain,   Abdominal    Substance History - negative use     OB/GYN negative ob/gyn ROS         Other   arthritis,                      Anesthesia Plan    ASA 3     general     (Wants same anesthetic drugs as last time didn't get sick.  Didn't get a scope patch last time. Hx grade 2a view with MAC3  five years ago.)  intravenous induction     Anesthetic plan, risks, benefits, and alternatives have been provided, discussed and informed consent has been obtained with: patient.        CODE STATUS:

## 2023-06-17 NOTE — ANESTHESIA POSTPROCEDURE EVALUATION
Patient: Patt Bond    Procedure Summary       Date: 06/16/23 Room / Location: [facility] OR [facility]    Anesthesia Start: 1725 Anesthesia Stop: 1854    Procedure: LEFT PERCUTANEOUS  NEPHROSTOLITHOTOMY, CYSTOSCOPY, STENT REMOVAL (Left) Diagnosis:       Renal calculi      (Left Renal Calculi)    Surgeons: Miguel Monte MD Provider: Elan Sneed MD    Anesthesia Type: general ASA Status: 3            Anesthesia Type: general    Vitals  Vitals Value Taken Time   /75 06/16/23 2045   Temp 36.4 °C (97.6 °F) 06/16/23 1850   Pulse 66 06/16/23 2057   Resp 18 06/16/23 2000   SpO2 95 % 06/16/23 2057   Vitals shown include unvalidated device data.        Post Anesthesia Care and Evaluation    Patient location during evaluation: bedside  Patient participation: complete - patient participated  Level of consciousness: awake  Pain management: adequate    Airway patency: patent  Anesthetic complications: No anesthetic complications    Cardiovascular status: acceptable  Respiratory status: acceptable  Hydration status: acceptable    Comments: */73 (BP Location: Right arm, Patient Position: Lying)   Pulse 71   Temp 36.4 °C (97.6 °F) (Oral)   Resp 18   SpO2 96%

## 2023-08-28 ENCOUNTER — TRANSCRIBE ORDERS (OUTPATIENT)
Dept: ADMINISTRATIVE | Facility: HOSPITAL | Age: 78
End: 2023-08-28
Payer: MEDICARE

## 2023-08-28 DIAGNOSIS — R91.1 LUNG NODULE: Primary | ICD-10-CM

## 2023-10-05 NOTE — PLAN OF CARE
Problem: Pain, Acute (Adult)  Goal: Acceptable Pain Control/Comfort Level  Outcome: Ongoing (interventions implemented as appropriate)   03/05/18 8414   Pain, Acute (Adult)   Acceptable Pain Control/Comfort Level making progress toward outcome          Retinoid Dermatitis Normal Treatment: I recommended more frequent application of Cetaphil or CeraVe to the areas of dermatitis.

## 2023-10-06 ENCOUNTER — PRE-ADMISSION TESTING (OUTPATIENT)
Dept: PREADMISSION TESTING | Facility: HOSPITAL | Age: 78
End: 2023-10-06
Payer: MEDICARE

## 2023-10-06 VITALS
BODY MASS INDEX: 43.29 KG/M2 | WEIGHT: 229.3 LBS | HEIGHT: 61 IN | DIASTOLIC BLOOD PRESSURE: 80 MMHG | HEART RATE: 73 BPM | TEMPERATURE: 97.8 F | RESPIRATION RATE: 16 BRPM | SYSTOLIC BLOOD PRESSURE: 174 MMHG | OXYGEN SATURATION: 97 %

## 2023-10-06 LAB
ANION GAP SERPL CALCULATED.3IONS-SCNC: 9.8 MMOL/L (ref 5–15)
BUN SERPL-MCNC: 15 MG/DL (ref 8–23)
BUN/CREAT SERPL: 11.2 (ref 7–25)
CALCIUM SPEC-SCNC: 10 MG/DL (ref 8.6–10.5)
CHLORIDE SERPL-SCNC: 107 MMOL/L (ref 98–107)
CO2 SERPL-SCNC: 23.2 MMOL/L (ref 22–29)
CREAT SERPL-MCNC: 1.34 MG/DL (ref 0.57–1)
DEPRECATED RDW RBC AUTO: 45.1 FL (ref 37–54)
EGFRCR SERPLBLD CKD-EPI 2021: 41.2 ML/MIN/1.73
ERYTHROCYTE [DISTWIDTH] IN BLOOD BY AUTOMATED COUNT: 13.8 % (ref 12.3–15.4)
GLUCOSE SERPL-MCNC: 157 MG/DL (ref 65–99)
HCT VFR BLD AUTO: 35.8 % (ref 34–46.6)
HGB BLD-MCNC: 12 G/DL (ref 12–15.9)
MCH RBC QN AUTO: 30.3 PG (ref 26.6–33)
MCHC RBC AUTO-ENTMCNC: 33.5 G/DL (ref 31.5–35.7)
MCV RBC AUTO: 90.4 FL (ref 79–97)
PLATELET # BLD AUTO: 244 10*3/MM3 (ref 140–450)
PMV BLD AUTO: 8.7 FL (ref 6–12)
POTASSIUM SERPL-SCNC: 4.6 MMOL/L (ref 3.5–5.2)
RBC # BLD AUTO: 3.96 10*6/MM3 (ref 3.77–5.28)
SODIUM SERPL-SCNC: 140 MMOL/L (ref 136–145)
WBC NRBC COR # BLD: 4.79 10*3/MM3 (ref 3.4–10.8)

## 2023-10-06 PROCEDURE — 36415 COLL VENOUS BLD VENIPUNCTURE: CPT

## 2023-10-06 PROCEDURE — 85027 COMPLETE CBC AUTOMATED: CPT

## 2023-10-06 PROCEDURE — 80048 BASIC METABOLIC PNL TOTAL CA: CPT

## 2023-10-06 NOTE — DISCHARGE INSTRUCTIONS
ARRIVAL TIME 1:00 PM      If you are on prescription narcotic pain medication to control your pain you may also take that medication the morning of surgery.    General Instructions:  Do not eat or drink anything after midnight the night before surgery.  Infants may have breast milk up to four hours before surgery.  Infants drinking formula may drink formula up to six hours before surgery.   Patients who avoid smoking, chewing tobacco and alcohol for 4 weeks prior to surgery have a reduced risk of post-operative complications.  Quit smoking as many days before surgery as you can.  Do not smoke, use chewing tobacco or drink alcohol the day of surgery.   If applicable bring your C-PAP/ BI-PAP machine in with you to preop day of surgery.  Bring any papers given to you in the doctor’s office.  Wear clean comfortable clothes.  Do not wear contact lenses, false eyelashes or make-up.  Bring a case for your glasses.   Bring crutches or walker if applicable.  Remove all piercings.  Leave jewelry and any other valuables at home.  Hair extensions with metal clips must be removed prior to surgery.  The Pre-Admission Testing nurse will instruct you to bring medications if unable to obtain an accurate list in Pre-Admission Testing.        Preventing a Surgical Site Infection:  For 2 to 3 days before surgery, avoid shaving with a razor because the razor can irritate skin and make it easier to develop an infection.    Any areas of open skin can increase the risk of a post-operative wound infection by allowing bacteria to enter and travel throughout the body.  Notify your surgeon if you have any skin wounds / rashes even if it is not near the expected surgical site.  The area will need assessed to determine if surgery should be delayed until it is healed.  The night prior to surgery shower using a fresh bar of anti-bacterial soap (such as Dial) and clean washcloth.  Sleep in a clean bed with clean clothing.  Do not allow pets to  sleep with you.  Shower on the morning of surgery using a fresh bar of anti-bacterial soap (such as Dial) and clean washcloth.  Dry with a clean towel and dress in clean clothing.  Ask your surgeon if you will be receiving antibiotics prior to surgery.  Make sure you, your family, and all healthcare providers clean their hands with soap and water or an alcohol based hand  before caring for you or your wound.    Day of surgery:  Your arrival time is approximately two hours before your scheduled surgery time.  Upon arrival, a Pre-op nurse and Anesthesiologist will review your health history, obtain vital signs, and answer questions you may have.  The only belongings needed at this time will be your home medications and if applicable your C-PAP/BI-PAP machine.  A Pre-op nurse will start an IV and you may receive medication in preparation for surgery, including something to help you relax.      Please be aware that surgery does come with discomfort.  We want to make every effort to control your discomfort so please discuss any uncontrolled symptoms with your nurse.   Your doctor will most likely have prescribed pain medications.      If you are going home after surgery you will receive individualized written care instructions before being discharged.  A responsible adult must drive you to and from the hospital on the day of your surgery and stay with you for 24 hours.  Discharge prescriptions can be filled by the hospital pharmacy during regular pharmacy hours.  If you are having surgery late in the day/evening your prescription may be e-prescribed to your pharmacy.  Please verify your pharmacy hours or chose a 24 hour pharmacy to avoid not having access to your prescription because your pharmacy has closed for the day.    If you are staying overnight following surgery, you will be transported to your hospital room following the recovery period.  [facility] has all private rooms.    If you have  any questions please call Pre-Admission Testing at [phone].  Deductibles and co-payments are collected on the day of service. Please be prepared to pay the required co-pay, deductible or deposit on the day of service as defined by your plan.    Call your surgeon immediately if you experience any of the following symptoms:  Sore Throat  Shortness of Breath or difficulty breathing  Cough  Chills  Body soreness or muscle pain  Headache  Fever  New loss of taste or smell  Do not arrive for your surgery ill.  Your procedure will need to be rescheduled to another time.  You will need to call your physician before the day of surgery to avoid any unnecessary exposure to hospital staff as well as other patients.

## 2023-10-11 ENCOUNTER — ANESTHESIA (OUTPATIENT)
Dept: PERIOP | Facility: HOSPITAL | Age: 78
End: 2023-10-11
Payer: MEDICARE

## 2023-10-11 ENCOUNTER — HOSPITAL ENCOUNTER (OUTPATIENT)
Facility: HOSPITAL | Age: 78
Setting detail: HOSPITAL OUTPATIENT SURGERY
Discharge: HOME | End: 2023-10-11
Attending: UROLOGY | Admitting: UROLOGY
Payer: MEDICARE

## 2023-10-11 ENCOUNTER — ANESTHESIA EVENT (OUTPATIENT)
Dept: PERIOP | Facility: HOSPITAL | Age: 78
End: 2023-10-11
Payer: MEDICARE

## 2023-10-11 ENCOUNTER — APPOINTMENT (OUTPATIENT)
Dept: GENERAL RADIOLOGY | Facility: HOSPITAL | Age: 78
End: 2023-10-11
Payer: MEDICARE

## 2023-10-11 VITALS
DIASTOLIC BLOOD PRESSURE: 69 MMHG | OXYGEN SATURATION: 96 % | HEART RATE: 70 BPM | SYSTOLIC BLOOD PRESSURE: 143 MMHG | RESPIRATION RATE: 16 BRPM | TEMPERATURE: 98.3 F

## 2023-10-11 DIAGNOSIS — N20.0 RENAL CALCULI: Primary | ICD-10-CM

## 2023-10-11 PROCEDURE — C1769 GUIDE WIRE: HCPCS | Performed by: UROLOGY

## 2023-10-11 PROCEDURE — 25010000002 DEXAMETHASONE SODIUM PHOSPHATE 20 MG/5ML SOLUTION: Performed by: ANESTHESIOLOGY

## 2023-10-11 PROCEDURE — 25010000002 FENTANYL CITRATE (PF) 50 MCG/ML SOLUTION: Performed by: ANESTHESIOLOGY

## 2023-10-11 PROCEDURE — 76000 FLUOROSCOPY <1 HR PHYS/QHP: CPT

## 2023-10-11 PROCEDURE — C2617 STENT, NON-COR, TEM W/O DEL: HCPCS | Performed by: UROLOGY

## 2023-10-11 PROCEDURE — 25010000002 ONDANSETRON PER 1 MG: Performed by: ANESTHESIOLOGY

## 2023-10-11 PROCEDURE — 74018 RADEX ABDOMEN 1 VIEW: CPT

## 2023-10-11 PROCEDURE — 25810000003 LACTATED RINGERS PER 1000 ML: Performed by: ANESTHESIOLOGY

## 2023-10-11 PROCEDURE — 25010000002 MAGNESIUM SULFATE PER 500 MG OF MAGNESIUM: Performed by: ANESTHESIOLOGY

## 2023-10-11 PROCEDURE — 25010000002 LEVOFLOXACIN PER 250 MG: Performed by: UROLOGY

## 2023-10-11 PROCEDURE — 25010000002 MIDAZOLAM PER 1 MG: Performed by: ANESTHESIOLOGY

## 2023-10-11 PROCEDURE — 25010000002 PROPOFOL 200 MG/20ML EMULSION: Performed by: ANESTHESIOLOGY

## 2023-10-11 DEVICE — URETERAL STENT
Type: IMPLANTABLE DEVICE | Site: URETER | Status: FUNCTIONAL
Brand: POLARIS™ ULTRA

## 2023-10-11 RX ORDER — IPRATROPIUM BROMIDE AND ALBUTEROL SULFATE 2.5; .5 MG/3ML; MG/3ML
3 SOLUTION RESPIRATORY (INHALATION) ONCE AS NEEDED
Status: DISCONTINUED | OUTPATIENT
Start: 2023-10-11 | End: 2023-10-11 | Stop reason: HOSPADM

## 2023-10-11 RX ORDER — MAGNESIUM HYDROXIDE 1200 MG/15ML
LIQUID ORAL AS NEEDED
Status: DISCONTINUED | OUTPATIENT
Start: 2023-10-11 | End: 2023-10-11 | Stop reason: HOSPADM

## 2023-10-11 RX ORDER — FENTANYL CITRATE 50 UG/ML
INJECTION, SOLUTION INTRAMUSCULAR; INTRAVENOUS AS NEEDED
Status: DISCONTINUED | OUTPATIENT
Start: 2023-10-11 | End: 2023-10-11 | Stop reason: SURG

## 2023-10-11 RX ORDER — PROMETHAZINE HYDROCHLORIDE 25 MG/1
25 SUPPOSITORY RECTAL ONCE AS NEEDED
Status: DISCONTINUED | OUTPATIENT
Start: 2023-10-11 | End: 2023-10-11 | Stop reason: HOSPADM

## 2023-10-11 RX ORDER — LABETALOL HYDROCHLORIDE 5 MG/ML
5 INJECTION, SOLUTION INTRAVENOUS
Status: DISCONTINUED | OUTPATIENT
Start: 2023-10-11 | End: 2023-10-11 | Stop reason: HOSPADM

## 2023-10-11 RX ORDER — SODIUM CHLORIDE 0.9 % (FLUSH) 0.9 %
3 SYRINGE (ML) INJECTION EVERY 12 HOURS SCHEDULED
Status: DISCONTINUED | OUTPATIENT
Start: 2023-10-11 | End: 2023-10-11 | Stop reason: HOSPADM

## 2023-10-11 RX ORDER — DROPERIDOL 2.5 MG/ML
0.62 INJECTION, SOLUTION INTRAMUSCULAR; INTRAVENOUS
Status: DISCONTINUED | OUTPATIENT
Start: 2023-10-11 | End: 2023-10-11 | Stop reason: HOSPADM

## 2023-10-11 RX ORDER — FENTANYL CITRATE 50 UG/ML
50 INJECTION, SOLUTION INTRAMUSCULAR; INTRAVENOUS ONCE AS NEEDED
Status: DISCONTINUED | OUTPATIENT
Start: 2023-10-11 | End: 2023-10-11 | Stop reason: HOSPADM

## 2023-10-11 RX ORDER — HYDRALAZINE HYDROCHLORIDE 20 MG/ML
5 INJECTION INTRAMUSCULAR; INTRAVENOUS
Status: DISCONTINUED | OUTPATIENT
Start: 2023-10-11 | End: 2023-10-11 | Stop reason: HOSPADM

## 2023-10-11 RX ORDER — CEFAZOLIN SODIUM 2 G/100ML
2000 INJECTION, SOLUTION INTRAVENOUS ONCE
Status: CANCELLED | OUTPATIENT
Start: 2023-10-11

## 2023-10-11 RX ORDER — MIDAZOLAM HYDROCHLORIDE 1 MG/ML
0.5 INJECTION INTRAMUSCULAR; INTRAVENOUS
Status: COMPLETED | OUTPATIENT
Start: 2023-10-11 | End: 2023-10-11

## 2023-10-11 RX ORDER — NALOXONE HCL 0.4 MG/ML
0.2 VIAL (ML) INJECTION AS NEEDED
Status: DISCONTINUED | OUTPATIENT
Start: 2023-10-11 | End: 2023-10-11 | Stop reason: HOSPADM

## 2023-10-11 RX ORDER — OXYCODONE AND ACETAMINOPHEN 7.5; 325 MG/1; MG/1
1 TABLET ORAL EVERY 4 HOURS PRN
Qty: 30 TABLET | Refills: 0 | Status: SHIPPED | OUTPATIENT
Start: 2023-10-11

## 2023-10-11 RX ORDER — FLUMAZENIL 0.1 MG/ML
0.2 INJECTION INTRAVENOUS AS NEEDED
Status: DISCONTINUED | OUTPATIENT
Start: 2023-10-11 | End: 2023-10-11 | Stop reason: HOSPADM

## 2023-10-11 RX ORDER — HYDROCODONE BITARTRATE AND ACETAMINOPHEN 5; 325 MG/1; MG/1
1 TABLET ORAL ONCE AS NEEDED
Status: DISCONTINUED | OUTPATIENT
Start: 2023-10-11 | End: 2023-10-11 | Stop reason: HOSPADM

## 2023-10-11 RX ORDER — SODIUM CHLORIDE, SODIUM LACTATE, POTASSIUM CHLORIDE, CALCIUM CHLORIDE 600; 310; 30; 20 MG/100ML; MG/100ML; MG/100ML; MG/100ML
9 INJECTION, SOLUTION INTRAVENOUS CONTINUOUS
Status: DISCONTINUED | OUTPATIENT
Start: 2023-10-11 | End: 2023-10-11 | Stop reason: HOSPADM

## 2023-10-11 RX ORDER — PHENAZOPYRIDINE HYDROCHLORIDE 200 MG/1
200 TABLET, FILM COATED ORAL 3 TIMES DAILY PRN
Qty: 30 TABLET | Refills: 0 | Status: SHIPPED | OUTPATIENT
Start: 2023-10-11

## 2023-10-11 RX ORDER — FENTANYL CITRATE 50 UG/ML
50 INJECTION, SOLUTION INTRAMUSCULAR; INTRAVENOUS
Status: DISCONTINUED | OUTPATIENT
Start: 2023-10-11 | End: 2023-10-11 | Stop reason: HOSPADM

## 2023-10-11 RX ORDER — MAGNESIUM SULFATE HEPTAHYDRATE 500 MG/ML
INJECTION, SOLUTION INTRAMUSCULAR; INTRAVENOUS AS NEEDED
Status: DISCONTINUED | OUTPATIENT
Start: 2023-10-11 | End: 2023-10-11 | Stop reason: SURG

## 2023-10-11 RX ORDER — PROMETHAZINE HYDROCHLORIDE 25 MG/1
25 TABLET ORAL ONCE AS NEEDED
Status: DISCONTINUED | OUTPATIENT
Start: 2023-10-11 | End: 2023-10-11 | Stop reason: HOSPADM

## 2023-10-11 RX ORDER — OXYCODONE AND ACETAMINOPHEN 7.5; 325 MG/1; MG/1
1 TABLET ORAL EVERY 4 HOURS PRN
Status: DISCONTINUED | OUTPATIENT
Start: 2023-10-11 | End: 2023-10-11 | Stop reason: HOSPADM

## 2023-10-11 RX ORDER — ONDANSETRON 2 MG/ML
INJECTION INTRAMUSCULAR; INTRAVENOUS AS NEEDED
Status: DISCONTINUED | OUTPATIENT
Start: 2023-10-11 | End: 2023-10-11 | Stop reason: SURG

## 2023-10-11 RX ORDER — HYDROMORPHONE HYDROCHLORIDE 1 MG/ML
0.5 INJECTION, SOLUTION INTRAMUSCULAR; INTRAVENOUS; SUBCUTANEOUS
Status: DISCONTINUED | OUTPATIENT
Start: 2023-10-11 | End: 2023-10-11 | Stop reason: HOSPADM

## 2023-10-11 RX ORDER — SODIUM CHLORIDE 0.9 % (FLUSH) 0.9 %
3-10 SYRINGE (ML) INJECTION AS NEEDED
Status: DISCONTINUED | OUTPATIENT
Start: 2023-10-11 | End: 2023-10-11 | Stop reason: HOSPADM

## 2023-10-11 RX ORDER — LEVOFLOXACIN 5 MG/ML
500 INJECTION, SOLUTION INTRAVENOUS ONCE
Status: COMPLETED | OUTPATIENT
Start: 2023-10-11 | End: 2023-10-11

## 2023-10-11 RX ORDER — DIPHENHYDRAMINE HYDROCHLORIDE 50 MG/ML
12.5 INJECTION INTRAMUSCULAR; INTRAVENOUS
Status: DISCONTINUED | OUTPATIENT
Start: 2023-10-11 | End: 2023-10-11 | Stop reason: HOSPADM

## 2023-10-11 RX ORDER — LIDOCAINE HYDROCHLORIDE 20 MG/ML
INJECTION, SOLUTION INFILTRATION; PERINEURAL AS NEEDED
Status: DISCONTINUED | OUTPATIENT
Start: 2023-10-11 | End: 2023-10-11 | Stop reason: SURG

## 2023-10-11 RX ORDER — ONDANSETRON 2 MG/ML
4 INJECTION INTRAMUSCULAR; INTRAVENOUS ONCE AS NEEDED
Status: DISCONTINUED | OUTPATIENT
Start: 2023-10-11 | End: 2023-10-11 | Stop reason: HOSPADM

## 2023-10-11 RX ORDER — DEXAMETHASONE SODIUM PHOSPHATE 4 MG/ML
INJECTION, SOLUTION INTRA-ARTICULAR; INTRALESIONAL; INTRAMUSCULAR; INTRAVENOUS; SOFT TISSUE AS NEEDED
Status: DISCONTINUED | OUTPATIENT
Start: 2023-10-11 | End: 2023-10-11 | Stop reason: SURG

## 2023-10-11 RX ORDER — FAMOTIDINE 10 MG/ML
20 INJECTION, SOLUTION INTRAVENOUS ONCE
Status: COMPLETED | OUTPATIENT
Start: 2023-10-11 | End: 2023-10-11

## 2023-10-11 RX ORDER — LIDOCAINE HYDROCHLORIDE 10 MG/ML
0.5 INJECTION, SOLUTION INFILTRATION; PERINEURAL ONCE AS NEEDED
Status: DISCONTINUED | OUTPATIENT
Start: 2023-10-11 | End: 2023-10-11 | Stop reason: HOSPADM

## 2023-10-11 RX ORDER — LEVOFLOXACIN 500 MG/1
500 TABLET, FILM COATED ORAL DAILY
Qty: 7 TABLET | Refills: 0 | Status: SHIPPED | OUTPATIENT
Start: 2023-10-12 | End: 2023-10-19

## 2023-10-11 RX ORDER — PHENYLEPHRINE HCL IN 0.9% NACL 0.5 MG/5ML
SYRINGE (ML) INTRAVENOUS AS NEEDED
Status: DISCONTINUED | OUTPATIENT
Start: 2023-10-11 | End: 2023-10-11 | Stop reason: SURG

## 2023-10-11 RX ORDER — EPHEDRINE SULFATE 50 MG/ML
5 INJECTION, SOLUTION INTRAVENOUS ONCE AS NEEDED
Status: DISCONTINUED | OUTPATIENT
Start: 2023-10-11 | End: 2023-10-11 | Stop reason: HOSPADM

## 2023-10-11 RX ORDER — PROPOFOL 10 MG/ML
INJECTION, EMULSION INTRAVENOUS AS NEEDED
Status: DISCONTINUED | OUTPATIENT
Start: 2023-10-11 | End: 2023-10-11 | Stop reason: SURG

## 2023-10-11 RX ADMIN — PROPOFOL 120 MG: 10 INJECTION, EMULSION INTRAVENOUS at 16:56

## 2023-10-11 RX ADMIN — LIDOCAINE HYDROCHLORIDE 100 MG: 20 INJECTION, SOLUTION INFILTRATION; PERINEURAL at 16:56

## 2023-10-11 RX ADMIN — ONDANSETRON 4 MG: 2 INJECTION INTRAMUSCULAR; INTRAVENOUS at 16:58

## 2023-10-11 RX ADMIN — MAGNESIUM SULFATE HEPTAHYDRATE 1 G: 500 INJECTION, SOLUTION INTRAMUSCULAR; INTRAVENOUS at 16:58

## 2023-10-11 RX ADMIN — Medication 200 MCG: at 17:08

## 2023-10-11 RX ADMIN — LEVOFLOXACIN 500 MG: 500 INJECTION, SOLUTION INTRAVENOUS at 16:45

## 2023-10-11 RX ADMIN — SODIUM CHLORIDE, POTASSIUM CHLORIDE, SODIUM LACTATE AND CALCIUM CHLORIDE 9 ML/HR: 600; 310; 30; 20 INJECTION, SOLUTION INTRAVENOUS at 14:49

## 2023-10-11 RX ADMIN — MIDAZOLAM 0.5 MG: 1 INJECTION INTRAMUSCULAR; INTRAVENOUS at 16:13

## 2023-10-11 RX ADMIN — FENTANYL CITRATE 25 MCG: 50 INJECTION, SOLUTION INTRAMUSCULAR; INTRAVENOUS at 17:24

## 2023-10-11 RX ADMIN — Medication 200 MCG: at 17:12

## 2023-10-11 RX ADMIN — FENTANYL CITRATE 25 MCG: 50 INJECTION, SOLUTION INTRAMUSCULAR; INTRAVENOUS at 17:05

## 2023-10-11 RX ADMIN — MIDAZOLAM 0.5 MG: 1 INJECTION INTRAMUSCULAR; INTRAVENOUS at 14:49

## 2023-10-11 RX ADMIN — DEXAMETHASONE SODIUM PHOSPHATE 8 MG: 4 INJECTION, SOLUTION INTRAMUSCULAR; INTRAVENOUS at 16:58

## 2023-10-11 RX ADMIN — FAMOTIDINE 20 MG: 10 INJECTION INTRAVENOUS at 14:49

## 2023-10-11 NOTE — OP NOTE
URETEROSCOPY LASER LITHOTRIPSY WITH STENT INSERTION  Procedure Note    Patt Bond  10/11/2023    Pre-op Diagnosis:   Left renal calculi    Post-op Diagnosis:     Post-Op Diagnosis Codes:     * Renal calculi [N20.0]    Procedure(s):  LEFT URETEROSCOPY LASER LITHOTRIPSY WITH STENT REMOVAL    Surgeon(s):  Miguel Monte MD    Anesthesia: General    Staff:   Circulator: Sridevi Hamilton RN  Laser Staff: Cinthya Nichole RN  Scrub Person: Lowell Whitten    Estimated Blood Loss: none    Specimens:                * No orders in the log *      Drains:   Nephrostomy Left 24 Fr. (Active)       [REMOVED] Urethral Catheter Silicone 16 Fr. (Removed)       [REMOVED] External Urinary Catheter (Removed)       Findings: Multiple soft stones in the pelvis and proximal ureter that fragmented easily.  Almost had a struvite-like appearance.  A lot of the debris was suctioned out with syringe but no hard stones encountered.  Replaced.    Complications: None apparent    Indications: 78-year-old female with recurrent nephrolithiasis had a recent percutaneous nephrostolithotomy with complete removal of water stones in the left kidney and then presents again with left-sided flank pain recurrent UTIs and have stones again on the left side.  She had stent placement and ESWL with still persistent debris in her kidney she now presents for ureteroscopy.    Procedure: Patient was taken the operating given general anesthesia.  Placed lithotomy.  Prepped and draped in a sterile fashion.  Surgical timeout was performed.  Cystoscope was inserted.  The stent was visualized and pulled out.  A guidewire was passed up.  Flexible ureteroscope was then passed over the wire.  Her distal mid and proximal ureteral segments were unremarkable.  There was a white density in the proximal ureter that seem to flow back up to the pelvis and appeared to be thick consistency and in visually of struvite stone.  I passed a 200 æm fiber began  fragmenting this and there is some small fluffy pieces and I chased some additional other stonelike material that had struvite-like appearance in the pelvis and interpolar calyces upper pole as well.  She did not have any dense stones that we have encountered on her before.  I's pulled out a lot of the debris with a 20 cc syringe if everything seemed to fragmented well I could not really appreciate on a on the x-ray.  The guidewire was replaced.  A 6 [place]ian by 26 cm stent was then placed to the left collecting system.  She was awoken and taken to recovery in stable condition.      Miguel Monte MD     Date: 10/11/2023  Time: 17:50 EDT

## 2023-10-11 NOTE — ANESTHESIA PROCEDURE NOTES
Airway  Date/Time: 10/11/2023 4:57 PM  Airway not difficult    General Information and Staff    Anesthesiologist: Mejia Persaud MD    Indications and Patient Condition    Preoxygenated: yes      Final Airway Details  Final airway type: supraglottic airway      Successful airway: unique  Size 4

## 2023-10-11 NOTE — H&P
[facility] Surgical History and Physical    Patient Care Team:  Moe Matute MD as PCP - General  Miguel Gautam MD as Consulting Physician (Cardiology)  Chalino Brooks MD as Consulting Physician (Endocrinology)  Nando Vasquez MD as Consulting Physician (Pulmonary Disease)    Chief complaint flank pain    Subjective     Patient is a 78 y.o. female presents with left renal stones that have recently taken an aggressive.turn. she had a pcnl within the last year and nwo with several sizable stones in the left kidney.     Review of Systems   The following systems were reviewed and negative;  respiratory, cardiovascular, and gastrointestinal    Past Medical History:   Diagnosis Date    Arthritis     OSTEO. LEFT KNEE    Asthma     Back pain     AT TIMES    Cataract     CLIFFORD EYES    Chronic diarrhea     Diverticulitis     WITH RESECTION    Diverticulosis     Goiter     History of colon polyps     BENIGN    History of sepsis     Hyperthyroidism     followed by Dr. Blackmon. PARTIAL THRYOIDECTOMY    Kidney stones     HISTORY OF MULTIPLE TIMES. URIC AND CALCIUM STONES    Lung nodule     HISTORY OF-NOTE LATEST CT CHEST 12/2022    Mass of mediastinum     HISTORY OF. BENIGN.    Obstructive pyelonephritis 09/28/2015    left obstructing pyelonephritis     PONV (postoperative nausea and vomiting)     Rectal prolapse     CHRONIC    Sleep apnea     CPAP MACHINE    SOB (shortness of breath) on exertion     SEES DR VASQUEZ     Past Surgical History:   Procedure Laterality Date    BRONCHOSCOPY N/A 11/06/2017    Procedure: BRONCHOSCOPY WITH ENDOBRONCHIAL ULTRASOUND AND TRANSBRONCHIAL NEEDLE ASPIRATION;  Surgeon: Nando Vasquez MD;  Location: [facility] ENDOSCOPY;  Service:     CARDIAC CATHETERIZATION N/A 09/27/2017    Procedure: Right Heart Cath;  Surgeon: Miguel Gautam MD;  Location: [facility] CATH INVASIVE LOCATION;  Service:     CARDIAC CATHETERIZATION N/A 09/27/2017    Procedure: Left Heart Cath;   Surgeon: Miguel Gautam MD;  Location: [facility] CATH INVASIVE LOCATION;  Service:     CARDIAC CATHETERIZATION N/A 09/27/2017    Procedure: Coronary angiography;  Surgeon: Miguel Gautam MD;  Location:  [facility] CATH INVASIVE LOCATION;  Service:     CARDIAC CATHETERIZATION N/A 09/27/2017    Procedure: Left ventriculography;  Surgeon: Miguel Gautam MD;  Location: [facility] CATH INVASIVE LOCATION;  Service:     CHOLECYSTECTOMY N/A 05/17/2017    Procedure: LAPAROSCOPIC CHOLECYSTECTOMY WITH IOC;  Surgeon: Patt Moore MD;  Location: [facility] MAIN OR;  Service:     COLON RESECTION Left 09/14/2016    Laparoscopic Low Anterior Resection with splenic flexure mobilization, drainage of Pelvic Abscess (cultured 3+ E. Coli), Left salpingo-ophorectomy, laparoscopic rectopexy, and umbilical hernia repair, Dr. Patt Moore    COLONOSCOPY N/A 05/15/2008    sigmoid diverticulosis with blunting of the haustral folds and angulation consistent with previous diverticulitis, no polyps, suggestion of rectal prolapse-Dr. Patt Moore    COLONOSCOPY W/ BIOPSIES AND POLYPECTOMY N/A 04/16/2001    Possible mild gastritis w/ some appearance of formations that look like petechiae in the antrum, no ulcerations, no erosions; cecal polyp approx 4mm sessile; probable transverse colon polyp, although we could not visualize this completely upon pulling out; sigmoid diverticula; multiple rectal polyps, mostly w/ appearance of hyperplasia, largest being 5 to 6mm: removed via snare-Dr. Patt Moore    COLONOSCOPY W/ POLYPECTOMY N/A 12/10/2004    Diverticulosis with sigmoid perideverticulitis with erythema and patchiness around some of the diverticula, proximal ascedning colon polyp-approx 5 mm-removed via snare cauter, two distal ascending colon polyps-7 mm and 3 mm-removed via snare cautery polypectomy, hemorrhoids-Dr. Patt Moore    CYSTOSCOPY W/ URETERAL STENT PLACEMENT Left 04/21/2018    Procedure: CYSTOSCOPY, LEFT RETROGRADE WITH  LEFT URETERAL STENT INSERTION;  Surgeon: Stanley Osuna MD;  Location: [facility];  Service: Urology    CYSTOSCOPY W/ URETERAL STENT REMOVAL Left 10/07/2015    Cystoscopy with stent extraction, left ureteral occulusion balloon placement, percutanous nephrostolithotomy with stone volume less than 2.5 cm involving the left lower pole and the left proximal ureter, balloon tract dilation for establishment of nephrostomy tract, antegrade nephrostomy tube placement-Dr. Miguel Monte    CYSTOSCOPY, RETROGRADE PYELOGRAM AND STENT INSERTION Left 09/28/2015    Left cystoscopy with left retrograde pyelogram, left double-J stent placement-Dr. Miguel Blas    CYSTOSCOPY, RETROGRADE PYELOGRAM AND STENT INSERTION Left 09/09/2015    Cystoscopy with bilateral retrograde pyelogram, left double-J stent placement, right ureteral pyeloscopy with laser lithotripsy of the ureteropelvic junction calculus, right double-J stent placement-Dr. Miguel Monte    CYSTOSCOPY, RETROGRADE PYELOGRAM AND STENT INSERTION Right 09/21/2007    Cystoscopy with right retrograde pyelogram, right biliary stent placement-Dr. Miguel Monte    CYSTOSCOPY, RETROGRADE PYELOGRAM AND STENT INSERTION Right 09/07/2007    Cystoscopy with right retrograde pyelogram, right ureteral peyloscopy with extraction distal ureteral mass, right double J stent placement-Dr. Miguel Monte    CYSTOSCOPY, RETROGRADE PYELOGRAM AND STENT INSERTION Left 07/29/2022    Procedure: CYSTOSCOPY, LEFT RETROGRADE PYELOGRAM, LEFT URETERAL STENT;  Surgeon: Cedrick Jones MD;  Location: [facility];  Service: Urology;  Laterality: Left;    CYSTOSCOPY, URETEROSCOPY, RETROGRADE PYELOGRAM, STENT INSERTION Right 09/07/2007    Cystoscopy with right retrograde pyelogram, rigth ureteroscopy with extraction of distal mass, right double J stent placement-Dr. Miguel Monte    D & C HYSTEROSCOPY N/A 05/18/2011    Procedure done due to thickened endomentrium and an  endometrial polyp-Dr. Quita Cheatham    DILATATION AND CURETTAGE N/A 03/22/2002    D&C, polyp removal-Dr. Quita Cheatham    EXTRACORPOREAL SHOCK WAVE LITHOTRIPSY (ESWL) Left 04/19/2023    Procedure: LEFT EXTRACORPOREAL SHOCKWAVE LITHOTRIPSY STENT REPLACEMENT;  Surgeon: Miguel Monte MD;  Location: [facility] OR;  Service: Urology;  Laterality: Left;    EXTRACORPOREAL SHOCKWAVE LITHOTRIPSY (ESWL), STENT INSERTION/REMOVAL Left 05/08/2015    Left extracoporeal shockwave lithotripsy, cystoscopy with stent placement-Dr. Miguel Monte    EXTRACORPOREAL SHOCKWAVE LITHOTRIPSY (ESWL), STENT INSERTION/REMOVAL  04/2023    [facility]    MEDIASTINOTOMY Left 03/05/2018    Procedure: left thyroidectomy, resection of substernal thyroid goiter cervical approach;  Surgeon: Quintin Mares III, MD;  Location: [facility] OR;  Service:     PERCUTANEOUS NEPHROSTOLITHOTOMY Left 6/16/2023    Procedure: LEFT PERCUTANEOUS  NEPHROSTOLITHOTOMY, CYSTOSCOPY, STENT REMOVAL;  Surgeon: Miguel Monte MD;  Location: [facility] HYBRID OR 18/19;  Service: Urology;  Laterality: Left;    SIGMOIDOSCOPY N/A 09/13/2016    Procedure: SIGMOIDOSCOPY FLEXIBLE TO 25 CM;  Surgeon: Patt Moore MD;  Location: [facility] ENDOSCOPY;  Service:     THORACOTOMY  1996    Thoracotomy for ectopic thyroid, Dr. Camarillo    THYROIDECTOMY, PARTIAL      left side 3/05/18    TOTAL KNEE ARTHROPLASTY Left 02/02/2023    Procedure: TOTAL KNEE ARTHROPLASTY;  Surgeon: Anthony Sinclair MD;  Location: [facility] OR OSC;  Service: Orthopedics;  Laterality: Left;    UMBILICAL HERNIA REPAIR N/A 09/14/2016    Procedure: UMBILICAL HERNIA REPAIR ;  Surgeon: Patt Moore MD;  Location: [facility] MAIN OR;  Service:     URETEROSCOPY LASER LITHOTRIPSY WITH STENT INSERTION Left 08/12/2022    Procedure: LEFT URETEROSCOPY LASER LITHOTRIPSY STONE BACKET EXTRACTION, STENT PLACEMENT;  Surgeon: Miguel Monte MD;  Location: [facility] MAIN OR;  Service: Urology;  Laterality:  Left;    VENTRAL/INCISIONAL HERNIA REPAIR N/A 05/17/2017    Procedure: VENTRAL HERNIA REPAIR WITH MESH, BILATERAL MUSCULOFASCIAL RELEASE;  Surgeon: Patt Moore MD;  Location: [facility] OR;  Service:      Family History   Problem Relation Age of Onset    Heart disease Father     Heart attack Paternal Uncle     Cancer Maternal Grandmother         ovarian    Heart attack Paternal Grandfather     Heart attack Paternal Uncle     Heart attack Paternal Uncle     Heart attack Paternal Uncle     Heart attack Paternal Uncle     Heart attack Paternal Uncle     Scleroderma Mother     Hypertension Sister     Malig Hyperthermia Neg Hx      Social History     Tobacco Use    Smoking status: Never     Passive exposure: Never    Smokeless tobacco: Never   Vaping Use    Vaping Use: Never used   Substance Use Topics    Alcohol use: No    Drug use: No       Meds:  Medications Prior to Admission   Medication Sig Dispense Refill Last Dose    allopurinol (ZYLOPRIM) 300 MG tablet Take 1 tablet by mouth Daily.   10/10/2023    cholestyramine (QUESTRAN) 4 g packet Take 1 packet by mouth Daily With Dinner. AROUND DINNERTIME   10/10/2023    methIMAzole (TAPAZOLE) 5 MG tablet Take 0.5 tablets by mouth Daily. Indications: Overactive Thyroid Gland 45 tablet 2 10/10/2023    potassium citrate (UROCIT-K) 10 MEQ (1080 MG) CR tablet Take 1 tablet by mouth Daily.   10/10/2023       Allergies:  Other, Cephalexin, Cephalosporins, and Penicillins    Debilities:  def    Objective     Vital Signs  Temp:  [98.5 øF (36.9 øC)] 98.5 øF (36.9 øC)  Heart Rate:  [66] 66  Resp:  [16] 16  BP: (165)/(83) 165/83  No intake or output data in the 24 hours ending 10/11/23 1631       Physical Exam:     General Appearance:    Alert, cooperative, NAD   HEENT:    No trauma, pupils reactive, hearing intact   Back:     No CVA tenderness   Lungs:     Respirations unlabored, no wheezing    Heart:    RRR, intact peripheral pulses   Abdomen:     Soft, NDNT, no masses, no  guarding   :    Nl ext genitalia   Extremities:   No edema, no deformity   Lymphatic:   No neck or groin LAD   Skin:   No bleeding, bruising or rashes   Neuro/Psych:   Orientation intact, mood/affect pleasant, no focal findings     Results Review:    I reviewed the patient's new clinical results.  Results for orders placed or performed in visit on 10/06/23   Basic Metabolic Panel    Specimen: Blood   Result Value Ref Range    Glucose 157 (H) 65 - 99 mg/dL    BUN 15 8 - 23 mg/dL    Creatinine 1.34 (H) 0.57 - 1.00 mg/dL    Sodium 140 136 - 145 mmol/L    Potassium 4.6 3.5 - 5.2 mmol/L    Chloride 107 98 - 107 mmol/L    CO2 23.2 22.0 - 29.0 mmol/L    Calcium 10.0 8.6 - 10.5 mg/dL    BUN/Creatinine Ratio 11.2 7.0 - 25.0    Anion Gap 9.8 5.0 - 15.0 mmol/L    eGFR 41.2 (L) >60.0 mL/min/1.73   CBC (No Diff)    Specimen: Blood   Result Value Ref Range    WBC 4.79 3.40 - 10.80 10*3/mm3    RBC 3.96 3.77 - 5.28 10*6/mm3    Hemoglobin 12.0 12.0 - 15.9 g/dL    Hematocrit 35.8 34.0 - 46.6 %    MCV 90.4 79.0 - 97.0 fL    MCH 30.3 26.6 - 33.0 pg    MCHC 33.5 31.5 - 35.7 g/dL    RDW 13.8 12.3 - 15.4 %    RDW-SD 45.1 37.0 - 54.0 fl    MPV 8.7 6.0 - 12.0 fL    Platelets 244 140 - 450 10*3/mm3        Assessment:  Recurrent Left renal stones    Plan:    Left ureteroscopy laserlitho and stent placement    I discussed the patient's findings and my recommendations with patient.   Risks, complications, outcomes and alternatives discussed with the patient at the bedside and office.    Miguel Monte MD  10/11/23  16:31 EDT

## 2023-10-11 NOTE — ANESTHESIA POSTPROCEDURE EVALUATION
Patient: Patt Bond    Procedure Summary       Date: 10/11/23 Room / Location: [facility] OR  / [facility] MAIN OR    Anesthesia Start: 1650 Anesthesia Stop: 1736    Procedure: LEFT URETEROSCOPY LASER LITHOTRIPSY WITH STENT REMOVAL (Left) Diagnosis:       Renal calculi      (Left renal calculi)    Surgeons: Miguel Monte MD Provider: Mejia Persaud MD    Anesthesia Type: general ASA Status: 3            Anesthesia Type: general    Vitals  Vitals Value Taken Time   /64 10/11/23 1815   Temp 36.8 øC (98.3 øF) 10/11/23 1815   Pulse 71 10/11/23 1816   Resp 16 10/11/23 1815   SpO2 96 % 10/11/23 1816   Vitals shown include unfiled device data.        Post Anesthesia Care and Evaluation    Patient location during evaluation: bedside  Patient participation: complete - patient participated  Level of consciousness: awake  Pain management: adequate    Airway patency: patent  Anesthetic complications: No anesthetic complications  PONV Status: controlled  Cardiovascular status: acceptable  Respiratory status: acceptable  Hydration status: acceptable    Comments: --------------------            10/11/23               1821     --------------------   BP:       152/75     Pulse:      66       Resp:       16       Temp:                SpO2:      94%      --------------------

## 2023-10-11 NOTE — ANESTHESIA PREPROCEDURE EVALUATION
Anesthesia Evaluation     Patient summary reviewed and Nursing notes reviewed   history of anesthetic complications:  PONV  NPO Solid Status: > 8 hours  NPO Liquid Status: > 2 hours           Airway   Mallampati: III  TM distance: >3 FB  Neck ROM: limited  Small opening  Dental      Comment: Had chipped incisor repaired yesterday    Pulmonary    (+) asthma,shortness of breath, sleep apnea  Cardiovascular     ECG reviewed  Rate: normal    (+) dysrhythmias PAC, GRAY      Neuro/Psych- negative ROS  GI/Hepatic/Renal/Endo    (+) morbid obesity, renal disease stones, thyroid problem hypothyroidism    Musculoskeletal     (+) back pain  Abdominal   (+) obese   Substance History - negative use     OB/GYN negative ob/gyn ROS         Other   arthritis,                   Anesthesia Plan    ASA 3     general     (Wants same anesthetic drugs as last time didn't get sick.  Didnt get a scope patch last time. Hx grade 2a view with MAC3  )  intravenous induction     Anesthetic plan, risks, benefits, and alternatives have been provided, discussed and informed consent has been obtained with: patient.      CODE STATUS:

## 2023-11-13 ENCOUNTER — CLINICAL SUPPORT (OUTPATIENT)
Dept: ORTHOPEDIC SURGERY | Facility: CLINIC | Age: 78
End: 2023-11-13
Payer: MEDICARE

## 2023-11-13 VITALS — BODY MASS INDEX: 42.38 KG/M2 | HEIGHT: 62 IN | WEIGHT: 230.3 LBS

## 2023-11-13 DIAGNOSIS — M17.11 PRIMARY OSTEOARTHRITIS OF RIGHT KNEE: Primary | ICD-10-CM

## 2023-11-13 PROCEDURE — 20610 DRAIN/INJ JOINT/BURSA W/O US: CPT | Performed by: ORTHOPAEDIC SURGERY

## 2023-11-13 RX ORDER — LIDOCAINE HYDROCHLORIDE 10 MG/ML
2 INJECTION, SOLUTION EPIDURAL; INFILTRATION; INTRACAUDAL; PERINEURAL
Status: COMPLETED | OUTPATIENT
Start: 2023-11-13 | End: 2023-11-13

## 2023-11-13 RX ORDER — METHYLPREDNISOLONE ACETATE 80 MG/ML
80 INJECTION, SUSPENSION INTRA-ARTICULAR; INTRALESIONAL; INTRAMUSCULAR; SOFT TISSUE
Status: COMPLETED | OUTPATIENT
Start: 2023-11-13 | End: 2023-11-13

## 2023-11-13 RX ADMIN — LIDOCAINE HYDROCHLORIDE 2 ML: 10 INJECTION, SOLUTION EPIDURAL; INFILTRATION; INTRACAUDAL; PERINEURAL at 11:07

## 2023-11-13 RX ADMIN — METHYLPREDNISOLONE ACETATE 80 MG: 80 INJECTION, SUSPENSION INTRA-ARTICULAR; INTRALESIONAL; INTRAMUSCULAR; SOFT TISSUE at 11:07

## 2023-11-13 NOTE — PROGRESS NOTES
Ms. Bond follows up today for the right knee.  She would like the injection repeated.  The risk, benefits and alternatives were discussed.  She consented and injection was performed as described below.  She will follow-up as needed.        Large Joint Arthrocentesis: R knee  Date/Time: 11/13/2023 11:07 AM  Consent given by: patient  Site marked: site marked  Timeout: Immediately prior to procedure a time out was called to verify the correct patient, procedure, equipment, support staff and site/side marked as required   Supporting Documentation  Indications: pain   Procedure Details  Location: knee - R knee  Preparation: Patient was prepped and draped in the usual sterile fashion  Needle gauge: 21 G.  Approach: anterolateral  Medications administered: 2 mL lidocaine PF 1% 1 %; 80 mg methylPREDNISolone acetate 80 MG/ML  Patient tolerance: patient tolerated the procedure well with no immediate complications

## 2023-12-04 ENCOUNTER — HOSPITAL ENCOUNTER (OUTPATIENT)
Facility: HOSPITAL | Age: 78
Discharge: HOME | End: 2023-12-04
Admitting: INTERNAL MEDICINE
Payer: MEDICARE

## 2023-12-04 DIAGNOSIS — R91.1 LUNG NODULE: ICD-10-CM

## 2023-12-04 PROCEDURE — 71250 CT THORAX DX C-: CPT

## 2023-12-12 DIAGNOSIS — G47.33 OBSTRUCTIVE SLEEP APNEA: ICD-10-CM

## 2023-12-12 DIAGNOSIS — R73.01 IMPAIRED FASTING GLUCOSE: ICD-10-CM

## 2023-12-12 DIAGNOSIS — Z90.09 HISTORY OF LOBECTOMY OF THYROID: ICD-10-CM

## 2023-12-12 DIAGNOSIS — N20.0 NEPHROLITHIASIS: ICD-10-CM

## 2023-12-12 DIAGNOSIS — E05.90 HYPERTHYROIDISM: ICD-10-CM

## 2023-12-13 ENCOUNTER — TRANSCRIBE ORDERS (OUTPATIENT)
Dept: ADMINISTRATIVE | Facility: HOSPITAL | Age: 78
End: 2023-12-13
Payer: MEDICARE

## 2023-12-13 DIAGNOSIS — R91.1 LUNG NODULE: Primary | ICD-10-CM

## 2023-12-13 LAB
ALBUMIN SERPL-MCNC: 4.1 G/DL (ref 3.5–5.2)
ALBUMIN/GLOB SERPL: 1.7 G/DL
ALP SERPL-CCNC: 85 U/L (ref 39–117)
ALT SERPL-CCNC: 17 U/L (ref 1–33)
AST SERPL-CCNC: 21 U/L (ref 1–32)
BILIRUB SERPL-MCNC: 0.8 MG/DL (ref 0–1.2)
BUN SERPL-MCNC: 18 MG/DL (ref 8–23)
BUN/CREAT SERPL: 16.8 (ref 7–25)
CALCIUM SERPL-MCNC: 9.7 MG/DL (ref 8.6–10.5)
CHLORIDE SERPL-SCNC: 106 MMOL/L (ref 98–107)
CO2 SERPL-SCNC: 23.1 MMOL/L (ref 22–29)
CREAT SERPL-MCNC: 1.07 MG/DL (ref 0.57–1)
EGFRCR SERPLBLD CKD-EPI 2021: 53.9 ML/MIN/1.73
GLOBULIN SER CALC-MCNC: 2.4 GM/DL
GLUCOSE SERPL-MCNC: 147 MG/DL (ref 65–99)
HBA1C MFR BLD: 5.5 % (ref 4.8–5.6)
POTASSIUM SERPL-SCNC: 4.1 MMOL/L (ref 3.5–5.2)
PROT SERPL-MCNC: 6.5 G/DL (ref 6–8.5)
SODIUM SERPL-SCNC: 140 MMOL/L (ref 136–145)
T3FREE SERPL-MCNC: 2.5 PG/ML (ref 2–4.4)
T4 FREE SERPL-MCNC: 0.96 NG/DL (ref 0.93–1.7)
TSH SERPL DL<=0.005 MIU/L-ACNC: 2.29 UIU/ML (ref 0.27–4.2)
URATE SERPL-MCNC: 4.8 MG/DL (ref 2.4–5.7)

## 2023-12-26 ENCOUNTER — OFFICE VISIT (OUTPATIENT)
Dept: ENDOCRINOLOGY | Age: 78
End: 2023-12-26
Payer: MEDICARE

## 2023-12-26 VITALS
SYSTOLIC BLOOD PRESSURE: 118 MMHG | DIASTOLIC BLOOD PRESSURE: 62 MMHG | HEIGHT: 62 IN | WEIGHT: 239.2 LBS | OXYGEN SATURATION: 95 % | BODY MASS INDEX: 44.02 KG/M2 | HEART RATE: 68 BPM | TEMPERATURE: 94.8 F

## 2023-12-26 DIAGNOSIS — N20.0 NEPHROLITHIASIS: ICD-10-CM

## 2023-12-26 DIAGNOSIS — E05.90 HYPERTHYROIDISM: Primary | ICD-10-CM

## 2023-12-26 DIAGNOSIS — R73.03 PREDIABETES: ICD-10-CM

## 2023-12-26 DIAGNOSIS — R79.9 ABNORMAL FINDING OF BLOOD CHEMISTRY, UNSPECIFIED: ICD-10-CM

## 2023-12-26 DIAGNOSIS — G47.33 OBSTRUCTIVE SLEEP APNEA: ICD-10-CM

## 2023-12-26 DIAGNOSIS — Z90.09 HISTORY OF LOBECTOMY OF THYROID: ICD-10-CM

## 2023-12-26 PROCEDURE — 99214 OFFICE O/P EST MOD 30 MIN: CPT | Performed by: INTERNAL MEDICINE

## 2023-12-26 NOTE — PROGRESS NOTES
Subjective   Patt Bond is a 78 y.o. female.     History of Present Illness     In March 2018, she had a left thyroidectomy for done by Dr. Mares.  Pathology was read as multinodular goiter with degenerative changes including extensive calcification.  Thyroid-stimulating immunoglobulin and thyroid peroxidase antibody were elevated in 2016.  Thyroid ultrasound done in July 2020 showed a 1.2 cm right hypoechoic nodule.      Thyroid ultrasound done in May 2022 showed stable nodule at the junction of the right thyroid and isthmus measuring up to 1.1 cm since July 14, 2020.  CT of the chest done on December 5, 2023 showed incidental stable multinodular thyroid goiter with substernal extension with no evidence of lymphadenopathy and coronary artery calcification.  Cardiac catheterization done in 2017 showed mild luminal irregularities in the LAD, normal circumflex, right coronary and left main.    She denies dysphagia or pressure symptoms.  She is on methimazole 5 mg 1/2 tablet daily.  TSH done in December 2023 is normal at 2.29 with normal free T4 and normal free T3.    She denies palpitations or increased tremors.  She denies heat or cold intolerance.  She had intermittent loose stools since 2019 which is improved with cholestyramine 1 packet/day.     She has sleep apnea and is using her CPAP regularly.  She wakes up rested. She has lung nodule which is being monitored by the pulmonologist Dr. Nando Heller.     She has history of nephrolithiasis and had multiple lithotripsies.  Stone analysis showed 87% uric acid and 10% calcium oxalate stones.  She is on allopurinol 300 mg once a day and Urocit 10 mEq BID.  She had 2 shockwave lithotripsy then left percutaneous nephrolithotomy done by Dr. Monte in 6/23.  Uric acid done in December 2023 is normal at 4.8 mg per DL.     She had serum glucose done in 8/21 was 213 mg/dl.  She had gestational diabetes mellitus with the first of her 2 pregnancies.  She did not require  "insulin..      Fasting glucose done in March 2022 is elevated at 115 mg per DL with normal hemoglobin A1c of 5.5%.  Hemoglobin A1c done in October 2022 is normal at 5.4% with a nonfasting glucose of 119 mg per DL.     Hemoglobin A1c done in December 2023 is normal at 5.5% with a nonfasting glucose of 147 mg per DL.          The following portions of the patient's history were reviewed and updated as appropriate: allergies, current medications, past family history, past medical history, past social history, past surgical history, and problem list.    Review of Systems   Eyes:  Negative for visual disturbance.   Respiratory:  Negative for shortness of breath and wheezing.    Cardiovascular:  Negative for chest pain and palpitations.   Gastrointestinal: Negative.    Genitourinary: Negative.    Musculoskeletal:  Negative for myalgias.   Neurological:  Negative for numbness.     Vitals:    12/26/23 1033   BP: 118/62   Pulse: 68   Temp: 94.8 °F (34.9 °C)   TempSrc: Temporal   SpO2: 95%   Weight: 109 kg (239 lb 3.2 oz)   Height: 157.5 cm (62.01\")      Objective   Physical Exam  Constitutional:       General: She is not in acute distress.     Appearance: She is obese. She is not ill-appearing.   Eyes:      General: No scleral icterus.        Right eye: No discharge.         Left eye: No discharge.      Extraocular Movements: Extraocular movements intact.   Cardiovascular:      Rate and Rhythm: Normal rate and regular rhythm.      Pulses: Normal pulses.      Heart sounds: Normal heart sounds.   Pulmonary:      Breath sounds: Normal breath sounds. No rales.   Chest:      Chest wall: No tenderness.   Abdominal:      General: Bowel sounds are normal.      Palpations: Abdomen is soft.      Tenderness: There is no right CVA tenderness or left CVA tenderness.   Musculoskeletal:      Right lower leg: No edema.      Left lower leg: No edema.   Neurological:      Mental Status: She is oriented to person, place, and time.       Orders " Only on 12/12/2023   Component Date Value Ref Range Status    Hemoglobin A1C 12/12/2023 5.50  4.80 - 5.60 % Final    Comment: Hemoglobin A1C Ranges:  Increased Risk for Diabetes  5.7% to 6.4%  Diabetes                     >= 6.5%  Diabetic Goal                < 7.0%      Glucose 12/12/2023 147 (H)  65 - 99 mg/dL Final    BUN 12/12/2023 18  8 - 23 mg/dL Final    Creatinine 12/12/2023 1.07 (H)  0.57 - 1.00 mg/dL Final    EGFR Result 12/12/2023 53.9 (L)  >60.0 mL/min/1.73 Final    Comment: GFR Normal >60  Chronic Kidney Disease <60  Kidney Failure <15  The GFR formula is only valid for adults with stable renal  function between ages 18 and 70.      BUN/Creatinine Ratio 12/12/2023 16.8  7.0 - 25.0 Final    Sodium 12/12/2023 140  136 - 145 mmol/L Final    Potassium 12/12/2023 4.1  3.5 - 5.2 mmol/L Final    Chloride 12/12/2023 106  98 - 107 mmol/L Final    Total CO2 12/12/2023 23.1  22.0 - 29.0 mmol/L Final    Calcium 12/12/2023 9.7  8.6 - 10.5 mg/dL Final    Total Protein 12/12/2023 6.5  6.0 - 8.5 g/dL Final    Albumin 12/12/2023 4.1  3.5 - 5.2 g/dL Final    Globulin 12/12/2023 2.4  gm/dL Final    A/G Ratio 12/12/2023 1.7  g/dL Final    Total Bilirubin 12/12/2023 0.8  0.0 - 1.2 mg/dL Final    Alkaline Phosphatase 12/12/2023 85  39 - 117 U/L Final    AST (SGOT) 12/12/2023 21  1 - 32 U/L Final    ALT (SGPT) 12/12/2023 17  1 - 33 U/L Final    Uric Acid 12/12/2023 4.8  2.4 - 5.7 mg/dL Final    TSH 12/12/2023 2.290  0.270 - 4.200 uIU/mL Final    T3, Free 12/12/2023 2.5  2.0 - 4.4 pg/mL Final    Free T4 12/12/2023 0.96  0.93 - 1.70 ng/dL Final    Results may be falsely increased if patient taking Biotin.     Assessment & Plan   Diagnoses and all orders for this visit:    1. Hyperthyroidism (Primary)  -     T4, Free; Future  -     TSH; Future  -     T3, Free; Future    2. History of lobectomy of thyroid  -     T4, Free; Future  -     TSH; Future  -     T3, Free; Future    3. Obstructive sleep apnea  -     Comprehensive  Metabolic Panel; Future    4. Nephrolithiasis  -     Comprehensive Metabolic Panel; Future    5. Prediabetes  -     Comprehensive Metabolic Panel; Future  -     Hemoglobin A1c; Future  -     Lipid Panel; Future    6. Abnormal finding of blood chemistry, unspecified  -     Lipid Panel; Future      Continue methimazole 5 mg 1/2 tablet daily.    Continue CPAP.    Continue Urocit per Dr. Monte.    Continue CPAP per Dr. Heller.    Continue no concentrated sweet diet.    Copy of my note sent through Dr. Matute, Dr. Nando Heller and Dr. Miguel Monte.    RTC 6 mos.

## 2024-01-03 ENCOUNTER — OFFICE VISIT (OUTPATIENT)
Dept: ORTHOPEDIC SURGERY | Facility: CLINIC | Age: 79
End: 2024-01-03
Payer: MEDICARE

## 2024-01-03 VITALS — WEIGHT: 238.7 LBS | HEIGHT: 62 IN | TEMPERATURE: 98.6 F | BODY MASS INDEX: 43.92 KG/M2

## 2024-01-03 DIAGNOSIS — Z96.652 HISTORY OF TOTAL LEFT KNEE REPLACEMENT: Primary | ICD-10-CM

## 2024-01-03 NOTE — PROGRESS NOTES
"Patt Bond     : 1945     MRN: [id number]    DATE: 1/3/2024    DIAGNOSIS:  Annual follow up left total knee arthroplasty    SUBJECTIVE:  Patient returns today for annual follow up of a left total knee replacement. Patient reports doing well with no unusual complaints. Denies any limitations due to the knee.  Reports she is doing so well, she is considering having the right knee replaced.      OBJECTIVE:  Temp 98.6 °F (37 °C) (Temporal)   Ht 157.5 cm (62\")   Wt 108 kg (238 lb 11.2 oz)   BMI 43.66 kg/m²   Family History   Problem Relation Age of Onset    Heart disease Father     Heart attack Paternal Uncle     Cancer Maternal Grandmother         ovarian    Heart attack Paternal Grandfather     Heart attack Paternal Uncle     Heart attack Paternal Uncle     Heart attack Paternal Uncle     Heart attack Paternal Uncle     Heart attack Paternal Uncle     Scleroderma Mother     Hypertension Sister     Malig Hyperthermia Neg Hx      Past Medical History:   Diagnosis Date    Arthritis     OSTEO. LEFT KNEE    Asthma     Back pain     AT TIMES    Cataract     CLIFFORD EYES    Chronic diarrhea     Diverticulitis     WITH RESECTION    Diverticulosis     Goiter     History of colon polyps     BENIGN    History of sepsis     Hyperthyroidism     followed by Dr. Blackmon. PARTIAL THRYOIDECTOMY    Kidney stones     HISTORY OF MULTIPLE TIMES. URIC AND CALCIUM STONES    Lung nodule     HISTORY OF-NOTE LATEST CT CHEST 2022    Mass of mediastinum     HISTORY OF. BENIGN.    Obstructive pyelonephritis 2015    left obstructing pyelonephritis     PONV (postoperative nausea and vomiting)     Rectal prolapse     CHRONIC    Sleep apnea     CPAP MACHINE    SOB (shortness of breath) on exertion     SEES DR VASQUEZ     Past Surgical History:   Procedure Laterality Date    BRONCHOSCOPY N/A 2017    Procedure: BRONCHOSCOPY WITH ENDOBRONCHIAL ULTRASOUND AND TRANSBRONCHIAL NEEDLE ASPIRATION;  Surgeon: Nando Vasquez MD;  " Location: [facility] ENDOSCOPY;  Service:     CARDIAC CATHETERIZATION N/A 09/27/2017    Procedure: Right Heart Cath;  Surgeon: Miguel Gautam MD;  [facility]AS[facility];  Service:     CARDIAC CATHETERIZATION N/A 09/27/2017    Procedure: Left Heart Cath;  Surgeon: Miguel Gautam MD;  [facility];  Service:     CARDIAC CATHETERIZATION N/A 09/27/2017    Procedure: Coronary angiography;  Surgeon: Miguel Gautam MD;  [facility];  Service:     CARDIAC CATHETERIZATION N/A 09/27/2017    Procedure: Left ventriculography;  Surgeon: Miguel Gautam MD;  Location: [facility] CATH INVASIVE LOCATION;  Service:     CHOLECYSTECTOMY N/A 05/17/2017    Procedure: LAPAROSCOPIC CHOLECYSTECTOMY WITH IOC;  Surgeon: Patt Moore MD;  Location: [facility] MAIN OR;  Service:     COLON RESECTION Left 09/14/2016    Laparoscopic Low Anterior Resection with splenic flexure mobilization, drainage of Pelvic Abscess (cultured 3+ E. Coli), Left salpingo-ophorectomy, laparoscopic rectopexy, and umbilical hernia repair, Dr. Patt Moore    COLONOSCOPY N/A 05/15/2008    sigmoid diverticulosis with blunting of the haustral folds and angulation consistent with previous diverticulitis, no polyps, suggestion of rectal prolapse-Dr. Patt Moore    COLONOSCOPY W/ BIOPSIES AND POLYPECTOMY N/A 04/16/2001    Possible mild gastritis w/ some appearance of formations that look like petechiae in the antrum, no ulcerations, no erosions; cecal polyp approx 4mm sessile; probable transverse colon polyp, although we could not visualize this completely upon pulling out; sigmoid diverticula; multiple rectal polyps, mostly w/ appearance of hyperplasia, largest being 5 to 6mm: removed via snare-Dr. Patt Moore    COLONOSCOPY W/ POLYPECTOMY N/A 12/10/2004    Diverticulosis with sigmoid perideverticulitis with erythema and patchiness around some of the diverticula, proximal  ascedning colon polyp-approx 5 mm-removed via snare cauter, two distal ascending colon polyps-7 mm and 3 mm-removed via snare cautery polypectomy, hemorrhoids-Dr. Patt Moore    CYSTOSCOPY W/ URETERAL STENT PLACEMENT Left 04/21/2018    Procedure: CYSTOSCOPY, LEFT RETROGRADE WITH LEFT URETERAL STENT INSERTION;  Surgeon: Stanley Osuna MD;  Location: [facility];  Service: Urology    CYSTOSCOPY W/ URETERAL STENT REMOVAL Left 10/07/2015    Cystoscopy with stent extraction, left ureteral occulusion balloon placement, percutanous nephrostolithotomy with stone volume less than 2.5 cm involving the left lower pole and the left proximal ureter, balloon tract dilation for establishment of nephrostomy tract, antegrade nephrostomy tube placement-Dr. Miguel Monte    CYSTOSCOPY, RETROGRADE PYELOGRAM AND STENT INSERTION Left 09/28/2015    Left cystoscopy with left retrograde pyelogram, left double-J stent placement-Dr. Miguel Blas    CYSTOSCOPY, RETROGRADE PYELOGRAM AND STENT INSERTION Left 09/09/2015    Cystoscopy with bilateral retrograde pyelogram, left double-J stent placement, right ureteral pyeloscopy with laser lithotripsy of the ureteropelvic junction calculus, right double-J stent placement-Dr. Miguel Monte    CYSTOSCOPY, RETROGRADE PYELOGRAM AND STENT INSERTION Right 09/21/2007    Cystoscopy with right retrograde pyelogram, right biliary stent placement-Dr. Miguel Monte    CYSTOSCOPY, RETROGRADE PYELOGRAM AND STENT INSERTION Right 09/07/2007    Cystoscopy with right retrograde pyelogram, right ureteral peyloscopy with extraction distal ureteral mass, right double J stent placement-Dr. Miguel Monte    CYSTOSCOPY, RETROGRADE PYELOGRAM AND STENT INSERTION Left 07/29/2022    Procedure: CYSTOSCOPY, LEFT RETROGRADE PYELOGRAM, LEFT URETERAL STENT;  Surgeon: Cedrick Jones MD;  Location: [facility] OR;  Service: Urology;  Laterality: Left;    CYSTOSCOPY, URETEROSCOPY, RETROGRADE  PYELOGRAM, STENT INSERTION Right 09/07/2007    Cystoscopy with right retrograde pyelogram, rigth ureteroscopy with extraction of distal mass, right double J stent placement-Dr. Miguel Monte    D & C HYSTEROSCOPY N/A 05/18/2011    Procedure done due to thickened endomentrium and an endometrial polyp-Dr. Quita Cheatham    DILATATION AND CURETTAGE N/A 03/22/2002    D&C, polyp removal-Dr. Quita Cheatham    EXTRACORPOREAL SHOCK WAVE LITHOTRIPSY (ESWL) Left 04/19/2023    Procedure: LEFT EXTRACORPOREAL SHOCKWAVE LITHOTRIPSY STENT REPLACEMENT;  Surgeon: Miguel Monte MD;  Location: [facility] OR;  Service: Urology;  Laterality: Left;    EXTRACORPOREAL SHOCKWAVE LITHOTRIPSY (ESWL), STENT INSERTION/REMOVAL Left 05/08/2015    Left extracoporeal shockwave lithotripsy, cystoscopy with stent placement-Dr. Miguel Monte    EXTRACORPOREAL SHOCKWAVE LITHOTRIPSY (ESWL), STENT INSERTION/REMOVAL  04/2023    [facility]    MEDIASTINOTOMY Left 03/05/2018    Procedure: left thyroidectomy, resection of substernal thyroid goiter cervical approach;  Surgeon: Quintin Mares III, MD;  Location: [facility] OR;  Service:     PERCUTANEOUS NEPHROSTOLITHOTOMY Left 6/16/2023    Procedure: LEFT PERCUTANEOUS  NEPHROSTOLITHOTOMY, CYSTOSCOPY, STENT REMOVAL;  Surgeon: Miguel Monte MD;  Location: [facility] HYBRID OR 18/19;  Service: Urology;  Laterality: Left;    SIGMOIDOSCOPY N/A 09/13/2016    Procedure: SIGMOIDOSCOPY FLEXIBLE TO 25 CM;  Surgeon: Patt Moore MD;  Location: [facility] ENDOSCOPY;  Service:     THORACOTOMY  1996    Thoracotomy for ectopic thyroid, Dr. Camarillo    THYROIDECTOMY, PARTIAL      left side 3/05/18    TOTAL KNEE ARTHROPLASTY Left 02/02/2023    Procedure: TOTAL KNEE ARTHROPLASTY;  Surgeon: Anthony Sinclair MD;  Location: [facility] OR OSC;  Service: Orthopedics;  Laterality: Left;    UMBILICAL HERNIA REPAIR N/A 09/14/2016    Procedure: UMBILICAL HERNIA REPAIR ;  Surgeon: Patt Moore MD;  Location:  [facility] OR;  Service:     URETEROSCOPY LASER LITHOTRIPSY WITH STENT INSERTION Left 08/12/2022    Procedure: LEFT URETEROSCOPY LASER LITHOTRIPSY STONE BACKET EXTRACTION, STENT PLACEMENT;  Surgeon: Miguel Monte MD;  Location: [facility] OR;  Service: Urology;  Laterality: Left;    URETEROSCOPY LASER LITHOTRIPSY WITH STENT INSERTION Left 10/11/2023    Procedure: LEFT URETEROSCOPY LASER LITHOTRIPSY WITH STENT REMOVAL;  Surgeon: Miguel Monte MD;  Location: [facility] OR;  Service: Urology;  Laterality: Left;    VENTRAL/INCISIONAL HERNIA REPAIR N/A 05/17/2017    Procedure: VENTRAL HERNIA REPAIR WITH MESH, BILATERAL MUSCULOFASCIAL RELEASE;  Surgeon: Patt Moore MD;  Location: [facility] OR;  Service:      Social History     Socioeconomic History    Marital status:      Spouse name: BERENICE COFFEY     Number of children: 2    Years of education: HIGH SCHOOL    Tobacco Use    Smoking status: Never     Passive exposure: Never    Smokeless tobacco: Never   Vaping Use    Vaping Use: Never used   Substance and Sexual Activity    Alcohol use: No    Drug use: No    Sexual activity: Defer       Review of Systems:   A 14 point review of systems is reviewed with the patient.  Pertinent positives are listed above.  All others negative.    Exam:  The incision is well healed.  Range of motion is measured at 0-120°.  The calf is soft and nontender with a negative Homans sign.  Alignment is neutral.  Good quad strength. There is no evidence of varus/valgus or flexion instability. No effusion.  Intact sensation to light touch.  Palpable pedal pulses    DIAGNOSTIC STUDIES    Xrays: AP, merchant and lateral views of the left knee were ordered and reviewed for evaluation of recent knee replacement.  The x-rays demonstrate a well positioned, well aligned knee replacement without complicating factors noted.  In comparison with previous films there has been no change.    ASSESSMENT:  Annual follow up left knee  replacement    PLAN: Appropriate activity modifications and restrictions discussed.  Antibiotic prophylaxis recommendations discussed.  Follow-up annually.    She will follow up with her right knee as needed.    VERO Lizama    01/03/2024

## 2024-03-11 ENCOUNTER — OFFICE VISIT (OUTPATIENT)
Dept: ORTHOPEDIC SURGERY | Facility: CLINIC | Age: 79
End: 2024-03-11
Payer: MEDICARE

## 2024-03-11 VITALS — HEIGHT: 62 IN | BODY MASS INDEX: 44.13 KG/M2 | TEMPERATURE: 98.6 F | WEIGHT: 239.8 LBS

## 2024-03-11 DIAGNOSIS — M17.11 PRIMARY OSTEOARTHRITIS OF RIGHT KNEE: Primary | ICD-10-CM

## 2024-03-11 PROCEDURE — 73562 X-RAY EXAM OF KNEE 3: CPT | Performed by: NURSE PRACTITIONER

## 2024-03-11 PROCEDURE — 1159F MED LIST DOCD IN RCRD: CPT | Performed by: NURSE PRACTITIONER

## 2024-03-11 PROCEDURE — 20610 DRAIN/INJ JOINT/BURSA W/O US: CPT | Performed by: NURSE PRACTITIONER

## 2024-03-11 PROCEDURE — 1160F RVW MEDS BY RX/DR IN RCRD: CPT | Performed by: NURSE PRACTITIONER

## 2024-03-11 RX ORDER — LIDOCAINE HYDROCHLORIDE 10 MG/ML
2 INJECTION, SOLUTION EPIDURAL; INFILTRATION; INTRACAUDAL; PERINEURAL
Status: COMPLETED | OUTPATIENT
Start: 2024-03-11 | End: 2024-03-11

## 2024-03-11 RX ORDER — METHYLPREDNISOLONE ACETATE 80 MG/ML
1 INJECTION, SUSPENSION INTRA-ARTICULAR; INTRALESIONAL; INTRAMUSCULAR; SOFT TISSUE
Status: COMPLETED | OUTPATIENT
Start: 2024-03-11 | End: 2024-03-11

## 2024-03-11 RX ADMIN — METHYLPREDNISOLONE ACETATE 1 ML: 80 INJECTION, SUSPENSION INTRA-ARTICULAR; INTRALESIONAL; INTRAMUSCULAR; SOFT TISSUE at 10:44

## 2024-03-11 RX ADMIN — LIDOCAINE HYDROCHLORIDE 2 ML: 10 INJECTION, SOLUTION EPIDURAL; INFILTRATION; INTRACAUDAL; PERINEURAL at 10:44

## 2024-03-11 NOTE — PROGRESS NOTES
Ms. Bond comes in today for follow-up.  Injections have worked well in the past.  The patient would like to get a repeat injection today.      Imaging:  Bilateral standing AP views, bilateral merchants views and a lateral view of the right knee are ordered by myself and reviewed to evaluate the patient's complaint.  These are compared to previous x-rays.  The x-rays show end stage degenerative arthritis including bone on bone degeneration, osteophyte and subchondral sclerosis.  The majority of the degenerative changes appear to involve the medial and patellofemoral compartments.    The risks, benefits and alternatives were discussed and the patient consented.  Going forward, the patient will follow-up as needed.    Yancy Cui, VERO    03/11/2024      Large Joint Arthrocentesis: R knee  Date/Time: 3/11/2024 10:44 AM  Consent given by: patient  Site marked: site marked  Timeout: Immediately prior to procedure a time out was called to verify the correct patient, procedure, equipment, support staff and site/side marked as required   Supporting Documentation  Indications: pain   Procedure Details  Location: knee - R knee  Preparation: Patient was prepped and draped in the usual sterile fashion  Needle gauge: 21G.  Approach: anterolateral  Medications administered: 1 mL methylPREDNISolone acetate 80 MG/ML; 2 mL lidocaine PF 1% 1 %  Patient tolerance: patient tolerated the procedure well with no immediate complications

## 2024-06-20 DIAGNOSIS — E05.90 HYPERTHYROIDISM: ICD-10-CM

## 2024-06-20 RX ORDER — METHIMAZOLE 5 MG/1
2.5 TABLET ORAL DAILY
Qty: 45 TABLET | Refills: 1 | Status: SHIPPED | OUTPATIENT
Start: 2024-06-20

## 2024-06-20 NOTE — TELEPHONE ENCOUNTER
Rx Refill Note  Requested Prescriptions     Pending Prescriptions Disp Refills    methIMAzole (TAPAZOLE) 5 MG tablet [Pharmacy Med Name: METHIMAZOLE 5 MG TABLET] 45 tablet 2     Sig: TAKE 1/2 TABLET BY MOUTH DAILY      Last office visit with prescribing clinician: 12/26/2023   Last telemedicine visit with prescribing clinician: Visit date not found   Next office visit with prescribing clinician: 8/5/2024                         Would you like a call back once the refill request has been completed: [] Yes [] No    If the office needs to give you a call back, can they leave a voicemail: [] Yes [] No    Adelaide Longoria  06/20/24, 15:11 EDT

## 2024-07-03 ENCOUNTER — OFFICE VISIT (OUTPATIENT)
Dept: ORTHOPEDIC SURGERY | Facility: CLINIC | Age: 79
End: 2024-07-03
Payer: MEDICARE

## 2024-07-03 VITALS — BODY MASS INDEX: 45.32 KG/M2 | HEIGHT: 62 IN | TEMPERATURE: 98.6 F | WEIGHT: 246.3 LBS

## 2024-07-03 DIAGNOSIS — M17.10 ARTHRITIS OF KNEE: Primary | ICD-10-CM

## 2024-07-03 PROBLEM — M17.11 ARTHRITIS OF KNEE, RIGHT: Status: ACTIVE | Noted: 2024-07-03

## 2024-07-03 RX ORDER — CHLORHEXIDINE GLUCONATE 500 MG/1
CLOTH TOPICAL 2 TIMES DAILY
OUTPATIENT
Start: 2024-07-03

## 2024-07-03 RX ORDER — ACETAMINOPHEN 325 MG/1
1000 TABLET ORAL ONCE
OUTPATIENT
Start: 2024-07-03 | End: 2024-07-03

## 2024-07-03 RX ORDER — MELOXICAM 7.5 MG/1
15 TABLET ORAL ONCE
OUTPATIENT
Start: 2024-07-03 | End: 2024-07-03

## 2024-07-03 RX ORDER — PREGABALIN 150 MG/1
150 CAPSULE ORAL ONCE
OUTPATIENT
Start: 2024-07-03 | End: 2024-07-03

## 2024-07-03 NOTE — PROGRESS NOTES
Patient: Patt Bond    YOB: 1945    Medical Record Number: [id number]    Chief Complaints: Follow-up regarding right knee arthritis    History of Present Illness:     79 y.o. female patient who presents for follow-up of the right knee. She reports progressively worsening pain and dysfunction.  Conservative treatment has been ineffective thus far.  The last injection did not help nearly as much. She reports difficulty walking prolonged distances.  She is having to use a walker for ambulation.  She did great with her contralateral knee replacement.  She has expressed interest in pursuing a right total knee.    Allergies:   Allergies   Allergen Reactions    Other Hives     ELECTRODE PATCHES    Cephalexin Hives    Cephalosporins Hives     Took in 2015 w/o problems    Penicillins Hives       Home Medications:    Current Outpatient Medications:     allopurinol (ZYLOPRIM) 300 MG tablet, Take 1 tablet by mouth Daily., Disp: , Rfl:     cholestyramine (QUESTRAN) 4 g packet, Take 1 packet by mouth Daily With Dinner. AROUND DINNERTIME, Disp: , Rfl:     methIMAzole (TAPAZOLE) 5 MG tablet, TAKE 1/2 TABLET BY MOUTH DAILY, Disp: 45 tablet, Rfl: 1    potassium citrate (UROCIT-K) 10 MEQ (1080 MG) CR tablet, Take 1 tablet by mouth 2 (Two) Times a Day., Disp: , Rfl:     Past Medical History:   Diagnosis Date    Arthritis     OSTEO. LEFT KNEE    Asthma     Back pain     AT TIMES    Cataract     CLIFFORD EYES    Chronic diarrhea     Diverticulitis     WITH RESECTION    Diverticulosis     Goiter     History of colon polyps     BENIGN    History of sepsis     Hyperthyroidism     followed by Dr. Blackmon. PARTIAL THRYOIDECTOMY    Kidney stones     HISTORY OF MULTIPLE TIMES. URIC AND CALCIUM STONES    Lung nodule     HISTORY OF-NOTE LATEST CT CHEST 12/2022    Mass of mediastinum     HISTORY OF. BENIGN.    Obstructive pyelonephritis 09/28/2015    left obstructing pyelonephritis     PONV (postoperative nausea and vomiting)      Rectal prolapse     CHRONIC    Sleep apnea     CPAP MACHINE    SOB (shortness of breath) on exertion     SEES DR VASQUEZ       Past Surgical History:   Procedure Laterality Date    BRONCHOSCOPY N/A 11/06/2017    Procedure: BRONCHOSCOPY WITH ENDOBRONCHIAL ULTRASOUND AND TRANSBRONCHIAL NEEDLE ASPIRATION;  Surgeon: Nando Vasquez MD;  Location: [facility] ENDOSCOPY;  Service:     CARDIAC CATHETERIZATION N/A 09/27/2017    Procedure: Right Heart Cath;  Surgeon: Miguel Gautam MD;  Location: [facility] CATH INVASIVE LOCATION;  Service:     CARDIAC CATHETERIZATION N/A 09/27/2017    Procedure: Left Heart Cath;  Surgeon: Miguel Gautam MD;  Location: [facility] CATH INVASIVE LOCATION;  Service:     CARDIAC CATHETERIZATION N/A 09/27/2017    Procedure: Coronary angiography;  Surgeon: Miguel Gautam MD;  Location: [facility] CATH INVASIVE LOCATION;  Service:     CARDIAC CATHETERIZATION N/A 09/27/2017    Procedure: Left ventriculography;  Surgeon: Miguel Gautam MD;  Location: [facility] CATH INVASIVE LOCATION;  Service:     CHOLECYSTECTOMY N/A 05/17/2017    Procedure: LAPAROSCOPIC CHOLECYSTECTOMY WITH IOC;  Surgeon: Patt Moore MD;  Location: [facility] MAIN OR;  Service:     COLON RESECTION Left 09/14/2016    Laparoscopic Low Anterior Resection with splenic flexure mobilization, drainage of Pelvic Abscess (cultured 3+ E. Coli), Left salpingo-ophorectomy, laparoscopic rectopexy, and umbilical hernia repair, Dr. Patt Moore    COLONOSCOPY N/A 05/15/2008    sigmoid diverticulosis with blunting of the haustral folds and angulation consistent with previous diverticulitis, no polyps, suggestion of rectal prolapse-Dr. Patt Moore    COLONOSCOPY W/ BIOPSIES AND POLYPECTOMY N/A 04/16/2001    Possible mild gastritis w/ some appearance of formations that look like petechiae in the antrum, no ulcerations, no erosions; cecal polyp approx 4mm sessile; probable transverse colon polyp, although we could not visualize this  completely upon pulling out; sigmoid diverticula; multiple rectal polyps, mostly w/ appearance of hyperplasia, largest being 5 to 6mm: removed via snare-Dr. Patt Moore    COLONOSCOPY W/ POLYPECTOMY N/A 12/10/2004    Diverticulosis with sigmoid perideverticulitis with erythema and patchiness around some of the diverticula, proximal ascedning colon polyp-approx 5 mm-removed via snare cauter, two distal ascending colon polyps-7 mm and 3 mm-removed via snare cautery polypectomy, hemorrhoids-Dr. Patt Moore    CYSTOSCOPY W/ URETERAL STENT PLACEMENT Left 04/21/2018    Procedure: CYSTOSCOPY, LEFT RETROGRADE WITH LEFT URETERAL STENT INSERTION;  Surgeon: Stanley Osuna MD;  Location: [facility];  Service: Urology    CYSTOSCOPY W/ URETERAL STENT REMOVAL Left 10/07/2015    Cystoscopy with stent extraction, left ureteral occulusion balloon placement, percutanous nephrostolithotomy with stone volume less than 2.5 cm involving the left lower pole and the left proximal ureter, balloon tract dilation for establishment of nephrostomy tract, antegrade nephrostomy tube placement-Dr. Miguel Monte    CYSTOSCOPY, RETROGRADE PYELOGRAM AND STENT INSERTION Left 09/28/2015    Left cystoscopy with left retrograde pyelogram, left double-J stent placement-Dr. Miguel Blas    CYSTOSCOPY, RETROGRADE PYELOGRAM AND STENT INSERTION Left 09/09/2015    Cystoscopy with bilateral retrograde pyelogram, left double-J stent placement, right ureteral pyeloscopy with laser lithotripsy of the ureteropelvic junction calculus, right double-J stent placement-Dr. Miguel Monte    CYSTOSCOPY, RETROGRADE PYELOGRAM AND STENT INSERTION Right 09/21/2007    Cystoscopy with right retrograde pyelogram, right biliary stent placement-Dr. Miguel Monte    CYSTOSCOPY, RETROGRADE PYELOGRAM AND STENT INSERTION Right 09/07/2007    Cystoscopy with right retrograde pyelogram, right ureteral peyloscopy with extraction distal ureteral mass, right  double J stent placement-Dr. Miguel Monte    CYSTOSCOPY, RETROGRADE PYELOGRAM AND STENT INSERTION Left 07/29/2022    Procedure: CYSTOSCOPY, LEFT RETROGRADE PYELOGRAM, LEFT URETERAL STENT;  Surgeon: Cedrick Jones MD;  Location: [facility] OR;  Service: Urology;  Laterality: Left;    CYSTOSCOPY, URETEROSCOPY, RETROGRADE PYELOGRAM, STENT INSERTION Right 09/07/2007    Cystoscopy with right retrograde pyelogram, rigth ureteroscopy with extraction of distal mass, right double J stent placement-Dr. Miguel Monte    D & C HYSTEROSCOPY N/A 05/18/2011    Procedure done due to thickened endomentrium and an endometrial polyp-Dr. Quita Cheatham    DILATATION AND CURETTAGE N/A 03/22/2002    D&C, polyp removal-Dr. Quita Cheatham    EXTRACORPOREAL SHOCK WAVE LITHOTRIPSY (ESWL) Left 04/19/2023    Procedure: LEFT EXTRACORPOREAL SHOCKWAVE LITHOTRIPSY STENT REPLACEMENT;  Surgeon: Miguel Monte MD;  Location: [facility] OR;  Service: Urology;  Laterality: Left;    EXTRACORPOREAL SHOCKWAVE LITHOTRIPSY (ESWL), STENT INSERTION/REMOVAL Left 05/08/2015    Left extracoporeal shockwave lithotripsy, cystoscopy with stent placement-Dr. Miguel Monte    EXTRACORPOREAL SHOCKWAVE LITHOTRIPSY (ESWL), STENT INSERTION/REMOVAL  04/2023    [facility]    MEDIASTINOTOMY Left 03/05/2018    Procedure: left thyroidectomy, resection of substernal thyroid goiter cervical approach;  Surgeon: Quintin Mares III, MD;  Location: [facility] OR;  Service:     PERCUTANEOUS NEPHROSTOLITHOTOMY Left 6/16/2023    Procedure: LEFT PERCUTANEOUS  NEPHROSTOLITHOTOMY, CYSTOSCOPY, STENT REMOVAL;  Surgeon: Miguel Monte MD;  Location: [facility] OR 18/19;  Service: Urology;  Laterality: Left;    SIGMOIDOSCOPY N/A 09/13/2016    Procedure: SIGMOIDOSCOPY FLEXIBLE TO 25 CM;  Surgeon: Patt Moore MD;  Location: [facility] ENDOSCOPY;  Service:     THORACOTOMY  1996    Thoracotomy for ectopic thyroid, Dr. Camarillo    THYROIDECTOMY,  PARTIAL      left side 3/05/18    TOTAL KNEE ARTHROPLASTY Left 02/02/2023    Procedure: TOTAL KNEE ARTHROPLASTY;  Surgeon: Anthony Sinclair MD;  Location: [facility];  Service: Orthopedics;  Laterality: Left;    UMBILICAL HERNIA REPAIR N/A 09/14/2016    Procedure: UMBILICAL HERNIA REPAIR ;  Surgeon: Patt Moore MD;  Location: [facility] MAIN OR;  Service:     URETEROSCOPY LASER LITHOTRIPSY WITH STENT INSERTION Left 08/12/2022    Procedure: LEFT URETEROSCOPY LASER LITHOTRIPSY STONE BACKET EXTRACTION, STENT PLACEMENT;  Surgeon: Miguel Monte MD;  Location: [facility] MAIN OR;  Service: Urology;  Laterality: Left;    URETEROSCOPY LASER LITHOTRIPSY WITH STENT INSERTION Left 10/11/2023    Procedure: LEFT URETEROSCOPY LASER LITHOTRIPSY WITH STENT REMOVAL;  Surgeon: Miguel Monte MD;  Location: [facility] MAIN OR;  Service: Urology;  Laterality: Left;    VENTRAL/INCISIONAL HERNIA REPAIR N/A 05/17/2017    Procedure: VENTRAL HERNIA REPAIR WITH MESH, BILATERAL MUSCULOFASCIAL RELEASE;  Surgeon: Patt Moore MD;  Location: [facility] MAIN OR;  Service:        Social History     Occupational History     Employer: RETIRED   Tobacco Use    Smoking status: Never     Passive exposure: Never    Smokeless tobacco: Never   Vaping Use    Vaping status: Never Used   Substance and Sexual Activity    Alcohol use: No    Drug use: No    Sexual activity: Defer      Social History     Social History Narrative    Not on file       Family History   Problem Relation Age of Onset    Heart disease Father     Heart attack Paternal Uncle     Cancer Maternal Grandmother         ovarian    Heart attack Paternal Grandfather     Heart attack Paternal Uncle     Heart attack Paternal Uncle     Heart attack Paternal Uncle     Heart attack Paternal Uncle     Heart attack Paternal Uncle     Scleroderma Mother     Hypertension Sister     Malig Hyperthermia Neg Hx        Review of Systems:      Constitutional: Denies fever, shaking or chills   Eyes:  "Denies change in visual acuity   HEENT: Denies nasal congestion or sore throat   Respiratory: Denies cough or shortness of breath   Cardiovascular: Denies chest pain or edema  Endocrine: Denies tremors, palpitations, intolerance of heat or cold, polyuria, polydipsia.  GI: Denies abdominal pain, nausea, vomiting, bloody stools or diarrhea  : Denies frequency, urgency, incontinence, retention, or nocturia.  Musculoskeletal: Denies numbness, tingling or loss of motor function except as above  Integument: Denies rash, lesion or ulceration   Neurologic: Denies headache or focal weakness, deficits  Heme: Denies spontaneous or excessive bleeding, epistaxis, hematuria, melena, fatigue, enlarged or tender lymph nodes.      All other pertinent positives and negatives as noted above in HPI.    Physical Exam:   79 y.o. female  Vitals:    07/03/24 1543   Temp: 98.6 °F (37 °C)   Weight: 112 kg (246 lb 4.8 oz)   Height: 157.5 cm (62\")     General:  Patient is awake and alert.  Appears in no acute distress or discomfort.    Psych:  Affect and demeanor are appropriate.    Cardiovascular:  Regular rate and rhythm.    Lungs:  Good chest expansion.  Breathing unlabored.    Extremities:  Right knee is examined.  Skin is benign.  Moderate medial compartment tenderness.  Motion: 3-110.  Normal motor and sensory function in the lower leg and foot.  Palpable pedal pulses.  Brisk capillary refill.    Imaging: Bilateral standing AP views, bilateral merchants views and a lateral view of the right knee are ordered by myself and reviewed to evaluate the patient's complaint.  These are compared to previous xrays.  The x-rays show end stage degenerative arthritis including bone on bone degeneration, malalignment, osteophyte and subchondral sclerosis.  The majority of the degenerative changes appear to involve the  medial and patellofemoral  compartments.     Assessment/Plan:  Right knee end-stage osteoarthritis    We discussed the natural " history of this condition and all available treatment options, both surgical and nonsurgical.  The risk, benefits and alternatives to a total knee arthroplasty were carefully discussed all questions posed by the patient were answered in detail. She wants to move forward with a total knee arthroplasty.  We will look at getting this scheduled.  I will have her follow-up with either myself or Yancy for a preoperative visit.    Anthony Sinclair MD    07/03/2024

## 2024-07-25 ENCOUNTER — TELEPHONE (OUTPATIENT)
Dept: ORTHOPEDIC SURGERY | Facility: CLINIC | Age: 79
End: 2024-07-25
Payer: MEDICARE

## 2024-07-25 NOTE — TELEPHONE ENCOUNTER
Patient called stating she tested positive for COVID on 7/24/24. She is wanting to know if her surgery will need to be postponed. It is currently scheduled 8/6/24 for Rt TKA. Patient had to cancel PAT for 7/26/24.

## 2024-09-10 ENCOUNTER — PREP FOR SURGERY (OUTPATIENT)
Dept: OTHER | Facility: HOSPITAL | Age: 79
End: 2024-09-10
Payer: MEDICARE

## 2024-09-10 DIAGNOSIS — M17.11 ARTHRITIS OF KNEE, RIGHT: Primary | ICD-10-CM

## 2024-09-10 RX ORDER — CHLORHEXIDINE GLUCONATE 500 MG/1
CLOTH TOPICAL SEE ADMIN INSTRUCTIONS
OUTPATIENT
Start: 2024-09-10

## 2024-09-16 ENCOUNTER — PRE-ADMISSION TESTING (OUTPATIENT)
Dept: PREADMISSION TESTING | Facility: HOSPITAL | Age: 79
End: 2024-09-16
Payer: MEDICARE

## 2024-09-16 VITALS
HEIGHT: 61 IN | RESPIRATION RATE: 20 BRPM | TEMPERATURE: 96.9 F | WEIGHT: 243 LBS | BODY MASS INDEX: 45.88 KG/M2 | HEART RATE: 94 BPM | DIASTOLIC BLOOD PRESSURE: 77 MMHG | SYSTOLIC BLOOD PRESSURE: 146 MMHG | OXYGEN SATURATION: 94 %

## 2024-09-16 LAB
ANION GAP SERPL CALCULATED.3IONS-SCNC: 8.4 MMOL/L (ref 5–15)
BUN SERPL-MCNC: 14 MG/DL (ref 8–23)
BUN/CREAT SERPL: 13.1 (ref 7–25)
CALCIUM SPEC-SCNC: 9.8 MG/DL (ref 8.6–10.5)
CHLORIDE SERPL-SCNC: 105 MMOL/L (ref 98–107)
CO2 SERPL-SCNC: 23.6 MMOL/L (ref 22–29)
CREAT SERPL-MCNC: 1.07 MG/DL (ref 0.57–1)
DEPRECATED RDW RBC AUTO: 49.1 FL (ref 37–54)
EGFRCR SERPLBLD CKD-EPI 2021: 53.6 ML/MIN/1.73
ERYTHROCYTE [DISTWIDTH] IN BLOOD BY AUTOMATED COUNT: 14.2 % (ref 12.3–15.4)
GLUCOSE SERPL-MCNC: 95 MG/DL (ref 65–99)
HBA1C MFR BLD: 5.4 % (ref 4.8–5.6)
HCT VFR BLD AUTO: 37.1 % (ref 34–46.6)
HGB BLD-MCNC: 12 G/DL (ref 12–15.9)
MCH RBC QN AUTO: 30.9 PG (ref 26.6–33)
MCHC RBC AUTO-ENTMCNC: 32.3 G/DL (ref 31.5–35.7)
MCV RBC AUTO: 95.6 FL (ref 79–97)
PLATELET # BLD AUTO: 142 10*3/MM3 (ref 140–450)
PMV BLD AUTO: 9.3 FL (ref 6–12)
POTASSIUM SERPL-SCNC: 4.3 MMOL/L (ref 3.5–5.2)
RBC # BLD AUTO: 3.88 10*6/MM3 (ref 3.77–5.28)
SODIUM SERPL-SCNC: 137 MMOL/L (ref 136–145)
WBC NRBC COR # BLD AUTO: 4.67 10*3/MM3 (ref 3.4–10.8)

## 2024-09-16 PROCEDURE — 36415 COLL VENOUS BLD VENIPUNCTURE: CPT

## 2024-09-16 PROCEDURE — 93005 ELECTROCARDIOGRAM TRACING: CPT

## 2024-09-16 PROCEDURE — 83036 HEMOGLOBIN GLYCOSYLATED A1C: CPT | Performed by: ORTHOPAEDIC SURGERY

## 2024-09-16 PROCEDURE — 85027 COMPLETE CBC AUTOMATED: CPT

## 2024-09-16 PROCEDURE — 80048 BASIC METABOLIC PNL TOTAL CA: CPT

## 2024-09-16 RX ORDER — FLUTICASONE PROPIONATE 50 MCG
2 SPRAY, SUSPENSION (ML) NASAL DAILY
Status: ON HOLD | COMMUNITY
Start: 2024-08-06 | End: 2025-08-06

## 2024-09-16 RX ORDER — AZITHROMYCIN 250 MG/1
TABLET, FILM COATED ORAL
Status: ON HOLD | COMMUNITY
Start: 2024-07-24

## 2024-09-17 ENCOUNTER — TELEPHONE (OUTPATIENT)
Dept: ORTHOPEDIC SURGERY | Facility: HOSPITAL | Age: 79
End: 2024-09-17
Payer: MEDICARE

## 2024-09-17 LAB
QT INTERVAL: 402 MS
QTC INTERVAL: 440 MS

## 2024-09-20 ENCOUNTER — OFFICE VISIT (OUTPATIENT)
Dept: ORTHOPEDIC SURGERY | Facility: CLINIC | Age: 79
End: 2024-09-20
Payer: MEDICARE

## 2024-09-20 VITALS — WEIGHT: 245.2 LBS | TEMPERATURE: 97.8 F | HEIGHT: 61 IN | BODY MASS INDEX: 46.29 KG/M2

## 2024-09-20 DIAGNOSIS — Z01.818 PREOP EXAMINATION: Primary | ICD-10-CM

## 2024-09-20 NOTE — H&P (VIEW-ONLY)
History & Physical       Patient: Patt Bond    YOB: 1945    Medical Record Number: [id number]    Chief Complaints: Right knee endstage osteoarthritis    History of Present Illness: 79 y.o. female presents today in anticipation of upcoming knee replacement surgery.  Patient has a long history of worsening symptoms.  Describes the pain as severe, constant, and typically dull and achy. She has tried and failed prolonged conservative treatment. She is struggling with routine daily activities.  This has become a significant issue for overall quality of life. She cannot walk any prolonged distances without having to rest.     Allergies:   Allergies   Allergen Reactions    Other Hives     ELECTRODE PATCHES    Cephalexin Hives    Cephalosporins Hives     Took in 2015 w/o problems    Penicillins Hives       Medications:   Home Medications:    Current Outpatient Medications:     allopurinol (ZYLOPRIM) 300 MG tablet, Take 1 tablet by mouth Daily. KIDNEY STONE, Disp: , Rfl:     azithromycin (ZITHROMAX) 250 MG tablet, AS NEEDED FOR DENTAL APPT, Disp: , Rfl:     cholestyramine (QUESTRAN) 4 g packet, Take 1 packet by mouth As Needed. AROUND DINNERTIME, Disp: , Rfl:     fluticasone (FLONASE) 50 MCG/ACT nasal spray, Administer 2 sprays into the nostril(s) as directed by provider Daily., Disp: , Rfl:     methIMAzole (TAPAZOLE) 5 MG tablet, TAKE 1/2 TABLET BY MOUTH DAILY, Disp: 45 tablet, Rfl: 1    potassium citrate (UROCIT-K) 10 MEQ (1080 MG) CR tablet, Take 1 tablet by mouth Daily., Disp: , Rfl:   No current facility-administered medications for this visit.    Facility-Administered Medications Ordered in Other Visits:     [START ON 9/23/2024] ropivacaine 0.5 % 50 mL, Morphine 5 mg, ketorolac 30 mg, EPINEPHrine 0.3 mg in sodium chloride 0.9 % 50 mL solution, , Injection, Once, Anthony Sinclair MD    Past Medical History:   Diagnosis Date    Arthritis     OSTEO. LEFT KNEE    Back pain     AT TIMES    Cataract      CLIFFORD EYES    Chronic diarrhea     Diverticulitis     WITH RESECTION    Diverticulosis     Goiter     History of colon polyps     BENIGN    History of sepsis     Hyperthyroidism     followed by Dr. Blackmon. PARTIAL THRYOIDECTOMY    Kidney stones     HISTORY OF MULTIPLE TIMES. URIC AND CALCIUM STONES    Lung nodule     HISTORY OF-NOTE LATEST CT CHEST 12/2022    Mass of mediastinum     HISTORY OF. BENIGN.    OA (osteoarthritis) of knee     RIGHT KNEE:  PAIN, WEAKNESS, LIMITED MOBILITY    Obstructive pyelonephritis 09/28/2015    left obstructing pyelonephritis     PONV (postoperative nausea and vomiting)     Rectal prolapse     CHRONIC    Risk for falls     USING ROLLATOR AND CANE    Sleep apnea     CPAP MACHINE    SOB (shortness of breath) on exertion     SEES DR VASQUEZ    Urinary urgency           Past Surgical History:   Procedure Laterality Date    BRONCHOSCOPY N/A 11/06/2017    Procedure: BRONCHOSCOPY WITH ENDOBRONCHIAL ULTRASOUND AND TRANSBRONCHIAL NEEDLE ASPIRATION;  Surgeon: Nando Vasquez MD;  Location: [facility] ENDOSCOPY;  Service:     CARDIAC CATHETERIZATION N/A 09/27/2017    Procedure: Right Heart Cath;  Surgeon: Miguel Gautam MD;  Location: [facility] CATH INVASIVE LOCATION;  Service:     CARDIAC CATHETERIZATION N/A 09/27/2017    Procedure: Left Heart Cath;  Surgeon: Miguel Gautam MD;  Location: [facility] CATH INVASIVE LOCATION;  Service:     CARDIAC CATHETERIZATION N/A 09/27/2017    Procedure: Coronary angiography;  Surgeon: Miguel Gautam MD;  Location: [facility] CATH INVASIVE LOCATION;  Service:     CARDIAC CATHETERIZATION N/A 09/27/2017    Procedure: Left ventriculography;  Surgeon: Miguel Gautam MD;  Location: [facility] CATH INVASIVE LOCATION;  Service:     CHOLECYSTECTOMY N/A 05/17/2017    Procedure: LAPAROSCOPIC CHOLECYSTECTOMY WITH IOC;  Surgeon: Patt Moore MD;  Location: [facility] MAIN OR;  Service:     COLON RESECTION Left 09/14/2016    Laparoscopic Low Anterior Resection  with splenic flexure mobilization, drainage of Pelvic Abscess (cultured 3+ E. Coli), Left salpingo-ophorectomy, laparoscopic rectopexy, and umbilical hernia repair, Dr. Patt Moore    COLONOSCOPY N/A 05/15/2008    sigmoid diverticulosis with blunting of the haustral folds and angulation consistent with previous diverticulitis, no polyps, suggestion of rectal prolapse-Dr. Patt Moore    COLONOSCOPY W/ BIOPSIES AND POLYPECTOMY N/A 04/16/2001    Possible mild gastritis w/ some appearance of formations that look like petechiae in the antrum, no ulcerations, no erosions; cecal polyp approx 4mm sessile; probable transverse colon polyp, although we could not visualize this completely upon pulling out; sigmoid diverticula; multiple rectal polyps, mostly w/ appearance of hyperplasia, largest being 5 to 6mm: removed via snare-Dr. Patt Moore    COLONOSCOPY W/ POLYPECTOMY N/A 12/10/2004    Diverticulosis with sigmoid perideverticulitis with erythema and patchiness around some of the diverticula, proximal ascedning colon polyp-approx 5 mm-removed via snare cauter, two distal ascending colon polyps-7 mm and 3 mm-removed via snare cautery polypectomy, hemorrhoids-Dr. Patt Moore    CYSTOSCOPY W/ URETERAL STENT PLACEMENT Left 04/21/2018    Procedure: CYSTOSCOPY, LEFT RETROGRADE WITH LEFT URETERAL STENT INSERTION;  Surgeon: Stanley Osuna MD;  Location: [facility];  Service: Urology    CYSTOSCOPY W/ URETERAL STENT REMOVAL Left 10/07/2015    Cystoscopy with stent extraction, left ureteral occulusion balloon placement, percutanous nephrostolithotomy with stone volume less than 2.5 cm involving the left lower pole and the left proximal ureter, balloon tract dilation for establishment of nephrostomy tract, antegrade nephrostomy tube placement-Dr. Miguel Monte    CYSTOSCOPY, RETROGRADE PYELOGRAM AND STENT INSERTION Left 09/28/2015    Left cystoscopy with left retrograde pyelogram, left double-J stent placement-  Miguel Blas    CYSTOSCOPY, RETROGRADE PYELOGRAM AND STENT INSERTION Left 09/09/2015    Cystoscopy with bilateral retrograde pyelogram, left double-J stent placement, right ureteral pyeloscopy with laser lithotripsy of the ureteropelvic junction calculus, right double-J stent placement-Dr. Miguel Monte    CYSTOSCOPY, RETROGRADE PYELOGRAM AND STENT INSERTION Right 09/21/2007    Cystoscopy with right retrograde pyelogram, right biliary stent placement-Dr. Miguel Monte    CYSTOSCOPY, RETROGRADE PYELOGRAM AND STENT INSERTION Right 09/07/2007    Cystoscopy with right retrograde pyelogram, right ureteral peyloscopy with extraction distal ureteral mass, right double J stent placement-Dr. Miguel Monte    CYSTOSCOPY, RETROGRADE PYELOGRAM AND STENT INSERTION Left 07/29/2022    Procedure: CYSTOSCOPY, LEFT RETROGRADE PYELOGRAM, LEFT URETERAL STENT;  Surgeon: Cedrick Jones MD;  Location: [facility];  Service: Urology;  Laterality: Left;    CYSTOSCOPY, URETEROSCOPY, RETROGRADE PYELOGRAM, STENT INSERTION Right 09/07/2007    Cystoscopy with right retrograde pyelogram, rigth ureteroscopy with extraction of distal mass, right double J stent placement-Dr. Miguel Monte    D & C HYSTEROSCOPY N/A 05/18/2011    Procedure done due to thickened endomentrium and an endometrial polyp-Dr. Quita Cheatham    DILATATION AND CURETTAGE N/A 03/22/2002    D&C, polyp removal-Dr. Quita Cheatham    EXTRACORPOREAL SHOCK WAVE LITHOTRIPSY (ESWL) Left 04/19/2023    Procedure: LEFT EXTRACORPOREAL SHOCKWAVE LITHOTRIPSY STENT REPLACEMENT;  Surgeon: Miguel Monte MD;  Location: [facility];  Service: Urology;  Laterality: Left;    EXTRACORPOREAL SHOCKWAVE LITHOTRIPSY (ESWL), STENT INSERTION/REMOVAL Left 05/08/2015    Left extracoporeal shockwave lithotripsy, cystoscopy with stent placement-Dr. Miguel Monte    EXTRACORPOREAL SHOCKWAVE LITHOTRIPSY (ESWL), STENT INSERTION/REMOVAL  04/2023    [facility]     MEDIASTINOTOMY Left 03/05/2018    Procedure: left thyroidectomy, resection of substernal thyroid goiter cervical approach;  Surgeon: Quintin Mares III, MD;  Location: [facility] MAIN OR;  Service:     PERCUTANEOUS NEPHROSTOLITHOTOMY Left 6/16/2023    Procedure: LEFT PERCUTANEOUS  NEPHROSTOLITHOTOMY, CYSTOSCOPY, STENT REMOVAL;  Surgeon: Miguel Monte MD;  Location: [facility] HYBRID OR 18/19;  Service: Urology;  Laterality: Left;    SIGMOIDOSCOPY N/A 09/13/2016    Procedure: SIGMOIDOSCOPY FLEXIBLE TO 25 CM;  Surgeon: Patt Moore MD;  Location: [facility] ENDOSCOPY;  Service:     THORACOTOMY  1996    Thoracotomy for ectopic thyroid, Dr. Camarillo    THYROIDECTOMY, PARTIAL      left side 3/05/18    TOTAL KNEE ARTHROPLASTY Left 02/02/2023    Procedure: TOTAL KNEE ARTHROPLASTY;  Surgeon: Anthony Sinclair MD;  Location: [facility] OR OSC;  Service: Orthopedics;  Laterality: Left;    UMBILICAL HERNIA REPAIR N/A 09/14/2016    Procedure: UMBILICAL HERNIA REPAIR ;  Surgeon: Patt Moore MD;  Location: [facility] OR;  Service:     URETEROSCOPY LASER LITHOTRIPSY WITH STENT INSERTION Left 08/12/2022    Procedure: LEFT URETEROSCOPY LASER LITHOTRIPSY STONE BACKET EXTRACTION, STENT PLACEMENT;  Surgeon: Miguel Monte MD;  Location: [facility] OR;  Service: Urology;  Laterality: Left;    URETEROSCOPY LASER LITHOTRIPSY WITH STENT INSERTION Left 10/11/2023    Procedure: LEFT URETEROSCOPY LASER LITHOTRIPSY WITH STENT REMOVAL;  Surgeon: Miguel Monte MD;  Location: [facility] OR;  Service: Urology;  Laterality: Left;    VENTRAL/INCISIONAL HERNIA REPAIR N/A 05/17/2017    Procedure: VENTRAL HERNIA REPAIR WITH MESH, BILATERAL MUSCULOFASCIAL RELEASE;  Surgeon: Patt Moore MD;  Location: [facility] MAIN OR;  Service:           Social History     Occupational History     Employer: RETIRED   Tobacco Use    Smoking status: Never     Passive exposure: Never    Smokeless tobacco: Never   Vaping Use    Vaping status: Never Used  "  Substance and Sexual Activity    Alcohol use: No    Drug use: No    Sexual activity: Defer      Social History     Social History Narrative    Not on file          Family History   Problem Relation Age of Onset    Heart disease Father     Heart attack Paternal Uncle     Cancer Maternal Grandmother         ovarian    Heart attack Paternal Grandfather     Heart attack Paternal Uncle     Heart attack Paternal Uncle     Heart attack Paternal Uncle     Heart attack Paternal Uncle     Heart attack Paternal Uncle     Scleroderma Mother     Hypertension Sister     Malig Hyperthermia Neg Hx        Review of Systems:  A 14-point review of systems is reviewed with the patient.  Pertinent positives are listed above.  All others are negative.    Physical Exam: 79 y.o. female    Vitals:    09/20/24 1509   Temp: 97.8 °F (36.6 °C)   Weight: 111 kg (245 lb 3.2 oz)   Height: 154.9 cm (61\")       General:  Patient is awake and alert.  Appears in no acute distress or discomfort.    Psych:  Affect and demeanor are appropriate.    Eyes:  Conjunctivae and sclerae; appear grossly normal.  Eyes track well and EOM seems to be intact.    Ears:  No gross abnormalities.  Hearing adequate for the exam.    Cardiovascular:  Regular rate and rhythm.    Lungs:  Good chest expansion.  Breathing unlabored.    Lymph:  No palpable adenopathy about neck or axillae.    Right lower extremity:  Skin benign and intact without evidence for swelling, masses or atrophy.  No palpable masses.  Focal tenderness noted over medial and lateral joint lines.  ROM is from 3-110°.   Knee is stable on exam.  Good strength throughout the lower leg and foot.  Intact sensation throughout.  Palpable pedal pulses with brisk cap refill.    Diagnostic Tests:  Lab Results   Component Value Date    GLUCOSE 95 09/16/2024    CALCIUM 9.8 09/16/2024     09/16/2024    K 4.3 09/16/2024    CO2 23.6 09/16/2024     09/16/2024    BUN 14 09/16/2024    CREATININE 1.07 (H) " 09/16/2024    EGFRIFAFRI 57 (L) 09/14/2021    EGFRIFNONA 47 (L) 09/14/2021    BCR 13.1 09/16/2024    ANIONGAP 8.4 09/16/2024     Lab Results   Component Value Date    WBC 4.67 09/16/2024    HGB 12.0 09/16/2024    HCT 37.1 09/16/2024    MCV 95.6 09/16/2024     09/16/2024     Lab Results   Component Value Date    INR 1.11 (H) 03/19/2023    INR 1.07 02/26/2018    PROTIME 14.4 (H) 03/19/2023    PROTIME 13.7 02/26/2018       Imaging:  AP, merchant and lateral views of the right knee are ordered and reviewed along with a full length alignment x-ray.  These x-rays were taken to evaluate her complaint and presurgical planning.  These compared to previous x-rays.  The x-rays show end-stage medial and patellofemoral compartment osteoarthritis with bone-on-bone, osteophyte formation, and subchondral sclerosis.  The leg length alignment x-ray shows varus malalignment with the weightbearing axis passing through the edge of the medial compartment.    Assessment:  Right knee endstage osteoarthritis    Plan: We will plan on proceeding with a right total knee arthroplasty at the patient's request.  I reviewed details of procedure with patient today and discussed all the risks, benefits, alternatives, and limitations of the procedure in laymen's terms with the risks including but not limited to:  neurovascular damage resulting in permanent dysfunction or footdrop and potential need for further surgery, bleeding, infection, hematoma, chronic pain, worsening of pain, persistent symptoms potentially necessitating revision, prosthesis-related problems including loosening or allergy, swelling, loss of motion and arthrofibrosis, weakness, stiffness, instability, DVT, pulmonary embolus, death, stroke, complex regional pain syndrome, and need for additional procedures.  Patient verbalized understanding, and was given the opportunity to ask and have all questions answered today.  No guarantees were given regarding results of surgery.       Patient is planning for hospital dismissal following surgery.  Denies history of DVT or metal allergy.     Yancy Cui, APRN    09/20/24

## 2024-09-23 ENCOUNTER — ANESTHESIA EVENT (OUTPATIENT)
Dept: PERIOP | Facility: HOSPITAL | Age: 79
End: 2024-09-23
Payer: MEDICARE

## 2024-09-23 ENCOUNTER — APPOINTMENT (OUTPATIENT)
Dept: GENERAL RADIOLOGY | Facility: HOSPITAL | Age: 79
End: 2024-09-23
Payer: MEDICARE

## 2024-09-23 ENCOUNTER — DOCUMENTATION (OUTPATIENT)
Dept: HOME HEALTH SERVICES | Facility: HOME HEALTHCARE | Age: 79
End: 2024-09-23
Payer: MEDICARE

## 2024-09-23 ENCOUNTER — HOSPITAL ENCOUNTER (OUTPATIENT)
Facility: HOSPITAL | Age: 79
Discharge: HOME | End: 2024-09-24
Attending: ORTHOPAEDIC SURGERY | Admitting: ORTHOPAEDIC SURGERY
Payer: MEDICARE

## 2024-09-23 ENCOUNTER — ANESTHESIA (OUTPATIENT)
Dept: PERIOP | Facility: HOSPITAL | Age: 79
End: 2024-09-23
Payer: MEDICARE

## 2024-09-23 DIAGNOSIS — M17.11 ARTHRITIS OF KNEE, RIGHT: ICD-10-CM

## 2024-09-23 DIAGNOSIS — Z96.651 H/O TOTAL KNEE REPLACEMENT, RIGHT: Primary | ICD-10-CM

## 2024-09-23 DIAGNOSIS — M17.10 ARTHRITIS OF KNEE: ICD-10-CM

## 2024-09-23 LAB
HCT VFR BLD AUTO: 34.1 % (ref 34–46.6)
HGB BLD-MCNC: 11.5 G/DL (ref 12–15.9)

## 2024-09-23 PROCEDURE — 25810000003 SODIUM CHLORIDE 0.9 % SOLUTION: Performed by: ORTHOPAEDIC SURGERY

## 2024-09-23 PROCEDURE — 25010000002 DEXAMETHASONE PER 1 MG: Performed by: ANESTHESIOLOGY

## 2024-09-23 PROCEDURE — 63710000001 HYDROCODONE-ACETAMINOPHEN 7.5-325 MG TABLET: Performed by: NURSE ANESTHETIST, CERTIFIED REGISTERED

## 2024-09-23 PROCEDURE — 25010000002 FENTANYL CITRATE (PF) 50 MCG/ML SOLUTION: Performed by: NURSE ANESTHETIST, CERTIFIED REGISTERED

## 2024-09-23 PROCEDURE — 25010000002 CEFAZOLIN PER 500 MG: Performed by: ORTHOPAEDIC SURGERY

## 2024-09-23 PROCEDURE — A9270 NON-COVERED ITEM OR SERVICE: HCPCS | Performed by: NURSE ANESTHETIST, CERTIFIED REGISTERED

## 2024-09-23 PROCEDURE — 73560 X-RAY EXAM OF KNEE 1 OR 2: CPT

## 2024-09-23 PROCEDURE — 25010000002 ONDANSETRON PER 1 MG: Performed by: NURSE ANESTHETIST, CERTIFIED REGISTERED

## 2024-09-23 PROCEDURE — 97161 PT EVAL LOW COMPLEX 20 MIN: CPT

## 2024-09-23 PROCEDURE — A9270 NON-COVERED ITEM OR SERVICE: HCPCS | Performed by: ORTHOPAEDIC SURGERY

## 2024-09-23 PROCEDURE — 25010000002 SUGAMMADEX 200 MG/2ML SOLUTION: Performed by: NURSE ANESTHETIST, CERTIFIED REGISTERED

## 2024-09-23 PROCEDURE — 85018 HEMOGLOBIN: CPT | Performed by: ORTHOPAEDIC SURGERY

## 2024-09-23 PROCEDURE — 25010000002 KETOROLAC TROMETHAMINE PER 15 MG: Performed by: ORTHOPAEDIC SURGERY

## 2024-09-23 PROCEDURE — C1776 JOINT DEVICE (IMPLANTABLE): HCPCS | Performed by: ORTHOPAEDIC SURGERY

## 2024-09-23 PROCEDURE — 85014 HEMATOCRIT: CPT | Performed by: ORTHOPAEDIC SURGERY

## 2024-09-23 PROCEDURE — 63710000001 MELOXICAM 15 MG TABLET: Performed by: ORTHOPAEDIC SURGERY

## 2024-09-23 PROCEDURE — C1713 ANCHOR/SCREW BN/BN,TIS/BN: HCPCS | Performed by: ORTHOPAEDIC SURGERY

## 2024-09-23 PROCEDURE — A9270 NON-COVERED ITEM OR SERVICE: HCPCS | Performed by: STUDENT IN AN ORGANIZED HEALTH CARE EDUCATION/TRAINING PROGRAM

## 2024-09-23 PROCEDURE — 27447 TOTAL KNEE ARTHROPLASTY: CPT | Performed by: TECHNICIAN, OTHER

## 2024-09-23 PROCEDURE — 25810000003 LACTATED RINGERS PER 1000 ML: Performed by: ANESTHESIOLOGY

## 2024-09-23 PROCEDURE — 25010000002 PROPOFOL 200 MG/20ML EMULSION: Performed by: NURSE ANESTHETIST, CERTIFIED REGISTERED

## 2024-09-23 PROCEDURE — 63710000001 ACETAMINOPHEN 325 MG TABLET: Performed by: ORTHOPAEDIC SURGERY

## 2024-09-23 PROCEDURE — 25010000002 EPINEPHRINE 1 MG/ML SOLUTION 30 ML VIAL: Performed by: ORTHOPAEDIC SURGERY

## 2024-09-23 PROCEDURE — 25010000002 VANCOMYCIN 10 G RECONSTITUTED SOLUTION: Performed by: ORTHOPAEDIC SURGERY

## 2024-09-23 PROCEDURE — 97530 THERAPEUTIC ACTIVITIES: CPT

## 2024-09-23 PROCEDURE — 25010000002 MIDAZOLAM PER 1 MG: Performed by: ANESTHESIOLOGY

## 2024-09-23 PROCEDURE — 25010000002 FENTANYL CITRATE (PF) 50 MCG/ML SOLUTION: Performed by: ANESTHESIOLOGY

## 2024-09-23 PROCEDURE — 25010000002 MORPHINE PER 10 MG: Performed by: ORTHOPAEDIC SURGERY

## 2024-09-23 PROCEDURE — 63710000001 PREGABALIN 75 MG CAPSULE: Performed by: ORTHOPAEDIC SURGERY

## 2024-09-23 PROCEDURE — 63710000001 HYDROCODONE-ACETAMINOPHEN 5-325 MG TABLET: Performed by: STUDENT IN AN ORGANIZED HEALTH CARE EDUCATION/TRAINING PROGRAM

## 2024-09-23 PROCEDURE — 27447 TOTAL KNEE ARTHROPLASTY: CPT | Performed by: ORTHOPAEDIC SURGERY

## 2024-09-23 PROCEDURE — 25010000002 FENTANYL CITRATE (PF) 100 MCG/2ML SOLUTION: Performed by: NURSE ANESTHETIST, CERTIFIED REGISTERED

## 2024-09-23 PROCEDURE — 25010000002 HYDROMORPHONE 1 MG/ML SOLUTION: Performed by: NURSE ANESTHETIST, CERTIFIED REGISTERED

## 2024-09-23 PROCEDURE — 25010000002 ROPIVACAINE PER 1 MG: Performed by: ORTHOPAEDIC SURGERY

## 2024-09-23 PROCEDURE — 25810000003 LACTATED RINGERS SOLUTION: Performed by: ORTHOPAEDIC SURGERY

## 2024-09-23 PROCEDURE — 25010000002 ROPIVACAINE PER 1 MG: Performed by: ANESTHESIOLOGY

## 2024-09-23 DEVICE — CMT BONE PALACOS R HI/VISC 1X40: Type: IMPLANTABLE DEVICE | Site: KNEE | Status: FUNCTIONAL

## 2024-09-23 DEVICE — IMPLANTABLE DEVICE: Type: IMPLANTABLE DEVICE | Status: FUNCTIONAL

## 2024-09-23 DEVICE — LEGION CRUCIATE RETAINING HIGH                                    FLEX HIGHLY CROSS LINKED                                    POLYETHYLENE SIZE 3-4 11MM
Type: IMPLANTABLE DEVICE | Site: KNEE | Status: FUNCTIONAL
Brand: LEGION

## 2024-09-23 DEVICE — DEV CONTRL TISS STRATAFIX SPIRAL MNCRYL UD 3/0 PLS 30CM: Type: IMPLANTABLE DEVICE | Site: KNEE | Status: FUNCTIONAL

## 2024-09-23 DEVICE — GEN II 7.5MM RESUR PAT 32MM
Type: IMPLANTABLE DEVICE | Site: KNEE | Status: FUNCTIONAL
Brand: GENESIS II

## 2024-09-23 DEVICE — DEV CONTRL TISS STRATAFIX SYMM PDS PLUS VIL CT-1 60CM: Type: IMPLANTABLE DEVICE | Site: KNEE | Status: FUNCTIONAL

## 2024-09-23 DEVICE — LEGION CRUCIATE RETAINING OXINIUM                                    FEMORAL SIZE 4 RIGHT
Type: IMPLANTABLE DEVICE | Site: KNEE | Status: FUNCTIONAL
Brand: LEGION

## 2024-09-23 DEVICE — GENESIS II NON-POROUS TIBIAL                                    BASEPLATE SIZE 3 RIGHT
Type: IMPLANTABLE DEVICE | Site: KNEE | Status: FUNCTIONAL
Brand: GENESIS II

## 2024-09-23 RX ORDER — FLUMAZENIL 0.1 MG/ML
0.2 INJECTION INTRAVENOUS AS NEEDED
Status: DISCONTINUED | OUTPATIENT
Start: 2024-09-23 | End: 2024-09-23 | Stop reason: HOSPADM

## 2024-09-23 RX ORDER — HYDROCODONE BITARTRATE AND ACETAMINOPHEN 7.5; 325 MG/1; MG/1
1 TABLET ORAL EVERY 6 HOURS PRN
Qty: 30 TABLET | Refills: 0 | Status: SHIPPED | OUTPATIENT
Start: 2024-09-23 | End: 2024-09-30 | Stop reason: SDUPTHER

## 2024-09-23 RX ORDER — PHENYLEPHRINE HCL IN 0.9% NACL 1 MG/10 ML
SYRINGE (ML) INTRAVENOUS AS NEEDED
Status: DISCONTINUED | OUTPATIENT
Start: 2024-09-23 | End: 2024-09-23 | Stop reason: SURG

## 2024-09-23 RX ORDER — HYDROCODONE BITARTRATE AND ACETAMINOPHEN 5; 325 MG/1; MG/1
2 TABLET ORAL EVERY 4 HOURS PRN
Status: DISCONTINUED | OUTPATIENT
Start: 2024-09-23 | End: 2024-09-24 | Stop reason: HOSPADM

## 2024-09-23 RX ORDER — SODIUM CHLORIDE 0.9 % (FLUSH) 0.9 %
3 SYRINGE (ML) INJECTION EVERY 12 HOURS SCHEDULED
Status: DISCONTINUED | OUTPATIENT
Start: 2024-09-23 | End: 2024-09-23 | Stop reason: HOSPADM

## 2024-09-23 RX ORDER — EPHEDRINE SULFATE 50 MG/ML
5 INJECTION, SOLUTION INTRAVENOUS ONCE AS NEEDED
Status: DISCONTINUED | OUTPATIENT
Start: 2024-09-23 | End: 2024-09-23 | Stop reason: HOSPADM

## 2024-09-23 RX ORDER — SODIUM CHLORIDE 0.9 % (FLUSH) 0.9 %
3-10 SYRINGE (ML) INJECTION AS NEEDED
Status: DISCONTINUED | OUTPATIENT
Start: 2024-09-23 | End: 2024-09-23 | Stop reason: HOSPADM

## 2024-09-23 RX ORDER — ASPIRIN 81 MG/1
81 TABLET ORAL ONCE
Status: DISCONTINUED | OUTPATIENT
Start: 2024-09-23 | End: 2024-09-24 | Stop reason: HOSPADM

## 2024-09-23 RX ORDER — PREGABALIN 75 MG/1
150 CAPSULE ORAL ONCE
Status: COMPLETED | OUTPATIENT
Start: 2024-09-23 | End: 2024-09-23

## 2024-09-23 RX ORDER — MAGNESIUM HYDROXIDE 1200 MG/15ML
LIQUID ORAL AS NEEDED
Status: DISCONTINUED | OUTPATIENT
Start: 2024-09-23 | End: 2024-09-23 | Stop reason: HOSPADM

## 2024-09-23 RX ORDER — ASPIRIN 81 MG/1
81 TABLET ORAL 2 TIMES DAILY
Qty: 84 TABLET | Refills: 0 | Status: SHIPPED | OUTPATIENT
Start: 2024-09-23

## 2024-09-23 RX ORDER — LIDOCAINE HYDROCHLORIDE 10 MG/ML
0.5 INJECTION, SOLUTION INFILTRATION; PERINEURAL ONCE AS NEEDED
Status: DISCONTINUED | OUTPATIENT
Start: 2024-09-23 | End: 2024-09-23 | Stop reason: HOSPADM

## 2024-09-23 RX ORDER — PROPOFOL 10 MG/ML
INJECTION, EMULSION INTRAVENOUS AS NEEDED
Status: DISCONTINUED | OUTPATIENT
Start: 2024-09-23 | End: 2024-09-23 | Stop reason: SURG

## 2024-09-23 RX ORDER — HYDRALAZINE HYDROCHLORIDE 20 MG/ML
5 INJECTION INTRAMUSCULAR; INTRAVENOUS
Status: DISCONTINUED | OUTPATIENT
Start: 2024-09-23 | End: 2024-09-23 | Stop reason: HOSPADM

## 2024-09-23 RX ORDER — DOCUSATE SODIUM 100 MG/1
100 CAPSULE, LIQUID FILLED ORAL 2 TIMES DAILY
Qty: 60 CAPSULE | Refills: 0 | Status: SHIPPED | OUTPATIENT
Start: 2024-09-23

## 2024-09-23 RX ORDER — ONDANSETRON 4 MG/1
4 TABLET, ORALLY DISINTEGRATING ORAL ONCE AS NEEDED
Status: DISCONTINUED | OUTPATIENT
Start: 2024-09-23 | End: 2024-09-24 | Stop reason: HOSPADM

## 2024-09-23 RX ORDER — ONDANSETRON 4 MG/1
4 TABLET, FILM COATED ORAL EVERY 8 HOURS PRN
Qty: 12 TABLET | Refills: 0 | Status: SHIPPED | OUTPATIENT
Start: 2024-09-23

## 2024-09-23 RX ORDER — MIDAZOLAM HYDROCHLORIDE 1 MG/ML
0.5 INJECTION INTRAMUSCULAR; INTRAVENOUS
Status: DISCONTINUED | OUTPATIENT
Start: 2024-09-23 | End: 2024-09-23 | Stop reason: HOSPADM

## 2024-09-23 RX ORDER — ONDANSETRON 2 MG/ML
4 INJECTION INTRAMUSCULAR; INTRAVENOUS ONCE AS NEEDED
Status: COMPLETED | OUTPATIENT
Start: 2024-09-23 | End: 2024-09-23

## 2024-09-23 RX ORDER — IPRATROPIUM BROMIDE AND ALBUTEROL SULFATE 2.5; .5 MG/3ML; MG/3ML
3 SOLUTION RESPIRATORY (INHALATION) ONCE AS NEEDED
Status: DISCONTINUED | OUTPATIENT
Start: 2024-09-23 | End: 2024-09-23 | Stop reason: HOSPADM

## 2024-09-23 RX ORDER — ONDANSETRON 2 MG/ML
INJECTION INTRAMUSCULAR; INTRAVENOUS AS NEEDED
Status: DISCONTINUED | OUTPATIENT
Start: 2024-09-23 | End: 2024-09-23 | Stop reason: SURG

## 2024-09-23 RX ORDER — FENTANYL CITRATE 50 UG/ML
50 INJECTION, SOLUTION INTRAMUSCULAR; INTRAVENOUS ONCE AS NEEDED
Status: COMPLETED | OUTPATIENT
Start: 2024-09-23 | End: 2024-09-23

## 2024-09-23 RX ORDER — SODIUM CHLORIDE, SODIUM LACTATE, POTASSIUM CHLORIDE, CALCIUM CHLORIDE 600; 310; 30; 20 MG/100ML; MG/100ML; MG/100ML; MG/100ML
9 INJECTION, SOLUTION INTRAVENOUS CONTINUOUS
Status: DISCONTINUED | OUTPATIENT
Start: 2024-09-23 | End: 2024-09-24 | Stop reason: HOSPADM

## 2024-09-23 RX ORDER — FENTANYL CITRATE 50 UG/ML
INJECTION, SOLUTION INTRAMUSCULAR; INTRAVENOUS AS NEEDED
Status: DISCONTINUED | OUTPATIENT
Start: 2024-09-23 | End: 2024-09-23 | Stop reason: SURG

## 2024-09-23 RX ORDER — PROMETHAZINE HYDROCHLORIDE 25 MG/1
25 SUPPOSITORY RECTAL ONCE AS NEEDED
Status: DISCONTINUED | OUTPATIENT
Start: 2024-09-23 | End: 2024-09-23 | Stop reason: HOSPADM

## 2024-09-23 RX ORDER — HYDROMORPHONE HYDROCHLORIDE 1 MG/ML
0.25 INJECTION, SOLUTION INTRAMUSCULAR; INTRAVENOUS; SUBCUTANEOUS
Status: DISCONTINUED | OUTPATIENT
Start: 2024-09-23 | End: 2024-09-23 | Stop reason: HOSPADM

## 2024-09-23 RX ORDER — ONDANSETRON 2 MG/ML
4 INJECTION INTRAMUSCULAR; INTRAVENOUS ONCE AS NEEDED
Status: COMPLETED | OUTPATIENT
Start: 2024-09-23 | End: 2024-09-24

## 2024-09-23 RX ORDER — DEXAMETHASONE SODIUM PHOSPHATE 4 MG/ML
INJECTION, SOLUTION INTRA-ARTICULAR; INTRALESIONAL; INTRAMUSCULAR; INTRAVENOUS; SOFT TISSUE
Status: COMPLETED | OUTPATIENT
Start: 2024-09-23 | End: 2024-09-23

## 2024-09-23 RX ORDER — DROPERIDOL 2.5 MG/ML
0.62 INJECTION, SOLUTION INTRAMUSCULAR; INTRAVENOUS
Status: DISCONTINUED | OUTPATIENT
Start: 2024-09-23 | End: 2024-09-23 | Stop reason: HOSPADM

## 2024-09-23 RX ORDER — HYDROCODONE BITARTRATE AND ACETAMINOPHEN 7.5; 325 MG/1; MG/1
1 TABLET ORAL EVERY 4 HOURS PRN
Status: DISCONTINUED | OUTPATIENT
Start: 2024-09-23 | End: 2024-09-23 | Stop reason: HOSPADM

## 2024-09-23 RX ORDER — DIPHENHYDRAMINE HYDROCHLORIDE 50 MG/ML
12.5 INJECTION INTRAMUSCULAR; INTRAVENOUS
Status: DISCONTINUED | OUTPATIENT
Start: 2024-09-23 | End: 2024-09-23 | Stop reason: HOSPADM

## 2024-09-23 RX ORDER — FENTANYL CITRATE 50 UG/ML
25 INJECTION, SOLUTION INTRAMUSCULAR; INTRAVENOUS
Status: DISCONTINUED | OUTPATIENT
Start: 2024-09-23 | End: 2024-09-23 | Stop reason: HOSPADM

## 2024-09-23 RX ORDER — HYDROCODONE BITARTRATE AND ACETAMINOPHEN 5; 325 MG/1; MG/1
1 TABLET ORAL ONCE AS NEEDED
Status: DISCONTINUED | OUTPATIENT
Start: 2024-09-23 | End: 2024-09-23 | Stop reason: HOSPADM

## 2024-09-23 RX ORDER — HYDROCODONE BITARTRATE AND ACETAMINOPHEN 5; 325 MG/1; MG/1
1 TABLET ORAL EVERY 4 HOURS PRN
Status: DISCONTINUED | OUTPATIENT
Start: 2024-09-23 | End: 2024-09-24 | Stop reason: HOSPADM

## 2024-09-23 RX ORDER — MELOXICAM 15 MG/1
15 TABLET ORAL ONCE
Status: COMPLETED | OUTPATIENT
Start: 2024-09-23 | End: 2024-09-23

## 2024-09-23 RX ORDER — SUCCINYLCHOLINE/SOD CL,ISO/PF 200MG/10ML
SYRINGE (ML) INTRAVENOUS AS NEEDED
Status: DISCONTINUED | OUTPATIENT
Start: 2024-09-23 | End: 2024-09-23 | Stop reason: SURG

## 2024-09-23 RX ORDER — KETOROLAC TROMETHAMINE 30 MG/ML
15 INJECTION, SOLUTION INTRAMUSCULAR; INTRAVENOUS ONCE AS NEEDED
Status: COMPLETED | OUTPATIENT
Start: 2024-09-23 | End: 2024-09-23

## 2024-09-23 RX ORDER — PROMETHAZINE HYDROCHLORIDE 25 MG/1
25 TABLET ORAL ONCE AS NEEDED
Status: DISCONTINUED | OUTPATIENT
Start: 2024-09-23 | End: 2024-09-23 | Stop reason: HOSPADM

## 2024-09-23 RX ORDER — ACETAMINOPHEN 325 MG/1
650 TABLET ORAL 2 TIMES DAILY PRN
Qty: 60 TABLET | Refills: 0 | Status: SHIPPED | OUTPATIENT
Start: 2024-09-23

## 2024-09-23 RX ORDER — FAMOTIDINE 10 MG/ML
20 INJECTION, SOLUTION INTRAVENOUS ONCE
Status: COMPLETED | OUTPATIENT
Start: 2024-09-23 | End: 2024-09-23

## 2024-09-23 RX ORDER — LABETALOL HYDROCHLORIDE 5 MG/ML
5 INJECTION, SOLUTION INTRAVENOUS
Status: DISCONTINUED | OUTPATIENT
Start: 2024-09-23 | End: 2024-09-23 | Stop reason: HOSPADM

## 2024-09-23 RX ORDER — NALOXONE HCL 0.4 MG/ML
0.2 VIAL (ML) INJECTION AS NEEDED
Status: DISCONTINUED | OUTPATIENT
Start: 2024-09-23 | End: 2024-09-23 | Stop reason: HOSPADM

## 2024-09-23 RX ORDER — ROPIVACAINE HYDROCHLORIDE 5 MG/ML
INJECTION, SOLUTION EPIDURAL; INFILTRATION; PERINEURAL
Status: COMPLETED | OUTPATIENT
Start: 2024-09-23 | End: 2024-09-23

## 2024-09-23 RX ORDER — ASPIRIN 81 MG/1
81 TABLET ORAL EVERY 12 HOURS SCHEDULED
Status: DISCONTINUED | OUTPATIENT
Start: 2024-09-24 | End: 2024-09-24 | Stop reason: HOSPADM

## 2024-09-23 RX ORDER — ACETAMINOPHEN 325 MG/1
1000 TABLET ORAL ONCE
Status: COMPLETED | OUTPATIENT
Start: 2024-09-23 | End: 2024-09-23

## 2024-09-23 RX ORDER — ROCURONIUM BROMIDE 10 MG/ML
INJECTION, SOLUTION INTRAVENOUS AS NEEDED
Status: DISCONTINUED | OUTPATIENT
Start: 2024-09-23 | End: 2024-09-23 | Stop reason: SURG

## 2024-09-23 RX ADMIN — SUGAMMADEX 400 MG: 100 INJECTION, SOLUTION INTRAVENOUS at 12:37

## 2024-09-23 RX ADMIN — PREGABALIN 75 MG: 75 CAPSULE ORAL at 09:53

## 2024-09-23 RX ADMIN — VANCOMYCIN HYDROCHLORIDE 1750 MG: 10 INJECTION, POWDER, LYOPHILIZED, FOR SOLUTION INTRAVENOUS at 10:35

## 2024-09-23 RX ADMIN — HYDROMORPHONE HYDROCHLORIDE 0.25 MG: 1 INJECTION, SOLUTION INTRAMUSCULAR; INTRAVENOUS; SUBCUTANEOUS at 11:54

## 2024-09-23 RX ADMIN — FAMOTIDINE 20 MG: 10 INJECTION INTRAVENOUS at 10:40

## 2024-09-23 RX ADMIN — HYDROMORPHONE HYDROCHLORIDE 0.25 MG: 1 INJECTION, SOLUTION INTRAMUSCULAR; INTRAVENOUS; SUBCUTANEOUS at 12:40

## 2024-09-23 RX ADMIN — FENTANYL CITRATE 100 MCG: 50 INJECTION, SOLUTION INTRAMUSCULAR; INTRAVENOUS at 11:19

## 2024-09-23 RX ADMIN — KETOROLAC TROMETHAMINE 15 MG: 30 INJECTION, SOLUTION INTRAMUSCULAR at 19:30

## 2024-09-23 RX ADMIN — FENTANYL CITRATE 25 MCG: 50 INJECTION, SOLUTION INTRAMUSCULAR; INTRAVENOUS at 15:47

## 2024-09-23 RX ADMIN — DEXAMETHASONE SODIUM PHOSPHATE 4 MG: 4 INJECTION, SOLUTION INTRA-ARTICULAR; INTRALESIONAL; INTRAMUSCULAR; INTRAVENOUS; SOFT TISSUE at 10:20

## 2024-09-23 RX ADMIN — ACETAMINOPHEN 325MG 975 MG: 325 TABLET ORAL at 09:52

## 2024-09-23 RX ADMIN — MELOXICAM 15 MG: 15 TABLET ORAL at 09:52

## 2024-09-23 RX ADMIN — FENTANYL CITRATE 50 MCG: 50 INJECTION, SOLUTION INTRAMUSCULAR; INTRAVENOUS at 10:45

## 2024-09-23 RX ADMIN — PROPOFOL 200 MG: 10 INJECTION, EMULSION INTRAVENOUS at 11:19

## 2024-09-23 RX ADMIN — SODIUM CHLORIDE, POTASSIUM CHLORIDE, SODIUM LACTATE AND CALCIUM CHLORIDE: 600; 310; 30; 20 INJECTION, SOLUTION INTRAVENOUS at 11:07

## 2024-09-23 RX ADMIN — ONDANSETRON 4 MG: 2 INJECTION INTRAMUSCULAR; INTRAVENOUS at 12:00

## 2024-09-23 RX ADMIN — SODIUM CHLORIDE, POTASSIUM CHLORIDE, SODIUM LACTATE AND CALCIUM CHLORIDE: 600; 310; 30; 20 INJECTION, SOLUTION INTRAVENOUS at 12:12

## 2024-09-23 RX ADMIN — ONDANSETRON 4 MG: 2 INJECTION, SOLUTION INTRAMUSCULAR; INTRAVENOUS at 15:46

## 2024-09-23 RX ADMIN — SODIUM CHLORIDE 2 G: 900 INJECTION INTRAVENOUS at 11:05

## 2024-09-23 RX ADMIN — Medication 120 MG: at 11:19

## 2024-09-23 RX ADMIN — ROPIVACAINE HYDROCHLORIDE 20 ML: 5 INJECTION EPIDURAL; INFILTRATION; PERINEURAL at 10:20

## 2024-09-23 RX ADMIN — SODIUM CHLORIDE 2 G: 900 INJECTION INTRAVENOUS at 19:30

## 2024-09-23 RX ADMIN — PROPOFOL 150 MCG/KG/MIN: 10 INJECTION, EMULSION INTRAVENOUS at 11:21

## 2024-09-23 RX ADMIN — ROCURONIUM BROMIDE 30 MG: 10 INJECTION, SOLUTION INTRAVENOUS at 11:30

## 2024-09-23 RX ADMIN — ROCURONIUM BROMIDE 10 MG: 10 INJECTION, SOLUTION INTRAVENOUS at 11:19

## 2024-09-23 RX ADMIN — Medication 200 MCG: at 11:35

## 2024-09-23 RX ADMIN — SODIUM CHLORIDE, POTASSIUM CHLORIDE, SODIUM LACTATE AND CALCIUM CHLORIDE 500 ML: 600; 310; 30; 20 INJECTION, SOLUTION INTRAVENOUS at 10:35

## 2024-09-23 RX ADMIN — DEXAMETHASONE SODIUM PHOSPHATE 4 MG: 4 INJECTION, SOLUTION INTRA-ARTICULAR; INTRALESIONAL; INTRAMUSCULAR; INTRAVENOUS; SOFT TISSUE at 11:19

## 2024-09-23 RX ADMIN — FENTANYL CITRATE 25 MCG: 50 INJECTION, SOLUTION INTRAMUSCULAR; INTRAVENOUS at 16:00

## 2024-09-23 RX ADMIN — HYDROCODONE BITARTRATE AND ACETAMINOPHEN 1 TABLET: 5; 325 TABLET ORAL at 21:47

## 2024-09-23 RX ADMIN — HYDROCODONE BITARTRATE AND ACETAMINOPHEN 1 TABLET: 7.5; 325 TABLET ORAL at 13:55

## 2024-09-23 RX ADMIN — MIDAZOLAM 1 MG: 1 INJECTION INTRAMUSCULAR; INTRAVENOUS at 10:46

## 2024-09-23 NOTE — CASE MANAGEMENT/SOCIAL WORK
Continued Stay Note  [facility]     Patient Name: Patt Bond  MRN: [id number]  Today's Date: 9/23/2024    Admit Date: 9/23/2024    Plan: Home with Mandaen    Discharge Plan       Row Name 09/23/24 0918       Plan    Plan Home with Mandaen     Patient/Family in Agreement with Plan yes    Provided Post Acute Provider List? Yes    Post Acute Provider List Home Health    Delivered To Patient    Method of Delivery Telephone                   Discharge Codes    No documentation.                       Shannon Epley, RN

## 2024-09-23 NOTE — DISCHARGE PLACEMENT REQUEST
"Patt Coffey (79 y.o. Female)       Date of Birth   1945    Social Security Number       Address   [address] [id number]    Home Phone   [phone]    MRN   [id number]       [employer]   [place]    Marital Status                               Admission Date       Admission Type   Elective    Admitting Provider   Anthony Sinclair MD    Attending Provider   Anthony Sinclair MD    Department, Room/Bed   [facility] OSC OR, [facility] OR/OSC OR       Discharge Date       Discharge Disposition       Discharge Destination                                 Attending Provider: Anthony Sinclair MD    Allergies: Other, Cephalexin, Cephalosporins, Penicillins    Isolation: None   Infection: None   Code Status: Prior    Ht: 154.9 cm (61\")   Wt: 111 kg (245 lb 3.2 oz)    Admission Cmt: None   Principal Problem: Arthritis of knee [M17.10]                   Active Insurance as of 9/23/2024       Primary Coverage       Payor Plan Insurance Group Employer/Plan Group    HU[employer]NA MEDICARE REPLACEMENT HU[employer] MED ADV GROUP [id number]       Payor Plan Address Payor Plan Phone Number Payor Plan Fax Number Effective Dates    [address] [address] [phone]  1/1/2022 - None Entered    [facility] [id number]         Subscriber Name Subscriber Birth Date Member ID       PATT COFFEY 1945 [id number]                     Emergency Contacts        (Rel.) Home Phone Work Phone Mobile Phone    Ellis Coffey (Spouse) [phone] -- [phone]    Sonia Arroyo (Daughter) -- -- [phone]                "

## 2024-09-23 NOTE — THERAPY EVALUATION
Patient Name: Patt Bond  : 1945    MRN: [id number]                              Today's Date: 2024       Admit Date: 2024    Visit Dx:     ICD-10-CM ICD-9-CM   1. H/O total knee replacement, right  Z96.651 V43.65   2. Arthritis of knee, right  M17.11 716.96   3. Arthritis of knee  M17.10 716.96     Patient Active Problem List   Diagnosis    Left lower quadrant pain    Ketonuria due to dehydration    Normocytic anemia    Pancytopenia    Obesity (BMI 30-39.9)    Nausea    Sepsis    Nephrolithiasis    Pleural nodule    Tracheal compression    Hyperthyroidism    Abnormal weight gain    Palpitations    Cholecystitis    History of colon polyps - 4 Tubulovillous adenomas in , normal colonoscopy in     History of abdominal abscess - 3+ E. coli at time of surgery 2016    Multiple drug allergies to Cephalexin, Cephalosporins, Penicillins    Weight gain - 10 pounds in 2 months, unintentional    Multinodular goiter    GRAY (dyspnea on exertion)    Obesity, morbid, BMI 40.0-49.9    Substernal goiter    Obstruction of left ureteropelvic junction (UPJ) due to stone    Morbid obesity with BMI of 40.0-44.9, adult    Obstructive sleep apnea    Hypersomnia with sleep apnea    Sleep related hypoxia    Chronic fatigue    History of lobectomy of thyroid    Impaired fasting glucose    Renal calculus, left    Arthritis of knee    Total knee replacement status, left    Diverticulosis    History of kidney stones    Postprandial diarrhea    Renal calculi    Arthritis of knee, right     Past Medical History:   Diagnosis Date    Arthritis     OSTEO. LEFT KNEE    Back pain     AT TIMES    Cataract     CLIFFORD EYES    Chronic diarrhea     Diverticulitis     WITH RESECTION    Diverticulosis     Goiter     History of colon polyps     BENIGN    History of sepsis     Hyperthyroidism     followed by Dr. Blackmon. PARTIAL THRYOIDECTOMY    Kidney stones     HISTORY OF MULTIPLE TIMES. URIC AND CALCIUM STONES    Lung nodule      HISTORY OF-NOTE LATEST CT CHEST 12/2022    Mass of mediastinum     HISTORY OF. BENIGN.    OA (osteoarthritis) of knee     RIGHT KNEE:  PAIN, WEAKNESS, LIMITED MOBILITY    Obstructive pyelonephritis 09/28/2015    left obstructing pyelonephritis     PONV (postoperative nausea and vomiting)     Rectal prolapse     CHRONIC    Risk for falls     USING ROLLATOR AND CANE    Sleep apnea     CPAP MACHINE    SOB (shortness of breath) on exertion     SEES DR VASQUEZ    Urinary urgency      Past Surgical History:   Procedure Laterality Date    BRONCHOSCOPY N/A 11/06/2017    Procedure: BRONCHOSCOPY WITH ENDOBRONCHIAL ULTRASOUND AND TRANSBRONCHIAL NEEDLE ASPIRATION;  Surgeon: Nando Vasquez MD;  Location: [facility] ENDOSCOPY;  Service:     CARDIAC CATHETERIZATION N/A 09/27/2017    Procedure: Right Heart Cath;  Surgeon: Miguel Gautam MD;  Location: [facility] CATH INVASIVE LOCATION;  Service:     CARDIAC CATHETERIZATION N/A 09/27/2017    Procedure: Left Heart Cath;  Surgeon: Miguel Gautam MD;  Location: [facility] CATH INVASIVE LOCATION;  Service:     CARDIAC CATHETERIZATION N/A 09/27/2017    Procedure: Coronary angiography;  Surgeon: Miguel Gautam MD;  Location: [facility] CATH INVASIVE LOCATION;  Service:     CARDIAC CATHETERIZATION N/A 09/27/2017    Procedure: Left ventriculography;  Surgeon: Miguel Gautam MD;  Location: [facility] CATH INVASIVE LOCATION;  Service:     CHOLECYSTECTOMY N/A 05/17/2017    Procedure: LAPAROSCOPIC CHOLECYSTECTOMY WITH IOC;  Surgeon: Patt Moore MD;  Location: [facility] MAIN OR;  Service:     COLON RESECTION Left 09/14/2016    Laparoscopic Low Anterior Resection with splenic flexure mobilization, drainage of Pelvic Abscess (cultured 3+ E. Coli), Left salpingo-ophorectomy, laparoscopic rectopexy, and umbilical hernia repair, Dr. Patt Moore    COLONOSCOPY N/A 05/15/2008    sigmoid diverticulosis with blunting of the haustral folds and angulation consistent with previous  diverticulitis, no polyps, suggestion of rectal prolapse-Dr. Patt Moore    COLONOSCOPY W/ BIOPSIES AND POLYPECTOMY N/A 04/16/2001    Possible mild gastritis w/ some appearance of formations that look like petechiae in the antrum, no ulcerations, no erosions; cecal polyp approx 4mm sessile; probable transverse colon polyp, although we could not visualize this completely upon pulling out; sigmoid diverticula; multiple rectal polyps, mostly w/ appearance of hyperplasia, largest being 5 to 6mm: removed via snare-Dr. Patt Moore    COLONOSCOPY W/ POLYPECTOMY N/A 12/10/2004    Diverticulosis with sigmoid perideverticulitis with erythema and patchiness around some of the diverticula, proximal ascedning colon polyp-approx 5 mm-removed via snare cauter, two distal ascending colon polyps-7 mm and 3 mm-removed via snare cautery polypectomy, hemorrhoids-Dr. Patt Moore    CYSTOSCOPY W/ URETERAL STENT PLACEMENT Left 04/21/2018    Procedure: CYSTOSCOPY, LEFT RETROGRADE WITH LEFT URETERAL STENT INSERTION;  Surgeon: Stanley Osuna MD;  Location: [facility];  Service: Urology    CYSTOSCOPY W/ URETERAL STENT REMOVAL Left 10/07/2015    Cystoscopy with stent extraction, left ureteral occulusion balloon placement, percutanous nephrostolithotomy with stone volume less than 2.5 cm involving the left lower pole and the left proximal ureter, balloon tract dilation for establishment of nephrostomy tract, antegrade nephrostomy tube placement-Dr. Miguel Monte    CYSTOSCOPY, RETROGRADE PYELOGRAM AND STENT INSERTION Left 09/28/2015    Left cystoscopy with left retrograde pyelogram, left double-J stent placement-Dr. Rivera gregory    CYSTOSCOPY, RETROGRADE PYELOGRAM AND STENT INSERTION Left 09/09/2015    Cystoscopy with bilateral retrograde pyelogram, left double-J stent placement, right ureteral pyeloscopy with laser lithotripsy of the ureteropelvic junction calculus, right double-J stent placement-Dr. Miguel Monte     CYSTOSCOPY, RETROGRADE PYELOGRAM AND STENT INSERTION Right 09/21/2007    Cystoscopy with right retrograde pyelogram, right biliary stent placement-Dr. Miguel Monte    CYSTOSCOPY, RETROGRADE PYELOGRAM AND STENT INSERTION Right 09/07/2007    Cystoscopy with right retrograde pyelogram, right ureteral peyloscopy with extraction distal ureteral mass, right double J stent placement-Dr. Miguel Monte    CYSTOSCOPY, RETROGRADE PYELOGRAM AND STENT INSERTION Left 07/29/2022    Procedure: CYSTOSCOPY, LEFT RETROGRADE PYELOGRAM, LEFT URETERAL STENT;  Surgeon: Cedrick Jones MD;  Location: [facility];  Service: Urology;  Laterality: Left;    CYSTOSCOPY, URETEROSCOPY, RETROGRADE PYELOGRAM, STENT INSERTION Right 09/07/2007    Cystoscopy with right retrograde pyelogram, rigth ureteroscopy with extraction of distal mass, right double J stent placement-Dr. Miguel Monte    D & C HYSTEROSCOPY N/A 05/18/2011    Procedure done due to thickened endomentrium and an endometrial polyp-Dr. Quita Cheatham    DILATATION AND CURETTAGE N/A 03/22/2002    D&C, polyp removal-Dr. Quita Cheatham    EXTRACORPOREAL SHOCK WAVE LITHOTRIPSY (ESWL) Left 04/19/2023    Procedure: LEFT EXTRACORPOREAL SHOCKWAVE LITHOTRIPSY STENT REPLACEMENT;  Surgeon: Miguel Monte MD;  Location: [facility];  Service: Urology;  Laterality: Left;    EXTRACORPOREAL SHOCKWAVE LITHOTRIPSY (ESWL), STENT INSERTION/REMOVAL Left 05/08/2015    Left extracoporeal shockwave lithotripsy, cystoscopy with stent placement-Dr. Miguel Monte    EXTRACORPOREAL SHOCKWAVE LITHOTRIPSY (ESWL), STENT INSERTION/REMOVAL  04/2023    [facility]    MEDIASTINOTOMY Left 03/05/2018    Procedure: left thyroidectomy, resection of substernal thyroid goiter cervical approach;  Surgeon: Quintin Mares III, MD;  Location: [facility];  Service:     PERCUTANEOUS NEPHROSTOLITHOTOMY Left 6/16/2023    Procedure: LEFT PERCUTANEOUS  NEPHROSTOLITHOTOMY, CYSTOSCOPY,  STENT REMOVAL;  Surgeon: Miguel Monte MD;  Location: [facility] HYBRID OR 18/19;  Service: Urology;  Laterality: Left;    SIGMOIDOSCOPY N/A 09/13/2016    Procedure: SIGMOIDOSCOPY FLEXIBLE TO 25 CM;  Surgeon: Patt Moore MD;  Location: [facility] ENDOSCOPY;  Service:     THORACOTOMY  1996    Thoracotomy for ectopic thyroid, Dr. Camarillo    THYROIDECTOMY, PARTIAL      left side 3/05/18    TOTAL KNEE ARTHROPLASTY Left 02/02/2023    Procedure: TOTAL KNEE ARTHROPLASTY;  Surgeon: Anthony Sinclair MD;  Location: [facility] OR OSC;  Service: Orthopedics;  Laterality: Left;    UMBILICAL HERNIA REPAIR N/A 09/14/2016    Procedure: UMBILICAL HERNIA REPAIR ;  Surgeon: Patt Moore MD;  Location: [facility] MAIN OR;  Service:     URETEROSCOPY LASER LITHOTRIPSY WITH STENT INSERTION Left 08/12/2022    Procedure: LEFT URETEROSCOPY LASER LITHOTRIPSY STONE BACKET EXTRACTION, STENT PLACEMENT;  Surgeon: Miguel Monte MD;  Location: [facility] MAIN OR;  Service: Urology;  Laterality: Left;    URETEROSCOPY LASER LITHOTRIPSY WITH STENT INSERTION Left 10/11/2023    Procedure: LEFT URETEROSCOPY LASER LITHOTRIPSY WITH STENT REMOVAL;  Surgeon: Miguel Monte MD;  Location: [facility] MAIN OR;  Service: Urology;  Laterality: Left;    VENTRAL/INCISIONAL HERNIA REPAIR N/A 05/17/2017    Procedure: VENTRAL HERNIA REPAIR WITH MESH, BILATERAL MUSCULOFASCIAL RELEASE;  Surgeon: Patt Moore MD;  Location: [facility] MAIN OR;  Service:       General Information       Row Name 09/23/24 1706          Physical Therapy Time and Intention    Document Type evaluation  -     Mode of Treatment individual therapy;physical therapy  -       Row Name 09/23/24 1706          General Information    Patient Profile Reviewed yes  -     Prior Level of Function independent:;gait;transfer;bed mobility  Using rollator prior  -     Existing Precautions/Restrictions fall  -     Barriers to Rehab previous functional deficit  -       Row Name 09/23/24 1751           Living Environment    People in Home spouse  -       Row Name 09/23/24 1706          Home Main Entrance    Number of Stairs, Main Entrance three  -       Row Name 09/23/24 1706          Stairs Within Home, Primary    Number of Stairs, Within Home, Primary none  -       Row Name 09/23/24 1706          Cognition    Orientation Status (Cognition) other (see comments);oriented x 3  Very drowsy  -       Row Name 09/23/24 1706          Safety Issues, Functional Mobility    Impairments Affecting Function (Mobility) balance;endurance/activity tolerance;strength;pain;range of motion (ROM)  -               User Key  (r) = Recorded By, (t) = Taken By, (c) = Cosigned By      Initials Name Provider Type     Christie Riggs PT Physical Therapist                   Mobility       Row Name 09/23/24 1707          Bed Mobility    Comment, (Bed Mobility) NT - UIC  -       Row Name 09/23/24 1707          Sit-Stand Transfer    Sit-Stand [place] (Transfers) minimum assist (75% patient effort);1 person assist;verbal cues  -     Assistive Device (Sit-Stand Transfers) walker, front-wheeled  -       Row Name 09/23/24 1707          Gait/Stairs (Locomotion)    [place] Level (Gait) minimum assist (75% patient effort);contact guard;verbal cues;1 person assist  -     Assistive Device (Gait) walker, front-wheeled  -     Distance in Feet (Gait) 30  -     Deviations/Abnormal Patterns (Gait) antalgic;lalitha decreased;gait speed decreased;stride length decreased;weight shifting decreased  -     Bilateral Gait Deviations forward flexed posture  -     Right Sided Gait Deviations heel strike decreased;weight shift ability decreased  -     [place] Level (Stairs) minimum assist (75% patient effort);1 person assist;verbal cues  -     Handrail Location (Stairs) both sides  -     Number of Steps (Stairs) 3  -     Ascending Technique (Stairs) step-to-step  -     Descending Technique (Stairs)  step-to-step  -BH       Row Name 09/23/24 1707          Mobility    Extremity Weight-bearing Status right lower extremity  -     Right Lower Extremity (Weight-bearing Status) weight-bearing as tolerated (WBAT)  -               User Key  (r) = Recorded By, (t) = Taken By, (c) = Cosigned By      Initials Name Provider Type     Christie Riggs, PT Physical Therapist                   Obj/Interventions       Row Name 09/23/24 1708          Range of Motion Comprehensive    General Range of Motion lower extremity range of motion deficits identified  -     Comment, General Range of Motion Expected post-op ROM deficits  -BH       Row Name 09/23/24 1708          Strength Comprehensive (MMT)    General Manual Muscle Testing (MMT) Assessment lower extremity strength deficits identified  -     Comment, General Manual Muscle Testing (MMT) Assessment Expected post-op strength deficits, BLE grossly 4/5  -BH       Row Name 09/23/24 1708          Balance    Balance Assessment sitting static balance;sitting dynamic balance;standing static balance;standing dynamic balance  -     Static Sitting Balance standby assist  -     Dynamic Sitting Balance standby assist  -     Position, Sitting Balance sitting in chair  -     Static Standing Balance contact guard;verbal cues  -     Dynamic Standing Balance minimal assist;verbal cues;contact guard  -     Position/Device Used, Standing Balance supported;walker, front-wheeled  -     Balance Interventions sitting;standing;sit to stand;supported;static;dynamic  -BH       Row Name 09/23/24 1708          Sensory Assessment (Somatosensory)    Sensory Assessment (Somatosensory) LE sensation intact  -               User Key  (r) = Recorded By, (t) = Taken By, (c) = Cosigned By      Initials Name Provider Type     Christie Riggs PT Physical Therapist                   Goals/Plan       [facility] Name 09/23/24 1713          Bed Mobility Goal 1 (PT)    Activity/Assistive Device  (Bed Mobility Goal 1, PT) bed mobility activities, all  -     [place] Level/Cues Needed (Bed Mobility Goal 1, PT) supervision required  -     Time Frame (Bed Mobility Goal 1, PT) 3 days  -[facility] Name 09/23/24 1713          Transfer Goal 1 (PT)    Activity/Assistive Device (Transfer Goal 1, PT) transfers, all  -     [place] Level/Cues Needed (Transfer Goal 1, PT) standby assist  -     Time Frame (Transfer Goal 1, PT) 3 days  -[facility] Name 09/23/24 1713          Gait Training Goal 1 (PT)    Activity/Assistive Device (Gait Training Goal 1, PT) gait (walking locomotion)  -     [place] Level (Gait Training Goal 1, PT) standby assist  -     Distance (Gait Training Goal 1, PT) 120ft  -     Time Frame (Gait Training Goal 1, PT) 3 days  -[facility] Name 09/23/24 1713          Stairs Goal 1 (PT)    Activity/Assistive Device (Stairs Goal 1, PT) stairs, all skills  -     [place] Level/Cues Needed (Stairs Goal 1, PT) contact guard required  -     Number of Stairs (Stairs Goal 1, PT) 3  -     Time Frame (Stairs Goal 1, PT) 3 days  -[facility] Name 09/23/24 1713          Therapy Assessment/Plan (PT)    Planned Therapy Interventions (PT) balance training;bed mobility training;gait training;home exercise program;patient/family education;ROM (range of motion);stair training;strengthening;stretching;transfer training  -               User Key  (r) = Recorded By, (t) = Taken By, (c) = Cosigned By      Initials Name Provider Type     Christie Riggs, PT Physical Therapist                   Clinical Impression       [facility] Name 09/23/24 1709          Pain    Pretreatment Pain Rating 8/10  -     Posttreatment Pain Rating 9/10  -     Pain Location - Side/Orientation Right  -     Pain Location incisional  -     Pain Location - knee  -     Pain Intervention(s) Repositioned;Ambulation/increased activity;Rest;Cold applied  -[facility] Name 09/23/24 1709          Plan of Care  Review    Plan of Care Reviewed With patient;spouse;daughter  -     Outcome Evaluation Patient is a 79 y.o. female POD 0 R TKA and is WBAT. Patient is independent at baseline using a rollator and lives with her . Pt has 3 LINCOLN and no steps inside. Pt presents to PT with expected post-op weakness and impaired functional mobility. Today, patient required min A x1 for transfers, and ambulated 10ft to the bathroom with min A x1-2 and rwx. Pt with very slow, shuffled steps, poor foot clearance from floor and taking several brief standing rest breaks 2/2 dizziness. Pt required min A x2 for slow lowering to commode and stood with min A x1. Pt ambulated an additional 20ft before requesting chair be pulled up d/t increasing dizziness/fatigue. Following rest, pt wheeled to stairs and did completed 3 stairs with min A x1 and cues for sequencing. Pt with no knee buckling but slow pace and pt not appearing to feel confident/safe on stairs. Pt wheeled back to room, notably fatigued following - c/o nausea/dizziness and 8-9/10 pain maintained throughout session. Pt likely would benefit from overnight stay and additional therapy session in AM to ensure safety for DC home - pt and family agreeable. Anticipate HHPT upon DC. PT will f/u tomorrow.  -       Row Name 09/23/24 5675          Therapy Assessment/Plan (PT)    Patient/Family Therapy Goals Statement (PT) Return to OF  -     Rehab Potential (PT) good, to achieve stated therapy goals  -     Criteria for Skilled Interventions Met (PT) yes  -     Therapy Frequency (PT) daily  -       Row Name 09/23/24 9617          Vital Signs    O2 Delivery Pre Treatment room air  -     O2 Delivery Intra Treatment room air  -     O2 Delivery Post Treatment room air  -       Row Name 09/23/24 1768          Positioning and Restraints    Pre-Treatment Position sitting in chair/recliner  -     Post Treatment Position chair  -     In Chair reclined;with nsg;call light  within reach;encouraged to call for assist;with family/caregiver  -               User Key  (r) = Recorded By, (t) = Taken By, (c) = Cosigned By      Initials Name Provider Type    Christie Nichole PT Physical Therapist                   Outcome Measures       Row Name 09/23/24 1713          How much help from another person do you currently need...    Turning from your back to your side while in flat bed without using bedrails? 3  -BH     Moving from lying on back to sitting on the side of a flat bed without bedrails? 3  -BH     Moving to and from a bed to a chair (including a wheelchair)? 3  -BH     Standing up from a chair using your arms (e.g., wheelchair, bedside chair)? 3  -BH     Climbing 3-5 steps with a railing? 3  -BH     To walk in hospital room? 3  -     AM-PAC 6 Clicks Score (PT) 18  -     Highest Level of Mobility Goal 6 --> Walk 10 steps or more  -       Row Name 09/23/24 1713          Functional Assessment    Outcome Measure Options AM-PAC 6 Clicks Basic Mobility (PT)  -               User Key  (r) = Recorded By, (t) = Taken By, (c) = Cosigned By      Initials Name Provider Type    Christie Nichole PT Physical Therapist                                 Physical Therapy Education       Title: PT OT SLP Therapies (In Progress)       Topic: Physical Therapy (In Progress)       Point: Mobility training (Done)       Learning Progress Summary             Patient Acceptance, E,TB,D, VU,NR by  at 9/23/2024 1713                         Point: Home exercise program (Not Started)       Learner Progress:  Not documented in this visit.              Point: Body mechanics (Done)       Learning Progress Summary             Patient Acceptance, E,TB,D, VU,NR by  at 9/23/2024 1713                         Point: Precautions (Done)       Learning Progress Summary             Patient Acceptance, E,TB,D, VU,NR by  at 9/23/2024 1713                                         User Key       Initials  Effective Dates Name Provider Type Discipline     04/08/22 -  Christie Riggs, PT Physical Therapist PT                  PT Recommendation and Plan  Planned Therapy Interventions (PT): balance training, bed mobility training, gait training, home exercise program, patient/family education, ROM (range of motion), stair training, strengthening, stretching, transfer training  Plan of Care Reviewed With: patient, spouse, daughter  Outcome Evaluation: Patient is a 79 y.o. female POD 0 R TKA and is WBAT. Patient is independent at baseline using a rollator and lives with her . Pt has 3 [place] and no steps inside. Pt presents to PT with expected post-op weakness and impaired functional mobility. Today, patient required min A x1 for transfers, and ambulated 10ft to the bathroom with min A x1-2 and rwx. Pt with very slow, shuffled steps, poor foot clearance from floor and taking several brief standing rest breaks 2/2 dizziness. Pt required min A x2 for slow lowering to commode and stood with min A x1. Pt ambulated an additional 20ft before requesting chair be pulled up d/t increasing dizziness/fatigue. Following rest, pt wheeled to stairs and did completed 3 stairs with min A x1 and cues for sequencing. Pt with no knee buckling but slow pace and pt not appearing to feel confident/safe on stairs. Pt wheeled back to room, notably fatigued following - c/o nausea/dizziness and 8-9/10 pain maintained throughout session. Pt likely would benefit from overnight stay and additional therapy session in AM to ensure safety for DC home - pt and family agreeable. Anticipate HHPT upon DC. PT will f/u tomorrow.     Time Calculation:   PT Evaluation Complexity  History, PT Evaluation Complexity: 1-2 personal factors and/or comorbidities  Examination of Body Systems (PT Eval Complexity): total of 3 or more elements  Clinical Presentation (PT Evaluation Complexity): stable  Clinical Decision Making (PT Evaluation Complexity): low  complexity  Overall Complexity (PT Evaluation Complexity): low complexity     PT Charges       Row Name 09/23/24 1714             Time Calculation    Start Time 1634  -      Stop Time 1656  -      Time Calculation (min) 22 min  -      PT Received On 09/23/24  -      PT - Next Appointment 09/24/24  -      PT Goal Re-Cert Due Date 09/26/24  -         Time Calculation- PT    Total Timed Code Minutes- PT 18 minute(s)  -         Timed Charges    23308 - Gait Training Minutes  8  -      33782 - PT Therapeutic Activity Minutes 10  -         Total Minutes    Timed Charges Total Minutes 18  -       Total Minutes 18  -                User Key  (r) = Recorded By, (t) = Taken By, (c) = Cosigned By      Initials Name Provider Type     Christie Riggs, PT Physical Therapist                  Therapy Charges for Today       Code Description Service Date Service Provider Modifiers Qty    33648691176 HC PT THERAPEUTIC ACT EA 15 MIN 9/23/2024 Christie Riggs, PT GP 1    46454922248 HC PT EVAL LOW COMPLEXITY 3 9/23/2024 Christie Riggs, PT GP 1            PT G-Codes  Outcome Measure Options: AM-PAC 6 Clicks Basic Mobility (PT)  AM-PAC 6 Clicks Score (PT): 18  PT Discharge Summary  Anticipated Discharge Disposition (PT): home with assist, home with home health    Christie Riggs, PT  9/23/2024

## 2024-09-23 NOTE — PLAN OF CARE
Goal Outcome Evaluation:  Plan of Care Reviewed With: patient, spouse, daughter      Patient is a 79 y.o. female POD 0 R TKA and is WBAT. Patient is independent at baseline using a rollator and lives with her . Pt has 3 LINCOLN and no steps inside. Pt presents to PT with expected post-op weakness and impaired functional mobility. Today, patient required min A x1 for transfers, and ambulated 10ft to the bathroom with min A x1-2 and rwx. Pt with very slow, shuffled steps, poor foot clearance from floor and taking several brief standing rest breaks 2/2 dizziness. Pt required min A x2 for slow lowering to commode and stood with min A x1. Pt ambulated an additional 20ft before requesting chair be pulled up d/t increasing dizziness/fatigue. Following rest, pt wheeled to stairs and did completed 3 stairs with min A x1 and cues for sequencing. Pt with no knee buckling but slow pace and pt not appearing to feel confident/safe on stairs. Pt wheeled back to room, notably fatigued following - c/o nausea/dizziness and 8-9/10 pain maintained throughout session. Pt likely would benefit from overnight stay and additional therapy session in AM to ensure safety for DC home - pt and family agreeable. Anticipate HHPT upon DC. PT will f/u tomorrow.                                        Quality 110: Preventive Care And Screening: Influenza Immunization: Influenza Immunization not Administered for Documented Reasons. Quality 111:Pneumonia Vaccination Status For Older Adults: Pneumococcal Vaccination not Administered or Previously Received, Reason not Otherwise Specified Detail Level: Detailed

## 2024-09-23 NOTE — OP NOTE
Orthopaedic Operative Note    Facility: [facility]    Patient: Patt Bond    Medical Record Number: [id number]    YOB: 1945    Dictating Surgeon: Anthony Sinclair M.D.*    Primary Care Physician: Moe Matute MD    Date of Operation: 9/23/2024    Pre-Operative Diagnosis:  Right knee end-stage osteoarthritis    Post-Operative Diagnosis:  Right knee end-stage osteoarthritis    Procedure Performed:   Right total knee arthroplasty    Surgeon: Anthony Sinclair MD     Assistant: Nimco Mcclellan whose assistance was critical for help with patient positioning, suctioning and irrigation, retraction, manipulation of the extremity for insertion of the implants, wound closure and application of the bandages.  Her assistance was critical to the success of this case.      Anesthesia: Regional followed by general.  Local administration of Ortho cocktail solution.    Complications: None.     Estimated Blood Loss: Less than 50 mL.     Implants:     1.  Smith & Nephew size 4 Legion Oxinium femoral component  2.  Smith and Nephew size 3 tibial component with size 11 polyethylene liner  3.  Smith & Nephew size 32 x 7.5 patellar component    Specimens: * No orders in the log *    Brief Operative Indication:  Ms. Bond has a history of worsening right knee osteoarthritis which had been refractory to prolonged conservative treatment.  The risk, benefits and alternatives to a total knee arthroplasty were discussed with the patient in detail.  She had already been through the same procedure on the contralateral side.  She had a good understanding of this information and consented to proceed.    Description of the procedure in detail: The patient and operative site were identified in the preoperative holding area.  The surgical site was marked.  Adequate regional anesthesia of the right lower extremity was administered. She was then taken to the operating room.  Adequate general anesthesia was  administered. She was then repositioned on the operating table in the supine position.  A timeout was taken and preoperative antibiotics administered.    The right lower extremity was prepped and draped in the standard, sterile fashion.  I began by cleaning the extremity with an alcohol solution.  A Hibiclens scrub was performed.  The extremity was then prepped with 2 ChloraPreps.  I allowed those to dry for 3 minutes before the draping procedure was carried out.  The leg was exsanguinated with an Esmarch bandage.  The tourniquet was inflated to 250 mmHg.  The leg was positioned at approximately 60 degrees of flexion across the knee in a DeMayo leg positioner.    I fashioned an approximately 8 cm incision anteriorly for a standard medial parapatellar approach.  Full-thickness medial and lateral skin flaps were developed.  The extensor mechanism was carefully exposed.  I performed a medial parapatellar arthrotomy, careful to maintain a cuff of tendinous tissue for later anatomic repair.  The joint was entered.  The infrapatellar fat pad was carefully removed.    Next, the anteromedial soft tissues were carefully elevated off of the anterior face of the tibia.  The MCL was kept protected.  I inspected the joint.  She had advanced arthritis of the medial and patellofemoral compartments with significant patellar wear.  She had large patellar osteophytes which were removed at this point.  I debrided some of the extensive synovitis throughout the joint.  Next,  An opening was created in the distal femur.  The wound was irrigated out and then the distal femur alignment judi was inserted down the canal.  A 5 degree valgus distal femoral cutting guide was pinned into position and then the distal femoral cut carried out in the typical fashion.  I inspected and measured to make sure the cut was appropriate.  The cut portion of bone was removed followed by the guide.    Next, the knee was flexed up further to allow for insertion  of the posterior referencing guide.  I measured the distal femur.  I determined the appropriate size as referenced off of the posterior condyles.  The femur measured a size 4 which matched up with her other side.  The guide was positioned, taking care to align this properly and then the anterior, posterior and chamfer cuts were carried out.  The cut portions of bone were then removed.      I then directed my attention to the tibia.  Retractors were positioned to keep the collateral ligaments, PCL and posterior neurovascular structures protected.  The extramedullary guide was positioned.  I took care to align the judi with the anterior face of the tibia.  I made sure that this was parallel and that the guide was centered at the knee and ankle.  I measured and carefully positioned the guide to allow for correction of the preoperative deformity.  I pinned the guide and then checked the alignment one more time with an regis wing.  Once we had the guide in good position and secure, an oscillating saw was used to carry out the proximal tibia cut.  The cut portion of bone was removed and the PCL inspected.  The PCL felt fine.  At this point I considered that we could likely place a cruciate retaining implant matching her other side..  The menisci were removed and the posterior capsule was infiltrated with some of the Ortho cocktail solution.    Next, I measured the proximal tibia cut.  This measured between A3 and A4.  She had a 4 on the other side but I felt like the 3 actually fit better on the right.  The trial implant was pinned into position, taking care to maintain appropriate rotation.  The proximal tibial preparations were then completed.  I then trialed with a size 4 femur and size 3 tibia.  The knee seemed to be well balanced and demonstrated excellent motion with a size 11 trial polyethylene liner.    I then examined the patella.  As stated above, she had tremendous patellar wear and the patella was quite thin.   There was just enough bone with which to work for a resurfacing.  I skimmed the surface of the bone to freshen that up and create an optimal surface for cement interdigitation.  It measured a 32 which matched her other side.  I drilled the patella in the typical fashion.  Next, I trialed with a size 32 patella.  With this implant in place, the patella tracked well.  A lateral release was deemed unnecessary in this case.    The final preparations were then completed.  The distal femur was prepared and then the trial implants were removed.  The appropriate size implants were opened at this point.  My assistant mixed the bone cement on the back table using current generation cement mixing technique and a centrifuge.  Once the cement was prepared, cement was applied to the bony surfaces and implants.  The implants were carefully impacted into position.  I made sure that these were fully seated.  The excess, extruded cement was carefully removed with a Berne elevator.  The knee was taken out into full extension and the trial polyethylene liner inserted.  The patella was then clamped into position.  Again, the excess, extruded cement was removed.  The knee was left in extension with the patella clamped until the cement had fully cured.    While the cement was curing, the periarticular soft tissue structures were carefully infiltrated with the Ortho cocktail solution.  Once the cement had fully cured, I again checked the balancing of the knee.  Again, the knee demonstrated excellent motion and stability with the 11 trial liner.  The trial was removed.  The final implant was impacted into position.  I took care to make sure that the dovetails were fully interdigitated.  Again the knee was carried through range of motion.  The patella tracked well and the knee demonstrated excellent motion and stability.    The wound was irrigated with 500 cc of a Betadine containing saline solution.  This was left in place for 3 minutes.  I  then irrigated with 3 L of sterile saline via pulsatile lavage.  The tourniquet was deflated.  A gram of transexamic acid was administered.  I made sure that we had good hemostasis.  The parapatellar arthrotomy was anatomically repaired using a PDS Stratafix suture and multiple #1 Vicryl sutures.  The subcutaneous tissues were repaired using 2-0 Vicryl.  A running Stratafix Monocryl suture was used to close the skin followed by a Zipline adhesive.  Sterile dressings were applied.  The drapes were withdrawn. She was awakened and taken to the recovery room in good condition.    Anthony Sinclair MD  09/23/24

## 2024-09-23 NOTE — NURSING NOTE
Failure to same day discharge    Patient with significant pain upon ambulation and fatigue requiring rest periods.  Nausea and dizziness also became progressively worse despite treatment with antiemetics.  Patient and family voiced reticence to proceed with discharge to home.

## 2024-09-23 NOTE — BRIEF OP NOTE
TOTAL KNEE ARTHROPLASTY  Progress Note    Patt Bond  9/23/2024    Pre-op Diagnosis:   Arthritis of knee [M17.10]       Post-Op Diagnosis Codes:     * Arthritis of knee [M17.10]    Procedure/CPT® Codes:        Procedure(s):  RIGHT TOTAL KNEE ARTHROPLASTY    Surgical Approach: Knee Medial Parapatellar            Surgeon(s):  Anthony Sinclair MD    Anesthesia: General with Block    Staff:   Circulator: Aleksandra Flores RN  Scrub Person: Jami Winston  Assistant: Nimco Mcclellan CSA  Assistant: Nimco Mcclellan CSA      Estimated Blood Loss: minimal    Urine Voided: * No values recorded between 9/23/2024 12:00 AM and 9/23/2024 11:19 AM *    Specimens:                None          Drains:   [REMOVED] Nephrostomy Left 24 Fr. (Removed)       [REMOVED] Urethral Catheter Silicone 16 Fr. (Removed)       [REMOVED] External Urinary Catheter (Removed)       Findings: See dictation        Complications: None    Assistant: Nimco Mcclellan CSA  was responsible for performing the following activities: Retraction and their skilled assistance was necessary for the success of this case.    Anthony Sinclair MD     Date: 9/23/2024  Time: 1231

## 2024-09-23 NOTE — PLAN OF CARE
Goal Outcome Evaluation:           Progress: no change  Outcome Evaluation: Patient stayed overnight for R total knee r/t dizziness. Ambulating with assist of 1. Voiding without difficulties. Cpap at bedside. Plans to d/c home tomorrow.

## 2024-09-24 ENCOUNTER — HOME HEALTH ADMISSION (OUTPATIENT)
Dept: HOME HEALTH SERVICES | Facility: HOME HEALTHCARE | Age: 79
End: 2024-09-24
Payer: MEDICARE

## 2024-09-24 VITALS
SYSTOLIC BLOOD PRESSURE: 124 MMHG | OXYGEN SATURATION: 96 % | DIASTOLIC BLOOD PRESSURE: 56 MMHG | WEIGHT: 245 LBS | TEMPERATURE: 97.5 F | RESPIRATION RATE: 16 BRPM | BODY MASS INDEX: 46.26 KG/M2 | HEIGHT: 61 IN | HEART RATE: 58 BPM

## 2024-09-24 LAB
HCT VFR BLD AUTO: 32.5 % (ref 34–46.6)
HGB BLD-MCNC: 11 G/DL (ref 12–15.9)

## 2024-09-24 PROCEDURE — 25010000002 ONDANSETRON PER 1 MG: Performed by: ORTHOPAEDIC SURGERY

## 2024-09-24 PROCEDURE — A9270 NON-COVERED ITEM OR SERVICE: HCPCS | Performed by: STUDENT IN AN ORGANIZED HEALTH CARE EDUCATION/TRAINING PROGRAM

## 2024-09-24 PROCEDURE — A9270 NON-COVERED ITEM OR SERVICE: HCPCS | Performed by: ORTHOPAEDIC SURGERY

## 2024-09-24 PROCEDURE — 63710000001 ASPIRIN 81 MG TABLET DELAYED-RELEASE: Performed by: ORTHOPAEDIC SURGERY

## 2024-09-24 PROCEDURE — 85018 HEMOGLOBIN: CPT | Performed by: ORTHOPAEDIC SURGERY

## 2024-09-24 PROCEDURE — 25010000002 CEFAZOLIN PER 500 MG: Performed by: ORTHOPAEDIC SURGERY

## 2024-09-24 PROCEDURE — 97530 THERAPEUTIC ACTIVITIES: CPT

## 2024-09-24 PROCEDURE — 97116 GAIT TRAINING THERAPY: CPT

## 2024-09-24 PROCEDURE — 63710000001 HYDROCODONE-ACETAMINOPHEN 5-325 MG TABLET: Performed by: STUDENT IN AN ORGANIZED HEALTH CARE EDUCATION/TRAINING PROGRAM

## 2024-09-24 PROCEDURE — 85014 HEMATOCRIT: CPT | Performed by: ORTHOPAEDIC SURGERY

## 2024-09-24 RX ORDER — ONDANSETRON 2 MG/ML
4 INJECTION INTRAMUSCULAR; INTRAVENOUS ONCE
Status: DISCONTINUED | OUTPATIENT
Start: 2024-09-24 | End: 2024-09-24 | Stop reason: HOSPADM

## 2024-09-24 RX ADMIN — HYDROCODONE BITARTRATE AND ACETAMINOPHEN 2 TABLET: 5; 325 TABLET ORAL at 04:36

## 2024-09-24 RX ADMIN — SODIUM CHLORIDE 2 G: 900 INJECTION INTRAVENOUS at 03:39

## 2024-09-24 RX ADMIN — ONDANSETRON 4 MG: 2 INJECTION, SOLUTION INTRAMUSCULAR; INTRAVENOUS at 11:00

## 2024-09-24 RX ADMIN — ASPIRIN 81 MG: 81 TABLET, COATED ORAL at 08:35

## 2024-09-24 NOTE — CASE MANAGEMENT/SOCIAL WORK
Discharge Planning Assessment  [facility]     Patient Name: Patt Bond  MRN: [id number]  Today's Date: 9/24/2024    Admit Date: 9/23/2024       Discharge Needs Assessment       Row Name 09/24/24 1013       Living Environment    People in Home spouse    Name(s) of People in Home spouse, Ellis Bond [phone]    Current Living Arrangements home    Potentially Unsafe Housing Conditions none    Primary Care Provided by self    Provides Primary Care For no one    Family Caregiver if Needed spouse    Family Caregiver Names spouse, Ellis [phone]    Quality of Family Relationships helpful;involved;supportive    Able to Return to Prior Arrangements yes       Resource/Environmental Concerns    Resource/Environmental Concerns none       Transition Planning    Patient/Family Anticipates Transition to home with family    Patient/Family Anticipated Services at Transition none    Transportation Anticipated family or friend will provide       Discharge Needs Assessment    Readmission Within the Last 30 Days no previous admission in last 30 days    Equipment Currently Used at Home cpap;walker, rolling;cane, straight                   Discharge Plan       Row Name 09/24/24 1014       Plan    Plan home [facility]    Patient/Family in Agreement with Plan yes    Plan Comments Spoke with patient at bedside. Introduced self and explained role. Facesheet verified. Patient lives with her , Ellis Bond [phone], in a single story house with 3 LINCOLN. She has a rollator. [facility] has already been arranged and will follow at [place]. Spouse will transport home.                  Continued Care and Services - Admitted Since 9/23/2024       Home Medical Care Coordination complete.      Service Provider Request Status Selected Services Address Phone Fax Patient Preferred    [facility]  Selected Home Rehabilitation [address] [address] [phone] --                  Expected  Discharge Date and Time       Expected Discharge Date Expected Discharge Time    Sep 23, 2024            Demographic Summary       Row Name 09/24/24 1012       General Information    Admission Type observation    Arrived From PACU/recovery room    Referral Source admission list    Reason for Consult discharge planning    Preferred Language English                   Functional Status       Row Name 09/24/24 1012       Functional Status    Usual Activity Tolerance good    Current Activity Tolerance moderate       Functional Status, IADL    Medications independent    Meal Preparation independent    Housekeeping independent    Laundry independent    Shopping independent       Mental Status    General Appearance WDL WDL                   Psychosocial    No documentation.                  Abuse/Neglect    No documentation.                  Legal    No documentation.                  Substance Abuse    No documentation.                  Patient Forms    No documentation.                     Aminah Aj RN

## 2024-09-24 NOTE — PLAN OF CARE
Goal Outcome Evaluation:  Plan of Care Reviewed With: patient, spouse        Progress: improving  Outcome Evaluation: Pt seen for PT this AM and tolerated progressed mobility well. Pt transferred to EOB with SBA, STS with CGA, and ambulated 200ft with rwx and CGA. Pt demo's fluid movement of rwx with step-through sequencing with cues, no LOB or significant unsteadiness. Pt needing a few standing rest breaks and 2 seated rest breaks d/t fatigue/pain. Pt did 4 stairs with CGA and cues for sequencing, B HR use. Pt returned to room and had an episode of vomiting following mobility, had c/o nausea the entire session and asking about pain meds. Pt tolerated TKA exercises sitting UIC, left with needs met. PT will continue to follow, safe to DC home today with HHPT and family assist. PT will sign-off.      Anticipated Discharge Disposition (PT): home with assist, home with home health

## 2024-09-24 NOTE — PROGRESS NOTES
Orthopedic Progress Note      Patient: Patt Bond    YOB: 1945    Medical Record Number: [id number]    Attending Physician: Anthony Sinclair MD    Date of Admission: 9/23/2024  9:04 AM    Admitting Dx:  Arthritis of knee [M17.10]  Arthritis of knee, right [M17.11]    Status Post: MN ARTHRP KNE CONDYLE&PLATU MEDIAL&LAT COMPARTMENTS [66871] (RIGHT TOTAL KNEE ARTHROPLASTY)    Post Operative Day Number: 1    Current Problem List:   Arthritis of knee    Arthritis of knee, right      Past Medical History:   Diagnosis Date    Arthritis     OSTEO. LEFT KNEE    Back pain     AT TIMES    Cataract     CLIFFORD EYES    Chronic diarrhea     Diverticulitis     WITH RESECTION    Diverticulosis     Goiter     History of colon polyps     BENIGN    History of sepsis     Hyperthyroidism     followed by Dr. Blackmon. PARTIAL THRYOIDECTOMY    Kidney stones     HISTORY OF MULTIPLE TIMES. URIC AND CALCIUM STONES    Lung nodule     HISTORY OF-NOTE LATEST CT CHEST 12/2022    Mass of mediastinum     HISTORY OF. BENIGN.    OA (osteoarthritis) of knee     RIGHT KNEE:  PAIN, WEAKNESS, LIMITED MOBILITY    Obstructive pyelonephritis 09/28/2015    left obstructing pyelonephritis     PONV (postoperative nausea and vomiting)     Rectal prolapse     CHRONIC    Risk for falls     USING ROLLATOR AND CANE    Sleep apnea     CPAP MACHINE    SOB (shortness of breath) on exertion     SEES DR VASQUEZ    Urinary urgency        Current Medications:  Scheduled Meds:aspirin, 81 mg, Oral, Once  aspirin, 81 mg, Oral, Q12H  ethyl alcohol, 2 Swab, Nasal, Once  ethyl alcohol, 2 Swab, Nasal, Q12H  ondansetron, 4 mg, Intravenous, Once      PRN Meds:.  HYDROcodone-acetaminophen    HYDROcodone-acetaminophen    ondansetron    ondansetron ODT    SUBJECTIVE: 79 y.o.  female awake and alert.  Complaints of nausea.  Vomited twice this morning.    OBJECTIVE:   Vitals:    09/23/24 2051 09/24/24 0104 09/24/24 0510 09/24/24 1022   BP: 123/59 113/63 130/70 118/61    BP Location: Left arm Left arm Left arm Left arm   Patient Position: Lying Lying Lying Sitting   Pulse: 61 61 61 59   Resp: 16 16 16 16   Temp: 97 °F (36.1 °C) 97 °F (36.1 °C) 97.8 °F (36.6 °C) 97.9 °F (36.6 °C)   TempSrc: Oral Oral Oral Oral   SpO2: 97% 93% 94% 97%   Weight:       Height:         I/O last 3 completed shifts:  In: 1440 [P.O.:240; I.V.:1200]  Out: -     Diagnostic Tests:  Lab Results (last 72 hours)       Procedure Component Value Units Date/Time    Hemoglobin & Hematocrit, Blood [[id number]]  (Abnormal) Collected: 09/24/24 0531    Specimen: Blood Updated: 09/24/24 0552     Hemoglobin 11.0 g/dL      Hematocrit 32.5 %     Hemoglobin & Hematocrit, Blood [[id number]]  (Abnormal) Collected: 09/23/24 1938    Specimen: Blood Updated: 09/23/24 2036     Hemoglobin 11.5 g/dL      Hematocrit 34.1 %              PHYSICAL EXAM: Right knee dressing is intact with a small area of old bloody drainage noted distal third of the dressing.  Does have some swelling to the knee and some ecchymosis.  Battery is functioning properly.  Calf is soft and nontender.  She has good motion sensation to her foot and ankle pain is well-controlled.  Patient has had 2 episodes of vomiting this morning and is complaining of nausea.  Abdomen is soft with bowel sounds.  She is not passing any gas.  Did take her pain medicine on an empty stomach which could account for the increased nausea.  Vital signs remained stable.  Hemoglobin is 11.0.  Patient did well with physical therapy this morning.     ASSESSMENT & PLAN:  Zofran for nausea.  Will also have her eat lunch and if her nausea improves and she is able to keep food down then will plan to discharge her home with home health.  Aspirin for DVT prophylaxis.  Return to the office in 2 weeks to see Dr. Sinclair for follow-up      Date: 9/24/2024    Deedee Carrillo RN

## 2024-09-24 NOTE — CASE MANAGEMENT/SOCIAL WORK
Case Management Discharge Note      Final Note: dc home with [facility]    Provided Post Acute Provider List?: Yes  Post Acute Provider List: Home Health  Delivered To: Patient  Method of Delivery: Telephone    Selected Continued Care - Discharged on 9/24/2024 Admission date: 9/23/2024 - Discharge disposition: Home or Self Care      Destination    No services have been selected for the patient.                Durable Medical Equipment    No services have been selected for the patient.                Dialysis/Infusion    No services have been selected for the patient.                Home Medical Care Coordination complete.      Service Provider Selected Services Address Phone Fax Patient Preferred     [facility] [address] [address] [phone] --              Therapy    No services have been selected for the patient.                Community Resources    No services have been selected for the patient.                Community & DME    No services have been selected for the patient.                    Transportation Services  Private: Car    Final Discharge Disposition Code: 06 - home with home health care   Drowsy

## 2024-09-24 NOTE — PLAN OF CARE
Goal Outcome Evaluation:  Plan of Care Reviewed With: patient        Progress: improving   POD 1. A&O. VSS. Up w x1 assist. Voiding per BRP. Pain managed w prn medication. Dressing cdi. Plans to d/c today.

## 2024-09-24 NOTE — THERAPY DISCHARGE NOTE
Patient Name: Patt Bond  : 1945    MRN: [id number]                              Today's Date: 2024       Admit Date: 2024    Visit Dx:     ICD-10-CM ICD-9-CM   1. H/O total knee replacement, right  Z96.651 V43.65   2. Arthritis of knee, right  M17.11 716.96   3. Arthritis of knee  M17.10 716.96     Patient Active Problem List   Diagnosis    Left lower quadrant pain    Ketonuria due to dehydration    Normocytic anemia    Pancytopenia    Obesity (BMI 30-39.9)    Nausea    Sepsis    Nephrolithiasis    Pleural nodule    Tracheal compression    Hyperthyroidism    Abnormal weight gain    Palpitations    Cholecystitis    History of colon polyps - 4 Tubulovillous adenomas in , normal colonoscopy in     History of abdominal abscess - 3+ E. coli at time of surgery 2016    Multiple drug allergies to Cephalexin, Cephalosporins, Penicillins    Weight gain - 10 pounds in 2 months, unintentional    Multinodular goiter    GRAY (dyspnea on exertion)    Obesity, morbid, BMI 40.0-49.9    Substernal goiter    Obstruction of left ureteropelvic junction (UPJ) due to stone    Morbid obesity with BMI of 40.0-44.9, adult    Obstructive sleep apnea    Hypersomnia with sleep apnea    Sleep related hypoxia    Chronic fatigue    History of lobectomy of thyroid    Impaired fasting glucose    Renal calculus, left    Arthritis of knee    Total knee replacement status, left    Diverticulosis    History of kidney stones    Postprandial diarrhea    Renal calculi    Arthritis of knee, right     Past Medical History:   Diagnosis Date    Arthritis     OSTEO. LEFT KNEE    Back pain     AT TIMES    Cataract     CLIFFORD EYES    Chronic diarrhea     Diverticulitis     WITH RESECTION    Diverticulosis     Goiter     History of colon polyps     BENIGN    History of sepsis     Hyperthyroidism     followed by Dr. Blackmon. PARTIAL THRYOIDECTOMY    Kidney stones     HISTORY OF MULTIPLE TIMES. URIC AND CALCIUM STONES    Lung nodule      HISTORY OF-NOTE LATEST CT CHEST 12/2022    Mass of mediastinum     HISTORY OF. BENIGN.    OA (osteoarthritis) of knee     RIGHT KNEE:  PAIN, WEAKNESS, LIMITED MOBILITY    Obstructive pyelonephritis 09/28/2015    left obstructing pyelonephritis     PONV (postoperative nausea and vomiting)     Rectal prolapse     CHRONIC    Risk for falls     USING ROLLATOR AND CANE    Sleep apnea     CPAP MACHINE    SOB (shortness of breath) on exertion     SEES DR VASQUEZ    Urinary urgency      Past Surgical History:   Procedure Laterality Date    BRONCHOSCOPY N/A 11/06/2017    Procedure: BRONCHOSCOPY WITH ENDOBRONCHIAL ULTRASOUND AND TRANSBRONCHIAL NEEDLE ASPIRATION;  Surgeon: Nando Vasquez MD;  Location: [facility] ENDOSCOPY;  Service:     CARDIAC CATHETERIZATION N/A 09/27/2017    Procedure: Right Heart Cath;  Surgeon: Miguel Gautam MD;  Location: [facility] CATH INVASIVE LOCATION;  Service:     CARDIAC CATHETERIZATION N/A 09/27/2017    Procedure: Left Heart Cath;  Surgeon: Miguel Gautam MD;  Location: [facility] CATH INVASIVE LOCATION;  Service:     CARDIAC CATHETERIZATION N/A 09/27/2017    Procedure: Coronary angiography;  Surgeon: Miguel Gautam MD;  Location: [facility] CATH INVASIVE LOCATION;  Service:     CARDIAC CATHETERIZATION N/A 09/27/2017    Procedure: Left ventriculography;  Surgeon: Miguel Gautam MD;  Location: [facility] CATH INVASIVE LOCATION;  Service:     CHOLECYSTECTOMY N/A 05/17/2017    Procedure: LAPAROSCOPIC CHOLECYSTECTOMY WITH IOC;  Surgeon: Patt Moore MD;  Location: [facility] MAIN OR;  Service:     COLON RESECTION Left 09/14/2016    Laparoscopic Low Anterior Resection with splenic flexure mobilization, drainage of Pelvic Abscess (cultured 3+ E. Coli), Left salpingo-ophorectomy, laparoscopic rectopexy, and umbilical hernia repair, Dr. Patt Moore    COLONOSCOPY N/A 05/15/2008    sigmoid diverticulosis with blunting of the haustral folds and angulation consistent with previous  diverticulitis, no polyps, suggestion of rectal prolapse-Dr. Patt Moore    COLONOSCOPY W/ BIOPSIES AND POLYPECTOMY N/A 04/16/2001    Possible mild gastritis w/ some appearance of formations that look like petechiae in the antrum, no ulcerations, no erosions; cecal polyp approx 4mm sessile; probable transverse colon polyp, although we could not visualize this completely upon pulling out; sigmoid diverticula; multiple rectal polyps, mostly w/ appearance of hyperplasia, largest being 5 to 6mm: removed via snare-Dr. Patt Moore    COLONOSCOPY W/ POLYPECTOMY N/A 12/10/2004    Diverticulosis with sigmoid perideverticulitis with erythema and patchiness around some of the diverticula, proximal ascedning colon polyp-approx 5 mm-removed via snare cauter, two distal ascending colon polyps-7 mm and 3 mm-removed via snare cautery polypectomy, hemorrhoids-Dr. Patt Moore    CYSTOSCOPY W/ URETERAL STENT PLACEMENT Left 04/21/2018    Procedure: CYSTOSCOPY, LEFT RETROGRADE WITH LEFT URETERAL STENT INSERTION;  Surgeon: Stanley Osuna MD;  Location: [facility];  Service: Urology    CYSTOSCOPY W/ URETERAL STENT REMOVAL Left 10/07/2015    Cystoscopy with stent extraction, left ureteral occulusion balloon placement, percutanous nephrostolithotomy with stone volume less than 2.5 cm involving the left lower pole and the left proximal ureter, balloon tract dilation for establishment of nephrostomy tract, antegrade nephrostomy tube placement-Dr. Miguel Monte    CYSTOSCOPY, RETROGRADE PYELOGRAM AND STENT INSERTION Left 09/28/2015    Left cystoscopy with left retrograde pyelogram, left double-J stent placement-Dr. Rivera gregory    CYSTOSCOPY, RETROGRADE PYELOGRAM AND STENT INSERTION Left 09/09/2015    Cystoscopy with bilateral retrograde pyelogram, left double-J stent placement, right ureteral pyeloscopy with laser lithotripsy of the ureteropelvic junction calculus, right double-J stent placement-Dr. Miguel Monte     CYSTOSCOPY, RETROGRADE PYELOGRAM AND STENT INSERTION Right 09/21/2007    Cystoscopy with right retrograde pyelogram, right biliary stent placement-Dr. Miguel Monte    CYSTOSCOPY, RETROGRADE PYELOGRAM AND STENT INSERTION Right 09/07/2007    Cystoscopy with right retrograde pyelogram, right ureteral peyloscopy with extraction distal ureteral mass, right double J stent placement-Dr. Miguel Monte    CYSTOSCOPY, RETROGRADE PYELOGRAM AND STENT INSERTION Left 07/29/2022    Procedure: CYSTOSCOPY, LEFT RETROGRADE PYELOGRAM, LEFT URETERAL STENT;  Surgeon: Cedrick Jones MD;  Location: [facility];  Service: Urology;  Laterality: Left;    CYSTOSCOPY, URETEROSCOPY, RETROGRADE PYELOGRAM, STENT INSERTION Right 09/07/2007    Cystoscopy with right retrograde pyelogram, rigth ureteroscopy with extraction of distal mass, right double J stent placement-Dr. Miguel Monte    D & C HYSTEROSCOPY N/A 05/18/2011    Procedure done due to thickened endomentrium and an endometrial polyp-Dr. Quita Cheatham    DILATATION AND CURETTAGE N/A 03/22/2002    D&C, polyp removal-Dr. Quita Cheatham    EXTRACORPOREAL SHOCK WAVE LITHOTRIPSY (ESWL) Left 04/19/2023    Procedure: LEFT EXTRACORPOREAL SHOCKWAVE LITHOTRIPSY STENT REPLACEMENT;  Surgeon: Miguel Monte MD;  Location: [facility];  Service: Urology;  Laterality: Left;    EXTRACORPOREAL SHOCKWAVE LITHOTRIPSY (ESWL), STENT INSERTION/REMOVAL Left 05/08/2015    Left extracoporeal shockwave lithotripsy, cystoscopy with stent placement-Dr. Miguel Monte    EXTRACORPOREAL SHOCKWAVE LITHOTRIPSY (ESWL), STENT INSERTION/REMOVAL  04/2023    [facility]    MEDIASTINOTOMY Left 03/05/2018    Procedure: left thyroidectomy, resection of substernal thyroid goiter cervical approach;  Surgeon: Quintin Mares III, MD;  Location: [facility];  Service:     PERCUTANEOUS NEPHROSTOLITHOTOMY Left 6/16/2023    Procedure: LEFT PERCUTANEOUS  NEPHROSTOLITHOTOMY, CYSTOSCOPY,  STENT REMOVAL;  Surgeon: Miguel Monte MD;  Location: [facility] HYBRID OR 18/19;  Service: Urology;  Laterality: Left;    SIGMOIDOSCOPY N/A 09/13/2016    Procedure: SIGMOIDOSCOPY FLEXIBLE TO 25 CM;  Surgeon: Patt Moore MD;  Location: [facility] ENDOSCOPY;  Service:     THORACOTOMY  1996    Thoracotomy for ectopic thyroid, Dr. Camarillo    THYROIDECTOMY, PARTIAL      left side 3/05/18    TOTAL KNEE ARTHROPLASTY Left 02/02/2023    Procedure: TOTAL KNEE ARTHROPLASTY;  Surgeon: Anthony Sinclair MD;  Location: [facility] OR OSC;  Service: Orthopedics;  Laterality: Left;    UMBILICAL HERNIA REPAIR N/A 09/14/2016    Procedure: UMBILICAL HERNIA REPAIR ;  Surgeon: Patt Moore MD;  Location: [facility] MAIN OR;  Service:     URETEROSCOPY LASER LITHOTRIPSY WITH STENT INSERTION Left 08/12/2022    Procedure: LEFT URETEROSCOPY LASER LITHOTRIPSY STONE BACKET EXTRACTION, STENT PLACEMENT;  Surgeon: Miguel Monte MD;  Location: [facility] MAIN OR;  Service: Urology;  Laterality: Left;    URETEROSCOPY LASER LITHOTRIPSY WITH STENT INSERTION Left 10/11/2023    Procedure: LEFT URETEROSCOPY LASER LITHOTRIPSY WITH STENT REMOVAL;  Surgeon: Miguel Monte MD;  Location: [facility] MAIN OR;  Service: Urology;  Laterality: Left;    VENTRAL/INCISIONAL HERNIA REPAIR N/A 05/17/2017    Procedure: VENTRAL HERNIA REPAIR WITH MESH, BILATERAL MUSCULOFASCIAL RELEASE;  Surgeon: Patt Moore MD;  Location: [facility] MAIN OR;  Service:       General Information       Row Name 09/24/24 0952          Physical Therapy Time and Intention    Document Type therapy note (daily note);discharge treatment  -     Mode of Treatment individual therapy;physical therapy  -       Row Name 09/24/24 0952          General Information    Patient Profile Reviewed yes  -     Existing Precautions/Restrictions fall  -       Row Name 09/24/24 0952          Cognition    Orientation Status (Cognition) oriented x 4  -       Row Name 09/24/24 0952          Safety  Issues, Functional Mobility    Impairments Affecting Function (Mobility) balance;endurance/activity tolerance;strength;pain;range of motion (ROM)  -               User Key  (r) = Recorded By, (t) = Taken By, (c) = Cosigned By      Initials Name Provider Type     Christie Riggs PT Physical Therapist                   Mobility       Row Name 09/24/24 0953          Bed Mobility    Bed Mobility supine-sit  -     Supine-Sit [place] (Bed Mobility) standby assist;verbal cues  -     Assistive Device (Bed Mobility) head of bed elevated;bed rails  -       Row Name 09/24/24 0953          Sit-Stand Transfer    Sit-Stand [place] (Transfers) verbal cues;contact guard  -     Assistive Device (Sit-Stand Transfers) walker, front-wheeled  -       Row Name 09/24/24 0953          Gait/Stairs (Locomotion)    [place] Level (Gait) contact guard;verbal cues  -     Assistive Device (Gait) walker, front-wheeled  -     Distance in Feet (Gait) 200  -     Deviations/Abnormal Patterns (Gait) antalgic;lalitha decreased;gait speed decreased;stride length decreased;weight shifting decreased  -     Bilateral Gait Deviations forward flexed posture  -     Right Sided Gait Deviations heel strike decreased;weight shift ability decreased  -     [place] Level (Stairs) verbal cues;contact guard  -     Handrail Location (Stairs) both sides  -     Number of Steps (Stairs) 4  -     Ascending Technique (Stairs) step-to-step  -     Descending Technique (Stairs) step-to-step  -       Row Name 09/24/24 0953          Mobility    Extremity Weight-bearing Status right lower extremity  -     Right Lower Extremity (Weight-bearing Status) weight-bearing as tolerated (WBAT)  -               User Key  (r) = Recorded By, (t) = Taken By, (c) = Cosigned By      Initials Name Provider Type     Christie Riggs PT Physical Therapist                   Obj/Interventions       Row Name 09/24/24 0954          Motor  Skills    Therapeutic Exercise --  10 reps TKA protocol  [facility]               User Key  (r) = Recorded By, (t) = Taken By, (c) = Cosigned By      Initials Name Provider Type     Christie Riggs, PT Physical Therapist                   Goals/Plan    No documentation.                  Clinical Impression       [facility] Name 09/24/24 0956          Pain    Pretreatment Pain Rating 2/10  [facility]     Posttreatment Pain Rating 7/10  [facility]     Pain Location - Side/Orientation Right  -     Pain Location incisional  -     Pain Location - knee  -     Pain Intervention(s) Repositioned;Ambulation/increased activity;Rest;Cold applied  -[facility] Name 09/24/24 0956          Plan of Care Review    Plan of Care Reviewed With patient;spouse  -     Progress improving  -     Outcome Evaluation Pt seen for PT this AM and tolerated progressed mobility well. Pt transferred to EOB with SBA, STS with CGA, and ambulated 200ft with rwx and CGA. Pt demo's fluid movement of rwx with step-through sequencing with cues, no LOB or significant unsteadiness. Pt needing a few standing rest breaks and 2 seated rest breaks d/t fatigue/pain. Pt did 4 stairs with CGA and cues for sequencing, B HR use. Pt returned to room and had an episode of vomiting following mobility, had c/o nausea the entire session and asking about pain meds. Pt tolerated TKA exercises sitting UIC, left with needs met. PT will continue to follow, safe to DC home today with HHPT and family assist. PT will sign-off.  -[facility] Name 09/24/24 0956          Vital Signs    O2 Delivery Pre Treatment room air  -     O2 Delivery Intra Treatment room air  -     O2 Delivery Post Treatment room air  -[facility] Name 09/24/24 0956          Positioning and Restraints    Pre-Treatment Position in bed  -     Post Treatment Position chair  -     In Chair notified nsg;reclined;call light within reach;encouraged to call for assist;exit alarm on;with family/caregiver  -                User Key  (r) = Recorded By, (t) = Taken By, (c) = Cosigned By      Initials Name Provider Type     Christie Riggs PT Physical Therapist                   Outcome Measures       Row Name 09/24/24 1001 09/24/24 0837       How much help from another person do you currently need...    Turning from your back to your side while in flat bed without using bedrails? 4  - 3  -OD    Moving from lying on back to sitting on the side of a flat bed without bedrails? 3  - 3  -OD    Moving to and from a bed to a chair (including a wheelchair)? 4  - 3  -OD    Standing up from a chair using your arms (e.g., wheelchair, bedside chair)? 3  - 3  -OD    Climbing 3-5 steps with a railing? 3  - 2  -OD    To walk in hospital room? 3  - 3  -OD    AM-PAC 6 Clicks Score (PT) 20  - 17  -OD    Highest Level of Mobility Goal 6 --> Walk 10 steps or more  - 5 --> Static standing  -OD      Row Name 09/24/24 1001          Functional Assessment    Outcome Measure Options AM-PAC 6 Clicks Basic Mobility (PT)  -               User Key  (r) = Recorded By, (t) = Taken By, (c) = Cosigned By      Initials Name Provider Type     Christie Riggs PT Physical Therapist    Rola Calabrese RN Registered Nurse                  Physical Therapy Education       Title: PT OT SLP Therapies (Done)       Topic: Physical Therapy (Done)       Point: Mobility training (Done)       Learning Progress Summary             Patient Acceptance, E,TB,D, VU by  at 9/24/2024 1001    Acceptance, E,TB,D, VU,NR by  at 9/23/2024 1713                         Point: Home exercise program (Done)       Learning Progress Summary             Patient Acceptance, E,TB,D, VU by  at 9/24/2024 1001                         Point: Body mechanics (Done)       Learning Progress Summary             Patient Acceptance, E,TB,D, VU by  at 9/24/2024 1001    Acceptance, E,TB,D, VU,NR by  at 9/23/2024 1713                         Point: Precautions (Done)       Learning  Progress Summary             Patient Acceptance, E,TB,D, VU by  at 9/24/2024 1001    Acceptance, E,TB,D, VU,NR by  at 9/23/2024 1713                                         User Key       Initials Effective Dates Name Provider Type Discipline     04/08/22 -  Christie Riggs, PT Physical Therapist PT                  PT Recommendation and Plan  Planned Therapy Interventions (PT): balance training, bed mobility training, gait training, home exercise program, patient/family education, ROM (range of motion), stair training, strengthening, stretching, transfer training  Plan of Care Reviewed With: patient, spouse  Progress: improving  Outcome Evaluation: Pt seen for PT this AM and tolerated progressed mobility well. Pt transferred to EOB with SBA, STS with CGA, and ambulated 200ft with rwx and CGA. Pt demo's fluid movement of rwx with step-through sequencing with cues, no LOB or significant unsteadiness. Pt needing a few standing rest breaks and 2 seated rest breaks d/t fatigue/pain. Pt did 4 stairs with CGA and cues for sequencing, B HR use. Pt returned to room and had an episode of vomiting following mobility, had c/o nausea the entire session and asking about pain meds. Pt tolerated TKA exercises sitting UIC, left with needs met. PT will continue to follow, safe to DC home today with HHPT and family assist. PT will sign-off.     Time Calculation:   PT Evaluation Complexity  History, PT Evaluation Complexity: 1-2 personal factors and/or comorbidities  Examination of Body Systems (PT Eval Complexity): total of 3 or more elements  Clinical Presentation (PT Evaluation Complexity): stable  Clinical Decision Making (PT Evaluation Complexity): low complexity  Overall Complexity (PT Evaluation Complexity): low complexity     PT Charges       Row Name 09/24/24 1002             Time Calculation    Start Time 0916  -      Stop Time 0946  -      Time Calculation (min) 30 min  -      PT Received On 09/24/24  -          Time Calculation- PT    Total Timed Code Minutes- PT 30 minute(s)  -         Timed Charges    97759 - PT Therapeutic Exercise Minutes 5  -BH      81346 - Gait Training Minutes  15  -      82671 - PT Therapeutic Activity Minutes 10  -BH         Total Minutes    Timed Charges Total Minutes 30  -       Total Minutes 30  -BH                User Key  (r) = Recorded By, (t) = Taken By, (c) = Cosigned By      Initials Name Provider Type     Christie Riggs, PT Physical Therapist                  Therapy Charges for Today       Code Description Service Date Service Provider Modifiers Qty    21561530943 HC PT THERAPEUTIC ACT EA 15 MIN 9/23/2024 Christie Riggs, PT GP 1    38038669950 HC PT EVAL LOW COMPLEXITY 3 9/23/2024 Christie Riggs, PT GP 1    08715419960 HC GAIT TRAINING EA 15 MIN 9/24/2024 Christie Riggs, PT GP 1    85020318157 HC PT THERAPEUTIC ACT EA 15 MIN 9/24/2024 Christie Riggs, PT GP 1            PT G-Codes  Outcome Measure Options: AM-PAC 6 Clicks Basic Mobility (PT)  AM-PAC 6 Clicks Score (PT): 20    PT Discharge Summary  Anticipated Discharge Disposition (PT): home with assist, home with home health    Christie Riggs, PT  9/24/2024

## 2024-09-25 ENCOUNTER — HOME CARE VISIT (OUTPATIENT)
Dept: HOME HEALTH SERVICES | Facility: HOME HEALTHCARE | Age: 79
End: 2024-09-25
Payer: MEDICARE

## 2024-09-25 ENCOUNTER — TELEPHONE (OUTPATIENT)
Dept: ORTHOPEDIC SURGERY | Facility: HOSPITAL | Age: 79
End: 2024-09-25
Payer: MEDICARE

## 2024-09-25 VITALS
TEMPERATURE: 97.8 F | OXYGEN SATURATION: 96 % | DIASTOLIC BLOOD PRESSURE: 72 MMHG | HEART RATE: 65 BPM | SYSTOLIC BLOOD PRESSURE: 144 MMHG | BODY MASS INDEX: 45.08 KG/M2 | RESPIRATION RATE: 18 BRPM | WEIGHT: 245 LBS | HEIGHT: 62 IN

## 2024-09-25 PROCEDURE — G0151 HHCP-SERV OF PT,EA 15 MIN: HCPCS

## 2024-09-27 ENCOUNTER — HOME CARE VISIT (OUTPATIENT)
Dept: HOME HEALTH SERVICES | Facility: HOME HEALTHCARE | Age: 79
End: 2024-09-27
Payer: MEDICARE

## 2024-09-27 VITALS
RESPIRATION RATE: 18 BRPM | OXYGEN SATURATION: 95 % | DIASTOLIC BLOOD PRESSURE: 76 MMHG | TEMPERATURE: 97.6 F | SYSTOLIC BLOOD PRESSURE: 142 MMHG | HEART RATE: 76 BPM

## 2024-09-27 PROCEDURE — G0151 HHCP-SERV OF PT,EA 15 MIN: HCPCS

## 2024-09-30 ENCOUNTER — HOME CARE VISIT (OUTPATIENT)
Dept: HOME HEALTH SERVICES | Facility: HOME HEALTHCARE | Age: 79
End: 2024-09-30
Payer: MEDICARE

## 2024-09-30 VITALS
TEMPERATURE: 98.4 F | SYSTOLIC BLOOD PRESSURE: 126 MMHG | DIASTOLIC BLOOD PRESSURE: 70 MMHG | OXYGEN SATURATION: 94 % | HEART RATE: 66 BPM

## 2024-09-30 DIAGNOSIS — Z96.651 H/O TOTAL KNEE REPLACEMENT, RIGHT: ICD-10-CM

## 2024-09-30 PROCEDURE — G0157 HHC PT ASSISTANT EA 15: HCPCS

## 2024-09-30 RX ORDER — HYDROCODONE BITARTRATE AND ACETAMINOPHEN 7.5; 325 MG/1; MG/1
1 TABLET ORAL EVERY 6 HOURS PRN
Qty: 30 TABLET | Refills: 0 | Status: SHIPPED | OUTPATIENT
Start: 2024-09-30

## 2024-09-30 NOTE — HOME HEALTH
Subjective: I am hurting so bad. my bowels moved friday but not since ( no cramping at this time)    Wound-Right knee covered with NEIL- new picture loaded in EPIC  Edema-Right knee  Dr Sinclair 10-7-24  Outpatient- [facility] 10-7-24  Falls- none  Medication Changes- none- called for refill and recommended muscle relaxer or increasing to 10 mg    Assessment: Patient does not have pain under control at this time and called for new options to Dr. Sauer office- see medication. Patient is able to maneuver with walker in the home with step thru gait. She was started on standing HEP with limited reps for demo purposes for progession and improved on flexion. Patient re-education on medication regimen, hydration and proper nutrition and used teach back for carryover and accuracy. Patient will benefit with continued PT for progression and reduce risk of decline.    Plan for next visit:  Gait training  Review and progress HEP  Increase ROM  Balance/transfers/safety  trial steps  pain pills refill?

## 2024-09-30 NOTE — TELEPHONE ENCOUNTER
Caller: Patt Bond    Relationship: Self    Best call back number: [phone]    Requested Prescriptions:   Requested Prescriptions     Pending Prescriptions Disp Refills    HYDROcodone-acetaminophen (NORCO) 7.5-325 MG per tablet 30 tablet 0     Sig: Take 1 tablet by mouth Every 6 (Six) Hours As Needed.        Pharmacy where request should be sent:  [facility]    Last office visit with prescribing clinician: 7/3/2024   Last telemedicine visit with prescribing clinician: Visit date not found   Next office visit with prescribing clinician: 10/7/2024     Additional details provided by patient: PATIENT STATED HER PAIN IS STILL AT A 10 AND WOULD LIKE TO INCREASE TO 10 MG OR ADD A MUSCLE RELAXER    Does the patient have less than a 3 day supply:  [x] Yes  [] No    Would you like a call back once the refill request has been completed: [x] Yes [] No    If the office needs to give you a call back, can they leave a voicemail: [x] Yes [] No    Emre Ramirez   09/30/24 10:41 EDT

## 2024-10-02 ENCOUNTER — HOME CARE VISIT (OUTPATIENT)
Dept: HOME HEALTH SERVICES | Facility: HOME HEALTHCARE | Age: 79
End: 2024-10-02
Payer: MEDICARE

## 2024-10-02 VITALS
DIASTOLIC BLOOD PRESSURE: 70 MMHG | OXYGEN SATURATION: 93 % | SYSTOLIC BLOOD PRESSURE: 136 MMHG | TEMPERATURE: 98.3 F | HEART RATE: 93 BPM

## 2024-10-02 PROCEDURE — G0157 HHC PT ASSISTANT EA 15: HCPCS

## 2024-10-02 NOTE — HOME HEALTH
Subjective:  I got the same pain pills. I have had more bowel movements    Wound-Right knee covered with NEIL- new picture loaded in EPIC last visit  Edema-Right knee   Dr Sinclair 10-7-24   Outpatient- [facility] 10-7-24   Falls- none  Medication Changes- none- just refilled the 7.5 mg    Assessment: Patient is able to maneuver with walker in the home with step thru gait and trialed steps today with CGA for safety. She is not ready for a cane at this time.Patient reviewed and progressed standing HEP with cues and improved on flexion. She declines shower transfers at this time.Patient re-education on medication regimen ( her pain is a little better with taking pain pills every 4 hours versus 6 hours), hydration and proper nutrition and used teach back for carryover and accuracy. Patient will benefit with continued PT for progression and reduce risk of decline and will start OP next week after discharge from  next visit    Plan for next visit:   Gait training   Review HEP   Increase ROM   Balance

## 2024-10-04 ENCOUNTER — HOME CARE VISIT (OUTPATIENT)
Dept: HOME HEALTH SERVICES | Facility: HOME HEALTHCARE | Age: 79
End: 2024-10-04
Payer: MEDICARE

## 2024-10-04 VITALS
OXYGEN SATURATION: 93 % | TEMPERATURE: 97.7 F | SYSTOLIC BLOOD PRESSURE: 124 MMHG | RESPIRATION RATE: 18 BRPM | HEART RATE: 67 BPM | DIASTOLIC BLOOD PRESSURE: 82 MMHG

## 2024-10-04 PROCEDURE — G0151 HHCP-SERV OF PT,EA 15 MIN: HCPCS

## 2024-10-05 NOTE — HOME HEALTH
Discharge Summary/Summary of Care Provided: PT  Patient received home health for diagnosis: RTKA  Current level of functional ability: independent transfers, ambulation  Living arrangements: with  Progress towards goals and/or Were all goals met? goals mostly met  If not all goals met, barriers that prevented patient from meeting goals: goals mostly met  SDOH concerns (i.e. Caregiver availability, social isolation, environment, income, transportation access, food insecurity etc.) none  Follow-up appointment plans and community resources provided: follow up 10/7/24  Other imporant information. starts outpatient 10/7/24

## 2024-10-07 ENCOUNTER — OFFICE VISIT (OUTPATIENT)
Dept: ORTHOPEDIC SURGERY | Facility: CLINIC | Age: 79
End: 2024-10-07
Payer: MEDICARE

## 2024-10-07 VITALS — WEIGHT: 245 LBS | HEIGHT: 62 IN | BODY MASS INDEX: 45.08 KG/M2 | TEMPERATURE: 98.6 F

## 2024-10-07 DIAGNOSIS — Z09 SURGERY FOLLOW-UP: ICD-10-CM

## 2024-10-07 DIAGNOSIS — Z96.651 H/O TOTAL KNEE REPLACEMENT, RIGHT: Primary | ICD-10-CM

## 2024-10-07 PROCEDURE — 1159F MED LIST DOCD IN RCRD: CPT | Performed by: ORTHOPAEDIC SURGERY

## 2024-10-07 PROCEDURE — 99024 POSTOP FOLLOW-UP VISIT: CPT | Performed by: ORTHOPAEDIC SURGERY

## 2024-10-07 PROCEDURE — 1160F RVW MEDS BY RX/DR IN RCRD: CPT | Performed by: ORTHOPAEDIC SURGERY

## 2024-10-07 PROCEDURE — 73562 X-RAY EXAM OF KNEE 3: CPT | Performed by: ORTHOPAEDIC SURGERY

## 2024-10-07 RX ORDER — OXYCODONE AND ACETAMINOPHEN 7.5; 325 MG/1; MG/1
1 TABLET ORAL EVERY 4 HOURS PRN
Qty: 50 TABLET | Refills: 0 | Status: SHIPPED | OUTPATIENT
Start: 2024-10-07

## 2024-10-07 NOTE — PROGRESS NOTES
Patt Bond     : 1945     MRN: [id number]     DATE: 10/7/2024    DIAGNOSIS: Follow-up 2 weeks status post total knee arthroplasty    SUBJECTIVE:  Patient returns today for 2 week follow up of recent knee replacement.  She reports compliance with the anticoagulation.  Her biggest issue has been pain control.  She does not feel like the hydrocodone is adequate.    OBJECTIVE:     Exam: The incision is healing appropriately.  No sign of infection.  Range of motion is progressing as expected--2-95.  The calf is soft and nontender with a negative Homans sign.    DIAGNOSTIC STUDIES     Xrays: AP, merchant and lateral views of the right knee were ordered and reviewed for evaluation of recent knee replacement. They demonstrate a well positioned, well aligned knee replacement without complicating factors noted. In comparison with previous films there has been interval implant placement.    ASSESSMENT: 2 week status post right knee replacement    PLAN:    Continue PT per protocol  Continue to ice as needed.  Continue anticoagulation as discussed.  We discussed the need for prophylactic antibiotics prior to dental appointments for 2 years following replacement surgery.  Follow up in 4 weeks.   I agree to switch her to Percocet instead of the hydrocodone.  Risk of this medicine were discussed.      Anthony Sinclair MD    10/7/2024

## 2024-10-21 ENCOUNTER — TELEPHONE (OUTPATIENT)
Dept: ORTHOPEDIC SURGERY | Facility: HOSPITAL | Age: 79
End: 2024-10-21
Payer: MEDICARE

## 2024-11-04 ENCOUNTER — OFFICE VISIT (OUTPATIENT)
Dept: ORTHOPEDIC SURGERY | Facility: CLINIC | Age: 79
End: 2024-11-04
Payer: MEDICARE

## 2024-11-04 VITALS — BODY MASS INDEX: 43.76 KG/M2 | WEIGHT: 237.8 LBS | TEMPERATURE: 98.3 F | HEIGHT: 62 IN

## 2024-11-04 DIAGNOSIS — Z96.651 H/O TOTAL KNEE REPLACEMENT, RIGHT: Primary | ICD-10-CM

## 2024-11-04 PROCEDURE — 99024 POSTOP FOLLOW-UP VISIT: CPT | Performed by: NURSE PRACTITIONER

## 2024-11-04 PROCEDURE — 73562 X-RAY EXAM OF KNEE 3: CPT | Performed by: NURSE PRACTITIONER

## 2024-11-04 PROCEDURE — 1159F MED LIST DOCD IN RCRD: CPT | Performed by: NURSE PRACTITIONER

## 2024-11-04 PROCEDURE — 1160F RVW MEDS BY RX/DR IN RCRD: CPT | Performed by: NURSE PRACTITIONER

## 2024-11-04 RX ORDER — HYDROCODONE BITARTRATE AND ACETAMINOPHEN 7.5; 325 MG/1; MG/1
1 TABLET ORAL EVERY 6 HOURS PRN
Qty: 30 TABLET | Refills: 0 | Status: SHIPPED | OUTPATIENT
Start: 2024-11-04

## 2024-11-04 NOTE — PROGRESS NOTES
Patt Bond : 1945 MRN: [id number] DATE: 2024    DIAGNOSIS: 6 week follow up right TKA      SUBJECTIVE:  Patient returns today for 6 week follow up of right total knee replacement. Patient reports doing well with no unusual complaints.     Vitals:    24 0939   Temp: 98.3 °F (36.8 °C)       OBJECTIVE:     Exam:  The incision is well healed. No sign of infection. Range of motion is measured at 3-100°. The calf is soft and nontender with a negative Homans sign.  Gait is reciprocal heel-to-toe and only mildly antalgic.    DIAGNOSTIC STUDIES    Xrays: 3 views of the right knee (AP, lateral, and sunrise) were ordered and reviewed for evaluation of recent knee replacement. They demonstrate a well positioned, well aligned knee replacement without complicating factors noted. In comparison with previous films there has been no change.    ASSESSMENT:  6 week status post right knee replacement.    PLAN:   Continue PT per protocol  Continue to ice as needed.  May discontinue anticoagulation.   I have agreed to refill her Thayer today.  The risk were discussed.    We discussed the need for prophylactic antibiotics prior to dental appointments for 2 years following replacement surgery.  Follow up in 6 weeks with Dr. Sinclair.     Yancy Cui, APRN  2024

## 2024-11-14 ENCOUNTER — TELEPHONE (OUTPATIENT)
Dept: ORTHOPEDIC SURGERY | Facility: CLINIC | Age: 79
End: 2024-11-14
Payer: MEDICARE

## 2024-11-14 NOTE — TELEPHONE ENCOUNTER
Received a call from [facility] dental office.  Patient is now 7-1/2 weeks postop from her total joint replacement however she has a cracked front tooth.  They were concerned about waiting the full 12 weeks where it could potentially cause a problem or actually break off.  Have advised him to go ahead and do the dental work but they should premedicate her with 2 g of amoxicillin or 2 g of Keflex an hour prior to her procedure.  If the patient needs any further elective dental treatment she should wait till after December 16 which would be 12 weeks after her surgery per Dr. Sinclair

## 2024-12-02 ENCOUNTER — HOSPITAL ENCOUNTER (OUTPATIENT)
Facility: HOSPITAL | Age: 79
Discharge: HOME | End: 2024-12-02
Admitting: INTERNAL MEDICINE
Payer: MEDICARE

## 2024-12-02 DIAGNOSIS — R91.1 LUNG NODULE: ICD-10-CM

## 2024-12-02 PROCEDURE — 71250 CT THORAX DX C-: CPT

## 2024-12-11 ENCOUNTER — TRANSCRIBE ORDERS (OUTPATIENT)
Dept: ADMINISTRATIVE | Facility: HOSPITAL | Age: 79
End: 2024-12-11
Payer: MEDICARE

## 2024-12-11 DIAGNOSIS — R91.1 LUNG NODULE: Primary | ICD-10-CM

## 2024-12-16 ENCOUNTER — OFFICE VISIT (OUTPATIENT)
Dept: ORTHOPEDIC SURGERY | Facility: CLINIC | Age: 79
End: 2024-12-16
Payer: MEDICARE

## 2024-12-16 VITALS — WEIGHT: 232 LBS | BODY MASS INDEX: 42.69 KG/M2 | TEMPERATURE: 98 F | HEIGHT: 62 IN

## 2024-12-16 DIAGNOSIS — Z96.651 H/O TOTAL KNEE REPLACEMENT, RIGHT: Primary | ICD-10-CM

## 2024-12-16 DIAGNOSIS — Z09 SURGERY FOLLOW-UP: ICD-10-CM

## 2024-12-16 PROCEDURE — 1160F RVW MEDS BY RX/DR IN RCRD: CPT | Performed by: ORTHOPAEDIC SURGERY

## 2024-12-16 PROCEDURE — 99024 POSTOP FOLLOW-UP VISIT: CPT | Performed by: ORTHOPAEDIC SURGERY

## 2024-12-16 PROCEDURE — 99213 OFFICE O/P EST LOW 20 MIN: CPT | Performed by: ORTHOPAEDIC SURGERY

## 2024-12-16 PROCEDURE — 73562 X-RAY EXAM OF KNEE 3: CPT | Performed by: ORTHOPAEDIC SURGERY

## 2024-12-16 PROCEDURE — 1159F MED LIST DOCD IN RCRD: CPT | Performed by: ORTHOPAEDIC SURGERY

## 2024-12-16 NOTE — PROGRESS NOTES
Patt Bond : 1945 MRN: [id number] DATE: 2024     DIAGNOSIS: 3 month follow up right TKA, new complaint of right ankle pain    SUBJECTIVE:  Patient returns today for 3 month follow up of right total knee replacement.  She says the knee seems to be doing pretty well but not great.  She gets some discomfort, particularly at night.  She has tried rubbing vitamin E and cocoa butter on the incision but it does not seem to help.  During the day, she says that she is able to do everything that she wants to do and the knee does not bother her.  Relative to before the surgery, she says she is tremendously better but she is just not at 100%.  She also mentions a complaint of new complaint of medial sided ankle pain.  It has been slowly getting worse since the surgery.  She says it is worse when she stands or walks.    Vitals:    24 1047   Temp: 98 °F (36.7 °C)       OBJECTIVE:     Right lower extremity exam:  The incision is well healed. Mild incisional tenderness.  No sign of infection. Range of motion: 0-115. The calf is soft and nontender with a negative Homans sign.  She has what appears to me to be flatfoot deformity.  She is tender along the posterior tibial tendon and medial arch.  No skin breakdown.  Gait is reciprocal heel-to-toe and mildly antalgic, favoring the right leg.    DIAGNOSTIC STUDIES    Xrays: AP, lateral and merchant's views of the right knee are ordered and reviewed for evaluation of recent knee replacement. They demonstrate a well positioned, well aligned knee replacement without complicating factors noted.  In comparison with previous films there has been no change.    ASSESSMENT: 1.  3 months status post right knee replacement  2.  Adult acquired right flatfoot deformity    PLAN: I think her knee is doing fine.  She has some incisional discomfort.  I recommended we try Rx alternatives.  Risk of this medicine were discussed.  I recommend we give the knee a bit more time.  I do  expect her incisional discomfort will slowly resolve.  We discussed appropriate activity modifications and restrictions.  We discussed antibiotic prophylaxis recommendations as well.  I want to see her back in 6 months.    I offered her a brace for the ankle.  She declined.  I also offered her referral to Dr. Hagan.  Again, she declined.  She is going to try putting the Rx alternatives on the ankle and see if that helps.  She will call me if her symptoms change or she changes her mind about the other options we discussed.    Anthony Sinclair MD    12/16/2024

## 2025-01-02 ENCOUNTER — OFFICE VISIT (OUTPATIENT)
Dept: ENDOCRINOLOGY | Age: 80
End: 2025-01-02
Payer: MEDICARE

## 2025-01-02 VITALS
TEMPERATURE: 98 F | WEIGHT: 239.6 LBS | SYSTOLIC BLOOD PRESSURE: 122 MMHG | HEART RATE: 72 BPM | OXYGEN SATURATION: 97 % | BODY MASS INDEX: 44.09 KG/M2 | DIASTOLIC BLOOD PRESSURE: 74 MMHG | HEIGHT: 62 IN

## 2025-01-02 DIAGNOSIS — Z90.09 HISTORY OF LOBECTOMY OF THYROID: ICD-10-CM

## 2025-01-02 DIAGNOSIS — R73.01 IMPAIRED FASTING GLUCOSE: ICD-10-CM

## 2025-01-02 DIAGNOSIS — G47.33 OBSTRUCTIVE SLEEP APNEA: ICD-10-CM

## 2025-01-02 DIAGNOSIS — R73.03 PREDIABETES: ICD-10-CM

## 2025-01-02 DIAGNOSIS — Z87.442 HISTORY OF KIDNEY STONES: ICD-10-CM

## 2025-01-02 DIAGNOSIS — E05.90 HYPERTHYROIDISM: Primary | ICD-10-CM

## 2025-01-02 PROCEDURE — 99214 OFFICE O/P EST MOD 30 MIN: CPT | Performed by: INTERNAL MEDICINE

## 2025-01-02 NOTE — PROGRESS NOTES
Subjective   Patt Bond is a 79 y.o. female.     History of Present Illness     In March 2018, she had a left thyroidectomy for done by Dr. Mares.  Pathology was read as multinodular goiter with degenerative changes including extensive calcification.  Thyroid-stimulating immunoglobulin and thyroid peroxidase antibody were elevated in 2016.  Thyroid ultrasound done in July 2020 showed a 1.2 cm right hypoechoic nodule.      Thyroid ultrasound done in May 2022 showed stable nodule at the junction of the right thyroid and isthmus measuring up to 1.1 cm since July 14, 2020.  CT of the chest done on December 5, 2023 showed incidental stable multinodular thyroid goiter with substernal extension with no evidence of lymphadenopathy and coronary artery calcification.  Cardiac catheterization done in 2017 showed mild luminal irregularities in the LAD, normal circumflex, right coronary and left main.     She denies dysphagia or pressure symptoms.  She is on methimazole 5 mg 1/2 tablet daily.  TSH done in December 2024 is normal at 1.88 with a normal free T3 at 2.7 pg/mL and low free T4 0.90 ng per DL.     She denies palpitations or increased tremors.  She denies heat or cold intolerance.  She had intermittent loose stools since 2019 which is improved with cholestyramine 1 packet/day as needed     She has sleep apnea and is using her CPAP regularly.  She wakes up rested. She has lung nodule which is being monitored by the pulmonologist Dr. Nando Heller.     She has history of nephrolithiasis and had multiple lithotripsies.  Stone analysis showed 87% uric acid and 10% calcium oxalate stones.  She is on allopurinol 300 mg once a day and Urocit 10 mEq BID.  She had 2 shockwave lithotripsy then left percutaneous nephrolithotomy done by Dr. Monte in 6/23.  Uric acid done in December 2023 is normal at 4.8 mg per DL.     She had serum glucose done in 8/21 was 213 mg/dl.  She had gestational diabetes mellitus with the first of  "her 2 pregnancies.  She did not require insulin.     Fasting glucose done in March 2022 is elevated at 115 mg per DL with normal hemoglobin A1c of 5.5%.  Hemoglobin A1c done in October 2022 is normal at 5.4% with a nonfasting glucose of 119 mg per DL.     Hemoglobin A1c done in December 2024 is normal at 4.9% with a nonfasting glucose of 174 mg per DL.    She has no history of hyperlipidemia.  Lipid panel done in December 2024 as follows: Cholesterol 129.  HDL 36.  LDL 49.  Nonfasting triglycerides 282.    She had right knee replacement done by Dr. Sinclair in September 2024 without complications.  She did not require blood transfusion she has completed physical therapy.       The following portions of the patient's history were reviewed and updated as appropriate: allergies, current medications, past family history, past medical history, past social history, past surgical history, and problem list.    Review of Systems   Eyes:  Negative for visual disturbance.   Respiratory:  Negative for shortness of breath and wheezing.    Cardiovascular:  Negative for chest pain and palpitations.   Gastrointestinal:  Positive for diarrhea. Negative for anal bleeding, blood in stool and constipation.   Genitourinary: Negative.    Musculoskeletal:  Negative for myalgias.   Neurological:  Negative for numbness.     Vitals:    01/02/25 1042   BP: 122/74   Pulse: 72   Temp: 98 °F (36.7 °C)   TempSrc: Oral   SpO2: 97%   Weight: 109 kg (239 lb 9.6 oz)   Height: 157.5 cm (62.01\")      Objective   Physical Exam  Constitutional:       Appearance: She is not toxic-appearing or diaphoretic.   Eyes:      General: No scleral icterus.        Right eye: No discharge.         Left eye: No discharge.      Extraocular Movements: Extraocular movements intact.   Neck:      Vascular: No carotid bruit.   Cardiovascular:      Rate and Rhythm: Regular rhythm.      Heart sounds: Normal heart sounds. No murmur heard.     No friction rub. No gallop. "   Pulmonary:      Breath sounds: Normal breath sounds. No rales.   Chest:      Chest wall: No tenderness.   Abdominal:      General: Bowel sounds are normal.      Palpations: Abdomen is soft.      Tenderness: There is no right CVA tenderness or left CVA tenderness.   Musculoskeletal:      Right lower leg: No edema.      Left lower leg: No edema.   Lymphadenopathy:      Cervical: No cervical adenopathy.   Neurological:      Mental Status: She is oriented to person, place, and time.   Psychiatric:         Mood and Affect: Mood normal.       Admission on 09/23/2024, Discharged on 09/24/2024   Component Date Value Ref Range Status    Hemoglobin A1C 09/16/2024 5.40  4.80 - 5.60 % Final    Hemoglobin 09/23/2024 11.5 (L)  12.0 - 15.9 g/dL Final    Hematocrit 09/23/2024 34.1  34.0 - 46.6 % Final    Hemoglobin 09/24/2024 11.0 (L)  12.0 - 15.9 g/dL Final    Hematocrit 09/24/2024 32.5 (L)  34.0 - 46.6 % Final     Assessment & Plan   Diagnoses and all orders for this visit:    1. Hyperthyroidism (Primary)    2. History of lobectomy of thyroid    3. Obstructive sleep apnea    4. Prediabetes    5. Impaired fasting glucose    6. History of kidney stones      Continue methimazole 5 mg 1/2 tablet daily.  Check thyroid function test and adjust dose if needed.    Continue Urised and allopurinol per Dr. Miguel Monte.    Patient has prediabetes.  Continue no concentrated sweet low-fat diet.  Try to lose 5 to 10 pounds.  Exercise regularly as tolerated.    Continue CPAP.    Continue methimazole and Urocit per Dr. Miguel Monte.    Copy of my note sent to Dr. Matute and Dr. Miguel Monte.    RTC 6 mos

## 2025-02-27 ENCOUNTER — OFFICE VISIT (OUTPATIENT)
Dept: ORTHOPEDIC SURGERY | Facility: CLINIC | Age: 80
End: 2025-02-27
Payer: MEDICARE

## 2025-02-27 VITALS — HEIGHT: 62 IN | TEMPERATURE: 96.4 F | BODY MASS INDEX: 43.76 KG/M2 | WEIGHT: 237.8 LBS

## 2025-02-27 DIAGNOSIS — M65.28 CALCIFIC ACHILLES TENDONITIS: ICD-10-CM

## 2025-02-27 DIAGNOSIS — M19.071 ARTHRITIS OF FIRST METATARSOPHALANGEAL (MTP) JOINT OF RIGHT FOOT: ICD-10-CM

## 2025-02-27 DIAGNOSIS — M76.821 TIBIAL TENDONITIS, POSTERIOR, RIGHT: Primary | ICD-10-CM

## 2025-02-27 DIAGNOSIS — M19.071 ARTHRITIS OF RIGHT ANKLE: ICD-10-CM

## 2025-02-27 DIAGNOSIS — M92.61 HAGLUND'S DEFORMITY OF RIGHT HEEL: ICD-10-CM

## 2025-02-27 DIAGNOSIS — R52 PAIN: ICD-10-CM

## 2025-02-27 DIAGNOSIS — M76.829 POSTERIOR TIBIAL TENDON DYSFUNCTION: ICD-10-CM

## 2025-02-27 DIAGNOSIS — M19.071 ARTHRITIS OF RIGHT FOOT: ICD-10-CM

## 2025-02-27 DIAGNOSIS — M20.11 HALLUX VALGUS OF RIGHT FOOT: ICD-10-CM

## 2025-02-27 PROBLEM — M76.60 CALCIFIC ACHILLES TENDONITIS: Status: ACTIVE | Noted: 2025-02-27

## 2025-02-27 PROCEDURE — 99214 OFFICE O/P EST MOD 30 MIN: CPT | Performed by: ORTHOPAEDIC SURGERY

## 2025-02-27 NOTE — PROGRESS NOTES
New Patient Complaint      Patient: Patt Bond  YOB: 1945 79 y.o. female  Medical Record Number: [id number]    Chief Complaints: Heart    History of Present Illness:    patient had right knee replacement done by Dr. Sinclair in September 2024.  She has been following with Dr. Sinclair she related a new complaint of medial sided ankle pain that has been slowly worsening since surgery and was worse when she stood or walked.  He had offered her a brace and/or referral to see me which she declined and she was going to try putting the Rx alternatives compounding cream on her ankle and see if that helped.    Patient reports the pain began after she had surgery on her knee and the doctor clears it may have been for realignment her knee.  She has had persistent pain over the inferomedial aspect of the right ankle and some over the anterior ankle has been worse with walking and has worsened over the last month.  He does not really have much of any pain at the insertion of the Achilles and does not report pain on the first toe.    HPI    Allergies:   Allergies   Allergen Reactions    Cephalexin Hives    Cephalosporins Hives     Took in 2015 w/o problems    Other Hives     ELECTRODE PATCHES    Penicillins Hives       Medications:   Current Outpatient Medications on File Prior to Visit   Medication Sig    allopurinol (ZYLOPRIM) 300 MG tablet Take 1 tablet by mouth Daily. KIDNEY STONE  Indications: Disorder of Excessive Uric Acid in the Blood    cholestyramine (QUESTRAN) 4 g packet Take 1 packet by mouth As Needed. AROUND DINNERTIME  Indications: Itching    methIMAzole (TAPAZOLE) 5 MG tablet TAKE 1/2 TABLET BY MOUTH DAILY    potassium citrate (UROCIT-K) 10 MEQ (1080 MG) CR tablet Take 1 tablet by mouth Daily. Indications: Low Amount of Potassium in the Blood     No current facility-administered medications on file prior to visit.       Past Medical History:   Diagnosis Date    Arthritis     OSTEO. LEFT KNEE     Back pain     AT TIMES    Cataract     CLIFFORD EYES    Chronic diarrhea     Diverticulitis     WITH RESECTION    Diverticulosis     Goiter     History of colon polyps     BENIGN    History of sepsis     Hyperthyroidism     followed by Dr. Blackmon. PARTIAL THRYOIDECTOMY    Kidney stones     HISTORY OF MULTIPLE TIMES. URIC AND CALCIUM STONES    Lung nodule     HISTORY OF-NOTE LATEST CT CHEST 12/2022    Mass of mediastinum     HISTORY OF. BENIGN.    OA (osteoarthritis) of knee     RIGHT KNEE:  PAIN, WEAKNESS, LIMITED MOBILITY    Obstructive pyelonephritis 09/28/2015    left obstructing pyelonephritis     PONV (postoperative nausea and vomiting)     Rectal prolapse     CHRONIC    Risk for falls     USING ROLLATOR AND CANE    Sleep apnea     CPAP MACHINE    SOB (shortness of breath) on exertion     SEES DR VASQUEZ    Urinary urgency      Past Surgical History:   Procedure Laterality Date    BRONCHOSCOPY N/A 11/06/2017    Procedure: BRONCHOSCOPY WITH ENDOBRONCHIAL ULTRASOUND AND TRANSBRONCHIAL NEEDLE ASPIRATION;  Surgeon: Nando Vasquez MD;  Location: [facility] ENDOSCOPY;  Service:     CARDIAC CATHETERIZATION N/A 09/27/2017    Procedure: Right Heart Cath;  Surgeon: Miguel Gautam MD;  Location: [facility] CATH INVASIVE LOCATION;  Service:     CARDIAC CATHETERIZATION N/A 09/27/2017    Procedure: Left Heart Cath;  Surgeon: Miguel Gautam MD;  Location: [facility] CATH INVASIVE LOCATION;  Service:     CARDIAC CATHETERIZATION N/A 09/27/2017    Procedure: Coronary angiography;  Surgeon: Miguel Gautam MD;  Location: [facility] CATH INVASIVE LOCATION;  Service:     CARDIAC CATHETERIZATION N/A 09/27/2017    Procedure: Left ventriculography;  Surgeon: Miguel Gautam MD;  Location: [facility] CATH INVASIVE LOCATION;  Service:     CHOLECYSTECTOMY N/A 05/17/2017    Procedure: LAPAROSCOPIC CHOLECYSTECTOMY WITH IOC;  Surgeon: Patt Moore MD;  Location: [facility] OR;  Service:     COLON RESECTION Left 09/14/2016     Laparoscopic Low Anterior Resection with splenic flexure mobilization, drainage of Pelvic Abscess (cultured 3+ E. Coli), Left salpingo-ophorectomy, laparoscopic rectopexy, and umbilical hernia repair, Dr. Patt Moore    COLONOSCOPY N/A 05/15/2008    sigmoid diverticulosis with blunting of the haustral folds and angulation consistent with previous diverticulitis, no polyps, suggestion of rectal prolapse-Dr. Patt Moore    COLONOSCOPY W/ BIOPSIES AND POLYPECTOMY N/A 04/16/2001    Possible mild gastritis w/ some appearance of formations that look like petechiae in the antrum, no ulcerations, no erosions; cecal polyp approx 4mm sessile; probable transverse colon polyp, although we could not visualize this completely upon pulling out; sigmoid diverticula; multiple rectal polyps, mostly w/ appearance of hyperplasia, largest being 5 to 6mm: removed via snare-Dr. Patt Moore    COLONOSCOPY W/ POLYPECTOMY N/A 12/10/2004    Diverticulosis with sigmoid perideverticulitis with erythema and patchiness around some of the diverticula, proximal ascedning colon polyp-approx 5 mm-removed via snare cauter, two distal ascending colon polyps-7 mm and 3 mm-removed via snare cautery polypectomy, hemorrhoids-Dr. Patt Moore    CYSTOSCOPY W/ URETERAL STENT PLACEMENT Left 04/21/2018    Procedure: CYSTOSCOPY, LEFT RETROGRADE WITH LEFT URETERAL STENT INSERTION;  Surgeon: Stanley Osuna MD;  Location: [facility];  Service: Urology    CYSTOSCOPY W/ URETERAL STENT REMOVAL Left 10/07/2015    Cystoscopy with stent extraction, left ureteral occulusion balloon placement, percutanous nephrostolithotomy with stone volume less than 2.5 cm involving the left lower pole and the left proximal ureter, balloon tract dilation for establishment of nephrostomy tract, antegrade nephrostomy tube placement-Dr. Miguel Monte    CYSTOSCOPY, RETROGRADE PYELOGRAM AND STENT INSERTION Left 09/28/2015    Left cystoscopy with left retrograde pyelogram,  left double-J stent placement-Dr. Miguel Blas    CYSTOSCOPY, RETROGRADE PYELOGRAM AND STENT INSERTION Left 09/09/2015    Cystoscopy with bilateral retrograde pyelogram, left double-J stent placement, right ureteral pyeloscopy with laser lithotripsy of the ureteropelvic junction calculus, right double-J stent placement-Dr. Miguel Monte    CYSTOSCOPY, RETROGRADE PYELOGRAM AND STENT INSERTION Right 09/21/2007    Cystoscopy with right retrograde pyelogram, right biliary stent placement-Dr. Miguel Monte    CYSTOSCOPY, RETROGRADE PYELOGRAM AND STENT INSERTION Right 09/07/2007    Cystoscopy with right retrograde pyelogram, right ureteral peyloscopy with extraction distal ureteral mass, right double J stent placement-Dr. Miguel Monte    CYSTOSCOPY, RETROGRADE PYELOGRAM AND STENT INSERTION Left 07/29/2022    Procedure: CYSTOSCOPY, LEFT RETROGRADE PYELOGRAM, LEFT URETERAL STENT;  Surgeon: Cedrick Jones MD;  Location: [facility];  Service: Urology;  Laterality: Left;    CYSTOSCOPY, URETEROSCOPY, RETROGRADE PYELOGRAM, STENT INSERTION Right 09/07/2007    Cystoscopy with right retrograde pyelogram, rigth ureteroscopy with extraction of distal mass, right double J stent placement-Dr. Miguel Monte    D & C HYSTEROSCOPY N/A 05/18/2011    Procedure done due to thickened endomentrium and an endometrial polyp-Dr. Quita Cheatham    DILATATION AND CURETTAGE N/A 03/22/2002    D&C, polyp removal-Dr. Quita Cheatham    EXTRACORPOREAL SHOCK WAVE LITHOTRIPSY (ESWL) Left 04/19/2023    Procedure: LEFT EXTRACORPOREAL SHOCKWAVE LITHOTRIPSY STENT REPLACEMENT;  Surgeon: Miguel Monte MD;  Location: [facility];  Service: Urology;  Laterality: Left;    EXTRACORPOREAL SHOCKWAVE LITHOTRIPSY (ESWL), STENT INSERTION/REMOVAL Left 05/08/2015    Left extracoporeal shockwave lithotripsy, cystoscopy with stent placement-Dr. Miguel Monte    EXTRACORPOREAL SHOCKWAVE LITHOTRIPSY (ESWL), STENT INSERTION/REMOVAL   04/2023    [facility]    MEDIASTINOTOMY Left 03/05/2018    Procedure: left thyroidectomy, resection of substernal thyroid goiter cervical approach;  Surgeon: Quintin Mares III, MD;  Location: [facility] MAIN OR;  Service:     PERCUTANEOUS NEPHROSTOLITHOTOMY Left 6/16/2023    Procedure: LEFT PERCUTANEOUS  NEPHROSTOLITHOTOMY, CYSTOSCOPY, STENT REMOVAL;  Surgeon: iMguel Monte MD;  Location: [facility] HYBRID OR 18/19;  Service: Urology;  Laterality: Left;    SIGMOIDOSCOPY N/A 09/13/2016    Procedure: SIGMOIDOSCOPY FLEXIBLE TO 25 CM;  Surgeon: Patt Moore MD;  Location: [facility] ENDOSCOPY;  Service:     THORACOTOMY  1996    Thoracotomy for ectopic thyroid, Dr. Camarillo    THYROIDECTOMY, PARTIAL      left side 3/05/18    TOTAL KNEE ARTHROPLASTY Left 02/02/2023    Procedure: TOTAL KNEE ARTHROPLASTY;  Surgeon: Anthony Sinclair MD;  Location: [facility] OR [facility];  Service: Orthopedics;  Laterality: Left;    TOTAL KNEE ARTHROPLASTY Right 9/23/2024    Procedure: RIGHT TOTAL KNEE ARTHROPLASTY;  Surgeon: Anthony Sinclair MD;  Location: [facility] OR [facility];  Service: Orthopedics;  Laterality: Right;    UMBILICAL HERNIA REPAIR N/A 09/14/2016    Procedure: UMBILICAL HERNIA REPAIR ;  Surgeon: Patt Moore MD;  Location: [facility] MAIN OR;  Service:     URETEROSCOPY LASER LITHOTRIPSY WITH STENT INSERTION Left 08/12/2022    Procedure: LEFT URETEROSCOPY LASER LITHOTRIPSY STONE BACKET EXTRACTION, STENT PLACEMENT;  Surgeon: Miguel Monte MD;  Location: [facility] MAIN OR;  Service: Urology;  Laterality: Left;    URETEROSCOPY LASER LITHOTRIPSY WITH STENT INSERTION Left 10/11/2023    Procedure: LEFT URETEROSCOPY LASER LITHOTRIPSY WITH STENT REMOVAL;  Surgeon: Miguel Monte MD;  Location: [facility] MAIN OR;  Service: Urology;  Laterality: Left;    VENTRAL/INCISIONAL HERNIA REPAIR N/A 05/17/2017    Procedure: VENTRAL HERNIA REPAIR WITH MESH, BILATERAL MUSCULOFASCIAL RELEASE;  Surgeon: Patt Moore MD;  Location: [facility]  "MAIN OR;  Service:      Social History     Occupational History     Employer: RETIRED   Tobacco Use    Smoking status: Never     Passive exposure: Never    Smokeless tobacco: Never   Vaping Use    Vaping status: Never Used   Substance and Sexual Activity    Alcohol use: No    Drug use: No    Sexual activity: Defer      Social History     Social History Narrative    Not on file     Family History   Problem Relation Age of Onset    Heart disease Father     Heart attack Paternal Uncle     Cancer Maternal Grandmother         ovarian    Heart attack Paternal Grandfather     Heart attack Paternal Uncle     Heart attack Paternal Uncle     Heart attack Paternal Uncle     Heart attack Paternal Uncle     Heart attack Paternal Uncle     Scleroderma Mother     Hypertension Sister     Malig Hyperthermia Neg Hx        Review of Systems: 14 point review of systems performed, positive pertinent findings identified in HPI. All remaining systems negative     Review of Systems      Physical Exam:   Vitals:    02/27/25 1426   Temp: 96.4 °F (35.8 °C)   Weight: 108 kg (237 lb 12.8 oz)   Height: 157.5 cm (62\")   PainSc: 7      Physical Exam   Constitutional: pleasant, well developed   Eyes: sclera non icteric  Hearing : adequate for exam  Cardiovascular: palpable pulses in right foot, right calf/ thigh NT without sign of DVT  Respiratoy: breathing unlabored   Neurological: grossly sensate to LT throughout right LE  Psychiatric: oriented with normal mood and affect.   Lymphatic: No palpable popliteal lymphadenopathy right LE  Skin: intact throughout right leg/foot  Musculoskeletal: She appears to have neutral alignment to the ankle.  She has moderate tenderness over the anteromedial ankle and inferomedially on the posterior tib tendon with some mild swelling worse with increased pain on resisted  inversion  Physical Exam  Ortho Exam    Radiology: 3 views right ankle including lateral view of the foot and 2 views of the right foot ordered " evaluate pain and alignment and no prior x-rays available for comparison.  Ankle x-rays show the talus remains well-seated within the mortise no obvious fracture or gross malalignment there is plantar more so than posterior calcaneal spurring with prominent superior calcaneal tuberosity.  There may be some arthritis limited spurring of the anterior ankle and some loss of subtalar joint space.  Foot x-rays show moderate hallux valgus with first MTP arthritis.  There is some arthritis at the second TMT joint.    Assessment/Plan: 1.  Right inferomedial hindfoot pain with posterior tib tendinitis and possible tear  2.  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  Right ankle arthritis  4.  Right midfoot arthritis  5.  Right hallux valgus deformity with first MTP arthritis    We discussed treatment going forward and main thing that is bothering her the inferomedial pain I am concerned she may have a posterior tib tendon tear    We had her fitted with a PTTD brace to help support this and recommend that she use a cane or walker that she has not been using it that she has to offload this.    Will get an MRI of her right hindfoot for posterior tib tendon tear in order to help tailor treatment protocol.    Will see her back in about 3 weeks to review MRI assess progress and determine treatment course going forward.

## 2025-03-04 DIAGNOSIS — M76.829 POSTERIOR TIBIAL TENDON DYSFUNCTION: Primary | ICD-10-CM

## 2025-03-04 DIAGNOSIS — E05.90 HYPERTHYROIDISM: ICD-10-CM

## 2025-03-04 RX ORDER — DIAZEPAM 5 MG/1
TABLET ORAL
Qty: 1 TABLET | Refills: 0 | Status: CANCELLED | OUTPATIENT
Start: 2025-03-04

## 2025-03-04 RX ORDER — DIAZEPAM 5 MG/1
TABLET ORAL
Qty: 1 TABLET | Refills: 0 | Status: SHIPPED | OUTPATIENT
Start: 2025-03-04

## 2025-03-04 NOTE — TELEPHONE ENCOUNTER
Caller: Patt Bond    Relationship: Self    Best call back number: [phone]    Requested Prescriptions:   SEDATIVE FOR MRI ON 03/28/25     Pharmacy where request should be sent: [facility] [id number] - [facility] [address] AT [facility] [address] [facility] [phone]  - [phone] FX     Last office visit with prescribing clinician: 2/27/2025   Last telemedicine visit with prescribing clinician: Visit date not found   Next office visit with prescribing clinician: 4/7/2025     Would you like a call back once the refill request has been completed: [] Yes [] No    If the office needs to give you a call back, can they leave a voicemail: [] Yes [] No    Lowell Gregory   03/04/25 12:28 EST

## 2025-03-05 RX ORDER — METHIMAZOLE 5 MG/1
2.5 TABLET ORAL DAILY
Qty: 45 TABLET | Refills: 1 | Status: SHIPPED | OUTPATIENT
Start: 2025-03-05

## 2025-03-05 NOTE — TELEPHONE ENCOUNTER
Rx Refill Note  Requested Prescriptions     Pending Prescriptions Disp Refills    methIMAzole (TAPAZOLE) 5 MG tablet [Pharmacy Med Name: METHIMAZOLE 5 MG TABLET] 45 tablet 1     Sig: TAKE 1/2 TABLET BY MOUTH DAILY      Last office visit with prescribing clinician: 1/2/2025   Last telemedicine visit with prescribing clinician: Visit date not found   Next office visit with prescribing clinician: 7/3/2025                         Would you like a call back once the refill request has been completed: [] Yes [] No    If the office needs to give you a call back, can they leave a voicemail: [] Yes [] No    Aminah Longoria MA  03/05/25, 07:40 EST

## 2025-03-19 ENCOUNTER — TELEPHONE (OUTPATIENT)
Dept: ORTHOPEDIC SURGERY | Facility: CLINIC | Age: 80
End: 2025-03-19
Payer: MEDICARE

## 2025-03-19 NOTE — TELEPHONE ENCOUNTER
Patient called stating that the PTTD brace is causing her right knee (previously replaced) to hurt and she is wanting to know if she has to wear it until her follow up on 4/7. She also stated that her right ankle is still staying swollen. Please advise

## 2025-03-28 ENCOUNTER — HOSPITAL ENCOUNTER (OUTPATIENT)
Dept: MRI IMAGING | Facility: HOSPITAL | Age: 80
Discharge: HOME | End: 2025-03-28
Payer: MEDICARE

## 2025-03-28 DIAGNOSIS — M19.071 ARTHRITIS OF RIGHT ANKLE: ICD-10-CM

## 2025-03-28 DIAGNOSIS — M76.829 POSTERIOR TIBIAL TENDON DYSFUNCTION: ICD-10-CM

## 2025-03-28 DIAGNOSIS — M92.61 HAGLUND'S DEFORMITY OF RIGHT HEEL: ICD-10-CM

## 2025-03-28 DIAGNOSIS — M65.28 CALCIFIC ACHILLES TENDONITIS: ICD-10-CM

## 2025-03-28 DIAGNOSIS — M76.821 TIBIAL TENDONITIS, POSTERIOR, RIGHT: ICD-10-CM

## 2025-03-28 PROCEDURE — 73721 MRI JNT OF LWR EXTRE W/O DYE: CPT

## 2025-03-28 NOTE — PROGRESS NOTES
Ankle Follow Up      Patient: Patt Bond    YOB: 1945 79 y.o. female    Chief Complaints: Ankle pain    History of Present Illness: Was seen on 2/27/2025 reporting that she had had right knee replacement done by Dr. Sinclair in September 2024.  She had had been following with Dr. Sinclair to whom she related a new complaint of medial sided ankle pain that had been slowly worsening since surgery and was worse when she stood or walked.  He had offered her a brace and/or referral to see me which she declined and she was going to try putting the Rx alternatives compounding cream on her ankle and see if that helped.     When seen by me on 2/27/2025 patient reported that the pain began after she had surgery on her knee and  felt it may have been for realignment her knee.  She had had persistent pain over the inferomedial aspect of the right ankle and some over the anterior ankle that had been worse with walking and had worsened over the previous month.  She did not really have much if any pain at the insertion of the Achilles and did not report pain on the first toe.    She is felt to have inferomedial hindfoot pain on the right side with posterior tib tendinitis and possible tear as well as insertional Achilles calcifications with David deformity, ankle arthritis, midfoot arthritis and hallux valgus with first MTP arthritis.  Main thing that was bothering her was the inferomedial pain I was concerned about her posterior tib tendon.  She was fitted with a PTTD brace and recommended offloading with a cane or walker and sent for MRI.     HPI    ROS: ankle pain  Past Medical History:   Diagnosis Date    Arthritis     OSTEO. LEFT KNEE    Back pain     AT TIMES    Cataract     CLIFFORD EYES    Chronic diarrhea     Diverticulitis     WITH RESECTION    Diverticulosis     Goiter     Graves disease     History of colon polyps     BENIGN    History of sepsis     Hyperthyroidism     followed by Dr. Blackmon. PARTIAL  "THRYOIDECTOMY    Kidney stones     HISTORY OF MULTIPLE TIMES. URIC AND CALCIUM STONES    Lung nodule     HISTORY OF-NOTE LATEST CT CHEST 12/2022    Mass of mediastinum     HISTORY OF. BENIGN.    OA (osteoarthritis) of knee     RIGHT KNEE:  PAIN, WEAKNESS, LIMITED MOBILITY    Obstructive pyelonephritis 09/28/2015    left obstructing pyelonephritis     PONV (postoperative nausea and vomiting)     Rectal prolapse     CHRONIC    Risk for falls     USING ROLLATOR AND CANE    Sleep apnea     CPAP MACHINE    SOB (shortness of breath) on exertion     SEES DR VASQUEZ    Urinary urgency        Physical Exam:   Vitals:    04/07/25 1311   Temp: 96.4 °F (35.8 °C)   Weight: 108 kg (237 lb)   Height: 157.5 cm (62\")   PainSc: 8      Well developed with normal mood.  On exam she had pes planus with walking cane.  She had mild swelling over the inferomedial aspect of the right hindfoot with some tenderness to palpation there appears mild tenderness of the calf no focal venous cords and no tenderness on the tibia itself.  No discomfort to the sinus tarsi.      Radiology: MRI films and report of the right hindfoot dated 3/8/2025 reviewed which shows nonspecific small 0.8 cm x 0.8 cm x 0.5 cm lesion superficial to the plantar fascia and immediately deep to the skin at the medial plantar midfoot.  There is venous varicosities in the tarsal tunnel as well plantar calcaneal spur    There was incompletely imaged signal bone marrow lesion in the distal tibia possible osteonecrosis    There is degenerative subchondral cystic change of the medial talar dome and plantar anterior process of the calcaneus plantar base of the distal aspect of the cuboid plantar distal aspect of the lateral cuneiform and bases of the 3rd, 4th and 5th metatarsals.  There is close abutment of the anterior process the calcaneus and lateral navicular without evidence of osseous coalition.  There is suspected full-thickness focal chondral fissuring of the medial talar " dome articular cartilage of the tibiotalar and subtalar joint was otherwise well-maintained.  There is mild multifocal chondromalacial changes of the midfoot    There is nonspecific abnormal fluid in the peroneal tendon sheath and mild Achilles tendinopathy.  Posterior tib tendon appeared intact      Assessment/Plan: 1.  Right pes planus with posterior tib tendon dysfunction with swelling as well as calf tightness  2.  Right medial talar dome full-thickness chondral fissuring  3.  Right multifocal degenerative changes of the midfoot  4.  Right degenerative subchondral cystic changes medial talar dome, anterior process calcaneus, plantar base and distal aspect of cuboid, plantar distal aspect of the lateral cuneiform and bases of 3rd, 4th and 5th metatarsals with close abutment of the anterior process calcaneus and lateral navicular  5.  Right mild Achilles tendinopathy  6.  Right asymptomatic subcutaneous midfoot lesion  7.  Asymptomatic right mid tibial marrow lesion possible osteonecrosis  8.  Right medial hindfoot venous varicosities      We discussed treatment going forward and nothing I can recommend from a surgical standpoint and could not give her a clear etiology of her symptoms but is having swelling pain and swelling over the inferomedial ankle.  I am encouraged that she reports swelling has improved to some degree and reviewed there is nothing I would reliably do from a surgical standpoint for her.    She had some EZEQUIEL hose that she had from the hospital but feels that they are too tight to get her fitted with some compression was.  The PTTD brace has been bothersome to her knee and has not helped her ankle.  We had her fitted with a medial heel wedge    Also with her calf tightness would send her for a Doppler to make sure there is no DVT.    Also sent her to work on physical therapy with [facility] [employer] and possible custom orthotics and prescribed compounded cream to apply several times  daily.    Anything worsens show me know otherwise I will see her back 6 to 8 weeks no x-rays.

## 2025-04-07 ENCOUNTER — OFFICE VISIT (OUTPATIENT)
Dept: ORTHOPEDIC SURGERY | Facility: CLINIC | Age: 80
End: 2025-04-07
Payer: MEDICARE

## 2025-04-07 VITALS — WEIGHT: 237 LBS | TEMPERATURE: 96.4 F | HEIGHT: 62 IN | BODY MASS INDEX: 43.61 KG/M2

## 2025-04-07 DIAGNOSIS — M19.071 ARTHRITIS OF RIGHT ANKLE: ICD-10-CM

## 2025-04-07 DIAGNOSIS — M76.60 CALCIFIC ACHILLES TENDONITIS: ICD-10-CM

## 2025-04-07 DIAGNOSIS — M19.071 ARTHRITIS OF RIGHT FOOT: ICD-10-CM

## 2025-04-07 DIAGNOSIS — M19.071 ARTHRITIS OF FIRST METATARSOPHALANGEAL (MTP) JOINT OF RIGHT FOOT: ICD-10-CM

## 2025-04-07 DIAGNOSIS — M92.61 HAGLUND'S DEFORMITY OF RIGHT HEEL: ICD-10-CM

## 2025-04-07 DIAGNOSIS — M79.661 RIGHT CALF PAIN: Primary | ICD-10-CM

## 2025-04-07 DIAGNOSIS — M76.829 POSTERIOR TIBIAL TENDON DYSFUNCTION: ICD-10-CM

## 2025-04-07 DIAGNOSIS — M65.979 PERONEAL TENOSYNOVITIS: ICD-10-CM

## 2025-04-07 DIAGNOSIS — M20.11 HALLUX VALGUS OF RIGHT FOOT: ICD-10-CM

## 2025-04-07 DIAGNOSIS — R22.41 MASS OF FOOT, RIGHT: ICD-10-CM

## 2025-04-07 DIAGNOSIS — M25.871 IMPINGEMENT SYNDROME OF RIGHT ANKLE: ICD-10-CM

## 2025-04-07 PROCEDURE — 99214 OFFICE O/P EST MOD 30 MIN: CPT | Performed by: ORTHOPAEDIC SURGERY

## 2025-04-08 ENCOUNTER — HOSPITAL ENCOUNTER (INPATIENT)
Facility: HOSPITAL | Age: 80
LOS: 2 days | Discharge: HOME | DRG: 176 | End: 2025-04-10
Attending: EMERGENCY MEDICINE | Admitting: STUDENT IN AN ORGANIZED HEALTH CARE EDUCATION/TRAINING PROGRAM
Payer: MEDICARE

## 2025-04-08 ENCOUNTER — TELEPHONE (OUTPATIENT)
Dept: ORTHOPEDIC SURGERY | Facility: CLINIC | Age: 80
End: 2025-04-08
Payer: MEDICARE

## 2025-04-08 ENCOUNTER — HOSPITAL ENCOUNTER (OUTPATIENT)
Dept: CARDIOLOGY | Facility: HOSPITAL | Age: 80
Discharge: HOME | End: 2025-04-08
Admitting: ORTHOPAEDIC SURGERY
Payer: MEDICARE

## 2025-04-08 ENCOUNTER — APPOINTMENT (OUTPATIENT)
Dept: CT IMAGING | Facility: HOSPITAL | Age: 80
DRG: 176 | End: 2025-04-08
Payer: MEDICARE

## 2025-04-08 DIAGNOSIS — I82.4Y1 ACUTE DEEP VEIN THROMBOSIS (DVT) OF PROXIMAL VEIN OF RIGHT LOWER EXTREMITY: ICD-10-CM

## 2025-04-08 DIAGNOSIS — I26.92 ACUTE SADDLE PULMONARY EMBOLISM WITHOUT ACUTE COR PULMONALE: Primary | ICD-10-CM

## 2025-04-08 DIAGNOSIS — M79.661 RIGHT CALF PAIN: ICD-10-CM

## 2025-04-08 PROBLEM — F41.9 ANXIETY DISORDER: Status: ACTIVE | Noted: 2025-04-08

## 2025-04-08 PROBLEM — I74.3: Status: ACTIVE | Noted: 2025-04-08

## 2025-04-08 PROBLEM — M1A.9XX0 CHRONIC GOUT: Status: ACTIVE | Noted: 2025-04-08

## 2025-04-08 LAB
ALBUMIN SERPL-MCNC: 3.8 G/DL (ref 3.5–5.2)
ALBUMIN/GLOB SERPL: 1.3 G/DL
ALP SERPL-CCNC: 89 U/L (ref 39–117)
ALT SERPL W P-5'-P-CCNC: 17 U/L (ref 1–33)
ANION GAP SERPL CALCULATED.3IONS-SCNC: 11.4 MMOL/L (ref 5–15)
APTT PPP: 28.7 SECONDS (ref 22.7–35.4)
AST SERPL-CCNC: 24 U/L (ref 1–32)
BASOPHILS # BLD AUTO: 0.01 10*3/MM3 (ref 0–0.2)
BASOPHILS NFR BLD AUTO: 0.2 % (ref 0–1.5)
BH CV LOW VAS RIGHT COMMON FEMORAL SPONT: 1
BH CV LOW VAS RIGHT DISTAL FEMORAL SPONT: 1
BH CV LOW VAS RIGHT MID FEMORAL SPONT: 1
BH CV LOW VAS RIGHT PERONEAL VESSEL: 1
BH CV LOW VAS RIGHT POPLITEAL SPONT: 1
BH CV LOW VAS RIGHT PROXIMAL FEMORAL SPONT: 1
BH CV LOWER VASCULAR LEFT COMMON FEMORAL AUGMENT: NORMAL
BH CV LOWER VASCULAR LEFT COMMON FEMORAL COMPETENT: NORMAL
BH CV LOWER VASCULAR LEFT COMMON FEMORAL COMPRESS: NORMAL
BH CV LOWER VASCULAR LEFT COMMON FEMORAL PHASIC: NORMAL
BH CV LOWER VASCULAR LEFT COMMON FEMORAL SPONT: NORMAL
BH CV LOWER VASCULAR RIGHT COMMON FEMORAL AUGMENT: NORMAL
BH CV LOWER VASCULAR RIGHT COMMON FEMORAL COMPRESS: NORMAL
BH CV LOWER VASCULAR RIGHT COMMON FEMORAL PHASIC: NORMAL
BH CV LOWER VASCULAR RIGHT COMMON FEMORAL SPONT: NORMAL
BH CV LOWER VASCULAR RIGHT COMMON FEMORAL THROMBUS: NORMAL
BH CV LOWER VASCULAR RIGHT DISTAL FEMORAL AUGMENT: NORMAL
BH CV LOWER VASCULAR RIGHT DISTAL FEMORAL COMPRESS: NORMAL
BH CV LOWER VASCULAR RIGHT DISTAL FEMORAL PHASIC: NORMAL
BH CV LOWER VASCULAR RIGHT DISTAL FEMORAL SPONT: NORMAL
BH CV LOWER VASCULAR RIGHT DISTAL FEMORAL THROMBUS: NORMAL
BH CV LOWER VASCULAR RIGHT GASTRONEMIUS COMPRESS: NORMAL
BH CV LOWER VASCULAR RIGHT GREATER SAPH AK COMPRESS: NORMAL
BH CV LOWER VASCULAR RIGHT GREATER SAPH BK COMPRESS: NORMAL
BH CV LOWER VASCULAR RIGHT LESSER SAPH COMPRESS: NORMAL
BH CV LOWER VASCULAR RIGHT MID FEMORAL AUGMENT: NORMAL
BH CV LOWER VASCULAR RIGHT MID FEMORAL COMPRESS: NORMAL
BH CV LOWER VASCULAR RIGHT MID FEMORAL PHASIC: NORMAL
BH CV LOWER VASCULAR RIGHT MID FEMORAL SPONT: NORMAL
BH CV LOWER VASCULAR RIGHT MID FEMORAL THROMBUS: NORMAL
BH CV LOWER VASCULAR RIGHT PERONEAL COMPRESS: NORMAL
BH CV LOWER VASCULAR RIGHT PERONEAL THROMBUS: NORMAL
BH CV LOWER VASCULAR RIGHT POPLITEAL AUGMENT: NORMAL
BH CV LOWER VASCULAR RIGHT POPLITEAL COMPRESS: NORMAL
BH CV LOWER VASCULAR RIGHT POPLITEAL PHASIC: NORMAL
BH CV LOWER VASCULAR RIGHT POPLITEAL SPONT: NORMAL
BH CV LOWER VASCULAR RIGHT POPLITEAL THROMBUS: NORMAL
BH CV LOWER VASCULAR RIGHT POSTERIOR TIBIAL COMPRESS: NORMAL
BH CV LOWER VASCULAR RIGHT PROFUNDA FEMORAL COMPRESS: NORMAL
BH CV LOWER VASCULAR RIGHT PROXIMAL FEMORAL AUGMENT: NORMAL
BH CV LOWER VASCULAR RIGHT PROXIMAL FEMORAL COMPRESS: NORMAL
BH CV LOWER VASCULAR RIGHT PROXIMAL FEMORAL PHASIC: NORMAL
BH CV LOWER VASCULAR RIGHT PROXIMAL FEMORAL SPONT: NORMAL
BH CV LOWER VASCULAR RIGHT PROXIMAL FEMORAL THROMBUS: NORMAL
BH CV LOWER VASCULAR RIGHT SAPHENOFEMORAL JUNCTION COMPRESS: NORMAL
BH CV VAS PRELIMINARY FINDINGS SCRIPTING: 1
BILIRUB SERPL-MCNC: 0.8 MG/DL (ref 0–1.2)
BUN SERPL-MCNC: 11 MG/DL (ref 8–23)
BUN/CREAT SERPL: 9.7 (ref 7–25)
CALCIUM SPEC-SCNC: 9.2 MG/DL (ref 8.6–10.5)
CHLORIDE SERPL-SCNC: 107 MMOL/L (ref 98–107)
CO2 SERPL-SCNC: 23.6 MMOL/L (ref 22–29)
CREAT SERPL-MCNC: 1.13 MG/DL (ref 0.57–1)
DEPRECATED RDW RBC AUTO: 53 FL (ref 37–54)
EGFRCR SERPLBLD CKD-EPI 2021: 50.2 ML/MIN/1.73
EOSINOPHIL # BLD AUTO: 0.19 10*3/MM3 (ref 0–0.4)
EOSINOPHIL NFR BLD AUTO: 4.4 % (ref 0.3–6.2)
ERYTHROCYTE [DISTWIDTH] IN BLOOD BY AUTOMATED COUNT: 15.5 % (ref 12.3–15.4)
GEN 5 1HR TROPONIN T REFLEX: 13 NG/L
GLOBULIN UR ELPH-MCNC: 3 GM/DL
GLUCOSE SERPL-MCNC: 102 MG/DL (ref 65–99)
HCT VFR BLD AUTO: 36.9 % (ref 34–46.6)
HGB BLD-MCNC: 12.2 G/DL (ref 12–15.9)
IMM GRANULOCYTES # BLD AUTO: 0.02 10*3/MM3 (ref 0–0.05)
IMM GRANULOCYTES NFR BLD AUTO: 0.5 % (ref 0–0.5)
INR PPP: 1.2 (ref 0.9–1.1)
LYMPHOCYTES # BLD AUTO: 0.63 10*3/MM3 (ref 0.7–3.1)
LYMPHOCYTES NFR BLD AUTO: 14.8 % (ref 19.6–45.3)
MCH RBC QN AUTO: 30.9 PG (ref 26.6–33)
MCHC RBC AUTO-ENTMCNC: 33.1 G/DL (ref 31.5–35.7)
MCV RBC AUTO: 93.4 FL (ref 79–97)
MONOCYTES # BLD AUTO: 0.27 10*3/MM3 (ref 0.1–0.9)
MONOCYTES NFR BLD AUTO: 6.3 % (ref 5–12)
NEUTROPHILS NFR BLD AUTO: 3.15 10*3/MM3 (ref 1.7–7)
NEUTROPHILS NFR BLD AUTO: 73.8 % (ref 42.7–76)
NRBC BLD AUTO-RTO: 0 /100 WBC (ref 0–0.2)
NT-PROBNP SERPL-MCNC: 111 PG/ML (ref 0–1800)
PLATELET # BLD AUTO: 115 10*3/MM3 (ref 140–450)
PMV BLD AUTO: 8.6 FL (ref 6–12)
POTASSIUM SERPL-SCNC: 4.3 MMOL/L (ref 3.5–5.2)
PROT SERPL-MCNC: 6.8 G/DL (ref 6–8.5)
PROTHROMBIN TIME: 15.2 SECONDS (ref 11.7–14.2)
RBC # BLD AUTO: 3.95 10*6/MM3 (ref 3.77–5.28)
SODIUM SERPL-SCNC: 142 MMOL/L (ref 136–145)
TROPONIN T % DELTA: -13
TROPONIN T NUMERIC DELTA: -2 NG/L
TROPONIN T SERPL HS-MCNC: 15 NG/L
WBC NRBC COR # BLD AUTO: 4.27 10*3/MM3 (ref 3.4–10.8)

## 2025-04-08 PROCEDURE — 71275 CT ANGIOGRAPHY CHEST: CPT

## 2025-04-08 PROCEDURE — 84484 ASSAY OF TROPONIN QUANT: CPT | Performed by: EMERGENCY MEDICINE

## 2025-04-08 PROCEDURE — 93971 EXTREMITY STUDY: CPT

## 2025-04-08 PROCEDURE — 36415 COLL VENOUS BLD VENIPUNCTURE: CPT

## 2025-04-08 PROCEDURE — 85610 PROTHROMBIN TIME: CPT | Performed by: EMERGENCY MEDICINE

## 2025-04-08 PROCEDURE — 93005 ELECTROCARDIOGRAM TRACING: CPT | Performed by: EMERGENCY MEDICINE

## 2025-04-08 PROCEDURE — 85730 THROMBOPLASTIN TIME PARTIAL: CPT | Performed by: EMERGENCY MEDICINE

## 2025-04-08 PROCEDURE — 93010 ELECTROCARDIOGRAM REPORT: CPT | Performed by: INTERNAL MEDICINE

## 2025-04-08 PROCEDURE — 85025 COMPLETE CBC W/AUTO DIFF WBC: CPT | Performed by: EMERGENCY MEDICINE

## 2025-04-08 PROCEDURE — 25510000001 IOPAMIDOL PER 1 ML: Performed by: EMERGENCY MEDICINE

## 2025-04-08 PROCEDURE — 80053 COMPREHEN METABOLIC PANEL: CPT | Performed by: EMERGENCY MEDICINE

## 2025-04-08 PROCEDURE — 83880 ASSAY OF NATRIURETIC PEPTIDE: CPT | Performed by: EMERGENCY MEDICINE

## 2025-04-08 PROCEDURE — 25010000002 ENOXAPARIN PER 10 MG: Performed by: EMERGENCY MEDICINE

## 2025-04-08 PROCEDURE — 99291 CRITICAL CARE FIRST HOUR: CPT

## 2025-04-08 PROCEDURE — 93971 EXTREMITY STUDY: CPT | Performed by: STUDENT IN AN ORGANIZED HEALTH CARE EDUCATION/TRAINING PROGRAM

## 2025-04-08 RX ORDER — NITROGLYCERIN 0.4 MG/1
0.4 TABLET SUBLINGUAL
Status: DISCONTINUED | OUTPATIENT
Start: 2025-04-08 | End: 2025-04-10 | Stop reason: HOSPADM

## 2025-04-08 RX ORDER — SODIUM CHLORIDE 0.9 % (FLUSH) 0.9 %
10 SYRINGE (ML) INJECTION EVERY 12 HOURS SCHEDULED
Status: DISCONTINUED | OUTPATIENT
Start: 2025-04-09 | End: 2025-04-10 | Stop reason: HOSPADM

## 2025-04-08 RX ORDER — ACETAMINOPHEN 160 MG/5ML
650 SOLUTION ORAL EVERY 4 HOURS PRN
Status: DISCONTINUED | OUTPATIENT
Start: 2025-04-08 | End: 2025-04-10 | Stop reason: HOSPADM

## 2025-04-08 RX ORDER — ALLOPURINOL 100 MG/1
300 TABLET ORAL DAILY
Status: DISCONTINUED | OUTPATIENT
Start: 2025-04-09 | End: 2025-04-10 | Stop reason: HOSPADM

## 2025-04-08 RX ORDER — POLYETHYLENE GLYCOL 3350 17 G/17G
17 POWDER, FOR SOLUTION ORAL DAILY PRN
Status: DISCONTINUED | OUTPATIENT
Start: 2025-04-08 | End: 2025-04-10 | Stop reason: HOSPADM

## 2025-04-08 RX ORDER — IOPAMIDOL 755 MG/ML
100 INJECTION, SOLUTION INTRAVASCULAR
Status: COMPLETED | OUTPATIENT
Start: 2025-04-08 | End: 2025-04-08

## 2025-04-08 RX ORDER — HEPARIN SODIUM 10000 [USP'U]/100ML
13.9 INJECTION, SOLUTION INTRAVENOUS
Status: DISCONTINUED | OUTPATIENT
Start: 2025-04-08 | End: 2025-04-08

## 2025-04-08 RX ORDER — ENOXAPARIN SODIUM 150 MG/ML
1 INJECTION SUBCUTANEOUS ONCE
Status: COMPLETED | OUTPATIENT
Start: 2025-04-08 | End: 2025-04-08

## 2025-04-08 RX ORDER — BISACODYL 5 MG/1
5 TABLET, DELAYED RELEASE ORAL DAILY PRN
Status: DISCONTINUED | OUTPATIENT
Start: 2025-04-08 | End: 2025-04-10 | Stop reason: HOSPADM

## 2025-04-08 RX ORDER — SODIUM CHLORIDE 9 MG/ML
40 INJECTION, SOLUTION INTRAVENOUS AS NEEDED
Status: DISCONTINUED | OUTPATIENT
Start: 2025-04-08 | End: 2025-04-10 | Stop reason: HOSPADM

## 2025-04-08 RX ORDER — ACETAMINOPHEN 650 MG/1
650 SUPPOSITORY RECTAL EVERY 4 HOURS PRN
Status: DISCONTINUED | OUTPATIENT
Start: 2025-04-08 | End: 2025-04-10 | Stop reason: HOSPADM

## 2025-04-08 RX ORDER — SODIUM CHLORIDE 0.9 % (FLUSH) 0.9 %
10 SYRINGE (ML) INJECTION AS NEEDED
Status: DISCONTINUED | OUTPATIENT
Start: 2025-04-08 | End: 2025-04-10 | Stop reason: HOSPADM

## 2025-04-08 RX ORDER — ENOXAPARIN SODIUM 150 MG/ML
1 INJECTION SUBCUTANEOUS EVERY 12 HOURS
Status: DISCONTINUED | OUTPATIENT
Start: 2025-04-09 | End: 2025-04-10

## 2025-04-08 RX ORDER — ACETAMINOPHEN 325 MG/1
650 TABLET ORAL EVERY 4 HOURS PRN
Status: DISCONTINUED | OUTPATIENT
Start: 2025-04-08 | End: 2025-04-10 | Stop reason: HOSPADM

## 2025-04-08 RX ORDER — BISACODYL 10 MG
10 SUPPOSITORY, RECTAL RECTAL DAILY PRN
Status: DISCONTINUED | OUTPATIENT
Start: 2025-04-08 | End: 2025-04-10 | Stop reason: HOSPADM

## 2025-04-08 RX ORDER — CALCIUM CARBONATE 500 MG/1
2 TABLET, CHEWABLE ORAL 2 TIMES DAILY PRN
Status: DISCONTINUED | OUTPATIENT
Start: 2025-04-08 | End: 2025-04-10 | Stop reason: HOSPADM

## 2025-04-08 RX ORDER — METHIMAZOLE 5 MG/1
2.5 TABLET ORAL DAILY
Status: DISCONTINUED | OUTPATIENT
Start: 2025-04-09 | End: 2025-04-10 | Stop reason: HOSPADM

## 2025-04-08 RX ORDER — ONDANSETRON 4 MG/1
4 TABLET, ORALLY DISINTEGRATING ORAL EVERY 6 HOURS PRN
Status: DISCONTINUED | OUTPATIENT
Start: 2025-04-08 | End: 2025-04-10 | Stop reason: HOSPADM

## 2025-04-08 RX ORDER — ONDANSETRON 2 MG/ML
4 INJECTION INTRAMUSCULAR; INTRAVENOUS EVERY 6 HOURS PRN
Status: DISCONTINUED | OUTPATIENT
Start: 2025-04-08 | End: 2025-04-10 | Stop reason: HOSPADM

## 2025-04-08 RX ORDER — MORPHINE SULFATE 2 MG/ML
2 INJECTION, SOLUTION INTRAMUSCULAR; INTRAVENOUS
Status: DISCONTINUED | OUTPATIENT
Start: 2025-04-08 | End: 2025-04-10 | Stop reason: HOSPADM

## 2025-04-08 RX ORDER — AMOXICILLIN 250 MG
2 CAPSULE ORAL 2 TIMES DAILY PRN
Status: DISCONTINUED | OUTPATIENT
Start: 2025-04-08 | End: 2025-04-10 | Stop reason: HOSPADM

## 2025-04-08 RX ADMIN — IOPAMIDOL 95 ML: 755 INJECTION, SOLUTION INTRAVENOUS at 18:59

## 2025-04-08 RX ADMIN — ENOXAPARIN SODIUM 105 MG: 150 INJECTION SUBCUTANEOUS at 20:30

## 2025-04-08 NOTE — TELEPHONE ENCOUNTER
Received a call from the vascular lab preliminary results of her Doppler positive for thigh and calf clot.  I did discuss the risk and benefits of being on anticoagulant.  Patient denies any chest pain but she has had some increasing shortness of breath over the last 2 weeks with minimal exertion.  Patient does see a pulmonologist and is scheduled to see Dr. Mar on Monday.  She does have history of pulmonary issues including she has sleep apnea.  Have discussed with Dr. Hagan.  He is recommended the patient be taken to the emergency room for evaluation to rule out possible PE.  I have gone ahead and sent in her prescription for the Eliquis to [facility] pharmacy at [phone].  Prescription is for Eliquis 5 mg, #60, directions are to take 2 tablets p.o. twice daily x 1 week and then 1 tablet p.o. twice daily.  Patient understands that she will be on the Eliquis for probably 3 to 6 months.  I will have her primary care physician take over management of the DVT as well as any continued refills.

## 2025-04-08 NOTE — ED PROVIDER NOTES
EMERGENCY DEPARTMENT ENCOUNTER  Room Number:  E547/1  PCP: Moe Matute MD  Independent Historians: Patient      HPI:  Chief Complaint: Positive DVT on outpatient ultrasound, also complaint of shortness of breath    A complete HPI/ROS/PMH/PSH/SH/FH are unobtainable due to: None    Chronic or social conditions impacting patient care (Social Determinants of Health): None      Context: Patt Bond is a 79 y.o. female with a medical history of hypothyroidism, sleep apnea, arthritis, diverticulitis and kidney stones who presents to the ED c/o acute pain and swelling in the right leg as well as some shortness of breath.  Patient had been seen by her orthopedist recently.  He ordered an outpatient ultrasound of the right lower extremity.  That discovered a positive DVT today.  Patient was advised to come to the hospital for further workup.  She says that she has had some intermittent shortness of breath feeling for the past 2 weeks.  Denies chest pain at this time.  Denies fevers.  Denies cough.  She was prescribed Xarelto but not yet started that prescription.      Review of prior external notes (non-ED) -and- Review of prior external test results outside of this encounter: I independently reviewed the orthopedics office progress note from April 7, 2025 which was yesterday, at 1:20 PM.  Patient had evaluation for her right leg pain and swelling.  At that time a Doppler ultrasound was ordered.  Also sent her to work on physical therapy with Ray[facility] at [facility] and possible custom orthotics and prescribed compounded cream to apply several times daily.    Prescription drug monitoring program review: GAURAV reviewed by Les Pickett DO, Dugger, Jason A, MD       PAST MEDICAL HISTORY  Active Ambulatory Problems     Diagnosis Date Noted    Left lower quadrant pain 07/03/2016    Ketonuria due to dehydration 07/03/2016    Normocytic anemia 07/03/2016    Pancytopenia 07/04/2016    Obesity (BMI 30-39.9) 07/04/2016     Nausea 07/04/2016    Sepsis 07/08/2016    Nephrolithiasis 08/25/2016    Pleural nodule 09/13/2016    Tracheal compression 09/13/2016    Hyperthyroidism 11/04/2016    Abnormal weight gain 11/04/2016    Palpitations 11/05/2016    Cholecystitis 02/14/2017    History of colon polyps - 4 Tubulovillous adenomas in 2004, normal colonoscopy in 2008 02/14/2017    History of abdominal abscess - 3+ E. coli at time of surgery 9/2016 02/14/2017    Multiple drug allergies to Cephalexin, Cephalosporins, Penicillins 02/14/2017    Weight gain - 10 pounds in 2 months, unintentional 02/14/2017    Multinodular goiter 07/07/2017    GRAY (dyspnea on exertion) 09/08/2017    Obesity, morbid, BMI 40.0-49.9 09/08/2017    Substernal goiter 02/13/2018    Obstruction of left ureteropelvic junction (UPJ) due to stone 04/21/2018    Morbid obesity with BMI of 40.0-44.9, adult 04/21/2018    Obstructive sleep apnea 08/06/2018    Hypersomnia with sleep apnea 08/06/2018    Sleep related hypoxia 08/06/2018    Chronic fatigue 06/20/2019    History of lobectomy of thyroid 10/07/2020    Impaired fasting glucose 04/14/2022    Renal calculus, left 07/29/2022    Arthritis of knee 12/08/2022    Total knee replacement status, left 02/02/2023    Diverticulosis 10/28/2021    History of kidney stones 10/28/2021    Postprandial diarrhea 10/28/2021    Renal calculi 06/16/2023    Arthritis of knee, right 07/03/2024    Arthritis of right foot 02/27/2025    Hallux valgus of right foot 02/27/2025    David's deformity of right heel 02/27/2025    Calcific Achilles tendonitis 02/27/2025    Tibial tendonitis, posterior, right 02/27/2025    Posterior tibial tendon dysfunction 02/27/2025    Right calf pain 04/07/2025    Peroneal tenosynovitis 04/07/2025    Impingement syndrome of right ankle 04/07/2025    Mass of foot, right 04/07/2025    Anxiety disorder 04/08/2025     Resolved Ambulatory Problems     Diagnosis Date Noted    Acute diverticulitis of sigmoid colon 07/03/2016     Acute diverticulitis 09/09/2016    Graves disease 11/04/2016    Incisional hernia, without obstruction or gangrene 02/14/2017    Nausea and vomiting 04/21/2018     Past Medical History:   Diagnosis Date    Arthritis     Back pain     Cataract     Chronic diarrhea     Diverticulitis     Goiter     History of sepsis     Kidney stones     Lung nodule     Mass of mediastinum     OA (osteoarthritis) of knee     Obstructive pyelonephritis 09/28/2015    PONV (postoperative nausea and vomiting)     Rectal prolapse     Risk for falls     Sleep apnea     SOB (shortness of breath) on exertion     Urinary urgency          PAST SURGICAL HISTORY  Past Surgical History:   Procedure Laterality Date    BRONCHOSCOPY N/A 11/06/2017    Procedure: BRONCHOSCOPY WITH ENDOBRONCHIAL ULTRASOUND AND TRANSBRONCHIAL NEEDLE ASPIRATION;  Surgeon: Nando Heller MD;  Location: [facility] ENDOSCOPY;  Service:     CARDIAC CATHETERIZATION N/A 09/27/2017    Procedure: Right Heart Cath;  Surgeon: Miguel Gautam MD;  Location: [facility] CATH INVASIVE LOCATION;  Service:     CARDIAC CATHETERIZATION N/A 09/27/2017    Procedure: Left Heart Cath;  Surgeon: Miguel Gautam MD;  Location: [facility] CATH INVASIVE LOCATION;  Service:     CARDIAC CATHETERIZATION N/A 09/27/2017    Procedure: Coronary angiography;  Surgeon: Miguel Gautam MD;  Location: [facility] CATH INVASIVE LOCATION;  Service:     CARDIAC CATHETERIZATION N/A 09/27/2017    Procedure: Left ventriculography;  Surgeon: Miguel Gautam MD;  Location: [facility] CATH INVASIVE LOCATION;  Service:     CHOLECYSTECTOMY N/A 05/17/2017    Procedure: LAPAROSCOPIC CHOLECYSTECTOMY WITH IOC;  Surgeon: Patt Moore MD;  Location: [facility] MAIN OR;  Service:     COLON RESECTION Left 09/14/2016    Laparoscopic Low Anterior Resection with splenic flexure mobilization, drainage of Pelvic Abscess (cultured 3+ E. Coli), Left salpingo-ophorectomy, laparoscopic rectopexy, and umbilical hernia repair,  Dr. Patt Moore    COLONOSCOPY N/A 05/15/2008    sigmoid diverticulosis with blunting of the haustral folds and angulation consistent with previous diverticulitis, no polyps, suggestion of rectal prolapse-Dr. Patt Moore    COLONOSCOPY W/ BIOPSIES AND POLYPECTOMY N/A 04/16/2001    Possible mild gastritis w/ some appearance of formations that look like petechiae in the antrum, no ulcerations, no erosions; cecal polyp approx 4mm sessile; probable transverse colon polyp, although we could not visualize this completely upon pulling out; sigmoid diverticula; multiple rectal polyps, mostly w/ appearance of hyperplasia, largest being 5 to 6mm: removed via snare-Dr. Patt Moore    COLONOSCOPY W/ POLYPECTOMY N/A 12/10/2004    Diverticulosis with sigmoid perideverticulitis with erythema and patchiness around some of the diverticula, proximal ascedning colon polyp-approx 5 mm-removed via snare cauter, two distal ascending colon polyps-7 mm and 3 mm-removed via snare cautery polypectomy, hemorrhoids-Dr. Patt Moore    CYSTOSCOPY W/ URETERAL STENT PLACEMENT Left 04/21/2018    Procedure: CYSTOSCOPY, LEFT RETROGRADE WITH LEFT URETERAL STENT INSERTION;  Surgeon: Stanley Osuna MD;  Location: [facility];  Service: Urology    CYSTOSCOPY W/ URETERAL STENT REMOVAL Left 10/07/2015    Cystoscopy with stent extraction, left ureteral occulusion balloon placement, percutanous nephrostolithotomy with stone volume less than 2.5 cm involving the left lower pole and the left proximal ureter, balloon tract dilation for establishment of nephrostomy tract, antegrade nephrostomy tube placement-Dr. Miguel Monte    CYSTOSCOPY, RETROGRADE PYELOGRAM AND STENT INSERTION Left 09/28/2015    Left cystoscopy with left retrograde pyelogram, left double-J stent placement-Dr. Miguel Blas    CYSTOSCOPY, RETROGRADE PYELOGRAM AND STENT INSERTION Left 09/09/2015    Cystoscopy with bilateral retrograde pyelogram, left double-J stent  placement, right ureteral pyeloscopy with laser lithotripsy of the ureteropelvic junction calculus, right double-J stent placement-Dr. Miguel Monte    CYSTOSCOPY, RETROGRADE PYELOGRAM AND STENT INSERTION Right 09/21/2007    Cystoscopy with right retrograde pyelogram, right biliary stent placement-Dr. Miguel Monte    CYSTOSCOPY, RETROGRADE PYELOGRAM AND STENT INSERTION Right 09/07/2007    Cystoscopy with right retrograde pyelogram, right ureteral peyloscopy with extraction distal ureteral mass, right double J stent placement-Dr. Miguel Monte    CYSTOSCOPY, RETROGRADE PYELOGRAM AND STENT INSERTION Left 07/29/2022    Procedure: CYSTOSCOPY, LEFT RETROGRADE PYELOGRAM, LEFT URETERAL STENT;  Surgeon: Cedrick Jones MD;  Location: [facility];  Service: Urology;  Laterality: Left;    CYSTOSCOPY, URETEROSCOPY, RETROGRADE PYELOGRAM, STENT INSERTION Right 09/07/2007    Cystoscopy with right retrograde pyelogram, rigth ureteroscopy with extraction of distal mass, right double J stent placement-Dr. Miguel Monte    D & C HYSTEROSCOPY N/A 05/18/2011    Procedure done due to thickened endomentrium and an endometrial polyp-Dr. Quita Cheatham    DILATATION AND CURETTAGE N/A 03/22/2002    D&C, polyp removal-Dr. Quita Cheatham    EXTRACORPOREAL SHOCK WAVE LITHOTRIPSY (ESWL) Left 04/19/2023    Procedure: LEFT EXTRACORPOREAL SHOCKWAVE LITHOTRIPSY STENT REPLACEMENT;  Surgeon: Miguel Monte MD;  Location: [facility];  Service: Urology;  Laterality: Left;    EXTRACORPOREAL SHOCKWAVE LITHOTRIPSY (ESWL), STENT INSERTION/REMOVAL Left 05/08/2015    Left extracoporeal shockwave lithotripsy, cystoscopy with stent placement-Dr. Miguel Monte    EXTRACORPOREAL SHOCKWAVE LITHOTRIPSY (ESWL), STENT INSERTION/REMOVAL  04/2023    [facility]    JOINT REPLACEMENT  Left knee    MEDIASTINOTOMY Left 03/05/2018    Procedure: left thyroidectomy, resection of substernal thyroid goiter cervical approach;   Surgeon: Quintin Mares III, MD;  Location: [facility] OR;  Service:     PERCUTANEOUS NEPHROSTOLITHOTOMY Left 06/16/2023    Procedure: LEFT PERCUTANEOUS  NEPHROSTOLITHOTOMY, CYSTOSCOPY, STENT REMOVAL;  Surgeon: Miguel Monte MD;  Location: [facility] HYBRID OR 18/19;  Service: Urology;  Laterality: Left;    SIGMOIDOSCOPY N/A 09/13/2016    Procedure: SIGMOIDOSCOPY FLEXIBLE TO 25 CM;  Surgeon: Patt Moore MD;  Location: [facility] ENDOSCOPY;  Service:     THORACOTOMY  1996    Thoracotomy for ectopic thyroid, Dr. Camarillo    THYROIDECTOMY, PARTIAL      left side 3/05/18    TOTAL KNEE ARTHROPLASTY Left 02/02/2023    Procedure: TOTAL KNEE ARTHROPLASTY;  Surgeon: Anthony Sinclair MD;  Location: [facility] OR [facility];  Service: Orthopedics;  Laterality: Left;    TOTAL KNEE ARTHROPLASTY Right 09/23/2024    Procedure: RIGHT TOTAL KNEE ARTHROPLASTY;  Surgeon: Anthony Sinclair MD;  Location: [facility] OR [facility];  Service: Orthopedics;  Laterality: Right;    UMBILICAL HERNIA REPAIR N/A 09/14/2016    Procedure: UMBILICAL HERNIA REPAIR ;  Surgeon: Patt Moore MD;  Location: [facility] OR;  Service:     URETEROSCOPY LASER LITHOTRIPSY WITH STENT INSERTION Left 08/12/2022    Procedure: LEFT URETEROSCOPY LASER LITHOTRIPSY STONE BACKET EXTRACTION, STENT PLACEMENT;  Surgeon: Miguel Monte MD;  Location: [facility] OR;  Service: Urology;  Laterality: Left;    URETEROSCOPY LASER LITHOTRIPSY WITH STENT INSERTION Left 10/11/2023    Procedure: LEFT URETEROSCOPY LASER LITHOTRIPSY WITH STENT REMOVAL;  Surgeon: Miguel Monte MD;  Location: [facility] OR;  Service: Urology;  Laterality: Left;    VENTRAL/INCISIONAL HERNIA REPAIR N/A 05/17/2017    Procedure: VENTRAL HERNIA REPAIR WITH MESH, BILATERAL MUSCULOFASCIAL RELEASE;  Surgeon: Patt Moore MD;  Location: [facility] OR;  Service:          FAMILY HISTORY  Family History   Problem Relation Age of Onset    Heart disease Father     Heart attack Paternal Uncle     Cancer  Maternal Grandmother         ovarian    Heart attack Paternal Grandfather     Heart attack Paternal Uncle     Heart attack Paternal Uncle     Heart attack Paternal Uncle     Heart attack Paternal Uncle     Heart attack Paternal Uncle     Scleroderma Mother     Hypertension Sister     Malig Hyperthermia Neg Hx          SOCIAL HISTORY  Social History     Socioeconomic History    Marital status:      Spouse name: BERENICE COFFEY     Number of children: 2    Years of education: HIGH SCHOOL    Tobacco Use    Smoking status: Never     Passive exposure: Never    Smokeless tobacco: Never   Vaping Use    Vaping status: Never Used   Substance and Sexual Activity    Alcohol use: No    Drug use: No    Sexual activity: Defer         ALLERGIES  Cephalexin, Cephalosporins, Other, and Penicillins      REVIEW OF SYSTEMS  Review of Systems  Included in HPI  All systems reviewed and negative except for those discussed in HPI.      PHYSICAL EXAM    I have reviewed the triage vital signs and nursing notes.    ED Triage Vitals   Temp Heart Rate Resp BP SpO2   04/08/25 1646 04/08/25 1646 04/08/25 1646 04/08/25 1706 04/08/25 1646   97.9 °F (36.6 °C) 74 20 (!) 192/81 96 %      Temp src Heart Rate Source Patient Position BP Location FiO2 (%)   04/08/25 1646 -- -- -- --   Tympanic           Physical Exam  GENERAL: alert, appearing but no acute distress  SKIN: Warm, dry, no rashes.  HENT: Normocephalic, atraumatic moist mucous membranes  EYES: no scleral icterus, normal conjunctivae  CV: regular rhythm, regular rate, normal perfusion  RESPIRATORY: normal effort, lungs clear bilaterally, no stridor  ABDOMEN: soft, nondistended, nontender  MUSCULOSKELETAL: no deformity.  Mild tenderness throughout the right knee and right calf area.  Mild swelling noted to the bilateral lower extremities.  No erythema.  Normal distal neurovascular exam at the bilateral ankles  NEURO: alert, moves all extremities, follows commands, normal speech      LAB  RESULTS  Recent Results (from the past 24 hours)   Duplex Venous Lower Extremity - Right CAR    Collection Time: 04/08/25  4:19 PM   Result Value Ref Range    Right Common Femoral Spont 1.0     Right Proximal Femoral Spont 1.0     Right Mid Femoral Spont 1.0     Right Distal Femoral Spont 1.0     Right Popliteal Spont 1.0     Right Peroneal Vessel 1.0     Right Common Femoral Spont N     Right Common Femoral Phasic N     Right Common Femoral Compress P     Right Common Femoral Augment N     Right Common Femoral Thrombus A     Right Saphenofemoral Junction Compress C     Right Profunda Femoral Compress C     Right Proximal Femoral Spont N     Right Proximal Femoral Phasic N     Right Proximal Femoral Compress P     Right Proximal Femoral Augment N     Right Proximal Femoral Thrombus A     Right Mid Femoral Spont N     Right Mid Femoral Phasic N     Right Mid Femoral Compress P     Right Mid Femoral Augment N     Right Mid Femoral Thrombus A     Right Distal Femoral Spont N     Right Distal Femoral Phasic N     Right Distal Femoral Compress N     Right Distal Femoral Augment N     Right Distal Femoral Thrombus A     Right Popliteal Spont N     Right Popliteal Phasic N     Right Popliteal Compress P     Right Popliteal Augment N     Right Popliteal Thrombus A     Right Posterior Tibial Compress C     Right Peroneal Compress N     Right Peroneal Thrombus A     Right Gastronemius Compress C     Right Greater Saph AK Compress C     Right Greater Saph BK Compress C     Right Lesser Saph Compress C     Left Common Femoral Spont Y     Left Common Femoral Competent Y     Left Common Femoral Phasic Y     Left Common Femoral Compress C     Left Common Femoral Augment Y     BH CV VAS PRELIMINARY FINDINGS SCRIPTING 1.0    Comprehensive Metabolic Panel    Collection Time: 04/08/25  5:27 PM    Specimen: Blood   Result Value Ref Range    Glucose 102 (H) 65 - 99 mg/dL    BUN 11 8 - 23 mg/dL    Creatinine 1.13 (H) 0.57 - 1.00 mg/dL     Sodium 142 136 - 145 mmol/L    Potassium 4.3 3.5 - 5.2 mmol/L    Chloride 107 98 - 107 mmol/L    CO2 23.6 22.0 - 29.0 mmol/L    Calcium 9.2 8.6 - 10.5 mg/dL    Total Protein 6.8 6.0 - 8.5 g/dL    Albumin 3.8 3.5 - 5.2 g/dL    ALT (SGPT) 17 1 - 33 U/L    AST (SGOT) 24 1 - 32 U/L    Alkaline Phosphatase 89 39 - 117 U/L    Total Bilirubin 0.8 0.0 - 1.2 mg/dL    Globulin 3.0 gm/dL    A/G Ratio 1.3 g/dL    BUN/Creatinine Ratio 9.7 7.0 - 25.0    Anion Gap 11.4 5.0 - 15.0 mmol/L    eGFR 50.2 (L) >60.0 mL/min/1.73   Protime-INR    Collection Time: 04/08/25  5:27 PM    Specimen: Blood   Result Value Ref Range    Protime 15.2 (H) 11.7 - 14.2 Seconds    INR 1.20 (H) 0.90 - 1.10   aPTT    Collection Time: 04/08/25  5:27 PM    Specimen: Blood   Result Value Ref Range    PTT 28.7 22.7 - 35.4 seconds   High Sensitivity Troponin T    Collection Time: 04/08/25  5:27 PM    Specimen: Blood   Result Value Ref Range    HS Troponin T 15 (H) <14 ng/L   BNP    Collection Time: 04/08/25  5:27 PM    Specimen: Blood   Result Value Ref Range    proBNP 111.0 0.0 - 1,800.0 pg/mL   CBC Auto Differential    Collection Time: 04/08/25  5:27 PM    Specimen: Blood   Result Value Ref Range    WBC 4.27 3.40 - 10.80 10*3/mm3    RBC 3.95 3.77 - 5.28 10*6/mm3    Hemoglobin 12.2 12.0 - 15.9 g/dL    Hematocrit 36.9 34.0 - 46.6 %    MCV 93.4 79.0 - 97.0 fL    MCH 30.9 26.6 - 33.0 pg    MCHC 33.1 31.5 - 35.7 g/dL    RDW 15.5 (H) 12.3 - 15.4 %    RDW-SD 53.0 37.0 - 54.0 fl    MPV 8.6 6.0 - 12.0 fL    Platelets 115 (L) 140 - 450 10*3/mm3    Neutrophil % 73.8 42.7 - 76.0 %    Lymphocyte % 14.8 (L) 19.6 - 45.3 %    Monocyte % 6.3 5.0 - 12.0 %    Eosinophil % 4.4 0.3 - 6.2 %    Basophil % 0.2 0.0 - 1.5 %    Immature Grans % 0.5 0.0 - 0.5 %    Neutrophils, Absolute 3.15 1.70 - 7.00 10*3/mm3    Lymphocytes, Absolute 0.63 (L) 0.70 - 3.10 10*3/mm3    Monocytes, Absolute 0.27 0.10 - 0.90 10*3/mm3    Eosinophils, Absolute 0.19 0.00 - 0.40 10*3/mm3    Basophils, Absolute  0.01 0.00 - 0.20 10*3/mm3    Immature Grans, Absolute 0.02 0.00 - 0.05 10*3/mm3    nRBC 0.0 0.0 - 0.2 /100 WBC   ECG 12 Lead Dyspnea    Collection Time: 04/08/25  5:52 PM   Result Value Ref Range    QT Interval 411 ms    QTC Interval 435 ms   High Sensitivity Troponin T 1Hr    Collection Time: 04/08/25  6:36 PM    Specimen: Blood   Result Value Ref Range    HS Troponin T 13 <14 ng/L    Troponin T Numeric Delta -2 ng/L    Troponin T % Delta -13 Abnormal if >/= 20%         RADIOLOGY  CT Angiogram Chest  Result Date: 4/8/2025  CT ANGIOGRAM CHEST-  Radiation dose reduction techniques were utilized, including automated exposure control and exposure modulation based on body size.  Clinical: DVT, shortness of breath  COMPARISON 12/2/2024  CT scan of the chest performed with intravenous contrast, pulmonary embolus protocol to include coronal, sagittal and three-dimensional reconstruction.  FINDINGS: There are bilateral saddle emboli. On the left extending from the left upper lobe into the left lower lobe and on the right extending from the right lower lobe into the right upper lobe with sparing of the middle lobe. Pulmonary emboli extend to the level of the right and left main pulmonary arteries, RV LV ratio 0.85. The right ventricular diameter is 30 mm, left ventricular diameter 35 mm. No indication of pulmonary infarction.  There is a minimal amount of chronic airspace disease along the left lower lobe costophrenic sulcus as before. 7 mm pleural-based nodule at the left base again seen, stable. Follow-up as previously advised. No acute airspace disease or pleural effusion or grossly suspicious pulmonary lesion seen.  The esophagus is satisfactory in course and caliber. There is cardiac enlargement. Diameter of the aorta is within normal limits. Aortic valve leaflet calcification noted. Previous partial thyroidectomy. Limited images through the upper abdomen are unremarkable.  CONCLUSION: Bilateral saddle emboli, RV LV  ratio of 0.85. No pulmonary infarction seen.     This report was finalized on 4/8/2025 7:37 PM by Dr. Raul Vinson M.D on Workstation: [id number]      Duplex Venous Lower Extremity - Right CAR  Result Date: 4/8/2025    Acute right lower extremity deep vein thrombosis noted in the common femoral, proximal femoral, mid femoral, distal femoral, popliteal and peroneal.   All other right sided veins appeared normal.         MEDICATIONS GIVEN IN ER  Medications   sodium chloride 0.9 % flush 10 mL (has no administration in time range)   sodium chloride 0.9 % flush 10 mL (has no administration in time range)   sodium chloride 0.9 % flush 10 mL (has no administration in time range)   sodium chloride 0.9 % infusion 40 mL (has no administration in time range)   mupirocin (BACTROBAN) 2 % nasal ointment 1 Application (has no administration in time range)   Pharmacy to Dose enoxaparin (LOVENOX) (has no administration in time range)   nitroglycerin (NITROSTAT) SL tablet 0.4 mg (has no administration in time range)   sennosides-docusate (PERICOLACE) 8.6-50 MG per tablet 2 tablet (has no administration in time range)     And   polyethylene glycol (MIRALAX) packet 17 g (has no administration in time range)     And   bisacodyl (DULCOLAX) EC tablet 5 mg (has no administration in time range)     And   bisacodyl (DULCOLAX) suppository 10 mg (has no administration in time range)   acetaminophen (TYLENOL) tablet 650 mg (has no administration in time range)     Or   acetaminophen (TYLENOL) 160 MG/5ML oral solution 650 mg (has no administration in time range)     Or   acetaminophen (TYLENOL) suppository 650 mg (has no administration in time range)   ondansetron ODT (ZOFRAN-ODT) disintegrating tablet 4 mg (has no administration in time range)     Or   ondansetron (ZOFRAN) injection 4 mg (has no administration in time range)   calcium carbonate (TUMS) chewable tablet 500 mg (200 mg elemental) (has no administration in time range)    enoxaparin sodium (LOVENOX) syringe 105 mg (has no administration in time range)   morphine injection 2 mg (has no administration in time range)   allopurinol (ZYLOPRIM) tablet 300 mg (has no administration in time range)   methIMAzole (TAPAZOLE) tablet 2.5 mg (has no administration in time range)   iopamidol (ISOVUE-370) 76 % injection 100 mL (95 mL Intravenous Given by Other 4/8/25 9649)   enoxaparin sodium (LOVENOX) syringe 105 mg (105 mg Subcutaneous Given 4/8/25 2030)         ORDERS PLACED DURING THIS VISIT:  Orders Placed This Encounter   Procedures    CT Angiogram Chest    Comprehensive Metabolic Panel    Protime-INR    aPTT    High Sensitivity Troponin T    BNP    CBC Auto Differential    High Sensitivity Troponin T 1Hr    aPTT    aPTT    CBC Auto Differential    CBC Auto Differential    Basic Metabolic Panel    High Sensitivity Troponin T    Diet: Cardiac; Healthy Heart (2-3 Na+); Fluid Consistency: Thin (IDDSI 0)    Adjust Heparin Rate Based on aPTT Using Nomogram    Verify All Anticoagulant Orders Are Discontinued Upon Initiation of Heparin Protocol (eg Enoxaparin, Fondaparinux, Apixaban, Dabigatran, Edoxaban, or Rivaroxaban)    RN to release aPTT order 6 hours after Heparin infusion INITIATION & 6 hours after EACH infusion change until 2 consecutive therapeutic aPTTs are achieved. Then switch to daily aPTT orders. If aPTT is outside target range, resume every 6 hour aPTT order schedule until therapeutic x 2    Vital Signs    Intake & Output    Weigh Patient    Oral Care    Daily CHG Bath While in ICU    Maintain IV Access    Telemetry - Place Orders & Notify Provider of Results When Patient Experiences Acute Chest Pain, Dysrhythmia or Respiratory Distress    May Be Off Telemetry for Tests    Code Status and Medical Interventions: CPR (Attempt to Resuscitate); Full Support    Interventional Cardiology (on-call MD unless specified)    [facility] (on-call MD unless specified) Details    Inpatient Cardiology  Consult    ECG 12 Lead Dyspnea    Telemetry Scan    Adult Transthoracic Echo Complete W/ Cont if Necessary Per Protocol    Insert Peripheral IV    Insert Peripheral IV    Inpatient Admission    CBC & Differential    CBC & Differential         OUTPATIENT MEDICATION MANAGEMENT:  Current Facility-Administered Medications Ordered in Epic   Medication Dose Route Frequency Provider Last Rate Last Admin    acetaminophen (TYLENOL) tablet 650 mg  650 mg Oral Q4H PRN Ruth Milner APRN        Or    acetaminophen (TYLENOL) 160 MG/5ML oral solution 650 mg  650 mg Oral Q4H PRN Ruth Milner APRN        Or    acetaminophen (TYLENOL) suppository 650 mg  650 mg Rectal Q4H PRN Ruth Milner APRN        allopurinol (ZYLOPRIM) tablet 300 mg  300 mg Oral Daily Ruth Milner APRN        sennosides-docusate (PERICOLACE) 8.6-50 MG per tablet 2 tablet  2 tablet Oral BID PRN Ruth Milner APRN        And    polyethylene glycol (MIRALAX) packet 17 g  17 g Oral Daily PRN Ruth Milner APRN        And    bisacodyl (DULCOLAX) EC tablet 5 mg  5 mg Oral Daily PRN Ruth Milner APRN        And    bisacodyl (DULCOLAX) suppository 10 mg  10 mg Rectal Daily PRN Ruth Milner APRN        calcium carbonate (TUMS) chewable tablet 500 mg (200 mg elemental)  2 tablet Oral BID PRN Ruth Milner APRN        enoxaparin sodium (LOVENOX) syringe 105 mg  1 mg/kg Subcutaneous Q12H Ruth Milner APRN        methIMAzole (TAPAZOLE) tablet 2.5 mg  2.5 mg Oral Daily Ruth Milner APRN        morphine injection 2 mg  2 mg Intravenous Q3H PRN Ruth Milner APRN        mupirocin (BACTROBAN) 2 % nasal ointment 1 Application  1 Application Each Nare BID Ruth Milner APRN        nitroglycerin (NITROSTAT) SL tablet 0.4 mg  0.4 mg Sublingual Q5 Min PRN Ruth Milner APRN        ondansetron ODT (ZOFRAN-ODT) disintegrating tablet 4 mg  4 mg Oral Q6H PRN Alf  VERO Isbell        Or    ondansetron (ZOFRAN) injection 4 mg  4 mg Intravenous Q6H PRN Ruth Milner APRN        Pharmacy to Dose enoxaparin (LOVENOX)   Not Applicable Continuous PRN Ruth Milner APRN        sodium chloride 0.9 % flush 10 mL  10 mL Intravenous PRN Modesto Lyman MD        sodium chloride 0.9 % flush 10 mL  10 mL Intravenous Q12H Ruth Milner APRN        sodium chloride 0.9 % flush 10 mL  10 mL Intravenous PRN Ruth Milner APRN        sodium chloride 0.9 % infusion 40 mL  40 mL Intravenous PRN Ruth Milner APRN         No current Epic-ordered outpatient medications on file.         PROCEDURES  Procedures      Critical care provider statement:    Critical care time (minutes): 35.   Critical care time was exclusive of:  Separately billable procedures and treating other patients   Critical care was necessary to treat or prevent imminent or life-threatening deterioration of the following conditions:  Cardiac Failure, Circulatory Failure, and Respiratory Failure   Critical care was time spent personally by me on the following activities:  Development of treatment plan with patient or surrogate, discussions with consultants, evaluation of patient's response to treatment, examination of patient, obtaining history from patient or surrogate, ordering and performing treatments and interventions, ordering and review of laboratory studies, ordering and review of radiographic studies, pulse oximetry, re-evaluation of patient's condition and review of old charts. Critical Care indicators: Pulmonary edema or embolism       PROGRESS, DATA ANALYSIS, CONSULTS, AND MEDICAL DECISION MAKING  All labs have been independently interpreted by me.  All radiology studies have been reviewed by me. All EKG's have been independently viewed and interpreted by me.  Discussion below represents my analysis of pertinent findings related to patient's condition, differential  diagnosis, treatment plan and final disposition.    Differential diagnosis includes but is not limited to pulmonary embolism, anxiety, aortic dissection, acute MI, pneumothorax, DVT.    Clinical Scores:                   ED Course as of 04/09/25 0017   Tue Apr 08, 2025   2679 Patient just learned of positive DVT diagnosis today.  She has not yet started on anticoagulation.  She is complaining of shortness of breath which certainly raises concern for possibility of a pulmonary embolism.  I considered starting heparin drip at this time but given the fact that it would take that medicine several hours to take effect, I would rather just go ahead and get the CT angiogram done first to see if there is any pulmonary embolism component and also to help rule out cor pulmonale.  Assuming that there are no such findings on the CT scan that I think patient could simply start oral anticoagulation as previously directed by her orthopedist.  We will continue to monitor carefully.  Her work of breathing is normal right now.  Saturations are 99% on room air.  There does not appear to be any evidence of cardiac or pulmonary compromise. [ASUNCION]   1755 EKG         EKG time/Interp time: 1752/1754  Rhythm/Rate: Sinus rhythm, 67 bpm  P waves and LA: 80 ms, normal interval  QRS, axis: 104 ms, narrow complex, normal axis  ST and T waves: No ST segment elevation.  No ischemic changes.  Independently interpreted by me contemporaneously with treatment   [ASUNCION]   1958 I independently interpreted the CT angiogram chest study and my findings are: No pneumothorax, no infiltrate.  Bilateral saddle emboli observed. [ASUNCION]   1958 I discussed with Dr. Charles from cardiology about this patient.  I reviewed with her the test results that we have obtained.  She recommends 1 dose of Lovenox now and agrees with admission to the hospitalist service.   [ASUNCION]   2014 I discussed with Dr. Pickett from [facility] about this patient.  He agrees to admit her to the hospitalist  service for further medical management needs tonight. [ASUNCION]   Wed Apr 09, 2025   0015 INR(!): 1.20 [ASUNCION]   0016 HS Troponin T(!): 15 [ASUNCION]   0016 HS Troponin T: 13 [ASUNCION]   0016 proBNP: 111.0 [ASUNCION]      ED Course User Index  [ASUNCION] Modesto Lyman MD             AS OF 00:17 EDT VITALS:    BP - 157/98  HR - 70  TEMP - 97.7 °F (36.5 °C) (Oral)  O2 SATS - 99%    COMPLEXITY OF CARE  The patient requires admission.      DIAGNOSIS  Final diagnoses:   Acute saddle pulmonary embolism without acute cor pulmonale   Acute deep vein thrombosis (DVT) of proximal vein of right lower extremity         DISPOSITION  ED Disposition       ED Disposition   Decision to Admit    Condition   --    Comment   Level of Care: Telemetry [5]   Diagnosis: Acute saddle pulmonary embolism [[id number]]   Admitting Physician: HARDEEP WARE [[id number]]   Attending Physician: HARDEEP WARE [[id number]]   Certification: I Certify That Inpatient Hospital Services Are Medically Necessary For Greater Than 2 Midnights                  Please note that portions of this document were completed with a voice recognition program.    Note Disclaimer: At [facility], we believe that sharing information builds trust and better relationships. You are receiving this note because you recently visited [facility]. It is possible you will see health information before a provider has talked with you about it. This kind of information can be easy to misunderstand. To help you fully understand what it means for your health, we urge you to discuss this note with your provider.         Modesto Lyman MD  04/09/25 0017

## 2025-04-09 ENCOUNTER — APPOINTMENT (OUTPATIENT)
Dept: CARDIOLOGY | Facility: HOSPITAL | Age: 80
DRG: 176 | End: 2025-04-09
Payer: MEDICARE

## 2025-04-09 PROBLEM — N18.31 CKD STAGE 3A, GFR 45-59 ML/MIN: Status: ACTIVE | Noted: 2025-04-09

## 2025-04-09 PROBLEM — I74.3: Status: RESOLVED | Noted: 2025-04-08 | Resolved: 2025-04-09

## 2025-04-09 PROBLEM — I82.411 ACUTE DEEP VEIN THROMBOSIS (DVT) OF RIGHT FEMORAL VEIN: Status: ACTIVE | Noted: 2025-04-09

## 2025-04-09 PROBLEM — M79.604 PAIN IN RIGHT LEG: Status: ACTIVE | Noted: 2025-04-09

## 2025-04-09 LAB
ANION GAP SERPL CALCULATED.3IONS-SCNC: 9.8 MMOL/L (ref 5–15)
AORTIC DIMENSIONLESS INDEX: 0.54 (DI)
APTT PPP: 35.5 SECONDS (ref 22.7–35.4)
AV MEAN PRESS GRAD SYS DOP V1V2: 11.1 MMHG
AV VMAX SYS DOP: 232.9 CM/SEC
BASOPHILS # BLD AUTO: 0.03 10*3/MM3 (ref 0–0.2)
BASOPHILS NFR BLD AUTO: 0.7 % (ref 0–1.5)
BH CV ECHO MEAS - ACS: 1.56 CM
BH CV ECHO MEAS - AO MAX PG: 21.7 MMHG
BH CV ECHO MEAS - AO ROOT DIAM: 2.9 CM
BH CV ECHO MEAS - AO V2 VTI: 47.9 CM
BH CV ECHO MEAS - AVA(I,D): 1.75 CM2
BH CV ECHO MEAS - EDV(MOD-SP2): 97 ML
BH CV ECHO MEAS - EDV(MOD-SP4): 91 ML
BH CV ECHO MEAS - EF(MOD-SP2): 70.1 %
BH CV ECHO MEAS - EF(MOD-SP4): 68.1 %
BH CV ECHO MEAS - ESV(MOD-SP2): 29 ML
BH CV ECHO MEAS - ESV(MOD-SP4): 29 ML
BH CV ECHO MEAS - LAT PEAK E' VEL: 6.3 CM/SEC
BH CV ECHO MEAS - LV DIASTOLIC VOL/BSA (35-75): 43.9 CM2
BH CV ECHO MEAS - LV MAX PG: 7.2 MMHG
BH CV ECHO MEAS - LV MEAN PG: 3.2 MMHG
BH CV ECHO MEAS - LV SYSTOLIC VOL/BSA (12-30): 14 CM2
BH CV ECHO MEAS - LV V1 MAX: 134 CM/SEC
BH CV ECHO MEAS - LV V1 VTI: 26.1 CM
BH CV ECHO MEAS - LVOT AREA: 3.2 CM2
BH CV ECHO MEAS - LVOT DIAM: 2.02 CM
BH CV ECHO MEAS - MED PEAK E' VEL: 7.2 CM/SEC
BH CV ECHO MEAS - MV A DUR: 0.15 SEC
BH CV ECHO MEAS - MV A MAX VEL: 112.3 CM/SEC
BH CV ECHO MEAS - MV DEC SLOPE: 341.6 CM/SEC2
BH CV ECHO MEAS - MV DEC TIME: 0.32 SEC
BH CV ECHO MEAS - MV E MAX VEL: 108 CM/SEC
BH CV ECHO MEAS - MV E/A: 0.96
BH CV ECHO MEAS - MV MAX PG: 6.9 MMHG
BH CV ECHO MEAS - MV MEAN PG: 3.2 MMHG
BH CV ECHO MEAS - MV P1/2T: 102.4 MSEC
BH CV ECHO MEAS - MV V2 VTI: 41.7 CM
BH CV ECHO MEAS - MVA(P1/2T): 2.15 CM2
BH CV ECHO MEAS - MVA(VTI): 2.01 CM2
BH CV ECHO MEAS - PA ACC TIME: 0.16 SEC
BH CV ECHO MEAS - PA V2 MAX: 115.4 CM/SEC
BH CV ECHO MEAS - PULM A REVS DUR: 0.11 SEC
BH CV ECHO MEAS - PULM A REVS VEL: 20.4 CM/SEC
BH CV ECHO MEAS - PULM DIAS VEL: 35 CM/SEC
BH CV ECHO MEAS - PULM S/D: 1.52
BH CV ECHO MEAS - PULM SYS VEL: 53.2 CM/SEC
BH CV ECHO MEAS - RV MAX PG: 2.6 MMHG
BH CV ECHO MEAS - RV V1 MAX: 80 CM/SEC
BH CV ECHO MEAS - RV V1 VTI: 17.4 CM
BH CV ECHO MEAS - SV(LVOT): 83.7 ML
BH CV ECHO MEAS - SV(MOD-SP2): 68 ML
BH CV ECHO MEAS - SV(MOD-SP4): 62 ML
BH CV ECHO MEAS - SVI(LVOT): 40.4 ML/M2
BH CV ECHO MEAS - SVI(MOD-SP2): 32.8 ML/M2
BH CV ECHO MEAS - SVI(MOD-SP4): 29.9 ML/M2
BH CV ECHO MEAS - TAPSE (>1.6): 2.6 CM
BH CV ECHO MEASUREMENTS AVERAGE E/E' RATIO: 16
BH CV ECHO SHUNT ASSESSMENT PERFORMED (HIDDEN SCRIPTING): 1
BH CV XLRA - RV BASE: 2.9 CM
BH CV XLRA - RV LENGTH: 6.7 CM
BH CV XLRA - RV MID: 2.6 CM
BH CV XLRA - TDI S': 12.1 CM/SEC
BUN SERPL-MCNC: 12 MG/DL (ref 8–23)
BUN/CREAT SERPL: 12.2 (ref 7–25)
CALCIUM SPEC-SCNC: 9.4 MG/DL (ref 8.6–10.5)
CHLORIDE SERPL-SCNC: 107 MMOL/L (ref 98–107)
CO2 SERPL-SCNC: 22.2 MMOL/L (ref 22–29)
CREAT SERPL-MCNC: 0.98 MG/DL (ref 0.57–1)
DEPRECATED RDW RBC AUTO: 51.7 FL (ref 37–54)
EGFRCR SERPLBLD CKD-EPI 2021: 59.6 ML/MIN/1.73
EOSINOPHIL # BLD AUTO: 0.22 10*3/MM3 (ref 0–0.4)
EOSINOPHIL NFR BLD AUTO: 5.3 % (ref 0.3–6.2)
ERYTHROCYTE [DISTWIDTH] IN BLOOD BY AUTOMATED COUNT: 15.2 % (ref 12.3–15.4)
FOLATE SERPL-MCNC: 10.8 NG/ML (ref 4.78–24.2)
GLUCOSE SERPL-MCNC: 106 MG/DL (ref 65–99)
HCT VFR BLD AUTO: 33.4 % (ref 34–46.6)
HGB BLD-MCNC: 11 G/DL (ref 12–15.9)
IMM GRANULOCYTES # BLD AUTO: 0.01 10*3/MM3 (ref 0–0.05)
IMM GRANULOCYTES NFR BLD AUTO: 0.2 % (ref 0–0.5)
LEFT ATRIUM VOLUME INDEX: 23.9 ML/M2
LV EF BIPLANE MOD: 68.8 %
LYMPHOCYTES # BLD AUTO: 0.7 10*3/MM3 (ref 0.7–3.1)
LYMPHOCYTES NFR BLD AUTO: 16.9 % (ref 19.6–45.3)
MCH RBC QN AUTO: 30.7 PG (ref 26.6–33)
MCHC RBC AUTO-ENTMCNC: 32.9 G/DL (ref 31.5–35.7)
MCV RBC AUTO: 93.3 FL (ref 79–97)
MONOCYTES # BLD AUTO: 0.28 10*3/MM3 (ref 0.1–0.9)
MONOCYTES NFR BLD AUTO: 6.8 % (ref 5–12)
NEUTROPHILS NFR BLD AUTO: 2.9 10*3/MM3 (ref 1.7–7)
NEUTROPHILS NFR BLD AUTO: 70.1 % (ref 42.7–76)
NRBC BLD AUTO-RTO: 0 /100 WBC (ref 0–0.2)
PLATELET # BLD AUTO: 116 10*3/MM3 (ref 140–450)
PLATELET # BLD AUTO: 134 10*3/MM3 (ref 140–450)
PLATELETS.RETICULATED NFR BLD AUTO: 1.7 % (ref 0.9–6.5)
PMV BLD AUTO: 8.7 FL (ref 6–12)
POTASSIUM SERPL-SCNC: 4.2 MMOL/L (ref 3.5–5.2)
QT INTERVAL: 411 MS
QTC INTERVAL: 435 MS
RBC # BLD AUTO: 3.58 10*6/MM3 (ref 3.77–5.28)
SODIUM SERPL-SCNC: 139 MMOL/L (ref 136–145)
TROPONIN T SERPL HS-MCNC: 16 NG/L
VIT B12 BLD-MCNC: 202 PG/ML (ref 211–946)
WBC NRBC COR # BLD AUTO: 4.14 10*3/MM3 (ref 3.4–10.8)

## 2025-04-09 PROCEDURE — 85306 CLOT INHIBIT PROT S FREE: CPT | Performed by: INTERNAL MEDICINE

## 2025-04-09 PROCEDURE — 80048 BASIC METABOLIC PNL TOTAL CA: CPT | Performed by: NURSE PRACTITIONER

## 2025-04-09 PROCEDURE — 36415 COLL VENOUS BLD VENIPUNCTURE: CPT | Performed by: EMERGENCY MEDICINE

## 2025-04-09 PROCEDURE — 93306 TTE W/DOPPLER COMPLETE: CPT | Performed by: INTERNAL MEDICINE

## 2025-04-09 PROCEDURE — 99222 1ST HOSP IP/OBS MODERATE 55: CPT | Performed by: INTERNAL MEDICINE

## 2025-04-09 PROCEDURE — 85613 RUSSELL VIPER VENOM DILUTED: CPT | Performed by: INTERNAL MEDICINE

## 2025-04-09 PROCEDURE — 85732 THROMBOPLASTIN TIME PARTIAL: CPT | Performed by: INTERNAL MEDICINE

## 2025-04-09 PROCEDURE — 85055 RETICULATED PLATELET ASSAY: CPT | Performed by: INTERNAL MEDICINE

## 2025-04-09 PROCEDURE — 84484 ASSAY OF TROPONIN QUANT: CPT | Performed by: NURSE PRACTITIONER

## 2025-04-09 PROCEDURE — 85300 ANTITHROMBIN III ACTIVITY: CPT | Performed by: INTERNAL MEDICINE

## 2025-04-09 PROCEDURE — 86146 BETA-2 GLYCOPROTEIN ANTIBODY: CPT | Performed by: INTERNAL MEDICINE

## 2025-04-09 PROCEDURE — 85705 THROMBOPLASTIN INHIBITION: CPT | Performed by: INTERNAL MEDICINE

## 2025-04-09 PROCEDURE — 86147 CARDIOLIPIN ANTIBODY EA IG: CPT | Performed by: INTERNAL MEDICINE

## 2025-04-09 PROCEDURE — 82607 VITAMIN B-12: CPT | Performed by: INTERNAL MEDICINE

## 2025-04-09 PROCEDURE — 81240 F2 GENE: CPT | Performed by: INTERNAL MEDICINE

## 2025-04-09 PROCEDURE — 25510000001 PERFLUTREN 6.52 MG/ML SUSPENSION 2 ML VIAL: Performed by: NURSE PRACTITIONER

## 2025-04-09 PROCEDURE — 82746 ASSAY OF FOLIC ACID SERUM: CPT | Performed by: INTERNAL MEDICINE

## 2025-04-09 PROCEDURE — 85670 THROMBIN TIME PLASMA: CPT | Performed by: INTERNAL MEDICINE

## 2025-04-09 PROCEDURE — 85025 COMPLETE CBC W/AUTO DIFF WBC: CPT | Performed by: EMERGENCY MEDICINE

## 2025-04-09 PROCEDURE — 85598 HEXAGNAL PHOSPH PLTLT NEUTRL: CPT | Performed by: INTERNAL MEDICINE

## 2025-04-09 PROCEDURE — 81241 F5 GENE: CPT | Performed by: INTERNAL MEDICINE

## 2025-04-09 PROCEDURE — 85730 THROMBOPLASTIN TIME PARTIAL: CPT | Performed by: EMERGENCY MEDICINE

## 2025-04-09 PROCEDURE — 25010000002 ENOXAPARIN PER 10 MG: Performed by: NURSE PRACTITIONER

## 2025-04-09 PROCEDURE — 93306 TTE W/DOPPLER COMPLETE: CPT

## 2025-04-09 RX ORDER — HYDROXYZINE HYDROCHLORIDE 25 MG/1
25 TABLET, FILM COATED ORAL ONCE
Status: COMPLETED | OUTPATIENT
Start: 2025-04-09 | End: 2025-04-09

## 2025-04-09 RX ORDER — CHOLESTYRAMINE 4 G/9G
1 POWDER, FOR SUSPENSION ORAL EVERY 12 HOURS SCHEDULED
Status: DISCONTINUED | OUTPATIENT
Start: 2025-04-09 | End: 2025-04-10 | Stop reason: HOSPADM

## 2025-04-09 RX ORDER — DIPHENHYDRAMINE HYDROCHLORIDE, ZINC ACETATE 2; .1 G/100G; G/100G
1 CREAM TOPICAL 3 TIMES DAILY PRN
Status: DISCONTINUED | OUTPATIENT
Start: 2025-04-09 | End: 2025-04-10 | Stop reason: HOSPADM

## 2025-04-09 RX ADMIN — ENOXAPARIN SODIUM 105 MG: 150 INJECTION SUBCUTANEOUS at 21:11

## 2025-04-09 RX ADMIN — MUPIROCIN 1 APPLICATION: 20 OINTMENT TOPICAL at 11:37

## 2025-04-09 RX ADMIN — Medication 10 ML: at 01:12

## 2025-04-09 RX ADMIN — METHIMAZOLE 2.5 MG: 5 TABLET ORAL at 08:24

## 2025-04-09 RX ADMIN — MUPIROCIN 1 APPLICATION: 20 OINTMENT TOPICAL at 21:13

## 2025-04-09 RX ADMIN — CHOLESTYRAMINE 1 PACKET: 4 POWDER, FOR SUSPENSION ORAL at 11:36

## 2025-04-09 RX ADMIN — MUPIROCIN 1 APPLICATION: 20 OINTMENT TOPICAL at 01:11

## 2025-04-09 RX ADMIN — CHOLESTYRAMINE 1 PACKET: 4 POWDER, FOR SUSPENSION ORAL at 21:13

## 2025-04-09 RX ADMIN — HYDROXYZINE HYDROCHLORIDE 25 MG: 25 TABLET, FILM COATED ORAL at 01:57

## 2025-04-09 RX ADMIN — ENOXAPARIN SODIUM 105 MG: 150 INJECTION SUBCUTANEOUS at 08:24

## 2025-04-09 RX ADMIN — ALLOPURINOL 300 MG: 100 TABLET ORAL at 08:23

## 2025-04-09 RX ADMIN — Medication 10 ML: at 10:00

## 2025-04-09 RX ADMIN — Medication 10 ML: at 21:15

## 2025-04-09 RX ADMIN — PERFLUTREN 2 ML: 6.52 INJECTION, SUSPENSION INTRAVENOUS at 11:13

## 2025-04-09 NOTE — ED NOTES
Nursing report ED to floor  Patt Bond  79 y.o.  female    HPI :  HPI  Stated Reason for Visit: brougt via wheelchair from vascular lab d/t +RLE doppler  History Obtained From: patient    Chief Complaint  Chief Complaint   Patient presents with    Abnormal Imaging       Admitting doctor:   Les Pickett DO    Admitting diagnosis:   The primary encounter diagnosis was Acute saddle pulmonary embolism without acute cor pulmonale. A diagnosis of Acute deep vein thrombosis (DVT) of proximal vein of right lower extremity was also pertinent to this visit.    Code status:   Current Code Status       Date Active Code Status Order ID Comments User Context       Prior            Allergies:   Cephalexin, Cephalosporins, Other, and Penicillins    Isolation:   No active isolations    Intake and Output  No intake or output data in the 24 hours ending 04/08/25 2029    Weight:       04/08/25 1705   Weight: 108 kg (237 lb)       Most recent vitals:   Vitals:    04/08/25 1949 04/08/25 1959 04/08/25 2002 04/08/25 2010   BP:   164/73    Pulse: 72 74 74 72   Resp:       Temp:       TempSrc:       SpO2:  97% 99% 100%   Weight:       Height:           Active LDAs/IV Access:   Lines, Drains & Airways       Active LDAs       Name Placement date Placement time Site Days    Peripheral IV 04/08/25 1727 Left Antecubital 04/08/25  1727  Antecubital  less than 1    Peripheral IV 04/08/25 1950 Anterior;Right Forearm 04/08/25  1950  Forearm  less than 1                    Labs (abnormal labs have a star):   Labs Reviewed   COMPREHENSIVE METABOLIC PANEL - Abnormal; Notable for the following components:       Result Value    Glucose 102 (*)     Creatinine 1.13 (*)     eGFR 50.2 (*)     All other components within normal limits    Narrative:     GFR Categories in Chronic Kidney Disease (CKD)      GFR Category          GFR (mL/min/1.73)    Interpretation  G1                     90 or greater         Normal or high (1)  G2                      60-89                 Mild decrease (1)  G3a                   45-59                Mild to moderate decrease  G3b                   30-44                Moderate to severe decrease  G4                    15-29                Severe decrease  G5                    14 or less           Kidney failure          (1)In the absence of evidence of kidney disease, neither GFR category G1 or G2 fulfill the criteria for CKD.    eGFR calculation 2021 CKD-EPI creatinine equation, which does not include race as a factor   PROTIME-INR - Abnormal; Notable for the following components:    Protime 15.2 (*)     INR 1.20 (*)     All other components within normal limits   TROPONIN - Abnormal; Notable for the following components:    HS Troponin T 15 (*)     All other components within normal limits    Narrative:     High Sensitive Troponin T Reference Range:  <14.0 ng/L- Negative Female for AMI  <22.0 ng/L- Negative Male for AMI  >=14 - Abnormal Female indicating possible myocardial injury.  >=22 - Abnormal Male indicating possible myocardial injury.   Clinicians would have to utilize clinical acumen, EKG, Troponin, and serial changes to determine if it is an Acute Myocardial Infarction or myocardial injury due to an underlying chronic condition.        CBC WITH AUTO DIFFERENTIAL - Abnormal; Notable for the following components:    RDW 15.5 (*)     Platelets 115 (*)     Lymphocyte % 14.8 (*)     Lymphocytes, Absolute 0.63 (*)     All other components within normal limits   APTT - Normal   BNP (IN-HOUSE) - Normal    Narrative:     This assay is used as an aid in the diagnosis of individuals suspected of having heart failure. It can be used as an aid in the diagnosis of acute decompensated heart failure (ADHF) in patients presenting with signs and symptoms of ADHF to the emergency department (ED). In addition, NT-proBNP of <300 pg/mL indicates ADHF is not likely.    Age Range Result Interpretation  NT-proBNP Concentration (pg/mL:      <50              Positive            >450                   Gray                 300-450                    Negative             <300    50-75           Positive            >900                  Gray                300-900                  Negative            <300      >75             Positive            >1800                  Gray                300-1800                  Negative            <300   HIGH SENSITIVITIY TROPONIN T 1HR    Narrative:     High Sensitive Troponin T Reference Range:  <14.0 ng/L- Negative Female for AMI  <22.0 ng/L- Negative Male for AMI  >=14 - Abnormal Female indicating possible myocardial injury.  >=22 - Abnormal Male indicating possible myocardial injury.   Clinicians would have to utilize clinical acumen, EKG, Troponin, and serial changes to determine if it is an Acute Myocardial Infarction or myocardial injury due to an underlying chronic condition.        CBC AND DIFFERENTIAL    Narrative:     The following orders were created for panel order CBC & Differential.  Procedure                               Abnormality         Status                     ---------                               -----------         ------                     CBC Auto Differential[[id number]]        Abnormal            Final result                 Please view results for these tests on the individual orders.       EKG:   ECG 12 Lead Dyspnea   Preliminary Result   HEART RATE=67  bpm   RR Gildtmff=481  ms   WA Interval=80  ms   P Horizontal Axis=  deg   P Front Axis=87  deg   QRSD Qinqkbdb=523  ms   QT Hhfujvhg=276  ms   KIqD=279  ms   QRS Axis=7  deg   T Wave Axis=61  deg   - BORDERLINE ECG -   Sinus rhythm   Short WA interval   Low voltage, precordial leads   Date and Time of Study:2025-04-08 17:52:02          Meds given in ED:   Medications   sodium chloride 0.9 % flush 10 mL (has no administration in time range)   enoxaparin sodium (LOVENOX) syringe 105 mg (has no administration in time range)   iopamidol (ISOVUE-370) 76  % injection 100 mL (95 mL Intravenous Given by Other 4/8/25 7349)       Imaging results:  No radiology results for the last day    Ambulatory status:   - up ad flora    Social issues:   Social History     Socioeconomic History    Marital status:      Spouse name: BERENICE COFFEY     Number of children: 2    Years of education: HIGH SCHOOL    Tobacco Use    Smoking status: Never     Passive exposure: Never    Smokeless tobacco: Never   Vaping Use    Vaping status: Never Used   Substance and Sexual Activity    Alcohol use: No    Drug use: No    Sexual activity: Defer       Peripheral Neurovascular  Peripheral Neurovascular (Adult)  Peripheral Neurovascular WDL: WDL    Neuro Cognitive  Neuro Cognitive (Adult)  Cognitive/Neuro/Behavioral WDL: WDL    Learning  Learning Assessment  Learning Readiness and Ability: no barriers identified    Respiratory  Respiratory  Airway WDL: WDL  Respiratory WDL  Respiratory WDL: WDL    Abdominal Pain       Pain Assessments  Pain (Adult)  (0-10) Pain Rating: Rest: 0    NIH Stroke Scale       Malia Evangelista RN  04/08/25 20:29 EDT

## 2025-04-09 NOTE — PROGRESS NOTES
"[facility] Clinical Pharmacy Services: Enoxaparin Consult    Patt Bond has a pharmacy consult to dose full-dose enoxaparin per Ruth Milner's request.     Indication: DVT/PE (active thrombosis)  Home Anticoagulation: none     Relevant clinical data and objective history reviewed:  79 y.o. female 157.5 cm (62\") 110 kg (242 lb 3.2 oz)   Body mass index is 44.3 kg/m².   Results from last 7 days   Lab Units 04/08/25  1727   PLATELETS 10*3/mm3 115*     Estimated Creatinine Clearance: 48.8 mL/min (A) (by C-G formula based on SCr of 1.13 mg/dL (H)).    Assessment/Plan    Will start patient on  105mg (1mg/kg) subcutaneous every 12 hours, adjusted for renal function. Consult order will be discontinued but pharmacy will continue to follow.     Buffy Javier, PharmD  Clinical Pharmacist    "

## 2025-04-09 NOTE — H&P
Patient Name:  Patt Bond  YOB: 1945  MRN:  [id number]  Admit Date:  4/8/2025  Patient Care Team:  Moe Matute MD as PCP - General  Miguel Gautam MD as Consulting Physician (Cardiology)  Chalino Brooks MD as Consulting Physician (Endocrinology)  Nando Heller MD as Consulting Physician (Pulmonary Disease)      Subjective   History Present Illness     Chief Complaint   Patient presents with    Abnormal Imaging       Ms. Bond is a 79 y.o. non-smoker with a history of hyperthyroidism, gout, and ERIS that presents to [facility] complaining of right leg pain. She states she had her right knee replaced back in September and she has had some swelling and pain in her right ankle for the past one month. She states she saw her orthopedic surgeon a couple weeks ago and she was ordered to have an MRI of the ankle that showed mild achilles tendinopathy, mild nonspecific peroneal tenosynovitis, chondral fissuring at the medial talar dome and mild multifocal chondromalacia/DJD at the midfoot, a nonspecific small subcentimeter cycstic lesion in the medial and plantar midfoot, and venous varicosities in the tarsal tunnel that could reflect sequela of tarsal tunnel syndrome. The patient reports she had a follow up appointment with her surgeon yesterday and the swelling and pain in her leg and ankle had not improved. She was sent to have an outpatient venous doppler of the right lower extremity today. The doppler showed an acute right lower extremity deep vein thrombosis noted in the common femoral, proximal femoral, mid femoral, distal femoral, popliteal and peroneal. She was sent to the ED for further evaluation where she complained of shortness of breath for the past few weeks. She also reported intermittent tightness in her chest. A CT angiogram of the chest showed bilateral saddle emboli with an RV LV ratio of 0.85. No pulmonary infarction is seen. The ED  physician spoke with cardiology and a dose of therapeutic Lovenox was recommended. The patient has been admitted for further evaluation.        History of Present Illness  Review of Systems   Constitutional:  Negative for chills and fever.   HENT:  Negative for congestion and sore throat.    Eyes:  Negative for photophobia and visual disturbance.   Respiratory:  Positive for chest tightness and shortness of breath. Negative for cough and wheezing.    Cardiovascular:  Negative for chest pain, palpitations and leg swelling.   Gastrointestinal:  Negative for abdominal pain, nausea and vomiting.   Musculoskeletal:  Positive for arthralgias (right ankle), joint swelling and myalgias.   Skin:  Negative for color change and pallor.   Neurological:  Negative for dizziness, weakness, light-headedness, numbness and headaches.        Personal History     Past Medical History:   Diagnosis Date    Arthritis     OSTEO. LEFT KNEE    Back pain     AT TIMES    Cataract     CLIFFORD EYES    Chronic diarrhea     Diverticulitis     WITH RESECTION    Diverticulosis     Goiter     Graves disease     History of colon polyps     BENIGN    History of sepsis     Hyperthyroidism     followed by Dr. Blackmon. PARTIAL THRYOIDECTOMY    Kidney stones     HISTORY OF MULTIPLE TIMES. URIC AND CALCIUM STONES    Lung nodule     HISTORY OF-NOTE LATEST CT CHEST 12/2022    Mass of mediastinum     HISTORY OF. BENIGN.    OA (osteoarthritis) of knee     RIGHT KNEE:  PAIN, WEAKNESS, LIMITED MOBILITY    Obstructive pyelonephritis 09/28/2015    left obstructing pyelonephritis     PONV (postoperative nausea and vomiting)     Rectal prolapse     CHRONIC    Risk for falls     USING ROLLATOR AND CANE    Sleep apnea     CPAP MACHINE    SOB (shortness of breath) on exertion     SEES DR VASQUEZ    Urinary urgency      Past Surgical History:   Procedure Laterality Date    BRONCHOSCOPY N/A 11/06/2017    Procedure: BRONCHOSCOPY WITH ENDOBRONCHIAL ULTRASOUND AND TRANSBRONCHIAL  NEEDLE ASPIRATION;  Surgeon: Nando Heller MD;  Location: [facility] ENDOSCOPY;  Service:     CARDIAC CATHETERIZATION N/A 09/27/2017    Procedure: Right Heart Cath;  Surgeon: Miguel Gautam MD;  Location: [facility] CATH INVASIVE LOCATION;  Service:     CARDIAC CATHETERIZATION N/A 09/27/2017    Procedure: Left Heart Cath;  Surgeon: Miguel Gautam MD;  Location: [facility] CATH INVASIVE LOCATION;  Service:     CARDIAC CATHETERIZATION N/A 09/27/2017    Procedure: Coronary angiography;  Surgeon: Miguel Gautam MD;  [facility];  Service:     CARDIAC CATHETERIZATION N/A 09/27/2017    Procedure: Left ventriculography;  Surgeon: Miguel Gautam MD;  Location: [facility] CATH INVASIVE LOCATION;  Service:     CHOLECYSTECTOMY N/A 05/17/2017    Procedure: LAPAROSCOPIC CHOLECYSTECTOMY WITH IOC;  Surgeon: Patt Moore MD;  Location: [facility] MAIN OR;  Service:     COLON RESECTION Left 09/14/2016    Laparoscopic Low Anterior Resection with splenic flexure mobilization, drainage of Pelvic Abscess (cultured 3+ E. Coli), Left salpingo-ophorectomy, laparoscopic rectopexy, and umbilical hernia repair, Dr. Patt Moore    COLONOSCOPY N/A 05/15/2008    sigmoid diverticulosis with blunting of the haustral folds and angulation consistent with previous diverticulitis, no polyps, suggestion of rectal prolapse-Dr. Patt Moore    COLONOSCOPY W/ BIOPSIES AND POLYPECTOMY N/A 04/16/2001    Possible mild gastritis w/ some appearance of formations that look like petechiae in the antrum, no ulcerations, no erosions; cecal polyp approx 4mm sessile; probable transverse colon polyp, although we could not visualize this completely upon pulling out; sigmoid diverticula; multiple rectal polyps, mostly w/ appearance of hyperplasia, largest being 5 to 6mm: removed via snare-Dr. Patt Moore    COLONOSCOPY W/ POLYPECTOMY N/A 12/10/2004    Diverticulosis with sigmoid perideverticulitis with erythema and  patchiness around some of the diverticula, proximal ascedning colon polyp-approx 5 mm-removed via snare cauter, two distal ascending colon polyps-7 mm and 3 mm-removed via snare cautery polypectomy, hemorrhoids-Dr. Patt Moore    CYSTOSCOPY W/ URETERAL STENT PLACEMENT Left 04/21/2018    Procedure: CYSTOSCOPY, LEFT RETROGRADE WITH LEFT URETERAL STENT INSERTION;  Surgeon: Stanley Osuna MD;  Location: [facility] OR;  Service: Urology    CYSTOSCOPY W/ URETERAL STENT REMOVAL Left 10/07/2015    Cystoscopy with stent extraction, left ureteral occulusion balloon placement, percutanous nephrostolithotomy with stone volume less than 2.5 cm involving the left lower pole and the left proximal ureter, balloon tract dilation for establishment of nephrostomy tract, antegrade nephrostomy tube placement-Dr. Miguel Monte    CYSTOSCOPY, RETROGRADE PYELOGRAM AND STENT INSERTION Left 09/28/2015    Left cystoscopy with left retrograde pyelogram, left double-J stent placement-Dr. Miguel Blas    CYSTOSCOPY, RETROGRADE PYELOGRAM AND STENT INSERTION Left 09/09/2015    Cystoscopy with bilateral retrograde pyelogram, left double-J stent placement, right ureteral pyeloscopy with laser lithotripsy of the ureteropelvic junction calculus, right double-J stent placement-Dr. Miguel Monte    CYSTOSCOPY, RETROGRADE PYELOGRAM AND STENT INSERTION Right 09/21/2007    Cystoscopy with right retrograde pyelogram, right biliary stent placement-Dr. Miguel Monte    CYSTOSCOPY, RETROGRADE PYELOGRAM AND STENT INSERTION Right 09/07/2007    Cystoscopy with right retrograde pyelogram, right ureteral peyloscopy with extraction distal ureteral mass, right double J stent placement-Dr. Miguel Monte    CYSTOSCOPY, RETROGRADE PYELOGRAM AND STENT INSERTION Left 07/29/2022    Procedure: CYSTOSCOPY, LEFT RETROGRADE PYELOGRAM, LEFT URETERAL STENT;  Surgeon: Cedrick Jones MD;  Location: [facility];  Service: Urology;  Laterality:  Left;    CYSTOSCOPY, URETEROSCOPY, RETROGRADE PYELOGRAM, STENT INSERTION Right 09/07/2007    Cystoscopy with right retrograde pyelogram, rigth ureteroscopy with extraction of distal mass, right double J stent placement-Dr. Miguel Monte    D & C HYSTEROSCOPY N/A 05/18/2011    Procedure done due to thickened endomentrium and an endometrial polyp-Dr. Quita Cheatham    DILATATION AND CURETTAGE N/A 03/22/2002    D&C, polyp removal-Dr. Quita Cheatham    EXTRACORPOREAL SHOCK WAVE LITHOTRIPSY (ESWL) Left 04/19/2023    Procedure: LEFT EXTRACORPOREAL SHOCKWAVE LITHOTRIPSY STENT REPLACEMENT;  Surgeon: Miguel Monte MD;  Location: [facility] OR;  Service: Urology;  Laterality: Left;    EXTRACORPOREAL SHOCKWAVE LITHOTRIPSY (ESWL), STENT INSERTION/REMOVAL Left 05/08/2015    Left extracoporeal shockwave lithotripsy, cystoscopy with stent placement-Dr. Miguel Monte    EXTRACORPOREAL SHOCKWAVE LITHOTRIPSY (ESWL), STENT INSERTION/REMOVAL  04/2023    [facility]    JOINT REPLACEMENT  Left knee    MEDIASTINOTOMY Left 03/05/2018    Procedure: left thyroidectomy, resection of substernal thyroid goiter cervical approach;  Surgeon: Quintin Mares III, MD;  Location: [facility] OR;  Service:     PERCUTANEOUS NEPHROSTOLITHOTOMY Left 06/16/2023    Procedure: LEFT PERCUTANEOUS  NEPHROSTOLITHOTOMY, CYSTOSCOPY, STENT REMOVAL;  Surgeon: Miguel Monte MD;  Location: [facility] HYBRID OR 18/19;  Service: Urology;  Laterality: Left;    SIGMOIDOSCOPY N/A 09/13/2016    Procedure: SIGMOIDOSCOPY FLEXIBLE TO 25 CM;  Surgeon: Patt Moore MD;  Location: [facility] ENDOSCOPY;  Service:     THORACOTOMY  1996    Thoracotomy for ectopic thyroid, Dr. Camarillo    THYROIDECTOMY, PARTIAL      left side 3/05/18    TOTAL KNEE ARTHROPLASTY Left 02/02/2023    Procedure: TOTAL KNEE ARTHROPLASTY;  Surgeon: Anthony Sinclair MD;  Location: [facility] OR OSC;  Service: Orthopedics;  Laterality: Left;    TOTAL KNEE ARTHROPLASTY Right 09/23/2024     Procedure: RIGHT TOTAL KNEE ARTHROPLASTY;  Surgeon: Anthony Sinclair MD;  Location: [facility] OR OSC;  Service: Orthopedics;  Laterality: Right;    UMBILICAL HERNIA REPAIR N/A 09/14/2016    Procedure: UMBILICAL HERNIA REPAIR ;  Surgeon: Patt Moore MD;  Location: [facility] MAIN OR;  Service:     URETEROSCOPY LASER LITHOTRIPSY WITH STENT INSERTION Left 08/12/2022    Procedure: LEFT URETEROSCOPY LASER LITHOTRIPSY STONE BACKET EXTRACTION, STENT PLACEMENT;  Surgeon: Miguel Monte MD;  Location: [facility] MAIN OR;  Service: Urology;  Laterality: Left;    URETEROSCOPY LASER LITHOTRIPSY WITH STENT INSERTION Left 10/11/2023    Procedure: LEFT URETEROSCOPY LASER LITHOTRIPSY WITH STENT REMOVAL;  Surgeon: Miguel Monte MD;  Location: [facility] MAIN OR;  Service: Urology;  Laterality: Left;    VENTRAL/INCISIONAL HERNIA REPAIR N/A 05/17/2017    Procedure: VENTRAL HERNIA REPAIR WITH MESH, BILATERAL MUSCULOFASCIAL RELEASE;  Surgeon: Patt Moore MD;  Location: [facility] OR;  Service:      Family History   Problem Relation Age of Onset    Heart disease Father     Heart attack Paternal Uncle     Cancer Maternal Grandmother         ovarian    Heart attack Paternal Grandfather     Heart attack Paternal Uncle     Heart attack Paternal Uncle     Heart attack Paternal Uncle     Heart attack Paternal Uncle     Heart attack Paternal Uncle     Scleroderma Mother     Hypertension Sister     Malig Hyperthermia Neg Hx      Social History     Tobacco Use    Smoking status: Never     Passive exposure: Never    Smokeless tobacco: Never   Vaping Use    Vaping status: Never Used   Substance Use Topics    Alcohol use: No    Drug use: No     Medications Prior to Admission   Medication Sig Dispense Refill Last Dose/Taking    allopurinol (ZYLOPRIM) 300 MG tablet Take 1 tablet by mouth Daily. KIDNEY STONE  Indications: Disorder of Excessive Uric Acid in the Blood   4/7/2025    cholestyramine (QUESTRAN) 4 g packet Take 1 packet by mouth As  Needed. AROUND DINNERTIME  Indications: Itching   Past Month    diazePAM (Valium) 5 MG tablet TAKE 1 TABLET 1 HOUR PRIOR TO PROCEDURE. DO NOT DRIVE AFTER TAKING 1 tablet 0 Past Month    Diclofenac Sodium 4 %, Topiramate 2 %, cloNIDine HCl 0.2 %, Lidocaine HCl 5 % Apply 1-2 g topically to the appropriate area as directed 3 (Three) to 4 (Four) times daily. 90 g 1 Past Month    methIMAzole (TAPAZOLE) 5 MG tablet TAKE 1/2 TABLET BY MOUTH DAILY 45 tablet 1 4/7/2025    potassium citrate (UROCIT-K) 10 MEQ (1080 MG) CR tablet Take 1 tablet by mouth Daily. Indications: Low Amount of Potassium in the Blood   4/7/2025    apixaban (ELIQUIS) 5 MG tablet tablet Take 2 tablets p.o. twice daily x 1 week, then 1 tablet p.o. twice daily 60 tablet 0 Unknown     Allergies:    Allergies   Allergen Reactions    Cephalexin Hives    Cephalosporins Hives     Took in 2015 w/o problems    Other Hives     ELECTRODE PATCHES    Penicillins Hives       Objective    Objective     Vital Signs  Temp:  [97.7 °F (36.5 °C)-98.2 °F (36.8 °C)] 97.7 °F (36.5 °C)  Heart Rate:  [67-74] 70  Resp:  [20-22] 20  BP: (141-192)/() 157/98  SpO2:  [93 %-100 %] 99 %  on  Flow (L/min) (Oxygen Therapy):  [2] 2;   Device (Oxygen Therapy): nasal cannula  Body mass index is 44.3 kg/m².    Physical Exam  Vitals and nursing note reviewed.   Constitutional:       Appearance: Normal appearance.   HENT:      Head: Normocephalic and atraumatic.      Nose: Nose normal.      Mouth/Throat:      Mouth: Mucous membranes are moist.      Pharynx: Oropharynx is clear.   Eyes:      Extraocular Movements: Extraocular movements intact.      Conjunctiva/sclera: Conjunctivae normal.   Cardiovascular:      Rate and Rhythm: Normal rate and regular rhythm.      Pulses: Normal pulses.           Dorsalis pedis pulses are 2+ on the right side and 2+ on the left side.        Posterior tibial pulses are 2+ on the right side and 2+ on the left side.      Heart sounds: Normal heart sounds.    Pulmonary:      Effort: Pulmonary effort is normal.      Breath sounds: Normal breath sounds.   Abdominal:      General: Bowel sounds are normal.      Palpations: Abdomen is soft.   Musculoskeletal:         General: No tenderness. Normal range of motion.      Cervical back: Normal range of motion and neck supple.      Right lower leg: No edema.      Left lower leg: No edema.   Skin:     General: Skin is warm and dry.   Neurological:      General: No focal deficit present.      Mental Status: She is alert and oriented to person, place, and time.   Psychiatric:         Mood and Affect: Mood normal.         Behavior: Behavior normal.         Results Review:  I reviewed the patient's new clinical results.  I reviewed the patient's new imaging results and agree with the interpretation.  I reviewed the patient's other test results and agree with the interpretation  I personally viewed and interpreted the patient's EKG/Telemetry data  Discussed with ED provider.    Lab Results (last 24 hours)       Procedure Component Value Units Date/Time    CBC & Differential [[id number]]  (Abnormal) Collected: 04/08/25 1727    Specimen: Blood Updated: 04/08/25 1741    Narrative:      The following orders were created for panel order CBC & Differential.  Procedure                               Abnormality         Status                     ---------                               -----------         ------                     CBC Auto Differential[[id number]]        Abnormal            Final result                 Please view results for these tests on the individual orders.    Comprehensive Metabolic Panel [[id number]]  (Abnormal) Collected: 04/08/25 1727    Specimen: Blood Updated: 04/08/25 1803     Glucose 102 mg/dL      BUN 11 mg/dL      Creatinine 1.13 mg/dL      Sodium 142 mmol/L      Potassium 4.3 mmol/L      Chloride 107 mmol/L      CO2 23.6 mmol/L      Calcium 9.2 mg/dL      Total Protein 6.8 g/dL      Albumin 3.8 g/dL      ALT  (SGPT) 17 U/L      AST (SGOT) 24 U/L      Alkaline Phosphatase 89 U/L      Total Bilirubin 0.8 mg/dL      Globulin 3.0 gm/dL      A/G Ratio 1.3 g/dL      BUN/Creatinine Ratio 9.7     Anion Gap 11.4 mmol/L      eGFR 50.2 mL/min/1.73     Narrative:      GFR Categories in Chronic Kidney Disease (CKD)      GFR Category          GFR (mL/min/1.73)    Interpretation  G1                     90 or greater         Normal or high (1)  G2                      60-89                Mild decrease (1)  G3a                   45-59                Mild to moderate decrease  G3b                   30-44                Moderate to severe decrease  G4                    15-29                Severe decrease  G5                    14 or less           Kidney failure          (1)In the absence of evidence of kidney disease, neither GFR category G1 or G2 fulfill the criteria for CKD.    eGFR calculation 2021 CKD-EPI creatinine equation, which does not include race as a factor    Protime-INR [[id number]]  (Abnormal) Collected: 04/08/25 1727    Specimen: Blood Updated: 04/08/25 1803     Protime 15.2 Seconds      INR 1.20    aPTT [[id number]]  (Normal) Collected: 04/08/25 1727    Specimen: Blood Updated: 04/08/25 1803     PTT 28.7 seconds     High Sensitivity Troponin T [[id number]]  (Abnormal) Collected: 04/08/25 1727    Specimen: Blood Updated: 04/08/25 1803     HS Troponin T 15 ng/L     Narrative:      High Sensitive Troponin T Reference Range:  <14.0 ng/L- Negative Female for AMI  <22.0 ng/L- Negative Male for AMI  >=14 - Abnormal Female indicating possible myocardial injury.  >=22 - Abnormal Male indicating possible myocardial injury.   Clinicians would have to utilize clinical acumen, EKG, Troponin, and serial changes to determine if it is an Acute Myocardial Infarction or myocardial injury due to an underlying chronic condition.         BNP [[id number]]  (Normal) Collected: 04/08/25 1727    Specimen: Blood Updated: 04/08/25 1803     proBNP  111.0 pg/mL     Narrative:      This assay is used as an aid in the diagnosis of individuals suspected of having heart failure. It can be used as an aid in the diagnosis of acute decompensated heart failure (ADHF) in patients presenting with signs and symptoms of ADHF to the emergency department (ED). In addition, NT-proBNP of <300 pg/mL indicates ADHF is not likely.    Age Range Result Interpretation  NT-proBNP Concentration (pg/mL:      <50             Positive            >450                   Gray                 300-450                    Negative             <300    50-75           Positive            >900                  Gray                300-900                  Negative            <300      >75             Positive            >1800                  Gray                300-1800                  Negative            <300    CBC Auto Differential [[id number]]  (Abnormal) Collected: 04/08/25 1727    Specimen: Blood Updated: 04/08/25 1741     WBC 4.27 10*3/mm3      RBC 3.95 10*6/mm3      Hemoglobin 12.2 g/dL      Hematocrit 36.9 %      MCV 93.4 fL      MCH 30.9 pg      MCHC 33.1 g/dL      RDW 15.5 %      RDW-SD 53.0 fl      MPV 8.6 fL      Platelets 115 10*3/mm3      Neutrophil % 73.8 %      Lymphocyte % 14.8 %      Monocyte % 6.3 %      Eosinophil % 4.4 %      Basophil % 0.2 %      Immature Grans % 0.5 %      Neutrophils, Absolute 3.15 10*3/mm3      Lymphocytes, Absolute 0.63 10*3/mm3      Monocytes, Absolute 0.27 10*3/mm3      Eosinophils, Absolute 0.19 10*3/mm3      Basophils, Absolute 0.01 10*3/mm3      Immature Grans, Absolute 0.02 10*3/mm3      nRBC 0.0 /100 WBC     High Sensitivity Troponin T 1Hr [[id number]] Collected: 04/08/25 1836    Specimen: Blood Updated: 04/08/25 1915     HS Troponin T 13 ng/L      Troponin T Numeric Delta -2 ng/L      Troponin T % Delta -13    Narrative:      High Sensitive Troponin T Reference Range:  <14.0 ng/L- Negative Female for AMI  <22.0 ng/L- Negative Male for AMI  >=14 -  Abnormal Female indicating possible myocardial injury.  >=22 - Abnormal Male indicating possible myocardial injury.   Clinicians would have to utilize clinical acumen, EKG, Troponin, and serial changes to determine if it is an Acute Myocardial Infarction or myocardial injury due to an underlying chronic condition.                 Imaging Results (Last 24 Hours)       Procedure Component Value Units Date/Time    CT Angiogram Chest [[id number]] Collected: 04/08/25 1929     Updated: 04/08/25 1940    Narrative:      CT ANGIOGRAM CHEST-     Radiation dose reduction techniques were utilized, including automated  exposure control and exposure modulation based on body size.     Clinical: DVT, shortness of breath     COMPARISON 12/2/2024     CT scan of the chest performed with intravenous contrast, pulmonary  embolus protocol to include coronal, sagittal and three-dimensional  reconstruction.     FINDINGS: There are bilateral saddle emboli. On the left extending from  the left upper lobe into the left lower lobe and on the right extending  from the right lower lobe into the right upper lobe with sparing of the  middle lobe. Pulmonary emboli extend to the level of the right and left  main pulmonary arteries, RV LV ratio 0.85. The right ventricular  diameter is 30 mm, left ventricular diameter 35 mm. No indication of  pulmonary infarction.     There is a minimal amount of chronic airspace disease along the left  lower lobe costophrenic sulcus as before. 7 mm pleural-based nodule at  the left base again seen, stable. Follow-up as previously advised. No  acute airspace disease or pleural effusion or grossly suspicious  pulmonary lesion seen.     The esophagus is satisfactory in course and caliber. There is cardiac  enlargement. Diameter of the aorta is within normal limits. Aortic valve  leaflet calcification noted. Previous partial thyroidectomy. Limited  images through the upper abdomen are unremarkable.     CONCLUSION:  Bilateral saddle emboli, RV LV ratio of 0.85. No pulmonary  infarction seen.              This report was finalized on 4/8/2025 7:37 PM by Dr. Raul Vinson M.D  on Workstation: [id number]               Results for orders placed during the hospital encounter of 08/30/17    Adult Transthoracic Echo Complete With Contrast    Interpretation Summary  · Left ventricular systolic function is normal. Estimated EF = 56%.  · Left ventricular diastolic dysfunction (grade II) consistent with pseudonormalization.      ECG 12 Lead Dyspnea   Preliminary Result   HEART RATE=67  bpm   RR Aucuvezh=892  ms   RI Interval=80  ms   P Horizontal Axis=  deg   P Front Axis=87  deg   QRSD Imnehjps=455  ms   QT Zjrhowkm=076  ms   ODdC=435  ms   QRS Axis=7  deg   T Wave Axis=61  deg   - BORDERLINE ECG -   Sinus rhythm   Short RI interval   Low voltage, precordial leads   Date and Time of Study:2025-04-08 17:52:02      Telemetry Scan   Final Result           Assessment/Plan     Active Hospital Problems    Diagnosis  POA    **Acute saddle pulmonary embolism [I26.92]  Yes    Acute deep vein thrombosis (DVT) of right femoral vein [I82.411]  Unknown    Chronic gout [M1A.9XX0]  Unknown    Obstructive sleep apnea [G47.33]  Yes    Hyperthyroidism [E05.90]  Yes       Acute bilateral saddle pulmonary emboli  Right common femoral DVT  -Admit to a telemetry unit for monitoring  -Her respiratory status is stable on room air  -Monitor on continuous pulse oximetry  -Continue therapeutic Lovenox q 12 H  -Cardiology consult  -Check echo  -Morphine as needed for pain    Hyperthyroidism  -She is followed by endocrinology outpatient  -Continue methimazole    Chronic gout  -Continue allopurinol    ERIS  -She may use a home CPAP if here  -Supplemental oxygen as needed at night to keep sats greater than or equal to 92%      -I discussed the patients findings and my recommendations with patient.    VTE Prophylaxis - Pharmacy to dose Lovenox.  Code Status - Full  code.       VERO Pimentel  [facility]  04/08/2025  23:45 EDT

## 2025-04-09 NOTE — CASE MANAGEMENT/SOCIAL WORK
Discharge Planning Assessment  [facility]     Patient Name: Patt Bond  MRN: [id number]  Today's Date: 4/9/2025    Admit Date: 4/8/2025    Plan: Home with spouse   Discharge Needs Assessment       Row Name 04/09/25 1713       Living Environment    People in Home spouse    Current Living Arrangements home    Potentially Unsafe Housing Conditions none    Primary Care Provided by self    Provides Primary Care For no one    Family Caregiver if Needed spouse;child(jn), adult    Family Caregiver Names  Ellis, daughter Sonia    Quality of Family Relationships involved;supportive    Able to Return to Prior Arrangements yes       Resource/Environmental Concerns    Resource/Environmental Concerns none    Transportation Concerns none       Transition Planning    Patient/Family Anticipates Transition to home with family    Patient/Family Anticipated Services at Transition none    Transportation Anticipated family or friend will provide       Discharge Needs Assessment    Equipment Currently Used at Home cane, straight;walker, rolling;shower chair;rollator;cpap    Concerns to be Addressed no discharge needs identified    Anticipated Changes Related to Illness none    Equipment Needed After Discharge none                   Discharge Plan       Row Name 04/09/25 1714       Plan    Plan Home with spouse    Patient/Family in Agreement with Plan yes    Plan Comments Met with pt and her  in room. Pt gave permission to discuss medical information in front of . Introduced self, explained  role, verified face sheet. She stated that her plan is to return home with her  at discharge. Her  will provide transportation. She has a walker, cane, rollator, shower chair, and CPAP at home. She denied any need for rehab, HH, or DME at this time. CCP following. John LANE RN                    Expected Discharge Date and Time       Expected Discharge Date Expected Discharge Time    Apr 11, 2025             Demographic Summary       Row Name 04/09/25 1713       General Information    Admission Type inpatient    Referral Source admission list    Preferred Language English       Contact Information    Permission Granted to Share Info With                    Functional Status       Row Name 04/09/25 1713       Functional Status    Usual Activity Tolerance moderate    Current Activity Tolerance moderate                   Psychosocial    No documentation.                  Abuse/Neglect    No documentation.                  Legal    No documentation.                  Substance Abuse    No documentation.                  Patient Forms    No documentation.                     John Perdomo RN

## 2025-04-09 NOTE — CONSULTS
.     REASON FOR CONSULTATION:     Provide an opinion on any further workup or treatment  Acute right lower extremity DVT                             REQUESTING PHYSICIAN: Modesto Lyman MD  RECORDS OBTAINED:  Records of the patient's history including those obtained from the referring provider were reviewed and summarized in detail.    HISTORY OF PRESENT ILLNESS:  The patient is a 79 y.o. year old female  who is here for follow-up with the above-mentioned history.    Patient is a 79-year-old lady with gout, obstructive sleep apnea, hyperthyroidism with a recent right total knee replacement in September 2024.      She has had worsening swelling in the right leg/ankle for the past 1 month.   Recent MRI of the ankle due to chronic right ankle swelling with mild Achilles tendinopathy, mild nonspecific.  Peroneal tenosynovitis.  This was ordered by orthopedics for chronic ankle swelling.  Due to the persistence of the swelling of the right lower extremity she was recommended to proceed with a right lower extremity Doppler.    4/8/2025-right lower extremity Doppler with acute DVT of the right common femoral vein, proximal femoral, mid femoral, distal femoral, popliteal and peroneal.    She also complained of some dyspnea due to which a CT angiogram of the chest was obtained and was noted to have bilateral saddle emboli.  Patient has been started on Lovenox 1 mg/kg.  We have been consulted for management of DVT and anticoagulation recommendations.    Echocardiogram with a normal ejection fraction of 68.8%.  Negative for right to left atrial level shunt.    Patient denies any previous history of DVT or PE.  Denies any family history of blood clots.  Currently not on any hormones.  She has not had a recent screening mammogram.  Stop screening mammograms at the age of 72.  Given that she is 79 she has not had a recent colonoscopy either.  Denies any change in bowel habits, blood in stool or dark-colored stool.  Denies  any breast masses.  No changes in weight.    Past Medical History:   Diagnosis Date    Arthritis     OSTEO. LEFT KNEE    Back pain     AT TIMES    Cataract     CLIFFORD EYES    Chronic diarrhea     Diverticulitis     WITH RESECTION    Diverticulosis     Goiter     Graves disease     History of colon polyps     BENIGN    History of sepsis     Hyperthyroidism     followed by Dr. Blackmon. PARTIAL THRYOIDECTOMY    Kidney stones     HISTORY OF MULTIPLE TIMES. URIC AND CALCIUM STONES    Lung nodule     HISTORY OF-NOTE LATEST CT CHEST 12/2022    Mass of mediastinum     HISTORY OF. BENIGN.    OA (osteoarthritis) of knee     RIGHT KNEE:  PAIN, WEAKNESS, LIMITED MOBILITY    Obstructive pyelonephritis 09/28/2015    left obstructing pyelonephritis     PONV (postoperative nausea and vomiting)     Rectal prolapse     CHRONIC    Risk for falls     USING ROLLATOR AND CANE    Sleep apnea     CPAP MACHINE    SOB (shortness of breath) on exertion     SEES DR VASQUEZ    Urinary urgency      Past Surgical History:   Procedure Laterality Date    BRONCHOSCOPY N/A 11/06/2017    Procedure: BRONCHOSCOPY WITH ENDOBRONCHIAL ULTRASOUND AND TRANSBRONCHIAL NEEDLE ASPIRATION;  Surgeon: Nando Vasquez MD;  Location: [facility] ENDOSCOPY;  Service:     CARDIAC CATHETERIZATION N/A 09/27/2017    Procedure: Right Heart Cath;  Surgeon: Miguel Gautam MD;  Location: [facility] CATH INVASIVE LOCATION;  Service:     CARDIAC CATHETERIZATION N/A 09/27/2017    Procedure: Left Heart Cath;  Surgeon: Miguel Gautam MD;  Location: [facility] CATH INVASIVE LOCATION;  Service:     CARDIAC CATHETERIZATION N/A 09/27/2017    Procedure: Coronary angiography;  Surgeon: Miguel Gautam MD;  Location: [facility] CATH INVASIVE LOCATION;  Service:     CARDIAC CATHETERIZATION N/A 09/27/2017    Procedure: Left ventriculography;  Surgeon: Miguel Gautam MD;  Location: [facility] CATH INVASIVE LOCATION;  Service:     CHOLECYSTECTOMY N/A 05/17/2017    Procedure: LAPAROSCOPIC  CHOLECYSTECTOMY WITH IOC;  Surgeon: Patt Moore MD;  Location: [facility];  Service:     COLON RESECTION Left 09/14/2016    Laparoscopic Low Anterior Resection with splenic flexure mobilization, drainage of Pelvic Abscess (cultured 3+ E. Coli), Left salpingo-ophorectomy, laparoscopic rectopexy, and umbilical hernia repair, Dr. Patt Moore    COLONOSCOPY N/A 05/15/2008    sigmoid diverticulosis with blunting of the haustral folds and angulation consistent with previous diverticulitis, no polyps, suggestion of rectal prolapse-Dr. Patt Moore    COLONOSCOPY W/ BIOPSIES AND POLYPECTOMY N/A 04/16/2001    Possible mild gastritis w/ some appearance of formations that look like petechiae in the antrum, no ulcerations, no erosions; cecal polyp approx 4mm sessile; probable transverse colon polyp, although we could not visualize this completely upon pulling out; sigmoid diverticula; multiple rectal polyps, mostly w/ appearance of hyperplasia, largest being 5 to 6mm: removed via snare-Dr. Patt Moore    COLONOSCOPY W/ POLYPECTOMY N/A 12/10/2004    Diverticulosis with sigmoid perideverticulitis with erythema and patchiness around some of the diverticula, proximal ascedning colon polyp-approx 5 mm-removed via snare cauter, two distal ascending colon polyps-7 mm and 3 mm-removed via snare cautery polypectomy, hemorrhoids-Dr. Patt Moore    CYSTOSCOPY W/ URETERAL STENT PLACEMENT Left 04/21/2018    Procedure: CYSTOSCOPY, LEFT RETROGRADE WITH LEFT URETERAL STENT INSERTION;  Surgeon: Stanley Osuna MD;  Location: [facility];  Service: Urology    CYSTOSCOPY W/ URETERAL STENT REMOVAL Left 10/07/2015    Cystoscopy with stent extraction, left ureteral occulusion balloon placement, percutanous nephrostolithotomy with stone volume less than 2.5 cm involving the left lower pole and the left proximal ureter, balloon tract dilation for establishment of nephrostomy tract, antegrade nephrostomy tube placement-  Miguel Monte    CYSTOSCOPY, RETROGRADE PYELOGRAM AND STENT INSERTION Left 09/28/2015    Left cystoscopy with left retrograde pyelogram, left double-J stent placement-Dr. Miguel Blas    CYSTOSCOPY, RETROGRADE PYELOGRAM AND STENT INSERTION Left 09/09/2015    Cystoscopy with bilateral retrograde pyelogram, left double-J stent placement, right ureteral pyeloscopy with laser lithotripsy of the ureteropelvic junction calculus, right double-J stent placement-Dr. Miguel Monte    CYSTOSCOPY, RETROGRADE PYELOGRAM AND STENT INSERTION Right 09/21/2007    Cystoscopy with right retrograde pyelogram, right biliary stent placement-Dr. Miguel Monte    CYSTOSCOPY, RETROGRADE PYELOGRAM AND STENT INSERTION Right 09/07/2007    Cystoscopy with right retrograde pyelogram, right ureteral peyloscopy with extraction distal ureteral mass, right double J stent placement-Dr. Miguel Monte    CYSTOSCOPY, RETROGRADE PYELOGRAM AND STENT INSERTION Left 07/29/2022    Procedure: CYSTOSCOPY, LEFT RETROGRADE PYELOGRAM, LEFT URETERAL STENT;  Surgeon: Cedrick Jones MD;  Location: [facility];  Service: Urology;  Laterality: Left;    CYSTOSCOPY, URETEROSCOPY, RETROGRADE PYELOGRAM, STENT INSERTION Right 09/07/2007    Cystoscopy with right retrograde pyelogram, rigth ureteroscopy with extraction of distal mass, right double J stent placement-Dr. Miguel Monte    D & C HYSTEROSCOPY N/A 05/18/2011    Procedure done due to thickened endomentrium and an endometrial polyp-Dr. Quita Cheatham    DILATATION AND CURETTAGE N/A 03/22/2002    D&C, polyp removal-Dr. Quita Cheatham    EXTRACORPOREAL SHOCK WAVE LITHOTRIPSY (ESWL) Left 04/19/2023    Procedure: LEFT EXTRACORPOREAL SHOCKWAVE LITHOTRIPSY STENT REPLACEMENT;  Surgeon: Miguel Monte MD;  Location: [facility];  Service: Urology;  Laterality: Left;    EXTRACORPOREAL SHOCKWAVE LITHOTRIPSY (ESWL), STENT INSERTION/REMOVAL Left 05/08/2015    Left extracoporeal shockwave  lithotripsy, cystoscopy with stent placement-Dr. Miguel Monte    EXTRACORPOREAL SHOCKWAVE LITHOTRIPSY (ESWL), STENT INSERTION/REMOVAL  04/2023    [facility]    JOINT REPLACEMENT  Left knee    MEDIASTINOTOMY Left 03/05/2018    Procedure: left thyroidectomy, resection of substernal thyroid goiter cervical approach;  Surgeon: Quintin Mares III, MD;  Location: [facility] MAIN OR;  Service:     PERCUTANEOUS NEPHROSTOLITHOTOMY Left 06/16/2023    Procedure: LEFT PERCUTANEOUS  NEPHROSTOLITHOTOMY, CYSTOSCOPY, STENT REMOVAL;  Surgeon: Miguel Monte MD;  Location: [facility] HYBRID OR 18/19;  Service: Urology;  Laterality: Left;    SIGMOIDOSCOPY N/A 09/13/2016    Procedure: SIGMOIDOSCOPY FLEXIBLE TO 25 CM;  Surgeon: Patt Moore MD;  Location: [facility] ENDOSCOPY;  Service:     THORACOTOMY  1996    Thoracotomy for ectopic thyroid, Dr. Camarillo    THYROIDECTOMY, PARTIAL      left side 3/05/18    TOTAL KNEE ARTHROPLASTY Left 02/02/2023    Procedure: TOTAL KNEE ARTHROPLASTY;  Surgeon: Anthony Sinclair MD;  Location: [facility] OR [facility];  Service: Orthopedics;  Laterality: Left;    TOTAL KNEE ARTHROPLASTY Right 09/23/2024    Procedure: RIGHT TOTAL KNEE ARTHROPLASTY;  Surgeon: Anthony Sinclair MD;  [facility];  Service: Orthopedics;  Laterality: Right;    UMBILICAL HERNIA REPAIR N/A 09/14/2016    Procedure: UMBILICAL HERNIA REPAIR ;  Surgeon: Patt Moore MD;  Location: [facility] MAIN OR;  Service:     URETEROSCOPY LASER LITHOTRIPSY WITH STENT INSERTION Left 08/12/2022    Procedure: LEFT URETEROSCOPY LASER LITHOTRIPSY STONE BACKET EXTRACTION, STENT PLACEMENT;  Surgeon: Miguel Monte MD;  Location: [facility] MAIN OR;  Service: Urology;  Laterality: Left;    URETEROSCOPY LASER LITHOTRIPSY WITH STENT INSERTION Left 10/11/2023    Procedure: LEFT URETEROSCOPY LASER LITHOTRIPSY WITH STENT REMOVAL;  Surgeon: Miguel Monte MD;  Location: [facility] MAIN OR;  Service: Urology;  Laterality: Left;     VENTRAL/INCISIONAL HERNIA REPAIR N/A 05/17/2017    Procedure: VENTRAL HERNIA REPAIR WITH MESH, BILATERAL MUSCULOFASCIAL RELEASE;  Surgeon: Patt Moore MD;  Location: [facility];  Service:        MEDICATIONS    Current Facility-Administered Medications:     acetaminophen (TYLENOL) tablet 650 mg, 650 mg, Oral, Q4H PRN **OR** acetaminophen (TYLENOL) 160 MG/5ML oral solution 650 mg, 650 mg, Oral, Q4H PRN **OR** acetaminophen (TYLENOL) suppository 650 mg, 650 mg, Rectal, Q4H PRN, Ruth Milner APRN    allopurinol (ZYLOPRIM) tablet 300 mg, 300 mg, Oral, Daily, Ruth Milner APRN, 300 mg at 04/09/25 0823    sennosides-docusate (PERICOLACE) 8.6-50 MG per tablet 2 tablet, 2 tablet, Oral, BID PRN **AND** polyethylene glycol (MIRALAX) packet 17 g, 17 g, Oral, Daily PRN **AND** bisacodyl (DULCOLAX) EC tablet 5 mg, 5 mg, Oral, Daily PRN **AND** bisacodyl (DULCOLAX) suppository 10 mg, 10 mg, Rectal, Daily PRN, Ruth Milner APRN    calcium carbonate (TUMS) chewable tablet 500 mg (200 mg elemental), 2 tablet, Oral, BID PRN, Ruth Milner APRN    cholestyramine (QUESTRAN) packet 1 packet, 1 packet, Oral, Q12H, Fred Murdock MD, 1 packet at 04/09/25 1136    diphenhydrAMINE-zinc acetate 2-0.1 % cream 1 Application, 1 Application, Topical, TID PRN, Fred Murdock MD    enoxaparin sodium (LOVENOX) syringe 105 mg, 1 mg/kg, Subcutaneous, Q12H, Ruth Milner APRN, 105 mg at 04/09/25 0824    methIMAzole (TAPAZOLE) tablet 2.5 mg, 2.5 mg, Oral, Daily, Ruth Milner APRN, 2.5 mg at 04/09/25 0824    morphine injection 2 mg, 2 mg, Intravenous, Q3H PRN, Ruth Milner APRN    mupirocin (BACTROBAN) 2 % nasal ointment 1 Application, 1 Application, Each Nare, BID, Ruth Milner APRN, 1 Application at 04/09/25 1137    nitroglycerin (NITROSTAT) SL tablet 0.4 mg, 0.4 mg, Sublingual, Q5 Min PRN, Ruth Milner APRN    ondansetron ODT (ZOFRAN-ODT) disintegrating tablet 4  mg, 4 mg, Oral, Q6H PRN **OR** ondansetron (ZOFRAN) injection 4 mg, 4 mg, Intravenous, Q6H PRN, Ruth Milner APRN    Pharmacy to Dose enoxaparin (LOVENOX), , Not Applicable, Continuous PRN, Ruth Milner APRN    [COMPLETED] Insert Peripheral IV, , , Once **AND** sodium chloride 0.9 % flush 10 mL, 10 mL, Intravenous, PRN, Modesto Lyman MD    sodium chloride 0.9 % flush 10 mL, 10 mL, Intravenous, Q12H, Ruth Milner APRN, 10 mL at 04/09/25 1000    sodium chloride 0.9 % flush 10 mL, 10 mL, Intravenous, PRN, Ruth Milner APRN    sodium chloride 0.9 % infusion 40 mL, 40 mL, Intravenous, PRN, Ruth Milner APRN    ALLERGIES:     Allergies   Allergen Reactions    Cephalexin Hives    Cephalosporins Hives     Took in 2015 w/o problems    Other Hives     ELECTRODE PATCHES    Penicillins Hives       SOCIAL HISTORY:       Social History     Socioeconomic History    Marital status:      Spouse name: BERENICE COFFEY     Number of children: 2    Years of education: HIGH SCHOOL    Tobacco Use    Smoking status: Never     Passive exposure: Never    Smokeless tobacco: Never   Vaping Use    Vaping status: Never Used   Substance and Sexual Activity    Alcohol use: No    Drug use: No    Sexual activity: Defer         FAMILY HISTORY:  Family History   Problem Relation Age of Onset    Heart disease Father     Heart attack Paternal Uncle     Cancer Maternal Grandmother         ovarian    Heart attack Paternal Grandfather     Heart attack Paternal Uncle     Heart attack Paternal Uncle     Heart attack Paternal Uncle     Heart attack Paternal Uncle     Heart attack Paternal Uncle     Scleroderma Mother     Hypertension Sister     Malig Hyperthermia Neg Hx        REVIEW OF SYSTEMS:  Review of Systems   Constitutional:  Positive for activity change.   HENT: Negative.     Eyes: Negative.    Respiratory:  Positive for shortness of breath.    Cardiovascular:  Positive for leg swelling.  "  Gastrointestinal: Negative.    Genitourinary: Negative.    Musculoskeletal: Negative.    Skin: Negative.    Allergic/Immunologic: Negative.    Neurological: Negative.    Hematological: Negative.    Psychiatric/Behavioral: Negative.                Vitals:    04/08/25 2306 04/09/25 0720 04/09/25 1112 04/09/25 1145   BP: 157/98 150/72  175/81   BP Location: Right arm Right arm  Right arm   Patient Position: Sitting Lying  Lying   Pulse: 70 69  73   Resp: 20 16  16   Temp: 97.7 °F (36.5 °C) 98 °F (36.7 °C)  98 °F (36.7 °C)   TempSrc: Oral Oral  Oral   SpO2: 99% 97%  99%   Weight:   110 kg (242 lb 8.1 oz)    Height:   157.3 cm (61.93\")           No data to display               PHYSICAL EXAM:      CONSTITUTIONAL:  Vital signs reviewed.  No distress, looks comfortable.  EYES:  Conjunctivae and lids unremarkable.  PERRLA  EARS,NOSE,MOUTH,THROAT:  Ears and nose appear unremarkable.  Lips, teeth, gums appear unremarkable.  RESPIRATORY:  Normal respiratory effort.  Lungs clear to auscultation bilaterally.  CARDIOVASCULAR:  Normal S1, S2.  No murmurs rubs or gallops.  No significant lower extremity edema.  GASTROINTESTINAL: Abdomen appears unremarkable.  Nontender.  No hepatomegaly.  No splenomegaly.  LYMPHATIC:  No cervical, supraclavicular, axillary lymphadenopathy.  MUSCULOSKELETAL:  Unremarkable gait and station.  Unremarkable digits/nails.  No cyanosis or clubbing.  SKIN:  Warm.  No rashes.  PSYCHIATRIC:  Normal judgment and insight.  Normal mood and affect.      RECENT LABS:        WBC   Date Value Ref Range Status   04/09/2025 4.14 3.40 - 10.80 10*3/mm3 Final   04/08/2025 4.27 3.40 - 10.80 10*3/mm3 Final     Hemoglobin   Date Value Ref Range Status   04/09/2025 11.0 (L) 12.0 - 15.9 g/dL Final   04/08/2025 12.2 12.0 - 15.9 g/dL Final     Platelets   Date Value Ref Range Status   04/09/2025 116 (L) 140 - 450 10*3/mm3 Final   04/08/2025 115 (L) 140 - 450 10*3/mm3 Final       Assessment & Plan   [unfilled]      Patt Z " Ronda     *Right lower extremity DVT  TKA performed September 2024.  Patient with significant swelling starting only a month ago.  Dyspnea started only 2 weeks ago.  Although she had a procedure performed on the same extremity the occurrence of symptoms happened about 4 months out from the procedure.  Therefore I think it is reasonable to proceed with thrombophilia workup.  If thrombophilia workup is negative then patient can continue 6 months of anticoagulation.  Okay to start patient on Eliquis from our standpoint.    *Gout, continue allopurinol    *Hyper thyroidism-continue methimazole    *Thrombocytopenia  Mild  Platelet count 116,000  Noted to be present at least since 2023.  Obtain IPF  Obtain B12 and folic acid  No recent imaging of the abdomen.    *Anemia  Hemoglobin ranges between 11-12.  Normocytic.  Obtain iron studies, B12 folic acid    *Recommendations  Thrombophilia workup  Patient can be transition to Eliquis and discharged from our standpoint.  We can see her as an outpatient to review the thrombophilia workup.  If thrombophilia workup is negative then recommend 6 months of anticoagulation.  Obtain B12, folic acid and IPF.

## 2025-04-09 NOTE — PLAN OF CARE
Goal Outcome Evaluation:              Outcome Evaluation: vss,a/ox4,no c/o pain this shift, stand by assist with ambulation, NSR, 2L NC , lovenox for VTE prevention.Plan for Echo today. Plan of care ongoing this shift.

## 2025-04-09 NOTE — PROGRESS NOTES
Name: Patt Bond ADMIT: 2025   : 1945  PCP: Moe Matute MD    MRN: [id number] LOS: 1 days   AGE/SEX: 79 y.o. female  ROOM: [facility]     Subjective   Subjective   No current complaint. She states her leg swelling is improved. She reported dyspnea on exertion and light foot swelling.     Objective   Objective   Vital Signs  Temp:  [97.7 °F (36.5 °C)-98.2 °F (36.8 °C)] 98 °F (36.7 °C)  Heart Rate:  [67-74] 69  Resp:  [16-22] 16  BP: (141-192)/() 150/72  SpO2:  [93 %-100 %] 97 %  on  Flow (L/min) (Oxygen Therapy):  [2] 2;   Device (Oxygen Therapy): room air  Body mass index is 44.46 kg/m².    Physical Exam  Constitutional:       General: She is not in acute distress.     Appearance: She is not toxic-appearing.   HENT:      Head: Normocephalic and atraumatic.   Cardiovascular:      Rate and Rhythm: Normal rate and regular rhythm.   Pulmonary:      Effort: Pulmonary effort is normal. No respiratory distress.      Breath sounds: Normal breath sounds. No wheezing or rhonchi.   Abdominal:      General: Bowel sounds are normal.      Palpations: Abdomen is soft.      Tenderness: There is no abdominal tenderness. There is no guarding or rebound.   Musculoskeletal:         General: Swelling (mild RLE) present.   Skin:     General: Skin is warm and dry.   Neurological:      General: No focal deficit present.      Mental Status: She is alert and oriented to person, place, and time.   Psychiatric:         Mood and Affect: Mood normal.         Behavior: Behavior normal.     Results Review  I reviewed the patient's new clinical results.  Results from last 7 days   Lab Units 25  0623 25  1727   WBC 10*3/mm3 4.14 4.27   HEMOGLOBIN g/dL 11.0* 12.2   PLATELETS 10*3/mm3 116* 115*     Results from last 7 days   Lab Units 25  0623 25  1727   SODIUM mmol/L 139 142   POTASSIUM mmol/L 4.2 4.3   CHLORIDE mmol/L 107 107   CO2 mmol/L 22.2 23.6   BUN mg/dL 12 11   CREATININE mg/dL 0.98 1.13*  "  GLUCOSE mg/dL 106* 102*     Lab Results   Component Value Date    ANIONGAP 9.8 04/09/2025     Estimated Creatinine Clearance: 56.1 mL/min (by C-G formula based on SCr of 0.98 mg/dL).   Lab Results   Component Value Date    EGFR 59.6 (L) 04/09/2025     Results from last 7 days   Lab Units 04/08/25  1727   ALBUMIN g/dL 3.8   BILIRUBIN mg/dL 0.8   ALK PHOS U/L 89   AST (SGOT) U/L 24   ALT (SGPT) U/L 17     Results from last 7 days   Lab Units 04/09/25  0623 04/08/25  1727   CALCIUM mg/dL 9.4 9.2   ALBUMIN g/dL  --  3.8       No results found for: \"HGBA1C\", \"POCGLU\"    No radiology results for the last day    Scheduled Meds  allopurinol, 300 mg, Oral, Daily  cholestyramine, 1 packet, Oral, Q12H  enoxaparin sodium, 1 mg/kg, Subcutaneous, Q12H  methIMAzole, 2.5 mg, Oral, Daily  mupirocin, 1 Application, Each Nare, BID  sodium chloride, 10 mL, Intravenous, Q12H    Continuous Infusions  Pharmacy to Dose enoxaparin (LOVENOX),     PRN Meds    acetaminophen **OR** acetaminophen **OR** acetaminophen    senna-docusate sodium **AND** polyethylene glycol **AND** bisacodyl **AND** bisacodyl    calcium carbonate    diphenhydrAMINE-zinc acetate    Morphine    nitroglycerin    ondansetron ODT **OR** ondansetron    Pharmacy to Dose enoxaparin (LOVENOX)    [COMPLETED] Insert Peripheral IV **AND** sodium chloride    sodium chloride    sodium chloride    Pharmacy to Dose enoxaparin (LOVENOX),     Diet  Diet: Cardiac; Healthy Heart (2-3 Na+); Fluid Consistency: Thin (IDDSI 0)       Assessment/Plan     Active Hospital Problems    Diagnosis  POA    **Acute saddle pulmonary embolism [I26.92]  Yes    Acute deep vein thrombosis (DVT) of right femoral vein [I82.411]  Yes    CKD stage 3a, GFR 45-59 ml/min [N18.31]  Yes    Pain in right leg [M79.604]  Yes    Chronic gout [M1A.9XX0]  Yes    Thrombosis of right common femoral artery [I74.3]  Yes    Obstructive sleep apnea [G47.33]  Yes    Hyperthyroidism [E05.90]  Yes      Resolved Hospital " Problems   No resolved problems to display.     Patient is a 79 y.o. female     Bilateral saddle PE  Right leg DVT  Echocardiogram pending  Had knee surgery back in September  Somewhat sedentary  Cardiology to see  Hematology consult  Discussed with patient recommend routine cancer screening with PCP  Currently on Lovenox probably oral anticoagulant at discharge  Thrombocytopenia may be from clot (however has had low platelets before)    CKD 3 A  ERIS followed by pulmonology (Dr. Anthony Heller)  Hyperthyroidism methimazole    Discharge  TBD  Expected discharge date/ time has not been documented.    Discussed with patient, family, and nursing staff    Fred Murdock MD  [facility]  04/09/25 12:02 EDT

## 2025-04-09 NOTE — CONSULTS
Patient Name: Patt Bond  :1945  79 y.o.    Date of Admission: 2025  Date of Consultation:  25  Encounter Provider: Radha Garcia MD  Place of Service: [facility] CARDIOLOGY  Referring Provider: Modesto Lyman MD  Patient Care Team:  Moe Matute MD as PCP - General  Miguel Gautam MD as Consulting Physician (Cardiology)  Chalino Brooks MD as Consulting Physician (Endocrinology)  Nando Heller MD as Consulting Physician (Pulmonary Disease)      Chief complaint: Pulmonary embolus    History of Present Illness:    79-year-old woman who has history of obesity BMI 44.  About 6 months ago 2024 she had orthopedic surgery.  Since then she has had ankle pain.  She has a result has been fairly sedentary due to that.  She was recently back in the orthopedist office with ongoing symptoms of pain in the ankle.  A Doppler was performed which showed right lower extremity DVT from common femoral down to the peroneal.  She has not had significant swelling though it has been intermittent.  In the last 2 weeks she has had a heaviness in the chest along with dyspnea.  No clear pleuritic pain.  No dizziness palpitations or syncope.  She was brought to the emergency room due to the complaints of shortness of breath from the outpatient Doppler lab.  Hypertension with pressures as high as 190/80.  Her labs are normal in terms of troponin and BNP.  Kidney liver function normal.  H&H stable.  She had a chest CT angiogram for PE.  The interpretation is somewhat misleading.  She does not have a true saddle embolus involving the main pulmonary artery, however she does have bilateral PE some of which span arterial bifurcation.  There is no dilation of the RV or PA, personal review.  She was admitted for further evaluation and echocardiogram was performed.  The formal result result is pending however my personal interpretation she has normal RV and LV  function, normal RV size, no significant valvular disease.   Today she is sitting upright finishing her lunch.  She is speaking full sentences without dyspnea.  She has not yet ambulated.  She remains off supplemental oxygen    Past Medical History:   Diagnosis Date    Arthritis     OSTEO. LEFT KNEE    Back pain     AT TIMES    Cataract     CLIFFORD EYES    Chronic diarrhea     Diverticulitis     WITH RESECTION    Diverticulosis     Goiter     Graves disease     History of colon polyps     BENIGN    History of sepsis     Hyperthyroidism     followed by Dr. Blackmon. PARTIAL THRYOIDECTOMY    Kidney stones     HISTORY OF MULTIPLE TIMES. URIC AND CALCIUM STONES    Lung nodule     HISTORY OF-NOTE LATEST CT CHEST 12/2022    Mass of mediastinum     HISTORY OF. BENIGN.    OA (osteoarthritis) of knee     RIGHT KNEE:  PAIN, WEAKNESS, LIMITED MOBILITY    Obstructive pyelonephritis 09/28/2015    left obstructing pyelonephritis     PONV (postoperative nausea and vomiting)     Rectal prolapse     CHRONIC    Risk for falls     USING ROLLATOR AND CANE    Sleep apnea     CPAP MACHINE    SOB (shortness of breath) on exertion     SEES DR VASQUEZ    Urinary urgency        Past Surgical History:   Procedure Laterality Date    BRONCHOSCOPY N/A 11/06/2017    Procedure: BRONCHOSCOPY WITH ENDOBRONCHIAL ULTRASOUND AND TRANSBRONCHIAL NEEDLE ASPIRATION;  Surgeon: Nando Vasquez MD;  Location: [facility] ENDOSCOPY;  Service:     CARDIAC CATHETERIZATION N/A 09/27/2017    Procedure: Right Heart Cath;  Surgeon: Miguel Gautam MD;  Location: [facility] CATH INVASIVE LOCATION;  Service:     CARDIAC CATHETERIZATION N/A 09/27/2017    Procedure: Left Heart Cath;  Surgeon: Miguel Gautam MD;  Location: [facility] CATH INVASIVE LOCATION;  Service:     CARDIAC CATHETERIZATION N/A 09/27/2017    Procedure: Coronary angiography;  Surgeon: Miguel Gautam MD;  Location: [facility] CATH INVASIVE LOCATION;  Service:     CARDIAC CATHETERIZATION N/A 09/27/2017     Procedure: Left ventriculography;  Surgeon: Miguel Gautam MD;  Location: [facility] INVASIVE LOCATION;  Service:     CHOLECYSTECTOMY N/A 05/17/2017    Procedure: LAPAROSCOPIC CHOLECYSTECTOMY WITH IOC;  Surgeon: Patt Moore MD;  Location: [facility] OR;  Service:     COLON RESECTION Left 09/14/2016    Laparoscopic Low Anterior Resection with splenic flexure mobilization, drainage of Pelvic Abscess (cultured 3+ E. Coli), Left salpingo-ophorectomy, laparoscopic rectopexy, and umbilical hernia repair, Dr. Patt Moore    COLONOSCOPY N/A 05/15/2008    sigmoid diverticulosis with blunting of the haustral folds and angulation consistent with previous diverticulitis, no polyps, suggestion of rectal prolapse-Dr. Patt Moore    COLONOSCOPY W/ BIOPSIES AND POLYPECTOMY N/A 04/16/2001    Possible mild gastritis w/ some appearance of formations that look like petechiae in the antrum, no ulcerations, no erosions; cecal polyp approx 4mm sessile; probable transverse colon polyp, although we could not visualize this completely upon pulling out; sigmoid diverticula; multiple rectal polyps, mostly w/ appearance of hyperplasia, largest being 5 to 6mm: removed via snare-Dr. Patt Moore    COLONOSCOPY W/ POLYPECTOMY N/A 12/10/2004    Diverticulosis with sigmoid perideverticulitis with erythema and patchiness around some of the diverticula, proximal ascedning colon polyp-approx 5 mm-removed via snare cauter, two distal ascending colon polyps-7 mm and 3 mm-removed via snare cautery polypectomy, hemorrhoids-Dr. Patt Moore    CYSTOSCOPY W/ URETERAL STENT PLACEMENT Left 04/21/2018    Procedure: CYSTOSCOPY, LEFT RETROGRADE WITH LEFT URETERAL STENT INSERTION;  Surgeon: Stanley Osuna MD;  Location: [facility] OR;  Service: Urology    CYSTOSCOPY W/ URETERAL STENT REMOVAL Left 10/07/2015    Cystoscopy with stent extraction, left ureteral occulusion balloon placement, percutanous nephrostolithotomy with stone volume less  than 2.5 cm involving the left lower pole and the left proximal ureter, balloon tract dilation for establishment of nephrostomy tract, antegrade nephrostomy tube placement-Dr. Miguel Monte    CYSTOSCOPY, RETROGRADE PYELOGRAM AND STENT INSERTION Left 09/28/2015    Left cystoscopy with left retrograde pyelogram, left double-J stent placement-Dr. Miguel Blas    CYSTOSCOPY, RETROGRADE PYELOGRAM AND STENT INSERTION Left 09/09/2015    Cystoscopy with bilateral retrograde pyelogram, left double-J stent placement, right ureteral pyeloscopy with laser lithotripsy of the ureteropelvic junction calculus, right double-J stent placement-Dr. Miguel Monte    CYSTOSCOPY, RETROGRADE PYELOGRAM AND STENT INSERTION Right 09/21/2007    Cystoscopy with right retrograde pyelogram, right biliary stent placement-Dr. Miguel Monte    CYSTOSCOPY, RETROGRADE PYELOGRAM AND STENT INSERTION Right 09/07/2007    Cystoscopy with right retrograde pyelogram, right ureteral peyloscopy with extraction distal ureteral mass, right double J stent placement-Dr. Miguel Monte    CYSTOSCOPY, RETROGRADE PYELOGRAM AND STENT INSERTION Left 07/29/2022    Procedure: CYSTOSCOPY, LEFT RETROGRADE PYELOGRAM, LEFT URETERAL STENT;  Surgeon: Cedrick Jones MD;  Location: [facility];  Service: Urology;  Laterality: Left;    CYSTOSCOPY, URETEROSCOPY, RETROGRADE PYELOGRAM, STENT INSERTION Right 09/07/2007    Cystoscopy with right retrograde pyelogram, rigth ureteroscopy with extraction of distal mass, right double J stent placement-Dr. Miguel Monte    D & C HYSTEROSCOPY N/A 05/18/2011    Procedure done due to thickened endomentrium and an endometrial polyp-Dr. Quita Cheatham    DILATATION AND CURETTAGE N/A 03/22/2002    D&C, polyp removal-Dr. Quita Cheatham    EXTRACORPOREAL SHOCK WAVE LITHOTRIPSY (ESWL) Left 04/19/2023    Procedure: LEFT EXTRACORPOREAL SHOCKWAVE LITHOTRIPSY STENT REPLACEMENT;  Surgeon: Miguel Monte MD;  Location:  [facility] MAIN OR;  Service: Urology;  Laterality: Left;    EXTRACORPOREAL SHOCKWAVE LITHOTRIPSY (ESWL), STENT INSERTION/REMOVAL Left 05/08/2015    Left extracoporeal shockwave lithotripsy, cystoscopy with stent placement-Dr. Miguel Monte    EXTRACORPOREAL SHOCKWAVE LITHOTRIPSY (ESWL), STENT INSERTION/REMOVAL  04/2023    [facility]    JOINT REPLACEMENT  Left knee    MEDIASTINOTOMY Left 03/05/2018    Procedure: left thyroidectomy, resection of substernal thyroid goiter cervical approach;  Surgeon: Quintin Mares III, MD;  Location: [facility] MAIN OR;  Service:     PERCUTANEOUS NEPHROSTOLITHOTOMY Left 06/16/2023    Procedure: LEFT PERCUTANEOUS  NEPHROSTOLITHOTOMY, CYSTOSCOPY, STENT REMOVAL;  Surgeon: Miguel Monte MD;  Location: [facility] HYBRID OR 18/19;  Service: Urology;  Laterality: Left;    SIGMOIDOSCOPY N/A 09/13/2016    Procedure: SIGMOIDOSCOPY FLEXIBLE TO 25 CM;  Surgeon: Patt Moore MD;  Location: [facility] ENDOSCOPY;  Service:     THORACOTOMY  1996    Thoracotomy for ectopic thyroid, Dr. Camarillo    THYROIDECTOMY, PARTIAL      left side 3/05/18    TOTAL KNEE ARTHROPLASTY Left 02/02/2023    Procedure: TOTAL KNEE ARTHROPLASTY;  Surgeon: Anthony Sinclair MD;  Location: [facility] OR [facility];  Service: Orthopedics;  Laterality: Left;    TOTAL KNEE ARTHROPLASTY Right 09/23/2024    Procedure: RIGHT TOTAL KNEE ARTHROPLASTY;  Surgeon: Anthony Sinclair MD;  Location: [facility] OR [facility];  Service: Orthopedics;  Laterality: Right;    UMBILICAL HERNIA REPAIR N/A 09/14/2016    Procedure: UMBILICAL HERNIA REPAIR ;  Surgeon: Patt Moore MD;  Location: [facility] MAIN OR;  Service:     URETEROSCOPY LASER LITHOTRIPSY WITH STENT INSERTION Left 08/12/2022    Procedure: LEFT URETEROSCOPY LASER LITHOTRIPSY STONE BACKET EXTRACTION, STENT PLACEMENT;  Surgeon: Miguel Monte MD;  Location: [facility] MAIN OR;  Service: Urology;  Laterality: Left;    URETEROSCOPY LASER LITHOTRIPSY WITH STENT INSERTION Left 10/11/2023     Procedure: LEFT URETEROSCOPY LASER LITHOTRIPSY WITH STENT REMOVAL;  Surgeon: Miguel Monte MD;  Location: [facility] OR;  Service: Urology;  Laterality: Left;    VENTRAL/INCISIONAL HERNIA REPAIR N/A 05/17/2017    Procedure: VENTRAL HERNIA REPAIR WITH MESH, BILATERAL MUSCULOFASCIAL RELEASE;  Surgeon: Patt Moore MD;  Location: [facility] MAIN OR;  Service:          Prior to Admission medications    Medication Sig Start Date End Date Taking? Authorizing Provider   allopurinol (ZYLOPRIM) 300 MG tablet Take 1 tablet by mouth Daily. KIDNEY STONE  Indications: Disorder of Excessive Uric Acid in the Blood 1/20/17  Yes Ana Barrios MD   cholestyramine (QUESTRAN) 4 g packet Take 1 packet by mouth As Needed. AROUND DINNERTIME  Indications: Itching 10/28/21  Yes Ana Barrios MD   diazePAM (Valium) 5 MG tablet TAKE 1 TABLET 1 HOUR PRIOR TO PROCEDURE. DO NOT DRIVE AFTER TAKING 3/4/25  Yes Eugenio Hagan MD   Diclofenac Sodium 4 %, Topiramate 2 %, cloNIDine HCl 0.2 %, Lidocaine HCl 5 % Apply 1-2 g topically to the appropriate area as directed 3 (Three) to 4 (Four) times daily. 4/7/25 4/7/26 Yes Eugenio Hagan MD   methIMAzole (TAPAZOLE) 5 MG tablet TAKE 1/2 TABLET BY MOUTH DAILY 3/5/25  Yes Chalino Brooks MD   potassium citrate (UROCIT-K) 10 MEQ (1080 MG) CR tablet Take 1 tablet by mouth Daily. Indications: Low Amount of Potassium in the Blood   Yes Ana Barrios MD   apixaban (ELIQUIS) 5 MG tablet tablet Take 2 tablets p.o. twice daily x 1 week, then 1 tablet p.o. twice daily 4/8/25   Eugenio Hagan MD       Allergies   Allergen Reactions    Cephalexin Hives    Cephalosporins Hives     Took in 2015 w/o problems    Other Hives     ELECTRODE PATCHES    Penicillins Hives       Social History     Socioeconomic History    Marital status:      Spouse name: BERENICE COFFEY     Number of children: 2    Years of education: HIGH SCHOOL    Tobacco Use    Smoking  "status: Never     Passive exposure: Never    Smokeless tobacco: Never   Vaping Use    Vaping status: Never Used   Substance and Sexual Activity    Alcohol use: No    Drug use: No    Sexual activity: Defer       Family History   Problem Relation Age of Onset    Heart disease Father     Heart attack Paternal Uncle     Cancer Maternal Grandmother         ovarian    Heart attack Paternal Grandfather     Heart attack Paternal Uncle     Heart attack Paternal Uncle     Heart attack Paternal Uncle     Heart attack Paternal Uncle     Heart attack Paternal Uncle     Scleroderma Mother     Hypertension Sister     Malig Hyperthermia Neg Hx        REVIEW OF SYSTEMS:   All systems reviewed.  Pertinent positives identified in HPI.  All other systems are negative.      Objective:     Vitals:    04/08/25 2306 04/09/25 0720 04/09/25 1112 04/09/25 1145   BP: 157/98 150/72  175/81   BP Location: Right arm Right arm  Right arm   Patient Position: Sitting Lying  Lying   Pulse: 70 69  73   Resp: 20 16  16   Temp: 97.7 °F (36.5 °C) 98 °F (36.7 °C)  98 °F (36.7 °C)   TempSrc: Oral Oral  Oral   SpO2: 99% 97%  99%   Weight:   110 kg (242 lb 8.1 oz)    Height:   157.3 cm (61.93\")      Body mass index is 44.46 kg/m².    General Appearance:    Alert, cooperative, in no acute distress.  Obese.   Head:    Normocephalic, without obvious abnormality, atraumatic   Eyes:            Lids and lashes normal, conjunctivae and sclerae normal, no icterus, no pallor, corneas clear, PERRLA   Ears:    Ears appear intact with no abnormalities noted   Throat:   No oral lesions, no thrush, oral mucosa moist   Neck:   No adenopathy, supple, trachea midline, no thyromegaly, no carotid bruit, no JVD   Back:     No kyphosis present, no scoliosis present, no skin lesions, erythema or scars, no tenderness to percussion or palpation, range of motion normal   Lungs:     Clear to auscultation, respirations regular, even and unlabored    Heart:    Regular rhythm and normal " rate, normal S1 and S2, no murmur, no gallop, no rub, no click   Chest Wall:    No abnormalities observed   Abdomen:     Normal bowel sounds, no masses, no organomegaly, soft, nontender, nondistended, no guarding, no rebound  tenderness   Extremities:   Moves all extremities well, no edema, no cyanosis, no redness   Pulses:   Pulses palpable and equal bilaterally. Normal radial, carotid, femoral, dorsalis pedis and posterior tibial pulses bilaterally. Normal abdominal aorta   Skin:  Psychiatric:   No bleeding, bruising or rash    Alert and oriented x 3, normal mood and affect   Lab Review:     Results from last 7 days   Lab Units 04/09/25  0623 04/08/25  1727   SODIUM mmol/L 139 142   POTASSIUM mmol/L 4.2 4.3   CHLORIDE mmol/L 107 107   CO2 mmol/L 22.2 23.6   BUN mg/dL 12 11   CREATININE mg/dL 0.98 1.13*   CALCIUM mg/dL 9.4 9.2   BILIRUBIN mg/dL  --  0.8   ALK PHOS U/L  --  89   ALT (SGPT) U/L  --  17   AST (SGOT) U/L  --  24   GLUCOSE mg/dL 106* 102*     Results from last 7 days   Lab Units 04/09/25  0623 04/08/25  1836 04/08/25  1727   HSTROP T ng/L 16* 13 15*     Results from last 7 days   Lab Units 04/09/25  0623   WBC 10*3/mm3 4.14   HEMOGLOBIN g/dL 11.0*   HEMATOCRIT % 33.4*   PLATELETS 10*3/mm3 116*     Results from last 7 days   Lab Units 04/09/25  0623 04/08/25  1727   INR   --  1.20*   APTT seconds 35.5* 28.7                       I personally viewed and interpreted the patient's EKG/Telemetry data.        Assessment and Plan:       1.  Low risk provoked bilateral PE without cor pulmonale: Vital signs are stable.  She is hypertensive.  She is off supplemental oxygen.  Biomarkers are normal.  Echocardiogram is unremarkable.  Provoked due to obesity and 6 months of inactivity following orthopedic surgery.  6 months of oral anticoagulant therapy per the primary team.  She should follow-up with her usual pulmonologist and primary care physician.  2.  Right lower extremity DVT common femoral vein to popliteal  trunk.  I suggested a compression stocking.  Will sign off please call for questions.     Radha Garcia MD  04/09/25  13:27 EDT

## 2025-04-10 VITALS
TEMPERATURE: 97.6 F | HEIGHT: 62 IN | RESPIRATION RATE: 16 BRPM | SYSTOLIC BLOOD PRESSURE: 130 MMHG | HEART RATE: 66 BPM | WEIGHT: 242.51 LBS | OXYGEN SATURATION: 100 % | DIASTOLIC BLOOD PRESSURE: 61 MMHG | BODY MASS INDEX: 44.63 KG/M2

## 2025-04-10 LAB
ANION GAP SERPL CALCULATED.3IONS-SCNC: 10.4 MMOL/L (ref 5–15)
APTT PPP: 38.3 SECONDS (ref 22.7–35.4)
B2 GLYCOPROT1 IGA SER-ACNC: <9 GPI IGA UNITS (ref 0–25)
B2 GLYCOPROT1 IGG SER-ACNC: <9 GPI IGG UNITS (ref 0–20)
B2 GLYCOPROT1 IGM SER-ACNC: <9 GPI IGM UNITS (ref 0–32)
BASOPHILS # BLD AUTO: 0.02 10*3/MM3 (ref 0–0.2)
BASOPHILS NFR BLD AUTO: 0.5 % (ref 0–1.5)
BUN SERPL-MCNC: 15 MG/DL (ref 8–23)
BUN/CREAT SERPL: 13.2 (ref 7–25)
CALCIUM SPEC-SCNC: 9.3 MG/DL (ref 8.6–10.5)
CARDIOLIPIN IGG SER IA-ACNC: <9 GPL U/ML (ref 0–14)
CARDIOLIPIN IGM SER IA-ACNC: <9 MPL U/ML (ref 0–12)
CHLORIDE SERPL-SCNC: 106 MMOL/L (ref 98–107)
CO2 SERPL-SCNC: 20.6 MMOL/L (ref 22–29)
CREAT SERPL-MCNC: 1.14 MG/DL (ref 0.57–1)
DEPRECATED RDW RBC AUTO: 51.1 FL (ref 37–54)
EGFRCR SERPLBLD CKD-EPI 2021: 49.7 ML/MIN/1.73
EOSINOPHIL # BLD AUTO: 0.24 10*3/MM3 (ref 0–0.4)
EOSINOPHIL NFR BLD AUTO: 6.3 % (ref 0.3–6.2)
ERYTHROCYTE [DISTWIDTH] IN BLOOD BY AUTOMATED COUNT: 15.3 % (ref 12.3–15.4)
GLUCOSE SERPL-MCNC: 118 MG/DL (ref 65–99)
HCT VFR BLD AUTO: 33.1 % (ref 34–46.6)
HGB BLD-MCNC: 11.2 G/DL (ref 12–15.9)
IMM GRANULOCYTES # BLD AUTO: 0.02 10*3/MM3 (ref 0–0.05)
IMM GRANULOCYTES NFR BLD AUTO: 0.5 % (ref 0–0.5)
LYMPHOCYTES # BLD AUTO: 0.65 10*3/MM3 (ref 0.7–3.1)
LYMPHOCYTES NFR BLD AUTO: 17.1 % (ref 19.6–45.3)
MAGNESIUM SERPL-MCNC: 2 MG/DL (ref 1.6–2.4)
MCH RBC QN AUTO: 31 PG (ref 26.6–33)
MCHC RBC AUTO-ENTMCNC: 33.8 G/DL (ref 31.5–35.7)
MCV RBC AUTO: 91.7 FL (ref 79–97)
MONOCYTES # BLD AUTO: 0.25 10*3/MM3 (ref 0.1–0.9)
MONOCYTES NFR BLD AUTO: 6.6 % (ref 5–12)
NEUTROPHILS NFR BLD AUTO: 2.63 10*3/MM3 (ref 1.7–7)
NEUTROPHILS NFR BLD AUTO: 69 % (ref 42.7–76)
NRBC BLD AUTO-RTO: 0 /100 WBC (ref 0–0.2)
PHOSPHATE SERPL-MCNC: 3 MG/DL (ref 2.5–4.5)
PLATELET # BLD AUTO: 118 10*3/MM3 (ref 140–450)
PMV BLD AUTO: 9 FL (ref 6–12)
POTASSIUM SERPL-SCNC: 4 MMOL/L (ref 3.5–5.2)
RBC # BLD AUTO: 3.61 10*6/MM3 (ref 3.77–5.28)
SODIUM SERPL-SCNC: 137 MMOL/L (ref 136–145)
WBC NRBC COR # BLD AUTO: 3.81 10*3/MM3 (ref 3.4–10.8)

## 2025-04-10 PROCEDURE — 85730 THROMBOPLASTIN TIME PARTIAL: CPT | Performed by: EMERGENCY MEDICINE

## 2025-04-10 PROCEDURE — 80048 BASIC METABOLIC PNL TOTAL CA: CPT | Performed by: HOSPITALIST

## 2025-04-10 PROCEDURE — 85025 COMPLETE CBC W/AUTO DIFF WBC: CPT | Performed by: EMERGENCY MEDICINE

## 2025-04-10 PROCEDURE — 84100 ASSAY OF PHOSPHORUS: CPT | Performed by: HOSPITALIST

## 2025-04-10 PROCEDURE — 83735 ASSAY OF MAGNESIUM: CPT | Performed by: HOSPITALIST

## 2025-04-10 PROCEDURE — 25010000002 ENOXAPARIN PER 10 MG: Performed by: NURSE PRACTITIONER

## 2025-04-10 RX ORDER — APIXABAN 5 MG (74)
5 KIT ORAL SEE ADMIN INSTRUCTIONS
Qty: 74 TABLET | Refills: 0 | Status: SHIPPED | OUTPATIENT
Start: 2025-04-10

## 2025-04-10 RX ORDER — LANOLIN ALCOHOL/MO/W.PET/CERES
2000 CREAM (GRAM) TOPICAL DAILY
Qty: 60 TABLET | Refills: 0 | Status: SHIPPED | OUTPATIENT
Start: 2025-04-10 | End: 2025-04-21 | Stop reason: SDUPTHER

## 2025-04-10 RX ADMIN — Medication 10 ML: at 09:03

## 2025-04-10 RX ADMIN — METHIMAZOLE 2.5 MG: 5 TABLET ORAL at 09:02

## 2025-04-10 RX ADMIN — CHOLESTYRAMINE 1 PACKET: 4 POWDER, FOR SUSPENSION ORAL at 09:01

## 2025-04-10 RX ADMIN — APIXABAN 10 MG: 5 TABLET, FILM COATED ORAL at 10:58

## 2025-04-10 RX ADMIN — ALLOPURINOL 300 MG: 100 TABLET ORAL at 09:02

## 2025-04-10 RX ADMIN — MUPIROCIN 1 APPLICATION: 20 OINTMENT TOPICAL at 09:02

## 2025-04-10 NOTE — DISCHARGE SUMMARY
Date of Discharge:  4/10/2025    PCP: Moe Matute MD    Discharge Diagnosis:   Active Hospital Problems    Diagnosis  POA    **Acute saddle pulmonary embolism [I26.92]  Yes    Acute deep vein thrombosis (DVT) of right femoral vein [I82.411]  Yes    CKD stage 3a, GFR 45-59 ml/min [N18.31]  Yes    Pain in right leg [M79.604]  Yes    Chronic gout [M1A.9XX0]  Yes    Thrombosis of right common femoral artery [I74.3]  Yes    Obstructive sleep apnea [G47.33]  Yes    Hyperthyroidism [E05.90]  Yes      Resolved Hospital Problems   No resolved problems to display.      Consults       Date and Time Order Name Status Description    4/9/2025 12:05 PM Hematology & Oncology Inpatient Consult Completed     4/8/2025 11:06 PM Inpatient Cardiology Consult Completed     4/8/2025  7:56 PM [facility] (on-call MD unless specified) Details      4/8/2025  7:43 PM Interventional Cardiology (on-call MD unless specified)            Hospital Course  Patient is a 79 y.o. female had orthopedic surgery in September afterwards has been fairly sedentary presented to orthopedist office with ankle pain and Doppler showed DVT. She was sent to the ED because of shortness of breath. CTA chest showed bilateral saddle emboli. She was started on Lovenox. Cardiology felt she had a low risk provoked PE without cor pulmonale. Echocardiogram was unremarkable. They recommended 6 months of oral anticoagulant therapy. Hematology ordered a thrombophilia workup. They will see her to review results and if negative recommendation is 6 months of anticoagulation. She has mild chronic thrombocytopenia. B12 was low.    I discussed the patient's findings and my recommendations with patient and nursing staff. She is feeling improved today and very much would like to go home. I discussed with her routine cancer screening and she will follow-up with PCP.    Temp:  [97.6 °F (36.4 °C)-98 °F (36.7 °C)] 97.6 °F (36.4 °C)  Heart Rate:  [66-78] 66  Resp:  [16] 16  BP:  (120-175)/(61-81) 130/61  Body mass index is 44.46 kg/m².    Physical Exam  Constitutional:       General: She is not in acute distress.     Appearance: She is obese. She is not toxic-appearing.   HENT:      Head: Normocephalic and atraumatic.   Cardiovascular:      Rate and Rhythm: Normal rate and regular rhythm.   Pulmonary:      Effort: Pulmonary effort is normal. No respiratory distress.      Breath sounds: Normal breath sounds.   Abdominal:      General: Bowel sounds are normal.      Palpations: Abdomen is soft.      Tenderness: There is no abdominal tenderness. There is no guarding or rebound.   Skin:     General: Skin is warm and dry.   Neurological:      General: No focal deficit present.      Mental Status: She is alert and oriented to person, place, and time.   Psychiatric:         Mood and Affect: Mood normal.         Behavior: Behavior normal.       Disposition: Home or Self Care       Discharge Medications        New Medications        Instructions Start Date   Eliquis DVT/PE Starter Pack tablet therapy pack  Generic drug: Apixaban Starter Pack  Replaces: apixaban 5 MG tablet tablet   Take two 5 mg tablets by mouth every 12 hours for 7 days. Followed by one 5 mg tablet every 12 hours.      vitamin B-12 1000 MCG tablet  Commonly known as: CYANOCOBALAMIN   2,000 mcg, Oral, Daily             Continue These Medications        Instructions Start Date   allopurinol 300 MG tablet  Commonly known as: ZYLOPRIM   1 tablet, Daily      cholestyramine 4 g packet  Commonly known as: QUESTRAN   1 packet, As Needed      diazePAM 5 MG tablet  Commonly known as: Valium   TAKE 1 TABLET 1 HOUR PRIOR TO PROCEDURE. DO NOT DRIVE AFTER TAKING      Diclofenac Sodium 4 %, Topiramate 2 %, cloNIDine HCl 0.2 %, Lidocaine HCl 5 %   1-2 g, Topical, 3 to 4 Times Daily      methIMAzole 5 MG tablet  Commonly known as: TAPAZOLE   2.5 mg, Oral, Daily      potassium citrate 10 MEQ (1080 MG) CR tablet  Commonly known as: UROCIT-K   1  tablet, Daily             Stop These Medications      apixaban 5 MG tablet tablet  Commonly known as: ELIQUIS  Replaced by: Eliquis DVT/PE Starter Pack tablet therapy pack             Diet Instructions       Diet: Regular/House Diet      Discharge Diet: Regular/House Diet    Texture: Regular Texture (IDDSI 7)    Fluid Consistency: Thin (IDDSI 0)           Activity Instructions       Activity as Tolerated             Additional Instructions for the Follow-ups that You Need to Schedule       Call MD for problems / concerns.   As directed             Follow-up Information       Moe Matute MD Follow up in 1 week(s).    Specialty: Family Medicine  Why: After hospital follow up  Contact information:  [address] [id number] [phone]               Ángela Hebert MD Follow up.    Specialties: Hematology and Oncology, Internal Medicine, Oncology  Contact information:  [address] [facility] [address]  [place] [address]  [phone]               Anthony Heller MD .    Specialties: Pulmonary Disease, Intensive Care  Contact information:  [address] [facility] [address]  [place] [address]  [phone]                            Future Appointments   Date Time Provider Department Center   6/2/2025 10:50 AM Eugenio Hagan MD [facility] [address] [facility]   6/16/2025 10:10 AM Anthony Sinclair MD [facility]   6/25/2025 10:30 AM [facility]  [facility]   7/3/2025 10:30 AM Chalino Brooks MD [facility]  [facility]   11/24/2025  1:00 PM [facility] [address] [facility] CT BR None     Pending Labs       Order Current Status    Anticardiolipin Antibody, IgG / M, Qn In process    Antithrombin III In process    Beta-2 Glycoprotein Antibodies In process    Factor 5 Leiden In process    Factor II, DNA Analysis In process    Lupus Anticoagulant In process    Protein S Antigen, Free In process    Protein S Functional In process           Fred Murdock MD  [facility]  04/10/25  08:51 EDT    Discharge time spent greater than 30 minutes.

## 2025-04-10 NOTE — CASE MANAGEMENT/SOCIAL WORK
Case Management Discharge Note      Final Note: Home via spouse to transport         Selected Continued Care - Discharged on 4/10/2025 Admission date: 4/8/2025 - Discharge disposition: Home or Self Care      Destination    No services have been selected for the patient.                Durable Medical Equipment    No services have been selected for the patient.                Dialysis/Infusion    No services have been selected for the patient.                Home Medical Care    No services have been selected for the patient.                Therapy    No services have been selected for the patient.                Community Resources    No services have been selected for the patient.                Community & DME    No services have been selected for the patient.                    Transportation Services  Private: Car    Final Discharge Disposition Code: 01 - home or self-care

## 2025-04-10 NOTE — PLAN OF CARE
Goal Outcome Evaluation:      Vital signs stable. Alert and oriented x 4, pleasant. Up with assistance of one out of bed. Voiding without difficulty. On room air during the day, wears CPAP at bedtime. No voiced complaints of pain. Normal sinus cardiac rhythm. Tolerating cardiac heart healthy diet well. Denies nausea. Bed alarm on. Call light within patient's reach. Discharged home in stable condition. Spouse will provide transportation home. Oral bid eliquis to begin this morning, waiting for pharmacy to deliver medication. Sub Q lovenox discontinued per physician order.

## 2025-04-10 NOTE — PLAN OF CARE
Goal Outcome Evaluation:              Outcome Evaluation: vss,a/ox4,ad flora,CPAP at night,lovenox for VTE prevention.No c/o pain or discomfrot this shift. Plan of care ongoing this shift.

## 2025-04-11 ENCOUNTER — READMISSION MANAGEMENT (OUTPATIENT)
Dept: CALL CENTER | Facility: HOSPITAL | Age: 80
End: 2025-04-11
Payer: MEDICARE

## 2025-04-11 LAB
APTT HEX PL PPP: 4 SEC (ref 0–11)
APTT SCREEN TO CONFIRM RATIO: 1.02 RATIO (ref 0–1.34)
AT III PPP CHRO-ACNC: 91 % (ref 90–134)
CONFIRM APTT/NORMAL: 40.2 SEC (ref 0–47.6)
F5 GENE MUT ANL BLD/T: NORMAL
FACTOR II, DNA ANALYSIS: NORMAL
LA 2 SCREEN W REFLEX-IMP: ABNORMAL
MIXING APTT: 43 SEC (ref 0–40.5)
PROT S ACT/NOR PPP: 88 % (ref 70–127)
PROT S FREE PPP-ACNC: 94 % (ref 49–138)
SCREEN APTT: 45.9 SEC (ref 0–43.5)
SCREEN DRVVT: 37.9 SEC (ref 0–47)
THROMBIN TIME: 26.4 SEC (ref 0–23)

## 2025-04-11 NOTE — OUTREACH NOTE
Prep Survey      Flowsheet Row Responses   [facility] facility patient discharged from? [place]   Is LACE score < 7 ? No   Eligibility Readm Mgmt   Discharge diagnosis Acute saddle pulmonary embolism   Does the patient have one of the following disease processes/diagnoses(primary or secondary)? Other   Does the patient have Home health ordered? No   Is there a DME ordered? No   Medication alerts for this patient see AVS   Prep survey completed? Yes            Yoko DIA - Registered Nurse              Yoko DIA - Registered Nurse

## 2025-04-14 ENCOUNTER — TRANSCRIBE ORDERS (OUTPATIENT)
Dept: ADMINISTRATIVE | Facility: HOSPITAL | Age: 80
End: 2025-04-14
Payer: MEDICARE

## 2025-04-14 DIAGNOSIS — I27.20 PULMONARY HTN: Primary | ICD-10-CM

## 2025-04-16 ENCOUNTER — READMISSION MANAGEMENT (OUTPATIENT)
Dept: CALL CENTER | Facility: HOSPITAL | Age: 80
End: 2025-04-16
Payer: MEDICARE

## 2025-04-16 NOTE — OUTREACH NOTE
Medical Week 1 Survey      Flowsheet Row Responses   [facility] patient discharged from? [place]   Does the patient have one of the following disease processes/diagnoses(primary or secondary)? Other   Week 1 attempt successful? No   Unsuccessful attempts Attempt 1            Kelly Maxwell Registered Nurse

## 2025-04-18 ENCOUNTER — TRANSCRIBE ORDERS (OUTPATIENT)
Age: 80
End: 2025-04-18
Payer: MEDICARE

## 2025-04-18 DIAGNOSIS — I82.431 ACUTE DEEP VEIN THROMBOSIS (DVT) OF POPLITEAL VEIN OF RIGHT LOWER EXTREMITY: ICD-10-CM

## 2025-04-18 DIAGNOSIS — I82.411 DVT OF DEEP FEMORAL VEIN, RIGHT: Primary | ICD-10-CM

## 2025-04-18 DIAGNOSIS — I82.411 ACUTE DEEP VEIN THROMBOSIS (DVT) OF RIGHT FEMORAL VEIN: Primary | ICD-10-CM

## 2025-04-18 DIAGNOSIS — I82.451 ACUTE DEEP VEIN THROMBOSIS (DVT) OF RIGHT PERONEAL VEIN: ICD-10-CM

## 2025-04-21 ENCOUNTER — INFUSION (OUTPATIENT)
Dept: ONCOLOGY | Facility: HOSPITAL | Age: 80
End: 2025-04-21
Payer: MEDICARE

## 2025-04-21 ENCOUNTER — OFFICE VISIT (OUTPATIENT)
Dept: ONCOLOGY | Facility: CLINIC | Age: 80
End: 2025-04-21
Payer: MEDICARE

## 2025-04-21 ENCOUNTER — LAB (OUTPATIENT)
Dept: OTHER | Facility: HOSPITAL | Age: 80
End: 2025-04-21
Payer: MEDICARE

## 2025-04-21 VITALS
HEIGHT: 62 IN | OXYGEN SATURATION: 97 % | HEART RATE: 60 BPM | WEIGHT: 243.1 LBS | DIASTOLIC BLOOD PRESSURE: 69 MMHG | TEMPERATURE: 98.1 F | RESPIRATION RATE: 16 BRPM | SYSTOLIC BLOOD PRESSURE: 117 MMHG | BODY MASS INDEX: 44.74 KG/M2

## 2025-04-21 DIAGNOSIS — I26.92 ACUTE SADDLE PULMONARY EMBOLISM WITHOUT ACUTE COR PULMONALE: ICD-10-CM

## 2025-04-21 DIAGNOSIS — I82.411 ACUTE DEEP VEIN THROMBOSIS (DVT) OF RIGHT FEMORAL VEIN: ICD-10-CM

## 2025-04-21 DIAGNOSIS — Z79.01 CURRENT USE OF LONG TERM ANTICOAGULATION: ICD-10-CM

## 2025-04-21 DIAGNOSIS — E53.8 B12 DEFICIENCY: ICD-10-CM

## 2025-04-21 DIAGNOSIS — E53.8 B12 DEFICIENCY: Primary | ICD-10-CM

## 2025-04-21 DIAGNOSIS — D69.6 THROMBOCYTOPENIA: ICD-10-CM

## 2025-04-21 DIAGNOSIS — D64.9 NORMOCYTIC ANEMIA: ICD-10-CM

## 2025-04-21 LAB
BASOPHILS # BLD AUTO: 0.02 10*3/MM3 (ref 0–0.2)
BASOPHILS NFR BLD AUTO: 0.4 % (ref 0–1.5)
DEPRECATED RDW RBC AUTO: 53.1 FL (ref 37–54)
EOSINOPHIL # BLD AUTO: 0.26 10*3/MM3 (ref 0–0.4)
EOSINOPHIL NFR BLD AUTO: 5.4 % (ref 0.3–6.2)
ERYTHROCYTE [DISTWIDTH] IN BLOOD BY AUTOMATED COUNT: 15.9 % (ref 12.3–15.4)
HCT VFR BLD AUTO: 30.8 % (ref 34–46.6)
HGB BLD-MCNC: 10.2 G/DL (ref 12–15.9)
IMM GRANULOCYTES # BLD AUTO: 0.03 10*3/MM3 (ref 0–0.05)
IMM GRANULOCYTES NFR BLD AUTO: 0.6 % (ref 0–0.5)
LYMPHOCYTES # BLD AUTO: 0.7 10*3/MM3 (ref 0.7–3.1)
LYMPHOCYTES NFR BLD AUTO: 14.6 % (ref 19.6–45.3)
MCH RBC QN AUTO: 30.9 PG (ref 26.6–33)
MCHC RBC AUTO-ENTMCNC: 33.1 G/DL (ref 31.5–35.7)
MCV RBC AUTO: 93.3 FL (ref 79–97)
MONOCYTES # BLD AUTO: 0.35 10*3/MM3 (ref 0.1–0.9)
MONOCYTES NFR BLD AUTO: 7.3 % (ref 5–12)
NEUTROPHILS NFR BLD AUTO: 3.44 10*3/MM3 (ref 1.7–7)
NEUTROPHILS NFR BLD AUTO: 71.7 % (ref 42.7–76)
NRBC BLD AUTO-RTO: 0 /100 WBC (ref 0–0.2)
PLATELET # BLD AUTO: 124 10*3/MM3 (ref 140–450)
PMV BLD AUTO: 9.6 FL (ref 6–12)
RBC # BLD AUTO: 3.3 10*6/MM3 (ref 3.77–5.28)
WBC NRBC COR # BLD AUTO: 4.8 10*3/MM3 (ref 3.4–10.8)

## 2025-04-21 PROCEDURE — 96372 THER/PROPH/DIAG INJ SC/IM: CPT

## 2025-04-21 PROCEDURE — 25010000002 CYANOCOBALAMIN PER 1000 MCG: Performed by: NURSE PRACTITIONER

## 2025-04-21 PROCEDURE — 1126F AMNT PAIN NOTED NONE PRSNT: CPT | Performed by: NURSE PRACTITIONER

## 2025-04-21 PROCEDURE — G2211 COMPLEX E/M VISIT ADD ON: HCPCS | Performed by: NURSE PRACTITIONER

## 2025-04-21 PROCEDURE — 36415 COLL VENOUS BLD VENIPUNCTURE: CPT

## 2025-04-21 PROCEDURE — 99215 OFFICE O/P EST HI 40 MIN: CPT | Performed by: NURSE PRACTITIONER

## 2025-04-21 PROCEDURE — 85025 COMPLETE CBC W/AUTO DIFF WBC: CPT | Performed by: NURSE PRACTITIONER

## 2025-04-21 RX ORDER — CYANOCOBALAMIN 1000 UG/ML
1000 INJECTION, SOLUTION INTRAMUSCULAR; SUBCUTANEOUS ONCE
Status: CANCELLED | OUTPATIENT
Start: 2025-04-21

## 2025-04-21 RX ORDER — CYANOCOBALAMIN 1000 UG/ML
1000 INJECTION, SOLUTION INTRAMUSCULAR; SUBCUTANEOUS ONCE
Status: COMPLETED | OUTPATIENT
Start: 2025-04-21 | End: 2025-04-21

## 2025-04-21 RX ORDER — LANOLIN ALCOHOL/MO/W.PET/CERES
2000 CREAM (GRAM) TOPICAL DAILY
Qty: 60 TABLET | Refills: 2 | Status: SHIPPED | OUTPATIENT
Start: 2025-04-21

## 2025-04-21 RX ADMIN — CYANOCOBALAMIN 1000 MCG: 1000 INJECTION, SOLUTION INTRAMUSCULAR at 10:18

## 2025-04-21 NOTE — PROGRESS NOTES
REASONS FOR FOLLOWUP:  New RLE DVT and saddle PE 4/2025    HISTORY OF PRESENT ILLNESS:  The patient is a 79 y.o. year old female  who is seen today in the outpatient setting after recent admission 4/8-4/10/2025.  Patient presented with worsening shortness of breath and was found to have both bilateral saddle emboli per CTA chest as well as extensive right lower extremity DVT.  She was started on Lovenox and then transition to Eliquis.  During admission we were consulted.  Only pertinent history is TKA in September 2024.  Considering this was almost 6 months ago it was reasonable to check thrombophilia workup which was drawn prior to hospital discharge.  She was also found to have mild thrombocytopenia in the hospital prompting additional workup showing B12 deficiency.  She was started on oral B12 at discharge.    She is now seen back accompanied by her .  She is concerned regarding continued right lower extremity swelling specifically in her foot.  It does sound like she went right back to being very busy in her home when she got discharged and we discussed she does have an extensive clot in her right leg which could contribute to swelling.  She denies any chest pain or worsening shortness of breath, noting only shortness of breath on exertion.  Oxygen saturations are good at 97%.  She did complete the first week of loading dose Eliquis and now continues on 5 mg twice daily.  Denies other concerns at this time.    Past Medical History:   Diagnosis Date    Arthritis     OSTEO. LEFT KNEE    Back pain     AT TIMES    Cataract     CLIFFORD EYES    Chronic diarrhea     Diverticulitis     WITH RESECTION    Diverticulosis     Goiter     Graves disease     History of colon polyps     BENIGN    History of sepsis     Hyperthyroidism     followed by Dr. Blackmon. PARTIAL THRYOIDECTOMY    Kidney stones     HISTORY OF MULTIPLE TIMES. URIC AND CALCIUM STONES    Lung nodule     HISTORY OF-NOTE LATEST CT CHEST 12/2022    Mass of  mediastinum     HISTORY OF. BENIGN.    OA (osteoarthritis) of knee     RIGHT KNEE:  PAIN, WEAKNESS, LIMITED MOBILITY    Obstructive pyelonephritis 09/28/2015    left obstructing pyelonephritis     PONV (postoperative nausea and vomiting)     Rectal prolapse     CHRONIC    Risk for falls     USING ROLLATOR AND CANE    Sleep apnea     CPAP MACHINE    SOB (shortness of breath) on exertion     SEES DR VASQUEZ    Urinary urgency      Past Surgical History:   Procedure Laterality Date    BRONCHOSCOPY N/A 11/06/2017    Procedure: BRONCHOSCOPY WITH ENDOBRONCHIAL ULTRASOUND AND TRANSBRONCHIAL NEEDLE ASPIRATION;  Surgeon: Nando Vasquez MD;  Location: [facility] ENDOSCOPY;  Service:     CARDIAC CATHETERIZATION N/A 09/27/2017    Procedure: Right Heart Cath;  Surgeon: Miguel Gautam MD;  Location: [facility] CATH INVASIVE LOCATION;  Service:     CARDIAC CATHETERIZATION N/A 09/27/2017    Procedure: Left Heart Cath;  Surgeon: Miguel Gautam MD;  Location: [facility] CATH INVASIVE LOCATION;  Service:     CARDIAC CATHETERIZATION N/A 09/27/2017    Procedure: Coronary angiography;  Surgeon: Miguel Gautam MD;  Location: [facility] CATH INVASIVE LOCATION;  Service:     CARDIAC CATHETERIZATION N/A 09/27/2017    Procedure: Left ventriculography;  Surgeon: Miguel Gautam MD;  Location: [facility] CATH INVASIVE LOCATION;  Service:     CHOLECYSTECTOMY N/A 05/17/2017    Procedure: LAPAROSCOPIC CHOLECYSTECTOMY WITH IOC;  Surgeon: Patt Moore MD;  Location: [facility] MAIN OR;  Service:     COLON RESECTION Left 09/14/2016    Laparoscopic Low Anterior Resection with splenic flexure mobilization, drainage of Pelvic Abscess (cultured 3+ E. Coli), Left salpingo-ophorectomy, laparoscopic rectopexy, and umbilical hernia repair, Dr. Patt Moore    COLONOSCOPY N/A 05/15/2008    sigmoid diverticulosis with blunting of the haustral folds and angulation consistent with previous diverticulitis, no polyps, suggestion of rectal prolapse-  Patt Moore    COLONOSCOPY W/ BIOPSIES AND POLYPECTOMY N/A 04/16/2001    Possible mild gastritis w/ some appearance of formations that look like petechiae in the antrum, no ulcerations, no erosions; cecal polyp approx 4mm sessile; probable transverse colon polyp, although we could not visualize this completely upon pulling out; sigmoid diverticula; multiple rectal polyps, mostly w/ appearance of hyperplasia, largest being 5 to 6mm: removed via snare-Dr. Patt Moore    COLONOSCOPY W/ POLYPECTOMY N/A 12/10/2004    Diverticulosis with sigmoid perideverticulitis with erythema and patchiness around some of the diverticula, proximal ascedning colon polyp-approx 5 mm-removed via snare cauter, two distal ascending colon polyps-7 mm and 3 mm-removed via snare cautery polypectomy, hemorrhoids-Dr. Patt Moore    CYSTOSCOPY W/ URETERAL STENT PLACEMENT Left 04/21/2018    Procedure: CYSTOSCOPY, LEFT RETROGRADE WITH LEFT URETERAL STENT INSERTION;  Surgeon: Stanley Osuna MD;  Location: [facility];  Service: Urology    CYSTOSCOPY W/ URETERAL STENT REMOVAL Left 10/07/2015    Cystoscopy with stent extraction, left ureteral occulusion balloon placement, percutanous nephrostolithotomy with stone volume less than 2.5 cm involving the left lower pole and the left proximal ureter, balloon tract dilation for establishment of nephrostomy tract, antegrade nephrostomy tube placement-Dr. Miguel Monte    CYSTOSCOPY, RETROGRADE PYELOGRAM AND STENT INSERTION Left 09/28/2015    Left cystoscopy with left retrograde pyelogram, left double-J stent placement-Dr. Miguel Blas    CYSTOSCOPY, RETROGRADE PYELOGRAM AND STENT INSERTION Left 09/09/2015    Cystoscopy with bilateral retrograde pyelogram, left double-J stent placement, right ureteral pyeloscopy with laser lithotripsy of the ureteropelvic junction calculus, right double-J stent placement-Dr. Miguel Monte    CYSTOSCOPY, RETROGRADE PYELOGRAM AND STENT INSERTION Right  09/21/2007    Cystoscopy with right retrograde pyelogram, right biliary stent placement-Dr. Miguel Monte    CYSTOSCOPY, RETROGRADE PYELOGRAM AND STENT INSERTION Right 09/07/2007    Cystoscopy with right retrograde pyelogram, right ureteral peyloscopy with extraction distal ureteral mass, right double J stent placement-Dr. Miguel Monte    CYSTOSCOPY, RETROGRADE PYELOGRAM AND STENT INSERTION Left 07/29/2022    Procedure: CYSTOSCOPY, LEFT RETROGRADE PYELOGRAM, LEFT URETERAL STENT;  Surgeon: Cedrick Jones MD;  Location: [facility];  Service: Urology;  Laterality: Left;    CYSTOSCOPY, URETEROSCOPY, RETROGRADE PYELOGRAM, STENT INSERTION Right 09/07/2007    Cystoscopy with right retrograde pyelogram, rigth ureteroscopy with extraction of distal mass, right double J stent placement-Dr. Miguel Monte    D & C HYSTEROSCOPY N/A 05/18/2011    Procedure done due to thickened endomentrium and an endometrial polyp-Dr. Quita Cheatham    DILATATION AND CURETTAGE N/A 03/22/2002    D&C, polyp removal-Dr. Quita Cheatham    EXTRACORPOREAL SHOCK WAVE LITHOTRIPSY (ESWL) Left 04/19/2023    Procedure: LEFT EXTRACORPOREAL SHOCKWAVE LITHOTRIPSY STENT REPLACEMENT;  Surgeon: Miguel Monte MD;  Location: [facility];  Service: Urology;  Laterality: Left;    EXTRACORPOREAL SHOCKWAVE LITHOTRIPSY (ESWL), STENT INSERTION/REMOVAL Left 05/08/2015    Left extracoporeal shockwave lithotripsy, cystoscopy with stent placement-Dr. Miguel Monte    EXTRACORPOREAL SHOCKWAVE LITHOTRIPSY (ESWL), STENT INSERTION/REMOVAL  04/2023    [facility]    JOINT REPLACEMENT  Left knee    MEDIASTINOTOMY Left 03/05/2018    Procedure: left thyroidectomy, resection of substernal thyroid goiter cervical approach;  Surgeon: Quintin Mares III, MD;  Location: [facility];  Service:     PERCUTANEOUS NEPHROSTOLITHOTOMY Left 06/16/2023    Procedure: LEFT PERCUTANEOUS  NEPHROSTOLITHOTOMY, CYSTOSCOPY, STENT REMOVAL;  Surgeon:  Miguel Monte MD;  Location: [facility] HYBRID OR 18/19;  Service: Urology;  Laterality: Left;    SIGMOIDOSCOPY N/A 09/13/2016    Procedure: SIGMOIDOSCOPY FLEXIBLE TO 25 CM;  Surgeon: Patt Moore MD;  Location: [facility] ENDOSCOPY;  Service:     THORACOTOMY  1996    Thoracotomy for ectopic thyroid, Dr. Camarillo    THYROIDECTOMY, PARTIAL      left side 3/05/18    TOTAL KNEE ARTHROPLASTY Left 02/02/2023    Procedure: TOTAL KNEE ARTHROPLASTY;  Surgeon: Anthony Sinclair MD;  Location: [facility] OR [facility];  Service: Orthopedics;  Laterality: Left;    TOTAL KNEE ARTHROPLASTY Right 09/23/2024    Procedure: RIGHT TOTAL KNEE ARTHROPLASTY;  Surgeon: Anthony Sinclair MD;  Location: [facility] OR [facility];  Service: Orthopedics;  Laterality: Right;    UMBILICAL HERNIA REPAIR N/A 09/14/2016    Procedure: UMBILICAL HERNIA REPAIR ;  Surgeon: Patt Moore MD;  Location: [facility] MAIN OR;  Service:     URETEROSCOPY LASER LITHOTRIPSY WITH STENT INSERTION Left 08/12/2022    Procedure: LEFT URETEROSCOPY LASER LITHOTRIPSY STONE BACKET EXTRACTION, STENT PLACEMENT;  Surgeon: Miguel Monte MD;  Location: [facility] OR;  Service: Urology;  Laterality: Left;    URETEROSCOPY LASER LITHOTRIPSY WITH STENT INSERTION Left 10/11/2023    Procedure: LEFT URETEROSCOPY LASER LITHOTRIPSY WITH STENT REMOVAL;  Surgeon: Miguel Monte MD;  Location: [facility] OR;  Service: Urology;  Laterality: Left;    VENTRAL/INCISIONAL HERNIA REPAIR N/A 05/17/2017    Procedure: VENTRAL HERNIA REPAIR WITH MESH, BILATERAL MUSCULOFASCIAL RELEASE;  Surgeon: Patt Moore MD;  Location: [facility] MAIN OR;  Service:        MEDICATIONS    Current Outpatient Medications:     allopurinol (ZYLOPRIM) 300 MG tablet, Take 1 tablet by mouth Daily. KIDNEY STONE  Indications: Disorder of Excessive Uric Acid in the Blood, Disp: , Rfl:     Apixaban Starter Pack (Eliquis DVT/PE Starter Pack) tablet therapy pack, Take two 5 mg tablets by mouth every 12 hours for 7 days. Followed by  one 5 mg tablet every 12 hours., Disp: 74 tablet, Rfl: 0    cholestyramine (QUESTRAN) 4 g packet, Take 1 packet by mouth As Needed. AROUND DINNERTIME  Indications: Itching, Disp: , Rfl:     diazePAM (Valium) 5 MG tablet, TAKE 1 TABLET 1 HOUR PRIOR TO PROCEDURE. DO NOT DRIVE AFTER TAKING, Disp: 1 tablet, Rfl: 0    Diclofenac Sodium 4 %, Topiramate 2 %, cloNIDine HCl 0.2 %, Lidocaine HCl 5 %, Apply 1-2 g topically to the appropriate area as directed 3 (Three) to 4 (Four) times daily., Disp: 90 g, Rfl: 1    methIMAzole (TAPAZOLE) 5 MG tablet, TAKE 1/2 TABLET BY MOUTH DAILY, Disp: 45 tablet, Rfl: 1    potassium citrate (UROCIT-K) 10 MEQ (1080 MG) CR tablet, Take 1 tablet by mouth Daily. Indications: Low Amount of Potassium in the Blood, Disp: , Rfl:     vitamin B-12 (CYANOCOBALAMIN) 1000 MCG tablet, Take 2 tablets by mouth Daily., Disp: 60 tablet, Rfl: 2  No current facility-administered medications for this visit.    ALLERGIES:     Allergies   Allergen Reactions    Cephalexin Hives    Cephalosporins Hives     Took in 2015 w/o problems    Other Hives     ELECTRODE PATCHES    Penicillins Hives       SOCIAL HISTORY:       Social History     Socioeconomic History    Marital status:      Spouse name: BERENICE COFFEY     Number of children: 2    Years of education: HIGH SCHOOL    Tobacco Use    Smoking status: Never     Passive exposure: Never    Smokeless tobacco: Never   Vaping Use    Vaping status: Never Used   Substance and Sexual Activity    Alcohol use: No    Drug use: No    Sexual activity: Defer         FAMILY HISTORY:  Family History   Problem Relation Age of Onset    Heart disease Father     Heart attack Paternal Uncle     Cancer Maternal Grandmother         ovarian    Heart attack Paternal Grandfather     Heart attack Paternal Uncle     Heart attack Paternal Uncle     Heart attack Paternal Uncle     Heart attack Paternal Uncle     Heart attack Paternal Uncle     Scleroderma Mother     Hypertension Sister      "Malig Hyperthermia Neg Hx           Vitals:    04/21/25 0943   BP: 117/69   Pulse: 60   Resp: 16   Temp: 98.1 °F (36.7 °C)   TempSrc: Oral   SpO2: 97%   Weight: 110 kg (243 lb 1.6 oz)   Height: 157.3 cm (61.93\")   PainSc: 0-No pain         4/21/2025     9:39 AM   Current Status   ECOG score 0       PHYSICAL EXAM:      CONSTITUTIONAL:  Vital signs reviewed.  No distress, looks comfortable.  HEENT: Normocephalic.  Sclera anicteric.  PERRLA.  Conjunctive normal.  Hearing intact.  RESPIRATORY:  Normal respiratory effort.  Lungs clear to auscultation bilaterally.  CARDIOVASCULAR:  Normal S1, S2.  No murmurs rubs or gallops. GASTROINTESTINAL: Abdomen appears unremarkable.  Nontender.  Bowel sounds active.  MUSCULOSKELETAL:  No cyanosis or clubbing.  Right lower extremity slightly larger than the left with trace to 1+ pitting edema in the foot and lower leg.  SKIN:  Warm.  No rashes.  PSYCHIATRIC:  Normal judgment and insight.  Normal mood and affect.      RECENT LABS:  Results from last 7 days   Lab Units 04/21/25  0934   WBC 10*3/mm3 4.80   NEUTROS ABS 10*3/mm3 3.44   HEMOGLOBIN g/dL 10.2*   HEMATOCRIT % 30.8*   PLATELETS 10*3/mm3 124*               Anticardiolipin Antibody, IgG / M, Qn (04/09/2025 15:11)  Beta-2 Glycoprotein Antibodies (04/09/2025 15:11)  Factor 5 Leiden (04/09/2025 15:11)  Protein S Functional (04/09/2025 15:11)  Protein S Antigen, Free (04/09/2025 15:11)  Factor II, DNA Analysis (04/09/2025 15:11)  Antithrombin III (04/09/2025 15:12)  Lupus Anticoagulant (04/09/2025 15:12)    ASSESSMENT:  *Saddle PE and extensive right lower extremity DVT diagnosed 4/8/2025  Admitted 4/8-4/10/2025, presenting with worsening shortness of breath x 2 weeks.  TKA performed September 2024.  Patient with significant swelling starting only a month ago.  Considering previous knee surgery was almost 6 months ago, proceeding with thrombophilia workup.  .  Patient transition from Lovenox to Eliquis prior to discharge.  "   4/21/2025 today patient is reviewed back with no worsening respiratory symptoms, some continued mild swelling in the right lower extremity which we discussed is to be expected.  We reviewed the workup which was negative.  Discussed plans at this time for at least 6 months of anticoagulation.  She is already scheduled for follow-up Doppler in July.  She also anticipates repeat CTA in 6 months through her pulmonologist.     *Gout, continue allopurinol     *Hyperthyroidism-continue methimazole     *Thrombocytopenia  Mild  Platelet count 116,000  Noted to be present at least since 2023.  IPF normal at 1.7.  B12 level found to be low at 202   4/21/2025 begin weekly B12 x 4.  After that she will continue with oral B12 2000 mcg which was given to her at hospital discharge.  We will repeat levels in 8 weeks.     *Normocytic anemia  Hemoglobin ranges between 11-12.  Per above, B12 level found to be low at 202.  Beginning B12 injections.  4/21/2025 Hgb 10.2 today.  Beginning B12 injections today as outlined.  We had previously planned to check iron studies as well in the hospital though this was not performed.  Will add these to her follow-up visit but for now plan to proceed with B12 as outlined.    PLAN:  Reviewed with patient/ negative thrombophilia workup.  Discussed the importance of keeping her leg elevated and trying to rest a little more currently in light of recent extensive right lower extremity DVT and saddle PE.   Continue Eliquis 5 mg twice daily.  Proceed with B12 injection weekly x 4 beginning today.  After she completes 4 weekly injections of B12 she will then continue on oral B12 2000 mcg daily as prescribed to her at hospital discharge.  I will see her back in 8 weeks for repeat CBC, B12, CMP, ferritin, iron profile  She has repeat right lower extremity Doppler study already scheduled 7/1/2025.  Will have her return in 3 months or follow-up with Dr. Hebert with repeat CBC, CMP, B12.  Explained to  the patient today that she can anticipate at least 6 months of anticoagulation currently.    I spent 55 minutes caring for Patt on this date of service. This time includes time spent by me in the following activities: preparing for the visit, reviewing tests, performing a medically appropriate examination and/or evaluation, counseling and educating the patient/family/caregiver, referring and communicating with other health care professionals, documenting information in the medical record, independently interpreting results and communicating that information with the patient/family/caregiver, care coordination, ordering test(s), obtaining a separately obtained history, and reviewing a separately obtained history

## 2025-04-22 ENCOUNTER — READMISSION MANAGEMENT (OUTPATIENT)
Dept: CALL CENTER | Facility: HOSPITAL | Age: 80
End: 2025-04-22
Payer: MEDICARE

## 2025-04-22 NOTE — OUTREACH NOTE
Medical Week 2 Survey      Flowsheet Row Responses   [facility] patient discharged from? [facility]   Does the patient have one of the following disease processes/diagnoses(primary or secondary)? Other   Week 2 attempt successful? Yes   Call start time 1127   Discharge diagnosis Acute saddle pulmonary embolism   Call end time 1131   Person spoke with today (if not patient) and relationship pt   Meds reviewed with patient/caregiver? Yes   Is the patient having any side effects they believe may be caused by any medication additions or changes? No   Does the patient have all medications ordered at discharge? Yes   Is the patient taking all medications as directed (includes completed medication regime)? Yes   Does the patient have a primary care provider?  Yes   Does the patient have an appointment with their PCP within 7 days of discharge? Yes   Has the patient kept scheduled appointments due by today? Yes   Psychosocial issues? No   Did the patient receive a copy of their discharge instructions? Yes   Nursing interventions Reviewed instructions with patient   What is the patient's perception of their health status since discharge? Improving   Is the patient/caregiver able to teach back signs and symptoms related to disease process for when to call PCP? Yes   Is the patient/caregiver able to teach back signs and symptoms related to disease process for when to call 911? Yes   Is the patient/caregiver able to teach back the hierarchy of who to call/visit for symptoms/problems? PCP, Specialist, Home health nurse, Urgent Care, ED, 911 Yes   If the patient is a current smoker, are they able to teach back resources for cessation? Not a smoker   Week 2 Call Completed? Yes   Graduated Yes   Did the patient feel the follow up calls were helpful during their recovery period? Yes   Was the number of calls appropriate? Yes   Does the patient have an Advance Directive or Living Will? No   Is the patient/caregiver familiar  with Advance Care Planning? No   Would the patient like more information on Advance Care Planning? No   Is the patient interested in additional calls from an ambulatory ? No   Would this patient benefit from a Referral to [facility] Social Work? No   Graduated/Revoked comments Pt states she is doing better, and is still having some SOB r/t PE. Pt is taking eliquis. Pt had PCP, Pulmonary, hematology fu appts. Pt will have another CT chest around 6 months after taking eliquis.   Wrap up additional comments Pt states she is doing better, and is still having some SOB r/t PE. Pt is taking eliquis. Pt had PCP, Pulmonary, hematology fu appts. Pt was told they would   Call end time 1139            Nayana MALDONADO - Registered Nurse

## 2025-04-28 ENCOUNTER — INFUSION (OUTPATIENT)
Dept: ONCOLOGY | Facility: HOSPITAL | Age: 80
End: 2025-04-28
Payer: MEDICARE

## 2025-04-28 DIAGNOSIS — E53.8 B12 DEFICIENCY: Primary | ICD-10-CM

## 2025-04-28 PROCEDURE — 96372 THER/PROPH/DIAG INJ SC/IM: CPT

## 2025-04-28 PROCEDURE — 25010000002 CYANOCOBALAMIN PER 1000 MCG: Performed by: NURSE PRACTITIONER

## 2025-04-28 RX ORDER — CYANOCOBALAMIN 1000 UG/ML
1000 INJECTION, SOLUTION INTRAMUSCULAR; SUBCUTANEOUS ONCE
Status: COMPLETED | OUTPATIENT
Start: 2025-04-28 | End: 2025-04-28

## 2025-04-28 RX ADMIN — CYANOCOBALAMIN 1000 MCG: 1000 INJECTION, SOLUTION INTRAMUSCULAR at 11:53

## 2025-04-29 ENCOUNTER — TELEPHONE (OUTPATIENT)
Dept: ONCOLOGY | Facility: CLINIC | Age: 80
End: 2025-04-29
Payer: MEDICARE

## 2025-04-29 NOTE — TELEPHONE ENCOUNTER
Provider: Anup  Caller: patient  Relationship to Patient: self  Call Back Phone Number: [phone]  Reason for Call: patient has severe diarrhea from the B-12 shot

## 2025-04-29 NOTE — TELEPHONE ENCOUNTER
Returned call to Ms Bond.  She reports that last week after her B12 shot she had watery diarrhea.  She reports this happened again this week after her shot.  She states she took an imodium this week which helped. Reviewed with VERO Laureano, we would not expect diarrhea from the B12 shot.  Advised her she can proceed next week and take imodium prior, or we can cancel further injections until she returns for lab recheck.  She was agreeable to take a preventative imodium and I asked her to call me next week should this recur.  She voiced understanding.

## 2025-05-05 ENCOUNTER — INFUSION (OUTPATIENT)
Dept: ONCOLOGY | Facility: HOSPITAL | Age: 80
End: 2025-05-05
Payer: MEDICARE

## 2025-05-05 DIAGNOSIS — E53.8 B12 DEFICIENCY: Primary | ICD-10-CM

## 2025-05-05 PROCEDURE — 96372 THER/PROPH/DIAG INJ SC/IM: CPT

## 2025-05-05 PROCEDURE — 25010000002 CYANOCOBALAMIN PER 1000 MCG: Performed by: NURSE PRACTITIONER

## 2025-05-05 RX ORDER — CYANOCOBALAMIN 1000 UG/ML
1000 INJECTION, SOLUTION INTRAMUSCULAR; SUBCUTANEOUS ONCE
Status: COMPLETED | OUTPATIENT
Start: 2025-05-05 | End: 2025-05-05

## 2025-05-05 RX ADMIN — CYANOCOBALAMIN 1000 MCG: 1000 INJECTION, SOLUTION INTRAMUSCULAR at 11:29

## 2025-05-05 NOTE — NURSING NOTE
Pt arrived for B12 injection with no complaints or concerns voiced at this time.  Injection administered without incidence. F/u appt reviewed with pt and instructed to call the office for any concerns prior to next appt next week. Pt vu and discharged in stable condition.

## 2025-05-12 ENCOUNTER — INFUSION (OUTPATIENT)
Dept: ONCOLOGY | Facility: HOSPITAL | Age: 80
End: 2025-05-12
Payer: MEDICARE

## 2025-05-12 DIAGNOSIS — E53.8 B12 DEFICIENCY: Primary | ICD-10-CM

## 2025-05-12 PROCEDURE — 25010000002 CYANOCOBALAMIN PER 1000 MCG: Performed by: NURSE PRACTITIONER

## 2025-05-12 PROCEDURE — 96372 THER/PROPH/DIAG INJ SC/IM: CPT

## 2025-05-12 RX ORDER — CYANOCOBALAMIN 1000 UG/ML
1000 INJECTION, SOLUTION INTRAMUSCULAR; SUBCUTANEOUS ONCE
Status: COMPLETED | OUTPATIENT
Start: 2025-05-12 | End: 2025-05-12

## 2025-05-12 RX ADMIN — CYANOCOBALAMIN 1000 MCG: 1000 INJECTION, SOLUTION INTRAMUSCULAR at 11:44

## 2025-06-09 ENCOUNTER — OFFICE VISIT (OUTPATIENT)
Dept: ONCOLOGY | Facility: CLINIC | Age: 80
End: 2025-06-09
Payer: MEDICARE

## 2025-06-09 ENCOUNTER — INFUSION (OUTPATIENT)
Dept: ONCOLOGY | Facility: HOSPITAL | Age: 80
End: 2025-06-09
Payer: MEDICARE

## 2025-06-09 ENCOUNTER — TELEPHONE (OUTPATIENT)
Dept: ONCOLOGY | Facility: CLINIC | Age: 80
End: 2025-06-09
Payer: MEDICARE

## 2025-06-09 ENCOUNTER — LAB (OUTPATIENT)
Dept: OTHER | Facility: HOSPITAL | Age: 80
End: 2025-06-09
Payer: MEDICARE

## 2025-06-09 ENCOUNTER — HOSPITAL ENCOUNTER (INPATIENT)
Facility: HOSPITAL | Age: 80
LOS: 4 days | Discharge: HOME | DRG: 378 | End: 2025-06-14
Attending: STUDENT IN AN ORGANIZED HEALTH CARE EDUCATION/TRAINING PROGRAM | Admitting: INTERNAL MEDICINE
Payer: MEDICARE

## 2025-06-09 ENCOUNTER — PRIOR AUTHORIZATION (OUTPATIENT)
Dept: ONCOLOGY | Facility: CLINIC | Age: 80
End: 2025-06-09
Payer: MEDICARE

## 2025-06-09 VITALS
RESPIRATION RATE: 17 BRPM | HEIGHT: 62 IN | OXYGEN SATURATION: 97 % | BODY MASS INDEX: 44.29 KG/M2 | TEMPERATURE: 98 F | WEIGHT: 240.7 LBS | HEART RATE: 85 BPM | SYSTOLIC BLOOD PRESSURE: 145 MMHG | DIASTOLIC BLOOD PRESSURE: 73 MMHG

## 2025-06-09 DIAGNOSIS — I26.92 ACUTE SADDLE PULMONARY EMBOLISM WITHOUT ACUTE COR PULMONALE: ICD-10-CM

## 2025-06-09 DIAGNOSIS — Z79.01 CURRENT USE OF LONG TERM ANTICOAGULATION: Primary | ICD-10-CM

## 2025-06-09 DIAGNOSIS — D50.0 IRON DEFICIENCY ANEMIA DUE TO CHRONIC BLOOD LOSS: ICD-10-CM

## 2025-06-09 DIAGNOSIS — D50.8 OTHER IRON DEFICIENCY ANEMIA: Primary | ICD-10-CM

## 2025-06-09 DIAGNOSIS — D64.9 NORMOCYTIC ANEMIA: ICD-10-CM

## 2025-06-09 DIAGNOSIS — E53.8 B12 DEFICIENCY: Primary | ICD-10-CM

## 2025-06-09 DIAGNOSIS — E53.8 B12 DEFICIENCY: ICD-10-CM

## 2025-06-09 DIAGNOSIS — D69.6 THROMBOCYTOPENIA: ICD-10-CM

## 2025-06-09 DIAGNOSIS — Z79.01 CURRENT USE OF LONG TERM ANTICOAGULATION: ICD-10-CM

## 2025-06-09 DIAGNOSIS — I82.411 ACUTE DEEP VEIN THROMBOSIS (DVT) OF RIGHT FEMORAL VEIN: ICD-10-CM

## 2025-06-09 PROBLEM — K90.9 IRON MALABSORPTION: Status: ACTIVE | Noted: 2025-06-09

## 2025-06-09 PROBLEM — K92.2 GI BLEED: Status: ACTIVE | Noted: 2025-06-09

## 2025-06-09 PROBLEM — D50.9 IRON DEFICIENCY ANEMIA: Status: ACTIVE | Noted: 2025-06-09

## 2025-06-09 LAB
ABO GROUP BLD: NORMAL
ALBUMIN SERPL-MCNC: 4 G/DL (ref 3.5–5.2)
ALBUMIN/GLOB SERPL: 1.5 G/DL
ALP SERPL-CCNC: 74 U/L (ref 39–117)
ALT SERPL W P-5'-P-CCNC: 12 U/L (ref 1–33)
ANION GAP SERPL CALCULATED.3IONS-SCNC: 9.5 MMOL/L (ref 5–15)
AST SERPL-CCNC: 16 U/L (ref 1–32)
BASOPHILS # BLD AUTO: 0.02 10*3/MM3 (ref 0–0.2)
BASOPHILS NFR BLD AUTO: 0.5 % (ref 0–1.5)
BILIRUB SERPL-MCNC: 0.4 MG/DL (ref 0–1.2)
BLD GP AB SCN SERPL QL: NEGATIVE
BUN SERPL-MCNC: 17.1 MG/DL (ref 8–23)
BUN/CREAT SERPL: 14.9 (ref 7–25)
CALCIUM SPEC-SCNC: 9.3 MG/DL (ref 8.6–10.5)
CHLORIDE SERPL-SCNC: 107 MMOL/L (ref 98–107)
CO2 SERPL-SCNC: 22.5 MMOL/L (ref 22–29)
CREAT SERPL-MCNC: 1.15 MG/DL (ref 0.57–1)
DEPRECATED RDW RBC AUTO: 50 FL (ref 37–54)
EGFRCR SERPLBLD CKD-EPI 2021: 48.9 ML/MIN/1.73
EOSINOPHIL # BLD AUTO: 0.1 10*3/MM3 (ref 0–0.4)
EOSINOPHIL NFR BLD AUTO: 2.6 % (ref 0.3–6.2)
ERYTHROCYTE [DISTWIDTH] IN BLOOD BY AUTOMATED COUNT: 15.1 % (ref 12.3–15.4)
FERRITIN SERPL-MCNC: 11.1 NG/ML (ref 13–150)
FERRITIN SERPL-MCNC: 11.7 NG/ML (ref 13–150)
GLOBULIN UR ELPH-MCNC: 2.6 GM/DL
GLUCOSE BLDC GLUCOMTR-MCNC: 123 MG/DL (ref 70–130)
GLUCOSE SERPL-MCNC: 229 MG/DL (ref 65–99)
HCT VFR BLD AUTO: 24.9 % (ref 34–46.6)
HGB BLD-MCNC: 7.5 G/DL (ref 12–15.9)
IMM GRANULOCYTES # BLD AUTO: 0.06 10*3/MM3 (ref 0–0.05)
IMM GRANULOCYTES NFR BLD AUTO: 1.5 % (ref 0–0.5)
IRON 24H UR-MRATE: 40 MCG/DL (ref 37–145)
IRON 24H UR-MRATE: 42 MCG/DL (ref 37–145)
IRON SATN MFR SERPL: 10 % (ref 20–50)
IRON SATN MFR SERPL: 8 % (ref 20–50)
LYMPHOCYTES # BLD AUTO: 0.51 10*3/MM3 (ref 0.7–3.1)
LYMPHOCYTES NFR BLD AUTO: 13.1 % (ref 19.6–45.3)
MCH RBC QN AUTO: 27.7 PG (ref 26.6–33)
MCHC RBC AUTO-ENTMCNC: 30.1 G/DL (ref 31.5–35.7)
MCV RBC AUTO: 91.9 FL (ref 79–97)
MONOCYTES # BLD AUTO: 0.29 10*3/MM3 (ref 0.1–0.9)
MONOCYTES NFR BLD AUTO: 7.4 % (ref 5–12)
NEUTROPHILS NFR BLD AUTO: 2.92 10*3/MM3 (ref 1.7–7)
NEUTROPHILS NFR BLD AUTO: 74.9 % (ref 42.7–76)
NRBC BLD AUTO-RTO: 0 /100 WBC (ref 0–0.2)
PLATELET # BLD AUTO: 113 10*3/MM3 (ref 140–450)
PMV BLD AUTO: 9.2 FL (ref 6–12)
POTASSIUM SERPL-SCNC: 4.5 MMOL/L (ref 3.5–5.2)
PROT SERPL-MCNC: 6.6 G/DL (ref 6–8.5)
RBC # BLD AUTO: 2.71 10*6/MM3 (ref 3.77–5.28)
RH BLD: POSITIVE
SODIUM SERPL-SCNC: 139 MMOL/L (ref 136–145)
T&S EXPIRATION DATE: NORMAL
TIBC SERPL-MCNC: 428 MCG/DL (ref 298–536)
TIBC SERPL-MCNC: 487 MCG/DL (ref 298–536)
TRANSFERRIN SERPL-MCNC: 287 MG/DL (ref 200–360)
TRANSFERRIN SERPL-MCNC: 327 MG/DL (ref 200–360)
VIT B12 BLD-MCNC: 374 PG/ML (ref 211–946)
WBC NRBC COR # BLD AUTO: 3.9 10*3/MM3 (ref 3.4–10.8)

## 2025-06-09 PROCEDURE — 86901 BLOOD TYPING SEROLOGIC RH(D): CPT | Performed by: INTERNAL MEDICINE

## 2025-06-09 PROCEDURE — G2211 COMPLEX E/M VISIT ADD ON: HCPCS | Performed by: NURSE PRACTITIONER

## 2025-06-09 PROCEDURE — 86920 COMPATIBILITY TEST SPIN: CPT

## 2025-06-09 PROCEDURE — 82728 ASSAY OF FERRITIN: CPT | Performed by: INTERNAL MEDICINE

## 2025-06-09 PROCEDURE — G0463 HOSPITAL OUTPT CLINIC VISIT: HCPCS

## 2025-06-09 PROCEDURE — 86923 COMPATIBILITY TEST ELECTRIC: CPT

## 2025-06-09 PROCEDURE — 80053 COMPREHEN METABOLIC PANEL: CPT | Performed by: NURSE PRACTITIONER

## 2025-06-09 PROCEDURE — 36415 COLL VENOUS BLD VENIPUNCTURE: CPT

## 2025-06-09 PROCEDURE — 99215 OFFICE O/P EST HI 40 MIN: CPT | Performed by: NURSE PRACTITIONER

## 2025-06-09 PROCEDURE — 86850 RBC ANTIBODY SCREEN: CPT | Performed by: INTERNAL MEDICINE

## 2025-06-09 PROCEDURE — 82607 VITAMIN B-12: CPT | Performed by: NURSE PRACTITIONER

## 2025-06-09 PROCEDURE — 83540 ASSAY OF IRON: CPT | Performed by: NURSE PRACTITIONER

## 2025-06-09 PROCEDURE — 1126F AMNT PAIN NOTED NONE PRSNT: CPT | Performed by: NURSE PRACTITIONER

## 2025-06-09 PROCEDURE — 86900 BLOOD TYPING SEROLOGIC ABO: CPT

## 2025-06-09 PROCEDURE — 84466 ASSAY OF TRANSFERRIN: CPT | Performed by: NURSE PRACTITIONER

## 2025-06-09 PROCEDURE — 86901 BLOOD TYPING SEROLOGIC RH(D): CPT

## 2025-06-09 PROCEDURE — 82728 ASSAY OF FERRITIN: CPT | Performed by: NURSE PRACTITIONER

## 2025-06-09 PROCEDURE — 85025 COMPLETE CBC W/AUTO DIFF WBC: CPT | Performed by: NURSE PRACTITIONER

## 2025-06-09 PROCEDURE — 25810000003 SODIUM CHLORIDE 0.9 % SOLUTION: Performed by: INTERNAL MEDICINE

## 2025-06-09 PROCEDURE — 82948 REAGENT STRIP/BLOOD GLUCOSE: CPT

## 2025-06-09 PROCEDURE — G0378 HOSPITAL OBSERVATION PER HR: HCPCS

## 2025-06-09 PROCEDURE — 86900 BLOOD TYPING SEROLOGIC ABO: CPT | Performed by: INTERNAL MEDICINE

## 2025-06-09 RX ORDER — INSULIN LISPRO 100 [IU]/ML
2-7 INJECTION, SOLUTION INTRAVENOUS; SUBCUTANEOUS
Status: DISCONTINUED | OUTPATIENT
Start: 2025-06-09 | End: 2025-06-14 | Stop reason: HOSPADM

## 2025-06-09 RX ORDER — ONDANSETRON 4 MG/1
4 TABLET, ORALLY DISINTEGRATING ORAL EVERY 6 HOURS PRN
Status: DISCONTINUED | OUTPATIENT
Start: 2025-06-09 | End: 2025-06-10

## 2025-06-09 RX ORDER — ALLOPURINOL 300 MG/1
300 TABLET ORAL DAILY
Status: DISCONTINUED | OUTPATIENT
Start: 2025-06-10 | End: 2025-06-14 | Stop reason: HOSPADM

## 2025-06-09 RX ORDER — PANTOPRAZOLE SODIUM 40 MG/10ML
40 INJECTION, POWDER, LYOPHILIZED, FOR SOLUTION INTRAVENOUS
Status: DISCONTINUED | OUTPATIENT
Start: 2025-06-09 | End: 2025-06-10

## 2025-06-09 RX ORDER — ONDANSETRON 2 MG/ML
4 INJECTION INTRAMUSCULAR; INTRAVENOUS EVERY 6 HOURS PRN
Status: DISCONTINUED | OUTPATIENT
Start: 2025-06-09 | End: 2025-06-10

## 2025-06-09 RX ORDER — POTASSIUM CHLORIDE 750 MG/1
10 TABLET, FILM COATED, EXTENDED RELEASE ORAL DAILY
Status: DISCONTINUED | OUTPATIENT
Start: 2025-06-10 | End: 2025-06-14 | Stop reason: HOSPADM

## 2025-06-09 RX ORDER — MULTIVITAMIN WITH IRON
2000 TABLET ORAL DAILY
Status: DISCONTINUED | OUTPATIENT
Start: 2025-06-10 | End: 2025-06-14 | Stop reason: HOSPADM

## 2025-06-09 RX ORDER — SODIUM CHLORIDE 9 MG/ML
100 INJECTION, SOLUTION INTRAVENOUS CONTINUOUS
Status: ACTIVE | OUTPATIENT
Start: 2025-06-09 | End: 2025-06-10

## 2025-06-09 RX ORDER — IBUPROFEN 600 MG/1
1 TABLET ORAL
Status: DISCONTINUED | OUTPATIENT
Start: 2025-06-09 | End: 2025-06-14 | Stop reason: HOSPADM

## 2025-06-09 RX ORDER — HYDROXYZINE HYDROCHLORIDE 25 MG/1
25 TABLET, FILM COATED ORAL 3 TIMES DAILY PRN
Status: DISCONTINUED | OUTPATIENT
Start: 2025-06-09 | End: 2025-06-14 | Stop reason: HOSPADM

## 2025-06-09 RX ORDER — DEXTROSE MONOHYDRATE 25 G/50ML
25 INJECTION, SOLUTION INTRAVENOUS
Status: DISCONTINUED | OUTPATIENT
Start: 2025-06-09 | End: 2025-06-14 | Stop reason: HOSPADM

## 2025-06-09 RX ORDER — CYANOCOBALAMIN 1000 UG/ML
1000 INJECTION, SOLUTION INTRAMUSCULAR; SUBCUTANEOUS ONCE
Status: DISCONTINUED | OUTPATIENT
Start: 2025-06-09 | End: 2025-06-12 | Stop reason: HOSPADM

## 2025-06-09 RX ORDER — NICOTINE POLACRILEX 4 MG
15 LOZENGE BUCCAL
Status: DISCONTINUED | OUTPATIENT
Start: 2025-06-09 | End: 2025-06-14 | Stop reason: HOSPADM

## 2025-06-09 RX ORDER — METHIMAZOLE 5 MG/1
2.5 TABLET ORAL DAILY
Status: DISCONTINUED | OUTPATIENT
Start: 2025-06-10 | End: 2025-06-14 | Stop reason: HOSPADM

## 2025-06-09 RX ADMIN — HYDROXYZINE HYDROCHLORIDE 25 MG: 25 TABLET, FILM COATED ORAL at 23:45

## 2025-06-09 RX ADMIN — SODIUM CHLORIDE 100 ML/HR: 9 INJECTION, SOLUTION INTRAVENOUS at 18:41

## 2025-06-09 RX ADMIN — PANTOPRAZOLE SODIUM 40 MG: 40 INJECTION, POWDER, FOR SOLUTION INTRAVENOUS at 18:40

## 2025-06-09 NOTE — TELEPHONE ENCOUNTER
Caller: Patt Bond    Relationship: Self    Best call back number: [phone]      What is the best time to reach you: ANYTIME       What was the call regarding: PT WAS TOLD SHE COULD GO HOME TO GET A BAG TOGETHER AND THEN SOMEONE WOULD CB ABOUT BEING ADMITTED AND WHEN TO RETURN TO THE HOSPITAL THAT WAS 4 HRS AGO. PLEASE CB TO ADVISE

## 2025-06-09 NOTE — PROGRESS NOTES
REASONS FOR FOLLOWUP:  New RLE DVT and saddle PE 4/2025    HISTORY OF PRESENT ILLNESS:  The patient is a 80 y.o. year old female  who is seen today in the outpatient setting after recent admission 4/8-4/10/2025.  Patient presented with worsening shortness of breath and was found to have both bilateral saddle emboli per CTA chest as well as extensive right lower extremity DVT.  She was started on Lovenox and then transition to Eliquis.  During admission we were consulted.  Only pertinent history is TKA in September 2024.  Considering this was almost 6 months ago it was reasonable to check thrombophilia workup which was drawn prior to hospital discharge.  She was also found to have mild thrombocytopenia in the hospital prompting additional workup showing B12 deficiency.  She was started on oral B12 at discharge. I saw her shortly back after hospital discharge on 4/21/2025.  At that point Hgb was 10.2. We set her up with weekly B12 x 4 to then be transitioned to monthly.  She was tolerating Eliquis well at that point.    Unfortunately as she is reviewed back today, 6/9/2025, her hemoglobin has plummeted to 7.5.  She denies obvious black stools but notes her stools are dark.  Denies any overt bleeding.  She is short of breath with minimal exertion and feels overall poorly.  Additionally she states that after each weekly B12 injection she developed transient diarrhea and nausea.  She is requesting at this point to discontinue further injectable B12 and switch to oral.    Last colonoscopy on file was 5/2008.      Past Medical History:   Diagnosis Date    Arthritis     OSTEO. LEFT KNEE    Back pain     AT TIMES    Cataract     CLIFFORD EYES    Chronic diarrhea     CKD (chronic kidney disease)     Diverticulitis     WITH RESECTION    Diverticulosis     Goiter     Graves disease     History of colon polyps     BENIGN    History of sepsis     History of snoring     Hyperthyroidism     followed by Dr. Blackmon. PARTIAL  THRYOIDECTOMY    Kidney stones     HISTORY OF MULTIPLE TIMES. URIC AND CALCIUM STONES    Lung nodule     HISTORY OF-NOTE LATEST CT CHEST 12/2022    Mass of mediastinum     HISTORY OF. BENIGN.    OA (osteoarthritis) of knee     RIGHT KNEE:  PAIN, WEAKNESS, LIMITED MOBILITY    Obstructive pyelonephritis 09/28/2015    left obstructing pyelonephritis     PONV (postoperative nausea and vomiting)     Rectal prolapse     CHRONIC    Risk for falls     USING ROLLATOR AND CANE    Sleep apnea     CPAP MACHINE    SOB (shortness of breath) on exertion     SEES DR VASQUEZ    Urinary urgency      Past Surgical History:   Procedure Laterality Date    BRONCHOSCOPY N/A 11/06/2017    Procedure: BRONCHOSCOPY WITH ENDOBRONCHIAL ULTRASOUND AND TRANSBRONCHIAL NEEDLE ASPIRATION;  Surgeon: Nando Vasquez MD;  Location: [facility] ENDOSCOPY;  Service:     CARDIAC CATHETERIZATION N/A 09/27/2017    Procedure: Right Heart Cath;  Surgeon: Miguel Gautam MD;  Location: [facility] CATH INVASIVE LOCATION;  Service:     CARDIAC CATHETERIZATION N/A 09/27/2017    Procedure: Left Heart Cath;  Surgeon: Miguel Gautam MD;  Location: [facility] CATH INVASIVE LOCATION;  Service:     CARDIAC CATHETERIZATION N/A 09/27/2017    Procedure: Coronary angiography;  Surgeon: Miguel Gautam MD;  Location: [facility] CATH INVASIVE LOCATION;  Service:     CARDIAC CATHETERIZATION N/A 09/27/2017    Procedure: Left ventriculography;  Surgeon: Miguel Gautam MD;  Location: [facility] CATH INVASIVE LOCATION;  Service:     CHOLECYSTECTOMY N/A 05/17/2017    Procedure: LAPAROSCOPIC CHOLECYSTECTOMY WITH IOC;  Surgeon: Patt Moore MD;  Location: [facility] MAIN OR;  Service:     COLON RESECTION Left 09/14/2016    Laparoscopic Low Anterior Resection with splenic flexure mobilization, drainage of Pelvic Abscess (cultured 3+ E. Coli), Left salpingo-ophorectomy, laparoscopic rectopexy, and umbilical hernia repair, Dr. Patt Moore    COLONOSCOPY N/A 05/15/2008     sigmoid diverticulosis with blunting of the haustral folds and angulation consistent with previous diverticulitis, no polyps, suggestion of rectal prolapse-Dr. Patt Moore    COLONOSCOPY W/ BIOPSIES AND POLYPECTOMY N/A 04/16/2001    Possible mild gastritis w/ some appearance of formations that look like petechiae in the antrum, no ulcerations, no erosions; cecal polyp approx 4mm sessile; probable transverse colon polyp, although we could not visualize this completely upon pulling out; sigmoid diverticula; multiple rectal polyps, mostly w/ appearance of hyperplasia, largest being 5 to 6mm: removed via snare-Dr. Patt Moore    COLONOSCOPY W/ POLYPECTOMY N/A 12/10/2004    Diverticulosis with sigmoid perideverticulitis with erythema and patchiness around some of the diverticula, proximal ascedning colon polyp-approx 5 mm-removed via snare cauter, two distal ascending colon polyps-7 mm and 3 mm-removed via snare cautery polypectomy, hemorrhoids-Dr. Patt Moore    CYSTOSCOPY W/ URETERAL STENT PLACEMENT Left 04/21/2018    Procedure: CYSTOSCOPY, LEFT RETROGRADE WITH LEFT URETERAL STENT INSERTION;  Surgeon: Stanley Osuna MD;  Location: [facility];  Service: Urology    CYSTOSCOPY W/ URETERAL STENT REMOVAL Left 10/07/2015    Cystoscopy with stent extraction, left ureteral occulusion balloon placement, percutanous nephrostolithotomy with stone volume less than 2.5 cm involving the left lower pole and the left proximal ureter, balloon tract dilation for establishment of nephrostomy tract, antegrade nephrostomy tube placement-Dr. Miguel Monte    CYSTOSCOPY, RETROGRADE PYELOGRAM AND STENT INSERTION Left 09/28/2015    Left cystoscopy with left retrograde pyelogram, left double-J stent placement-Dr. Miguel Blas    CYSTOSCOPY, RETROGRADE PYELOGRAM AND STENT INSERTION Left 09/09/2015    Cystoscopy with bilateral retrograde pyelogram, left double-J stent placement, right ureteral pyeloscopy with laser  lithotripsy of the ureteropelvic junction calculus, right double-J stent placement-Dr. Miguel Monte    CYSTOSCOPY, RETROGRADE PYELOGRAM AND STENT INSERTION Right 09/21/2007    Cystoscopy with right retrograde pyelogram, right biliary stent placement-Dr. Miguel Monte    CYSTOSCOPY, RETROGRADE PYELOGRAM AND STENT INSERTION Right 09/07/2007    Cystoscopy with right retrograde pyelogram, right ureteral peyloscopy with extraction distal ureteral mass, right double J stent placement-Dr. Miguel Monte    CYSTOSCOPY, RETROGRADE PYELOGRAM AND STENT INSERTION Left 07/29/2022    Procedure: CYSTOSCOPY, LEFT RETROGRADE PYELOGRAM, LEFT URETERAL STENT;  Surgeon: Cedrick Jones MD;  Location: [facility];  Service: Urology;  Laterality: Left;    CYSTOSCOPY, URETEROSCOPY, RETROGRADE PYELOGRAM, STENT INSERTION Right 09/07/2007    Cystoscopy with right retrograde pyelogram, rigth ureteroscopy with extraction of distal mass, right double J stent placement-Dr. Miguel Monte    D & C HYSTEROSCOPY N/A 05/18/2011    Procedure done due to thickened endomentrium and an endometrial polyp-Dr. Quita Cheatham    DILATATION AND CURETTAGE N/A 03/22/2002    D&C, polyp removal-Dr. Quita Cheatham    EXTRACORPOREAL SHOCK WAVE LITHOTRIPSY (ESWL) Left 04/19/2023    Procedure: LEFT EXTRACORPOREAL SHOCKWAVE LITHOTRIPSY STENT REPLACEMENT;  Surgeon: Miguel Monte MD;  Location: [facility];  Service: Urology;  Laterality: Left;    EXTRACORPOREAL SHOCKWAVE LITHOTRIPSY (ESWL), STENT INSERTION/REMOVAL Left 05/08/2015    Left extracoporeal shockwave lithotripsy, cystoscopy with stent placement-Dr. Miguel Monte    EXTRACORPOREAL SHOCKWAVE LITHOTRIPSY (ESWL), STENT INSERTION/REMOVAL  04/2023    [facility]    JOINT REPLACEMENT  Left knee    MEDIASTINOTOMY Left 03/05/2018    Procedure: left thyroidectomy, resection of substernal thyroid goiter cervical approach;  Surgeon: Quintin Mares III, MD;  Location:   [facility];  Service:     PERCUTANEOUS NEPHROSTOLITHOTOMY Left 06/16/2023    Procedure: LEFT PERCUTANEOUS  NEPHROSTOLITHOTOMY, CYSTOSCOPY, STENT REMOVAL;  Surgeon: Miguel Monte MD;  Location: [facility] HYBRID OR 18/19;  Service: Urology;  Laterality: Left;    SIGMOIDOSCOPY N/A 09/13/2016    Procedure: SIGMOIDOSCOPY FLEXIBLE TO 25 CM;  Surgeon: Patt Moore MD;  Location: [facility] ENDOSCOPY;  Service:     THORACOTOMY  1996    Thoracotomy for ectopic thyroid, Dr. Camarillo    THYROIDECTOMY, PARTIAL      left side 3/05/18    TOTAL KNEE ARTHROPLASTY Left 02/02/2023    Procedure: TOTAL KNEE ARTHROPLASTY;  Surgeon: Anthony Sinclair MD;  [facility];  Service: Orthopedics;  Laterality: Left;    TOTAL KNEE ARTHROPLASTY Right 09/23/2024    Procedure: RIGHT TOTAL KNEE ARTHROPLASTY;  Surgeon: Anthony Sinclair MD;  [facility];  Service: Orthopedics;  Laterality: Right;    UMBILICAL HERNIA REPAIR N/A 09/14/2016    Procedure: UMBILICAL HERNIA REPAIR ;  Surgeon: Patt Moore MD;  Location: [facility] OR;  Service:     URETEROSCOPY LASER LITHOTRIPSY WITH STENT INSERTION Left 08/12/2022    Procedure: LEFT URETEROSCOPY LASER LITHOTRIPSY STONE BACKET EXTRACTION, STENT PLACEMENT;  Surgeon: Miguel Monte MD;  Location: [facility] MAIN OR;  Service: Urology;  Laterality: Left;    URETEROSCOPY LASER LITHOTRIPSY WITH STENT INSERTION Left 10/11/2023    Procedure: LEFT URETEROSCOPY LASER LITHOTRIPSY WITH STENT REMOVAL;  Surgeon: Miguel Monte MD;  Location: [facility] OR;  Service: Urology;  Laterality: Left;    VENTRAL/INCISIONAL HERNIA REPAIR N/A 05/17/2017    Procedure: VENTRAL HERNIA REPAIR WITH MESH, BILATERAL MUSCULOFASCIAL RELEASE;  Surgeon: Patt Moore MD;  Location: [facility] MAIN OR;  Service:        MEDICATIONS    Current Outpatient Medications:     allopurinol (ZYLOPRIM) 300 MG tablet, Take 1 tablet by mouth Daily. KIDNEY STONE  Indications: Disorder of Excessive Uric Acid in the  Blood, Disp: , Rfl:     Apixaban Starter Pack (Eliquis DVT/PE Starter Pack) tablet therapy pack, Take two 5 mg tablets by mouth every 12 hours for 7 days. Followed by one 5 mg tablet every 12 hours., Disp: 74 tablet, Rfl: 0    cholestyramine (QUESTRAN) 4 g packet, Take 1 packet by mouth As Needed. AROUND DINNERTIME  Indications: Itching, Disp: , Rfl:     Diclofenac Sodium 4 %, Topiramate 2 %, cloNIDine HCl 0.2 %, Lidocaine HCl 5 %, Apply 1-2 g topically to the appropriate area as directed 3 (Three) to 4 (Four) times daily., Disp: 90 g, Rfl: 1    methIMAzole (TAPAZOLE) 5 MG tablet, TAKE 1/2 TABLET BY MOUTH DAILY, Disp: 45 tablet, Rfl: 1    potassium citrate (UROCIT-K) 10 MEQ (1080 MG) CR tablet, Take 1 tablet by mouth Daily., Disp: , Rfl:     vitamin B-12 (CYANOCOBALAMIN) 1000 MCG tablet, Take 2 tablets by mouth Daily., Disp: 60 tablet, Rfl: 2    diazePAM (Valium) 5 MG tablet, TAKE 1 TABLET 1 HOUR PRIOR TO PROCEDURE. DO NOT DRIVE AFTER TAKING (Patient not taking: Reported on 6/9/2025), Disp: 1 tablet, Rfl: 0  No current facility-administered medications for this visit.    Facility-Administered Medications Ordered in Other Visits:     cyanocobalamin injection 1,000 mcg, 1,000 mcg, Intramuscular, Once, Stephanie Mendez APRN    ALLERGIES:     Allergies   Allergen Reactions    Cephalexin Hives    Cephalosporins Hives     Took in 2015 w/o problems    Other Hives     ELECTRODE PATCHES    Penicillins Hives       SOCIAL HISTORY:       Social History     Socioeconomic History    Marital status:      Spouse name: BERENICE COFFEY     Number of children: 2    Years of education: HIGH SCHOOL    Tobacco Use    Smoking status: Never     Passive exposure: Never    Smokeless tobacco: Never   Vaping Use    Vaping status: Never Used   Substance and Sexual Activity    Alcohol use: No    Drug use: No    Sexual activity: Defer         FAMILY HISTORY:  Family History   Problem Relation Age of Onset    Heart disease Father      "Heart attack Paternal Uncle     Cancer Maternal Grandmother         ovarian    Heart attack Paternal Grandfather     Heart attack Paternal Uncle     Heart attack Paternal Uncle     Heart attack Paternal Uncle     Heart attack Paternal Uncle     Heart attack Paternal Uncle     Scleroderma Mother     Hypertension Sister     Caesar Hyperthermia Neg Hx           Vitals:    06/09/25 1014   BP: 145/73   Pulse: 85   Resp: 17   Temp: 98 °F (36.7 °C)   SpO2: 97%   Weight: 109 kg (240 lb 11.2 oz)   Height: 157.3 cm (61.93\")   PainSc: 0-No pain           6/9/2025    10:15 AM   Current Status   ECOG score 1       PHYSICAL EXAM:      CONSTITUTIONAL:  Vital signs reviewed.  No distress, looks comfortable.  HEENT: Normocephalic.  Sclera anicteric.  PERRLA.  Conjunctive normal.  Hearing intact.  RESPIRATORY:  Normal respiratory effort.  Lungs clear to auscultation bilaterally.  CARDIOVASCULAR:  Normal S1, S2.  No murmurs rubs or gallops. GASTROINTESTINAL: Abdomen appears unremarkable.  Nontender.  Bowel sounds active.  MUSCULOSKELETAL:  No cyanosis or clubbing.  Right lower extremity slightly larger than the left with trace to 1+ pitting edema in the foot and lower leg.  SKIN:  Warm.  No rashes. + Pallor  PSYCHIATRIC:  Normal judgment and insight.  Normal mood and affect.      RECENT LABS:  Results from last 7 days   Lab Units 06/09/25  1007   WBC 10*3/mm3 3.90   NEUTROS ABS 10*3/mm3 2.92   HEMOGLOBIN g/dL 7.5*   HEMATOCRIT % 24.9*   PLATELETS 10*3/mm3 113*       Results from last 7 days   Lab Units 06/09/25  1007   SODIUM mmol/L 139   POTASSIUM mmol/L 4.5   CHLORIDE mmol/L 107   CO2 mmol/L 22.5   BUN mg/dL 17.1   CREATININE mg/dL 1.15*   CALCIUM mg/dL 9.3   ALBUMIN g/dL 4.0   BILIRUBIN mg/dL 0.4   ALK PHOS U/L 74   ALT (SGPT) U/L 12   AST (SGOT) U/L 16   GLUCOSE mg/dL 229*   FERRITIN ng/mL 11.70*   IRON mcg/dL 42   TIBC mcg/dL 428   Iron sat 10%  B12 374          Anticardiolipin Antibody, IgG / M, Qn (04/09/2025 15:11)  Beta-2 " Glycoprotein Antibodies (04/09/2025 15:11)  Factor 5 Leiden (04/09/2025 15:11)  Protein S Functional (04/09/2025 15:11)  Protein S Antigen, Free (04/09/2025 15:11)  Factor II, DNA Analysis (04/09/2025 15:11)  Antithrombin III (04/09/2025 15:12)  Lupus Anticoagulant (04/09/2025 15:12)    ASSESSMENT:  *Saddle PE and extensive right lower extremity DVT diagnosed 4/8/2025  Admitted 4/8-4/10/2025, presenting with worsening shortness of breath x 2 weeks.  TKA performed September 2024.  Patient with significant swelling starting only a month ago.  Considering previous knee surgery was almost 6 months ago, proceeding with thrombophilia workup.  .  Patient transitioned from Lovenox to Eliquis prior to discharge.    4/21/2025 patient with no worsening respiratory symptoms, some continued mild swelling in the right lower extremity which we discussed is to be expected.  We reviewed hypercoagulable workup which was negative.  Discussed plans at this time for at least 6 months of anticoagulation.  She is already scheduled for follow-up Doppler in July.  She also anticipates repeat CTA in 6 months through her pulmonologist.  6/9/2025 continues to be compliant with Eliquis however concern now for bleeding (see below).     *Gout, continue allopurinol     *Hyperthyroidism-continue methimazole     *Thrombocytopenia  Mild  Platelet count 116,000  Noted to be present at least since 2023.  IPF normal at 1.7.  Found to be B12 deficient during hospitalization 4/2025.  B12 replacement initiated.  6/9/2025 platelets stable at 113,000.    *B12 deficiency   B12 level found to be low at 202   4/21/2025 begin weekly B12 x 4.  After that she will continue with oral B12 2000 mcg which was given to her at hospital discharge.  We will repeat levels in 8 weeks.  6/9/2025 B12 level today 374.  Patient reports that B12 injections making her feel nauseous and have diarrhea briefly thereafter.  Would like to switch to oral B12 going forward.      *Normocytic anemia  Hemoglobin ranges between 11-12.  Per above, B12 level found to be low at 202.  Beginning B12 injections.  4/21/2025 Hgb 10.2.  Beginning B12 injections today as outlined.  We had previously planned to check iron studies as well in the hospital though this was not performed.  Will add these to her follow-up visit but for now plan to proceed with B12 as outlined.  6/9/2025 Hgb down to 7.5, ferritin 11, iron sat 10%.  Concerned that patient is bleeding in setting of anticoagulation.  She is noting dark stools. Last colonoscopy on file was 5/2008, diverticulosis noted, no polyps.    PLAN:  Patient has now been on Eliquis for 8 weeks.  Previous hemoglobin ranging 11-12 has now dropped to 7.5.  Concerned that she is bleeding.  She is now iron deficient as outlined.  Patient will be admitted per [facility], Dr. Peña, in anticipation of transitioning to heparin gtt, urgent scopes, IV iron, blood transfusion if needed.  We will follow.  Note: Patient does not wish to receive further injectable B12 (has received 4 weekly doses noting nausea and diarrhea after each 1).  She would like to take oral B12 going forward.  Note: Previous hypercoag workup was unremarkable.  Initial anticipation was that she would take anticoagulation for at least 6 months.      I spent 50 minutes caring for Patt on this date of service. This time includes time spent by me in the following activities: preparing for the visit, reviewing tests, performing a medically appropriate examination and/or evaluation, counseling and educating the patient/family/caregiver, referring and communicating with other health care professionals, documenting information in the medical record, independently interpreting results and communicating that information with the patient/family/caregiver, care coordination, obtaining a separately obtained history, and reviewing a separately obtained history

## 2025-06-09 NOTE — NURSING NOTE
Pt arrived around 1710 with her spouse. I did not get report. Patient is alert and oriented x 4, not on any distress. RA. Vital signs stable. Up ad flora. Assessment and med rec. completed.The patient was referred from outpatient hematologist. The patient is allergic to the regular ALAN electrodes.

## 2025-06-09 NOTE — TELEPHONE ENCOUNTER
PA pending for 6/9/25 hospital admit through [facility]. Pending auth # [id number]. Scottie with bed board notified.

## 2025-06-09 NOTE — TELEPHONE ENCOUNTER
Called patient and let her know that we have a bed for the patient - she is to head that way within 1 hr and 1/2 - they are cleaning the room

## 2025-06-10 ENCOUNTER — HOSPITAL ENCOUNTER (OUTPATIENT)
Facility: HOSPITAL | Age: 80
Setting detail: HOSPITAL OUTPATIENT SURGERY
DRG: 378 | End: 2025-06-10
Attending: INTERNAL MEDICINE | Admitting: INTERNAL MEDICINE
Payer: MEDICARE

## 2025-06-10 PROBLEM — D62 ANEMIA, POSTHEMORRHAGIC, ACUTE: Status: ACTIVE | Noted: 2025-06-10

## 2025-06-10 LAB
ANION GAP SERPL CALCULATED.3IONS-SCNC: 9 MMOL/L (ref 5–15)
BUN SERPL-MCNC: 13 MG/DL (ref 8–23)
BUN/CREAT SERPL: 11.9 (ref 7–25)
CALCIUM SPEC-SCNC: 8.8 MG/DL (ref 8.6–10.5)
CHLORIDE SERPL-SCNC: 110 MMOL/L (ref 98–107)
CO2 SERPL-SCNC: 20 MMOL/L (ref 22–29)
CREAT SERPL-MCNC: 1.09 MG/DL (ref 0.57–1)
DEPRECATED RDW RBC AUTO: 49.1 FL (ref 37–54)
EGFRCR SERPLBLD CKD-EPI 2021: 52.1 ML/MIN/1.73
ERYTHROCYTE [DISTWIDTH] IN BLOOD BY AUTOMATED COUNT: 15.4 % (ref 12.3–15.4)
GLUCOSE BLDC GLUCOMTR-MCNC: 108 MG/DL (ref 70–130)
GLUCOSE BLDC GLUCOMTR-MCNC: 109 MG/DL (ref 70–130)
GLUCOSE BLDC GLUCOMTR-MCNC: 129 MG/DL (ref 70–130)
GLUCOSE BLDC GLUCOMTR-MCNC: 132 MG/DL (ref 70–130)
GLUCOSE SERPL-MCNC: 112 MG/DL (ref 65–99)
HAPTOGLOB SERPL-MCNC: 58 MG/DL (ref 30–200)
HBA1C MFR BLD: 5.3 % (ref 4.8–5.6)
HCT VFR BLD AUTO: 21 % (ref 34–46.6)
HCT VFR BLD AUTO: 29.4 % (ref 34–46.6)
HEMOCCULT STL QL: POSITIVE
HGB BLD-MCNC: 6.7 G/DL (ref 12–15.9)
HGB BLD-MCNC: 9.5 G/DL (ref 12–15.9)
HGB RETIC QN AUTO: 24.8 PG (ref 29.8–36.1)
IMM RETICS NFR: 21 % (ref 3–15.8)
LDH SERPL-CCNC: 172 U/L (ref 135–214)
MCH RBC QN AUTO: 28.3 PG (ref 26.6–33)
MCHC RBC AUTO-ENTMCNC: 31.9 G/DL (ref 31.5–35.7)
MCV RBC AUTO: 88.6 FL (ref 79–97)
PLATELET # BLD AUTO: 110 10*3/MM3 (ref 140–450)
PLATELET # BLD AUTO: 149 10*3/MM3 (ref 140–450)
PLATELETS.RETICULATED NFR BLD AUTO: 2.3 % (ref 0.9–6.5)
PMV BLD AUTO: 9.4 FL (ref 6–12)
POTASSIUM SERPL-SCNC: 4.2 MMOL/L (ref 3.5–5.2)
RBC # BLD AUTO: 2.37 10*6/MM3 (ref 3.77–5.28)
RETICS # AUTO: 0.08 10*6/MM3 (ref 0.02–0.13)
RETICS/RBC NFR AUTO: 2.6 % (ref 0.7–1.9)
SODIUM SERPL-SCNC: 139 MMOL/L (ref 136–145)
WBC NRBC COR # BLD AUTO: 3.65 10*3/MM3 (ref 3.4–10.8)

## 2025-06-10 PROCEDURE — 85014 HEMATOCRIT: CPT | Performed by: HOSPITALIST

## 2025-06-10 PROCEDURE — 25010000002 NA FERRIC GLUC CPLX PER 12.5 MG: Performed by: INTERNAL MEDICINE

## 2025-06-10 PROCEDURE — 82272 OCCULT BLD FECES 1-3 TESTS: CPT | Performed by: INTERNAL MEDICINE

## 2025-06-10 PROCEDURE — 82948 REAGENT STRIP/BLOOD GLUCOSE: CPT

## 2025-06-10 PROCEDURE — 83615 LACTATE (LD) (LDH) ENZYME: CPT | Performed by: INTERNAL MEDICINE

## 2025-06-10 PROCEDURE — 85046 RETICYTE/HGB CONCENTRATE: CPT | Performed by: INTERNAL MEDICINE

## 2025-06-10 PROCEDURE — 36430 TRANSFUSION BLD/BLD COMPNT: CPT

## 2025-06-10 PROCEDURE — 97530 THERAPEUTIC ACTIVITIES: CPT

## 2025-06-10 PROCEDURE — 85055 RETICULATED PLATELET ASSAY: CPT | Performed by: INTERNAL MEDICINE

## 2025-06-10 PROCEDURE — 85018 HEMOGLOBIN: CPT | Performed by: HOSPITALIST

## 2025-06-10 PROCEDURE — 97161 PT EVAL LOW COMPLEX 20 MIN: CPT

## 2025-06-10 PROCEDURE — 63710000001 DIPHENHYDRAMINE PER 50 MG: Performed by: INTERNAL MEDICINE

## 2025-06-10 PROCEDURE — G0378 HOSPITAL OBSERVATION PER HR: HCPCS

## 2025-06-10 PROCEDURE — 80048 BASIC METABOLIC PNL TOTAL CA: CPT | Performed by: INTERNAL MEDICINE

## 2025-06-10 PROCEDURE — 83036 HEMOGLOBIN GLYCOSYLATED A1C: CPT | Performed by: INTERNAL MEDICINE

## 2025-06-10 PROCEDURE — P9016 RBC LEUKOCYTES REDUCED: HCPCS

## 2025-06-10 PROCEDURE — 25010000002 ENOXAPARIN PER 10 MG: Performed by: INTERNAL MEDICINE

## 2025-06-10 PROCEDURE — 85027 COMPLETE CBC AUTOMATED: CPT | Performed by: INTERNAL MEDICINE

## 2025-06-10 PROCEDURE — 25810000003 SODIUM CHLORIDE 0.9 % SOLUTION: Performed by: INTERNAL MEDICINE

## 2025-06-10 PROCEDURE — 99222 1ST HOSP IP/OBS MODERATE 55: CPT | Performed by: INTERNAL MEDICINE

## 2025-06-10 PROCEDURE — 86900 BLOOD TYPING SEROLOGIC ABO: CPT

## 2025-06-10 PROCEDURE — 83010 ASSAY OF HAPTOGLOBIN QUANT: CPT | Performed by: INTERNAL MEDICINE

## 2025-06-10 RX ORDER — ONDANSETRON 2 MG/ML
4 INJECTION INTRAMUSCULAR; INTRAVENOUS EVERY 6 HOURS PRN
Status: DISCONTINUED | OUTPATIENT
Start: 2025-06-10 | End: 2025-06-14 | Stop reason: HOSPADM

## 2025-06-10 RX ORDER — ACETAMINOPHEN 325 MG/1
650 TABLET ORAL EVERY 6 HOURS PRN
Status: DISCONTINUED | OUTPATIENT
Start: 2025-06-10 | End: 2025-06-14 | Stop reason: HOSPADM

## 2025-06-10 RX ORDER — BISACODYL 5 MG/1
10 TABLET, DELAYED RELEASE ORAL ONCE
Status: COMPLETED | OUTPATIENT
Start: 2025-06-10 | End: 2025-06-10

## 2025-06-10 RX ORDER — POLYETHYLENE GLYCOL 3350 17 G/17G
0.5 POWDER, FOR SOLUTION ORAL EVERY 12 HOURS
Status: COMPLETED | OUTPATIENT
Start: 2025-06-10 | End: 2025-06-11

## 2025-06-10 RX ORDER — ENOXAPARIN SODIUM 100 MG/ML
40 INJECTION SUBCUTANEOUS ONCE
Status: COMPLETED | OUTPATIENT
Start: 2025-06-10 | End: 2025-06-10

## 2025-06-10 RX ORDER — ONDANSETRON 4 MG/1
4 TABLET, ORALLY DISINTEGRATING ORAL EVERY 6 HOURS PRN
Status: DISCONTINUED | OUTPATIENT
Start: 2025-06-10 | End: 2025-06-14 | Stop reason: HOSPADM

## 2025-06-10 RX ORDER — DIPHENHYDRAMINE HCL 25 MG
25 CAPSULE ORAL DAILY
Status: COMPLETED | OUTPATIENT
Start: 2025-06-10 | End: 2025-06-13

## 2025-06-10 RX ORDER — FAMOTIDINE 20 MG/1
20 TABLET, FILM COATED ORAL DAILY
Status: COMPLETED | OUTPATIENT
Start: 2025-06-10 | End: 2025-06-13

## 2025-06-10 RX ADMIN — POTASSIUM CHLORIDE 10 MEQ: 750 TABLET, EXTENDED RELEASE ORAL at 09:55

## 2025-06-10 RX ADMIN — DIPHENHYDRAMINE HYDROCHLORIDE 25 MG: 25 CAPSULE ORAL at 17:04

## 2025-06-10 RX ADMIN — FAMOTIDINE 20 MG: 20 TABLET, FILM COATED ORAL at 17:04

## 2025-06-10 RX ADMIN — ALLOPURINOL 300 MG: 300 TABLET ORAL at 09:54

## 2025-06-10 RX ADMIN — SODIUM CHLORIDE 250 MG: 9 INJECTION, SOLUTION INTRAVENOUS at 17:05

## 2025-06-10 RX ADMIN — Medication 2000 MCG: at 09:54

## 2025-06-10 RX ADMIN — BISACODYL 10 MG: 5 TABLET, COATED ORAL at 17:04

## 2025-06-10 RX ADMIN — PANTOPRAZOLE SODIUM 8 MG/HR: 40 INJECTION, POWDER, FOR SOLUTION INTRAVENOUS at 09:55

## 2025-06-10 RX ADMIN — METHIMAZOLE 2.5 MG: 5 TABLET ORAL at 09:54

## 2025-06-10 RX ADMIN — ENOXAPARIN SODIUM 40 MG: 100 INJECTION SUBCUTANEOUS at 12:26

## 2025-06-10 RX ADMIN — PANTOPRAZOLE SODIUM 8 MG/HR: 40 INJECTION, POWDER, FOR SOLUTION INTRAVENOUS at 14:51

## 2025-06-10 RX ADMIN — POLYETHYLENE GLYCOL 3350 0.5 BOTTLE: 17 POWDER, FOR SOLUTION ORAL at 17:05

## 2025-06-10 NOTE — NURSING NOTE
Per Blood Bank, Blood product and transfusion report cannot be scanned into EPIC and must be entered via Blood Transfusion report sheet and returned to Blood bank     0653-Blood Transfusion initiated. Pt calm and cooperative oriented x 4. No SOB, CP on RA    0653- /71 Temp 98.2 Pulse 66 Resp 15 o2 97%    0708-/71 Temp 98.2 Pulse 127/71 Resp 17 o2 97%    No transfusion reaction present.

## 2025-06-10 NOTE — CONSULTS
[facility]  Initial Inpatient Consult Note    Referring Provider: Dr Whitt    Reason for Consultation: Melena    Subjective     History of present illness:    80 y.o. female, not previously known to our service, that we are asked to see for GI bleeding.  Patient was recently diagnosed with a pulmonary embolus and started on Eliquis.  She reports that she has had dark stools since she initiated this medication.  She also says that the stools have not been bloody or black.  She has had no abdominal pain nausea or vomiting.  No heartburn or dysphagia.    Hemoglobin on admission was 7.5 down from 10.21-month ago.  Hemoglobin this morning is 6.7.  Indices have become more microcytic however are still in the normal range.  BUN is normal.  Her ferritin is 11.1    Colonoscopy 2008-diverticulosis, 2004 apparently had multiple tubulovillous adenomas on colonoscopy per Dr. Moore's note.  She had a flex sig prior to an LAR in 2016 that was related to a pelvic abscess.    Family history of colon cancer or polyps.    Past Medical History:  Past Medical History:   Diagnosis Date    Arthritis     OSTEO. LEFT KNEE    Back pain     AT TIMES    Cataract     CLIFFORD EYES    Chronic diarrhea     CKD (chronic kidney disease)     Diverticulitis     WITH RESECTION    Diverticulosis     Goiter     Graves disease     History of colon polyps     BENIGN    History of sepsis     History of snoring     Hyperthyroidism     followed by Dr. Blackmon. PARTIAL THRYOIDECTOMY    Kidney stones     HISTORY OF MULTIPLE TIMES. URIC AND CALCIUM STONES    Lung nodule     HISTORY OF-NOTE LATEST CT CHEST 12/2022    Mass of mediastinum     HISTORY OF. BENIGN.    OA (osteoarthritis) of knee     RIGHT KNEE:  PAIN, WEAKNESS, LIMITED MOBILITY    Obstructive pyelonephritis 09/28/2015    left obstructing pyelonephritis     PONV (postoperative nausea and vomiting)     Rectal prolapse     CHRONIC    Risk for falls     USING ROLLATOR AND CANE     Sleep apnea     CPAP MACHINE    SOB (shortness of breath) on exertion     SEES DR VASQUEZ    Urinary urgency      Past Surgical History:  Past Surgical History:   Procedure Laterality Date    BRONCHOSCOPY N/A 11/06/2017    Procedure: BRONCHOSCOPY WITH ENDOBRONCHIAL ULTRASOUND AND TRANSBRONCHIAL NEEDLE ASPIRATION;  Surgeon: Nando Vasquez MD;  Location: [facility] ENDOSCOPY;  Service:     CARDIAC CATHETERIZATION N/A 09/27/2017    Procedure: Right Heart Cath;  Surgeon: Miguel Gautam MD;  Location: [facility] CATH INVASIVE LOCATION;  Service:     CARDIAC CATHETERIZATION N/A 09/27/2017    Procedure: Left Heart Cath;  Surgeon: Miguel Gautam MD;  Location[facility] CATH [facility]VASIVE LOC[facility];  Service:     CARDIAC CATHETERIZATION N/A 09/27/2017    Procedure: Coronary angiography;  Surgeon: Miguel Gautam MD;  Location: [facility] CATH INVASIVE LOCATION;  Service:     CARDIAC CATHETERIZATION N/A 09/27/2017    Procedure: Left ventriculography;  Surgeon: Miguel Gautam MD;  Location: [facility] CATH INVASIVE LOCATION;  Service:     CHOLECYSTECTOMY N/A 05/17/2017    Procedure: LAPAROSCOPIC CHOLECYSTECTOMY WITH IOC;  Surgeon: Patt Moore MD;  Location: [facility] MAIN OR;  Service:     COLON RESECTION Left 09/14/2016    Laparoscopic Low Anterior Resection with splenic flexure mobilization, drainage of Pelvic Abscess (cultured 3+ E. Coli), Left salpingo-ophorectomy, laparoscopic rectopexy, and umbilical hernia repair, Dr. Patt Moore    COLONOSCOPY N/A 05/15/2008    sigmoid diverticulosis with blunting of the haustral folds and angulation consistent with previous diverticulitis, no polyps, suggestion of rectal prolapse-Dr. Patt Moore    COLONOSCOPY W/ BIOPSIES AND POLYPECTOMY N/A 04/16/2001    Possible mild gastritis w/ some appearance of formations that look like petechiae in the antrum, no ulcerations, no erosions; cecal polyp approx 4mm sessile; probable transverse colon polyp, although we could not  visualize this completely upon pulling out; sigmoid diverticula; multiple rectal polyps, mostly w/ appearance of hyperplasia, largest being 5 to 6mm: removed via snare-Dr. Patt Moore    COLONOSCOPY W/ POLYPECTOMY N/A 12/10/2004    Diverticulosis with sigmoid perideverticulitis with erythema and patchiness around some of the diverticula, proximal ascedning colon polyp-approx 5 mm-removed via snare cauter, two distal ascending colon polyps-7 mm and 3 mm-removed via snare cautery polypectomy, hemorrhoids-Dr. Patt Moore    CYSTOSCOPY W/ URETERAL STENT PLACEMENT Left 04/21/2018    Procedure: CYSTOSCOPY, LEFT RETROGRADE WITH LEFT URETERAL STENT INSERTION;  Surgeon: Stanley Osuna MD;  Location: [facility];  Service: Urology    CYSTOSCOPY W/ URETERAL STENT REMOVAL Left 10/07/2015    Cystoscopy with stent extraction, left ureteral occulusion balloon placement, percutanous nephrostolithotomy with stone volume less than 2.5 cm involving the left lower pole and the left proximal ureter, balloon tract dilation for establishment of nephrostomy tract, antegrade nephrostomy tube placement-Dr. Miguel Monte    CYSTOSCOPY, RETROGRADE PYELOGRAM AND STENT INSERTION Left 09/28/2015    Left cystoscopy with left retrograde pyelogram, left double-J stent placement-Dr. Miguel Blas    CYSTOSCOPY, RETROGRADE PYELOGRAM AND STENT INSERTION Left 09/09/2015    Cystoscopy with bilateral retrograde pyelogram, left double-J stent placement, right ureteral pyeloscopy with laser lithotripsy of the ureteropelvic junction calculus, right double-J stent placement-Dr. Miguel Monte    CYSTOSCOPY, RETROGRADE PYELOGRAM AND STENT INSERTION Right 09/21/2007    Cystoscopy with right retrograde pyelogram, right biliary stent placement-Dr. Miguel Monte    CYSTOSCOPY, RETROGRADE PYELOGRAM AND STENT INSERTION Right 09/07/2007    Cystoscopy with right retrograde pyelogram, right ureteral peyloscopy with extraction distal ureteral  mass, right double J stent placement-Dr. Miguel Monte    CYSTOSCOPY, RETROGRADE PYELOGRAM AND STENT INSERTION Left 07/29/2022    Procedure: CYSTOSCOPY, LEFT RETROGRADE PYELOGRAM, LEFT URETERAL STENT;  Surgeon: Cedrick Jones MD;  Location: [facility] OR;  Service: Urology;  Laterality: Left;    CYSTOSCOPY, URETEROSCOPY, RETROGRADE PYELOGRAM, STENT INSERTION Right 09/07/2007    Cystoscopy with right retrograde pyelogram, rigth ureteroscopy with extraction of distal mass, right double J stent placement-Dr. Miguel Monte    D & C HYSTEROSCOPY N/A 05/18/2011    Procedure done due to thickened endomentrium and an endometrial polyp-Dr. Quita Cheatham    DILATATION AND CURETTAGE N/A 03/22/2002    D&C, polyp removal-Dr. Quita Cheatham    EXTRACORPOREAL SHOCK WAVE LITHOTRIPSY (ESWL) Left 04/19/2023    Procedure: LEFT EXTRACORPOREAL SHOCKWAVE LITHOTRIPSY STENT REPLACEMENT;  Surgeon: Miguel Monte MD;  Location: [facility] OR;  Service: Urology;  Laterality: Left;    EXTRACORPOREAL SHOCKWAVE LITHOTRIPSY (ESWL), STENT INSERTION/REMOVAL Left 05/08/2015    Left extracoporeal shockwave lithotripsy, cystoscopy with stent placement-Dr. Miguel Monte    EXTRACORPOREAL SHOCKWAVE LITHOTRIPSY (ESWL), STENT INSERTION/REMOVAL  04/2023    [facility]    JOINT REPLACEMENT  Left knee    MEDIASTINOTOMY Left 03/05/2018    Procedure: left thyroidectomy, resection of substernal thyroid goiter cervical approach;  Surgeon: Quintin Mares III, MD;  Location: [facility] OR;  Service:     PERCUTANEOUS NEPHROSTOLITHOTOMY Left 06/16/2023    Procedure: LEFT PERCUTANEOUS  NEPHROSTOLITHOTOMY, CYSTOSCOPY, STENT REMOVAL;  Surgeon: Miguel Monte MD;  Location: [facility] HYBRID OR 18/19;  Service: Urology;  Laterality: Left;    SIGMOIDOSCOPY N/A 09/13/2016    Procedure: SIGMOIDOSCOPY FLEXIBLE TO 25 CM;  Surgeon: Patt Moore MD;  Location: [facility] ENDOSCOPY;  Service:     THORACOTOMY  1996    Thoracotomy for  ectopic thyroid, Dr. Camarillo    THYROIDECTOMY, PARTIAL      left side 3/05/18    TOTAL KNEE ARTHROPLASTY Left 02/02/2023    Procedure: TOTAL KNEE ARTHROPLASTY;  Surgeon: Anthony Sinclair MD;  [facility];  Service: Orthopedics;  Laterality: Left;    TOTAL KNEE ARTHROPLASTY Right 09/23/2024    Procedure: RIGHT TOTAL KNEE ARTHROPLASTY;  Surgeon: Anthony Sinclair MD;  [facility];  Service: Orthopedics;  Laterality: Right;    UMBILICAL HERNIA REPAIR N/A 09/14/2016    Procedure: UMBILICAL HERNIA REPAIR ;  Surgeon: Patt Moore MD;  Location: [facility] OR;  Service:     URETEROSCOPY LASER LITHOTRIPSY WITH STENT INSERTION Left 08/12/2022    Procedure: LEFT URETEROSCOPY LASER LITHOTRIPSY STONE BACKET EXTRACTION, STENT PLACEMENT;  Surgeon: Miguel Monte MD;  Location: [facility] MAIN OR;  Service: Urology;  Laterality: Left;    URETEROSCOPY LASER LITHOTRIPSY WITH STENT INSERTION Left 10/11/2023    Procedure: LEFT URETEROSCOPY LASER LITHOTRIPSY WITH STENT REMOVAL;  Surgeon: Miguel Monte MD;  Location: [facility] OR;  Service: Urology;  Laterality: Left;    VENTRAL/INCISIONAL HERNIA REPAIR N/A 05/17/2017    Procedure: VENTRAL HERNIA REPAIR WITH MESH, BILATERAL MUSCULOFASCIAL RELEASE;  Surgeon: Patt Moore MD;  Location: [facility] OR;  Service:       Social History:   Social History     Tobacco Use    Smoking status: Never     Passive exposure: Never    Smokeless tobacco: Never   Substance Use Topics    Alcohol use: No      Family History:  Family History   Problem Relation Age of Onset    Heart disease Father     Heart attack Paternal Uncle     Cancer Maternal Grandmother         ovarian    Heart attack Paternal Grandfather     Heart attack Paternal Uncle     Heart attack Paternal Uncle     Heart attack Paternal Uncle     Heart attack Paternal Uncle     Heart attack Paternal Uncle     Scleroderma Mother     Hypertension Sister     Malig Hyperthermia Neg Hx        Home  Meds:  Medications Prior to Admission   Medication Sig Dispense Refill Last Dose/Taking    allopurinol (ZYLOPRIM) 300 MG tablet Take 1 tablet by mouth Daily. KIDNEY STONE  Indications: Disorder of Excessive Uric Acid in the Blood   6/9/2025    Apixaban Starter Pack (Eliquis DVT/PE Starter Pack) tablet therapy pack Take two 5 mg tablets by mouth every 12 hours for 7 days. Followed by one 5 mg tablet every 12 hours. 74 tablet 0 6/9/2025    cholestyramine (QUESTRAN) 4 g packet Take 1 packet by mouth As Needed. AROUND DINNERTIME  Indications: Itching   Past Week    methIMAzole (TAPAZOLE) 5 MG tablet TAKE 1/2 TABLET BY MOUTH DAILY 45 tablet 1 6/9/2025    potassium citrate (UROCIT-K) 10 MEQ (1080 MG) CR tablet Take 1 tablet by mouth Daily. Indications: Low Amount of Potassium in the Blood   6/9/2025    vitamin B-12 (CYANOCOBALAMIN) 1000 MCG tablet Take 2 tablets by mouth Daily. 60 tablet 2 Past Month    diazePAM (Valium) 5 MG tablet TAKE 1 TABLET 1 HOUR PRIOR TO PROCEDURE. DO NOT DRIVE AFTER TAKING (Patient not taking: Reported on 6/9/2025) 1 tablet 0 Unknown    Diclofenac Sodium 4 %, Topiramate 2 %, cloNIDine HCl 0.2 %, Lidocaine HCl 5 % Apply 1-2 g topically to the appropriate area as directed 3 (Three) to 4 (Four) times daily. 90 g 1 Unknown     Current Meds:   allopurinol, 300 mg, Oral, Daily  bisacodyl, 10 mg, Oral, Once  diphenhydrAMINE, 25 mg, Oral, Daily  famotidine, 20 mg, Oral, Daily  ferric gluconate, 250 mg, Intravenous, Daily  insulin lispro, 2-7 Units, Subcutaneous, 4x Daily AC & at Bedtime  methIMAzole, 2.5 mg, Oral, Daily  polyethylene glycol, 0.5 bottle, Oral, Q12H  potassium chloride, 10 mEq, Oral, Daily  vitamin B-12, 2,000 mcg, Oral, Daily      Allergies:  Allergies   Allergen Reactions    Cephalexin Hives    Cephalosporins Hives     Took in 2015 w/o problems    Other Hives     ELECTRODE PATCHES    Penicillins Hives     Review of Systems  Pertinent items are noted in HPI     Objective     Vital  Signs  Temp:  [97.5 °F (36.4 °C)-98.4 °F (36.9 °C)] 98.1 °F (36.7 °C)  Heart Rate:  [66-72] 67  Resp:  [15-18] 17  BP: (126-158)/(57-88) 148/61  Physical Exam:  General Appearance:    Alert, cooperative, in no acute distress   Head:    Normocephalic, without obvious abnormality, atraumatic   Eyes:          conjunctivae and sclerae normal, no   icterus   Throat:   no thrush, oral mucosa moist   Neck:   Supple, no adenopathy   Lungs:     Clear to auscultation bilaterally    Heart:    Regular rhythm and normal rate    Chest Wall:    No abnormalities observed   Abdomen:     Soft, nondistended, nontender; normal bowel sounds   Extremities: Left lower extremity edema, no redness   Skin:   No bruising or rash   Psychiatric:  normal mood and insight     Results Review:   I reviewed the patient's new clinical results.    Results from last 7 days   Lab Units 06/10/25  1054 06/10/25  0302 06/09/25  1007   WBC 10*3/mm3  --  3.65 3.90   HEMOGLOBIN g/dL  --  6.7* 7.5*   HEMATOCRIT %  --  21.0* 24.9*   PLATELETS 10*3/mm3 149 110* 113*     Results from last 7 days   Lab Units 06/10/25  0302 06/09/25  1007   SODIUM mmol/L 139 139   POTASSIUM mmol/L 4.2 4.5   CHLORIDE mmol/L 110* 107   CO2 mmol/L 20.0* 22.5   BUN mg/dL 13.0 17.1   CREATININE mg/dL 1.09* 1.15*   CALCIUM mg/dL 8.8 9.3   BILIRUBIN mg/dL  --  0.4   ALK PHOS U/L  --  74   ALT (SGPT) U/L  --  12   AST (SGOT) U/L  --  16   GLUCOSE mg/dL 112* 229*         Lab Results   Lab Value Date/Time    LIPASE 31 03/19/2023 1955    LIPASE 49 07/23/2022 0803    LIPASE 26 04/21/2018 1046    LIPASE 36 12/06/2016 0642    LIPASE 24 09/09/2016 0954    LIPASE 30 08/25/2016 1138    LIPASE 21 07/05/2016 0531    LIPASE 23 07/03/2016 0512    LIPASE 50 06/16/2014 2026       Radiology:  No orders to display       Assessment & Plan   Active Hospital Problems    Diagnosis     **GI bleed     Anemia, posthemorrhagic, acute     Acute deep vein thrombosis (DVT) of right femoral vein     CKD stage 3a, GFR  45-59 ml/min     Acute saddle pulmonary embolism     Anxiety disorder     Hyperthyroidism        Assessment:  Iron deficiency anemia, reported melena  Acute drop in hemoglobin over the past month  History of recent PE started on Eliquis  History of colon polyps-last colonoscopy 2008  History of LAR 2016 due to pelvic abscess related to diverticulitis    Plan:  She has had an acute drop in her hemoglobin and melena, she is also significantly iron deficient and has a history of polyps.  She will warrant ongoing anticoagulation due to recent thromboembolism.  Recommend EGD and colonoscopy tomorrow for further evaluation of the above issues.  Continue to hold Eliquis  Follow-up posttransfusion H&H and continue to monitor closely overnight  Continue pantoprazole drip      I discussed the patients findings and my recommendations with patient, family, and nursing staff.          Nell Tavarez M.D.  [facility]  [phone]

## 2025-06-10 NOTE — THERAPY DISCHARGE NOTE
Patient Name: Patt Bond  : 1945    MRN: [id number]                              Today's Date: 6/10/2025       Admit Date: 2025    Visit Dx: No diagnosis found.  Patient Active Problem List   Diagnosis    Left lower quadrant pain    Ketonuria due to dehydration    Normocytic anemia    Pancytopenia    Obesity (BMI 30-39.9)    Nausea    Sepsis    Nephrolithiasis    Pleural nodule    Tracheal compression    Hyperthyroidism    Abnormal weight gain    Palpitations    Cholecystitis    History of colon polyps - 4 Tubulovillous adenomas in , normal colonoscopy in     History of abdominal abscess - 3+ E. coli at time of surgery 2016    Multiple drug allergies to Cephalexin, Cephalosporins, Penicillins    Weight gain - 10 pounds in 2 months, unintentional    Multinodular goiter    GRAY (dyspnea on exertion)    Obesity, morbid, BMI 40.0-49.9    Substernal goiter    Obstruction of left ureteropelvic junction (UPJ) due to stone    Morbid obesity with BMI of 40.0-44.9, adult    Obstructive sleep apnea    Hypersomnia with sleep apnea    Sleep related hypoxia    Chronic fatigue    History of lobectomy of thyroid    Impaired fasting glucose    Renal calculus, left    Arthritis of knee    Total knee replacement status, left    Diverticulosis    History of kidney stones    Postprandial diarrhea    Renal calculi    Arthritis of knee, right    Arthritis of right foot    Hallux valgus of right foot    David's deformity of right heel    Calcific Achilles tendonitis    Tibial tendonitis, posterior, right    Posterior tibial tendon dysfunction    Right calf pain    Peroneal tenosynovitis    Impingement syndrome of right ankle    Mass of foot, right    Acute saddle pulmonary embolism    Chronic gout    Anxiety disorder    Thrombosis of right common femoral artery    Acute deep vein thrombosis (DVT) of right femoral vein    CKD stage 3a, GFR 45-59 ml/min    Pain in right leg    B12 deficiency    Iron deficiency  anemia    Iron malabsorption    GI bleed    Anemia, posthemorrhagic, acute     Past Medical History:   Diagnosis Date    Arthritis     OSTEO. LEFT KNEE    Back pain     AT TIMES    Cataract     CLIFFORD EYES    Chronic diarrhea     CKD (chronic kidney disease)     Diverticulitis     WITH RESECTION    Diverticulosis     Goiter     Graves disease     History of colon polyps     BENIGN    History of sepsis     History of snoring     Hyperthyroidism     followed by Dr. Blackmon. PARTIAL THRYOIDECTOMY    Kidney stones     HISTORY OF MULTIPLE TIMES. URIC AND CALCIUM STONES    Lung nodule     HISTORY OF-NOTE LATEST CT CHEST 12/2022    Mass of mediastinum     HISTORY OF. BENIGN.    OA (osteoarthritis) of knee     RIGHT KNEE:  PAIN, WEAKNESS, LIMITED MOBILITY    Obstructive pyelonephritis 09/28/2015    left obstructing pyelonephritis     PONV (postoperative nausea and vomiting)     Rectal prolapse     CHRONIC    Risk for falls     USING ROLLATOR AND CANE    Sleep apnea     CPAP MACHINE    SOB (shortness of breath) on exertion     SEES DR VASQUEZ    Urinary urgency      Past Surgical History:   Procedure Laterality Date    BRONCHOSCOPY N/A 11/06/2017    Procedure: BRONCHOSCOPY WITH ENDOBRONCHIAL ULTRASOUND AND TRANSBRONCHIAL NEEDLE ASPIRATION;  Surgeon: Nando Vasquez MD;  Location: [facility] ENDOSCOPY;  Service:     CARDIAC CATHETERIZATION N/A 09/27/2017    Procedure: Right Heart Cath;  Surgeon: Miguel Gautam MD;  Location: [facility] CATH INVASIVE LOCATION;  Service:     CARDIAC CATHETERIZATION N/A 09/27/2017    Procedure: Left Heart Cath;  Surgeon: Miguel Gautam MD;  Location: [facility] CATH INVASIVE LOCATION;  Service:     CARDIAC CATHETERIZATION N/A 09/27/2017    Procedure: Coronary angiography;  Surgeon: Miguel Gautam MD;  Location: [facility] CATH INVASIVE LOCATION;  Service:     CARDIAC CATHETERIZATION N/A 09/27/2017    Procedure: Left ventriculography;  Surgeon: Miguel Gautam MD;  Location: [facility]  INVASIVE LOCATION;  Service:     CHOLECYSTECTOMY N/A 05/17/2017    Procedure: LAPAROSCOPIC CHOLECYSTECTOMY WITH IOC;  Surgeon: Patt Moore MD;  Location: [facility];  Service:     COLON RESECTION Left 09/14/2016    Laparoscopic Low Anterior Resection with splenic flexure mobilization, drainage of Pelvic Abscess (cultured 3+ E. Coli), Left salpingo-ophorectomy, laparoscopic rectopexy, and umbilical hernia repair, Dr. Patt Moore    COLONOSCOPY N/A 05/15/2008    sigmoid diverticulosis with blunting of the haustral folds and angulation consistent with previous diverticulitis, no polyps, suggestion of rectal prolapse-Dr. Patt Moore    COLONOSCOPY W/ BIOPSIES AND POLYPECTOMY N/A 04/16/2001    Possible mild gastritis w/ some appearance of formations that look like petechiae in the antrum, no ulcerations, no erosions; cecal polyp approx 4mm sessile; probable transverse colon polyp, although we could not visualize this completely upon pulling out; sigmoid diverticula; multiple rectal polyps, mostly w/ appearance of hyperplasia, largest being 5 to 6mm: removed via snare-Dr. Patt Moore    COLONOSCOPY W/ POLYPECTOMY N/A 12/10/2004    Diverticulosis with sigmoid perideverticulitis with erythema and patchiness around some of the diverticula, proximal ascedning colon polyp-approx 5 mm-removed via snare cauter, two distal ascending colon polyps-7 mm and 3 mm-removed via snare cautery polypectomy, hemorrhoids-Dr. Patt Moore    CYSTOSCOPY W/ URETERAL STENT PLACEMENT Left 04/21/2018    Procedure: CYSTOSCOPY, LEFT RETROGRADE WITH LEFT URETERAL STENT INSERTION;  Surgeon: Stanley Osuna MD;  Location: [facility];  Service: Urology    CYSTOSCOPY W/ URETERAL STENT REMOVAL Left 10/07/2015    Cystoscopy with stent extraction, left ureteral occulusion balloon placement, percutanous nephrostolithotomy with stone volume less than 2.5 cm involving the left lower pole and the left proximal ureter, balloon tract dilation  for establishment of nephrostomy tract, antegrade nephrostomy tube placement-Dr. Miguel Monte    CYSTOSCOPY, RETROGRADE PYELOGRAM AND STENT INSERTION Left 09/28/2015    Left cystoscopy with left retrograde pyelogram, left double-J stent placement-Dr. Miguel Blas    CYSTOSCOPY, RETROGRADE PYELOGRAM AND STENT INSERTION Left 09/09/2015    Cystoscopy with bilateral retrograde pyelogram, left double-J stent placement, right ureteral pyeloscopy with laser lithotripsy of the ureteropelvic junction calculus, right double-J stent placement-Dr. Miguel Monte    CYSTOSCOPY, RETROGRADE PYELOGRAM AND STENT INSERTION Right 09/21/2007    Cystoscopy with right retrograde pyelogram, right biliary stent placement-Dr. Miguel Monte    CYSTOSCOPY, RETROGRADE PYELOGRAM AND STENT INSERTION Right 09/07/2007    Cystoscopy with right retrograde pyelogram, right ureteral peyloscopy with extraction distal ureteral mass, right double J stent placement-Dr. Miguel Monte    CYSTOSCOPY, RETROGRADE PYELOGRAM AND STENT INSERTION Left 07/29/2022    Procedure: CYSTOSCOPY, LEFT RETROGRADE PYELOGRAM, LEFT URETERAL STENT;  Surgeon: Cedrick Jones MD;  Location: [facility];  Service: Urology;  Laterality: Left;    CYSTOSCOPY, URETEROSCOPY, RETROGRADE PYELOGRAM, STENT INSERTION Right 09/07/2007    Cystoscopy with right retrograde pyelogram, rigth ureteroscopy with extraction of distal mass, right double J stent placement-Dr. Miguel Monte    D & C HYSTEROSCOPY N/A 05/18/2011    Procedure done due to thickened endomentrium and an endometrial polyp-Dr. Quita Cheatham    DILATATION AND CURETTAGE N/A 03/22/2002    D&C, polyp removal-Dr. Quita Cheatham    EXTRACORPOREAL SHOCK WAVE LITHOTRIPSY (ESWL) Left 04/19/2023    Procedure: LEFT EXTRACORPOREAL SHOCKWAVE LITHOTRIPSY STENT REPLACEMENT;  Surgeon: Miguel Monte MD;  Location: [facility];  Service: Urology;  Laterality: Left;    EXTRACORPOREAL SHOCKWAVE LITHOTRIPSY  (ESWL), STENT INSERTION/REMOVAL Left 05/08/2015    Left extracoporeal shockwave lithotripsy, cystoscopy with stent placement-Dr. Miguel Monte    EXTRACORPOREAL SHOCKWAVE LITHOTRIPSY (ESWL), STENT INSERTION/REMOVAL  04/2023    [facility]    JOINT REPLACEMENT  Left knee    MEDIASTINOTOMY Left 03/05/2018    Procedure: left thyroidectomy, resection of substernal thyroid goiter cervical approach;  Surgeon: Quintin Mares III, MD;  Location: [facility] MAIN OR;  Service:     PERCUTANEOUS NEPHROSTOLITHOTOMY Left 06/16/2023    Procedure: LEFT PERCUTANEOUS  NEPHROSTOLITHOTOMY, CYSTOSCOPY, STENT REMOVAL;  Surgeon: Miguel Monte MD;  Location: [facility] HYBRID OR 18/19;  Service: Urology;  Laterality: Left;    SIGMOIDOSCOPY N/A 09/13/2016    Procedure: SIGMOIDOSCOPY FLEXIBLE TO 25 CM;  Surgeon: Patt Moore MD;  Location: [facility] ENDOSCOPY;  Service:     THORACOTOMY  1996    Thoracotomy for ectopic thyroid, Dr. Camarillo    THYROIDECTOMY, PARTIAL      left side 3/05/18    TOTAL KNEE ARTHROPLASTY Left 02/02/2023    Procedure: TOTAL KNEE ARTHROPLASTY;  Surgeon: Anthony Sinclair MD;  [facility];  Service: Orthopedics;  Laterality: Left;    TOTAL KNEE ARTHROPLASTY Right 09/23/2024    Procedure: RIGHT TOTAL KNEE ARTHROPLASTY;  Surgeon: Anthony Sinclair MD;  [facility];  Service: Orthopedics;  Laterality: Right;    UMBILICAL HERNIA REPAIR N/A 09/14/2016    Procedure: UMBILICAL HERNIA REPAIR ;  Surgeon: Patt Moore MD;  Location: [facility] MAIN OR;  Service:     URETEROSCOPY LASER LITHOTRIPSY WITH STENT INSERTION Left 08/12/2022    Procedure: LEFT URETEROSCOPY LASER LITHOTRIPSY STONE BACKET EXTRACTION, STENT PLACEMENT;  Surgeon: Miguel Monte MD;  Location: [facility] MAIN OR;  Service: Urology;  Laterality: Left;    URETEROSCOPY LASER LITHOTRIPSY WITH STENT INSERTION Left 10/11/2023    Procedure: LEFT URETEROSCOPY LASER LITHOTRIPSY WITH STENT REMOVAL;  Surgeon: Lavell  Miguel TOVAR MD;  Location: [facility] OR;  Service: Urology;  Laterality: Left;    VENTRAL/INCISIONAL HERNIA REPAIR N/A 05/17/2017    Procedure: VENTRAL HERNIA REPAIR WITH MESH, BILATERAL MUSCULOFASCIAL RELEASE;  Surgeon: Patt Moore MD;  Location: [facility] OR;  Service:       General Information       Row Name 06/10/25 1144          Physical Therapy Time and Intention    Document Type discharge evaluation/summary  -MS     Mode of Treatment physical therapy;individual therapy  -MS       Row Name 06/10/25 1144          General Information    Patient Profile Reviewed yes  -MS     Prior Level of Function independent:  -MS     Barriers to Rehab none identified  -MS       Row Name 06/10/25 1144          Cognition    Orientation Status (Cognition) oriented x 3  -MS               User Key  (r) = Recorded By, (t) = Taken By, (c) = Cosigned By      Initials Name Provider Type    Augie Yañez PT Physical Therapist                   Mobility       Row Name 06/10/25 1144          Bed Mobility    Bed Mobility supine-sit;sit-supine  -MS     Supine-Sit Cassia (Bed Mobility) independent  -MS     Sit-Supine Cassia (Bed Mobility) independent  -MS       Row Name 06/10/25 1144          Sit-Stand Transfer    Sit-Stand Cassia (Transfers) independent  -MS       Row Name 06/10/25 1144          Gait/Stairs (Locomotion)    Cassia Level (Gait) independent  -MS     Distance in Feet (Gait) 50  -MS     Deviations/Abnormal Patterns (Gait) lalitha decreased  -MS     Comment, (Gait/Stairs) No balance deficits noted.  -MS               User Key  (r) = Recorded By, (t) = Taken By, (c) = Cosigned By      Initials Name Provider Type    Augie Yañez PT Physical Therapist                   Obj/Interventions       Row Name 06/10/25 1145          Range of Motion Comprehensive    Comment, General Range of Motion BUE/LE (WFL's)  -MS       Row Name 06/10/25 1145          Strength Comprehensive (MMT)     Comment, General Manual Muscle Testing (MMT) Assessment BUE/LE (4-/5)  -MS               User Key  (r) = Recorded By, (t) = Taken By, (c) = Cosigned By      Initials Name Provider Type    Augie Yañez, PT Physical Therapist                   Goals/Plan    No documentation.                  Clinical Impression       Row Name 06/10/25 1145          Pain    Pretreatment Pain Rating 0/10 - no pain  -MS     Posttreatment Pain Rating 0/10 - no pain  -MS       Row Name 06/10/25 1145          Plan of Care Review    Plan of Care Reviewed With family;patient  -MS       Row Name 06/10/25 1145          Therapy Assessment/Plan (PT)    Criteria for Skilled Interventions Met (PT) no problems identified which require skilled intervention  -MS       Row Name 06/10/25 1145          Positioning and Restraints    Pre-Treatment Position in bed  -MS     Post Treatment Position bed  -MS     In Bed notified nsg;sitting EOB;call light within reach;encouraged to call for assist;exit alarm on;with family/caregiver  All lines intact.  -MS               User Key  (r) = Recorded By, (t) = Taken By, (c) = Cosigned By      Initials Name Provider Type    Augie Yañez, PT Physical Therapist                   Outcome Measures       Row Name 06/10/25 1145          How much help from another person do you currently need...    Turning from your back to your side while in flat bed without using bedrails? 4  -MS     Moving from lying on back to sitting on the side of a flat bed without bedrails? 4  -MS     Moving to and from a bed to a chair (including a wheelchair)? 4  -MS     Standing up from a chair using your arms (e.g., wheelchair, bedside chair)? 4  -MS     Climbing 3-5 steps with a railing? 4  -MS     To walk in hospital room? 4  -MS     AM-PAC 6 Clicks Score (PT) 24  -MS     Highest Level of Mobility Goal Walk 250 Feet or More - 8  -MS       Row Name 06/10/25 1145          Functional Assessment    Outcome Measure Options AM-PAC 6  Clicks Basic Mobility (PT)  -MS               User Key  (r) = Recorded By, (t) = Taken By, (c) = Cosigned By      Initials Name Provider Type    MS Augie Gregory PT Physical Therapist                  Physical Therapy Education       Title: PT OT SLP Therapies (Resolved)       Topic: Physical Therapy (Resolved)       Point: Mobility training (Resolved)       Learning Progress Summary            Patient Acceptance, E,D, VU,DU by MS at 6/10/2025 1146                      Point: Home exercise program (Resolved)       Learner Progress:  Not documented in this visit.              Point: Body mechanics (Resolved)       Learning Progress Summary            Patient Acceptance, E,D, VU,DU by MS at 6/10/2025 1146                      Point: Precautions (Resolved)       Learning Progress Summary            Patient Acceptance, E,D, VU,DU by MS at 6/10/2025 1146                                      User Key       Initials Effective Dates Name Provider Type Discipline    MS 06/16/21 -  Augie Gregory PT Physical Therapist PT                  PT Recommendation and Plan     Outcome Evaluation: Pt. is currently independent with functional mobility and has no further questions/concerns regarding functional mobility or home safety.  Encouraged pt. to continue ambulation with nursing staff while here in the hospital.  Otherwise, P.T. will sign off.     Time Calculation:         PT Charges       Row Name 06/10/25 1149             Time Calculation    Start Time 1040  -MS      Stop Time 1053  -MS      Time Calculation (min) 13 min  -MS      PT Received On 06/10/25  -MS         Time Calculation- PT    Total Timed Code Minutes- PT 13 minute(s)  -MS                User Key  (r) = Recorded By, (t) = Taken By, (c) = Cosigned By      Initials Name Provider Type    MS Augie Gregory PT Physical Therapist                  Therapy Charges for Today       Code Description Service Date Service Provider Modifiers Qty    [id number]  PT  EVAL LOW COMPLEXITY 2 6/10/2025 Augie Gregory, PT GP 1    [id number] HC PT THERAPEUTIC ACT EA 15 MIN 6/10/2025 Augie Gregory, PT GP 1            PT G-Codes  Outcome Measure Options: AM-PAC 6 Clicks Basic Mobility (PT)  AM-PAC 6 Clicks Score (PT): 24    PT Discharge Summary  Anticipated Discharge Disposition (PT): home  Reason for Discharge: Independent, At baseline function  Discharge Destination: Home    Augie Gregory, PT  6/10/2025

## 2025-06-10 NOTE — PLAN OF CARE
Goal Outcome Evaluation:               A/Ox4, pleasant. VSS. RA. Up ad flora. Sinus rhythm. Received 2 units of PRBC's today. H&H pending. Protonix drip. First dose of IV iron infusing. Started bowel prep at 5pm. Plan for EGD and Colonoscopy tomorrow. Lovenox given. Care plan ongoing.  Problem: Adult Inpatient Plan of Care  Goal: Plan of Care Review  Outcome: Progressing  Goal: Patient-Specific Goal (Individualized)  Outcome: Progressing  Goal: Absence of Hospital-Acquired Illness or Injury  Outcome: Progressing  Intervention: Identify and Manage Fall Risk  Recent Flowsheet Documentation  Taken 6/10/2025 1422 by Alfredo Morales RN  Safety Promotion/Fall Prevention:   safety round/check completed   room organization consistent  Taken 6/10/2025 0749 by Alfredo Morales RN  Safety Promotion/Fall Prevention:   safety round/check completed   room organization consistent  Intervention: Prevent Skin Injury  Recent Flowsheet Documentation  Taken 6/10/2025 1422 by Alfredo Morales RN  Body Position: position changed independently  Skin Protection:   transparent dressing maintained   incontinence pads utilized  Taken 6/10/2025 0749 by Alfredo Morales RN  Body Position: position changed independently  Skin Protection:   transparent dressing maintained   incontinence pads utilized  Intervention: Prevent and Manage VTE (Venous Thromboembolism) Risk  Recent Flowsheet Documentation  Taken 6/10/2025 1422 by Alfredo Morales RN  VTE Prevention/Management: (Lovenox) other (see comments)  Taken 6/10/2025 0749 by Alfredo Morales RN  VTE Prevention/Management: (pt is up ad flora and does not desire to use the SCD) other (see comments)  Intervention: Prevent Infection  Recent Flowsheet Documentation  Taken 6/10/2025 1422 by Alfredo Morales RN  Infection Prevention:   single patient room provided   rest/sleep promoted   hand hygiene promoted  Taken 6/10/2025 0722  by Alfredo Morales RN  Infection Prevention:   single patient room provided   rest/sleep promoted   hand hygiene promoted  Goal: Optimal Comfort and Wellbeing  Outcome: Progressing  Intervention: Provide Person-Centered Care  Recent Flowsheet Documentation  Taken 6/10/2025 1422 by Alfredo Morales RN  Trust Relationship/Rapport:   care explained   choices provided   emotional support provided   empathic listening provided   questions answered   questions encouraged   reassurance provided   thoughts/feelings acknowledged  Taken 6/10/2025 0749 by Alfredo Morales RN  Trust Relationship/Rapport:   care explained   choices provided   emotional support provided   empathic listening provided   questions answered   questions encouraged   reassurance provided   thoughts/feelings acknowledged  Goal: Readiness for Transition of Care  Outcome: Progressing     Problem: Skin Injury Risk Increased  Goal: Skin Health and Integrity  Outcome: Progressing  Intervention: Optimize Skin Protection  Recent Flowsheet Documentation  Taken 6/10/2025 1422 by Alfredo Morales RN  Activity Management: activity encouraged  Head of Bed (HOB) Positioning: HOB lowered  Skin Protection:   transparent dressing maintained   incontinence pads utilized  Taken 6/10/2025 0749 by Alfredo Morales RN  Activity Management: activity encouraged  Head of Bed (HOB) Positioning: HOB lowered  Skin Protection:   transparent dressing maintained   incontinence pads utilized     Problem: Sepsis/Septic Shock  Goal: Optimal Coping  Outcome: Progressing  Intervention: Support Patient and Family Response  Recent Flowsheet Documentation  Taken 6/10/2025 1422 by Alfredo Morales RN  Supportive Measures: active listening utilized  Family/Support System Care: self-care encouraged  Taken 6/10/2025 0749 by Alfredo Morales RN  Family/Support System Care: self-care encouraged  Goal: Absence of  Bleeding  Outcome: Progressing  Goal: Blood Glucose Level Within Target Range  Outcome: Progressing  Intervention: Optimize Glycemic Control  Recent Flowsheet Documentation  Taken 6/10/2025 1422 by Alfredo Morales RN  Hypoglycemia Management: blood glucose monitored  Taken 6/10/2025 0749 by Alfredo Morales RN  Hypoglycemia Management: blood glucose monitored  Goal: Absence of Infection Signs and Symptoms  Outcome: Progressing  Intervention: Initiate Sepsis Management  Recent Flowsheet Documentation  Taken 6/10/2025 1422 by Alfredo Morales RN  Infection Management: aseptic technique maintained  Infection Prevention:   single patient room provided   rest/sleep promoted   hand hygiene promoted  Taken 6/10/2025 0749 by Alfredo Morales RN  Infection Prevention:   single patient room provided   rest/sleep promoted   hand hygiene promoted  Intervention: Promote Recovery  Recent Flowsheet Documentation  Taken 6/10/2025 1422 by Alfredo Morales RN  Activity Management: activity encouraged  Sleep/Rest Enhancement: awakenings minimized  Taken 6/10/2025 0749 by Alfredo Morales RN  Activity Management: activity encouraged  Goal: Optimal Nutrition Delivery  Outcome: Progressing

## 2025-06-10 NOTE — NURSING NOTE
Blood transfusion stopped at 0950. No reactions were observed. Temp. 98.1; BP: 158/71; HR:66; resp.: 16; O2 @100 RA. Documented on blood bank form (handwritten).

## 2025-06-10 NOTE — PLAN OF CARE
Goal Outcome Evaluation:            Pt alert and oriented x 4. No CP during shift, mild dyspnea on exertion. Pt on RA baseline, has home CPAP at bedside, wore last night and tolerated without difficulty. Hgb continued to drop x 1 unit PRBCs ordered to be administered.

## 2025-06-10 NOTE — PROGRESS NOTES
DAILY PROGRESS NOTE  [facility]    Patient Identification:  Name: Patt Bond  Age: 80 y.o.  Sex: female  :  1945  MRN: [id number]         Primary Care Physician: Moe Matute MD    Subjective:  Interval History: Extensive discussion with patient and spouse at bedside with RN present as I tried to  all further explained at her previous PE had a saddle embolism compounded by active bleeding and the fact that her PE was diagnosed just last month.  She is not complaining of any chest pain or shortness of breath was likely real evolving more around the acute anemia.  She has completed 1 unit of ordered 2 units total.  Discussed this with RN.    Objective: Clinically looks way better than I would have anticipated    Scheduled Meds:allopurinol, 300 mg, Oral, Daily  insulin lispro, 2-7 Units, Subcutaneous, 4x Daily AC & at Bedtime  methIMAzole, 2.5 mg, Oral, Daily  pantoprazole, 40 mg, Intravenous, BID AC  potassium chloride, 10 mEq, Oral, Daily  vitamin B-12, 2,000 mcg, Oral, Daily      Continuous Infusions:sodium chloride, 100 mL/hr, Last Rate: 100 mL/hr (25 1841)        Vital signs in last 24 hours:  Temp:  [98 °F (36.7 °C)-98.4 °F (36.9 °C)] (P) 98.2 °F (36.8 °C)  Heart Rate:  [66-85] (P) 66  Resp:  [15-18] (P) 15  BP: (128-148)/(57-73) (P) 147/71    Intake/Output:    Intake/Output Summary (Last 24 hours) at 6/10/2025 0817  Last data filed at 6/10/2025 0653  Gross per 24 hour   Intake 1700 ml   Output --   Net 1700 ml       Exam:  BP (P) 147/71   Pulse (P) 66   Temp (P) 98.2 °F (36.8 °C)   Resp (P) 15   SpO2 (P) 98%     General Appearance:    Alert, cooperative, nontoxic, AAOx3                          Head:    Normocephalic, without obvious abnormality, atraumatic                           Eyes:    PERRL, conjunctivae/corneas clear, EOM's intact, both eyes                         Throat:   Oral mucosa pink and moist                           Neck:   Supple,  symmetrical, trachea midline, no JVD                         Lungs:    Clear to auscultation bilaterally, respirations unlabored                 Chest Wall:    No tenderness or deformity                          Heart:    Regular rate and rhythm, S1 and S2 normal                  Abdomen:     Soft, nontender, bowel sounds active, MO/BMI 44+                 Extremities: Old healed knee scars, trace/1+ edema to right lower extremity with in the leg with a previous DVT that was extensive, moving all with baseline strength and no obvious deficits.  Feet are warm and well-perfused-NVM intact                        Pulses:   Pulses palpable in all extremities                            Skin:   Skin is warm and dry                  Neurologic:   CNII-XII intact       Data Review:  Labs in chart were reviewed.    Assessment:  Active Hospital Problems    Diagnosis  POA    **GI bleed [K92.2]  Yes    Anemia, posthemorrhagic, acute [D62]  Unknown    Acute deep vein thrombosis (DVT) of right femoral vein [I82.411]  Yes    CKD stage 3a, GFR 45-59 ml/min [N18.31]  Yes    Acute saddle pulmonary embolism [I26.92]  Yes    Anxiety disorder [F41.9]  Yes    Hyperthyroidism [E05.90]  Yes      Resolved Hospital Problems   No resolved problems to display.       Plan:    ABLA/GI bleed secondary to recent initiation of anticoagulation for PE with hemoglobin dropping to 6.7   - Transfused 2 units packed red blood cells   - Iron low as is ferritin secondary to the above -consider IV iron in near future as we will focus on volume resuscitation today with packed red blood cells   - Hematological consult pending   - AC on hold and will defer further AC recommendations to hematology.  May need to consider heparin drip as review of previous imaging notes thrombosis was quite significant per review of 4/8/2025 CTA indicating bilateral saddle emboli   - IV PPI twice daily for now -will switch to drip   - GI consult pending -currently n.p.o. -no  objection to clears/liquids pending GI      CKD 3A with probable element of ACD-creatinine and electrolytes reassuring/stable    DM2 with CKD-SSI    Hyperthyroid on methimazole        Nuno Dickerson MD  6/10/2025  08:17 EDT

## 2025-06-10 NOTE — CONSULTS
Subjective     REASON FOR CONSULTATION:  Provide an opinion on any further workup or treatment on:    Anemia  Saddle PE in April 2025  DVT of RLE in April 2025                       REQUESTING PHYSICIAN: Dr. Dickerson    HISTORY OF PRESENT ILLNESS:      Patt Bond is a 80 y.o. patient who was admitted on 6/9/2025.  She was diagnosed with right lower extremity DVT and saddle pulmonary embolism in April 2025.  She was seen consultation by Dr. Hebert.  She was started on Lovenox and transition to Eliquis.  She had thrombocytopenia during the hospital stay.    She had follow-up with our NP on 4/21/2025 and was found to be anemic with vitamin B12 deficiency.  Hemoglobin was 10.2.  She was started on vitamin B12 injections weekly x 4 followed by monthly.    Patient was seen by our NP on 6/9/2025.  Her hemoglobin dropped to 7.5.  She did not notice black stools but reported that stool was dark (she recently took Pepto-Bismol).  No blood per rectum.  Patient was admitted for further evaluation and management.    Past Medical History:   Diagnosis Date    Arthritis     OSTEO. LEFT KNEE    Back pain     AT TIMES    Cataract     CLIFFORD EYES    Chronic diarrhea     CKD (chronic kidney disease)     Diverticulitis     WITH RESECTION    Diverticulosis     Goiter     Graves disease     History of colon polyps     BENIGN    History of sepsis     History of snoring     Hyperthyroidism     followed by Dr. Blackmon. PARTIAL THRYOIDECTOMY    Kidney stones     HISTORY OF MULTIPLE TIMES. URIC AND CALCIUM STONES    Lung nodule     HISTORY OF-NOTE LATEST CT CHEST 12/2022    Mass of mediastinum     HISTORY OF. BENIGN.    OA (osteoarthritis) of knee     RIGHT KNEE:  PAIN, WEAKNESS, LIMITED MOBILITY    Obstructive pyelonephritis 09/28/2015    left obstructing pyelonephritis     PONV (postoperative nausea and vomiting)     Rectal prolapse     CHRONIC    Risk for falls     USING ROLLATOR AND CANE    Sleep apnea     CPAP MACHINE    SOB (shortness of  breath) on exertion     SEES DR VASQUEZ    Urinary urgency      SCHEDULED MEDS:  allopurinol, 300 mg, Oral, Daily  insulin lispro, 2-7 Units, Subcutaneous, 4x Daily AC & at Bedtime  methIMAzole, 2.5 mg, Oral, Daily  pantoprazole, 40 mg, Intravenous, BID AC  potassium chloride, 10 mEq, Oral, Daily  vitamin B-12, 2,000 mcg, Oral, Daily      INFUSIONS:  sodium chloride, 100 mL/hr, Last Rate: 100 mL/hr (06/09/25 1841)      ALLERGIES:  Allergies   Allergen Reactions    Cephalexin Hives    Cephalosporins Hives     Took in 2015 w/o problems    Other Hives     ELECTRODE PATCHES    Penicillins Hives      Social History     Socioeconomic History    Marital status:      Spouse name: BERENICE COFFEY     Number of children: 2    Years of education: HIGH SCHOOL    Tobacco Use    Smoking status: Never     Passive exposure: Never    Smokeless tobacco: Never   Vaping Use    Vaping status: Never Used   Substance and Sexual Activity    Alcohol use: No    Drug use: No    Sexual activity: Defer     Family History   Problem Relation Age of Onset    Heart disease Father     Heart attack Paternal Uncle     Cancer Maternal Grandmother         ovarian    Heart attack Paternal Grandfather     Heart attack Paternal Uncle     Heart attack Paternal Uncle     Heart attack Paternal Uncle     Heart attack Paternal Uncle     Heart attack Paternal Uncle     Scleroderma Mother     Hypertension Sister     Malig Hyperthermia Neg Hx       REVIEW OF SYSTEMS:   GENERAL: Fatigue.   SKIN: Negative.  HEME/LYMPH: Negative.  RESPIRATORY: Shortness of breath.   CVS:  Negative.   GI:  Negative.   :  Negative.   MUSCULOSKELETAL:  Negative.  NEUROLOGICAL:  Negative.    Objective   VITAL SIGNS:  Temp:  [98 °F (36.7 °C)-98.4 °F (36.9 °C)] (P) 98.2 °F (36.8 °C)  Heart Rate:  [66-85] (P) 66  Resp:  [15-18] (P) 15  BP: (128-148)/(57-73) (P) 147/71     Wt Readings from Last 3 Encounters:   06/09/25 109 kg (240 lb 11.2 oz)   04/21/25 110 kg (243 lb 1.6 oz)   04/09/25  110 kg (242 lb 8.1 oz)     PHYSICAL EXAMINATION:   GENERAL:  The patient appears in fair general condition, not in acute distress.  SKIN: No skin rash. No ecchymosis.  HEAD:  Normocephalic.  EYES:  No Jaundice. Pallor.   NECK:  Supple.   CHEST: Normal respiratory effort. Lungs clear to auscultation.   CARDIAC:  Normal S1 & S2. No murmur.   ABDOMEN:  Soft. No tenderness.  EXTREMITIES: Right thigh tenderness  NEUROLOGICAL:  No Focal neurological deficits.     RESULT REVIEW:   Results from last 7 days   Lab Units 06/10/25  0302 06/09/25  1007   WBC 10*3/mm3 3.65 3.90   NEUTROS ABS 10*3/mm3  --  2.92   HEMOGLOBIN g/dL 6.7* 7.5*   HEMATOCRIT % 21.0* 24.9*   PLATELETS 10*3/mm3 110* 113*     Results from last 7 days   Lab Units 06/10/25  0302 06/09/25  1007   SODIUM mmol/L 139 139   POTASSIUM mmol/L 4.2 4.5   CHLORIDE mmol/L 110* 107   CO2 mmol/L 20.0* 22.5   BUN mg/dL 13.0 17.1   CREATININE mg/dL 1.09* 1.15*   CALCIUM mg/dL 8.8 9.3   ALBUMIN g/dL  --  4.0   BILIRUBIN mg/dL  --  0.4   ALK PHOS U/L  --  74   ALT (SGPT) U/L  --  12   AST (SGOT) U/L  --  16     Component      Latest Ref [facility] ([place], [place]) 4/9/2025 6/9/2025   Iron      37 - 145 mcg/dL  42    Iron Saturation (TSAT)      20 - 50 %  10 (L)    Transferrin      200 - 360 mg/dL  287    TIBC      298 - 536 mcg/dL  428    Vitamin B-12      211 - 946 pg/mL 202 (L)  374    Folate      4.78 - 24.20 ng/mL 10.80     Ferritin      13.00 - 150.00 ng/mL  11.70 (L)       Component      Latest Ref [facility] ([place], [place]) 4/9/2025   Lupus Anticoagulant Reflex Comment: *   Beta-2 Glyco 1 IgG      0 - 20 GPI IgG units <9    Beta-2 Glyco 1 IgA      0 - 25 GPI IgA units <9    Beta-2 Glyco 1 IgM      0 - 32 GPI IgM units <9    Anticardiolipin IgG      0 - 14 GPL U/mL <9    Anticardiolipin IgM      0 - 12 MPL U/mL <9    ANTITHROMBIN ACTIVITY      90 - 134 % 91    Factor V Leiden      Normal  Normal    Protein S Functional      70 - 127 % 88    Protein S Ag, Free      49.0 - 138.0 % 94.0    Factor II, DNA Analysis       Normal  Normal      * No lupus anticoagulant was detected     Assessment & Plan   *Saddle pulmonary embolism and extensive right lower extremity DVT diagnosed on 4/8/2025.  She was hospitalized between 4/8/2025 and 4/10/2025.  The only preceding surgery was total knee arthroplasty that she had in September 2024.  She had thrombophilia workup which was negative.  She was anticoagulated with Lovenox and transition to Eliquis.  Plan for a follow-up CT angiogram 6 months through her pulmonologist.  Patient was taking Eliquis regularly.  Eliquis held on 6/9/2025 due to severe anemia.   With hemoglobin at 6.7, we will hold full dose anticoagulation for today.     *Acute blood loss anemia.    4/10/2025: Hemoglobin 11.2.  4/21/2025: Hemoglobin decreased to 10.2.  6/9/2025: Hemoglobin dropped to 7.5.    Patient did not notice bright blood per rectum or melena.  6/10/2025: Hemoglobin 6.7.  Labs are indicative of iron deficiency anemia with ferritin at 11.7 and transferrin saturation at 10%.  Patient is going to be transfused with 2 units of PRBCs.    *Thrombocytopenia.  6/9/2025:Platelets 113,000.  6/10/2025: Platelets 110,000.     *Vitamin B12 deficiency.  4/9/2025: Vitamin B12 202.  She was placed on vitamin B12 injections weekly x 4 followed by monthly.  6/9/2025: Vitamin B12 improved to 374.     *Graves' disease.  She had partial thyroidectomy.  Patient is on methimazole    PLAN:    1.  Given the severe iron deficiency anemia, I recommend an IV iron infusion with IV Ferrlecit.  I explained possible development of an infusion reaction and the need to give Benadryl and Pepcid as premedications.  2.  Hold Eliquis.  3.  We will give Lovenox 40 mg SQ x 1 dose today.  4.  Patient is going to receive 2 units of PRBCs.  5.  Await EGD and colonoscopy planned for tomorrow.      Discussed with the patient's .  Discussed with Dr. Dickerson.  Discussed with the patient's RN.      Jocelyn Perez MD  06/10/25

## 2025-06-10 NOTE — CASE MANAGEMENT/SOCIAL WORK
Discharge Planning Assessment  [facility]     Patient Name: Patt Bond  MRN: [id number]  Today's Date: 6/10/2025    Admit Date: 6/9/2025    Plan: Home with family   Discharge Needs Assessment       Row Name 06/10/25 165       Living Environment    People in Home spouse    Current Living Arrangements home    Potentially Unsafe Housing Conditions none    Primary Care Provided by self    Provides Primary Care For no one    Family Caregiver if Needed none    Quality of Family Relationships helpful;involved;supportive    Able to Return to Prior Arrangements yes       Resource/Environmental Concerns    Resource/Environmental Concerns none    Transportation Concerns none       Transition Planning    Patient/Family Anticipates Transition to home with family    Patient/Family Anticipated Services at Transition none    Transportation Anticipated family or friend will provide       Discharge Needs Assessment    Readmission Within the Last 30 Days no previous admission in last 30 days    Equipment Currently Used at Home cane, straight;walker, rolling    Anticipated Changes Related to Illness none    Equipment Needed After Discharge none    Provided Post Acute Provider List? N/A    Provided Post Acute Provider Quality & Resource List? N/A                   Discharge Plan       Row Name 06/10/25 6467       Plan    Plan Home with family    Patient/Family in Agreement with Plan yes    Plan Comments CCP met with patient at bedside. Introduced self and explained role of CCP. Patient confirmed the information on her face sheet is accurate. Patients PCP is Moe Matute. Patient uses [facility]. Patient lives at home with spouse. She is independent at home. Has a cane and walker. No HH or SNF history. PT signed off. Family to transport home. CCP following for any needs                       Demographic Summary       Row Name 06/10/25 1019       General Information    Admission Type observation    Arrived From emergency  department    Referral Source admission list    Reason for Consult discharge planning    Preferred Language English                   Functional Status       Row Name 06/10/25 0500       Functional Status    Usual Activity Tolerance moderate    Current Activity Tolerance moderate       Functional Status, IADL    Medications independent    Meal Preparation independent    Housekeeping independent    Laundry independent    Shopping independent       Mental Status    General Appearance WDL WDL       Mental Status Summary    Recent Changes in Mental Status/Cognitive Functioning no changes       Employment/    Employment Status retired                   Psychosocial    No documentation.                  Abuse/Neglect    No documentation.                  Legal    No documentation.                  Substance Abuse    No documentation.                  Patient Forms    No documentation.

## 2025-06-10 NOTE — H&P
HISTORY AND PHYSICAL   [facility]        Date of Admission: 2025  Patient Identification:  Name: Patt Bond  Age: 80 y.o.  Sex: female  :  1945  MRN: [id number]                     Primary Care Physician: Moe Matute MD    Chief Complaint:  80 year old female was sent for admission by oncology; she was recently diagnosed with a pulmonary embolus and started on eliquis; she has had dark stools since then but no abdominal pain; she was seen for follow up today and had a low hgb so admission was arranged; she has a history of colon polyps    History of Present Illness:   As above    Past Medical History:  Past Medical History:   Diagnosis Date    Arthritis     OSTEO. LEFT KNEE    Back pain     AT TIMES    Cataract     CLIFFORD EYES    Chronic diarrhea     CKD (chronic kidney disease)     Diverticulitis     WITH RESECTION    Diverticulosis     Goiter     Graves disease     History of colon polyps     BENIGN    History of sepsis     History of snoring     Hyperthyroidism     followed by Dr. Blackmon. PARTIAL THRYOIDECTOMY    Kidney stones     HISTORY OF MULTIPLE TIMES. URIC AND CALCIUM STONES    Lung nodule     HISTORY OF-NOTE LATEST CT CHEST 2022    Mass of mediastinum     HISTORY OF. BENIGN.    OA (osteoarthritis) of knee     RIGHT KNEE:  PAIN, WEAKNESS, LIMITED MOBILITY    Obstructive pyelonephritis 2015    left obstructing pyelonephritis     PONV (postoperative nausea and vomiting)     Rectal prolapse     CHRONIC    Risk for falls     USING ROLLATOR AND CANE    Sleep apnea     CPAP MACHINE    SOB (shortness of breath) on exertion     SEES DR VASQUEZ    Urinary urgency      Past Surgical History:  Past Surgical History:   Procedure Laterality Date    BRONCHOSCOPY N/A 2017    Procedure: BRONCHOSCOPY WITH ENDOBRONCHIAL ULTRASOUND AND TRANSBRONCHIAL NEEDLE ASPIRATION;  Surgeon: Nando Vasquez MD;  Location: [facility] ENDOSCOPY;  Service:     CARDIAC CATHETERIZATION N/A  09/27/2017    Procedure: Right Heart Cath;  Surgeon: Miguel Gautam MD;  Location:  [facility];  Service:     CARDIAC CATHETERIZATION N/A 09/27/2017    Procedure: Left Heart Cath;  Surgeon: Miguel Gautam MD;  Location:  [facility];  Service:     CARDIAC CATHETERIZATION N/A 09/27/2017    Procedure: Coronary angiography;  Surgeon: Miguel Gautam MD;  Location:  [facility];  Service:     CARDIAC CATHETERIZATION N/A 09/27/2017    Procedure: Left ventriculography;  Surgeon: Miguel Gautam MD;  Location:  [facility];  Service:     CHOLECYSTECTOMY N/A 05/17/2017    Procedure: LAPAROSCOPIC CHOLECYSTECTOMY WITH IOC;  Surgeon: Patt Moore MD;  Location: [facility] MAIN OR;  Service:     COLON RESECTION Left 09/14/2016    Laparoscopic Low Anterior Resection with splenic flexure mobilization, drainage of Pelvic Abscess (cultured 3+ E. Coli), Left salpingo-ophorectomy, laparoscopic rectopexy, and umbilical hernia repair, Dr. Patt Moore    COLONOSCOPY N/A 05/15/2008    sigmoid diverticulosis with blunting of the haustral folds and angulation consistent with previous diverticulitis, no polyps, suggestion of rectal prolapse-Dr. Patt Moore    COLONOSCOPY W/ BIOPSIES AND POLYPECTOMY N/A 04/16/2001    Possible mild gastritis w/ some appearance of formations that look like petechiae in the antrum, no ulcerations, no erosions; cecal polyp approx 4mm sessile; probable transverse colon polyp, although we could not visualize this completely upon pulling out; sigmoid diverticula; multiple rectal polyps, mostly w/ appearance of hyperplasia, largest being 5 to 6mm: removed via snare-Dr. Patt Moore    COLONOSCOPY W/ POLYPECTOMY N/A 12/10/2004    Diverticulosis with sigmoid perideverticulitis with erythema and patchiness around some of the diverticula, proximal ascedning colon polyp-approx 5 mm-removed via snare cauter, two distal  ascending colon polyps-7 mm and 3 mm-removed via snare cautery polypectomy, hemorrhoids-Dr. Patt Moore    CYSTOSCOPY W/ URETERAL STENT PLACEMENT Left 04/21/2018    Procedure: CYSTOSCOPY, LEFT RETROGRADE WITH LEFT URETERAL STENT INSERTION;  Surgeon: Stanley Osuna MD;  Location: [facility]  Service: Urology    CYSTOSCOPY W/ URETERAL STENT REMOVAL Left 10/07/2015    Cystoscopy with stent extraction, left ureteral occulusion balloon placement, percutanous nephrostolithotomy with stone volume less than 2.5 cm involving the left lower pole and the left proximal ureter, balloon tract dilation for establishment of nephrostomy tract, antegrade nephrostomy tube placement-Dr. Miguel Monte    CYSTOSCOPY, RETROGRADE PYELOGRAM AND STENT INSERTION Left 09/28/2015    Left cystoscopy with left retrograde pyelogram, left double-J stent placement-Dr. Miguel Blas    CYSTOSCOPY, RETROGRADE PYELOGRAM AND STENT INSERTION Left 09/09/2015    Cystoscopy with bilateral retrograde pyelogram, left double-J stent placement, right ureteral pyeloscopy with laser lithotripsy of the ureteropelvic junction calculus, right double-J stent placement-Dr. Miguel Monte    CYSTOSCOPY, RETROGRADE PYELOGRAM AND STENT INSERTION Right 09/21/2007    Cystoscopy with right retrograde pyelogram, right biliary stent placement-Dr. Miguel Monte    CYSTOSCOPY, RETROGRADE PYELOGRAM AND STENT INSERTION Right 09/07/2007    Cystoscopy with right retrograde pyelogram, right ureteral peyloscopy with extraction distal ureteral mass, right double J stent placement-Dr. Miguel Monte    CYSTOSCOPY, RETROGRADE PYELOGRAM AND STENT INSERTION Left 07/29/2022    Procedure: CYSTOSCOPY, LEFT RETROGRADE PYELOGRAM, LEFT URETERAL STENT;  Surgeon: Cedrick Jones MD;  Location: [facility]  Service: Urology;  Laterality: Left;    CYSTOSCOPY, URETEROSCOPY, RETROGRADE PYELOGRAM, STENT INSERTION Right 09/07/2007    Cystoscopy with right retrograde  pyelogram, rigth ureteroscopy with extraction of distal mass, right double J stent placement-Dr. Miguel Monte    D & C HYSTEROSCOPY N/A 05/18/2011    Procedure done due to thickened endomentrium and an endometrial polyp-Dr. Quita Cheatham    DILATATION AND CURETTAGE N/A 03/22/2002    D&C, polyp removal-Dr. Quita Cheatham    EXTRACORPOREAL SHOCK WAVE LITHOTRIPSY (ESWL) Left 04/19/2023    Procedure: LEFT EXTRACORPOREAL SHOCKWAVE LITHOTRIPSY STENT REPLACEMENT;  Surgeon: Miguel Monte MD;  Location: [facility] OR;  Service: Urology;  Laterality: Left;    EXTRACORPOREAL SHOCKWAVE LITHOTRIPSY (ESWL), STENT INSERTION/REMOVAL Left 05/08/2015    Left extracoporeal shockwave lithotripsy, cystoscopy with stent placement-Dr. Miguel Monte    EXTRACORPOREAL SHOCKWAVE LITHOTRIPSY (ESWL), STENT INSERTION/REMOVAL  04/2023    [facility]    JOINT REPLACEMENT  Left knee    MEDIASTINOTOMY Left 03/05/2018    Procedure: left thyroidectomy, resection of substernal thyroid goiter cervical approach;  Surgeon: Quintin Mares III, MD;  Location: [facility] OR;  Service:     PERCUTANEOUS NEPHROSTOLITHOTOMY Left 06/16/2023    Procedure: LEFT PERCUTANEOUS  NEPHROSTOLITHOTOMY, CYSTOSCOPY, STENT REMOVAL;  Surgeon: Miguel Monte MD;  Location: [facility] HYBRID OR 18/19;  Service: Urology;  Laterality: Left;    SIGMOIDOSCOPY N/A 09/13/2016    Procedure: SIGMOIDOSCOPY FLEXIBLE TO 25 CM;  Surgeon: Patt Moore MD;  Location: [facility] ENDOSCOPY;  Service:     THORACOTOMY  1996    Thoracotomy for ectopic thyroid, Dr. Camarillo    THYROIDECTOMY, PARTIAL      left side 3/05/18    TOTAL KNEE ARTHROPLASTY Left 02/02/2023    Procedure: TOTAL KNEE ARTHROPLASTY;  Surgeon: Anthony Sinclair MD;  Location: [facility] OR OSC;  Service: Orthopedics;  Laterality: Left;    TOTAL KNEE ARTHROPLASTY Right 09/23/2024    Procedure: RIGHT TOTAL KNEE ARTHROPLASTY;  Surgeon: Anthony Sinclair MD;  Location: [facility] OR [facility];  Service: Orthopedics;   Laterality: Right;    UMBILICAL HERNIA REPAIR N/A 09/14/2016    Procedure: UMBILICAL HERNIA REPAIR ;  Surgeon: Patt Moore MD;  Location[facility] MAIN OR;  Service:     URETEROSCOPY LASER LITHOTRIPSY WITH STENT INSERTION Left 08/12/2022    Procedure: LEFT URETEROSCOPY LASER LITHOTRIPSY STONE BACKET EXTRACTION, STENT PLACEMENT;  Surgeon: Miguel Monte MD;  Location: [facility] MAIN OR;  Service: Urology;  Laterality: Left;    URETEROSCOPY LASER LITHOTRIPSY WITH STENT INSERTION Left 10/11/2023    Procedure: LEFT URETEROSCOPY LASER LITHOTRIPSY WITH STENT REMOVAL;  Surgeon: Miguel Monte MD;  Location: [facility] MAIN OR;  Service: Urology;  Laterality: Left;    VENTRAL/INCISIONAL HERNIA REPAIR N/A 05/17/2017    Procedure: VENTRAL HERNIA REPAIR WITH MESH, BILATERAL MUSCULOFASCIAL RELEASE;  Surgeon: Patt Moore MD;  Location: [facility] MAIN OR;  Service:       Home Meds:  Medications Prior to Admission   Medication Sig Dispense Refill Last Dose/Taking    allopurinol (ZYLOPRIM) 300 MG tablet Take 1 tablet by mouth Daily. KIDNEY STONE  Indications: Disorder of Excessive Uric Acid in the Blood   6/9/2025    Apixaban Starter Pack (Eliquis DVT/PE Starter Pack) tablet therapy pack Take two 5 mg tablets by mouth every 12 hours for 7 days. Followed by one 5 mg tablet every 12 hours. 74 tablet 0 6/9/2025    cholestyramine (QUESTRAN) 4 g packet Take 1 packet by mouth As Needed. AROUND DINNERTIME  Indications: Itching   Past Week    methIMAzole (TAPAZOLE) 5 MG tablet TAKE 1/2 TABLET BY MOUTH DAILY 45 tablet 1 6/9/2025    potassium citrate (UROCIT-K) 10 MEQ (1080 MG) CR tablet Take 1 tablet by mouth Daily. Indications: Low Amount of Potassium in the Blood   6/9/2025    vitamin B-12 (CYANOCOBALAMIN) 1000 MCG tablet Take 2 tablets by mouth Daily. 60 tablet 2 Past Month    diazePAM (Valium) 5 MG tablet TAKE 1 TABLET 1 HOUR PRIOR TO PROCEDURE. DO NOT DRIVE AFTER TAKING (Patient not taking: Reported on 6/9/2025) 1 tablet 0  Unknown    Diclofenac Sodium 4 %, Topiramate 2 %, cloNIDine HCl 0.2 %, Lidocaine HCl 5 % Apply 1-2 g topically to the appropriate area as directed 3 (Three) to 4 (Four) times daily. 90 g 1 Unknown       Allergies:  Allergies   Allergen Reactions    Cephalexin Hives    Cephalosporins Hives     Took in 2015 w/o problems    Other Hives     ELECTRODE PATCHES    Penicillins Hives     Immunizations:  Immunization History   Administered Date(s) Administered    COVID-19 (PFIZER) Purple Cap Monovalent 02/17/2021, 03/10/2021    Fluad Quad 65+ 11/01/2022    Fluzone High-Dose 65+YRS 12/14/2015, 10/13/2020    Fluzone High-Dose 65+yrs 10/13/2020, 10/28/2021     Social History:   Social History     Social History Narrative    Not on file     Social History     Socioeconomic History    Marital status:      Spouse name: BERENICE COFFEY     Number of children: 2    Years of education: HIGH SCHOOL    Tobacco Use    Smoking status: Never     Passive exposure: Never    Smokeless tobacco: Never   Vaping Use    Vaping status: Never Used   Substance and Sexual Activity    Alcohol use: No    Drug use: No    Sexual activity: Defer       Family History:  Family History   Problem Relation Age of Onset    Heart disease Father     Heart attack Paternal Uncle     Cancer Maternal Grandmother         ovarian    Heart attack Paternal Grandfather     Heart attack Paternal Uncle     Heart attack Paternal Uncle     Heart attack Paternal Uncle     Heart attack Paternal Uncle     Heart attack Paternal Uncle     Scleroderma Mother     Hypertension Sister     Malig Hyperthermia Neg Hx         Review of Systems  See history of present illness and past medical history.  Patient denies headache, dizziness, syncope, falls, trauma, change in vision, change in hearing, change in taste, changes in weight, changes in appetite, focal weakness, numbness, or paresthesia.  Patient denies chest pain, palpitations, dyspnea, orthopnea, PND, cough, sinus pressure,  rhinorrhea, epistaxis, hemoptysis, nausea, vomiting,hematemesis, diarrhea, constipation or hematochezia.  Denies cold or heat intolerance, polydipsia, polyuria, polyphagia. Denies hematuria, pyuria, dysuria, hesitancy, frequency or urgency. Denies consumption of raw and under cooked meats foods or change in water source.  Denies fever, chills, sweats, night sweats.  Denies missing any routine medications. Remainder of ROS is negative.    Objective:  T Max 24 hrs: Temp (24hrs), Av.1 °F (36.7 °C), Min:98 °F (36.7 °C), Max:98.2 °F (36.8 °C)    Vitals Ranges:   Temp:  [98 °F (36.7 °C)-98.2 °F (36.8 °C)] 98.2 °F (36.8 °C)  Heart Rate:  [72-85] 72  Resp:  [17-18] 18  BP: (145-148)/(66-73) 148/66      Exam:  /66 (BP Location: Right arm, Patient Position: Sitting)   Pulse 72   Temp 98.2 °F (36.8 °C) (Oral)   Resp 18   SpO2 98%     General Appearance:    Alert, cooperative, no distress, appears stated age   Head:    Normocephalic, without obvious abnormality, atraumatic   Eyes:    PERRL, conjunctivae/corneas clear, EOM's intact, both eyes   Ears:    Normal external ear canals, both ears   Nose:   Nares normal, septum midline, mucosa normal, no drainage    or sinus tenderness   Throat:   Lips, mucosa, and tongue normal   Neck:   Supple, symmetrical, trachea midline, no adenopathy;     thyroid:  no enlargement/tenderness/nodules; no carotid    bruit or JVD   Back:     Symmetric, no curvature, ROM normal, no CVA tenderness   Lungs:     Clear to auscultation bilaterally, respirations unlabored   Chest Wall:    No tenderness or deformity    Heart:    Regular rate and rhythm, S1 and S2 normal, no murmur, rub   or gallop   Abdomen:     Soft, nontender, bowel sounds active all four quadrants,     no masses, no hepatomegaly, no splenomegaly   Extremities:   Extremities normal, atraumatic, no cyanosis or edema                       .    Data Review:  Labs in chart were reviewed.  WBC   Date Value Ref Range Status    06/09/2025 3.90 3.40 - 10.80 10*3/mm3 Final     Hemoglobin   Date Value Ref Range Status   06/09/2025 7.5 (L) 12.0 - 15.9 g/dL Final     Hematocrit   Date Value Ref Range Status   06/09/2025 24.9 (L) 34.0 - 46.6 % Final     Platelets   Date Value Ref Range Status   06/09/2025 113 (L) 140 - 450 10*3/mm3 Final     Sodium   Date Value Ref Range Status   06/09/2025 139 136 - 145 mmol/L Final     Potassium   Date Value Ref Range Status   06/09/2025 4.5 3.5 - 5.2 mmol/L Final     Chloride   Date Value Ref Range Status   06/09/2025 107 98 - 107 mmol/L Final     CO2   Date Value Ref Range Status   06/09/2025 22.5 22.0 - 29.0 mmol/L Final     BUN   Date Value Ref Range Status   06/09/2025 17.1 8.0 - 23.0 mg/dL Final     Creatinine   Date Value Ref Range Status   06/09/2025 1.15 (H) 0.57 - 1.00 mg/dL Final     Glucose   Date Value Ref Range Status   06/09/2025 229 (H) 65 - 99 mg/dL Final     Calcium   Date Value Ref Range Status   06/09/2025 9.3 8.6 - 10.5 mg/dL Final                Imaging Results (All)       None              Assessment:  Active Hospital Problems    Diagnosis  POA    **GI bleed [K92.2]  Yes      Resolved Hospital Problems   No resolved problems to display.   Anemia  Hyperglycemia  Ckd3    plan  Will ask gi to see her  Hematology to see  Check iron studies  Monitor on telemetry  Hold eliquis  Npo after midnight   Dw patient, ed provider  Patient is full code and  is catrachito Guzman Bolivar Whitt MD  6/9/2025  20:37 EDT

## 2025-06-11 ENCOUNTER — ANESTHESIA EVENT (OUTPATIENT)
Dept: GASTROENTEROLOGY | Facility: HOSPITAL | Age: 80
End: 2025-06-11
Payer: MEDICARE

## 2025-06-11 ENCOUNTER — ANESTHESIA (OUTPATIENT)
Dept: GASTROENTEROLOGY | Facility: HOSPITAL | Age: 80
End: 2025-06-11
Payer: MEDICARE

## 2025-06-11 DIAGNOSIS — D50.0 IRON DEFICIENCY ANEMIA DUE TO CHRONIC BLOOD LOSS: Primary | ICD-10-CM

## 2025-06-11 LAB
ANION GAP SERPL CALCULATED.3IONS-SCNC: 9.5 MMOL/L (ref 5–15)
BASOPHILS # BLD AUTO: 0.02 10*3/MM3 (ref 0–0.2)
BASOPHILS NFR BLD AUTO: 0.5 % (ref 0–1.5)
BH BB BLOOD EXPIRATION DATE: NORMAL
BH BB BLOOD EXPIRATION DATE: NORMAL
BH BB BLOOD TYPE BARCODE: 6200
BH BB BLOOD TYPE BARCODE: 6200
BH BB DISPENSE STATUS: NORMAL
BH BB DISPENSE STATUS: NORMAL
BH BB PRODUCT CODE: NORMAL
BH BB PRODUCT CODE: NORMAL
BH BB UNIT NUMBER: NORMAL
BH BB UNIT NUMBER: NORMAL
BUN SERPL-MCNC: 10 MG/DL (ref 8–23)
BUN/CREAT SERPL: 8.1 (ref 7–25)
CALCIUM SPEC-SCNC: 9 MG/DL (ref 8.6–10.5)
CHLORIDE SERPL-SCNC: 109 MMOL/L (ref 98–107)
CO2 SERPL-SCNC: 19.5 MMOL/L (ref 22–29)
CREAT SERPL-MCNC: 1.23 MG/DL (ref 0.57–1)
CROSSMATCH INTERPRETATION: NORMAL
CROSSMATCH INTERPRETATION: NORMAL
DEPRECATED RDW RBC AUTO: 49.1 FL (ref 37–54)
EGFRCR SERPLBLD CKD-EPI 2021: 45.1 ML/MIN/1.73
EOSINOPHIL # BLD AUTO: 0.21 10*3/MM3 (ref 0–0.4)
EOSINOPHIL NFR BLD AUTO: 4.9 % (ref 0.3–6.2)
ERYTHROCYTE [DISTWIDTH] IN BLOOD BY AUTOMATED COUNT: 15.2 % (ref 12.3–15.4)
GLUCOSE BLDC GLUCOMTR-MCNC: 110 MG/DL (ref 70–130)
GLUCOSE BLDC GLUCOMTR-MCNC: 122 MG/DL (ref 70–130)
GLUCOSE BLDC GLUCOMTR-MCNC: 131 MG/DL (ref 70–130)
GLUCOSE BLDC GLUCOMTR-MCNC: 137 MG/DL (ref 70–130)
GLUCOSE SERPL-MCNC: 119 MG/DL (ref 65–99)
HCT VFR BLD AUTO: 28 % (ref 34–46.6)
HGB BLD-MCNC: 9 G/DL (ref 12–15.9)
IMM GRANULOCYTES # BLD AUTO: 0.02 10*3/MM3 (ref 0–0.05)
IMM GRANULOCYTES NFR BLD AUTO: 0.5 % (ref 0–0.5)
INR PPP: 1.3 (ref 0.9–1.1)
LYMPHOCYTES # BLD AUTO: 0.6 10*3/MM3 (ref 0.7–3.1)
LYMPHOCYTES NFR BLD AUTO: 14 % (ref 19.6–45.3)
MCH RBC QN AUTO: 28.1 PG (ref 26.6–33)
MCHC RBC AUTO-ENTMCNC: 32.1 G/DL (ref 31.5–35.7)
MCV RBC AUTO: 87.5 FL (ref 79–97)
MONOCYTES # BLD AUTO: 0.34 10*3/MM3 (ref 0.1–0.9)
MONOCYTES NFR BLD AUTO: 7.9 % (ref 5–12)
NEUTROPHILS NFR BLD AUTO: 3.09 10*3/MM3 (ref 1.7–7)
NEUTROPHILS NFR BLD AUTO: 72.2 % (ref 42.7–76)
NRBC BLD AUTO-RTO: 0 /100 WBC (ref 0–0.2)
PLATELET # BLD AUTO: 113 10*3/MM3 (ref 140–450)
PMV BLD AUTO: 9.4 FL (ref 6–12)
POTASSIUM SERPL-SCNC: 3.8 MMOL/L (ref 3.5–5.2)
PROTHROMBIN TIME: 16.1 SECONDS (ref 11.7–14.2)
RBC # BLD AUTO: 3.2 10*6/MM3 (ref 3.77–5.28)
SODIUM SERPL-SCNC: 138 MMOL/L (ref 136–145)
UNIT  ABO: NORMAL
UNIT  ABO: NORMAL
UNIT  RH: NORMAL
UNIT  RH: NORMAL
WBC NRBC COR # BLD AUTO: 4.28 10*3/MM3 (ref 3.4–10.8)

## 2025-06-11 PROCEDURE — 25810000003 SODIUM CHLORIDE 0.9 % SOLUTION: Performed by: INTERNAL MEDICINE

## 2025-06-11 PROCEDURE — 25010000002 NA FERRIC GLUC CPLX PER 12.5 MG: Performed by: INTERNAL MEDICINE

## 2025-06-11 PROCEDURE — 45385 COLONOSCOPY W/LESION REMOVAL: CPT | Performed by: INTERNAL MEDICINE

## 2025-06-11 PROCEDURE — 85025 COMPLETE CBC W/AUTO DIFF WBC: CPT | Performed by: INTERNAL MEDICINE

## 2025-06-11 PROCEDURE — 99232 SBSQ HOSP IP/OBS MODERATE 35: CPT | Performed by: INTERNAL MEDICINE

## 2025-06-11 PROCEDURE — 0DBH8ZZ EXCISION OF CECUM, VIA NATURAL OR ARTIFICIAL OPENING ENDOSCOPIC: ICD-10-PCS | Performed by: INTERNAL MEDICINE

## 2025-06-11 PROCEDURE — 0DBM8ZZ EXCISION OF DESCENDING COLON, VIA NATURAL OR ARTIFICIAL OPENING ENDOSCOPIC: ICD-10-PCS | Performed by: INTERNAL MEDICINE

## 2025-06-11 PROCEDURE — 63710000001 DIPHENHYDRAMINE PER 50 MG: Performed by: INTERNAL MEDICINE

## 2025-06-11 PROCEDURE — 25010000002 ENOXAPARIN PER 10 MG: Performed by: INTERNAL MEDICINE

## 2025-06-11 PROCEDURE — 25810000003 LACTATED RINGERS PER 1000 ML: Performed by: ANESTHESIOLOGY

## 2025-06-11 PROCEDURE — 0DB98ZX EXCISION OF DUODENUM, VIA NATURAL OR ARTIFICIAL OPENING ENDOSCOPIC, DIAGNOSTIC: ICD-10-PCS | Performed by: INTERNAL MEDICINE

## 2025-06-11 PROCEDURE — 25010000002 PROPOFOL 10 MG/ML EMULSION: Performed by: ANESTHESIOLOGY

## 2025-06-11 PROCEDURE — 0DBK8ZZ EXCISION OF ASCENDING COLON, VIA NATURAL OR ARTIFICIAL OPENING ENDOSCOPIC: ICD-10-PCS | Performed by: INTERNAL MEDICINE

## 2025-06-11 PROCEDURE — 85610 PROTHROMBIN TIME: CPT | Performed by: INTERNAL MEDICINE

## 2025-06-11 PROCEDURE — 25010000002 LIDOCAINE 2% SOLUTION: Performed by: ANESTHESIOLOGY

## 2025-06-11 PROCEDURE — 82948 REAGENT STRIP/BLOOD GLUCOSE: CPT

## 2025-06-11 PROCEDURE — 88305 TISSUE EXAM BY PATHOLOGIST: CPT | Performed by: INTERNAL MEDICINE

## 2025-06-11 PROCEDURE — 43239 EGD BIOPSY SINGLE/MULTIPLE: CPT | Performed by: INTERNAL MEDICINE

## 2025-06-11 PROCEDURE — 80048 BASIC METABOLIC PNL TOTAL CA: CPT | Performed by: HOSPITALIST

## 2025-06-11 RX ORDER — SODIUM CHLORIDE 9 MG/ML
1000 INJECTION, SOLUTION INTRAVENOUS CONTINUOUS
Status: ACTIVE | OUTPATIENT
Start: 2025-06-11 | End: 2025-06-11

## 2025-06-11 RX ORDER — SODIUM CHLORIDE, SODIUM LACTATE, POTASSIUM CHLORIDE, CALCIUM CHLORIDE 600; 310; 30; 20 MG/100ML; MG/100ML; MG/100ML; MG/100ML
INJECTION, SOLUTION INTRAVENOUS CONTINUOUS PRN
Status: DISCONTINUED | OUTPATIENT
Start: 2025-06-11 | End: 2025-06-11 | Stop reason: SURG

## 2025-06-11 RX ORDER — SODIUM CHLORIDE 0.9 % (FLUSH) 0.9 %
10 SYRINGE (ML) INJECTION AS NEEDED
Status: DISCONTINUED | OUTPATIENT
Start: 2025-06-11 | End: 2025-06-11 | Stop reason: HOSPADM

## 2025-06-11 RX ORDER — ENOXAPARIN SODIUM 100 MG/ML
40 INJECTION SUBCUTANEOUS EVERY 24 HOURS
Status: DISCONTINUED | OUTPATIENT
Start: 2025-06-11 | End: 2025-06-12

## 2025-06-11 RX ORDER — LIDOCAINE HYDROCHLORIDE 20 MG/ML
INJECTION, SOLUTION INFILTRATION; PERINEURAL AS NEEDED
Status: DISCONTINUED | OUTPATIENT
Start: 2025-06-11 | End: 2025-06-11 | Stop reason: SURG

## 2025-06-11 RX ORDER — LIDOCAINE HYDROCHLORIDE 10 MG/ML
0.5 INJECTION, SOLUTION INFILTRATION; PERINEURAL ONCE AS NEEDED
Status: DISCONTINUED | OUTPATIENT
Start: 2025-06-11 | End: 2025-06-11 | Stop reason: HOSPADM

## 2025-06-11 RX ORDER — PROPOFOL 10 MG/ML
VIAL (ML) INTRAVENOUS CONTINUOUS PRN
Status: DISCONTINUED | OUTPATIENT
Start: 2025-06-11 | End: 2025-06-11 | Stop reason: SURG

## 2025-06-11 RX ADMIN — Medication 2000 MCG: at 11:50

## 2025-06-11 RX ADMIN — Medication 2.5 MG: at 22:04

## 2025-06-11 RX ADMIN — FAMOTIDINE 20 MG: 20 TABLET, FILM COATED ORAL at 11:49

## 2025-06-11 RX ADMIN — METHIMAZOLE 2.5 MG: 5 TABLET ORAL at 11:49

## 2025-06-11 RX ADMIN — PANTOPRAZOLE SODIUM 8 MG/HR: 40 INJECTION, POWDER, FOR SOLUTION INTRAVENOUS at 02:48

## 2025-06-11 RX ADMIN — SODIUM CHLORIDE, POTASSIUM CHLORIDE, SODIUM LACTATE AND CALCIUM CHLORIDE: 600; 310; 30; 20 INJECTION, SOLUTION INTRAVENOUS at 09:29

## 2025-06-11 RX ADMIN — DIPHENHYDRAMINE HYDROCHLORIDE 25 MG: 25 CAPSULE ORAL at 11:49

## 2025-06-11 RX ADMIN — SODIUM CHLORIDE 250 MG: 9 INJECTION, SOLUTION INTRAVENOUS at 13:19

## 2025-06-11 RX ADMIN — LIDOCAINE HYDROCHLORIDE 60 MG: 20 INJECTION, SOLUTION INFILTRATION; PERINEURAL at 09:31

## 2025-06-11 RX ADMIN — ALLOPURINOL 300 MG: 300 TABLET ORAL at 11:50

## 2025-06-11 RX ADMIN — PROPOFOL 100 MCG/KG/MIN: 10 INJECTION, EMULSION INTRAVENOUS at 09:33

## 2025-06-11 RX ADMIN — SODIUM CHLORIDE 1000 ML: 9 INJECTION, SOLUTION INTRAVENOUS at 09:08

## 2025-06-11 RX ADMIN — ENOXAPARIN SODIUM 40 MG: 100 INJECTION SUBCUTANEOUS at 18:44

## 2025-06-11 RX ADMIN — POLYETHYLENE GLYCOL 3350 0.5 BOTTLE: 17 POWDER, FOR SOLUTION ORAL at 04:29

## 2025-06-11 RX ADMIN — POTASSIUM CHLORIDE 10 MEQ: 750 TABLET, EXTENDED RELEASE ORAL at 11:49

## 2025-06-11 NOTE — PROGRESS NOTES
DAILY PROGRESS NOTE  [facility]    Patient Identification:  Name: Patt Bond  Age: 80 y.o.  Sex: female  :  1945  MRN: [id number]         Primary Care Physician: Moe Matute MD    Subjective:  Interval History: Feeling fine overall.  No chest pain no troubles breathing no shortness of air.  She is sitting up on the side the bed conversational with no use of accessory muscles and no signs of any dyspnea.  Spouse at bedside.  Case discussed amongst all.  She did admit to some bloody stools with bowel prep.  No abdominal pain nausea or vomiting now status post endoscopy    Objective:    Scheduled Meds:allopurinol, 300 mg, Oral, Daily  diphenhydrAMINE, 25 mg, Oral, Daily  famotidine, 20 mg, Oral, Daily  ferric gluconate, 250 mg, Intravenous, Daily  insulin lispro, 2-7 Units, Subcutaneous, 4x Daily AC & at Bedtime  methIMAzole, 2.5 mg, Oral, Daily  potassium chloride, 10 mEq, Oral, Daily  vitamin B-12, 2,000 mcg, Oral, Daily      Continuous Infusions:     Vital signs in last 24 hours:  Temp:  [98.1 °F (36.7 °C)-98.6 °F (37 °C)] 98.3 °F (36.8 °C)  Heart Rate:  [67-72] 67  Resp:  [14-20] 16  BP: (123-150)/(61-87) 131/75    Intake/Output:    Intake/Output Summary (Last 24 hours) at 2025 1304  Last data filed at 6/10/2025 1448  Gross per 24 hour   Intake 310.42 ml   Output --   Net 310.42 ml       Exam:  /75 (BP Location: Left arm, Patient Position: Lying)   Pulse 67   Temp 98.3 °F (36.8 °C) (Oral)   Resp 16   SpO2 99%     General Appearance:    Alert, cooperative, nontoxic, AAOx3                         Throat:   Oral mucosa pink and moist                           Neck: Very large diameter without obvious JVD                         Lungs:    Clear to auscultation bilaterally, respirations unlabored                 Chest Wall:    No tenderness or deformity                          Heart:    Regular rate and rhythm, S1 and S2 normal                  Abdomen:     Soft,  nontender, bowel sounds active, MO/BMI 44+                 extremities: Chronic trace edema to RLE                  Neurologic:   CNII-XII intact     Data Review:  Labs in chart were reviewed.    Assessment:  Active Hospital Problems    Diagnosis  POA    **GI bleed [K92.2]  Yes    Anemia, posthemorrhagic, acute [D62]  Unknown    Iron deficiency anemia [D50.9]  Unknown    Acute deep vein thrombosis (DVT) of right femoral vein [I82.411]  Yes    CKD stage 3a, GFR 45-59 ml/min [N18.31]  Yes    Acute saddle pulmonary embolism [I26.92]  Yes    Anxiety disorder [F41.9]  Yes    Hyperthyroidism [E05.90]  Yes      Resolved Hospital Problems   No resolved problems to display.       Plan:    ABLA/GI bleed secondary to recent initiation of AC for PE with hemoglobin dropping to 6.7   - Transfused 2 units in Hgb trending 9.5-9.0   - Appreciate hematological input and assistance -Lovenox adjusted for PE in light of the above.  Curious to see which form of AC is endorsed as this occurred on Eliquis.  Thrombocytopenia seems to be a chronic issue-platelets 113   - Upper endoscopy unremarkable per  who also performed colonoscopy in which  #three 8 to 10 mm polyps were removed.  Diverticulosis was noted throughout the colon as well as nonbleeding hemorrhoids.  I am assuming diverticular etiology could be the culprit even though not actively bleeding and awaiting further GI recommendations   - IV PPI drip discontinued and no adjustment to p.o. versus IV noted      CKD 3A with probable element of ACD-creatinine and electrolytes reassuring/stable     DM2 with CKD-SSI     Hyperthyroid on methimazole        Disposition: May be home soon but still need to trend hemoglobin another day or 2 and also need to come up with final AC recommendations with hematology and to be honest I would not be opposed to watching her the first 24 hours after initiating     Nuno Dickerson MD  6/11/2025  13:04 EDT

## 2025-06-11 NOTE — ANESTHESIA PREPROCEDURE EVALUATION
Anesthesia Evaluation     history of anesthetic complications:  PONV  NPO Solid Status: > 8 hours  NPO Liquid Status: > 8 hours           Airway   Mallampati: II  Possible difficult intubation  Dental      Pulmonary    (+) pulmonary embolism,sleep apnea  Cardiovascular     (+) GRAY, PVD, DVT      Neuro/Psych  (+) psychiatric history  GI/Hepatic/Renal/Endo    (+) morbid obesity, renal disease-, thyroid problem     Musculoskeletal     (+) back pain  Abdominal    Substance History      OB/GYN          Other   arthritis,     ROS/Med Hx Other: ABLA/GI bleed secondary to recent initiation of anticoagulation for PE with hemoglobin dropping to 6.7              - Transfused 2 units packed red blood cells              - Iron low as is ferritin secondary to the above -consider IV iron in near future as we will focus on volume resuscitation today with packed red blood cells              - Hematological consult pending              - AC on hold and will defer further AC recommendations to hematology.  May need to consider heparin drip as review of previous imaging notes thrombosis was quite significant per review of 4/8/2025 CTA indicating bilateral saddle emboli              - IV PPI twice daily for now -will switch to drip              - GI consult pending -currently n.p.o. -no objection to clears/liquids pending GI        CKD 3A with probable element of ACD-creatinine and electrolytes reassuring/stable     DM2 with CKD-SSI     Hyperthyroid on methimazole                Anesthesia Plan    ASA 4     MAC     intravenous induction     Anesthetic plan, risks, benefits, and alternatives have been provided, discussed and informed consent has been obtained with: patient.    CODE STATUS:    Code Status (Patient has no pulse and is not breathing): CPR (Attempt to Resuscitate)  Medical Interventions (Patient has pulse or is breathing): Full

## 2025-06-11 NOTE — PROGRESS NOTES
Subjective     CHIEF COMPLAINT:     Anemia    INTERVAL HISTORY:     Patient underwent EGD and colonoscopy earlier today.  She continues to have watery bowel movements.    REVIEW OF SYSTEMS:  Pertinent ROS as in the interval history. Otherwise, negative.    SCHEDULED MEDS:  allopurinol, 300 mg, Oral, Daily  diphenhydrAMINE, 25 mg, Oral, Daily  famotidine, 20 mg, Oral, Daily  ferric gluconate, 250 mg, Intravenous, Daily  insulin lispro, 2-7 Units, Subcutaneous, 4x Daily AC & at Bedtime  methIMAzole, 2.5 mg, Oral, Daily  potassium chloride, 10 mEq, Oral, Daily  vitamin B-12, 2,000 mcg, Oral, Daily      INFUSIONS:  pantoprazole, 8 mg/hr, Last Rate: 8 mg/hr (06/11/25 0248)      Objective   VITAL SIGNS:  Temp:  [97.5 °F (36.4 °C)-98.6 °F (37 °C)] 98.1 °F (36.7 °C)  Heart Rate:  [66-72] 72  Resp:  [16-20] 20  BP: (126-158)/(58-88) 132/71     PHYSICAL EXAMINATION:  GENERAL:  The patient appears in fair general condition, not in acute distress.  SKIN: No skin rash.   EYES:  No Jaundice. Pallor.   CHEST: Normal respiratory effort.   ABDOMEN: Protuberant.  EXTREMITIES: Tenderness on exam of the right thigh.    RESULT REVIEW:   Results from last 7 days   Lab Units 06/11/25  0346 06/10/25  1855 06/10/25  1054 06/10/25  0302 06/09/25  1007   WBC 10*3/mm3 4.28  --   --  3.65 3.90   NEUTROS ABS 10*3/mm3 3.09  --   --   --  2.92   HEMOGLOBIN g/dL 9.0* 9.5*  --  6.7* 7.5*   HEMATOCRIT % 28.0* 29.4*  --  21.0* 24.9*   PLATELETS 10*3/mm3 113*  --  149 110* 113*     Results from last 7 days   Lab Units 06/11/25  0346 06/10/25  0302 06/09/25  1007   SODIUM mmol/L 138 139 139   POTASSIUM mmol/L 3.8 4.2 4.5   CHLORIDE mmol/L 109* 110* 107   CO2 mmol/L 19.5* 20.0* 22.5   BUN mg/dL 10.0 13.0 17.1   CREATININE mg/dL 1.23* 1.09* 1.15*   CALCIUM mg/dL 9.0 8.8 9.3   ALBUMIN g/dL  --   --  4.0   BILIRUBIN mg/dL  --   --  0.4   ALK PHOS U/L  --   --  74   ALT (SGPT) U/L  --   --  12   AST (SGOT) U/L  --   --  16     Results from last 7 days   Lab  Units 06/11/25  0346   INR  1.30*         Lab 06/09/25  1814 06/09/25  1007   IRON 40 42   IRON SATURATION (TSAT) 8* 10*   TIBC 487 428   TRANSFERRIN 327 287   FERRITIN 11.10* 11.70*   VITAMIN B 12  --  374      Assessment & Plan   *Saddle pulmonary embolism and extensive right lower extremity DVT diagnosed on 4/8/2025.  She was hospitalized between 4/8/2025 and 4/10/2025.  The only preceding surgery was total knee arthroplasty that she had in September 2024.  She had thrombophilia workup which was negative.  She was anticoagulated with Lovenox and transition to Eliquis.  Plan for a follow-up CT angiogram 6 months through her pulmonologist.  Patient was taking Eliquis regularly.  Eliquis held on 6/9/2025 due to severe anemia.   With hemoglobin at 6.7, Eliquis was held.  She was given Lovenox 40 mg SQ on 6/10/2025.  Due to the endoscopy and biopsy, GI recommended holding anticoagulation x 3 days.     *Acute blood loss anemia.    4/10/2025: Hemoglobin 11.2.  4/21/2025: Hemoglobin decreased to 10.2.  6/9/2025: Hemoglobin dropped to 7.5.    Patient did not notice bright blood per rectum or melena.  6/10/2025: Hemoglobin 6.7.    Reticulocyte count 2.6%.  .  Haptoglobin 58 (no evidence of hemolysis).  Labs are indicative of iron deficiency anemia with ferritin at 11.7 and transferrin saturation at 10%.  S/p 2 unit PRBC transfusion.  6/10/2025: Patient was started on IV Ferrlecit 250 mg daily.  6/11/2025: Hemoglobin 9.0.     *Thrombocytopenia.  6/9/2025:Platelets 113,000.  6/10/2025: Platelets 110,000.  IPF 2.3%.  6/11/2025: Platelets 113,000.     *Vitamin B12 deficiency.  4/9/2025: Vitamin B12 202.  She was placed on vitamin B12 injections weekly x 4 followed by monthly.  6/9/2025: Vitamin B12 improved to 374.     *Graves' disease.  She had partial thyroidectomy.  Patient is on methimazole.     PLAN:     1.  We will give dose #2 of IV Ferrlecit today.  2.  Continue vitamin B12 p.o.  Will avoid vitamin B12 IM  injections due to problem with diarrhea.  3.  Since GI recommended keeping full dose anticoagulation on hold, will give Lovenox at 40 mg SQ daily.  4.  GI discussed possible capsule endoscopy as outpatient.    Discussed with  at bedside.    Jocelyn Perez MD  06/11/25

## 2025-06-11 NOTE — ANESTHESIA POSTPROCEDURE EVALUATION
Patient: Patt Bond    Procedure Summary       Date: 06/11/25 Room / Location: [facility] ENDOSCOPY 5 / [facility] ENDOSCOPY    Anesthesia Start: 0928 Anesthesia Stop: 1003    Procedures:       COLONOSCOPY into cecum with hot snare polypectomies      ESOPHAGOGASTRODUODENOSCOPY with biopsies (Esophagus) Diagnosis:       Other iron deficiency anemia      (Other iron deficiency anemia [D50.8])    Surgeons: Nell Tavarez MD Provider: Willy Bueno MD    Anesthesia Type: MAC ASA Status: 4            Anesthesia Type: MAC    Vitals  Vitals Value Taken Time   /75 06/11/25 10:28   Temp     Pulse 70 06/11/25 10:33   Resp 16 06/11/25 10:28   SpO2 99 % 06/11/25 10:33   Vitals shown include unfiled device data.        Post Anesthesia Care and Evaluation    Patient location during evaluation: PACU  Patient participation: complete - patient participated  Level of consciousness: awake and alert  Pain management: adequate    Airway patency: patent  Anesthetic complications: No anesthetic complications    Cardiovascular status: acceptable  Respiratory status: acceptable  Hydration status: acceptable    Comments: --------------------            06/11/25               1028     --------------------   BP:       131/75     Pulse:      67       Resp:       16       Temp:                SpO2:      99%      --------------------

## 2025-06-12 LAB
CYTO UR: NORMAL
DEPRECATED RDW RBC AUTO: 51.4 FL (ref 37–54)
ERYTHROCYTE [DISTWIDTH] IN BLOOD BY AUTOMATED COUNT: 15.7 % (ref 12.3–15.4)
GLUCOSE BLDC GLUCOMTR-MCNC: 119 MG/DL (ref 70–130)
GLUCOSE BLDC GLUCOMTR-MCNC: 126 MG/DL (ref 70–130)
GLUCOSE BLDC GLUCOMTR-MCNC: 144 MG/DL (ref 70–130)
GLUCOSE BLDC GLUCOMTR-MCNC: 152 MG/DL (ref 70–130)
HCT VFR BLD AUTO: 28.4 % (ref 34–46.6)
HGB BLD-MCNC: 8.8 G/DL (ref 12–15.9)
LAB AP CASE REPORT: NORMAL
MCH RBC QN AUTO: 27.6 PG (ref 26.6–33)
MCHC RBC AUTO-ENTMCNC: 31 G/DL (ref 31.5–35.7)
MCV RBC AUTO: 89 FL (ref 79–97)
PATH REPORT.FINAL DX SPEC: NORMAL
PATH REPORT.GROSS SPEC: NORMAL
PLATELET # BLD AUTO: 117 10*3/MM3 (ref 140–450)
PMV BLD AUTO: 9.9 FL (ref 6–12)
RBC # BLD AUTO: 3.19 10*6/MM3 (ref 3.77–5.28)
WBC NRBC COR # BLD AUTO: 4.52 10*3/MM3 (ref 3.4–10.8)

## 2025-06-12 PROCEDURE — 82948 REAGENT STRIP/BLOOD GLUCOSE: CPT

## 2025-06-12 PROCEDURE — 25010000002 NA FERRIC GLUC CPLX PER 12.5 MG: Performed by: INTERNAL MEDICINE

## 2025-06-12 PROCEDURE — 99232 SBSQ HOSP IP/OBS MODERATE 35: CPT | Performed by: INTERNAL MEDICINE

## 2025-06-12 PROCEDURE — 25810000003 SODIUM CHLORIDE 0.9 % SOLUTION: Performed by: INTERNAL MEDICINE

## 2025-06-12 PROCEDURE — 63710000001 DIPHENHYDRAMINE PER 50 MG: Performed by: INTERNAL MEDICINE

## 2025-06-12 PROCEDURE — 25010000002 ENOXAPARIN PER 10 MG: Performed by: INTERNAL MEDICINE

## 2025-06-12 PROCEDURE — 85027 COMPLETE CBC AUTOMATED: CPT | Performed by: HOSPITALIST

## 2025-06-12 RX ORDER — ENOXAPARIN SODIUM 100 MG/ML
40 INJECTION SUBCUTANEOUS EVERY 12 HOURS
Status: COMPLETED | OUTPATIENT
Start: 2025-06-12 | End: 2025-06-13

## 2025-06-12 RX ADMIN — Medication 2.5 MG: at 22:14

## 2025-06-12 RX ADMIN — ALLOPURINOL 300 MG: 300 TABLET ORAL at 08:33

## 2025-06-12 RX ADMIN — Medication 2000 MCG: at 08:33

## 2025-06-12 RX ADMIN — POTASSIUM CHLORIDE 10 MEQ: 750 TABLET, EXTENDED RELEASE ORAL at 08:33

## 2025-06-12 RX ADMIN — DIPHENHYDRAMINE HYDROCHLORIDE 25 MG: 25 CAPSULE ORAL at 08:33

## 2025-06-12 RX ADMIN — SODIUM CHLORIDE 250 MG: 9 INJECTION, SOLUTION INTRAVENOUS at 11:15

## 2025-06-12 RX ADMIN — FAMOTIDINE 20 MG: 20 TABLET, FILM COATED ORAL at 08:33

## 2025-06-12 RX ADMIN — ENOXAPARIN SODIUM 40 MG: 100 INJECTION SUBCUTANEOUS at 15:00

## 2025-06-12 RX ADMIN — METHIMAZOLE 2.5 MG: 5 TABLET ORAL at 08:33

## 2025-06-12 NOTE — PLAN OF CARE
Goal Outcome Evaluation:         No significant changes overnight, pt is alert and oriented,  vital signs stable, wore cpap at night. Plan of care ongoing.

## 2025-06-12 NOTE — PROGRESS NOTES
[facility]  Inpatient Progress Note    Reason for Follow Up: Iron deficiency anemia    Subjective     Interval History:   Status post EGD and colonoscopy yesterday-normal EGD, colonoscopy with polyps, diverticulosis and internal hemorrhoids.    No bleeding since procedure.  No gi complaints -- discussed results    Current Facility-Administered Medications:     acetaminophen (TYLENOL) tablet 650 mg, 650 mg, Oral, Q6H PRN, Nuno Dickerson MD    allopurinol (ZYLOPRIM) tablet 300 mg, 300 mg, Oral, Daily, Megan Whitt MD, 300 mg at 06/12/25 0833    dextrose (D50W) (25 g/50 mL) IV injection 25 g, 25 g, Intravenous, Q15 Min PRN, Megan Whitt MD    dextrose (GLUTOSE) oral gel 15 g, 15 g, Oral, Q15 Min PRN, Megan Whitt MD    diphenhydrAMINE (BENADRYL) capsule 25 mg, 25 mg, Oral, Daily, Jocelyn Perez MD, 25 mg at 06/12/25 0833    enoxaparin sodium (LOVENOX) syringe 40 mg, 40 mg, Subcutaneous, Q24H, Jocelyn Perez MD, 40 mg at 06/11/25 1844    famotidine (PEPCID) tablet 20 mg, 20 mg, Oral, Daily, Jocelyn Perez MD, 20 mg at 06/12/25 0833    ferric gluconate (FERRLECIT) 250 MG in sodium chloride 0.9% 250 mL IVPB, 250 mg, Intravenous, Daily, Jocelyn Perez MD, Last Rate: 125 mL/hr at 06/11/25 1319, 250 mg at 06/11/25 1319    glucagon (GLUCAGEN) injection 1 mg, 1 mg, Intramuscular, Q15 Min PRN, Megan Whitt MD    hydrOXYzine (ATARAX) tablet 25 mg, 25 mg, Oral, TID PRN, Yoana Diez APRN, 25 mg at 06/09/25 2345    insulin lispro (HUMALOG/ADMELOG) injection 2-7 Units, 2-7 Units, Subcutaneous, 4x Daily AC & at Bedtime, Megan Whitt MD    melatonin tablet 2.5 mg, 2.5 mg, Oral, Nightly PRN, Megan Whitt MD, 2.5 mg at 06/11/25 2204    methIMAzole (TAPAZOLE) tablet 2.5 mg, 2.5 mg, Oral, Daily, Megan Whitt MD, 2.5 mg at 06/12/25 0833    ondansetron ODT (ZOFRAN-ODT) disintegrating tablet 4 mg, 4 mg, Oral, Q6H  PRN **OR** ondansetron (ZOFRAN) injection 4 mg, 4 mg, Intravenous, Q6H PRN, Nuno Dickerson MD    potassium chloride (K-DUR,KLOR-CON) ER tablet 10 mEq, 10 mEq, Oral, Daily, Megan Whitt MD, 10 mEq at 06/12/25 0833    vitamin B-12 (CYANOCOBALAMIN) tablet 2,000 mcg, 2,000 mcg, Oral, Daily, Megan Whitt MD, 2,000 mcg at 06/12/25 0833  Review of Systems:    The following systems were reviewed and negative;  constitution and gastrointestinal    Objective     Vital Signs  Temp:  [97 °F (36.1 °C)-98.6 °F (37 °C)] 98.6 °F (37 °C)  Heart Rate:  [63-67] 66  Resp:  [15-18] 18  BP: (101-135)/(64-76) 101/76  There is no height or weight on file to calculate BMI.  No intake or output data in the 24 hours ending 06/12/25 1006  No intake/output data recorded.     Physical Exam:   General: patient awake, alert and cooperative   Eyes: Normal lids and lashes, no scleral icterus   Neck: supple, normal ROM   Skin: warm and dry, not jaundiced   Pulm: regular and unlabored   Abdomen: soft, nontender, nondistended    Psychiatric: Normal mood and behavior; memory intact     Results Review:     I reviewed the patient's new clinical results.    Results from last 7 days   Lab Units 06/12/25  0432 06/11/25  0346 06/10/25  1855 06/10/25  1054 06/10/25  0302   WBC 10*3/mm3 4.52 4.28  --   --  3.65   HEMOGLOBIN g/dL 8.8* 9.0* 9.5*  --  6.7*   HEMATOCRIT % 28.4* 28.0* 29.4*  --  21.0*   PLATELETS 10*3/mm3 117* 113*  --  149 110*     Results from last 7 days   Lab Units 06/11/25  0346 06/10/25  0302 06/09/25  1007   SODIUM mmol/L 138 139 139   POTASSIUM mmol/L 3.8 4.2 4.5   CHLORIDE mmol/L 109* 110* 107   CO2 mmol/L 19.5* 20.0* 22.5   BUN mg/dL 10.0 13.0 17.1   CREATININE mg/dL 1.23* 1.09* 1.15*   CALCIUM mg/dL 9.0 8.8 9.3   BILIRUBIN mg/dL  --   --  0.4   ALK PHOS U/L  --   --  74   ALT (SGPT) U/L  --   --  12   AST (SGOT) U/L  --   --  16   GLUCOSE mg/dL 119* 112* 229*     Results from last 7 days   Lab Units  06/11/25  0346   INR  1.30*     Lab Results   Lab Value Date/Time    LIPASE 31 03/19/2023 1955    LIPASE 49 07/23/2022 0803    LIPASE 26 04/21/2018 1046    LIPASE 36 12/06/2016 0642    LIPASE 24 09/09/2016 0954    LIPASE 30 08/25/2016 1138    LIPASE 21 07/05/2016 0531    LIPASE 23 07/03/2016 0512    LIPASE 50 06/16/2014 2026       Radiology:  No orders to display       Assessment & Plan     Active Hospital Problems    Diagnosis     **GI bleed     Anemia, posthemorrhagic, acute     Iron deficiency anemia     Acute deep vein thrombosis (DVT) of right femoral vein     CKD stage 3a, GFR 45-59 ml/min     Acute saddle pulmonary embolism     Anxiety disorder     Hyperthyroidism        Assessment:  Iron deficiency anemia, reported melena  Acute drop in hemoglobin over the past month  History of recent PE started on Eliquis  History of colon polyps-last colonoscopy 2008  History of LAR 2016 due to pelvic abscess related to diverticulitis      Plan:  Follow-up path from yesterday's EGD and colonoscopy.  My office will contact her with results  No significant bleeding lesion was seen on exam yesterday.  There was no blood anywhere in the colon.  Will plan for outpatient capsule study  Okay to resume Eliquis on 6/14-if AC needed in interim, could use heparin drip with no bolus or lovenox with close follow up of H&H  No further recommendations at this time-we will sign off but are available as needed    I discussed the patients findings and my recommendations with patient and family.            Nell Tavarez M.D.  [facility]  [phone]

## 2025-06-12 NOTE — PROGRESS NOTES
Subjective     CHIEF COMPLAINT:     Anemia    INTERVAL HISTORY:     Patient reports having some loose bowel movements today.  No blood in the stool.  She is tolerating the IV iron well.    REVIEW OF SYSTEMS:  Pertinent ROS as in the interval history. Otherwise, negative.    SCHEDULED MEDS:  allopurinol, 300 mg, Oral, Daily  diphenhydrAMINE, 25 mg, Oral, Daily  enoxaparin sodium, 40 mg, Subcutaneous, Q24H  famotidine, 20 mg, Oral, Daily  ferric gluconate, 250 mg, Intravenous, Daily  insulin lispro, 2-7 Units, Subcutaneous, 4x Daily AC & at Bedtime  methIMAzole, 2.5 mg, Oral, Daily  potassium chloride, 10 mEq, Oral, Daily  vitamin B-12, 2,000 mcg, Oral, Daily      INFUSIONS:       Objective   VITAL SIGNS:  Temp:  [97 °F (36.1 °C)-98.6 °F (37 °C)] 98.6 °F (37 °C)  Heart Rate:  [63-66] 66  Resp:  [16-18] 18  BP: (101-133)/(64-76) 101/76     PHYSICAL EXAMINATION:    GENERAL:  The patient appears in fair general condition, not in acute distress.  SKIN: No skin rash.   EYES:  No Jaundice. Pallor.   CHEST: Normal respiratory effort.  Lungs clear bilaterally.  No added sounds.  HEART: Normal S1-S2.  No murmurs.  ABDOMEN: Protuberant.  EXTREMITIES: No edema.    RESULT REVIEW:   Results from last 7 days   Lab Units 06/12/25  0432 06/11/25  0346 06/10/25  1855 06/10/25  1054 06/10/25  0302 06/09/25  1007   WBC 10*3/mm3 4.52 4.28  --   --  3.65 3.90   NEUTROS ABS 10*3/mm3  --  3.09  --   --   --  2.92   HEMOGLOBIN g/dL 8.8* 9.0* 9.5*  --  6.7* 7.5*   HEMATOCRIT % 28.4* 28.0* 29.4*  --  21.0* 24.9*   PLATELETS 10*3/mm3 117* 113*  --  149 110* 113*     Results from last 7 days   Lab Units 06/11/25  0346 06/10/25  0302 06/09/25  1007   SODIUM mmol/L 138 139 139   POTASSIUM mmol/L 3.8 4.2 4.5   CHLORIDE mmol/L 109* 110* 107   CO2 mmol/L 19.5* 20.0* 22.5   BUN mg/dL 10.0 13.0 17.1   CREATININE mg/dL 1.23* 1.09* 1.15*   CALCIUM mg/dL 9.0 8.8 9.3   ALBUMIN g/dL  --   --  4.0   BILIRUBIN mg/dL  --   --  0.4   ALK PHOS U/L  --   --  74    ALT (SGPT) U/L  --   --  12   AST (SGOT) U/L  --   --  16     Results from last 7 days   Lab Units 06/11/25  0346   INR  1.30*         Lab 06/09/25  1814 06/09/25  1007   IRON 40 42   IRON SATURATION (TSAT) 8* 10*   TIBC 487 428   TRANSFERRIN 327 287   FERRITIN 11.10* 11.70*   VITAMIN B 12  --  374      Component      Latest Ref Rng 6/10/2025   Fecal Occult Blood      Negative  Positive !      Pathology exam from 6/11/2025:  1.   Duodenum, biopsy:         A.  Duodenal mucosa with normal villous architecture and no significant histopathologic changes.     2.  Colon, cecum, polypectomy:         A.  Fragments of tubulovillous adenoma.     3.  Ascending colon, polypectomy:          A.  Fragments of tubulovillous adenoma.     4.  Descending colon, polypectomy:         A.  Hyperplastic polyp.    Assessment & Plan   *Saddle pulmonary embolism and extensive right lower extremity DVT diagnosed on 4/8/2025.  She was hospitalized between 4/8/2025 and 4/10/2025.  The only preceding surgery was total knee arthroplasty that she had in September 2024.  She had thrombophilia workup which was negative.  She was anticoagulated with Lovenox and transition to Eliquis.  Plan for a follow-up CT angiogram 6 months through her pulmonologist.  Patient was taking Eliquis regularly.  Eliquis held on 6/9/2025 due to severe anemia.   With hemoglobin at 6.7, Eliquis was held.  She was given Lovenox 40 mg SQ on 6/10/2025.  Due to the endoscopy and biopsy, GI recommended holding anticoagulation x 3 days.     *Acute blood loss anemia.    4/10/2025: Hemoglobin 11.2.  4/21/2025: Hemoglobin decreased to 10.2.  6/9/2025: Hemoglobin dropped to 7.5.    Patient did not notice bright blood per rectum or melena.  6/10/2025: Hemoglobin 6.7.    Reticulocyte count 2.6%.  .  Haptoglobin 58 (no evidence of hemolysis).  Labs are indicative of iron deficiency anemia with ferritin at 11.7 and transferrin saturation at 10%.  S/p 2 unit PRBC  transfusion.  6/10/2025: Patient was started on IV Ferrlecit 250 mg daily with plans for 4 days.  6/11/2025: Hemoglobin 9.0.  6/12/2025: Hemoglobin 8.8.     *Thrombocytopenia.  6/9/2025:Platelets 113,000.  6/10/2025: Platelets 110,000.  IPF 2.3%.  6/11/2025: Platelets 113,000.  6/12/2025: Platelets 117,000.     *Vitamin B12 deficiency.  4/9/2025: Vitamin B12 202.  She was placed on vitamin B12 injections weekly x 4 followed by monthly.  Patient reports that she developed diarrhea after receiving the vitamin B12 injections.  6/9/2025: Vitamin B12 improved to 374.     *Graves' disease.  She had partial thyroidectomy.  Patient is on methimazole.     PLAN:     1.  We will give dose #3 of IV Ferrlecit today.  2.  Continue vitamin B12 1000 mcg p.o. daily.  3.  Continue low dose Lovenox but increase the dose to 40 mg SQ Q 12 hours.   4.  Plan to switch to oral anticoagulation on 6/14/2025.  5.  I discussed with Dr. Dickerson regarding the option of anticoagulation.  Since she has been on Eliquis and with the suspected GI bleeding, we will switch to Xarelto 20 mg daily on 6/14/2025.        Jocelyn Perez MD  06/12/25

## 2025-06-12 NOTE — PROGRESS NOTES
DAILY PROGRESS NOTE  [facility]    Patient Identification:  Name: Patt Bond  Age: 80 y.o.  Sex: female  :  1945  MRN: [id number]         Primary Care Physician: Moe Matute MD    Subjective:  Interval History: Spent ample time at bedside trying to explain disease process and patient seems to understand at this point.  Counseled her as well as spouse.  They understand my concerns regarding pros versus cons on disposition and try to manage this as an outpatient versus keeping her in-house and restarting AC and perhaps watching overnight to ensure no further bleeding.  She has been counseled on what to look for when she is ready for home.  She is not complain of any chest pain troubles breathing and is not having any bleeding complaints.  Denies abdominal pain nausea vomiting or diarrhea    Objective:    Scheduled Meds:allopurinol, 300 mg, Oral, Daily  diphenhydrAMINE, 25 mg, Oral, Daily  enoxaparin sodium, 40 mg, Subcutaneous, Q24H  famotidine, 20 mg, Oral, Daily  ferric gluconate, 250 mg, Intravenous, Daily  insulin lispro, 2-7 Units, Subcutaneous, 4x Daily AC & at Bedtime  methIMAzole, 2.5 mg, Oral, Daily  potassium chloride, 10 mEq, Oral, Daily  vitamin B-12, 2,000 mcg, Oral, Daily      Continuous Infusions:     Vital signs in last 24 hours:  Temp:  [97 °F (36.1 °C)-98.6 °F (37 °C)] 98.6 °F (37 °C)  Heart Rate:  [63-66] 66  Resp:  [16-18] 18  BP: (101-133)/(64-76) 101/76    Intake/Output:  No intake or output data in the 24 hours ending 25 1211    Exam:  /76 (BP Location: Left arm, Patient Position: Lying)   Pulse 66   Temp 98.6 °F (37 °C) (Oral)   Resp 18   SpO2 99%     General Appearance:    Alert, cooperative, nontoxic, AAOx3                         Throat:   Oral mucosa pink and moist                           Neck:   No JVD                         Lungs:    Clear to auscultation bilaterally, respirations unlabored                          Heart:     Regular rate and rhythm, S1 and S2 normal                  Abdomen:     Soft, nontender, bowel sounds active                 Extremities: Moving all, baseline volume                          Data Review:  Labs in chart were reviewed.    Assessment:  Active Hospital Problems    Diagnosis  POA    **GI bleed [K92.2]  Yes    Anemia, posthemorrhagic, acute [D62]  Unknown    Iron deficiency anemia [D50.9]  Unknown    Acute deep vein thrombosis (DVT) of right femoral vein [I82.411]  Yes    CKD stage 3a, GFR 45-59 ml/min [N18.31]  Yes    Acute saddle pulmonary embolism [I26.92]  Yes    Anxiety disorder [F41.9]  Yes    Hyperthyroidism [E05.90]  Yes      Resolved Hospital Problems   No resolved problems to display.       Plan:    Appreciate GI advising to hold AC until 6/14/2025.  I discussed AC choices with hematology as I am worried about restarting Eliquis since she bled on that medication.  I think there is a risk with any of these anticoagulants and potentially might be trying Xarelto per our discussion to see if it perhaps does not elicit the same type of response while providing coverage for her recent PE/DVT a couple months prior      GI planning on capsule endoscopy in outpatient setting      Monitoring H&H daily and hemoglobin trending down to touch 9.0-8.8.  My concern is that if we restart AC on Saturday I think it would be wise to keep her in-house and monitor response if there is any additional hemorrhage.  Patient understands the pros and cons of trying to manage this inpatient versus as an outpatient and is amenable to stay if need be   - Platelets 113-117   -Hgb 6.7 then transfused 2 units-9.5-9.0-8.8   - IV iron noted      CKD 3a with ACD and subsequent acute blood loss    DM2 with CKD-SSI/stable BS    Hypothyroidism methimazole        Disposition pending -hopefully home over the weekend    Nuno Dickerson MD  6/12/2025  12:11 EDT

## 2025-06-13 LAB
DEPRECATED RDW RBC AUTO: 49.8 FL (ref 37–54)
ERYTHROCYTE [DISTWIDTH] IN BLOOD BY AUTOMATED COUNT: 15.5 % (ref 12.3–15.4)
GLUCOSE BLDC GLUCOMTR-MCNC: 107 MG/DL (ref 70–130)
GLUCOSE BLDC GLUCOMTR-MCNC: 119 MG/DL (ref 70–130)
GLUCOSE BLDC GLUCOMTR-MCNC: 122 MG/DL (ref 70–130)
GLUCOSE BLDC GLUCOMTR-MCNC: 128 MG/DL (ref 70–130)
HCT VFR BLD AUTO: 28.5 % (ref 34–46.6)
HGB BLD-MCNC: 9.2 G/DL (ref 12–15.9)
MCH RBC QN AUTO: 28.8 PG (ref 26.6–33)
MCHC RBC AUTO-ENTMCNC: 32.3 G/DL (ref 31.5–35.7)
MCV RBC AUTO: 89.1 FL (ref 79–97)
PLATELET # BLD AUTO: 109 10*3/MM3 (ref 140–450)
PMV BLD AUTO: 9.8 FL (ref 6–12)
RBC # BLD AUTO: 3.2 10*6/MM3 (ref 3.77–5.28)
WBC NRBC COR # BLD AUTO: 3.49 10*3/MM3 (ref 3.4–10.8)

## 2025-06-13 PROCEDURE — 85027 COMPLETE CBC AUTOMATED: CPT | Performed by: HOSPITALIST

## 2025-06-13 PROCEDURE — 25010000002 NA FERRIC GLUC CPLX PER 12.5 MG: Performed by: INTERNAL MEDICINE

## 2025-06-13 PROCEDURE — 25010000002 ENOXAPARIN PER 10 MG: Performed by: INTERNAL MEDICINE

## 2025-06-13 PROCEDURE — 82948 REAGENT STRIP/BLOOD GLUCOSE: CPT

## 2025-06-13 PROCEDURE — 25810000003 SODIUM CHLORIDE 0.9 % SOLUTION: Performed by: INTERNAL MEDICINE

## 2025-06-13 PROCEDURE — 99232 SBSQ HOSP IP/OBS MODERATE 35: CPT | Performed by: INTERNAL MEDICINE

## 2025-06-13 PROCEDURE — 63710000001 DIPHENHYDRAMINE PER 50 MG: Performed by: INTERNAL MEDICINE

## 2025-06-13 RX ADMIN — DIPHENHYDRAMINE HYDROCHLORIDE 25 MG: 25 CAPSULE ORAL at 09:14

## 2025-06-13 RX ADMIN — ENOXAPARIN SODIUM 40 MG: 100 INJECTION SUBCUTANEOUS at 20:59

## 2025-06-13 RX ADMIN — FAMOTIDINE 20 MG: 20 TABLET, FILM COATED ORAL at 09:14

## 2025-06-13 RX ADMIN — ENOXAPARIN SODIUM 40 MG: 100 INJECTION SUBCUTANEOUS at 08:00

## 2025-06-13 RX ADMIN — POTASSIUM CHLORIDE 10 MEQ: 750 TABLET, EXTENDED RELEASE ORAL at 09:14

## 2025-06-13 RX ADMIN — METHIMAZOLE 2.5 MG: 5 TABLET ORAL at 09:14

## 2025-06-13 RX ADMIN — Medication 2000 MCG: at 09:14

## 2025-06-13 RX ADMIN — Medication 2.5 MG: at 21:00

## 2025-06-13 RX ADMIN — ALLOPURINOL 300 MG: 300 TABLET ORAL at 09:14

## 2025-06-13 RX ADMIN — SODIUM CHLORIDE 250 MG: 9 INJECTION, SOLUTION INTRAVENOUS at 09:00

## 2025-06-13 NOTE — CASE MANAGEMENT/SOCIAL WORK
Continued Stay Note  [facility]     Patient Name: Patt Bond  MRN: [id number]  Today's Date: 6/13/2025    Admit Date: 6/9/2025    Plan: Home with family   Discharge Plan       Row Name 06/13/25 0901       Plan    Plan Home with family    Plan Comments Plan remains home with family at discharge. Family to transport. CCP following for any discharge needs.                   Discharge Codes    No documentation.                 Expected Discharge Date and Time       Expected Discharge Date Expected Discharge Time    Jun 14, 2025

## 2025-06-13 NOTE — PROGRESS NOTES
DAILY PROGRESS NOTE  [facility]    Patient Identification:  Name: Patt Bond  Age: 80 y.o.  Sex: female  :  1945  MRN: [id number]         Primary Care Physician: Moe Matute MD    Subjective:  Interval History: Tolerating all.  No new bleeding complaints.  No new abdominal pain nausea vomiting chest pain or troubles breathing    Objective: Wonderfully pleasant.  Casual and conversational.  No family present    Scheduled Meds:allopurinol, 300 mg, Oral, Daily  enoxaparin sodium, 40 mg, Subcutaneous, Q12H  ferric gluconate, 250 mg, Intravenous, Daily  insulin lispro, 2-7 Units, Subcutaneous, 4x Daily AC & at Bedtime  methIMAzole, 2.5 mg, Oral, Daily  potassium chloride, 10 mEq, Oral, Daily  vitamin B-12, 2,000 mcg, Oral, Daily      Continuous Infusions:     Vital signs in last 24 hours:  Temp:  [97.7 °F (36.5 °C)-98.1 °F (36.7 °C)] 98.1 °F (36.7 °C)  Heart Rate:  [63-67] 63  Resp:  [16-18] 18  BP: (125-152)/(63-68) 138/63    Intake/Output:    Intake/Output Summary (Last 24 hours) at 2025 1046  Last data filed at 2025 2110  Gross per 24 hour   Intake 500 ml   Output --   Net 500 ml       Exam:  /63 (BP Location: Left arm, Patient Position: Lying)   Pulse 63   Temp 98.1 °F (36.7 °C) (Oral)   Resp 18   SpO2 95%     General Appearance:    Alert, cooperative, AAOx3                         Throat:   Oral mucosa pink and moist                           Neck:   No JVD                         Lungs:    Clear to auscultation bilaterally, respirations unlabored                          Heart:    Regular rate and rhythm, S1 and S2 normal                  Abdomen:     Soft, nontender, bowel sounds active                 Extremities: Moving all without edema                        Pulses:   Pulses palpable in all extremities                             Data Review:  Labs in chart were reviewed.    Assessment:  Active Hospital Problems    Diagnosis  POA    **GI bleed [K92.2]   Yes    Anemia, posthemorrhagic, acute [D62]  Unknown    Iron deficiency anemia [D50.9]  Unknown    Acute deep vein thrombosis (DVT) of right femoral vein [I82.411]  Yes    CKD stage 3a, GFR 45-59 ml/min [N18.31]  Yes    Acute saddle pulmonary embolism [I26.92]  Yes    Anxiety disorder [F41.9]  Yes    Hyperthyroidism [E05.90]  Yes      Resolved Hospital Problems   No resolved problems to display.       Plan:    Currently on Lovenox adjusted to just a touch above prophylaxis and patient continues to tolerate with no bleeding complaints thankfully and her hemoglobin is trended up 8.8-9.2 though platelets with a subtle drop 117-109      Completing iron D4 IV      Probably adjustment back to p.o. AC either tomorrow or later this afternoon as we do have indications from GI to hold Eliquis until the 14th.   and I have discussed the case and we are going to try implementing Xarelto as opposed to Eliquis to see if this is better tolerated      GI planning on capsule endoscopy in outpatient setting    CKD 3a with ACD and subsequent acute blood loss     DM2 with CKD-SSI/stable BS     Hypothyroidism methimazole        Disposition: Perhaps tomorrow with AC initiation or early versus observing overnight and discharge home on Sunday.  Would appreciate hematological's input regarding their preference as well          Nuno Dickerson MD  6/13/2025  10:46 EDT

## 2025-06-13 NOTE — PROGRESS NOTES
Subjective     CHIEF COMPLAINT:     Anemia    INTERVAL HISTORY:     Patient was seen with  at bedside.  She reports feeling good.  She walked in the hallway and reports developing shortness of breath after she completed 1 complete Surprenant.  No chest pain.    REVIEW OF SYSTEMS:  Pertinent ROS as in the interval history. Otherwise, negative.    SCHEDULED MEDS:  allopurinol, 300 mg, Oral, Daily  diphenhydrAMINE, 25 mg, Oral, Daily  enoxaparin sodium, 40 mg, Subcutaneous, Q12H  famotidine, 20 mg, Oral, Daily  ferric gluconate, 250 mg, Intravenous, Daily  insulin lispro, 2-7 Units, Subcutaneous, 4x Daily AC & at Bedtime  methIMAzole, 2.5 mg, Oral, Daily  potassium chloride, 10 mEq, Oral, Daily  vitamin B-12, 2,000 mcg, Oral, Daily      Objective   VITAL SIGNS:  Temp:  [97.7 °F (36.5 °C)-98.1 °F (36.7 °C)] 98.1 °F (36.7 °C)  Heart Rate:  [63-67] 63  Resp:  [16-18] 18  BP: (125-152)/(63-68) 138/63     PHYSICAL EXAMINATION:      GENERAL:  The patient appears in fair general condition, not in acute distress.  SKIN: No skin rash.   EYES:  No Jaundice. Pallor.   CHEST: Normal respiratory effort.  Lungs clear bilaterally.  No added sounds.  HEART: Normal S1 and S2.  No murmurs.  ABDOMEN: Protuberant.  EXTREMITIES: No edema.    RESULT REVIEW:   Results from last 7 days   Lab Units 06/13/25  0652 06/12/25  0432 06/11/25  0346 06/10/25  1855 06/10/25  1054 06/10/25  0302 06/09/25  1007   WBC 10*3/mm3 3.49 4.52 4.28  --   --  3.65 3.90   NEUTROS ABS 10*3/mm3  --   --  3.09  --   --   --  2.92   HEMOGLOBIN g/dL 9.2* 8.8* 9.0* 9.5*  --  6.7* 7.5*   HEMATOCRIT % 28.5* 28.4* 28.0* 29.4*  --  21.0* 24.9*   PLATELETS 10*3/mm3 109* 117* 113*  --  149 110* 113*     Results from last 7 days   Lab Units 06/11/25  0346 06/10/25  0302 06/09/25  1007   SODIUM mmol/L 138 139 139   POTASSIUM mmol/L 3.8 4.2 4.5   CHLORIDE mmol/L 109* 110* 107   CO2 mmol/L 19.5* 20.0* 22.5   BUN mg/dL 10.0 13.0 17.1   CREATININE mg/dL 1.23* 1.09* 1.15*    CALCIUM mg/dL 9.0 8.8 9.3   ALBUMIN g/dL  --   --  4.0   BILIRUBIN mg/dL  --   --  0.4   ALK PHOS U/L  --   --  74   ALT (SGPT) U/L  --   --  12   AST (SGOT) U/L  --   --  16     Results from last 7 days   Lab Units 06/11/25  0346   INR  1.30*         Lab 06/09/25  1814 06/09/25  1007   IRON 40 42   IRON SATURATION (TSAT) 8* 10*   TIBC 487 428   TRANSFERRIN 327 287   FERRITIN 11.10* 11.70*   VITAMIN B 12  --  374      Component      Latest Ref Rng 6/10/2025   Fecal Occult Blood      Negative  Positive !      Assessment & Plan   *Saddle pulmonary embolism and extensive right lower extremity DVT diagnosed on 4/8/2025.  She was hospitalized between 4/8/2025 and 4/10/2025.  The only preceding surgery was total knee arthroplasty that she had in September 2024.  She had thrombophilia workup which was negative.  She was anticoagulated with Lovenox and transition to Eliquis.  Plan for a follow-up CT angiogram 6 months through her pulmonologist.  Patient was taking Eliquis regularly.  Eliquis held on 6/9/2025 due to severe anemia.   With hemoglobin at 6.7, Eliquis was held.  She was started on Lovenox 40 mg SQ on 6/10/2025.  Due to the endoscopy and biopsy, GI recommended holding oral anticoagulation x 3 days.  6/12/2025: Lovenox was increased to 40 mg SQ every 12 hours.  She is tolerating this dose of Lovenox.  No clinical evidence of recurrent bleeding.  Due to the suspected GI bleeding while on Eliquis, plan to switch to Xarelto.     *Acute blood loss anemia.    4/10/2025: Hemoglobin 11.2.  4/21/2025: Hemoglobin decreased to 10.2.  6/9/2025: Hemoglobin dropped to 7.5.    Patient did not notice bright blood per rectum or melena.  6/10/2025: Hemoglobin 6.7.    Reticulocyte count 2.6%.  .  Haptoglobin 58 (no evidence of hemolysis).  Labs are indicative of iron deficiency anemia with ferritin at 11.7 and transferrin saturation at 10%.  S/p 2 unit PRBC transfusion.  6/10/2025: Patient was started on IV Ferrlecit 250  mg daily with plans for 4 days.  6/11/2025: Hemoglobin 9.0.  6/13/2025: Hemoglobin improved to 9.2.     *Thrombocytopenia.  6/9/2025:Platelets 113,000.  6/10/2025: Platelets 110,000.  IPF 2.3%.  6/11/2025: Platelets 113,000.  6/12/2025: Platelets 117,000.  6/13/2025: Platelets 109,000.     *Vitamin B12 deficiency.  4/9/2025: Vitamin B12 202.  She was placed on vitamin B12 injections weekly x 4 followed by monthly.  Patient reports that she developed diarrhea after receiving the vitamin B12 injections.  6/9/2025: Vitamin B12 improved to 374.     *Graves' disease.  She had partial thyroidectomy.  Patient is on methimazole.     PLAN:     1.  She will receive dose #4 of IV Ferrlecit today.  2.  Continue oral vitamin B12 1000 mcg daily.  3.  Lovenox will be given today 40 mg SQ every 12 hours.  4.  As discussed with Dr. Dickerson, plan to switch to oral anticoagulation with Xarelto 20 mg daily on 6/14/2025.    Discussed with  at bedside.      Jocelyn Perez MD  06/13/25

## 2025-06-14 ENCOUNTER — READMISSION MANAGEMENT (OUTPATIENT)
Dept: CALL CENTER | Facility: HOSPITAL | Age: 80
End: 2025-06-14
Payer: MEDICARE

## 2025-06-14 VITALS
SYSTOLIC BLOOD PRESSURE: 141 MMHG | TEMPERATURE: 97.9 F | RESPIRATION RATE: 18 BRPM | HEART RATE: 64 BPM | DIASTOLIC BLOOD PRESSURE: 65 MMHG | OXYGEN SATURATION: 97 %

## 2025-06-14 LAB
DEPRECATED RDW RBC AUTO: 48.4 FL (ref 37–54)
ERYTHROCYTE [DISTWIDTH] IN BLOOD BY AUTOMATED COUNT: 15.7 % (ref 12.3–15.4)
GLUCOSE BLDC GLUCOMTR-MCNC: 128 MG/DL (ref 70–130)
GLUCOSE BLDC GLUCOMTR-MCNC: 134 MG/DL (ref 70–130)
HCT VFR BLD AUTO: 26.7 % (ref 34–46.6)
HGB BLD-MCNC: 8.8 G/DL (ref 12–15.9)
MCH RBC QN AUTO: 28.7 PG (ref 26.6–33)
MCHC RBC AUTO-ENTMCNC: 33 G/DL (ref 31.5–35.7)
MCV RBC AUTO: 87 FL (ref 79–97)
PLATELET # BLD AUTO: 112 10*3/MM3 (ref 140–450)
PMV BLD AUTO: 10.1 FL (ref 6–12)
RBC # BLD AUTO: 3.07 10*6/MM3 (ref 3.77–5.28)
WBC NRBC COR # BLD AUTO: 3.73 10*3/MM3 (ref 3.4–10.8)

## 2025-06-14 PROCEDURE — 99232 SBSQ HOSP IP/OBS MODERATE 35: CPT | Performed by: INTERNAL MEDICINE

## 2025-06-14 PROCEDURE — 82948 REAGENT STRIP/BLOOD GLUCOSE: CPT

## 2025-06-14 PROCEDURE — 85027 COMPLETE CBC AUTOMATED: CPT | Performed by: HOSPITALIST

## 2025-06-14 RX ADMIN — POTASSIUM CHLORIDE 10 MEQ: 750 TABLET, EXTENDED RELEASE ORAL at 08:13

## 2025-06-14 RX ADMIN — Medication 2000 MCG: at 08:12

## 2025-06-14 RX ADMIN — ALLOPURINOL 300 MG: 300 TABLET ORAL at 08:13

## 2025-06-14 RX ADMIN — RIVAROXABAN 20 MG: 20 TABLET, FILM COATED ORAL at 08:13

## 2025-06-14 RX ADMIN — METHIMAZOLE 2.5 MG: 5 TABLET ORAL at 08:12

## 2025-06-14 NOTE — PLAN OF CARE
Goal Outcome Evaluation:  Plan of Care Reviewed With: patient        Progress: improving  Outcome Evaluation: discharge home this afternoon to follow up outpatient

## 2025-06-14 NOTE — PROGRESS NOTES
Subjective     CHIEF COMPLAINT:     Anemia    INTERVAL HISTORY:     Patient reports feeling better today.  She has shortness of breath which is slowly improving.  No chest pain.  No blood in the stool.    REVIEW OF SYSTEMS:  Pertinent ROS as in the interval history. Otherwise, negative.    SCHEDULED MEDS:  allopurinol, 300 mg, Oral, Daily  insulin lispro, 2-7 Units, Subcutaneous, 4x Daily AC & at Bedtime  methIMAzole, 2.5 mg, Oral, Daily  potassium chloride, 10 mEq, Oral, Daily  rivaroxaban, 20 mg, Oral, Daily  vitamin B-12, 2,000 mcg, Oral, Daily      Objective   VITAL SIGNS:  Temp:  [97.5 °F (36.4 °C)-97.9 °F (36.6 °C)] 97.9 °F (36.6 °C)  Heart Rate:  [63-66] 64  Resp:  [16-18] 18  BP: (132-150)/(65-77) 141/65     PHYSICAL EXAMINATION:        GENERAL:  The patient appears in fair general condition, not in acute distress.  SKIN: No skin rash.   EYES:  No Jaundice. Pallor.   CHEST: Normal respiratory effort.  Lungs clear bilaterally.  No added sounds.  HEART: Normal S1 and S2.  No murmurs.  ABDOMEN: Protuberant.    RESULT REVIEW:   Results from last 7 days   Lab Units 06/14/25  0327 06/13/25  0652 06/12/25  0432 06/11/25  0346 06/10/25  1855 06/10/25  1054 06/10/25  0302 06/09/25  1007   WBC 10*3/mm3 3.73 3.49 4.52 4.28  --   --  3.65 3.90   NEUTROS ABS 10*3/mm3  --   --   --  3.09  --   --   --  2.92   HEMOGLOBIN g/dL 8.8* 9.2* 8.8* 9.0* 9.5*  --  6.7* 7.5*   HEMATOCRIT % 26.7* 28.5* 28.4* 28.0* 29.4*  --  21.0* 24.9*   PLATELETS 10*3/mm3 112* 109* 117* 113*  --  149 110* 113*     Results from last 7 days   Lab Units 06/11/25  0346 06/10/25  0302 06/09/25  1007   SODIUM mmol/L 138 139 139   POTASSIUM mmol/L 3.8 4.2 4.5   CHLORIDE mmol/L 109* 110* 107   CO2 mmol/L 19.5* 20.0* 22.5   BUN mg/dL 10.0 13.0 17.1   CREATININE mg/dL 1.23* 1.09* 1.15*   CALCIUM mg/dL 9.0 8.8 9.3   ALBUMIN g/dL  --   --  4.0   BILIRUBIN mg/dL  --   --  0.4   ALK PHOS U/L  --   --  74   ALT (SGPT) U/L  --   --  12   AST (SGOT) U/L  --   --   16     Results from last 7 days   Lab Units 06/11/25  0346   INR  1.30*         Lab 06/09/25  1814 06/09/25  1007   IRON 40 42   IRON SATURATION (TSAT) 8* 10*   TIBC 487 428   TRANSFERRIN 327 287   FERRITIN 11.10* 11.70*   VITAMIN B 12  --  374      Component      Latest Ref Rng 6/10/2025   Fecal Occult Blood      Negative  Positive !      Assessment & Plan   *Saddle pulmonary embolism and extensive right lower extremity DVT diagnosed on 4/8/2025.  She was hospitalized between 4/8/2025 and 4/10/2025.  The only preceding surgery was total knee arthroplasty that she had in September 2024.  She had thrombophilia workup which was negative.  She was anticoagulated with Lovenox and transition to Eliquis.  Plan for a follow-up CT angiogram 6 months through her pulmonologist.  Patient was taking Eliquis regularly.  Eliquis held on 6/9/2025 due to severe anemia.   With hemoglobin at 6.7, Eliquis was held.  She was started on Lovenox 40 mg SQ on 6/10/2025.  Due to the endoscopy and biopsy, GI recommended holding oral anticoagulation x 3 days.  6/12/2025: Lovenox was increased to 40 mg SQ every 12 hours.  She is tolerating this dose of Lovenox.  No clinical evidence of recurrent bleeding.  Due to the suspected GI bleeding while on Eliquis, plan to switch to Xarelto.     *Acute blood loss anemia.    4/10/2025: Hemoglobin 11.2.  4/21/2025: Hemoglobin decreased to 10.2.  6/9/2025: Hemoglobin dropped to 7.5.    Patient did not notice bright blood per rectum or melena.  6/10/2025: Hemoglobin 6.7.    Reticulocyte count 2.6%.  .  Haptoglobin 58 (no evidence of hemolysis).  Labs are indicative of iron deficiency anemia with ferritin at 11.7 and transferrin saturation at 10%.  S/p 2 unit PRBC transfusion.  S/p IV Ferrlecit 250 mg daily x 4 days between 6/10/2025 and 6/13/2025.  6/13/2025: Hemoglobin 9.2.  6/14/2025: Hemoglobin 8.8.     *Thrombocytopenia.  9/16/2024: Platelets 142,000.  4/8/2025: Platelets decreased to  115,000.  6/10/2025: Platelets 110,000.  IPF 2.3%.  6/14/2025: Platelets 112,000.     *Vitamin B12 deficiency.  4/9/2025: Vitamin B12 202.  She was placed on vitamin B12 injections weekly x 4 followed by monthly.  Patient reports that she developed diarrhea after receiving the vitamin B12 injections.  6/9/2025: Vitamin B12 improved to 374.     *Graves' disease.  She had partial thyroidectomy.  Patient is on methimazole 2.5 mg daily.     PLAN:     1.  Patient will start Xarelto 20 mg daily this morning.  2.  Continue vitamin B12 1000 mcg daily.  3.  Discussed with Dr. Dickerson.  Since patient is stable, okay for discharge from hematology standpoint.  4.  Patient will need CBC in 3-4 days from now for continued monitoring of her anemia.  Dr. Dickerson asked the patient to contact her PCP to schedule a CBC on 6/18/2025 or 6/19/2025.  Will arrange for the patient to see our NP for follow-up the week after and she will have a repeat CBC.        Jocelyn Perez MD  06/14/25

## 2025-06-14 NOTE — DISCHARGE SUMMARY
Date of Discharge:  6/14/2025    PCP: Moe Matute MD    Discharge Diagnosis:   Active Hospital Problems    Diagnosis  POA    **GI bleed [K92.2]  Yes    Anemia, posthemorrhagic, acute [D62]  Unknown    Iron deficiency anemia [D50.9]  Unknown    Acute deep vein thrombosis (DVT) of right femoral vein [I82.411]  Yes    CKD stage 3a, GFR 45-59 ml/min [N18.31]  Yes    Acute saddle pulmonary embolism [I26.92]  Yes    Anxiety disorder [F41.9]  Yes    Hyperthyroidism [E05.90]  Yes      Resolved Hospital Problems   No resolved problems to display.     Procedure(s):  COLONOSCOPY into cecum with hot snare polypectomies  ESOPHAGOGASTRODUODENOSCOPY with biopsies     Consults       Date and Time Order Name Status Description    6/10/2025  2:52 AM Inpatient Gastroenterology Consult Completed     6/9/2025  5:38 PM Inpatient Hematology & Oncology Consult Completed           Hospital Course  Patient is a 80 y.o. female who was initially admitted after being sent over from her oncology office for melena.  This patient was recently anticoagulated on Eliquis as she had extensive right lower extremity DVT and more importantly she had significant bilateral saddle pulmonary embolism just a couple months ago.  Patient's AC was held and GI proceeded with endoscopy workup.  Unfortunately endoscopy did not reveal any new findings and nothing was acutely bleeding in regards to her diverticulosis.  The concern is that she has some type of slow GI bleed in her small bowel and GI is planning on capsule endoscopy.  They did ask for an AC holiday and she was stable to resume this as of today on the 14th.  I have discussed anticoagulation with hematology  at length on this admission.  We had previously planned on putting her back on Eliquis but given the fact that she did not tolerate for that for just 2 months, we have elected to adjust to Xarelto and hopes this is better tolerated for the patient.  She did require transfusion at  admission as she was down to 6.7 but since then her hemoglobin has overall stabilized as she is gone 8.89.0 and 8.8.  IV iron has been utilized x 4 doses per hematology.  Her platelet count is also chronic theme of being low but is stable at 112 with no bleeding aspects.  This is a very reliable patient and she denies any further issues with nausea or vomiting abdominal pain melena or any type of bleeding whatsoever.  We were planning on keeping her in house another day due to switching of AC to Xarelto.  I do not anticipate her to bleed within the first 24 hours and hopefully what ever was going on is starting to simmer down and resolved.  She understands what to watch for and she understands risk and benefits with AC.  She has good rapport with her PCP and she states she can get in easily so I asked her to call them on Monday and have her CBC checked by midweek hopefully on Wednesday.  I feel that information should also be conveyed to hematologist  as he was helpful in this admission with the above.  Patient's vital signs are stable as well as afebrile and other comorbidities with DM2 with CKD 3 AA and hyperthyroidism are all stable on respective medications.  All questions answered to the patient at length and has been discussed with spouse along the way though not present on day of discharge.  Patient would like to proceed with discharge home today understanding risk first benefits and need for close follow-up with monitoring of CBC          Temp:  [97.5 °F (36.4 °C)-97.9 °F (36.6 °C)] 97.9 °F (36.6 °C)  Heart Rate:  [63-66] 64  Resp:  [16-18] 18  BP: (132-150)/(65-77) 141/65  There is no height or weight on file to calculate BMI.    Physical Exam  Constitutional:       Comments: Wonderfully pleasant.  Nontoxic.  Excellent historian, alert and oriented x 3   HENT:      Head: Normocephalic.      Nose: Nose normal.      Mouth/Throat:      Mouth: Mucous membranes are moist.      Pharynx: Oropharynx is  clear.   Cardiovascular:      Rate and Rhythm: Normal rate and regular rhythm.   Pulmonary:      Effort: Pulmonary effort is normal. No respiratory distress.      Breath sounds: Normal breath sounds.   Abdominal:      General: Bowel sounds are normal. There is no distension.      Palpations: Abdomen is soft.      Tenderness: There is no abdominal tenderness. There is no guarding.   Skin:     General: Skin is warm and dry.      Coloration: Skin is not jaundiced.   Neurological:      Mental Status: She is alert and oriented to person, place, and time. Mental status is at baseline.      Cranial Nerves: No cranial nerve deficit.      Motor: No weakness.      Gait: Gait normal.       Disposition: Home or Self Care       Discharge Medications        New Medications        Instructions Start Date   rivaroxaban 20 MG tablet  Commonly known as: XARELTO   20 mg, Oral, Daily   Start Date: Josee 15, 2025            Continue These Medications        Instructions Start Date   allopurinol 300 MG tablet  Commonly known as: ZYLOPRIM   1 tablet, Daily      cholestyramine 4 g packet  Commonly known as: QUESTRAN   1 packet, As Needed      Diclofenac Sodium 4 %, Topiramate 2 %, cloNIDine HCl 0.2 %, Lidocaine HCl 5 %   1-2 g, Topical, 3 to 4 Times Daily      methIMAzole 5 MG tablet  Commonly known as: TAPAZOLE   2.5 mg, Oral, Daily      potassium citrate 10 MEQ (1080 MG) CR tablet  Commonly known as: UROCIT-K   1 tablet, Daily      vitamin B-12 1000 MCG tablet  Commonly known as: CYANOCOBALAMIN   2,000 mcg, Oral, Daily             Stop These Medications      diazePAM 5 MG tablet  Commonly known as: Valium     Eliquis DVT/PE Starter Pack tablet therapy pack  Generic drug: Apixaban Starter Pack               Additional Instructions for the Follow-ups that You Need to Schedule       Discharge Follow-up with PCP   As directed       Currently Documented PCP:    Moe Matute MD    PCP Phone Number:    [phone]     Follow Up  Details: PCP 1 week as patient to contact them on Monday and ensure she has H&H follow-up by midweek.  CBC group per their recommendations               Follow-up Information       Moe Matute MD .    Specialty: Family Medicine  Why: PCP 1 week as patient to contact them on Monday and ensure she has H&H follow-up by midweek.  CBC group per their recommendations  Contact information:  [address] [facility] [id number] [phone]                            Future Appointments   Date Time Provider Department Center   6/25/2025 10:30 AM [facility] ENDO [facility] 320 [facility] EN  [facility]   7/1/2025  1:00 PM [facility] OP VAS RM 3 BH [facility] OVKR [facility]   7/1/2025  2:00 PM Travon Flores MD [facility] VS [facility]   7/3/2025 10:30 AM Chalino Brooks MD [facility] EN  [facility]   8/7/2025 10:00 AM LAB CHAIR 1 CBC LAB [facility]   8/7/2025 10:20 AM Ángela Hebert MD [facility] CBC [facility]   11/24/2025  1:00 PM [facility] BRKG CT 1 BH [facility] CT BR None        Nuno Dickerson MD  [facility]  06/14/25 10:55 EDT    Discharge time spent greater than 30 minutes.

## 2025-06-14 NOTE — OUTREACH NOTE
Prep Survey      Flowsheet Row Responses   [facility] facility patient discharged from? [place]   Is LACE score < 7 ? No   Eligibility Readm Mgmt   Discharge diagnosis GI bleed   Does the patient have one of the following disease processes/diagnoses(primary or secondary)? Other   Prep survey completed? Yes            Isabela DE LA ROSA - Registered Nurse

## 2025-06-16 ENCOUNTER — TELEPHONE (OUTPATIENT)
Dept: ONCOLOGY | Facility: CLINIC | Age: 80
End: 2025-06-16
Payer: MEDICARE

## 2025-06-16 DIAGNOSIS — D64.9 NORMOCYTIC ANEMIA: Primary | ICD-10-CM

## 2025-06-16 DIAGNOSIS — D50.0 IRON DEFICIENCY ANEMIA DUE TO CHRONIC BLOOD LOSS: ICD-10-CM

## 2025-06-16 NOTE — CASE MANAGEMENT/SOCIAL WORK
Case Management Discharge Note      Final Note: Home, no additional CCP needs.    Provided Post Acute Provider List?: N/A  Provided Post Acute Provider Quality & Resource List?: N/A    Selected Continued Care - Discharged on 6/14/2025 Admission date: 6/9/2025 - Discharge disposition: Home or Self Care      Destination    No services have been selected for the patient.                Durable Medical Equipment    No services have been selected for the patient.                Dialysis/Infusion    No services have been selected for the patient.                Home Medical Care    No services have been selected for the patient.                Therapy    No services have been selected for the patient.                Community Resources    No services have been selected for the patient.                Community & DME    No services have been selected for the patient.                         Final Discharge Disposition Code: 01 - home or self-care

## 2025-06-16 NOTE — TELEPHONE ENCOUNTER
"Returned call to Ms Bond.  She was discharged home Saturday from the hospital. She states Dr Perez instructed her to call if she thought she was having any bleeding.  She reports two stools yesterday that were half brown half \"dark\", some solid, some sort of runny.  Has not had any more episodes since then.  Reviewed with Dr Hebert, advised her to monitor closely and don't hesitate to call if she continues to have 2-3 episodes of black runny stools.  Otherwise we will see her back next week as scheduled.  She voiced understanding.   "

## 2025-06-16 NOTE — TELEPHONE ENCOUNTER
Provider: Anup  Caller: patient  Relationship to Patient: self  Call Back Phone Number: [phone]   Reason for Call: Pt may have some blood in stools.

## 2025-06-17 ENCOUNTER — TELEPHONE (OUTPATIENT)
Dept: ENDOCRINOLOGY | Age: 80
End: 2025-06-17

## 2025-06-17 DIAGNOSIS — E05.90 HYPERTHYROIDISM: Primary | ICD-10-CM

## 2025-06-17 DIAGNOSIS — R73.03 PREDIABETES: ICD-10-CM

## 2025-06-17 NOTE — TELEPHONE ENCOUNTER
Hub staff attempted to follow warm transfer process and was unsuccessful     Caller: Patt Bond    Relationship to patient: Self    Best call back number: [phone]    Patient is needing: PT CALLED NEEDING TO RESCHEDULE LAB APPT. PLEASE ADVISE.

## 2025-06-18 ENCOUNTER — READMISSION MANAGEMENT (OUTPATIENT)
Dept: CALL CENTER | Facility: HOSPITAL | Age: 80
End: 2025-06-18
Payer: MEDICARE

## 2025-06-18 NOTE — OUTREACH NOTE
Medical Week 1 Survey      Flowsheet Row Responses   [facility] patient discharged from? [place]   Does the patient have one of the following disease processes/diagnoses(primary or secondary)? Other   Week 1 attempt successful? Yes   Call start time 0838   Call end time 0839   Discharge diagnosis GI bleed   Meds reviewed with patient/caregiver? Yes   Is the patient having any side effects they believe may be caused by any medication additions or changes? No   Does the patient have all medications ordered at discharge? Yes   Is the patient taking all medications as directed (includes completed medication regime)? Yes   Does the patient have a primary care provider?  Yes   Does the patient have an appointment with their PCP within 7 days of discharge? Yes   Has the patient kept scheduled appointments due by today? N/A   Has home health visited the patient within 72 hours of discharge? N/A   Psychosocial issues? No   What is the patient's perception of their health status since discharge? Same   Is the patient/caregiver able to teach back the hierarchy of who to call/visit for symptoms/problems? PCP, Specialist, Home health nurse, Urgent Care, ED, 911 Yes   Week 1 call completed? Yes   Wrap up additional comments Pt about the same and has f/u appt next week.   Call end time 0839            Gaviota MALDONADO - Registered Nurse

## 2025-06-19 ENCOUNTER — TELEPHONE (OUTPATIENT)
Dept: ONCOLOGY | Facility: CLINIC | Age: 80
End: 2025-06-19
Payer: MEDICARE

## 2025-06-19 NOTE — PROGRESS NOTES
REASONS FOR FOLLOWUP:  New RLE DVT and saddle PE 4/2025    HISTORY OF PRESENT ILLNESS:    The patient returns today for hospital follow-up.  She was hospitalized from 6/9/2025 - 6/14/2025.  She underwent endoscopy workup, no acute findings, no evidence of acute bleed.  They are planning outpatient capsule endoscopy.  She did require blood transfusion for hemoglobin down to 6.7.  She also received IV iron during hospitalization.  Anticoagulation was transitioned from Eliquis to Xarelto in hopes that she would tolerate this better.  She now continues on Xarelto 20 mg daily.  She notes that her stools are starting to lighten in color, when she went into the hospital her stools were pretty dark.  Her hemoglobin has improved to 10.0 today.  She does continue with fatigue and dyspnea on exertion, these have remained stable since discharge from the hospital.  She is tried contacting gastroenterology to set up her outpatient capsule endoscopy, she has not yet heard back about an appointment.  No new concerns.    Past Medical History:   Diagnosis Date    Arthritis     OSTEO. LEFT KNEE    Back pain     AT TIMES    Cataract     CLIFFORD EYES    Chronic diarrhea     CKD (chronic kidney disease)     Diverticulitis     WITH RESECTION    Diverticulosis     Goiter     Graves disease     History of colon polyps     BENIGN    History of sepsis     History of snoring     Hyperthyroidism     followed by Dr. Blackmon. PARTIAL THRYOIDECTOMY    Kidney stones     HISTORY OF MULTIPLE TIMES. URIC AND CALCIUM STONES    Lung nodule     HISTORY OF-NOTE LATEST CT CHEST 12/2022    Mass of mediastinum     HISTORY OF. BENIGN.    OA (osteoarthritis) of knee     RIGHT KNEE:  PAIN, WEAKNESS, LIMITED MOBILITY    Obstructive pyelonephritis 09/28/2015    left obstructing pyelonephritis     PONV (postoperative nausea and vomiting)     Rectal prolapse     CHRONIC    Risk for falls     USING ROLLATOR AND CANE    Sleep apnea     CPAP MACHINE    SOB (shortness of  breath) on exertion     SEES DR VASQUEZ    Urinary urgency      Past Surgical History:   Procedure Laterality Date    BRONCHOSCOPY N/A 11/06/2017    Procedure: BRONCHOSCOPY WITH ENDOBRONCHIAL ULTRASOUND AND TRANSBRONCHIAL NEEDLE ASPIRATION;  Surgeon: Nando Vasquez MD;  Location: [facility] ENDOSCOPY;  Service:     CARDIAC CATHETERIZATION N/A 09/27/2017    Procedure: Right Heart Cath;  Surgeon: Miguel Gautam MD;  Location: [facility] CATH INVASIVE LOCATION;  Service:     CARDIAC CATHETERIZATION N/A 09/27/2017    Procedure: Left Heart Cath;  Surgeon: Miguel Gautam MD;  Location: [facility] CATH INVASIVE LOCATION;  Service:     CARDIAC CATHETERIZATION N/A 09/27/2017    Procedure: Coronary angiography;  Surgeon: Miguel Gautam MD;  Location: [facility] CATH INVASIVE LOCATION;  Service:     CARDIAC CATHETERIZATION N/A 09/27/2017    Procedure: Left ventriculography;  Surgeon: Miguel Gautam MD;  Location: [facility] CATH INVASIVE LOCATION;  Service:     CHOLECYSTECTOMY N/A 05/17/2017    Procedure: LAPAROSCOPIC CHOLECYSTECTOMY WITH IOC;  Surgeon: Patt Moore MD;  Location: [facility] MAIN OR;  Service:     COLON RESECTION Left 09/14/2016    Laparoscopic Low Anterior Resection with splenic flexure mobilization, drainage of Pelvic Abscess (cultured 3+ E. Coli), Left salpingo-ophorectomy, laparoscopic rectopexy, and umbilical hernia repair, Dr. Patt Moore    COLONOSCOPY N/A 05/15/2008    sigmoid diverticulosis with blunting of the haustral folds and angulation consistent with previous diverticulitis, no polyps, suggestion of rectal prolapse-Dr. Patt Moore    COLONOSCOPY N/A 6/11/2025    Procedure: COLONOSCOPY into cecum with hot snare polypectomies;  Surgeon: Nell Tavarez MD;  Location: [facility] ENDOSCOPY;  Service: Gastroenterology;  Laterality: N/A;  Pre: Iron deficiency anemia, melena, history of polyps  Post: Polyps, diverticulosis, hemorrhoids, sigmoid anastomosis    COLONOSCOPY W/ BIOPSIES AND  POLYPECTOMY N/A 04/16/2001    Possible mild gastritis w/ some appearance of formations that look like petechiae in the antrum, no ulcerations, no erosions; cecal polyp approx 4mm sessile; probable transverse colon polyp, although we could not visualize this completely upon pulling out; sigmoid diverticula; multiple rectal polyps, mostly w/ appearance of hyperplasia, largest being 5 to 6mm: removed via snare-Dr. Patt Moore    COLONOSCOPY W/ POLYPECTOMY N/A 12/10/2004    Diverticulosis with sigmoid perideverticulitis with erythema and patchiness around some of the diverticula, proximal ascedning colon polyp-approx 5 mm-removed via snare cauter, two distal ascending colon polyps-7 mm and 3 mm-removed via snare cautery polypectomy, hemorrhoids-Dr. Patt Moore    CYSTOSCOPY W/ URETERAL STENT PLACEMENT Left 04/21/2018    Procedure: CYSTOSCOPY, LEFT RETROGRADE WITH LEFT URETERAL STENT INSERTION;  Surgeon: Stanley Osuna MD;  Location: [facility];  Service: Urology    CYSTOSCOPY W/ URETERAL STENT REMOVAL Left 10/07/2015    Cystoscopy with stent extraction, left ureteral occulusion balloon placement, percutanous nephrostolithotomy with stone volume less than 2.5 cm involving the left lower pole and the left proximal ureter, balloon tract dilation for establishment of nephrostomy tract, antegrade nephrostomy tube placement-Dr. iMguel Monte    CYSTOSCOPY, RETROGRADE PYELOGRAM AND STENT INSERTION Left 09/28/2015    Left cystoscopy with left retrograde pyelogram, left double-J stent placement-Dr. Miguel Blas    CYSTOSCOPY, RETROGRADE PYELOGRAM AND STENT INSERTION Left 09/09/2015    Cystoscopy with bilateral retrograde pyelogram, left double-J stent placement, right ureteral pyeloscopy with laser lithotripsy of the ureteropelvic junction calculus, right double-J stent placement-Dr. Miguel Monte    CYSTOSCOPY, RETROGRADE PYELOGRAM AND STENT INSERTION Right 09/21/2007    Cystoscopy with right retrograde  pyelogram, right biliary stent placement-Dr. Miguel Monte    CYSTOSCOPY, RETROGRADE PYELOGRAM AND STENT INSERTION Right 09/07/2007    Cystoscopy with right retrograde pyelogram, right ureteral peyloscopy with extraction distal ureteral mass, right double J stent placement-Dr. Miguel Monte    CYSTOSCOPY, RETROGRADE PYELOGRAM AND STENT INSERTION Left 07/29/2022    Procedure: CYSTOSCOPY, LEFT RETROGRADE PYELOGRAM, LEFT URETERAL STENT;  Surgeon: Cedrick Jones MD;  Location: [facility];  Service: Urology;  Laterality: Left;    CYSTOSCOPY, URETEROSCOPY, RETROGRADE PYELOGRAM, STENT INSERTION Right 09/07/2007    Cystoscopy with right retrograde pyelogram, rigth ureteroscopy with extraction of distal mass, right double J stent placement-Dr. Miguel Monte    D & C HYSTEROSCOPY N/A 05/18/2011    Procedure done due to thickened endomentrium and an endometrial polyp-Dr. Quita Cheatham    DILATATION AND CURETTAGE N/A 03/22/2002    D&C, polyp removal-Dr. Quita Cheatham    ENDOSCOPY N/A 6/11/2025    Procedure: ESOPHAGOGASTRODUODENOSCOPY with biopsies;  Surgeon: Nell Tavarez MD;  Location: [facility] ENDOSCOPY;  Service: Gastroenterology;  Laterality: N/A;  Pre: Iron Deficiency anemia, melena  Post: Normal    EXTRACORPOREAL SHOCK WAVE LITHOTRIPSY (ESWL) Left 04/19/2023    Procedure: LEFT EXTRACORPOREAL SHOCKWAVE LITHOTRIPSY STENT REPLACEMENT;  Surgeon: Miguel Monte MD;  Location: [facility];  Service: Urology;  Laterality: Left;    EXTRACORPOREAL SHOCKWAVE LITHOTRIPSY (ESWL), STENT INSERTION/REMOVAL Left 05/08/2015    Left extracoporeal shockwave lithotripsy, cystoscopy with stent placement-Dr. Miguel Monte    EXTRACORPOREAL SHOCKWAVE LITHOTRIPSY (ESWL), STENT INSERTION/REMOVAL  04/2023    [facility]    JOINT REPLACEMENT  Left knee    MEDIASTINOTOMY Left 03/05/2018    Procedure: left thyroidectomy, resection of substernal thyroid goiter cervical approach;  Surgeon: Quintin STONER  Vishnu PACHECO MD;  Location: [facility] MAIN OR;  Service:     PERCUTANEOUS NEPHROSTOLITHOTOMY Left 06/16/2023    Procedure: LEFT PERCUTANEOUS  NEPHROSTOLITHOTOMY, CYSTOSCOPY, STENT REMOVAL;  Surgeon: Miguel Monte MD;  Location: [facility] HYBRID OR 18/19;  Service: Urology;  Laterality: Left;    SIGMOIDOSCOPY N/A 09/13/2016    Procedure: SIGMOIDOSCOPY FLEXIBLE TO 25 CM;  Surgeon: Patt Moore MD;  Location: [facility] ENDOSCOPY;  Service:     THORACOTOMY  1996    Thoracotomy for ectopic thyroid, Dr. Camarillo    THYROIDECTOMY, PARTIAL      left side 3/05/18    TOTAL KNEE ARTHROPLASTY Left 02/02/2023    Procedure: TOTAL KNEE ARTHROPLASTY;  Surgeon: Anthony Sinclair MD;  Location: [facility] OR [facility];  Service: Orthopedics;  Laterality: Left;    TOTAL KNEE ARTHROPLASTY Right 09/23/2024    Procedure: RIGHT TOTAL KNEE ARTHROPLASTY;  Surgeon: Anthony Sinclair MD;  Location: [facility] OR [facility];  Service: Orthopedics;  Laterality: Right;    UMBILICAL HERNIA REPAIR N/A 09/14/2016    Procedure: UMBILICAL HERNIA REPAIR ;  Surgeon: Patt Moore MD;  Location: [facility] OR;  Service:     URETEROSCOPY LASER LITHOTRIPSY WITH STENT INSERTION Left 08/12/2022    Procedure: LEFT URETEROSCOPY LASER LITHOTRIPSY STONE BACKET EXTRACTION, STENT PLACEMENT;  Surgeon: Miguel Monte MD;  Location: [facility] OR;  Service: Urology;  Laterality: Left;    URETEROSCOPY LASER LITHOTRIPSY WITH STENT INSERTION Left 10/11/2023    Procedure: LEFT URETEROSCOPY LASER LITHOTRIPSY WITH STENT REMOVAL;  Surgeon: Miguel Monte MD;  Location: [facility] OR;  Service: Urology;  Laterality: Left;    VENTRAL/INCISIONAL HERNIA REPAIR N/A 05/17/2017    Procedure: VENTRAL HERNIA REPAIR WITH MESH, BILATERAL MUSCULOFASCIAL RELEASE;  Surgeon: Patt Moore MD;  Location: [facility] OR;  Service:        MEDICATIONS    Current Outpatient Medications:     allopurinol (ZYLOPRIM) 300 MG tablet, Take 1 tablet by mouth Daily. KIDNEY STONE  Indications: Disorder  of Excessive Uric Acid in the Blood, Disp: , Rfl:     cholestyramine (QUESTRAN) 4 g packet, Take 1 packet by mouth As Needed. AROUND DINNERTIME  Indications: Itching, Disp: , Rfl:     Diclofenac Sodium 4 %, Topiramate 2 %, cloNIDine HCl 0.2 %, Lidocaine HCl 5 %, Apply 1-2 g topically to the appropriate area as directed 3 (Three) to 4 (Four) times daily., Disp: 90 g, Rfl: 1    methIMAzole (TAPAZOLE) 5 MG tablet, TAKE 1/2 TABLET BY MOUTH DAILY, Disp: 45 tablet, Rfl: 1    potassium citrate (UROCIT-K) 10 MEQ (1080 MG) CR tablet, Take 1 tablet by mouth Daily. Indications: Low Amount of Potassium in the Blood, Disp: , Rfl:     rivaroxaban (XARELTO) 20 MG tablet, Take 1 tablet by mouth Daily., Disp: 30 tablet, Rfl: 3    vitamin B-12 (CYANOCOBALAMIN) 1000 MCG tablet, Take 2 tablets by mouth Daily., Disp: 60 tablet, Rfl: 2    ALLERGIES:     Allergies   Allergen Reactions    Cephalexin Hives    Cephalosporins Hives     Took in 2015 w/o problems    Other Hives     ELECTRODE PATCHES    Penicillins Hives       SOCIAL HISTORY:       Social History     Socioeconomic History    Marital status:      Spouse name: BERENICE COFFEY     Number of children: 2    Years of education: HIGH SCHOOL    Tobacco Use    Smoking status: Never     Passive exposure: Never    Smokeless tobacco: Never   Vaping Use    Vaping status: Never Used   Substance and Sexual Activity    Alcohol use: No    Drug use: No    Sexual activity: Defer         FAMILY HISTORY:  Family History   Problem Relation Age of Onset    Heart disease Father     Heart attack Paternal Uncle     Cancer Maternal Grandmother         ovarian    Heart attack Paternal Grandfather     Heart attack Paternal Uncle     Heart attack Paternal Uncle     Heart attack Paternal Uncle     Heart attack Paternal Uncle     Heart attack Paternal Uncle     Scleroderma Mother     Hypertension Sister     Malig Hyperthermia Neg Hx           Vitals:    06/23/25 1302   BP: 126/75   Pulse: 65   Resp: 16  "  Temp: 97.8 °F (36.6 °C)   TempSrc: Oral   SpO2: 96%   Weight: 108 kg (239 lb 1.6 oz)   Height: 157.3 cm (61.93\")   PainSc: 0-No pain             6/23/2025    12:58 PM   Current Status   ECOG score 0       PHYSICAL EXAM:      CONSTITUTIONAL:  Vital signs reviewed.  No distress, looks comfortable.  HEENT: Normocephalic.  Sclera anicteric.  PERRLA.  Conjunctive normal.  Hearing intact.  RESPIRATORY:  Normal respiratory effort.  Lungs clear to auscultation bilaterally.  CARDIOVASCULAR:  Normal S1, S2.  No murmurs rubs or gallops. GASTROINTESTINAL: Abdomen appears unremarkable.  Nontender.  Bowel sounds active.  MUSCULOSKELETAL:  No cyanosis or clubbing.  Right lower extremity slightly larger than the left with trace to 1+ pitting edema in the foot and lower leg.  SKIN:  Warm.  No rashes. + Pallor  PSYCHIATRIC:  Normal judgment and insight.  Normal mood and affect.      RECENT LABS:  Results from last 7 days   Lab Units 06/23/25  1232   WBC 10*3/mm3 3.83   NEUTROS ABS 10*3/mm3 2.70   HEMOGLOBIN g/dL 10.0*   HEMATOCRIT % 30.7*   PLATELETS 10*3/mm3 137*           Iron sat 10%  B12 374          Anticardiolipin Antibody, IgG / M, Qn (04/09/2025 15:11)  Beta-2 Glycoprotein Antibodies (04/09/2025 15:11)  Factor 5 Leiden (04/09/2025 15:11)  Protein S Functional (04/09/2025 15:11)  Protein S Antigen, Free (04/09/2025 15:11)  Factor II, DNA Analysis (04/09/2025 15:11)  Antithrombin III (04/09/2025 15:12)  Lupus Anticoagulant (04/09/2025 15:12)    ASSESSMENT:  *Saddle PE and extensive right lower extremity DVT diagnosed 4/8/2025  Admitted 4/8-4/10/2025, presenting with worsening shortness of breath x 2 weeks.  TKA performed September 2024.  Patient with significant swelling starting only a month ago.  Considering previous knee surgery was almost 6 months ago, proceeding with thrombophilia workup.  .  Patient transitioned from Lovenox to Eliquis prior to discharge.    Plan for a follow-up CT angiogram 6 months through her " pulmonologist.  Patient was taking Eliquis regularly.  Required hospitalization 6/9/2025 - 6/14/2025 due to concern for GI bleed.  Eliquis held on 6/9/2025 due to severe anemia.   With hemoglobin at 6.7, Eliquis was held.  She was started on Lovenox 40 mg SQ on 6/10/2025.  Due to the endoscopy and biopsy, GI recommended holding oral anticoagulation x 3 days.  6/12/2025: Lovenox was increased to 40 mg SQ every 12 hours.  Tolerated without signs or symptoms of bleeding.  Due to the suspected GI bleeding while on Eliquis, plan to switch to Xarelto.  6/23/2025: Continues Xarelto daily without signs or symptoms of bleeding.  Notes that her stools are starting to lighten in color, they were pretty dark when she went into the hospital.  She has been trying to contact gastroenterology to set up her outpatient capsule endoscopy, she is still waiting for an appointment.     *Acute blood loss anemia.    4/10/2025: Hemoglobin 11.2.  4/21/2025: Hemoglobin decreased to 10.2.  6/9/2025: Hemoglobin dropped to 7.5.    Patient did not notice bright blood per rectum or melena.  6/10/2025: Hemoglobin 6.7.    Reticulocyte count 2.6%.  .  Haptoglobin 58 (no evidence of hemolysis).  Labs are indicative of iron deficiency anemia with ferritin at 11.7 and transferrin saturation at 10%.  S/p 2 unit PRBC transfusion.  6/10/2025: Patient was started on IV Ferrlecit 250 mg daily with plans for 4 days.  6/11/2025: Hemoglobin 9.0.  6/13/2025: Hemoglobin improved to 9.2.  6/23/2025: Hemoglobin improved to 10.0.    *Gout, continue allopurinol     *Hyperthyroidism-continue methimazole     *Thrombocytopenia  Mild  Noted to be present at least since 2023.  IPF normal at 1.7.  Found to be B12 deficient during hospitalization 4/2025.  B12 replacement initiated.  6/9/2025 platelets stable at 113,000.  6/23/2025: Platelets improved to 137,000.    *B12 deficiency   4/9/2025: B12 level found to be low at 202   She was placed on vitamin B12 injections  weekly x 4 followed by monthly.  She developed diarrhea after receiving B12 injections, therefore transition to oral vitamin B12.  6/9/2025: Vitamin B12 improved to 374.     *Normocytic anemia  Hemoglobin ranges between 11-12.  Per above, B12 level found to be low at 202.  Beginning B12 injections.  4/21/2025 Hgb 10.2.  Beginning B12 injections today as outlined.  We had previously planned to check iron studies as well in the hospital though this was not performed.  Will add these to her follow-up visit but for now plan to proceed with B12 as outlined.  6/9/2025 Hgb down to 7.5, ferritin 11, iron sat 10%.  Concerned that patient is bleeding in setting of anticoagulation.  She is noting dark stools. Last colonoscopy on file was 5/2008, diverticulosis noted, no polyps.  Received IV iron during hospitalization June 2025.  6/23/2025: Hemoglobin improved to 10.0.    PLAN:   Continue Xarelto 20 mg daily.  Refill sent to pharmacy.  Continue oral vitamin B12 1000 mcg daily.  Return in 6 weeks for MD follow-up.  She is working on contacting gastroenterology for outpatient capsule endoscopy.

## 2025-06-19 NOTE — TELEPHONE ENCOUNTER
Caller: Patt Bond    Relationship to patient: Self    Best call back number: [phone]    Type of visit: LAB & HOSPITAL    Requested date: CALL TO R/S WANTS TO BE AT [place] [facility]    If rescheduling, when is the original appointment: 6/25

## 2025-06-23 ENCOUNTER — OFFICE VISIT (OUTPATIENT)
Dept: ONCOLOGY | Facility: CLINIC | Age: 80
End: 2025-06-23
Payer: MEDICARE

## 2025-06-23 ENCOUNTER — LAB (OUTPATIENT)
Dept: OTHER | Facility: HOSPITAL | Age: 80
End: 2025-06-23
Payer: MEDICARE

## 2025-06-23 VITALS
RESPIRATION RATE: 16 BRPM | WEIGHT: 239.1 LBS | BODY MASS INDEX: 44 KG/M2 | TEMPERATURE: 97.8 F | SYSTOLIC BLOOD PRESSURE: 126 MMHG | DIASTOLIC BLOOD PRESSURE: 75 MMHG | OXYGEN SATURATION: 96 % | HEART RATE: 65 BPM | HEIGHT: 62 IN

## 2025-06-23 DIAGNOSIS — D50.0 IRON DEFICIENCY ANEMIA DUE TO CHRONIC BLOOD LOSS: Primary | ICD-10-CM

## 2025-06-23 DIAGNOSIS — D50.0 IRON DEFICIENCY ANEMIA DUE TO CHRONIC BLOOD LOSS: ICD-10-CM

## 2025-06-23 DIAGNOSIS — I26.92 ACUTE SADDLE PULMONARY EMBOLISM WITHOUT ACUTE COR PULMONALE: ICD-10-CM

## 2025-06-23 DIAGNOSIS — D69.6 THROMBOCYTOPENIA: ICD-10-CM

## 2025-06-23 DIAGNOSIS — I82.411 ACUTE DEEP VEIN THROMBOSIS (DVT) OF RIGHT FEMORAL VEIN: ICD-10-CM

## 2025-06-23 DIAGNOSIS — Z79.01 CURRENT USE OF LONG TERM ANTICOAGULATION: ICD-10-CM

## 2025-06-23 DIAGNOSIS — D64.9 NORMOCYTIC ANEMIA: ICD-10-CM

## 2025-06-23 LAB
BASOPHILS # BLD AUTO: 0.01 10*3/MM3 (ref 0–0.2)
BASOPHILS NFR BLD AUTO: 0.3 % (ref 0–1.5)
DEPRECATED RDW RBC AUTO: 58.5 FL (ref 37–54)
EOSINOPHIL # BLD AUTO: 0.16 10*3/MM3 (ref 0–0.4)
EOSINOPHIL NFR BLD AUTO: 4.2 % (ref 0.3–6.2)
ERYTHROCYTE [DISTWIDTH] IN BLOOD BY AUTOMATED COUNT: 18.3 % (ref 12.3–15.4)
HCT VFR BLD AUTO: 30.7 % (ref 34–46.6)
HGB BLD-MCNC: 10 G/DL (ref 12–15.9)
IMM GRANULOCYTES # BLD AUTO: 0.03 10*3/MM3 (ref 0–0.05)
IMM GRANULOCYTES NFR BLD AUTO: 0.8 % (ref 0–0.5)
LYMPHOCYTES # BLD AUTO: 0.68 10*3/MM3 (ref 0.7–3.1)
LYMPHOCYTES NFR BLD AUTO: 17.8 % (ref 19.6–45.3)
MCH RBC QN AUTO: 29.5 PG (ref 26.6–33)
MCHC RBC AUTO-ENTMCNC: 32.6 G/DL (ref 31.5–35.7)
MCV RBC AUTO: 90.6 FL (ref 79–97)
MONOCYTES # BLD AUTO: 0.25 10*3/MM3 (ref 0.1–0.9)
MONOCYTES NFR BLD AUTO: 6.5 % (ref 5–12)
NEUTROPHILS NFR BLD AUTO: 2.7 10*3/MM3 (ref 1.7–7)
NEUTROPHILS NFR BLD AUTO: 70.4 % (ref 42.7–76)
NRBC BLD AUTO-RTO: 0 /100 WBC (ref 0–0.2)
PLATELET # BLD AUTO: 137 10*3/MM3 (ref 140–450)
PMV BLD AUTO: 10.1 FL (ref 6–12)
RBC # BLD AUTO: 3.39 10*6/MM3 (ref 3.77–5.28)
WBC NRBC COR # BLD AUTO: 3.83 10*3/MM3 (ref 3.4–10.8)

## 2025-06-23 PROCEDURE — 1159F MED LIST DOCD IN RCRD: CPT | Performed by: NURSE PRACTITIONER

## 2025-06-23 PROCEDURE — 36415 COLL VENOUS BLD VENIPUNCTURE: CPT

## 2025-06-23 PROCEDURE — 99214 OFFICE O/P EST MOD 30 MIN: CPT | Performed by: NURSE PRACTITIONER

## 2025-06-23 PROCEDURE — 85025 COMPLETE CBC W/AUTO DIFF WBC: CPT | Performed by: INTERNAL MEDICINE

## 2025-06-23 PROCEDURE — 1126F AMNT PAIN NOTED NONE PRSNT: CPT | Performed by: NURSE PRACTITIONER

## 2025-06-23 PROCEDURE — 1160F RVW MEDS BY RX/DR IN RCRD: CPT | Performed by: NURSE PRACTITIONER

## 2025-06-27 ENCOUNTER — READMISSION MANAGEMENT (OUTPATIENT)
Dept: CALL CENTER | Facility: HOSPITAL | Age: 80
End: 2025-06-27
Payer: MEDICARE

## 2025-06-27 NOTE — OUTREACH NOTE
Medical Week 2 Survey      Flowsheet Row Responses   [facility] patient discharged from? [place]   Does the patient have one of the following disease processes/diagnoses(primary or secondary)? Other   Week 2 attempt successful? Yes   Call start time 0943   Discharge diagnosis GI bleed   Call end time 0944   Meds reviewed with patient/caregiver? Yes   Is the patient taking all medications as directed (includes completed medication regime)? Yes   Does the patient have a primary care provider?  Yes   Has the patient kept scheduled appointments due by today? Yes  [hem/onc apt since hosp dc]   Did the patient receive a copy of their discharge instructions? Yes   Nursing interventions Reviewed instructions with patient   What is the patient's perception of their health status since discharge? Improving   Is the patient/caregiver able to teach back signs and symptoms related to disease process for when to call PCP? Yes   Is the patient/caregiver able to teach back signs and symptoms related to disease process for when to call 911? Yes   Is the patient/caregiver able to teach back the hierarchy of who to call/visit for symptoms/problems? PCP, Specialist, Home health nurse, Urgent Care, ED, 911 Yes   If the patient is a current smoker, are they able to teach back resources for cessation? Not a smoker   Week 2 Call Completed? Yes   Graduated Yes   Did the patient feel the follow up calls were helpful during their recovery period? Yes   Graduated/Revoked comments Pt states she is feeling better,  pt attended her FU apt with hem/onc since hosp dc   Call end time 0944            Karen CHOWDARY - Registered Nurse

## 2025-07-01 ENCOUNTER — HOSPITAL ENCOUNTER (OUTPATIENT)
Facility: HOSPITAL | Age: 80
Discharge: HOME | End: 2025-07-01
Admitting: SURGERY
Payer: MEDICARE

## 2025-07-01 ENCOUNTER — OFFICE VISIT (OUTPATIENT)
Age: 80
End: 2025-07-01
Payer: MEDICARE

## 2025-07-01 VITALS
WEIGHT: 239 LBS | BODY MASS INDEX: 43.98 KG/M2 | HEIGHT: 62 IN | DIASTOLIC BLOOD PRESSURE: 84 MMHG | SYSTOLIC BLOOD PRESSURE: 131 MMHG | HEART RATE: 64 BPM

## 2025-07-01 DIAGNOSIS — I82.411 ACUTE DEEP VEIN THROMBOSIS (DVT) OF RIGHT FEMORAL VEIN: ICD-10-CM

## 2025-07-01 DIAGNOSIS — Z18.83 CEMENT EMBOLISM OF PULMONARY ARTERY WITHOUT ACUTE COR PULMONALE: Primary | ICD-10-CM

## 2025-07-01 DIAGNOSIS — I26.95 CEMENT EMBOLISM OF PULMONARY ARTERY WITHOUT ACUTE COR PULMONALE: Primary | ICD-10-CM

## 2025-07-01 DIAGNOSIS — Z86.718 HISTORY OF DVT (DEEP VEIN THROMBOSIS): ICD-10-CM

## 2025-07-01 DIAGNOSIS — T84.81XA CEMENT EMBOLISM OF PULMONARY ARTERY WITHOUT ACUTE COR PULMONALE: Primary | ICD-10-CM

## 2025-07-01 LAB
BH CV LOWER VASCULAR LEFT COMMON FEMORAL AUGMENT: NORMAL
BH CV LOWER VASCULAR LEFT COMMON FEMORAL COMPETENT: NORMAL
BH CV LOWER VASCULAR LEFT COMMON FEMORAL COMPRESS: NORMAL
BH CV LOWER VASCULAR LEFT COMMON FEMORAL PHASIC: NORMAL
BH CV LOWER VASCULAR LEFT COMMON FEMORAL SPONT: NORMAL
BH CV LOWER VASCULAR RIGHT COMMON FEMORAL AUGMENT: NORMAL
BH CV LOWER VASCULAR RIGHT COMMON FEMORAL COMPETENT: NORMAL
BH CV LOWER VASCULAR RIGHT COMMON FEMORAL COMPRESS: NORMAL
BH CV LOWER VASCULAR RIGHT COMMON FEMORAL PHASIC: NORMAL
BH CV LOWER VASCULAR RIGHT COMMON FEMORAL SPONT: NORMAL
BH CV LOWER VASCULAR RIGHT DISTAL FEMORAL COMPRESS: NORMAL
BH CV LOWER VASCULAR RIGHT EXTERNAL ILIAC AUGMENT: NORMAL
BH CV LOWER VASCULAR RIGHT EXTERNAL ILIAC COMPETENT: NORMAL
BH CV LOWER VASCULAR RIGHT EXTERNAL ILIAC COMPRESS: NORMAL
BH CV LOWER VASCULAR RIGHT EXTERNAL ILIAC PHASIC: NORMAL
BH CV LOWER VASCULAR RIGHT EXTERNAL ILIAC SPONT: NORMAL
BH CV LOWER VASCULAR RIGHT GASTRONEMIUS COMPRESS: NORMAL
BH CV LOWER VASCULAR RIGHT GREATER SAPH AK COMPRESS: NORMAL
BH CV LOWER VASCULAR RIGHT GREATER SAPH BK COMPRESS: NORMAL
BH CV LOWER VASCULAR RIGHT LESSER SAPH COMPRESS: NORMAL
BH CV LOWER VASCULAR RIGHT MID FEMORAL AUGMENT: NORMAL
BH CV LOWER VASCULAR RIGHT MID FEMORAL COMPETENT: NORMAL
BH CV LOWER VASCULAR RIGHT MID FEMORAL COMPRESS: NORMAL
BH CV LOWER VASCULAR RIGHT MID FEMORAL PHASIC: NORMAL
BH CV LOWER VASCULAR RIGHT MID FEMORAL SPONT: NORMAL
BH CV LOWER VASCULAR RIGHT PERONEAL COMPRESS: NORMAL
BH CV LOWER VASCULAR RIGHT POPLITEAL AUGMENT: NORMAL
BH CV LOWER VASCULAR RIGHT POPLITEAL COMPETENT: NORMAL
BH CV LOWER VASCULAR RIGHT POPLITEAL COMPRESS: NORMAL
BH CV LOWER VASCULAR RIGHT POPLITEAL PHASIC: NORMAL
BH CV LOWER VASCULAR RIGHT POPLITEAL SPONT: NORMAL
BH CV LOWER VASCULAR RIGHT POSTERIOR TIBIAL COMPRESS: NORMAL
BH CV LOWER VASCULAR RIGHT PROFUNDA FEMORAL COMPRESS: NORMAL
BH CV LOWER VASCULAR RIGHT PROXIMAL FEMORAL COMPRESS: NORMAL
BH CV LOWER VASCULAR RIGHT SAPHENOFEMORAL JUNCTION COMPRESS: NORMAL

## 2025-07-01 PROCEDURE — 93971 EXTREMITY STUDY: CPT

## 2025-07-01 NOTE — PROGRESS NOTES
"Chief Complaint  Deep Vein Thrombosis    Subjective        Patt Bond presents to [facility] VASCULAR SURGERY  Deep Vein Thrombosis      New patient evaluation for history of DVT following knee replacement.  Had associated pulmonary embolism.    Objective   Vital Signs:  /84   Pulse 64   Ht 157.3 cm (61.93\")   Wt 108 kg (239 lb)   BMI 43.81 kg/m²   Estimated body mass index is 43.81 kg/m² as calculated from the following:    Height as of this encounter: 157.3 cm (61.93\").    Weight as of this encounter: 108 kg (239 lb).           Patt Bond  reports that she has never smoked. She has never been exposed to tobacco smoke. She has never used smokeless tobacco.      Physical Exam  Constitutional:       Appearance: She is well-developed.   Pulmonary:      Effort: Pulmonary effort is normal. No respiratory distress.   Abdominal:      General: There is no distension.      Palpations: Abdomen is soft.   Neurological:      Mental Status: She is alert and oriented to person, place, and time.        Result Review :    The following data was reviewed by: Travon Flores MD on 07/01/25  CBC          6/13/2025    06:52 6/14/2025    03:27 6/23/2025    12:32   CBC   WBC 3.49  3.73  3.83    RBC 3.20  3.07  3.39    Hemoglobin 9.2  8.8  10.0    Hematocrit 28.5  26.7  30.7    MCV 89.1  87.0  90.6    MCH 28.8  28.7  29.5    MCHC 32.3  33.0  32.6    RDW 15.5  15.7  18.3    Platelets 109  112  137       BMP          6/9/2025    10:07 6/10/2025    03:02 6/11/2025    03:46   BMP   BUN 17.1  13.0  10.0    Creatinine 1.15  1.09  1.23    Sodium 139  139  138    Potassium 4.5  4.2  3.8    Chloride 107  110  109    CO2 22.5  20.0  19.5    Calcium 9.3  8.8  9.0       A1C Last 3 Results          9/16/2024    13:51 12/26/2024    10:05 6/10/2025    03:02   HGBA1C Last 3 Results   Hemoglobin A1C 5.40  4.90  5.30        Data reviewed : Radiologic studies as below and Cardiology studies as below  Vascular Surgical " History:    Vascular Imaging History:  CT angiogram PE protocol of the chest:  4/8/2025:  Saddle emboli to the pulmonary artery.    Right lower extremity venous duplex:  4/8/2025: Acute DVT extending up the leg to the common femoral level.  7/1/2025: Normal right lower extremity venous duplex    (Text in bold font has been individually reviewed by myself and confirmed)         Assessment and Plan     Vascular surgery following for:  DVT/PE.    Diagnoses and all orders for this visit:    1. 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 History of DVT (deep vein thrombosis)    Patient comes in today as a new patient for evaluation for recent history of DVT with pulmonary embolism.  This dates back to a knee replacement on the right side back in 2024.  She had persistent right leg swelling prompting an eventual duplex which showed an acute DVT extending up to the common femoral vein.  At that time she was noted to have a pulmonary embolism as well.  Treated with systemic anticoagulation.  Recently in the hospital for anemia.  Switched over to Xarelto.  From my standpoint this does appear to be a provoked DVT.  Would recommend at least 6 months of therapeutic anticoagulation from time of diagnosis.  This would put her into October 2025.  At which time I think it would be reasonable to downgrade her to 10 mg p.o. twice daily of Xarelto for reducing risk of recurrence.  Will be happy to see her back on an as-needed basis        Vascular medical management:Patient is on Xarelto 20 mg daily.  Return if symptoms worsen or fail to improve.  Patient was given instructions and counseling regarding her condition or for health maintenance advice. Please see specific information pulled into the AVS if appropriate.

## 2025-07-16 DIAGNOSIS — R73.03 PREDIABETES: ICD-10-CM

## 2025-07-16 DIAGNOSIS — E05.90 HYPERTHYROIDISM: ICD-10-CM

## 2025-07-17 ENCOUNTER — TELEPHONE (OUTPATIENT)
Dept: GASTROENTEROLOGY | Facility: CLINIC | Age: 80
End: 2025-07-17
Payer: MEDICARE

## 2025-07-17 LAB
ALBUMIN SERPL-MCNC: 4.1 G/DL (ref 3.5–5.2)
ALBUMIN/GLOB SERPL: 1.6 G/DL
ALP SERPL-CCNC: 82 U/L (ref 39–117)
ALT SERPL-CCNC: 23 U/L (ref 1–33)
AST SERPL-CCNC: 23 U/L (ref 1–32)
BILIRUB SERPL-MCNC: 0.4 MG/DL (ref 0–1.2)
BUN SERPL-MCNC: 16 MG/DL (ref 8–23)
BUN/CREAT SERPL: 15.5 (ref 7–25)
CALCIUM SERPL-MCNC: 10 MG/DL (ref 8.6–10.5)
CHLORIDE SERPL-SCNC: 107 MMOL/L (ref 98–107)
CHOLEST SERPL-MCNC: 154 MG/DL (ref 0–200)
CO2 SERPL-SCNC: 22.6 MMOL/L (ref 22–29)
CREAT SERPL-MCNC: 1.03 MG/DL (ref 0.57–1)
EGFRCR SERPLBLD CKD-EPI 2021: 55.8 ML/MIN/1.73
GLOBULIN SER CALC-MCNC: 2.5 GM/DL
GLUCOSE SERPL-MCNC: 198 MG/DL (ref 65–99)
HBA1C MFR BLD: 5.1 % (ref 4.8–5.6)
HDLC SERPL-MCNC: 44 MG/DL (ref 40–60)
IMP & REVIEW OF LAB RESULTS: NORMAL
LDLC SERPL CALC-MCNC: 65 MG/DL (ref 0–100)
POTASSIUM SERPL-SCNC: 4.2 MMOL/L (ref 3.5–5.2)
PROT SERPL-MCNC: 6.6 G/DL (ref 6–8.5)
SODIUM SERPL-SCNC: 142 MMOL/L (ref 136–145)
T3FREE SERPL-MCNC: 2.4 PG/ML (ref 2–4.4)
T4 FREE SERPL-MCNC: 0.82 NG/DL (ref 0.92–1.68)
TRIGL SERPL-MCNC: 286 MG/DL (ref 0–150)
TSH SERPL DL<=0.005 MIU/L-ACNC: 5.09 UIU/ML (ref 0.27–4.2)
VLDLC SERPL CALC-MCNC: 45 MG/DL (ref 5–40)

## 2025-07-17 NOTE — TELEPHONE ENCOUNTER
Hub staff attempted to follow warm transfer process and was unsuccessful     Caller: Patt Bond    Relationship to patient: Self    Best call back number: [phone]    Patient is needing: PT CALLING TO GET SCHEDULED FOR ENDOSCOPY GI CAPSULE STUDY. WAS TOLD A MONTH AGO THAT THE EQUIP WAS DOWN. PT CALLING BACK TO GET SCHEDULED HAS NOT HEARD FROM ANYONE. PLEASE CONTACT PT TO ASSIST/ADVISE.

## 2025-07-31 ENCOUNTER — TELEPHONE (OUTPATIENT)
Dept: GASTROENTEROLOGY | Facility: CLINIC | Age: 80
End: 2025-07-31
Payer: MEDICARE

## 2025-07-31 ENCOUNTER — CLINICAL SUPPORT (OUTPATIENT)
Dept: GASTROENTEROLOGY | Facility: CLINIC | Age: 80
End: 2025-07-31
Payer: MEDICARE

## 2025-07-31 DIAGNOSIS — D50.0 IRON DEFICIENCY ANEMIA DUE TO CHRONIC BLOOD LOSS: Primary | ICD-10-CM

## 2025-08-04 ENCOUNTER — TELEPHONE (OUTPATIENT)
Dept: GASTROENTEROLOGY | Facility: CLINIC | Age: 80
End: 2025-08-04
Payer: MEDICARE

## 2025-08-07 ENCOUNTER — OFFICE VISIT (OUTPATIENT)
Dept: ONCOLOGY | Facility: CLINIC | Age: 80
End: 2025-08-07
Payer: MEDICARE

## 2025-08-07 ENCOUNTER — LAB (OUTPATIENT)
Dept: OTHER | Facility: HOSPITAL | Age: 80
End: 2025-08-07
Payer: MEDICARE

## 2025-08-07 VITALS
OXYGEN SATURATION: 98 % | DIASTOLIC BLOOD PRESSURE: 79 MMHG | HEART RATE: 73 BPM | BODY MASS INDEX: 44.79 KG/M2 | HEIGHT: 62 IN | WEIGHT: 243.4 LBS | TEMPERATURE: 98.2 F | RESPIRATION RATE: 16 BRPM | SYSTOLIC BLOOD PRESSURE: 148 MMHG

## 2025-08-07 DIAGNOSIS — D69.6 THROMBOCYTOPENIA: ICD-10-CM

## 2025-08-07 DIAGNOSIS — D50.0 IRON DEFICIENCY ANEMIA DUE TO CHRONIC BLOOD LOSS: ICD-10-CM

## 2025-08-07 DIAGNOSIS — I82.411 ACUTE DEEP VEIN THROMBOSIS (DVT) OF RIGHT FEMORAL VEIN: ICD-10-CM

## 2025-08-07 DIAGNOSIS — E53.8 B12 DEFICIENCY: ICD-10-CM

## 2025-08-07 DIAGNOSIS — D50.0 IRON DEFICIENCY ANEMIA DUE TO CHRONIC BLOOD LOSS: Primary | ICD-10-CM

## 2025-08-07 DIAGNOSIS — Z79.01 CURRENT USE OF LONG TERM ANTICOAGULATION: ICD-10-CM

## 2025-08-07 DIAGNOSIS — I26.92 ACUTE SADDLE PULMONARY EMBOLISM WITHOUT ACUTE COR PULMONALE: ICD-10-CM

## 2025-08-07 LAB
ALBUMIN SERPL-MCNC: 3.8 G/DL (ref 3.5–5.2)
ALBUMIN/GLOB SERPL: 1.6 G/DL
ALP SERPL-CCNC: 77 U/L (ref 39–117)
ALT SERPL W P-5'-P-CCNC: 22 U/L (ref 1–33)
ANION GAP SERPL CALCULATED.3IONS-SCNC: 9.8 MMOL/L (ref 5–15)
AST SERPL-CCNC: 20 U/L (ref 1–32)
BASOPHILS # BLD AUTO: 0.01 10*3/MM3 (ref 0–0.2)
BASOPHILS NFR BLD AUTO: 0.3 % (ref 0–1.5)
BILIRUB SERPL-MCNC: 0.4 MG/DL (ref 0–1.2)
BUN SERPL-MCNC: 17.1 MG/DL (ref 8–23)
BUN/CREAT SERPL: 15.7 (ref 7–25)
CALCIUM SPEC-SCNC: 8.9 MG/DL (ref 8.6–10.5)
CHLORIDE SERPL-SCNC: 107 MMOL/L (ref 98–107)
CO2 SERPL-SCNC: 23.2 MMOL/L (ref 22–29)
CREAT SERPL-MCNC: 1.09 MG/DL (ref 0.57–1)
DEPRECATED RDW RBC AUTO: 57.4 FL (ref 37–54)
EGFRCR SERPLBLD CKD-EPI 2021: 52.1 ML/MIN/1.73
EOSINOPHIL # BLD AUTO: 0.14 10*3/MM3 (ref 0–0.4)
EOSINOPHIL NFR BLD AUTO: 3.7 % (ref 0.3–6.2)
ERYTHROCYTE [DISTWIDTH] IN BLOOD BY AUTOMATED COUNT: 16.2 % (ref 12.3–15.4)
FERRITIN SERPL-MCNC: 19.3 NG/ML (ref 13–150)
GLOBULIN UR ELPH-MCNC: 2.4 GM/DL
GLUCOSE SERPL-MCNC: 274 MG/DL (ref 65–99)
HCT VFR BLD AUTO: 25.6 % (ref 34–46.6)
HGB BLD-MCNC: 7.8 G/DL (ref 12–15.9)
IMM GRANULOCYTES # BLD AUTO: 0.04 10*3/MM3 (ref 0–0.05)
IMM GRANULOCYTES NFR BLD AUTO: 1 % (ref 0–0.5)
IRON 24H UR-MRATE: 45 MCG/DL (ref 37–145)
IRON SATN MFR SERPL: 12 % (ref 20–50)
LYMPHOCYTES # BLD AUTO: 0.49 10*3/MM3 (ref 0.7–3.1)
LYMPHOCYTES NFR BLD AUTO: 12.8 % (ref 19.6–45.3)
MCH RBC QN AUTO: 29.3 PG (ref 26.6–33)
MCHC RBC AUTO-ENTMCNC: 30.5 G/DL (ref 31.5–35.7)
MCV RBC AUTO: 96.2 FL (ref 79–97)
MONOCYTES # BLD AUTO: 0.23 10*3/MM3 (ref 0.1–0.9)
MONOCYTES NFR BLD AUTO: 6 % (ref 5–12)
NEUTROPHILS NFR BLD AUTO: 2.91 10*3/MM3 (ref 1.7–7)
NEUTROPHILS NFR BLD AUTO: 76.2 % (ref 42.7–76)
NRBC BLD AUTO-RTO: 0 /100 WBC (ref 0–0.2)
PLATELET # BLD AUTO: 107 10*3/MM3 (ref 140–450)
PMV BLD AUTO: 9 FL (ref 6–12)
POTASSIUM SERPL-SCNC: 4.3 MMOL/L (ref 3.5–5.2)
PROT SERPL-MCNC: 6.2 G/DL (ref 6–8.5)
RBC # BLD AUTO: 2.66 10*6/MM3 (ref 3.77–5.28)
SODIUM SERPL-SCNC: 140 MMOL/L (ref 136–145)
TIBC SERPL-MCNC: 386 MCG/DL (ref 298–536)
TRANSFERRIN SERPL-MCNC: 259 MG/DL (ref 200–360)
VIT B12 BLD-MCNC: 1272 PG/ML (ref 211–946)
WBC NRBC COR # BLD AUTO: 3.82 10*3/MM3 (ref 3.4–10.8)

## 2025-08-07 PROCEDURE — 82728 ASSAY OF FERRITIN: CPT | Performed by: NURSE PRACTITIONER

## 2025-08-07 PROCEDURE — 83540 ASSAY OF IRON: CPT | Performed by: NURSE PRACTITIONER

## 2025-08-07 PROCEDURE — 85025 COMPLETE CBC W/AUTO DIFF WBC: CPT | Performed by: NURSE PRACTITIONER

## 2025-08-07 PROCEDURE — 84466 ASSAY OF TRANSFERRIN: CPT | Performed by: NURSE PRACTITIONER

## 2025-08-07 PROCEDURE — 80053 COMPREHEN METABOLIC PANEL: CPT | Performed by: NURSE PRACTITIONER

## 2025-08-07 PROCEDURE — 82607 VITAMIN B-12: CPT | Performed by: NURSE PRACTITIONER

## 2025-08-07 PROCEDURE — 36415 COLL VENOUS BLD VENIPUNCTURE: CPT

## 2025-08-07 RX ORDER — ENOXAPARIN SODIUM 100 MG/ML
40 INJECTION SUBCUTANEOUS EVERY 12 HOURS SCHEDULED
Qty: 24 ML | Refills: 1 | Status: SHIPPED | OUTPATIENT
Start: 2025-08-07 | End: 2025-08-07

## 2025-08-07 RX ORDER — FAMOTIDINE 10 MG/ML
20 INJECTION, SOLUTION INTRAVENOUS AS NEEDED
OUTPATIENT
Start: 2025-08-21

## 2025-08-07 RX ORDER — ACETAMINOPHEN 325 MG/1
650 TABLET ORAL ONCE
OUTPATIENT
Start: 2025-08-21

## 2025-08-07 RX ORDER — SODIUM CHLORIDE 9 MG/ML
20 INJECTION, SOLUTION INTRAVENOUS ONCE
OUTPATIENT
Start: 2025-08-28

## 2025-08-07 RX ORDER — SODIUM CHLORIDE 9 MG/ML
20 INJECTION, SOLUTION INTRAVENOUS ONCE
OUTPATIENT
Start: 2025-08-14

## 2025-08-07 RX ORDER — DIPHENHYDRAMINE HYDROCHLORIDE 50 MG/ML
50 INJECTION, SOLUTION INTRAMUSCULAR; INTRAVENOUS AS NEEDED
OUTPATIENT
Start: 2025-08-21

## 2025-08-07 RX ORDER — CETIRIZINE HYDROCHLORIDE 10 MG/1
10 TABLET ORAL ONCE
OUTPATIENT
Start: 2025-08-28

## 2025-08-07 RX ORDER — ENOXAPARIN SODIUM 100 MG/ML
40 INJECTION SUBCUTANEOUS
Start: 2025-08-07

## 2025-08-07 RX ORDER — DIPHENHYDRAMINE HYDROCHLORIDE 50 MG/ML
50 INJECTION, SOLUTION INTRAMUSCULAR; INTRAVENOUS AS NEEDED
OUTPATIENT
Start: 2025-08-28

## 2025-08-07 RX ORDER — ACETAMINOPHEN 325 MG/1
650 TABLET ORAL ONCE
OUTPATIENT
Start: 2025-08-14

## 2025-08-07 RX ORDER — DIPHENHYDRAMINE HYDROCHLORIDE 50 MG/ML
50 INJECTION, SOLUTION INTRAMUSCULAR; INTRAVENOUS AS NEEDED
OUTPATIENT
Start: 2025-08-14

## 2025-08-07 RX ORDER — CETIRIZINE HYDROCHLORIDE 10 MG/1
10 TABLET ORAL ONCE
OUTPATIENT
Start: 2025-08-14

## 2025-08-07 RX ORDER — CETIRIZINE HYDROCHLORIDE 10 MG/1
10 TABLET ORAL ONCE
OUTPATIENT
Start: 2025-08-21

## 2025-08-07 RX ORDER — HYDROCORTISONE SODIUM SUCCINATE 100 MG/2ML
100 INJECTION INTRAMUSCULAR; INTRAVENOUS AS NEEDED
OUTPATIENT
Start: 2025-08-28

## 2025-08-07 RX ORDER — HYDROCORTISONE SODIUM SUCCINATE 100 MG/2ML
100 INJECTION INTRAMUSCULAR; INTRAVENOUS AS NEEDED
OUTPATIENT
Start: 2025-08-21

## 2025-08-07 RX ORDER — FAMOTIDINE 10 MG/ML
20 INJECTION, SOLUTION INTRAVENOUS AS NEEDED
OUTPATIENT
Start: 2025-08-14

## 2025-08-07 RX ORDER — FAMOTIDINE 10 MG/ML
20 INJECTION, SOLUTION INTRAVENOUS AS NEEDED
OUTPATIENT
Start: 2025-08-28

## 2025-08-07 RX ORDER — ACETAMINOPHEN 325 MG/1
650 TABLET ORAL ONCE
OUTPATIENT
Start: 2025-08-28

## 2025-08-07 RX ORDER — SODIUM CHLORIDE 9 MG/ML
20 INJECTION, SOLUTION INTRAVENOUS ONCE
OUTPATIENT
Start: 2025-08-21

## 2025-08-07 RX ORDER — HYDROCORTISONE SODIUM SUCCINATE 100 MG/2ML
100 INJECTION INTRAMUSCULAR; INTRAVENOUS AS NEEDED
OUTPATIENT
Start: 2025-08-14

## 2025-08-10 ENCOUNTER — HOSPITAL ENCOUNTER (OUTPATIENT)
Dept: CT IMAGING | Facility: HOSPITAL | Age: 80
Discharge: HOME | End: 2025-08-10
Admitting: INTERNAL MEDICINE
Payer: MEDICARE

## 2025-08-10 DIAGNOSIS — D50.0 IRON DEFICIENCY ANEMIA DUE TO CHRONIC BLOOD LOSS: ICD-10-CM

## 2025-08-10 DIAGNOSIS — I82.411 ACUTE DEEP VEIN THROMBOSIS (DVT) OF RIGHT FEMORAL VEIN: ICD-10-CM

## 2025-08-10 DIAGNOSIS — I26.92 ACUTE SADDLE PULMONARY EMBOLISM WITHOUT ACUTE COR PULMONALE: ICD-10-CM

## 2025-08-10 PROCEDURE — 82565 ASSAY OF CREATININE: CPT

## 2025-08-10 PROCEDURE — 25510000001 IOPAMIDOL PER 1 ML: Performed by: INTERNAL MEDICINE

## 2025-08-10 PROCEDURE — 71275 CT ANGIOGRAPHY CHEST: CPT

## 2025-08-10 RX ORDER — IOPAMIDOL 755 MG/ML
100 INJECTION, SOLUTION INTRAVASCULAR
Status: COMPLETED | OUTPATIENT
Start: 2025-08-10 | End: 2025-08-10

## 2025-08-10 RX ADMIN — IOPAMIDOL 95 ML: 755 INJECTION, SOLUTION INTRAVENOUS at 10:08

## 2025-08-11 LAB — CREAT BLDA-MCNC: 1.1 MG/DL (ref 0.6–1.3)

## 2025-08-12 ENCOUNTER — OFFICE VISIT (OUTPATIENT)
Dept: ONCOLOGY | Facility: CLINIC | Age: 80
End: 2025-08-12
Payer: MEDICARE

## 2025-08-12 ENCOUNTER — LAB (OUTPATIENT)
Dept: OTHER | Facility: HOSPITAL | Age: 80
End: 2025-08-12
Payer: MEDICARE

## 2025-08-12 VITALS
SYSTOLIC BLOOD PRESSURE: 128 MMHG | TEMPERATURE: 96.6 F | WEIGHT: 243 LBS | OXYGEN SATURATION: 97 % | BODY MASS INDEX: 44.72 KG/M2 | HEART RATE: 74 BPM | DIASTOLIC BLOOD PRESSURE: 73 MMHG | HEIGHT: 62 IN

## 2025-08-12 DIAGNOSIS — I82.411 ACUTE DEEP VEIN THROMBOSIS (DVT) OF RIGHT FEMORAL VEIN: ICD-10-CM

## 2025-08-12 DIAGNOSIS — I26.92 ACUTE SADDLE PULMONARY EMBOLISM WITHOUT ACUTE COR PULMONALE: ICD-10-CM

## 2025-08-12 DIAGNOSIS — D50.0 IRON DEFICIENCY ANEMIA DUE TO CHRONIC BLOOD LOSS: ICD-10-CM

## 2025-08-12 DIAGNOSIS — D50.0 IRON DEFICIENCY ANEMIA DUE TO CHRONIC BLOOD LOSS: Primary | ICD-10-CM

## 2025-08-12 LAB
BASOPHILS # BLD AUTO: 0.01 10*3/MM3 (ref 0–0.2)
BASOPHILS NFR BLD AUTO: 0.3 % (ref 0–1.5)
DEPRECATED RDW RBC AUTO: 52.9 FL (ref 37–54)
EOSINOPHIL # BLD AUTO: 0.12 10*3/MM3 (ref 0–0.4)
EOSINOPHIL NFR BLD AUTO: 3.2 % (ref 0.3–6.2)
ERYTHROCYTE [DISTWIDTH] IN BLOOD BY AUTOMATED COUNT: 15.9 % (ref 12.3–15.4)
HCT VFR BLD AUTO: 26.1 % (ref 34–46.6)
HGB BLD-MCNC: 8.3 G/DL (ref 12–15.9)
IMM GRANULOCYTES # BLD AUTO: 0.02 10*3/MM3 (ref 0–0.05)
IMM GRANULOCYTES NFR BLD AUTO: 0.5 % (ref 0–0.5)
LYMPHOCYTES # BLD AUTO: 0.58 10*3/MM3 (ref 0.7–3.1)
LYMPHOCYTES NFR BLD AUTO: 15.6 % (ref 19.6–45.3)
MCH RBC QN AUTO: 28.9 PG (ref 26.6–33)
MCHC RBC AUTO-ENTMCNC: 31.8 G/DL (ref 31.5–35.7)
MCV RBC AUTO: 90.9 FL (ref 79–97)
MONOCYTES # BLD AUTO: 0.26 10*3/MM3 (ref 0.1–0.9)
MONOCYTES NFR BLD AUTO: 7 % (ref 5–12)
NEUTROPHILS NFR BLD AUTO: 2.72 10*3/MM3 (ref 1.7–7)
NEUTROPHILS NFR BLD AUTO: 73.4 % (ref 42.7–76)
NRBC BLD AUTO-RTO: 0 /100 WBC (ref 0–0.2)
PLATELET # BLD AUTO: 133 10*3/MM3 (ref 140–450)
PMV BLD AUTO: 9.9 FL (ref 6–12)
RBC # BLD AUTO: 2.87 10*6/MM3 (ref 3.77–5.28)
WBC NRBC COR # BLD AUTO: 3.71 10*3/MM3 (ref 3.4–10.8)

## 2025-08-12 PROCEDURE — 85025 COMPLETE CBC W/AUTO DIFF WBC: CPT | Performed by: INTERNAL MEDICINE

## 2025-08-13 ENCOUNTER — TELEPHONE (OUTPATIENT)
Dept: ONCOLOGY | Facility: CLINIC | Age: 80
End: 2025-08-13
Payer: MEDICARE

## 2025-08-13 DIAGNOSIS — I26.92 ACUTE SADDLE PULMONARY EMBOLISM WITHOUT ACUTE COR PULMONALE: Primary | ICD-10-CM

## 2025-08-14 ENCOUNTER — OFFICE VISIT (OUTPATIENT)
Dept: ENDOCRINOLOGY | Age: 80
End: 2025-08-14
Payer: MEDICARE

## 2025-08-14 VITALS
DIASTOLIC BLOOD PRESSURE: 60 MMHG | WEIGHT: 240.4 LBS | HEART RATE: 73 BPM | TEMPERATURE: 97.7 F | SYSTOLIC BLOOD PRESSURE: 136 MMHG | OXYGEN SATURATION: 99 % | BODY MASS INDEX: 44.24 KG/M2 | HEIGHT: 62 IN

## 2025-08-14 DIAGNOSIS — Z87.442 HISTORY OF KIDNEY STONES: ICD-10-CM

## 2025-08-14 DIAGNOSIS — G47.33 OBSTRUCTIVE SLEEP APNEA: ICD-10-CM

## 2025-08-14 DIAGNOSIS — Z90.09 HISTORY OF LOBECTOMY OF THYROID: ICD-10-CM

## 2025-08-14 DIAGNOSIS — Z86.32 HISTORY OF GESTATIONAL DIABETES MELLITUS (GDM): ICD-10-CM

## 2025-08-14 DIAGNOSIS — Z86.711 HISTORY OF PULMONARY EMBOLISM: ICD-10-CM

## 2025-08-14 DIAGNOSIS — E04.2 MULTINODULAR THYROID: Primary | ICD-10-CM

## 2025-08-14 DIAGNOSIS — Z86.718 HISTORY OF DVT (DEEP VEIN THROMBOSIS): ICD-10-CM

## 2025-08-14 DIAGNOSIS — R73.03 PREDIABETES: ICD-10-CM

## 2025-08-14 DIAGNOSIS — I25.10 NONOBSTRUCTIVE ATHEROSCLEROSIS OF CORONARY ARTERY: ICD-10-CM

## 2025-08-15 LAB
ALBUMIN SERPL-MCNC: 4 G/DL (ref 3.5–5.2)
ALBUMIN/GLOB SERPL: 2 G/DL
ALP SERPL-CCNC: 76 U/L (ref 39–117)
ALT SERPL-CCNC: 20 U/L (ref 1–33)
AST SERPL-CCNC: 22 U/L (ref 1–32)
BILIRUB SERPL-MCNC: 0.5 MG/DL (ref 0–1.2)
BUN SERPL-MCNC: 16 MG/DL (ref 8–23)
BUN/CREAT SERPL: 13.7 (ref 7–25)
CALCIUM SERPL-MCNC: 9.8 MG/DL (ref 8.6–10.5)
CHLORIDE SERPL-SCNC: 107 MMOL/L (ref 98–107)
CO2 SERPL-SCNC: 21.6 MMOL/L (ref 22–29)
CREAT SERPL-MCNC: 1.17 MG/DL (ref 0.57–1)
EGFRCR SERPLBLD CKD-EPI 2021: 47.9 ML/MIN/1.73
GLOBULIN SER CALC-MCNC: 2 GM/DL
GLUCOSE SERPL-MCNC: 193 MG/DL (ref 65–99)
POTASSIUM SERPL-SCNC: 4.1 MMOL/L (ref 3.5–5.2)
PROT SERPL-MCNC: 6 G/DL (ref 6–8.5)
SODIUM SERPL-SCNC: 141 MMOL/L (ref 136–145)
T3FREE SERPL-MCNC: 2.2 PG/ML (ref 2–4.4)
T4 FREE SERPL-MCNC: 0.83 NG/DL (ref 0.92–1.68)
TSH SERPL DL<=0.005 MIU/L-ACNC: 2.96 UIU/ML (ref 0.27–4.2)

## 2025-08-18 ENCOUNTER — LAB (OUTPATIENT)
Dept: LAB | Facility: HOSPITAL | Age: 80
End: 2025-08-18
Payer: MEDICARE

## 2025-08-18 DIAGNOSIS — D64.9 NORMOCYTIC ANEMIA: ICD-10-CM

## 2025-08-18 DIAGNOSIS — D64.9 NORMOCYTIC ANEMIA: Primary | ICD-10-CM

## 2025-08-18 LAB
COLLECT DATE SP2 STL: ABNORMAL
COLLECT DATE STL: ABNORMAL
GASTROCULT GAST QL: POSITIVE
HEMOCCULT SP2 STL QL: NEGATIVE
HEMOCCULT SP3 STL QL: ABNORMAL
Lab: 12
Lab: 12

## 2025-08-18 PROCEDURE — 82272 OCCULT BLD FECES 1-3 TESTS: CPT

## 2025-08-19 ENCOUNTER — LAB (OUTPATIENT)
Dept: OTHER | Facility: HOSPITAL | Age: 80
End: 2025-08-19
Payer: MEDICARE

## 2025-08-19 ENCOUNTER — OFFICE VISIT (OUTPATIENT)
Dept: ONCOLOGY | Facility: CLINIC | Age: 80
End: 2025-08-19
Payer: MEDICARE

## 2025-08-19 VITALS
BODY MASS INDEX: 44.4 KG/M2 | HEART RATE: 67 BPM | OXYGEN SATURATION: 96 % | HEIGHT: 62 IN | SYSTOLIC BLOOD PRESSURE: 128 MMHG | DIASTOLIC BLOOD PRESSURE: 77 MMHG | TEMPERATURE: 97.3 F | WEIGHT: 241.3 LBS

## 2025-08-19 DIAGNOSIS — D64.9 NORMOCYTIC ANEMIA: ICD-10-CM

## 2025-08-19 DIAGNOSIS — K90.9 IRON MALABSORPTION: ICD-10-CM

## 2025-08-19 DIAGNOSIS — D50.8 OTHER IRON DEFICIENCY ANEMIA: Primary | ICD-10-CM

## 2025-08-19 LAB
BASOPHILS # BLD AUTO: 0.02 10*3/MM3 (ref 0–0.2)
BASOPHILS NFR BLD AUTO: 0.4 % (ref 0–1.5)
DEPRECATED RDW RBC AUTO: 51.2 FL (ref 37–54)
EOSINOPHIL # BLD AUTO: 0.17 10*3/MM3 (ref 0–0.4)
EOSINOPHIL NFR BLD AUTO: 3.5 % (ref 0.3–6.2)
ERYTHROCYTE [DISTWIDTH] IN BLOOD BY AUTOMATED COUNT: 15.7 % (ref 12.3–15.4)
HCT VFR BLD AUTO: 26.4 % (ref 34–46.6)
HGB BLD-MCNC: 8.3 G/DL (ref 12–15.9)
IMM GRANULOCYTES # BLD AUTO: 0.03 10*3/MM3 (ref 0–0.05)
IMM GRANULOCYTES NFR BLD AUTO: 0.6 % (ref 0–0.5)
LYMPHOCYTES # BLD AUTO: 0.8 10*3/MM3 (ref 0.7–3.1)
LYMPHOCYTES NFR BLD AUTO: 16.6 % (ref 19.6–45.3)
MCH RBC QN AUTO: 28.5 PG (ref 26.6–33)
MCHC RBC AUTO-ENTMCNC: 31.4 G/DL (ref 31.5–35.7)
MCV RBC AUTO: 90.7 FL (ref 79–97)
MONOCYTES # BLD AUTO: 0.32 10*3/MM3 (ref 0.1–0.9)
MONOCYTES NFR BLD AUTO: 6.6 % (ref 5–12)
NEUTROPHILS NFR BLD AUTO: 3.49 10*3/MM3 (ref 1.7–7)
NEUTROPHILS NFR BLD AUTO: 72.3 % (ref 42.7–76)
NRBC BLD AUTO-RTO: 0 /100 WBC (ref 0–0.2)
PLATELET # BLD AUTO: 157 10*3/MM3 (ref 140–450)
PMV BLD AUTO: 9.7 FL (ref 6–12)
RBC # BLD AUTO: 2.91 10*6/MM3 (ref 3.77–5.28)
WBC NRBC COR # BLD AUTO: 4.83 10*3/MM3 (ref 3.4–10.8)

## 2025-08-19 PROCEDURE — 36415 COLL VENOUS BLD VENIPUNCTURE: CPT

## 2025-08-19 PROCEDURE — 85025 COMPLETE CBC W/AUTO DIFF WBC: CPT

## 2025-08-26 ENCOUNTER — INFUSION (OUTPATIENT)
Dept: ONCOLOGY | Facility: HOSPITAL | Age: 80
End: 2025-08-26
Payer: MEDICARE

## 2025-08-26 VITALS
OXYGEN SATURATION: 98 % | HEART RATE: 67 BPM | DIASTOLIC BLOOD PRESSURE: 79 MMHG | SYSTOLIC BLOOD PRESSURE: 134 MMHG | RESPIRATION RATE: 16 BRPM | WEIGHT: 242.8 LBS | HEIGHT: 63 IN | TEMPERATURE: 97.5 F | BODY MASS INDEX: 43.02 KG/M2

## 2025-08-26 DIAGNOSIS — K90.9 IRON MALABSORPTION: ICD-10-CM

## 2025-08-26 DIAGNOSIS — D50.8 OTHER IRON DEFICIENCY ANEMIA: Primary | ICD-10-CM

## 2025-08-26 LAB
BASOPHILS # BLD AUTO: 0.02 10*3/MM3 (ref 0–0.2)
BASOPHILS NFR BLD AUTO: 0.4 % (ref 0–1.5)
DEPRECATED RDW RBC AUTO: 50.6 FL (ref 37–54)
EOSINOPHIL # BLD AUTO: 0.15 10*3/MM3 (ref 0–0.4)
EOSINOPHIL NFR BLD AUTO: 3.3 % (ref 0.3–6.2)
ERYTHROCYTE [DISTWIDTH] IN BLOOD BY AUTOMATED COUNT: 15.6 % (ref 12.3–15.4)
HCT VFR BLD AUTO: 26.8 % (ref 34–46.6)
HGB BLD-MCNC: 8.1 G/DL (ref 12–15.9)
IMM GRANULOCYTES # BLD AUTO: 0.03 10*3/MM3 (ref 0–0.05)
IMM GRANULOCYTES NFR BLD AUTO: 0.7 % (ref 0–0.5)
LYMPHOCYTES # BLD AUTO: 0.61 10*3/MM3 (ref 0.7–3.1)
LYMPHOCYTES NFR BLD AUTO: 13.5 % (ref 19.6–45.3)
MCH RBC QN AUTO: 27.5 PG (ref 26.6–33)
MCHC RBC AUTO-ENTMCNC: 30.2 G/DL (ref 31.5–35.7)
MCV RBC AUTO: 90.8 FL (ref 79–97)
MONOCYTES # BLD AUTO: 0.32 10*3/MM3 (ref 0.1–0.9)
MONOCYTES NFR BLD AUTO: 7.1 % (ref 5–12)
NEUTROPHILS NFR BLD AUTO: 3.4 10*3/MM3 (ref 1.7–7)
NEUTROPHILS NFR BLD AUTO: 75 % (ref 42.7–76)
NRBC BLD AUTO-RTO: 0 /100 WBC (ref 0–0.2)
PLATELET # BLD AUTO: 139 10*3/MM3 (ref 140–450)
PMV BLD AUTO: 9.5 FL (ref 6–12)
RBC # BLD AUTO: 2.95 10*6/MM3 (ref 3.77–5.28)
WBC NRBC COR # BLD AUTO: 4.53 10*3/MM3 (ref 3.4–10.8)

## 2025-08-26 PROCEDURE — 63710000001 ACETAMINOPHEN 325 MG TABLET: Performed by: INTERNAL MEDICINE

## 2025-08-26 PROCEDURE — A9270 NON-COVERED ITEM OR SERVICE: HCPCS | Performed by: INTERNAL MEDICINE

## 2025-08-26 PROCEDURE — 25010000002 IRON SUCROSE PER 1 MG: Performed by: INTERNAL MEDICINE

## 2025-08-26 PROCEDURE — 96365 THER/PROPH/DIAG IV INF INIT: CPT

## 2025-08-26 PROCEDURE — 25810000003 SODIUM CHLORIDE 0.9 % SOLUTION 250 ML FLEX CONT: Performed by: INTERNAL MEDICINE

## 2025-08-26 PROCEDURE — 63710000001 CETIRIZINE 10 MG TABLET: Performed by: INTERNAL MEDICINE

## 2025-08-26 PROCEDURE — 25810000003 SODIUM CHLORIDE 0.9 % SOLUTION: Performed by: INTERNAL MEDICINE

## 2025-08-26 PROCEDURE — 85025 COMPLETE CBC W/AUTO DIFF WBC: CPT | Performed by: INTERNAL MEDICINE

## 2025-08-26 RX ORDER — ACETAMINOPHEN 325 MG/1
650 TABLET ORAL ONCE
Status: COMPLETED | OUTPATIENT
Start: 2025-08-26 | End: 2025-08-26

## 2025-08-26 RX ORDER — SODIUM CHLORIDE 9 MG/ML
20 INJECTION, SOLUTION INTRAVENOUS ONCE
Status: COMPLETED | OUTPATIENT
Start: 2025-08-26 | End: 2025-08-26

## 2025-08-26 RX ORDER — CETIRIZINE HYDROCHLORIDE 10 MG/1
10 TABLET ORAL ONCE
Status: COMPLETED | OUTPATIENT
Start: 2025-08-26 | End: 2025-08-26

## 2025-08-26 RX ADMIN — SODIUM CHLORIDE 300 MG: 9 INJECTION, SOLUTION INTRAVENOUS at 14:42

## 2025-08-26 RX ADMIN — SODIUM CHLORIDE 20 ML/HR: 9 INJECTION, SOLUTION INTRAVENOUS at 14:30

## 2025-08-26 RX ADMIN — ACETAMINOPHEN 650 MG: 325 TABLET ORAL at 14:17

## 2025-08-26 RX ADMIN — CETIRIZINE HYDROCHLORIDE 10 MG: 10 TABLET, FILM COATED ORAL at 14:17

## (undated) DEVICE — LOU LAP CHOLE: Brand: MEDLINE INDUSTRIES, INC.

## (undated) DEVICE — SKIN PREP TRAY W/CHG: Brand: MEDLINE INDUSTRIES, INC.

## (undated) DEVICE — PK KN TOTL 40

## (undated) DEVICE — SUT VIC 3/0 CTI 36IN J944H

## (undated) DEVICE — ADAPT CLN BIOGUARD AIR/H2O DISP

## (undated) DEVICE — ADAPT SWVL FIBROPTIC BRONCH

## (undated) DEVICE — TUBING, SUCTION, 1/4" X 20', STRAIGHT: Brand: MEDLINE INDUSTRIES, INC.

## (undated) DEVICE — DISPOSABLE TOURNIQUET CUFF SINGLE BLADDER, SINGLE PORT AND QUICK CONNECT CONNECTOR: Brand: COLOR CUFF

## (undated) DEVICE — SOL ISO/ALC RUB 70PCT 4OZ

## (undated) DEVICE — DECANTER BAG 9": Brand: MEDLINE INDUSTRIES, INC.

## (undated) DEVICE — ENDOPATH PNEUMONEEDLE INSUFFLATION NEEDLES WITH LUER LOCK CONNECTORS 120MM: Brand: ENDOPATH

## (undated) DEVICE — ENDOPATH XCEL BLADELESS TROCARS WITH STABILITY SLEEVES: Brand: ENDOPATH XCEL

## (undated) DEVICE — SUT MNCRYL PLS ANTIB UD 4/0 PS2 18IN

## (undated) DEVICE — ANTIBACTERIAL UNDYED BRAIDED (POLYGLACTIN 910), SYNTHETIC ABSORBABLE SUTURE: Brand: COATED VICRYL

## (undated) DEVICE — PREP SOL POVIDONE/IODINE BT 4OZ

## (undated) DEVICE — ECHELON FLEX  POWERED VASCULAR STAPLER WITH ADVANCED PLACEMENT TIP, 35MM: Brand: ECHELON FLEX

## (undated) DEVICE — TRAP,MUCUS SPECIMEN, 80CC: Brand: MEDLINE

## (undated) DEVICE — SUT SILK 3/0 TIES 18IN A184H

## (undated) DEVICE — SPNG DISECTOR KTNER XRAY COTN 1/4X9/16IN PK/5

## (undated) DEVICE — GLV SURG SENSICARE PI PF LF 7 GRN STRL

## (undated) DEVICE — NITINOL WIRE WITH HYDROPHILIC TIP: Brand: SENSOR

## (undated) DEVICE — CEMENT MIXING SYSTEM WITH FEMORAL BREAKWAY NOZZLE: Brand: REVOLUTION

## (undated) DEVICE — ADHS SKIN DERMABOND

## (undated) DEVICE — INTENDED TO SUPPORT AND MAINTAIN THE POSITION OF AN ANESTHETIZED PATIENT DURING SURGERY: Brand: HERMANTOR XL PINK KNEE POSITIONING PAD

## (undated) DEVICE — SUT VIC 2/0 CT2 27IN J269H

## (undated) DEVICE — 3M™ IOBAN™ 2 ANTIMICROBIAL INCISE DRAPE 6650EZ: Brand: IOBAN™ 2

## (undated) DEVICE — SUT VIC 2 TP1 54IN J880T

## (undated) DEVICE — ELECTRD BLD EXT EDGE/INSUL 6IN

## (undated) DEVICE — GLV SURG SENSICARE PI MIC PF SZ6.5 LF STRL

## (undated) DEVICE — 3M™ IOBAN™ 2 ANTIMICROBIAL INCISE DRAPE 6648EZ: Brand: IOBAN™ 2

## (undated) DEVICE — UNDERCAST PADDING: Brand: DEROYAL

## (undated) DEVICE — KT ASP VIZISHOT 5SYRG 5BIOPSY/VLV 22G

## (undated) DEVICE — GLV SURG BIOGEL LTX PF 7

## (undated) DEVICE — SUT VIC 5/0 PS2 18IN J495H

## (undated) DEVICE — SINGLE USE BIOPSY VALVE MAJ-210: Brand: SINGLE USE BIOPSY VALVE (STERILE)

## (undated) DEVICE — GLV SURG SENSICARE POLYISPRN W/ALOE PF LF 6.5 GRN STRL

## (undated) DEVICE — SUT VIC 3/0 TIES 18IN J110T

## (undated) DEVICE — HI-TORQUE BALANCE MIDDLEWEIGHT GUIDE WIRE .014 STRAIGHT TIP 3.0 CM X 190 CM: Brand: HI-TORQUE BALANCE MIDDLEWEIGHT

## (undated) DEVICE — PK CHST BRST 40

## (undated) DEVICE — ELECTRD BLD EDGE/INSUL1P 2.4X5.1MM STRL

## (undated) DEVICE — THE SINGLE USE ETRAP – POLYP TRAP IS USED FOR SUCTION RETRIEVAL OF ENDOSCOPICALLY REMOVED POLYPS.: Brand: ETRAP

## (undated) DEVICE — SOL IRR NACL 0.9PCT 3000ML

## (undated) DEVICE — PRT BIOP SEALS

## (undated) DEVICE — CATH URETRL FLXITP POLLACK STD 5F 70CM

## (undated) DEVICE — PK URETSCP 40

## (undated) DEVICE — BLD SAW STERN L 32.0MM H 6.0MM

## (undated) DEVICE — TRAP FLD MINIVAC MEGADYNE 100ML

## (undated) DEVICE — CATH DIAG IMPULSE PIG 5F 100CM

## (undated) DEVICE — FIBR LASR FLEXIVAPULSE242 HOLMIUM FLT/TP 1P/U

## (undated) DEVICE — BNDG ELAS ELITE V/CLOSE 6IN 5YD LF STRL

## (undated) DEVICE — ZIP 16 SURGICAL SKIN CLOSURE DEVICE, PSA: Brand: ZIP 16 SURGICAL SKIN CLOSURE DEVICE

## (undated) DEVICE — 3M™ STERI-STRIP™ REINFORCED ADHESIVE SKIN CLOSURES, R1547, 1/2 IN X 4 IN (12 MM X 100 MM), 6 STRIPS/ENVELOPE: Brand: 3M™ STERI-STRIP™

## (undated) DEVICE — GOWN,NON-REINFORCED,SIRUS,SET IN SLV,XL: Brand: MEDLINE

## (undated) DEVICE — STERILE PATIENT PROTECTIVE PAD FOR IMP® KNEE POSITIONERS & COHESIVE WRAP (10 / CASE): Brand: DE MAYO KNEE POSITIONER®

## (undated) DEVICE — CATH DIAG IMPULSE FR4 5F 100CM

## (undated) DEVICE — DRSNG SURESITE WNDW 2.38X2.75

## (undated) DEVICE — ENDOCUT SCISSOR TIP, DISPOSABLE: Brand: RENEW

## (undated) DEVICE — BNDR ABD 4PANEL 12IN 46 TO 62IN

## (undated) DEVICE — GW EMR FIX EXCHG J STD .035 3MM 260CM

## (undated) DEVICE — TUBING, SUCTION, 1/4" X 10', STRAIGHT: Brand: MEDLINE

## (undated) DEVICE — MARKR SKIN W/RULR AND LBL

## (undated) DEVICE — PK PROC MAJ 40

## (undated) DEVICE — KT ORCA ORCAPOD DISP STRL

## (undated) DEVICE — CANNULA,ADULT,SOFT-TOUCH,7'TUBE,UC: Brand: PENDING

## (undated) DEVICE — TIDISHIELD UROLOGY DRAIN BAGS FROSTY VINYL STERILE FITS SIEMENS UROSKOP ACCESS 20 PER CASE: Brand: TIDISHIELD

## (undated) DEVICE — SUT SILK 4/0 TIES 18IN A183H

## (undated) DEVICE — SYR LUERLOK 20CC

## (undated) DEVICE — DRSNG SURESITE WNDW 4X4.5

## (undated) DEVICE — MSK PROC CURAPLEX O2 2/ADAPT 7FT

## (undated) DEVICE — LN SMPL CO2 SHTRM SD STREAM W/M LUER

## (undated) DEVICE — SYS IRR PUMP SGL ACTN VAC SYR 10CC

## (undated) DEVICE — JACKSON-PRATT 100CC BULB RESERVOIR: Brand: CARDINAL HEALTH

## (undated) DEVICE — BLCK/BITE BLOX W/DENTL/RIM W/STRAP 54F

## (undated) DEVICE — LN SMPL O2 NASL/ORL SMART/CAPNOLINE PLS A/

## (undated) DEVICE — NDL HYPO PRECISIONGLIDE REG 25G 1 1/2

## (undated) DEVICE — GLV SURG BIOGEL LTX PF 6 1/2

## (undated) DEVICE — Device

## (undated) DEVICE — INTENDED FOR TISSUE SEPARATION, AND OTHER PROCEDURES THAT REQUIRE A SHARP SURGICAL BLADE TO PUNCTURE OR CUT.: Brand: BARD-PARKER ® CARBON RIB-BACK BLADES

## (undated) DEVICE — FLEXIVA  PULSE  AND  FLEXIVA  PULSE  TRACTIP  LASER  FIBERS  ARE  HIGH  POWER  SINGLE-USE FIBER: Brand: FLEXIVA PULSE

## (undated) DEVICE — GLIDESHEATH BASIC HYDROPHILIC COATED INTRODUCER SHEATH: Brand: GLIDESHEATH

## (undated) DEVICE — TOTAL TRAY, 16FR 10ML SIL FOLEY, URN: Brand: MEDLINE

## (undated) DEVICE — PENCL E/S HNDSWCH ROCKR CB

## (undated) DEVICE — DUAL CUT SAGITTAL BLADE

## (undated) DEVICE — SOL NACL 0.9PCT 1000ML

## (undated) DEVICE — ENCORE® LATEX ORTHO SIZE 8, STERILE LATEX POWDER-FREE SURGICAL GLOVE: Brand: ENCORE

## (undated) DEVICE — GLV SURG SENSICARE PI MIC PF SZ7 LF STRL

## (undated) DEVICE — TBG 02 CRUSH RESIST LF CLR 7FT

## (undated) DEVICE — MSK AIRWY LARYNG LMA UNIQUE STD PK SZ4

## (undated) DEVICE — ENDOPATH XCEL BLUNT TIP TROCARS WITH SMOOTH SLEEVES: Brand: ENDOPATH XCEL

## (undated) DEVICE — NEEDLE, QUINCKE, 20GX3.5": Brand: MEDLINE

## (undated) DEVICE — GLV SURG BIOGEL LTX PF 8 1/2

## (undated) DEVICE — IRRIGATOR BULB ASEPTO 60CC STRL

## (undated) DEVICE — GLV SURG SIGNATURE ESSENTIAL PF LTX SZ8.5

## (undated) DEVICE — SUT SILK 2/0 TIES 18IN A185H

## (undated) DEVICE — SUT PDS 0 CT1 36IN Z346H

## (undated) DEVICE — SUT VIC 0 CT 36IN J958H

## (undated) DEVICE — APPL CHLORAPREP HI/LITE 26ML ORNG

## (undated) DEVICE — SCANLAN® SUTURE BOOT™ INSTRUMENT JAW COVERS - ORIGINAL YELLOW, STANDARD PKG (5 PAIR/CARTRIDGE, 1 CARTRIDGE/PKG): Brand: SCANLAN® SUTURE BOOT™ INSTRUMENT JAW COVERS

## (undated) DEVICE — ENDOPOUCH RETRIEVER SPECIMEN RETRIEVAL BAGS: Brand: ENDOPOUCH RETRIEVER

## (undated) DEVICE — LASSO POLYPECTOMY SNARE: Brand: LASSO

## (undated) DEVICE — SUT VIC 0 CT1 36IN J946H

## (undated) DEVICE — BALN PRESS WEDGE 5F 110CM

## (undated) DEVICE — CATH DIAG IMPULSE FL3.5 5F 100CM

## (undated) DEVICE — CATH CHOLANG 4.5F18IN BRGNDY

## (undated) DEVICE — PATIENT RETURN ELECTRODE, SINGLE-USE, CONTACT QUALITY MONITORING, ADULT, WITH 9FT CORD, FOR PATIENTS WEIGING OVER 33LBS. (15KG): Brand: MEGADYNE

## (undated) DEVICE — APPL DURAPREP IODOPHOR APL 26ML

## (undated) DEVICE — SOL ISO/ALC 70PCT 4OZ

## (undated) DEVICE — BITEBLOCK OMNI BLOC

## (undated) DEVICE — GLV SURG SENSICARE PI MIC PF SZ8 LF STRL

## (undated) DEVICE — SPNG GZ WOVN 4X4IN 12PLY 10/BX STRL

## (undated) DEVICE — LOU CYSTO: Brand: MEDLINE INDUSTRIES, INC.

## (undated) DEVICE — ERBE NESSY®PLATE 170 SPLIT; 168CM²; CABLE 3M: Brand: ERBE

## (undated) DEVICE — SUT SILK 2/0 SH 30IN K833H

## (undated) DEVICE — SENSR O2 OXIMAX FNGR A/ 18IN NONSTR

## (undated) DEVICE — DRAPE,REIN 53X77,STERILE: Brand: MEDLINE

## (undated) DEVICE — SINGLE USE SUCTION VALVE MAJ-209: Brand: SINGLE USE SUCTION VALVE (STERILE)

## (undated) DEVICE — SUT VIC 0 TN 27IN DYED JTN0G

## (undated) DEVICE — Device: Brand: BALLOON

## (undated) DEVICE — SYS PERFUS SEP PLATLT W TIPS CUST

## (undated) DEVICE — TBG PENCL TELESCP MEGADYNE SMOKE EVAC 10FT

## (undated) DEVICE — GLV SURG BIOGEL LTX PF 7 1/2

## (undated) DEVICE — SUT SILK 0 PSL 18IN 580H

## (undated) DEVICE — SUT VIC 2/0 TIES 18IN J111T

## (undated) DEVICE — DRSNG WND BORDR/ADHS NONADHR/GZ LF 4X14IN STRL

## (undated) DEVICE — VITAL SIGNS™ JACKSON-REES CIRCUITS: Brand: VITAL SIGNS™

## (undated) DEVICE — ZIP 24 SURGICAL SKIN CLOSURE DEVICE, PSA: Brand: ZIP 24 SURGICAL SKIN CLOSURE DEVICE

## (undated) DEVICE — LARGE BORE STOPCOCK WITH EXTENSION SET, MALE LUER LOCK ADAPTER WITH RETRACTABLE COLLAR

## (undated) DEVICE — SINGLE-USE BIOPSY FORCEPS: Brand: RADIAL JAW 4

## (undated) DEVICE — BNDG,ELSTC,MATRIX,STRL,6"X5YD,LF,HOOK&LP: Brand: MEDLINE

## (undated) DEVICE — SUT SILK 3/0 SH 30IN K832H